# Patient Record
Sex: MALE | Race: WHITE | Employment: UNEMPLOYED | URBAN - NONMETROPOLITAN AREA
[De-identification: names, ages, dates, MRNs, and addresses within clinical notes are randomized per-mention and may not be internally consistent; named-entity substitution may affect disease eponyms.]

---

## 2016-12-31 LAB
CARBAMAZEPINE SERPL-MCNC: 6.4 UG/ML (ref 4–12)
FELBAMATE LEVEL: 48 UG/ML (ref 40–100)
Lab: 3.4 UG/ML (ref 0.4–4)
TOPIRAMATE LEVEL: 10.9 UG/ML (ref 2–25)

## 2017-01-01 ENCOUNTER — COMMUNICATION - GICH (OUTPATIENT)
Dept: INTERNAL MEDICINE | Facility: OTHER | Age: 47
End: 2017-01-01

## 2017-01-01 DIAGNOSIS — F41.1 GENERALIZED ANXIETY DISORDER: ICD-10-CM

## 2017-01-01 DIAGNOSIS — R56.1 POST TRAUMATIC SEIZURE (H): ICD-10-CM

## 2017-01-01 DIAGNOSIS — Z79.899 OTHER LONG TERM (CURRENT) DRUG THERAPY: ICD-10-CM

## 2017-01-01 DIAGNOSIS — G40.919 INTRACTABLE EPILEPSY WITHOUT STATUS EPILEPTICUS (H): ICD-10-CM

## 2017-01-03 ENCOUNTER — COMMUNICATION - GICH (OUTPATIENT)
Dept: INTERNAL MEDICINE | Facility: OTHER | Age: 47
End: 2017-01-03

## 2017-01-03 DIAGNOSIS — Z79.899 OTHER LONG TERM (CURRENT) DRUG THERAPY: ICD-10-CM

## 2017-01-03 DIAGNOSIS — F41.1 GENERALIZED ANXIETY DISORDER: ICD-10-CM

## 2017-01-03 DIAGNOSIS — R56.1 POST TRAUMATIC SEIZURE (H): ICD-10-CM

## 2017-01-03 DIAGNOSIS — S02.5XXB: ICD-10-CM

## 2017-01-03 DIAGNOSIS — G40.919 INTRACTABLE EPILEPSY WITHOUT STATUS EPILEPTICUS (H): ICD-10-CM

## 2017-01-06 ENCOUNTER — TELEPHONE (OUTPATIENT)
Dept: NEUROLOGY | Facility: CLINIC | Age: 47
End: 2017-01-06

## 2017-01-06 DIAGNOSIS — G40.219 PARTIAL EPILEPSY WITH IMPAIRMENT OF CONSCIOUSNESS, INTRACTABLE (H): Primary | ICD-10-CM

## 2017-01-06 DIAGNOSIS — G40.909 EPILEPTIC SEIZURE (H): Primary | ICD-10-CM

## 2017-01-06 DIAGNOSIS — G40.219 PARTIAL SYMPTOMATIC EPILEPSY WITH COMPLEX PARTIAL SEIZURES, INTRACTABLE, WITHOUT STATUS EPILEPTICUS (H): ICD-10-CM

## 2017-01-06 NOTE — TELEPHONE ENCOUNTER
PLAN:  Discussed with Dr. Plaza    1) Likely flu related.    2) Will check CBC, SGOT, and LFT    3) Fax lab order to [facility]    4) Follow up with PCP.

## 2017-01-06 NOTE — TELEPHONE ENCOUNTER
"Caller: Eleanor   Relationship to Patient: wife   Call Back Number: [phone]   Reason for Call: patient has been not feeling well, having hot flashes and getting sick. No fever.Would like to know if this is due to the VNS.  Spoke with PCP office already and think it maybe seizure related.     First time Joni became sick he had several layers of clothes on/was bundled up.   At some point Joni fell to the ground, Eleanor said this wasn't a seizure.   She feels his thinking is \"out of wack\"  His sleeping patterns are \"off\".  For two weeks he didn't get out of bed until 5 or 6 PM.  No he's lost his appetite. He's vomited a couple of times.   They called PCP office who directed them to call [facility].  Currently have an appointment February 9, 2017.    Current/confirmed anticonvulsant medication:    FBM [phone]  CBZ [phone]  TPM [phone]    "

## 2017-01-12 ENCOUNTER — TRANSFERRED RECORDS (OUTPATIENT)
Dept: HEALTH INFORMATION MANAGEMENT | Facility: CLINIC | Age: 47
End: 2017-01-12

## 2017-01-12 ENCOUNTER — HISTORY (OUTPATIENT)
Dept: INTERNAL MEDICINE | Facility: OTHER | Age: 47
End: 2017-01-12

## 2017-01-12 ENCOUNTER — OFFICE VISIT - GICH (OUTPATIENT)
Dept: INTERNAL MEDICINE | Facility: OTHER | Age: 47
End: 2017-01-12

## 2017-01-12 DIAGNOSIS — R53.81 OTHER MALAISE: ICD-10-CM

## 2017-01-12 DIAGNOSIS — R41.0 DISORIENTATION: ICD-10-CM

## 2017-01-12 DIAGNOSIS — G40.909 EPILEPSY WITHOUT STATUS EPILEPTICUS, NOT INTRACTABLE (H): ICD-10-CM

## 2017-01-12 DIAGNOSIS — R26.89 OTHER ABNORMALITIES OF GAIT AND MOBILITY: ICD-10-CM

## 2017-01-12 DIAGNOSIS — R53.83 OTHER FATIGUE: ICD-10-CM

## 2017-01-12 LAB
A/G RATIO - HISTORICAL: 1.3 (ref 1–2)
ABS BASOPHILS: 0.1 THOU/CU MM
ABSOLUTE BASOPHILS - HISTORICAL: 0.1 THOU/CU MM
ABSOLUTE EOSINOPHILS - HISTORICAL: 0.1 THOU/CU MM
ABSOLUTE LYMPHOCYTES - HISTORICAL: 2.2 THOU/CU MM (ref 0.9–2.9)
ABSOLUTE MONOCYTES - HISTORICAL: 0.4 THOU/CU MM
ABSOLUTE NEUTROPHILS - HISTORICAL: 2.8 THOU/CU MM (ref 1.7–7)
ALBUMIN SERPL-MCNC: 4 G/DL (ref 3.5–5.7)
ALBUMIN SERPL-MCNC: 4 G/DL (ref 3.5–5.7)
ALBUMIN/GLOB SERPL: 1.3 {RATIO} (ref 1–2)
ALP SERPL-CCNC: 104 IU/L (ref 34–104)
ALP SERPL-CCNC: 104 IU/L (ref 34–104)
ALT (SGPT) - HISTORICAL: 11 IU/L (ref 7–52)
ALT SERPL W/O P-5'-P-CCNC: 11 IU/L (ref 7–52)
AST SERPL-CCNC: 13 IU/L (ref 13–39)
AST SERPL-CCNC: 13 IU/L (ref 13–39)
BASOPHILS # BLD AUTO: 1.1 %
BASOPHILS NFR BLD AUTO: 1.1 %
BILIRUB SERPL-MCNC: 0.4 MG/DL (ref 0.3–1)
BILIRUB SERPL-MCNC: 0.4 MG/DL (ref 0.3–1)
BILIRUBIN DIRECT: 0.06 MG/DL (ref 0.03–0.18)
BILIRUBIN, DIRECT: 0.06 MG/DL (ref 0.03–0.18)
BILIRUBIN,INDIRECT: 0.3 MG/DL (ref 0.2–0.8)
BILIRUBIN,INDIRECT: 0.3 MG/DL (ref 0.2–0.8)
EOSINOPHIL # BLD AUTO: 0.1 THOU/CU MM
EOSINOPHIL NFR BLD AUTO: 2.4 %
EOSINOPHIL NFR BLD AUTO: 2.4 %
ERYTHROCYTE [DISTWIDTH] IN BLOOD BY AUTOMATED COUNT: 12.6 % (ref 11.5–15.5)
ERYTHROCYTE [DISTWIDTH] IN BLOOD BY AUTOMATED COUNT: 12.6 % (ref 11.5–15.5)
GLOBULIN - HISTORICAL: 3.2 G/DL (ref 2–3.7)
GLOBULIN: 3.2 G/DL (ref 2–3.7)
HCT VFR BLD AUTO: 39.3 % (ref 37–53)
HCT VFR BLD AUTO: 39.3 % (ref 37–53)
HEMOGLOBIN: 13.3 G/DL (ref 13.5–17.5)
HEMOGLOBIN: 13.3 G/DL (ref 13.5–17.5)
LYMPHOCYTES # BLD AUTO: 2.2 THOU/CU MM (ref 0.9–2.9)
LYMPHOCYTES NFR BLD AUTO: 39 % (ref 20–44)
LYMPHOCYTES NFR BLD AUTO: 39 % (ref 20–44)
MCH RBC QN AUTO: 31.7 PG (ref 26–34)
MCH RBC QN AUTO: 31.7 PG (ref 26–34)
MCHC RBC AUTO-ENTMCNC: 34 G/DL (ref 32–36)
MCHC RBC AUTO-ENTMCNC: 34 G/DL (ref 32–36)
MCV RBC AUTO: 93 FL (ref 80–100)
MCV RBC AUTO: 93 FL (ref 80–100)
MONOCYTES # BLD AUTO: 0.4 THOU/CU MM
MONOCYTES NFR BLD AUTO: 7.1 %
MONOCYTES NFR BLD AUTO: 7.1 %
NEUTROPHILS # BLD AUTO: 2.8 THOU/CU MM (ref 1.7–7)
NEUTROPHILS NFR BLD AUTO: 50.4 % (ref 42–72)
NEUTROPHILS NFR BLD AUTO: 50.4 % (ref 42–72)
PLATELET # BLD AUTO: 255 THOU/CU MM (ref 140–440)
PLATELET # BLD AUTO: 255 THOU/CU MM (ref 140–440)
PMV BLD: 8.4 FL (ref 6.5–11)
PMV BLD: 8.4 FL (ref 6.5–11)
PROT SERPL-MCNC: 7.2 G/DL (ref 6.4–8.9)
PROT UR QL STRIP.AUTO: 7.2 G/DL (ref 6.4–8.9)
RBC # BLD AUTO: 4.21 MIL/CU MM (ref 4.3–5.9)
RED BLOOD COUNT - HISTORICAL: 4.21 MIL/CU MM (ref 4.3–5.9)
SODIUM SERPL-SCNC: 139 MMOL/L (ref 133–143)
SODIUM SERPL-SCNC: 139 MMOL/L (ref 133–143)
WBC # BLD AUTO: 5.5 THOU/CU MM (ref 4.5–11)
WHITE BLOOD COUNT - HISTORICAL: 5.5 THOU/CU MM (ref 4.5–11)

## 2017-01-12 ASSESSMENT — PATIENT HEALTH QUESTIONNAIRE - PHQ9: SUM OF ALL RESPONSES TO PHQ QUESTIONS 1-9: 0

## 2017-01-13 DIAGNOSIS — G40.909 EPILEPTIC SEIZURE (H): ICD-10-CM

## 2017-01-13 DIAGNOSIS — G40.219 PARTIAL EPILEPSY WITH IMPAIRMENT OF CONSCIOUSNESS, INTRACTABLE (H): ICD-10-CM

## 2017-01-31 ENCOUNTER — TELEPHONE (OUTPATIENT)
Dept: NEUROLOGY | Facility: CLINIC | Age: 47
End: 2017-01-31

## 2017-01-31 NOTE — TELEPHONE ENCOUNTER
"Patient's wife called to clinic 1-30-17 with report that patient had decreased Carbatrol on his own and that PCP had said that it was OK.    Writer returned call to Eleanor -patient's wife.      Wife has many concerns about her , who at the time of call is living with his parents.  These concerns include that after noticing increased seizure activity, she questioned him about his meds and was told by him that he had skipped part of his medications due to his eye feeling funny.  She states that he often adjust his meds according to how he is feeling and that he often complains of feeling toxic.  She also states that he usually adjust his Carbatrol.    She also indicates that she feels there is something more and unusual going on now and indicates that she is concerned about a tumor re-growth.  She feels that he needs to have another MRI while here for his upcoming appointment 2/9/17.  She indicates that a MRI was ordered, but did not get done due to his VNS and \"the people up here don't know how to deal with it\".    Information will be forwarded to MD for appointment.    Tony Ramirez          "

## 2017-01-31 NOTE — TELEPHONE ENCOUNTER
Caller: brittany   Relationship to Patient: mother   Call Back Number: [phone]   Reason for Call: patient mother is requesting that Valentina gives her a phone call    Brittany (mom) calls reporting Joni will be moving to his parents home ( from spouse)  Mom expressed concern that Joni has lost weight, loss of appetite and experiencing headaches caused by light sensitivity.  Villa (brother) isn't observing as many seizures / seizures are better controlled.  Mom was called to see if she should help Joni establish care with PCP.  Appointment scheduled with Dr. Bush on 2/9/17.      PLAN:  Establish care with PCP.  Keep appointment 2/8/17  Call if seizures/concerns

## 2017-02-07 ENCOUNTER — OFFICE VISIT (OUTPATIENT)
Dept: FAMILY MEDICINE | Facility: CLINIC | Age: 47
End: 2017-02-07
Payer: MEDICARE

## 2017-02-07 VITALS
SYSTOLIC BLOOD PRESSURE: 110 MMHG | DIASTOLIC BLOOD PRESSURE: 66 MMHG | BODY MASS INDEX: 27.28 KG/M2 | HEIGHT: 68 IN | WEIGHT: 180 LBS | HEART RATE: 85 BPM | OXYGEN SATURATION: 98 % | TEMPERATURE: 98.2 F

## 2017-02-07 DIAGNOSIS — K21.9 GASTROESOPHAGEAL REFLUX DISEASE WITHOUT ESOPHAGITIS: ICD-10-CM

## 2017-02-07 DIAGNOSIS — Z96.89 STATUS POST VNS (VAGUS NERVE STIMULATOR) PLACEMENT: ICD-10-CM

## 2017-02-07 DIAGNOSIS — G40.209 PARTIAL EPILEPSY WITH IMPAIRMENT OF CONSCIOUSNESS (H): ICD-10-CM

## 2017-02-07 DIAGNOSIS — F41.8 MIXED ANXIETY DEPRESSIVE DISORDER: ICD-10-CM

## 2017-02-07 DIAGNOSIS — F32.4 MAJOR DEPRESSIVE DISORDER WITH SINGLE EPISODE, IN PARTIAL REMISSION (H): ICD-10-CM

## 2017-02-07 DIAGNOSIS — R26.81 UNSTABLE GAIT: ICD-10-CM

## 2017-02-07 DIAGNOSIS — C71.9 OLIGODENDROGLIOMA (H): ICD-10-CM

## 2017-02-07 DIAGNOSIS — F41.9 ANXIETY: ICD-10-CM

## 2017-02-07 DIAGNOSIS — Z72.0 TOBACCO USE: ICD-10-CM

## 2017-02-07 PROBLEM — L30.1 VESICULAR PALMOPLANTAR ECZEMA: Status: ACTIVE | Noted: 2017-02-07

## 2017-02-07 PROBLEM — M91.91: Status: ACTIVE | Noted: 2017-02-07

## 2017-02-07 PROBLEM — M91.91: Status: RESOLVED | Noted: 2017-02-07 | Resolved: 2017-02-07

## 2017-02-07 PROBLEM — M91.90: Status: ACTIVE | Noted: 2017-02-07

## 2017-02-07 PROBLEM — Z98.890 HISTORY OF BILIARY T-TUBE PLACEMENT: Status: RESOLVED | Noted: 2017-01-25 | Resolved: 2017-02-07

## 2017-02-07 PROBLEM — Z98.890 HISTORY OF BILIARY T-TUBE PLACEMENT: Status: ACTIVE | Noted: 2017-01-25

## 2017-02-07 PROCEDURE — 99204 OFFICE O/P NEW MOD 45 MIN: CPT | Performed by: FAMILY MEDICINE

## 2017-02-07 RX ORDER — ESCITALOPRAM OXALATE 20 MG/1
20 TABLET ORAL DAILY
Qty: 90 TABLET | Refills: 1 | Status: ON HOLD | OUTPATIENT
Start: 2017-02-07 | End: 2017-03-24

## 2017-02-07 RX ORDER — PANTOPRAZOLE SODIUM 40 MG/1
40 TABLET, DELAYED RELEASE ORAL DAILY
Qty: 90 TABLET | Refills: 1 | Status: ON HOLD | OUTPATIENT
Start: 2017-02-07 | End: 2017-03-24

## 2017-02-07 RX ORDER — CARBAMAZEPINE 300 MG/1
300 CAPSULE, EXTENDED RELEASE ORAL 2 TIMES DAILY
COMMUNITY
Start: 2017-01-12 | End: 2017-02-09

## 2017-02-07 ASSESSMENT — ANXIETY QUESTIONNAIRES
6. BECOMING EASILY ANNOYED OR IRRITABLE: SEVERAL DAYS
1. FEELING NERVOUS, ANXIOUS, OR ON EDGE: SEVERAL DAYS
2. NOT BEING ABLE TO STOP OR CONTROL WORRYING: SEVERAL DAYS
5. BEING SO RESTLESS THAT IT IS HARD TO SIT STILL: NOT AT ALL
GAD7 TOTAL SCORE: 6
7. FEELING AFRAID AS IF SOMETHING AWFUL MIGHT HAPPEN: SEVERAL DAYS
3. WORRYING TOO MUCH ABOUT DIFFERENT THINGS: SEVERAL DAYS
IF YOU CHECKED OFF ANY PROBLEMS ON THIS QUESTIONNAIRE, HOW DIFFICULT HAVE THESE PROBLEMS MADE IT FOR YOU TO DO YOUR WORK, TAKE CARE OF THINGS AT HOME, OR GET ALONG WITH OTHER PEOPLE: SOMEWHAT DIFFICULT

## 2017-02-07 ASSESSMENT — PATIENT HEALTH QUESTIONNAIRE - PHQ9: 5. POOR APPETITE OR OVEREATING: SEVERAL DAYS

## 2017-02-07 NOTE — PROGRESS NOTES
SUBJECTIVE:                                                    Joni Borja is a 46 year old male who presents to clinic today for the following health issues:    Mood Problems:  The patient recently moved down to [place] to live with his parents for better management of his medical problems. He recently  from his wife and was living in his work shop. The patient has been experiencing a lot of stress due to the relationship problems. He wants to go to therapy with his wife but she refused.    Weight Loss:  The patient has been experiencing unexpected weight loss for the past two months. He has a decreased appetite but has hanny drinking a lot of water. He denies vomiting and diarrhea. He recently decreased his Carbuterol his dosage to one 300 mg tablet twice daily. He drinks alcohol on the weekends.    Seizures:  The patient has a few partial seizures a week. He has not had Grand Mal seizures in the past few years. The patient does not remember the seizures occuring and is confused after the episodes. He has a history of a right sided frontal oligodendroglioma tumor. The tumor was removed in 2013. He is currently working with a neurologist. He has an implant in is vagus nerve that help prevent seizures. The implant sends impulses every five minutes. The implant is close to his vocal chords. He turns it off when he is talking because it makes it difficult to understand him.    Memory Issues:  The patient states that he occasionally has memory problems. He is oriented to place and time but occasionally has difficult remembering details.      Problem list and histories reviewed & adjusted, as indicated.  Additional history: as documented      ROS:  Constitutional, HEENT, cardiovascular, pulmonary, GI, , musculoskeletal, neuro, skin, endocrine and psych systems are negative, except as otherwise noted.    This document serves as a record of the services and decisions personally performed and made by  "Brian Coon MD. It was created on his behalf by Lisa Reynoso, a trained medical scribe. The creation of this document is based on the provider's statements to the medical scribe.  Lisa Reynoso 1:14 PM 2/7/2017  OBJECTIVE:                                                    /66 mmHg  Pulse 85  Temp(Src) 98.2  F (36.8  C) (Oral)  Ht 1.727 m (5' 8\")  Wt 81.647 kg (180 lb)  BMI 27.38 kg/m2  SpO2 98% Body mass index is 27.38 kg/(m^2).   GENERAL: healthy, alert, well nourished, well hydrated, no distress  HENT: ear canals- normal; TMs- normal; Nose- normal; Mouth- no ulcers, no lesions  NECK: no tenderness, no adenopathy, no asymmetry, no masses, no stiffness; thyroid- normal to palpation  RESP: lungs clear to auscultation - no rales, no rhonchi, no wheezes  CV: regular rates and rhythm, normal S1 S2, no S3 or S4 and no murmur, no click or rub -  ABDOMEN: soft, no tenderness, no  hepatosplenomegaly, no masses, normal bowel sounds  MS: extremities- no gross deformities noted, no edema  SKIN: no suspicious lesions, no rashes    Diagnostic test results:  none      ASSESSMENT/PLAN:       Joni was seen today for establish care.    Diagnoses and all orders for this visit:    Partial epilepsy with impairment of consciousness, intractable (H) - stable - continue meds and VNS.  Recent labs done at prior clinic - in [facility]    Anxiety - - controlled - continue medication./Major depressive disorder with single episode, in partial remission (H) - controlled - continue medication.  -     escitalopram (LEXAPRO) 20 MG tablet; Take 1 tablet (20 mg) by mouth daily 1 tab nightly  -     MENTAL HEALTH REFERRAL    Tobacco use    Unstable gait    Oligodendroglioma (H) - s/p excision        Gastroesophageal reflux disease without esophagitis  -     pantoprazole (PROTONIX) 40 MG EC tablet; Take 1 tablet (40 mg) by mouth daily            Risks, benefits and alternatives of treatments discussed. Plan agreed on.  " "    Followup: 6 months    Will call, return to clinic, or go to ED if worsening or symptoms not improving as discussed.    See patient instructions.     Tobacco Cessation:   reports that he has been smoking Cigarettes.  He has a 20 pack-year smoking history. He has never used smokeless tobacco.  Tobacco Cessation Action Plan: Information offered: Patient not interested at this time    BMI:   Estimated body mass index is 27.38 kg/(m^2) as calculated from the following:    Height as of this encounter: 1.727 m (5' 8\").    Weight as of this encounter: 81.647 kg (180 lb).   Weight management plan: Discussed healthy diet and exercise guidelines and patient will follow up in 12 months in clinic to re-evaluate.      Health Maintenance Topics with due status: Overdue       Topic Date Due    LIPID SCREEN Q5 YR MALE (SYSTEM ASSIGNED) 12/19/2005    INFLUENZA VACCINE (SYSTEM ASSIGNED) 09/01/2016       Health maintenance reviewed/updated? Yes    The information in this document, created by a scribe for me, accurately reflects the services I personally performed and the decisions made by me. I have reviewed and approved this document for accuracy.      Viet Coon MD     "

## 2017-02-07 NOTE — NURSING NOTE
"Chief Complaint   Patient presents with     Establish Care       Initial /66 mmHg  Pulse 85  Temp(Src) 98.2  F (36.8  C) (Oral)  Ht 5' 8\" (1.727 m)  Wt 180 lb (81.647 kg)  BMI 27.38 kg/m2  SpO2 98% Estimated body mass index is 27.38 kg/(m^2) as calculated from the following:    Height as of this encounter: 5' 8\" (1.727 m).    Weight as of this encounter: 180 lb (81.647 kg).  Medication Reconciliation: complete  "

## 2017-02-07 NOTE — MR AVS SNAPSHOT
After Visit Summary   2/7/2017    Joni Borja    MRN: [id number]           Patient Information     Date Of Birth          1970        Visit Information        Provider Department      2/7/2017 1:20 PM Brian Coon MD [facility]        Today's Diagnoses     Partial epilepsy with impairment of consciousness, intractable (H)    -  1     Anxiety         Unstable gait         Oligodendroglioma (H)         Major depressive disorder with single episode, in partial remission (H)            Follow-ups after your visit        Additional Services     MENTAL HEALTH REFERRAL       Your provider has referred you to: [facility]al [facility] Providers Intake Scheduling [phone]   [email or web address]    All scheduling is subject to the client's specific insurance plan & benefits, provider/location availability, and provider clinical specialities.  Please arrive 15 minutes early for your first appointment and bring your completed paperwork.    Please be aware that coverage of these services is subject to the terms and limitations of your health insurance plan.  Call member services at your health plan with any benefit or coverage questions.                  Your next 10 appointments already scheduled     Feb 09, 2017  2:30 PM   Return Visit with Moshe Bush MD   [facility] ([facility])    [address] [place] [address], [address]  [facility] [address] [phone]              Who to contact     If you have questions or need follow up information about today's clinic visit or your schedule please contact [facility] directly at [phone].  Normal or non-critical lab and imaging results will be communicated to you by MyChart, letter or phone within 4 business days after the clinic has received the results. If you do not hear from us within 7 days, please contact the clinic through MyChart or phone. If you have a critical or abnormal lab result,  "we will notify you by phone as soon as possible.  Submit refill requests through [employer] or call your pharmacy and they will forward the refill request to us. Please allow 3 business days for your refill to be completed.          Additional Information About Your Visit        [employer] Information     [employer] lets you send messages to your doctor, view your test results, renew your prescriptions, schedule appointments and more. To sign up, go to [email or web address] . Click on \"Log in\" on the left side of the screen, which will take you to the Welcome page. Then click on \"Sign up Now\" on the right side of the page.     You will be asked to enter the access code listed below, as well as some personal information. Please follow the directions to create your username and password.     Your access code is: [email or web address]  Expires: 2017  2:06 PM     Your access code will  in 90 days. If you need help or a new code, please call your [place] clinic or [phone].        Care EveryWhere ID     This is your Care EveryWhere ID. This could be used by other organizations to access your [facility] medical records  [id number]        Your Vitals Were     Pulse Temperature Height BMI (Body Mass Index) Pulse Oximetry       85 98.2  F (36.8  C) (Oral) 5' 8\" (1.727 m) 27.38 kg/m2 98%        Blood Pressure from Last 3 Encounters:   17 110/66   16 113/69   16 117/76    Weight from Last 3 Encounters:   17 180 lb (81.647 kg)   16 190 lb 3.2 oz (86.274 kg)   16 196 lb 6.4 oz (89.086 kg)              We Performed the Following     MENTAL HEALTH REFERRAL          Today's Medication Changes          These changes are accurate as of: 17  2:06 PM.  If you have any questions, ask your nurse or doctor.               These medicines have changed or have updated prescriptions.        Dose/Directions    carBAMazepine 300 MG 12 hr capsule   Commonly known as:  CARBATROL   This may have changed:  " Another medication with the same name was removed. Continue taking this medication, and follow the directions you see here.   Changed by:  Brian Coon MD        Dose:  300 mg   Take 300 mg by mouth 2 times daily   Refills:  0       escitalopram 20 MG tablet   Commonly known as:  LEXAPRO   This may have changed:    - medication strength  - when to take this   Used for:  Partial epilepsy with impairment of consciousness, intractable (H), Major depressive disorder with single episode, in partial remission (H), Anxiety, Unstable gait   Changed by:  Brian Coon MD        Dose:  20 mg   Take 1 tablet (20 mg) by mouth daily 1 tab nightly   Quantity:  90 tablet   Refills:  1            Where to get your medicines      These medications were sent to [id number] - [place] - [address]  [address] [facility] [place] [address]     Phone:  [phone]    - escitalopram 20 MG tablet  - pantoprazole 40 MG EC tablet             Primary Care Provider Office Phone # Fax #    Brian Coon [phone]       [address] [address]        Thank you!     Thank you for choosing [facility]  for your care. Our goal is always to provide you with excellent care. Hearing back from our patients is one way we can continue to improve our services. Please take a few minutes to complete the written survey that you may receive in the mail after your visit with us. Thank you!             Your Updated Medication List - Protect others around you: Learn how to safely use, store and throw away your medicines at www.disposemymeds.org.          This list is accurate as of: 2/7/17  2:06 PM.  Always use your most recent med list.                   Brand Name Dispense Instructions for use    acetaminophen-codeine 300-30 MG per tablet    TYLENOL #3     Take 1-2 tablets by mouth every 4 hours as needed       BENADRYL PO      Take by  mouth as needed       carBAMazepine 300 MG 12 hr capsule    CARBATROL     Take 300 mg by mouth 2 times daily       clonazePAM 0.5 MG tablet    klonoPIN    180 tablet    1 tab am, 2 tab pm       diazepam 5 MG tablet    VALIUM    30 tablet    As instructed for seizures. Please take 5 mg for more than 3 seizures in 8 hour period, may repeat 5 mg after 30 min if seizures continue.       escitalopram 20 MG tablet    LEXAPRO    90 tablet    Take 1 tablet (20 mg) by mouth daily 1 tab nightly       felbamate 600 MG tablet    FELBATOL    225 tablet    TAKE 1 TAB AM and 1 1/2 TAB in afternoon at 2 pm (600mg-900mg)       pantoprazole 40 MG EC tablet    PROTONIX    90 tablet    Take 1 tablet (40 mg) by mouth daily       topiramate 100 MG tablet    TOPAMAX    450 tablet    Take 2 tab po am - 3 tab po pm       ZYRTEC ALLERGY PO      Take 10 mg by mouth as needed

## 2017-02-08 ASSESSMENT — PATIENT HEALTH QUESTIONNAIRE - PHQ9: SUM OF ALL RESPONSES TO PHQ QUESTIONS 1-9: 10

## 2017-02-08 ASSESSMENT — ANXIETY QUESTIONNAIRES: GAD7 TOTAL SCORE: 6

## 2017-02-09 ENCOUNTER — AMBULATORY - GICH (OUTPATIENT)
Dept: SCHEDULING | Facility: OTHER | Age: 47
End: 2017-02-09

## 2017-02-09 ENCOUNTER — OFFICE VISIT (OUTPATIENT)
Dept: NEUROLOGY | Facility: CLINIC | Age: 47
End: 2017-02-09

## 2017-02-09 VITALS
HEIGHT: 68 IN | BODY MASS INDEX: 27.19 KG/M2 | SYSTOLIC BLOOD PRESSURE: 116 MMHG | HEART RATE: 84 BPM | WEIGHT: 179.4 LBS | DIASTOLIC BLOOD PRESSURE: 74 MMHG

## 2017-02-09 DIAGNOSIS — G40.219 PARTIAL EPILEPSY WITH IMPAIRMENT OF CONSCIOUSNESS, INTRACTABLE (H): ICD-10-CM

## 2017-02-09 DIAGNOSIS — G40.909 SEIZURE DISORDER (H): ICD-10-CM

## 2017-02-09 DIAGNOSIS — F41.9 ANXIETY: ICD-10-CM

## 2017-02-09 DIAGNOSIS — G40.219 LOCALIZATION-RELATED EPILEPSY WITH COMPLEX PARTIAL SEIZURES WITH INTRACTABLE EPILEPSY (H): ICD-10-CM

## 2017-02-09 DIAGNOSIS — R26.81 UNSTABLE GAIT: ICD-10-CM

## 2017-02-09 DIAGNOSIS — G40.209 PARTIAL EPILEPSY WITH IMPAIRMENT OF CONSCIOUSNESS (H): Primary | ICD-10-CM

## 2017-02-09 RX ORDER — CLONAZEPAM 0.5 MG/1
TABLET ORAL
Qty: 180 TABLET | Refills: 3 | Status: SHIPPED | OUTPATIENT
Start: 2017-02-09 | End: 2017-03-23

## 2017-02-09 RX ORDER — TOPIRAMATE 100 MG/1
TABLET, FILM COATED ORAL
Qty: 450 TABLET | Refills: 3 | Status: SHIPPED | OUTPATIENT
Start: 2017-02-09 | End: 2017-03-23

## 2017-02-09 RX ORDER — CARBAMAZEPINE 300 MG/1
300 CAPSULE, EXTENDED RELEASE ORAL 2 TIMES DAILY
Qty: 60 CAPSULE | Refills: 3 | Status: ON HOLD | OUTPATIENT
Start: 2017-02-09 | End: 2017-03-24

## 2017-02-09 RX ORDER — FELBAMATE 600 MG/1
TABLET ORAL
Qty: 225 TABLET | Refills: 3 | Status: SHIPPED | OUTPATIENT
Start: 2017-02-09 | End: 2017-03-23

## 2017-02-09 NOTE — LETTER
2017       RE: Joni Borja  : 1970   MRN: [id number]      Dear Colleague,    Thank you for referring your patient, Joni Borja, to the [facility] at [facility]. Please see a copy of my visit note below.    [facility]      Patient:  Joni Borja  :  1970   Age:  46 year old   Today's Office Visit:  2017    Epilepsy Data:    Patient History  Primary Epileptologist/Provider: Silvia Brown M.D.  Patient Status: Chronic Intractable  Epilepsy Syndrome: Localization-related epilepsy unspecified  Epilepsy Syndrome Status: Final  Age of Onset: 29  Etiology  : Tumor  Other Relevant Dx/ Issues: Grade 2 oligodendroglioma , s/p subtotal resection (), chemo (3407-6255), and whole brain radiation. Depression with paranoid thinking. 2013 right frontal resection, path notable for residual glioma with glial scarring.      Tests/Surgery History  Last EE2012  Last MRI: 2012  Last Neuropsych Testin2012  Last Case Management Conference: 2013  Epilepsy Surgery #1 Date: 13 (right frontal lobectomy. )  Epilepsy Surgery #2 Date: 16  Epilepsy Related Surgery #2 : Type : VNS    Seizure Record  Current Visit Date: 17  Previous Visit Date: 16  Months since last visit: 2.63  Seizure Type 1: Complex partial seizures unspecified  # of Type 1 Seizure since last visit: 12  Freq. Type 1 / Month: 4.56  Seizure Type 2: Tonic-clonic seizures  # of Type 2 Seizure since last visit: 0  Freq. Type 2 / Month: 0  Seizure Type 3: Unclassified Seizure  Description of Sz Type 3:  (Drop attacks)  # of Type 3 Seizure since last visit: 0  Freq. Type 3 / Month: 0      Current Outpatient Prescriptions   Medication Sig Dispense Refill     carBAMazepine (CARBATROL) 300 MG 12 hr capsule Take 1 capsule (300 mg) by mouth 2 times daily 60 capsule 3     felbamate (FELBATOL) 600 MG tablet TAKE 1 TAB AM and 1  1/2 TAB in afternoon at 2 pm (600mg-900mg) 225 tablet 3     topiramate (TOPAMAX) 100 MG tablet Take 2 tab po am - 3 tab po pm 450 tablet 3     clonazePAM (KLONOPIN) 0.5 MG tablet 1 tab am, 2 tab pm 180 tablet 3     escitalopram (LEXAPRO) 20 MG tablet Take 1 tablet (20 mg) by mouth daily 1 tab nightly 90 tablet 1     pantoprazole (PROTONIX) 40 MG EC tablet Take 1 tablet (40 mg) by mouth daily 90 tablet 1     acetaminophen-codeine (TYLENOL #3) 300-30 MG per tablet Take 1-2 tablets by mouth every 4 hours as needed       diazepam (VALIUM) 5 MG tablet As instructed for seizures. Please take 5 mg for more than 3 seizures in 8 hour period, may repeat 5 mg after 30 min if seizures continue. 30 tablet 1     Cetirizine HCl (ZYRTEC ALLERGY PO) Take 10 mg by mouth as needed        [DISCONTINUED] carBAMazepine (CARBATROL) 300 MG 12 hr capsule Take 300 mg by mouth 2 times daily       [DISCONTINUED] felbamate (FELBATOL) 600 MG tablet TAKE 1 TAB AM and 1 1/2 TAB in afternoon at 2 pm (600mg-900mg) 225 tablet 3     [DISCONTINUED] topiramate (TOPAMAX) 100 MG tablet Take 2 tab po am - 3 tab po pm 450 tablet 3     [DISCONTINUED] clonazePAM (KLONOPIN) 0.5 MG tablet 1 tab am, 2 tab pm 180 tablet 1     DiphenhydrAMINE HCl (BENADRYL PO) Take by mouth as needed              EPILEPSY HISTORY:  Copied forward from Dr. Brown's notes: The patient had his 1st seizure at the age of 29 on 04/22/2002; and, at that time, he was diagnosed with a right frontal oligodendroglioma grade II and underwent resection (2007), whole brain radiation and chemotherapy (procarbazine, CCNU, vincristine from 06/2007-07/2008).  His pathological evaluation of the tumor demonstrated abnormalities of 1p and 19q.  The patient has been tried on multiple seizure medications; carbamazepine is his mainstay medication, and he has responded the best to this medication.  The patient had a right frontal lobe resection on 09/19/2013 at [facility] by Dr. Linder.   Surgical pathology  "9/18/2013 showed \"majority of cells demonstrate an astrocytic morphology rather than that of oligodendrocytes. The immunohistochemical stain that recognizes a common mutation in IDH-1 is positive in many of the cells, indicating that these cells are a neoplastic population.\" On MRI of the brain, there is still residual tumor or T2 signal changes posterior to the tumor resection cavity. This portion of the brain was not removed primarily because on cortical mapping there was concern post operative hemiparesis. Prior to his surgery had 10 seizure per day (hypermotor) and after surgery he has 1- 2 per week. Major issue post epilepsy surgery have been psych issues.        History of Present Illness:   Patient returns for follow up.  He is accompanied by his parents.  He continues to have about 2 CPS per week, no falls, no double vision, continues to be ataxic/off balance, and more fatigue.  Seizure are lighter compared to previous ones.  He is more awake.  Parents felt that he is getting better.  No side effects to felbamate.  No mood changes, no nausea, no vomiting, no abdominal pain, no rashes. No recent ER visits and no hospitalizations since last visit. Vagus nerve stimulator was placed 7/19/2016, now much better tolerated.  Depression is the better, no suicidal ideations.  VNS interrogated, settings are not changed.  Patient had some eye pain and fatigue problems, improved after tegretol was decreased to 300mg bid.    Current AEDs:    Felbamate 600 mg morning, 600 mg noon, and 300 mg pm   Topiramate 200 mg AM and 300 mg PM   Tegretol  mg twice a day   Klonopin 0.5mg PM morning and 1 mg pm (prescribed by PCP for anxiety)   CBD oil started June 2016 from [employer] (smoking and oil - dosing and sporadic). Prior to this he was smoking marijuana.     Medication Notes:     AED Medication Compliance:  compliant most of the time. Using a pill box, yes.   1. Levetiracetam - mood issues. Weaned off Levetiracetam " "2/12/2015 after getting a DUI.   2. Vimpat 200 mg twice a day, which caused increased sedation, toxicity, and was not able to tolerate.   3. Carbamazepine: Higher dose of carbamazepine causes ataxia and falls (> 1600 mg per day).   4. Fycompa was started November 2015 and was not helpful and weaned off 5/2016, global toxicity and was not able to tolerate higher than 6 mg.  5. Felbamate was started in May 2016. No major side effects.      Review of Systems:  Positive for lethargy, depression.  Rest of the 8 point ROS is negative.    Other Issues:  Is patient safe to drive:  No. Not working.     Exam:    /74 mmHg  Pulse 84  Ht 5' 7.99\" (172.7 cm)  Wt 179 lb 6.4 oz (81.375 kg)  BMI 27.28 kg/m2     General exam: General Appearance: No acute distress. HEENT: Normocephalic, atraumatic. Neck: Supple.  Extremities: No edema, no clubbing, no cyanosis.     Neurologic Exam: Alert and oriented x3. Speech fluent, appropriate. Normal attention. Cranial Nerves: Pupils are equal, round, reactive to light and accomodation. Extraocular movement intact. No facial weakness or asymmetry. Hearing normal. Motor Exam: Normal. Coordination:no ataxia.  Gait and Station: normal.       IMPRESSION:   1.  Right frontal lobe epilepsy, intractable, etiology oligodendroglioma/astrocytoma. S/P right frontal lobe resection (9/19/2013). He continues to have about 2 CPS per week, no falls, no double vision, continues to be ataxic/off balance, and more fatigue.  Seizure are lighter compared to previous ones.  He is more awake.  Parents felt that he is getting better.  No side effects to felbamate.  No mood changes, no nausea, no vomiting, no abdominal pain, no rashes. No recent ER visits and no hospitalizations since last visit. Vagus nerve stimulator was placed 7/19/2016, now much better tolerated.  Depression is the better, no suicidal ideations.  VNS interrogated, settings are not changed.  Patient had some eye pain and fatigue problems, " improved after tegretol was decreased to 300mg bid.    2.  Depression.  Taking Celexa 40 mg daily.      3.  Oligodendroglioma.  Stable. Follows with Dr. Janes Garcia. MRI of brain is stable per Antonino.     PLAN:   1. CONTINUE   Felbamate 600 mg morning, 600 mg noon, and 300 mg pm   Topiramate 200 mg AM and 300 mg PM   Tegretol  mg twice a day   Klonopin 0.5mg PM morning and 1 mg pm  Taking CBD from [place]    2. Continue current VNS settings.  3. Follow up with Kevin in 3 months.    40 min was spent on the visit.  Over 50% of the time was spent on education, counseling about optimal seizure control and coordination of care.      Again, thank you for allowing me to participate in the care of your patient.      Sincerely,    Moshe Bush MD

## 2017-02-09 NOTE — MR AVS SNAPSHOT
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      Your next 10 appointments already scheduled     Mar 15, 2017 10:00 AM   New Visit with Flex Lombardo [facility]    [address] [place] [id number] [phone]            Apr 05, 2017  1:00 PM   Return Visit with Flex Lombardo [facility] ([facility]    [address] [place] [address] [phone]            May 17, 2017  2:30 PM   Return Visit with Silvia Brown MD   [facility] ([facility])    [address], [address]  [facility] [address] [phone]              Who to contact     Please call your clinic at [phone] to:    Ask questions about your health    Make or cancel appointments    Discuss your medicines    Learn about your test results    Speak to your doctor   If you have compliments or concerns about an experience at your clinic, or if you wish to file a complaint, please contact [facility] Patient Relations at [phone] or email us at [email or web address]         Additional Information About Your Visit        MyChart Information   "   [employer] gives you secure access to your electronic health record. If you see a primary care provider, you can also send messages to your care team and make appointments. If you have questions, please call your primary care clinic.  If you do not have a primary care provider, please call [phone] and they will assist you.      [employer] is an electronic gateway that provides easy, online access to your medical records. With [employer], you can request a clinic appointment, read your test results, renew a prescription or communicate with your care team.     To access your existing account, please contact your [facility] or call [phone] for assistance.        Care EveryWhere ID     This is your Care EveryWhere ID. This could be used by other organizations to access your [place] medical records  [id number]        Your Vitals Were     Pulse Height BMI (Body Mass Index)             84 5' 7.99\" (172.7 cm) 27.28 kg/m2          Blood Pressure from Last 3 Encounters:   02/09/17 116/74   02/07/17 110/66   11/28/16 113/69    Weight from Last 3 Encounters:   02/09/17 179 lb 6.4 oz (81.375 kg)   02/07/17 180 lb (81.647 kg)   11/28/16 190 lb 3.2 oz (86.274 kg)              Today, you had the following     No orders found for display         Where to get your medicines      These medications were sent to [facility] - [address]  [address] [facility] [address] [facility] [place] [address]     Phone:  [phone]    - carBAMazepine 300 MG 12 hr capsule  - felbamate 600 MG tablet  - topiramate 100 MG tablet      Some of these will need a paper prescription and others can be bought over the counter.  Ask your nurse if you have questions.     Bring a paper prescription for each of these medications    - clonazePAM 0.5 MG tablet       Primary Care Provider Office Phone # Fax #    Brian Coon [phone]       [address] " [facility] [address]        Thank you!     Thank you for choosing [facility]  for your care. Our goal is always to provide you with excellent care. Hearing back from our patients is one way we can continue to improve our services. Please take a few minutes to complete the written survey that you may receive in the mail after your visit with us. Thank you!             Your Updated Medication List - Protect others around you: Learn how to safely use, store and throw away your medicines at www.disposemymeds.org.          This list is accurate as of: 2/9/17  3:22 PM.  Always use your most recent med list.                   Brand Name Dispense Instructions for use    acetaminophen-codeine 300-30 MG per tablet    TYLENOL #3     Take 1-2 tablets by mouth every 4 hours as needed       BENADRYL PO      Take by mouth as needed       carBAMazepine 300 MG 12 hr capsule    CARBATROL    60 capsule    Take 1 capsule (300 mg) by mouth 2 times daily       clonazePAM 0.5 MG tablet    klonoPIN    180 tablet    1 tab am, 2 tab pm       diazepam 5 MG tablet    VALIUM    30 tablet    As instructed for seizures. Please take 5 mg for more than 3 seizures in 8 hour period, may repeat 5 mg after 30 min if seizures continue.       escitalopram 20 MG tablet    LEXAPRO    90 tablet    Take 1 tablet (20 mg) by mouth daily 1 tab nightly       felbamate 600 MG tablet    FELBATOL    225 tablet    TAKE 1 TAB AM and 1 1/2 TAB in afternoon at 2 pm (600mg-900mg)       pantoprazole 40 MG EC tablet    PROTONIX    90 tablet    Take 1 tablet (40 mg) by mouth daily       topiramate 100 MG tablet    TOPAMAX    450 tablet    Take 2 tab po am - 3 tab po pm       ZYRTEC ALLERGY PO      Take 10 mg by mouth as needed

## 2017-02-10 NOTE — PROGRESS NOTES
[facility] Progress Note      Patient:  Joni Borja  :  1970   Age:  46 year old   Today's Office Visit:  2017    Epilepsy Data:    Patient History  Primary Epileptologist/Provider: Silvia Brown M.D.  Patient Status: Chronic Intractable  Epilepsy Syndrome: Localization-related epilepsy unspecified  Epilepsy Syndrome Status: Final  Age of Onset: 29  Etiology  : Tumor  Other Relevant Dx/ Issues: Grade 2 oligodendroglioma , s/p subtotal resection (), chemo (7418-1890), and whole brain radiation. Depression with paranoid thinking. 2013 right frontal resection, path notable for residual glioma with glial scarring.      Tests/Surgery History  Last EE2012  Last MRI: 2012  Last Neuropsych Testin2012  Last Case Management Conference: 2013  Epilepsy Surgery #1 Date: 13 (right frontal lobectomy. )  Epilepsy Surgery #2 Date: 16  Epilepsy Related Surgery #2 : Type : VNS    Seizure Record  Current Visit Date: 17  Previous Visit Date: 16  Months since last visit: 2.63  Seizure Type 1: Complex partial seizures unspecified  # of Type 1 Seizure since last visit: 12  Freq. Type 1 / Month: 4.56  Seizure Type 2: Tonic-clonic seizures  # of Type 2 Seizure since last visit: 0  Freq. Type 2 / Month: 0  Seizure Type 3: Unclassified Seizure  Description of Sz Type 3:  (Drop attacks)  # of Type 3 Seizure since last visit: 0  Freq. Type 3 / Month: 0      Current Outpatient Prescriptions   Medication Sig Dispense Refill     carBAMazepine (CARBATROL) 300 MG 12 hr capsule Take 1 capsule (300 mg) by mouth 2 times daily 60 capsule 3     felbamate (FELBATOL) 600 MG tablet TAKE 1 TAB AM and 1 1/2 TAB in afternoon at 2 pm (600mg-900mg) 225 tablet 3     topiramate (TOPAMAX) 100 MG tablet Take 2 tab po am - 3 tab po pm 450 tablet 3     clonazePAM (KLONOPIN) 0.5 MG tablet 1 tab am, 2 tab pm 180 tablet 3     escitalopram (LEXAPRO) 20 MG tablet Take 1 tablet (20 mg) by  "mouth daily 1 tab nightly 90 tablet 1     pantoprazole (PROTONIX) 40 MG EC tablet Take 1 tablet (40 mg) by mouth daily 90 tablet 1     acetaminophen-codeine (TYLENOL #3) 300-30 MG per tablet Take 1-2 tablets by mouth every 4 hours as needed       diazepam (VALIUM) 5 MG tablet As instructed for seizures. Please take 5 mg for more than 3 seizures in 8 hour period, may repeat 5 mg after 30 min if seizures continue. 30 tablet 1     Cetirizine HCl (ZYRTEC ALLERGY PO) Take 10 mg by mouth as needed        [DISCONTINUED] carBAMazepine (CARBATROL) 300 MG 12 hr capsule Take 300 mg by mouth 2 times daily       [DISCONTINUED] felbamate (FELBATOL) 600 MG tablet TAKE 1 TAB AM and 1 1/2 TAB in afternoon at 2 pm (600mg-900mg) 225 tablet 3     [DISCONTINUED] topiramate (TOPAMAX) 100 MG tablet Take 2 tab po am - 3 tab po pm 450 tablet 3     [DISCONTINUED] clonazePAM (KLONOPIN) 0.5 MG tablet 1 tab am, 2 tab pm 180 tablet 1     DiphenhydrAMINE HCl (BENADRYL PO) Take by mouth as needed              EPILEPSY HISTORY:  Copied forward from Dr. Brown's notes: The patient had his 1st seizure at the age of 29 on 04/22/2002; and, at that time, he was diagnosed with a right frontal oligodendroglioma grade II and underwent resection (2007), whole brain radiation and chemotherapy (procarbazine, CCNU, vincristine from 06/2007-07/2008).  His pathological evaluation of the tumor demonstrated abnormalities of 1p and 19q.  The patient has been tried on multiple seizure medications; carbamazepine is his mainstay medication, and he has responded the best to this medication.  The patient had a right frontal lobe resection on 09/19/2013 at [facility] by Dr. Linder.   Surgical pathology 9/18/2013 showed \"majority of cells demonstrate an astrocytic morphology rather than that of oligodendrocytes. The immunohistochemical stain that recognizes a common mutation in IDH-1 is positive in many of the cells, indicating that these cells are a neoplastic population.\" On MRI " of the brain, there is still residual tumor or T2 signal changes posterior to the tumor resection cavity. This portion of the brain was not removed primarily because on cortical mapping there was concern post operative hemiparesis. Prior to his surgery had 10 seizure per day (hypermotor) and after surgery he has 1- 2 per week. Major issue post epilepsy surgery have been psych issues.        History of Present Illness:   Patient returns for follow up.  He is accompanied by his parents.  He continues to have about 2 CPS per week, no falls, no double vision, continues to be ataxic/off balance, and more fatigue.  Seizure are lighter compared to previous ones.  He is more awake.  Parents felt that he is getting better.  No side effects to felbamate.  No mood changes, no nausea, no vomiting, no abdominal pain, no rashes. No recent ER visits and no hospitalizations since last visit. Vagus nerve stimulator was placed 7/19/2016, now much better tolerated.  Depression is the better, no suicidal ideations.  VNS interrogated, settings are not changed.  Patient had some eye pain and fatigue problems, improved after tegretol was decreased to 300mg bid.    Current AEDs:    Felbamate 600 mg morning, 600 mg noon, and 300 mg pm   Topiramate 200 mg AM and 300 mg PM   Tegretol  mg twice a day   Klonopin 0.5mg PM morning and 1 mg pm (prescribed by PCP for anxiety)   CBD oil started June 2016 from [employer] (smoking and oil - dosing and sporadic). Prior to this he was smoking marijuana.     Medication Notes:     AED Medication Compliance:  compliant most of the time. Using a pill box, yes.   1. Levetiracetam - mood issues. Weaned off Levetiracetam 2/12/2015 after getting a DUI.   2. Vimpat 200 mg twice a day, which caused increased sedation, toxicity, and was not able to tolerate.   3. Carbamazepine: Higher dose of carbamazepine causes ataxia and falls (> 1600 mg per day).   4. Fycompa was started November 2015 and was not helpful  "and weaned off 5/2016, global toxicity and was not able to tolerate higher than 6 mg.  5. Felbamate was started in May 2016. No major side effects.      Review of Systems:  Positive for lethargy, depression.  Rest of the 8 point ROS is negative.    Other Issues:  Is patient safe to drive:  No. Not working.     Exam:    /74 mmHg  Pulse 84  Ht 5' 7.99\" (172.7 cm)  Wt 179 lb 6.4 oz (81.375 kg)  BMI 27.28 kg/m2     General exam: General Appearance: No acute distress. HEENT: Normocephalic, atraumatic. Neck: Supple.  Extremities: No edema, no clubbing, no cyanosis.     Neurologic Exam: Alert and oriented x3. Speech fluent, appropriate. Normal attention. Cranial Nerves: Pupils are equal, round, reactive to light and accomodation. Extraocular movement intact. No facial weakness or asymmetry. Hearing normal. Motor Exam: Normal. Coordination:no ataxia.  Gait and Station: normal.       IMPRESSION:   1.  Right frontal lobe epilepsy, intractable, etiology oligodendroglioma/astrocytoma. S/P right frontal lobe resection (9/19/2013). He continues to have about 2 CPS per week, no falls, no double vision, continues to be ataxic/off balance, and more fatigue.  Seizure are lighter compared to previous ones.  He is more awake.  Parents felt that he is getting better.  No side effects to felbamate.  No mood changes, no nausea, no vomiting, no abdominal pain, no rashes. No recent ER visits and no hospitalizations since last visit. Vagus nerve stimulator was placed 7/19/2016, now much better tolerated.  Depression is the better, no suicidal ideations.  VNS interrogated, settings are not changed.  Patient had some eye pain and fatigue problems, improved after tegretol was decreased to 300mg bid.    2.  Depression.  Taking Celexa 40 mg daily.      3.  Oligodendroglioma.  Stable. Follows with Dr. Janes Garcia. MRI of brain is stable per Antonino.     PLAN:   1. CONTINUE   Felbamate 600 mg morning, 600 mg noon, and 300 mg pm "   Topiramate 200 mg AM and 300 mg PM   Tegretol  mg twice a day   Klonopin 0.5mg PM morning and 1 mg pm  Taking CBD from [place]    2. Continue current VNS settings.  3. Follow up with Kevin in 3 months.    40 min was spent on the visit.  Over 50% of the time was spent on education, counseling about optimal seizure control and coordination of care.

## 2017-02-14 ENCOUNTER — OFFICE VISIT (OUTPATIENT)
Dept: PSYCHOLOGY | Facility: CLINIC | Age: 47
End: 2017-02-14
Payer: MEDICARE

## 2017-02-14 DIAGNOSIS — F32.1 MAJOR DEPRESSIVE DISORDER, SINGLE EPISODE, MODERATE WITH MELANCHOLIC FEATURES (H): Primary | ICD-10-CM

## 2017-02-14 DIAGNOSIS — Z63.0 PARTNER RELATIONSHIP PROBLEM: ICD-10-CM

## 2017-02-14 DIAGNOSIS — Z63.5 MARITAL ESTRANGEMENT: ICD-10-CM

## 2017-02-14 PROCEDURE — 90791 PSYCH DIAGNOSTIC EVALUATION: CPT | Performed by: MARRIAGE & FAMILY THERAPIST

## 2017-02-14 SDOH — SOCIAL STABILITY - SOCIAL INSECURITY: DISRUPTION OF FAMILY BY SEPARATION AND DIVORCE: Z63.5

## 2017-02-14 SDOH — SOCIAL STABILITY - SOCIAL INSECURITY: PROBLEMS IN RELATIONSHIP WITH SPOUSE OR PARTNER: Z63.0
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
## 2017-02-14 NOTE — Clinical Note
Hello,  Client completed his therapy diagnostic assessment at [facility]. Therapy will continue to address depression and process of un-coupling in a healthy manner.  Will collaborate throughout treatment as needed.  Regards,  Flex Lombardo MA, LMFT, LICSW

## 2017-02-17 ENCOUNTER — CARE COORDINATION (OUTPATIENT)
Dept: NEUROLOGY | Facility: CLINIC | Age: 47
End: 2017-02-17

## 2017-02-17 ENCOUNTER — HOSPITAL ENCOUNTER (OUTPATIENT)
Dept: MRI IMAGING | Facility: CLINIC | Age: 47
Discharge: HOME | End: 2017-02-17
Attending: INTERNAL MEDICINE | Admitting: INTERNAL MEDICINE
Payer: MEDICARE

## 2017-02-17 DIAGNOSIS — C71.9 OLIGODENDROGLIOMA (H): ICD-10-CM

## 2017-02-17 PROCEDURE — A9585 GADOBUTROL INJECTION: HCPCS | Performed by: INTERNAL MEDICINE

## 2017-02-17 PROCEDURE — 70553 MRI BRAIN STEM W/O & W/DYE: CPT

## 2017-02-17 PROCEDURE — 25500064 ZZH RX 255 OP 636: Performed by: INTERNAL MEDICINE

## 2017-02-17 RX ORDER — GADOBUTROL 604.72 MG/ML
10 INJECTION INTRAVENOUS ONCE
Status: COMPLETED | OUTPATIENT
Start: 2017-02-17 | End: 2017-02-17

## 2017-02-17 RX ADMIN — GADOBUTROL 10 ML: 604.72 INJECTION INTRAVENOUS at 14:37

## 2017-02-17 NOTE — PROGRESS NOTES
VNS turned off to complete MRI, turned back on to incoming parameters below. Patient tolerated well.     [employer] Vagus Nerve Stimulator interrogated. Settings as follows:-  Patient ID [id number], Model ID [id number], Serial # [id number], Implant date 7/19/16.    NORMAL SETTINGS:  Output Current 1.00 mA,  Pulse/signal Frequency 30 Hz,  Pulse Width 250 ìsec,  On Time 30 sec,  Off Time 5.0 min,  IFI no.    MAGNET SETTINGS:  Magnet Output Current 1.25mA,  Magnet Pulse Width 250 ìsec,  Magnet On Time 60 sec.    AUTOSTIM SETTINGS:   AutoStim Current .875  AutoStim Pulse Width 500  AutoStim On Time 60    CONFIGURATION SETTINGS:  Tachycardia Detection ON  Threshold for AutoStim 3  Heartbeat Detection (sensitivity) 50%

## 2017-02-20 ENCOUNTER — OFFICE VISIT (OUTPATIENT)
Dept: PSYCHOLOGY | Facility: CLINIC | Age: 47
End: 2017-02-20
Payer: MEDICARE

## 2017-02-20 DIAGNOSIS — F32.1 MAJOR DEPRESSIVE DISORDER, SINGLE EPISODE, MODERATE WITH MELANCHOLIC FEATURES (H): Primary | ICD-10-CM

## 2017-02-20 DIAGNOSIS — Z63.0 PARTNER RELATIONSHIP PROBLEM: ICD-10-CM

## 2017-02-20 DIAGNOSIS — Z63.5 MARITAL ESTRANGEMENT: ICD-10-CM

## 2017-02-20 PROCEDURE — 90847 FAMILY PSYTX W/PT 50 MIN: CPT | Performed by: MARRIAGE & FAMILY THERAPIST

## 2017-02-20 SDOH — SOCIAL STABILITY - SOCIAL INSECURITY: DISRUPTION OF FAMILY BY SEPARATION AND DIVORCE: Z63.5

## 2017-02-20 SDOH — SOCIAL STABILITY - SOCIAL INSECURITY: PROBLEMS IN RELATIONSHIP WITH SPOUSE OR PARTNER: Z63.0

## 2017-02-20 NOTE — MR AVS SNAPSHOT
MRN:[id number]                      After Visit Summary   2/20/2017    Joni Borja    MRN: [id number]           Visit Information        Provider Department      2/20/2017 3:00 PM [facility] Flex [facility] Generic      Your next 10 appointments already scheduled     Mar 15, 2017 10:00 AM CDT   Return Visit with Flex Lombardo [facility] ([facility])    [address] [facility] [address]   [phone]            Apr 05, 2017  1:00 PM CDT   Return Visit with Flex Lombardo [facility]    [address] [facility] [id number] [phone]            May 17, 2017  2:30 PM CDT   Return Visit with Silvia Brown MD   [facility] Epilepsy [facility] ([facility])    [address] [place] [address], [address] [address] [phone]              MyChart Information     [employer] gives you secure access to your electronic health record. If you see a primary care provider, you can also send messages to your care team and make appointments. If you have questions, please call your primary care clinic.  If you do not have a primary care provider, please call [phone] and they will assist you.        Care EveryWhere ID     This is your Care EveryWhere ID. This could be used by other organizations to access your [place] medical records  [id number]

## 2017-02-20 NOTE — PROGRESS NOTES
Progress Note    Client Name: Joni Borja  Date: 2/20/2017         Service Type: Family with client present      Session Start Time: 2:55pm  Session End Time: 3:40pm      Session Length: 45 minutes     Session #: 2     Attendees: Client, Father and Mother    Treatment Plan Last Reviewed: developing Tx plan  PHQ-9 / TONI-7 : 2/7/17     DATA      Progress Since Last Session (Related to Symptoms / Goals / Homework):   Symptoms: Stable    Homework: Achieved / completed to satisfaction      Episode of Care Goals: Satisfactory progress - PREPARATION (Decided to change - considering how); Intervened by negotiating a change plan and determining options / strategies for behavior change, identifying triggers, exploring social supports, and working towards setting a date to begin behavior change     Current / Ongoing Stressors and Concerns:   - Sx of depression as a result of years of marital distress that led to present separation, working towards divorce   - difficulties coping with medical problems which had led to limitation (driving) and work disability     Treatment Objective(s) Addressed in This Session:   Decrease frequency and intensity of feeling down, depressed, hopeless  compile a list of boundaries that they would like to set with others. wife  Continue to gather information about dynamics of separation and help family problem solve healthy solutions     Intervention:   CBT: operant conditioning, identifying triggers and patterns, identifying alternatives  Solution Focused: miracle question, healthy divorce/separation, coping with Dx  Structural: balanced         ASSESSMENT: Current Emotional / Mental Status (status of significant symptoms):   Risk status (Self / Other harm or suicidal ideation)   Client denies current fears or concerns for personal safety.   Client denies current or recent suicidal ideation or behaviors.   Client denies current or recent homicidal  ideation or behaviors.   Client denies current or recent self injurious behavior or ideation.   Client denies other safety concerns.   A safety and risk management plan has not been developed at this time, however client was given the after-hours number / 911 should there be a change in any of these risk factors.     Appearance:   Appropriate    Eye Contact:   Fair    Psychomotor Behavior: Normal    Attitude:   Cooperative    Orientation:   All   Speech    Rate / Production: Normal  Slow     Volume:  Normal    Mood:    Angry  Depressed    Affect:    Appropriate  Worrisome    Thought Content:  Clear  Rumination    Thought Form:  Coherent  Goal Directed  Circumstantial   Insight:    Poor      Medication Review:   No changes to current psychiatric medication(s)     Medication Compliance:   Yes     Changes in Health Issues:   None reported     Chemical Use Review:   Substance Use: Chemical use reviewed, no active concerns identified      Tobacco Use: No change in amount of tobacco use since last session.  Patient declined discussion at this time     Collateral Reports Completed:   Not Applicable    PLAN: (Client Tasks / Therapist Tasks / Other)  Client and his family will seek  for professional guidance in what next steps to take in collecting client's belongings and to begin divorce proceedings.       EMILY Donahue, [facility] Center[facility]                                                         ________________________________________________________________________    Treatment Plan    Client's Name: Joni Borja  YOB: 1970    Date: 2/20/17    DSM-V Diagnoses: 296.22 Major Depressive Disorder, Single Episode, Moderate With melancholic features   V61.10 (Z63.0) Relationship Distress With Spouse  V61.03 (Z63.5) Disruption of Family by Separation  Psychosocial & Contextual Factors: Client is experiencing depression from stressors related to marital distress which has led to his current separation, and  "from his history of medical problems.   WHODAS 2.0 (12 item) 16.67% on 2/14/2017    Referral / Collaboration:  Referral to another professional/service is not indicated at this time..    Anticipated number of session or this episode of care: 10      MeasurableTreatment Goal(s) related to diagnosis / functional impairment(s)  Goal 1: Client's depression will remit as evidenced by a decrease in PHQ9 score by at least 6 points or below a five, where symptoms occur fewer than half the days.   I will know I've met my goal when I \"clear my head and work on myself.      Objective #A (Client Action)   Client will decrease frequency and intensity of feeling down, depressed, hopeless  Client will increase self-awareness of symptom onset/escalation  Status: New - Date: TBD    Intervention(s)  Therapist will assign homework track symptoms, patterns and triggers  provide psycho-education on depression  Therapist will teach CBT strategies for attending to negative self-talk and cognitive distortions.  ACT: experiential exercises and mindfulness practices, how  to live with life barriers    Objective #B  Client will Increase interest, engagement, and pleasure in doing things  Identify negative self-talk and behaviors: challenge core beliefs, myths, and actions  Status: New - Date:  TBD    Intervention(s)  Therapist will assign homework to practice using coping skills outside the therapy encounter, worksheets to challenge negative thoughts  teach distraction skills. explore and tailor enjoyable activities, increase social activities, strengthen social supports  ACT: life values and being mindful of values for goals and daily living    Objective #C  Improve concentration, focus, and mindfulness in daily activities   Status: New - Date:  TBD    Intervention(s)  provide safe space to address hx of presenting problem and root cause  teach emotional regulation skills. mindfulness practices, grounding skills, affirmations, strengths " "based approach  Horacio: exercise to stage presenting problem, reflect, process and problem solve.      Goal 2: Client will improve his condition of interactions in his relationships (specifically with wife/) establishing healthy, balanced and appropriate boundaries to be kept 100% of the time for a minimum of 4 weeks.     I will know I've met my goal when I \"get my own place. Get the rest of my valuables from the house.      Objective #A (Client Action)      Client will compile a list of boundaries that they would like to set with others. Wife, adult step-children, family members  Learning to fight fair, learning to identify positive and negative communication patterns  Status: New - Date: TBD    Intervention(s)  Therapist will teach about healthy boundaries. structure, saying \"no\", advocating, identifying and establishing appropriate roles, rules, expectations.    Objective #B  Client will practice setting boundaries daily times in the next 12 weeks.                    Status: New - Date: TBD    Intervention(s)  Therapist will assign homework to track the use of healthy boundaries and outcome of establishing relational change  role-play effective communication skills and conflict management    Objective #C  Client will learn & utilize at least 3 assertive communication skills weekly.  Status: New - Date: TBD    Intervention(s)  teach assertiveness skills. aggressive/passive/assertive, impulsive control, reactive vs sensitive, exposure to uncomfortable conversation.  Therapist will role-play assertiveness skills      Client has not reviewed nor agreed to the above plan.      EMILY Donahue, [facility]SW  February 20, 2017  "

## 2017-02-28 ENCOUNTER — OFFICE VISIT (OUTPATIENT)
Dept: PSYCHOLOGY | Facility: CLINIC | Age: 47
End: 2017-02-28
Payer: MEDICARE

## 2017-02-28 ENCOUNTER — ONCOLOGY VISIT (OUTPATIENT)
Dept: ONCOLOGY | Facility: CLINIC | Age: 47
End: 2017-02-28
Attending: PSYCHIATRY & NEUROLOGY
Payer: MEDICARE

## 2017-02-28 VITALS
TEMPERATURE: 98 F | BODY MASS INDEX: 27.5 KG/M2 | HEART RATE: 80 BPM | WEIGHT: 180.8 LBS | DIASTOLIC BLOOD PRESSURE: 69 MMHG | SYSTOLIC BLOOD PRESSURE: 111 MMHG

## 2017-02-28 DIAGNOSIS — F32.1 MAJOR DEPRESSIVE DISORDER, SINGLE EPISODE, MODERATE WITH MELANCHOLIC FEATURES (H): Primary | ICD-10-CM

## 2017-02-28 DIAGNOSIS — G40.209 PARTIAL EPILEPSY WITH IMPAIRMENT OF CONSCIOUSNESS (H): ICD-10-CM

## 2017-02-28 DIAGNOSIS — Z63.0 PARTNER RELATIONSHIP PROBLEM: ICD-10-CM

## 2017-02-28 DIAGNOSIS — Z63.5 MARITAL ESTRANGEMENT: ICD-10-CM

## 2017-02-28 DIAGNOSIS — Z96.89 STATUS POST VNS (VAGUS NERVE STIMULATOR) PLACEMENT: ICD-10-CM

## 2017-02-28 DIAGNOSIS — C71.9 OLIGODENDROGLIOMA (H): Primary | ICD-10-CM

## 2017-02-28 PROCEDURE — 99205 OFFICE O/P NEW HI 60 MIN: CPT | Mod: GC | Performed by: PSYCHIATRY & NEUROLOGY

## 2017-02-28 PROCEDURE — 90834 PSYTX W PT 45 MINUTES: CPT | Performed by: MARRIAGE & FAMILY THERAPIST

## 2017-02-28 PROCEDURE — 99211 OFF/OP EST MAY X REQ PHY/QHP: CPT

## 2017-02-28 SDOH — SOCIAL STABILITY - SOCIAL INSECURITY: PROBLEMS IN RELATIONSHIP WITH SPOUSE OR PARTNER: Z63.0

## 2017-02-28 SDOH — SOCIAL STABILITY - SOCIAL INSECURITY: DISRUPTION OF FAMILY BY SEPARATION AND DIVORCE: Z63.5

## 2017-02-28 ASSESSMENT — ANXIETY QUESTIONNAIRES
1. FEELING NERVOUS, ANXIOUS, OR ON EDGE: SEVERAL DAYS
5. BEING SO RESTLESS THAT IT IS HARD TO SIT STILL: NOT AT ALL
2. NOT BEING ABLE TO STOP OR CONTROL WORRYING: SEVERAL DAYS
7. FEELING AFRAID AS IF SOMETHING AWFUL MIGHT HAPPEN: NOT AT ALL
IF YOU CHECKED OFF ANY PROBLEMS ON THIS QUESTIONNAIRE, HOW DIFFICULT HAVE THESE PROBLEMS MADE IT FOR YOU TO DO YOUR WORK, TAKE CARE OF THINGS AT HOME, OR GET ALONG WITH OTHER PEOPLE: NOT DIFFICULT AT ALL
GAD7 TOTAL SCORE: 4
6. BECOMING EASILY ANNOYED OR IRRITABLE: SEVERAL DAYS
3. WORRYING TOO MUCH ABOUT DIFFERENT THINGS: SEVERAL DAYS

## 2017-02-28 ASSESSMENT — PAIN SCALES - GENERAL: PAINLEVEL: NO PAIN (0)

## 2017-02-28 ASSESSMENT — PATIENT HEALTH QUESTIONNAIRE - PHQ9: 5. POOR APPETITE OR OVEREATING: NOT AT ALL
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
## 2017-02-28 NOTE — PROGRESS NOTES
"Join Borja is a 46 year old male who presents for:  Chief Complaint   Patient presents with     Oncology Clinic Visit     Oligodendroglioma        Initial Vitals:  /69  Pulse 80  Temp 98  F (36.7  C) (Oral)  Wt 82 kg (180 lb 12.8 oz)  BMI 27.5 kg/m2 Estimated body mass index is 27.5 kg/(m^2) as calculated from the following:    Height as of 2/9/17: 1.727 m (5' 7.99\").    Weight as of this encounter: 82 kg (180 lb 12.8 oz).. Body surface area is 1.98 meters squared. BP completed using cuff size: regular  No Pain (0) No LMP for male patient. Allergies and medications reviewed.     Medications: Medication refills not needed today.  Pharmacy name entered into Call[facility]:    [id number] - [facility] [address]  [email or web address] [address] [address]    Comments: None    6 minutes for nursing intake (face to face time)   Etelvina Wong CMA    DISCHARGE PLAN:  Next appointments: See patient instruction section  Departure Mode: Ambulatory  Accompanied by: family  10 minutes for nursing discharge (face to face time)   Gina Ramos RN    Per : obtain Pt's chemo-radiation records from [id number] from [facility] in [place]--obtained signed YADIEL.      "

## 2017-02-28 NOTE — MR AVS SNAPSHOT
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    Please be aware that coverage of these services is subject to the terms and limitations of your health insurance plan.  Call member services at your health plan with any benefit or coverage questions.      Please bring the following with you to your appointment:    (1) Any X-Rays, CTs or MRIs which have been performed.  Contact the facility where they were done to arrange for  prior to your scheduled appointment.   (2) List of current medications  (3) This referral request   (4) Any documents/labs given to you for this referral                  Your next 10 appointments already scheduled     Mar 15, 2017 10:00 AM CDT   Return Visit with Flex Lombardo [facility]    [address] [address] [phone]            Apr 05, 2017  1:00 PM CDT   Return Visit with Flex Lombardo [facility] ([facility])    [address] [id number] [phone]            May 17, 2017  2:30 PM CDT   Return Visit with Silvia Brown MD   [facility] ([facility])    [address], [address]  [facility] [address] [phone]              Who to contact     If you have  questions or need follow up information about today's clinic visit or your schedule please contact [facility] directly at [phone].  Normal or non-critical lab and imaging results will be communicated to you by MyChart, letter or phone within 4 business days after the clinic has received the results. If you do not hear from us within 7 days, please contact the clinic through [employer] or phone. If you have a critical or abnormal lab result, we will notify you by phone as soon as possible.  Submit refill requests through [employer] or call your pharmacy and they will forward the refill request to us. Please allow 3 business days for your refill to be completed.          Additional Information About Your Visit        [employer] Information     [employer] gives you secure access to your electronic health record. If you see a primary care provider, you can also send messages to your care team and make appointments. If you have questions, please call your primary care clinic.  If you do not have a primary care provider, please call [phone] and they will assist you.        Care EveryWhere ID     This is your Care EveryWhere ID. This could be used by other organizations to access your [place] medical records  [id number]        Your Vitals Were     Pulse Temperature BMI (Body Mass Index)             80 98  F (36.7  C) (Oral) 27.5 kg/m2          Blood Pressure from Last 3 Encounters:   02/28/17 111/69   02/09/17 116/74   02/07/17 110/66    Weight from Last 3 Encounters:   02/28/17 82 kg (180 lb 12.8 oz)   02/09/17 81.4 kg (179 lb 6.4 oz)   02/07/17 81.6 kg (180 lb)              We Performed the Following     NEUROSURGERY REFERRAL        Primary Care Provider Office Phone # Fax #    Brian Coon [phone]       [address] [address]        Thank you!     Thank you for choosing [facility]  for your care. Our goal is always to  provide you with excellent care. Hearing back from our patients is one way we can continue to improve our services. Please take a few minutes to complete the written survey that you may receive in the mail after your visit with us. Thank you!             Your Updated Medication List - Protect others around you: Learn how to safely use, store and throw away your medicines at www.disposemymeds.org.          This list is accurate as of: 2/28/17 11:59 PM.  Always use your most recent med list.                   Brand Name Dispense Instructions for use    acetaminophen-codeine 300-30 MG per tablet    TYLENOL #3     Take 1-2 tablets by mouth every 4 hours as needed       BENADRYL PO      Take by mouth as needed       carBAMazepine 300 MG 12 hr capsule    CARBATROL    60 capsule    Take 1 capsule (300 mg) by mouth 2 times daily       clonazePAM 0.5 MG tablet    klonoPIN    180 tablet    1 tab am, 2 tab pm       diazepam 5 MG tablet    VALIUM    30 tablet    As instructed for seizures. Please take 5 mg for more than 3 seizures in 8 hour period, may repeat 5 mg after 30 min if seizures continue.       escitalopram 20 MG tablet    LEXAPRO    90 tablet    Take 1 tablet (20 mg) by mouth daily 1 tab nightly       felbamate 600 MG tablet    FELBATOL    225 tablet    TAKE 1 TAB AM and 1 1/2 TAB in afternoon at 2 pm (600mg-900mg)       pantoprazole 40 MG EC tablet    PROTONIX    90 tablet    Take 1 tablet (40 mg) by mouth daily       topiramate 100 MG tablet    TOPAMAX    450 tablet    Take 2 tab po am - 3 tab po pm       ZYRTEC ALLERGY PO      Take 10 mg by mouth as needed

## 2017-03-01 ENCOUNTER — DOCUMENTATION ONLY (OUTPATIENT)
Dept: NEUROSURGERY | Facility: CLINIC | Age: 47
End: 2017-03-01

## 2017-03-01 ASSESSMENT — ANXIETY QUESTIONNAIRES: GAD7 TOTAL SCORE: 4

## 2017-03-01 ASSESSMENT — PATIENT HEALTH QUESTIONNAIRE - PHQ9: SUM OF ALL RESPONSES TO PHQ QUESTIONS 1-9: 10

## 2017-03-06 ENCOUNTER — OFFICE VISIT (OUTPATIENT)
Dept: NEUROSURGERY | Facility: CLINIC | Age: 47
End: 2017-03-06
Attending: NEUROLOGICAL SURGERY
Payer: MEDICARE

## 2017-03-06 VITALS
OXYGEN SATURATION: 99 % | WEIGHT: 177 LBS | TEMPERATURE: 98 F | HEIGHT: 69 IN | BODY MASS INDEX: 26.22 KG/M2 | DIASTOLIC BLOOD PRESSURE: 70 MMHG | SYSTOLIC BLOOD PRESSURE: 112 MMHG | HEART RATE: 72 BPM

## 2017-03-06 DIAGNOSIS — C71.9 OLIGODENDROGLIOMA (H): Primary | ICD-10-CM

## 2017-03-06 PROCEDURE — 99211 OFF/OP EST MAY X REQ PHY/QHP: CPT

## 2017-03-06 PROCEDURE — 99204 OFFICE O/P NEW MOD 45 MIN: CPT | Performed by: NEUROLOGICAL SURGERY

## 2017-03-06 NOTE — MR AVS SNAPSHOT
After Visit Summary   3/6/2017    Joni Borja    MRN: [id number]           Patient Information     Date Of Birth          1970        Visit Information        Provider Department      3/6/2017 2:20 PM Stuart Oscar MD [facility]        Care Instructions    Surgery scheduled for right frontal Craniotomy at [facility].      -Surgical risks: blood clots in the leg or lung, problems urinating, nerve damage, drainage from the incision, infection, stiffness    -Pre-operative physical with primary care physician within 30 days of surgical date.   -Stop all solid foods 8 hours before surgery.  -Keep drinking clear liquids until 4 hours before surgery. Clear liquids include water, clear juice, black coffee, or clear tea without milk, Gatorade, clear soda.     - Discontinue Aspirin, NSAIDs (Advil/Ibuprofen, Naproxen,Nuprin, Diclofenac,Meloxicam, Aleve, Celebrex) x 7 days prior to surgical date.    -Do not begin taking Non-Steroidal Anti-Inflammatory Drugs or NSAIDs (Advil/ibuprofen, Naproxen,Nuprin, Diclofenac,Meloxicam, Aleve, Celebrex, Aspirin, etc.) until 12 weeks after surgery. May cause bleeding and interfere with bone healing.     -Incision care: Watch for signs of infection: redness, swelling, warmth, drainage, and fever of 101 degrees or higher. If these occur, please notify clinic or visit nearest Emergency Room. No submerging incision in water, showers only, may use baby shampoo. Do not scrub incision. Gently pat or air dry.  -Restrictions for atleast 4 weeks: No bending forward, no lifting anything greater than 10 pounds.  -Expected hospitalization stay: 2-3 nights  -Go to the nearest Emergency Room if you develop: Acute changes in the level of consciousness (increased confusion, memory loss, speech abnormalities), any change in hearing or vision, increased headaches, or new onset of seizures.    -You will need a Stealth MRI with and without  contrast prior to surgery. We will schedule this appointment for you.    - Follow up appointments: 2 weeks after surgery for suture/staple removal and 4 weeks after surgery with Dr. Oscar. Will need a MRI of brain prior to this appointment. Please call to schedule follow up appointment at [phone].             Follow-ups after your visit        Your next 10 appointments already scheduled     Mar 15, 2017 10:00 AM CDT   Return Visit with Flex Lombardo [facility])    [address] [facility] [address] [phone]            Apr 05, 2017  1:00 PM CDT   [facility] Visit [facility] Flex [facility]    [address] [facility] [address] [phone]            May 17, 2017  2:30 PM CDT   Return Visit with Silvia Brown MD   [facility] ([facility])    [address] [place] [address] [address], [address]  [facility] [address] [phone]              Who to contact     If you have questions or need follow up information about today's clinic visit or your schedule please contact [facility] directly at [phone].  Normal or non-critical lab and imaging results will be communicated to you by [employer], letter or phone within 4 business days after the clinic has received the results. If you do not hear from us within 7 days, please contact the clinic through [employer] or phone. If you have a critical or abnormal lab result, we will notify you by phone as soon as possible.  Submit refill requests through [employer] or call your pharmacy and they will forward the refill request to us. Please allow 3 business days for your refill to be completed.          Additional Information About Your Visit        [employer] Information     [employer] gives you secure access to your electronic health record. If you see a primary care provider, you can also send messages to your care team and  "make appointments. If you have questions, please call your primary care clinic.  If you do not have a primary care provider, please call [phone] and they will assist you.        Care EveryWhere ID     This is your Care EveryWhere ID. This could be used by other organizations to access your [place] medical records  [id number]        Your Vitals Were     Pulse Temperature Height Pulse Oximetry BMI (Body Mass Index)       72 98  F (36.7  C) (Oral) 5' 8.5\" (1.74 m) 99% 26.52 kg/m2        Blood Pressure from Last 3 Encounters:   03/06/17 112/70   02/28/17 111/69   02/09/17 116/74    Weight from Last 3 Encounters:   03/06/17 177 lb (80.3 kg)   02/28/17 180 lb 12.8 oz (82 kg)   02/09/17 179 lb 6.4 oz (81.4 kg)              Today, you had the following     No orders found for display       Primary Care Provider Office Phone # Fax #    Brian Coon [phone]       [address] [address]        Thank you!     Thank you for choosing [facility]  for your care. Our goal is always to provide you with excellent care. Hearing back from our patients is one way we can continue to improve our services. Please take a few minutes to complete the written survey that you may receive in the mail after your visit with us. Thank you!             Your Updated Medication List - Protect others around you: Learn how to safely use, store and throw away your medicines at www.disposemymeds.org.          This list is accurate as of: 3/6/17  3:04 PM.  Always use your most recent med list.                   Brand Name Dispense Instructions for use    acetaminophen-codeine 300-30 MG per tablet    TYLENOL #3     Take 1-2 tablets by mouth every 4 hours as needed       BENADRYL PO      Take by mouth as needed       carBAMazepine 300 MG 12 hr capsule    CARBATROL    60 capsule    Take 1 capsule (300 mg) by mouth 2 times daily       clonazePAM 0.5 MG " tablet    klonoPIN    180 tablet    1 tab am, 2 tab pm       diazepam 5 MG tablet    VALIUM    30 tablet    As instructed for seizures. Please take 5 mg for more than 3 seizures in 8 hour period, may repeat 5 mg after 30 min if seizures continue.       escitalopram 20 MG tablet    LEXAPRO    90 tablet    Take 1 tablet (20 mg) by mouth daily 1 tab nightly       felbamate 600 MG tablet    FELBATOL    225 tablet    TAKE 1 TAB AM and 1 1/2 TAB in afternoon at 2 pm (600mg-900mg)       pantoprazole 40 MG EC tablet    PROTONIX    90 tablet    Take 1 tablet (40 mg) by mouth daily       topiramate 100 MG tablet    TOPAMAX    450 tablet    Take 2 tab po am - 3 tab po pm       ZYRTEC ALLERGY PO      Take 10 mg by mouth as needed

## 2017-03-06 NOTE — NURSING NOTE
"Joni Borja is a 46 year old male who presents for:  Chief Complaint   Patient presents with     Neurologic Problem     referral from Dr. Souza for right frontal mass resection slowly increasing in size, recurrent tumon        Initial Vitals:  There were no vitals taken for this visit. Estimated body mass index is 27.5 kg/(m^2) as calculated from the following:    Height as of 2/9/17: 5' 7.99\" (1.727 m).    Weight as of 2/28/17: 180 lb 12.8 oz (82 kg).. There is no height or weight on file to calculate BSA. BP completed using cuff size: regular  Data Unavailable    Do you feel safe in your environment?  Yes  Do you need any refills today? No    Nursing Comments: referral from Dr. Souza for right frontal mass resection slowly increasing in size, recurrent tumon.  Patient rates his pain today as 0 just a slight headach      5 min. nursing intake time  Kristina Carson CMA, AAS      Discharge plan:   See providers dictation  2 min. nursing discharge time  Kristina Carson CMA, AAS       "

## 2017-03-06 NOTE — PATIENT INSTRUCTIONS
Surgery scheduled for right frontal Craniotomy at [facility].      -Surgical risks: blood clots in the leg or lung, problems urinating, nerve damage, drainage from the incision, infection, stiffness    -Pre-operative physical with primary care physician within 30 days of surgical date.   -Stop all solid foods 8 hours before surgery.  -Keep drinking clear liquids until 4 hours before surgery. Clear liquids include water, clear juice, black coffee, or clear tea without milk, Gatorade, clear soda.     - Discontinue Aspirin, NSAIDs (Advil/Ibuprofen, Naproxen,Nuprin, Diclofenac,Meloxicam, Aleve, Celebrex) x 7 days prior to surgical date.    -Do not begin taking Non-Steroidal Anti-Inflammatory Drugs or NSAIDs (Advil/ibuprofen, Naproxen,Nuprin, Diclofenac,Meloxicam, Aleve, Celebrex, Aspirin, etc.) until 12 weeks after surgery. May cause bleeding and interfere with bone healing.     -Incision care: Watch for signs of infection: redness, swelling, warmth, drainage, and fever of 101 degrees or higher. If these occur, please notify clinic or visit nearest Emergency Room. No submerging incision in water, showers only, may use baby shampoo. Do not scrub incision. Gently pat or air dry.  -Restrictions for atleast 4 weeks: No bending forward, no lifting anything greater than 10 pounds.  -Expected hospitalization stay: 2-3 nights  -Go to the nearest Emergency Room if you develop: Acute changes in the level of consciousness (increased confusion, memory loss, speech abnormalities), any change in hearing or vision, increased headaches, or new onset of seizures.    -You will need a Stealth MRI with and without contrast prior to surgery. We will schedule this appointment for you.    - Follow up appointments: 2 weeks after surgery for suture/staple removal and 4 weeks after surgery with Dr. Oscar. Will need a MRI of brain prior to this appointment. Please call to schedule follow up appointment at [phone].

## 2017-03-06 NOTE — NURSING NOTE
Pre-operative Education    Education included but not limited to:    - Pre-operative physical with primary care physician within 30 days of surgical date.  Pre-operative clearance (cardiology, hematology, etc).  - Discontinue Aspirin, NSAIDs, Diclofenac x 7 days prior to surgical date.   -May try tylenol for pain 1000 mg three times per day for pain  -Patient is a smoker  -Surgical risks: blood clots in the leg or lung, problems urinating, nerve damage, drainage from the incision, infection, stiffness     -Preparation timeline   When to start NPO   Special bathing procedure.  Hospital stay:   Checking in   The surgery itself   Recovery room   Transition to the hospital room where you will begin your recovery  -2-3 night hospitalization.   -Managing pain  - Post operative incisional pain x 1-2 weeks which will require pain medications and muscle relaxants.   -Do NOT drive while taking narcotic pain medication.  -Post operative incision care:   Keep your incision clean and dry at all times.    Allow water to run over incision, pat dry after shower. No bathing, swimming or submerging in water.    Call immediately or come to ED if any drainage occurs, or you develop new pain.  Watch for signs of infection: redness, swelling, warmth, drainage, and fever of 101 degrees or higher. Notify clinic if these occur.  - Post operative activity limitations for atleast 4 weeks: No bending forward, no lifting anything greater than 10 pounds  - Follow up appointments in 2 weeks post op for suture/staple removal and 4 weeks post op with MRI of Brain with and without contrast. Please call to schedule follow up appointment at [phone].   -Go to the nearest Emergency Room for evaluation if you develop: Acute changes in the level of consciousness (increased confusion, memory loss, speech abnormalities), any change in hearing or vision, increased headaches, new onset of seizures.  -Craniotomy Precautions hand out given to  patient.     Patient verbalized understanding of above instructions. All questions were answered to the best of my ability and the patient's satisfaction. Patient advised to call with any additional questions or concerns.

## 2017-03-06 NOTE — PROGRESS NOTES
46-year-old male with history of right frontal glioma and seizures, status post multiple resections, chemotherapy, and placement of vagus nerve stimulator, presents with concern for recurrence.  He continues to have approximately two seizures per week, which is much improved since the placement of vagus nerve stimulator.  He has low-grade bitemporal headaches worse in the mornings, not substantially worse recently.  MRI shows a progressive periventricular enhancing lesion with some surrounding FLAIR signal, and perfusion uptake.         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, s/p biopsy and whole brain radiation, recurrence 5/07 s/p subtotal resection and chemo     Perthe's disease of hip      Right hip     Seizures (H)      Past Surgical History   Procedure Laterality Date     Appendectomy  1972     Head & neck surgery  2002     brain tumor removal     Orthopedic surgery  1982     Right Hip - Perthe's     Optical tracking system craniotomy, excise tumor with mapping, combined  9/10/2013     Procedure: COMBINED OPTICAL TRACKING SYSTEM CRANIOTOMY, EXCISE TUMOR WITH MAPPING;  Stealth Guided Right Redo Frontal Craniotomy for Grid Placement ;  Surgeon: Maverick Linder MD;  Location: [facility] OR     Craniotomy, excise tumor complex, combined  9/18/2013     Procedure: COMBINED CRANIOTOMY, EXCISE TUMOR COMPLEX;  Re-do Right Frontal Craniotomy, Grid Removal,  Resection of Right Frontal  Tumor and Epileptogenic Cortex Resection;  Surgeon: Maverick Linder MD;  Location: [facility] OR     Implant stimulator vagus nerve Left 7/19/2016     Procedure: IMPLANT STIMULATOR VAGUS NERVE;  Surgeon: Maverick Linder MD;  Location: [facility] OR     Social History     Social History     Marital status:  "     Spouse name:  from wife.     Number of children: 0     Years of education: N/A     Occupational History     Not on file.     Social History Main Topics     Smoking status: Current Every Day Smoker     Packs/day: 1.00     Years: 20.00     Types: Cigarettes     Smokeless tobacco: Never Used     Alcohol use 0.0 oz/week     0 Standard drinks or equivalent per week      Comment: on weekends     Drug use: Yes      Comment: marijuana(Rx) on weekends     Sexual activity: Not Currently     Other Topics Concern     Not on file     Social History Narrative     Family History   Problem Relation Age of Onset     Hyperlipidemia Father      Coronary Artery Disease Maternal Grandmother      Coronary Artery Disease Brother 42     DIABETES No family hx of      CEREBROVASCULAR DISEASE No family hx of      Breast Cancer No family hx of      Colon Cancer No family hx of      Prostate Cancer No family hx of         ROS: 10 point ROS neg other than the symptoms noted above in the HPI.    Physical Exam  /70 (BP Location: Right arm, Cuff Size: Adult Regular)  Pulse 72  Temp 98  F (36.7  C) (Oral)  Ht 1.74 m (5' 8.5\")  Wt 80.3 kg (177 lb)  SpO2 99%  BMI 26.52 kg/m2  HEENT:  Normocephalic, atraumatic.  PERRLA.  EOM s intact.  Visual fields full to gross exam  Neck:  Supple, non-tender, without lymphadenopathy.  Heart:  No peripheral edema  Lungs:  No SOB  Abdomen:  Non-distended.   Skin:  Warm and dry.  Extremities:  No edema, cyanosis or clubbing.    NEUROLOGICAL EXAMINATION:     Mental status:  Alert and Oriented x 3, speech is fluent.  Cranial nerves:  II-XII intact.   Motor:    Shoulder Abduction:  Right:  5/5   Left:  5/5  Biceps:                      Right:  5/5   Left:  5/5  Triceps:                     Right:  5/5   Left:  5/5  Wrist Extensors:       Right:  5/5   Left:  5/5  Wrist Flexors:           Right:  5/5   Left:  5/5  interosseus :            Right:  5/5   Left:  5/5   Hip Flexor:                " Right: 5/5  Left:  5/5  Hip Adductor:             Right:  5/5  Left:  5/5  Hip Abductor:             Right:  5/5  Left:  5/5  Gastroc Soleus:        Right:  5/5  Left:  5/5  Tib/Ant:                      Right:  5/5  Left:  5/5  EHL:                     Right:  5/5  Left:  5/5  Sensation:  Intact  Reflexes:  Negative Babinski.  Negative Clonus.  Negative Nolen's.  Coordination:  Smooth finger to nose testing.   Negative pronator drift.  Smooth tandem walking.  Incision well healed    A/P:  46-year-old male with history of right frontal glioma and seizures, status post multiple resections, chemotherapy, and placement of vagus nerve stimulator, presents with concern for recurrence    I had a lengthy discussion with the patient, reviewing the history, symptoms and imaging  We discussed risks and benefits of observation versus early biopsy and resection  He wishes to proceed with surgery  Risks and benefits discussed in detail

## 2017-03-09 ENCOUNTER — TELEPHONE (OUTPATIENT)
Dept: NEUROLOGY | Facility: CLINIC | Age: 47
End: 2017-03-09

## 2017-03-09 NOTE — TELEPHONE ENCOUNTER
Writer received message:    Subject: nful                                             Caller: self     Relationship to Patient: self     Call Back Number: [phone] Or [phone]     Writer called patient at [phone], his Mother answered.  Mother indicates that Joni is scheduled for a craniotomy to resect a growing tumor, and request that we look at MRI to assure him that it is going to be a safe procedure to perform as he was told the first time he had it rescected that they had gone as deep as they safely could go.    Writer explained to Mother that he was unqualified as an RN to give an opinion as it was not an area in which he is has expertise.   Writer further explained that it would not be something that the physicians here would be willing to comment on as it is not there area of expertise either.  Writer did indicate trust in the neuro surgeons as highly trained medical professionals.    Mother verbalized understanding of this, but indicated that Joni wanted to ask us because of his high regard for [facility] staff and the trust he places in them.    Mother also ask about dental x-rays with the VNS and if there would be any issue with it.  Writer assured her that dental x-rays would not be a issue.    Tony Ramirez

## 2017-03-10 ENCOUNTER — TELEPHONE (OUTPATIENT)
Dept: NEUROLOGY | Facility: CLINIC | Age: 47
End: 2017-03-10

## 2017-03-10 ENCOUNTER — OFFICE VISIT (OUTPATIENT)
Dept: FAMILY MEDICINE | Facility: CLINIC | Age: 47
End: 2017-03-10
Payer: MEDICARE

## 2017-03-10 ENCOUNTER — TRANSFERRED RECORDS (OUTPATIENT)
Dept: HEALTH INFORMATION MANAGEMENT | Facility: CLINIC | Age: 47
End: 2017-03-10

## 2017-03-10 VITALS
WEIGHT: 180 LBS | HEIGHT: 69 IN | DIASTOLIC BLOOD PRESSURE: 60 MMHG | SYSTOLIC BLOOD PRESSURE: 90 MMHG | OXYGEN SATURATION: 97 % | TEMPERATURE: 98.3 F | HEART RATE: 99 BPM | BODY MASS INDEX: 26.66 KG/M2

## 2017-03-10 DIAGNOSIS — C71.9 OLIGODENDROGLIOMA (H): ICD-10-CM

## 2017-03-10 DIAGNOSIS — Z01.818 PREOP GENERAL PHYSICAL EXAM: Primary | ICD-10-CM

## 2017-03-10 DIAGNOSIS — Z72.0 TOBACCO USE: ICD-10-CM

## 2017-03-10 DIAGNOSIS — G40.219 PARTIAL EPILEPSY WITH IMPAIRMENT OF CONSCIOUSNESS, INTRACTABLE (H): ICD-10-CM

## 2017-03-10 DIAGNOSIS — R26.81 UNSTABLE GAIT: ICD-10-CM

## 2017-03-10 PROCEDURE — 93000 ELECTROCARDIOGRAM COMPLETE: CPT | Performed by: FAMILY MEDICINE

## 2017-03-10 PROCEDURE — 99214 OFFICE O/P EST MOD 30 MIN: CPT | Performed by: FAMILY MEDICINE

## 2017-03-10 NOTE — MR AVS SNAPSHOT
After Visit Summary   3/10/2017    Joni Borja    MRN: [id number]           Patient Information     Date Of Birth          1970        Visit Information        Provider Department      3/10/2017 11:40 AM Brian Coon MD [facility]'s Diagnoses     Preop general physical exam    -  1    Oligodendroglioma (H)        Partial epilepsy with impairment of consciousness, intractable (H)        Unstable gait        Tobacco use          Care Instructions      Before Your Surgery      Call your surgeon if there is any change in your health. This includes signs of a cold or flu (such as a sore throat, runny nose, cough, rash or fever).    Do not smoke, drink alcohol or take over the counter medicine (unless your surgeon or primary care doctor tells you to) for the 24 hours before and after surgery.    If you take prescribed drugs: Follow your doctor s orders about which medicines to take and which to stop until after surgery.    Eating and drinking prior to surgery: follow the instructions from your surgeon    Take a shower or bath the night before surgery. Use the soap your surgeon gave you to gently clean your skin. If you do not have soap from your surgeon, use your regular soap. Do not shave or scrub the surgery site.  Wear clean pajamas and have clean sheets on your bed.         Follow-ups after your visit        Your next 10 appointments already scheduled     Mar 15, 2017 10:00 AM CDT   Return Visit with EMILY Donahue   [facility] ([facility])    [address] [address] [phone]            Mar 22, 2017 11:00 AM CDT   MR BRAIN W CONTRAST STEREOTACTIC with [id number]   [facility] ([facility])    [address] [address] [phone]           Take your medicines as usual, unless your doctor tells you not to. Bring a list of your current medicines to your exam  (including vitamins, minerals and over-the-counter drugs).  You will be given intravenous contrast for this exam. To prepare:   The day before your exam, drink extra fluids at least six 8-ounce glasses (unless your doctor tells you to restrict your fluids).   Have a blood test (creatinine test) within 30 days of your exam. Go to your clinic or Diagnostic Imaging Department for this test.  The MRI machine uses a strong magnet. Please wear clothes without metal (snaps, zippers). A sweatsuit works well, or we may give you a hospital gown.  Please remove any body piercings and hair extensions before you arrive. You will also remove watches, jewelry, hairpins, wallets, dentures, partial dental plates and hearing aids. You may wear contact lenses, and you may be able to wear your rings. We have a safe place to keep your personal items, but it is safer to leave them at home.   **IMPORTANT** THE INSTRUCTIONS BELOW ARE ONLY FOR THOSE PATIENTS WHO HAVE BEEN TOLD THEY WILL RECEIVE SEDATION OR GENERAL ANESTHESIA DURING THEIR MRI PROCEDURE:  IF YOU WILL RECEIVE SEDATION (take medicine to help you relax during your exam):   You must get the medicine from your doctor before you arrive. Bring the medicine to the exam. Do not take it at home.   Arrive one hour early. Bring someone who can take you home after the test. Your medicine will make you sleepy. After the exam, you may not drive, take a bus or take a taxi by yourself.   No eating 8 hours before your exam. You may have clear liquids up until 4 hours before your exam. (Clear liquids include water, clear tea, black coffee and fruit juice without pulp.)  IF YOU WILL RECEIVE ANESTHESIA (be asleep for your exam):   Arrive 1 1/2 hours early. Bring someone who can take you home after the test. You may not drive, take a bus or take a taxi by yourself.   No eating 8 hours before your exam. You may have clear liquids up until 4 hours before your exam. (Clear liquids include water,  clear tea, black coffee and fruit juice without pulp.)  Please call the Imaging Department at your exam site with any questions.            Mar 24, 2017   Procedure with Stuart Oscar MD   [facility] (--)    [address], [address]  [facility] [id number] [phone]            Apr 05, 2017  1:00 PM CDT   Return Visit with EMILY Donahue   [facility] ([facility])    [address] [address] [phone]            May 17, 2017  2:30 PM CDT   Return Visit with Silvia Brown MD   [facility] ([facility])    [address], [address]  [facility] [address] [phone]              Who to contact     If you have questions or need follow up information about today's clinic visit or your schedule please contact [facility] directly at [phone].  Normal or non-critical lab and imaging results will be communicated to you by MyChart, letter or phone within 4 business days after the clinic has received the results. If you do not hear from us within 7 days, please contact the clinic through MyChart or phone. If you have a critical or abnormal lab result, we will notify you by phone as soon as possible.  Submit refill requests through [employer] or call your pharmacy and they will forward the refill request to us. Please allow 3 business days for your refill to be completed.          Additional Information About Your Visit        [employer] Information     [employer] gives you secure access to your electronic health record. If you see a primary care provider, you can also send messages to your care team and make appointments. If you have questions, please call your primary care clinic.  If you do not have a primary care provider, please call [phone] and they will assist you.        Care EveryWhere ID     This is your Care EveryWhere ID. This could be used by other organizations to access your  "[facility] medical records  [id number]        Your Vitals Were     Pulse Temperature Height Pulse Oximetry BMI (Body Mass Index)       99 98.3  F (36.8  C) (Oral) 5' 8.5\" (1.74 m) 97% 26.97 kg/m2        Blood Pressure from Last 3 Encounters:   03/10/17 90/60   03/06/17 112/70   02/28/17 111/69    Weight from Last 3 Encounters:   03/10/17 180 lb (81.6 kg)   03/06/17 177 lb (80.3 kg)   02/28/17 180 lb 12.8 oz (82 kg)              We Performed the Following     EKG 12-lead complete w/read - Clinics        Primary Care Provider Office Phone # Fax #    Brian Coon [phone]       [facility] [address] [place] [id number]        Thank you!     Thank you for choosing [facility]  for your care. Our goal is always to provide you with excellent care. Hearing back from our patients is one way we can continue to improve our services. Please take a few minutes to complete the written survey that you may receive in the mail after your visit with us. Thank you!             Your Updated Medication List - Protect others around you: Learn how to safely use, store and throw away your medicines at www.disposemymeds.org.          This list is accurate as of: 3/10/17 12:17 PM.  Always use your most recent med list.                   Brand Name Dispense Instructions for use    acetaminophen-codeine 300-30 MG per tablet    TYLENOL #3     Take 1-2 tablets by mouth every 4 hours as needed       BENADRYL PO      Take by mouth as needed       carBAMazepine 300 MG 12 hr capsule    CARBATROL    60 capsule    Take 1 capsule (300 mg) by mouth 2 times daily       clonazePAM 0.5 MG tablet    klonoPIN    180 tablet    1 tab am, 2 tab pm       diazepam 5 MG tablet    VALIUM    30 tablet    As instructed for seizures. Please take 5 mg for more than 3 seizures in 8 hour period, may repeat 5 mg after 30 min if seizures continue.       escitalopram 20 MG tablet    LEXAPRO    90 tablet    " Take 1 tablet (20 mg) by mouth daily 1 tab nightly       felbamate 600 MG tablet    FELBATOL    225 tablet    TAKE 1 TAB AM and 1 1/2 TAB in afternoon at 2 pm (600mg-900mg)       pantoprazole 40 MG EC tablet    PROTONIX    90 tablet    Take 1 tablet (40 mg) by mouth daily       topiramate 100 MG tablet    TOPAMAX    450 tablet    Take 2 tab po am - 3 tab po pm       ZYRTEC ALLERGY PO      Take 10 mg by mouth as needed

## 2017-03-10 NOTE — NURSING NOTE
"Chief Complaint   Patient presents with     Pre-Op Exam       Initial BP 90/60  Pulse 99  Temp 98.3  F (36.8  C) (Oral)  Ht 5' 8.5\" (1.74 m)  Wt 180 lb (81.6 kg)  SpO2 97%  BMI 26.97 kg/m2 Estimated body mass index is 26.97 kg/(m^2) as calculated from the following:    Height as of this encounter: 5' 8.5\" (1.74 m).    Weight as of this encounter: 180 lb (81.6 kg).  Medication Reconciliation: complete  "

## 2017-03-10 NOTE — PROGRESS NOTES
[facility] PRIOR [address] [address]  [phone]  Dept: [phone]    PRE-OP EVALUATION:  Today's date: 3/10/2017    Joni Borja (: 1970) presents for pre-operative evaluation assessment as requested by Dr. Oscar.  He requires evaluation and anesthesia risk assessment prior to undergoing surgery/procedure for treatment of Brain Tumor .  Proposed procedure: COMBINED OPTICAL TRACKING SYSTEM CRANIOTOMY, EXCISE TUMOR; RIGHT FRONTAL CRANIOTOMY FOR TUMOR RESECTION (STEALTH; CALIX PINS)    Date of Surgery/ Procedure: 2017  Time of Surgery/ Procedure: 11:35 AM  Hospital/Surgical Facility: [facility]  Primary Physician: Brian Coon  Type of Anesthesia Anticipated: General    Patient has a Health Care Directive or Living Will:  NO    Preop Questions 3/7/2017   1.  Do you have a history of heart attack, stroke, stent, bypass or surgery on an artery in the head, neck, heart or legs? No   2.  Do you ever have any pain or discomfort in your chest? No   3.  Do you have a history of  Heart Failure? No   4.   Are you troubled by shortness of breath when:  walking on a level surface, or up a slight hill, or at night? No   5.  Do you currently have a cold, bronchitis or other respiratory infection? No   6.  Do you have a cough, shortness of breath, or wheezing? No   7.  Do you sometimes get pains in the calves of your legs when you walk? No   8. Do you or anyone in your family have previous history of blood clots? YES - brother had heart attack   9.  Do you or does anyone in your family have a serious bleeding problem such as prolonged bleeding following surgeries or cuts? No   10. Have you ever had problems with anemia or been told to take iron pills? No   11. Have you had any abnormal blood loss such as black, tarry or bloody stools? No   12. Have you ever had a blood transfusion? No   13. Have you or any of your relatives ever had problems with  anesthesia? No   14. Do you have sleep apnea, excessive snoring or daytime drowsiness? No   15. Do you have any prosthetic heart valves? No   16. Do you have prosthetic joints? No         HPI:                                                      Brief HPI related to upcoming procedure: h/o oligodendroglioma and increased seizure history       See problem list for active medical problems.  Problems all longstanding and stable, except as noted/documented.  See ROS for pertinent symptoms related to these conditions.                                                                                  .  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 Esdt-Sdsln-Bcjruyy disease 02/07/2017     Priority: Medium              Status post VNS (vagus nerve stimulator) placement - for epilepsy 02/07/2017     Priority: Medium     Oligodendroglioma (H)      Priority: Medium     Overview:   Right temporal brain tumor, oligodendroglioma  right frontal grade 2, s/p biopsy and whole brain radiation, recurrence 5/07 s/p subtotal resection and chemo       Other speech disturbance(784.59) 06/09/2015     Priority: Medium     Partial epilepsy with impairment of consciousness (H) 02/14/2015     Priority: Medium     Unstable gait 06/18/2014     Priority: Medium     Tobacco use 03/14/2014     Priority: Medium     Epilepsy (H) 09/10/2013     Priority:  Medium     Partial epilepsy with impairment of consciousness, intractable (H) 08/19/2013     Priority: Medium     Problem list name updated by automated process. 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 Bvud-Gmtlo-Gwzpura disease 2/7/2017           Localization-related epilepsy (H)      Oligodendroglioma (H) 4/02     right frontal grade 2, s/p biopsy and whole brain radiation, recurrence 5/07 s/p subtotal resection and chemo     Perthe's disease of hip      Right hip     Seizures (H)      Past Surgical History   Procedure Laterality Date     Appendectomy  1972     Head & neck surgery  2002     brain tumor removal     Orthopedic surgery  1982     Right Hip - Perthe's     Optical tracking system craniotomy, excise tumor with mapping, combined  9/10/2013     Procedure: COMBINED OPTICAL TRACKING SYSTEM CRANIOTOMY, EXCISE TUMOR WITH MAPPING;  Stealth Guided Right Redo Frontal Craniotomy for Grid Placement ;  Surgeon: Maverick Linder MD;  Location: [facility] OR     Craniotomy, excise tumor complex, combined  9/18/2013     Procedure: COMBINED CRANIOTOMY, EXCISE TUMOR COMPLEX;  Re-do Right Frontal Craniotomy, Grid Removal,  Resection of Right Frontal  Tumor and Epileptogenic Cortex Resection;  Surgeon: Maverick Linder MD;  Location: [facility] OR     Implant stimulator vagus nerve Left 7/19/2016     Procedure: IMPLANT STIMULATOR VAGUS NERVE;  Surgeon: Maverick Linder MD;  Location: [facility] OR     Biopsy  2002     Current Outpatient Prescriptions   Medication Sig Dispense Refill     carBAMazepine (CARBATROL) 300 MG 12 hr capsule Take 1 capsule (300 mg) by mouth 2 times daily 60 capsule 3     felbamate (FELBATOL) 600 MG tablet TAKE 1 TAB AM and 1 1/2 TAB in afternoon at 2 pm (600mg-900mg) 225 tablet 3     topiramate (TOPAMAX) 100 MG tablet Take 2 tab po am - 3 tab po pm 450  "tablet 3     clonazePAM (KLONOPIN) 0.5 MG tablet 1 tab am, 2 tab pm 180 tablet 3     escitalopram (LEXAPRO) 20 MG tablet Take 1 tablet (20 mg) by mouth daily 1 tab nightly 90 tablet 1     pantoprazole (PROTONIX) 40 MG EC tablet Take 1 tablet (40 mg) by mouth daily 90 tablet 1     diazepam (VALIUM) 5 MG tablet As instructed for seizures. Please take 5 mg for more than 3 seizures in 8 hour period, may repeat 5 mg after 30 min if seizures continue. 30 tablet 1     acetaminophen-codeine (TYLENOL #3) 300-30 MG per tablet Take 1-2 tablets by mouth every 4 hours as needed       Cetirizine HCl (ZYRTEC ALLERGY PO) Take 10 mg by mouth as needed        DiphenhydrAMINE HCl (BENADRYL PO) Take by mouth as needed       OTC products: None, except as noted above    Allergies   Allergen Reactions     Dilantin [Phenytoin] Hives     Mosquitoes (Informational Only)      blisters     Nuts      Other reaction(s): Angioedema  mosquito      Latex Allergy: NO    Social History   Substance Use Topics     Smoking status: Current Every Day Smoker     Packs/day: 1.00     Years: 20.00     Types: Cigarettes     Smokeless tobacco: Never Used     Alcohol use 0.0 oz/week      Comment: on weekends     History   Drug Use     Yes     Comment: marijuana(Rx) on weekends       REVIEW OF SYSTEMS:                                                    Constitutional, neuro, ENT, endocrine, pulmonary, cardiac, gastrointestinal, genitourinary, musculoskeletal, integument and psychiatric systems are negative, except as otherwise noted.    This document serves as a record of the services and decisions personally performed and made by Brian Coon MD. It was created on his behalf by Gloria Peres, a trained medical scribe. The creation of this document is based the provider's statements to the medical scribe.  Gloria Peres   EXAM:                                                    BP 90/60  Pulse 99  Temp 98.3  F (36.8  C) (Oral)  Ht 5' 8.5\" (1.74 m)  Wt 180 lb " (81.6 kg)  SpO2 97%  BMI 26.97 kg/m2    GENERAL APPEARANCE: healthy, alert and no distress     EYES: EOMI,  PERRL     HENT: ear canals and TM's normal and nose and mouth without ulcers or lesions     NECK: no adenopathy, no asymmetry, masses, or scars and thyroid normal to palpation     RESP: lungs clear to auscultation - no rales, rhonchi or wheezes     CV: regular rates and rhythm, normal S1 S2, no S3 or S4 and no murmur, click or rub     ABDOMEN:  soft, nontender, no HSM or masses and bowel sounds normal     MS: extremities normal- no gross deformities noted, no evidence of inflammation in joints, FROM in all extremities.     SKIN: no suspicious lesions or rashes     PSYCH: mentation appears normal. and affect normal/bright     LYMPHATICS: No axillary, cervical, or supraclavicular nodes    DIAGNOSTICS:                                                    EKG: appears normal, NSR, normal axis, normal intervals, no acute ST/T changes c/w ischemia, no LVH by voltage criteria, unchanged from previous tracings    Recent Labs   Lab Test 01/12/17 12/29/16   1548 10/24/16  07/19/16   0738  06/02/16 09/24/13   0533   09/23/13   0728   HGB  13.3*  13.4*  13.5   --    < >  13.5   < >  9.1*   < >   --    PLT  255  305  259   --    < >  267   < >  465*   < >   --    INR   --    --    --    --    --    --    --   1.09   --   1.23*   NA  139  143  142   --    < >  142   < >  143   --   142   POTASSIUM   --   3.5  3.8  3.9   < >  4.4   < >  3.8   --   3.5   CR   --    --    --   1.05   --   1.10   < >  0.90   --   0.87    < > = values in this interval not displayed.        IMPRESSION:                                                    Reason for surgery/procedure:     ICD-10-CM    1. Preop general physical exam Z01.818 EKG 12-lead complete w/read - Clinics   2. Oligodendroglioma (H) C71.9    3. Partial epilepsy with impairment of consciousness, intractable (H) G40.219    4. Unstable gait R26.81    5. Tobacco use Z72.0         The proposed surgical procedure is considered INTERMEDIATE risk.    REVISED CARDIAC RISK INDEX  The patient has the following serious cardiovascular risks for perioperative complications such as (MI, PE, VFib and 3  AV Block):  No serious cardiac risks  INTERPRETATION: 0 risks: Class I (very low risk - 0.4% complication rate)    The patient has the following additional risks for perioperative complications:  No identified additional risks      RECOMMENDATIONS:                                                      --Consult hospital rounder / IM to assist post-op medical management    --Patient is to take all scheduled medications on the day of surgery EXCEPT for modifications listed below.    Anticoagulant or Antiplatelet Medication Use  ASPIRIN: Discontinue ASA 7-10 days prior to procedure to reduce bleeding risk.  It should be resumed post-operatively.        APPROVAL GIVEN to proceed with proposed procedure, without further diagnostic evaluation     The information in this document, created by the medical scribe for me, accurately reflects the services I personally performed and the decisions made by me. I have reviewed and approved this document for accuracy prior to leaving the patient care area.  Brian Coon MD March 10, 2017 7:47 AM    Signed Electronically by: Brian Coon MD    Copy of this evaluation report is provided to requesting physician.    [facility] Preop Guidelines

## 2017-03-10 NOTE — TELEPHONE ENCOUNTER
Received a physician recommendations regarding dental treatment form from [facility] on 3/10/17. I placed the form in the folder to be completed by PANDA Schulz CMA

## 2017-03-15 ENCOUNTER — OFFICE VISIT (OUTPATIENT)
Dept: PSYCHOLOGY | Facility: CLINIC | Age: 47
End: 2017-03-15
Payer: MEDICARE

## 2017-03-15 DIAGNOSIS — Z63.5 MARITAL ESTRANGEMENT: ICD-10-CM

## 2017-03-15 DIAGNOSIS — F32.1 MAJOR DEPRESSIVE DISORDER, SINGLE EPISODE, MODERATE WITH MELANCHOLIC FEATURES (H): Primary | ICD-10-CM

## 2017-03-15 DIAGNOSIS — Z63.0 PARTNER RELATIONSHIP PROBLEM: ICD-10-CM

## 2017-03-15 PROCEDURE — 90834 PSYTX W PT 45 MINUTES: CPT | Performed by: MARRIAGE & FAMILY THERAPIST

## 2017-03-15 SDOH — SOCIAL STABILITY - SOCIAL INSECURITY: PROBLEMS IN RELATIONSHIP WITH SPOUSE OR PARTNER: Z63.0

## 2017-03-15 SDOH — SOCIAL STABILITY - SOCIAL INSECURITY: DISRUPTION OF FAMILY BY SEPARATION AND DIVORCE: Z63.5

## 2017-03-15 ASSESSMENT — ANXIETY QUESTIONNAIRES
3. WORRYING TOO MUCH ABOUT DIFFERENT THINGS: NOT AT ALL
2. NOT BEING ABLE TO STOP OR CONTROL WORRYING: SEVERAL DAYS
1. FEELING NERVOUS, ANXIOUS, OR ON EDGE: NOT AT ALL
IF YOU CHECKED OFF ANY PROBLEMS ON THIS QUESTIONNAIRE, HOW DIFFICULT HAVE THESE PROBLEMS MADE IT FOR YOU TO DO YOUR WORK, TAKE CARE OF THINGS AT HOME, OR GET ALONG WITH OTHER PEOPLE: NOT DIFFICULT AT ALL
5. BEING SO RESTLESS THAT IT IS HARD TO SIT STILL: SEVERAL DAYS
GAD7 TOTAL SCORE: 5
6. BECOMING EASILY ANNOYED OR IRRITABLE: SEVERAL DAYS
7. FEELING AFRAID AS IF SOMETHING AWFUL MIGHT HAPPEN: SEVERAL DAYS

## 2017-03-15 ASSESSMENT — PATIENT HEALTH QUESTIONNAIRE - PHQ9: 5. POOR APPETITE OR OVEREATING: SEVERAL DAYS

## 2017-03-15 NOTE — MR AVS SNAPSHOT
MRN:[id number]                      After Visit Summary   3/15/2017    Joni Borja    MRN: [id number]           Visit Information        Provider Department      3/15/2017 10:00 AM Flex Lombardo LMFT [facility]      Your next 10 appointments already scheduled     Mar 22, 2017 11:00 AM CDT   MR BRAIN W CONTRAST STEREOTACTIC with [id number]   [facility] ([facility])    [address] [place] [id number] [phone]           Take your medicines as usual, unless your doctor tells you not to. Bring a list of your current medicines to your exam (including vitamins, minerals and over-the-counter drugs).  You will be given intravenous contrast for this exam. To prepare:   The day before your exam, drink extra fluids at least six 8-ounce glasses (unless your doctor tells you to restrict your fluids).   Have a blood test (creatinine test) within 30 days of your exam. Go to your clinic or Diagnostic Imaging Department for this test.  The MRI machine uses a strong magnet. Please wear clothes without metal (snaps, zippers). A sweatsuit works well, or we may give you a hospital gown.  Please remove any body piercings and hair extensions before you arrive. You will also remove watches, jewelry, hairpins, wallets, dentures, partial dental plates and hearing aids. You may wear contact lenses, and you may be able to wear your rings. We have a safe place to keep your personal items, but it is safer to leave them at home.   **IMPORTANT** THE INSTRUCTIONS BELOW ARE ONLY FOR THOSE PATIENTS WHO HAVE BEEN TOLD THEY WILL RECEIVE SEDATION OR GENERAL ANESTHESIA DURING THEIR MRI PROCEDURE:  IF YOU WILL RECEIVE SEDATION (take medicine to help you relax during your exam):   You must get the medicine from your doctor before you arrive. Bring the medicine to the exam. Do not take it at home.   Arrive one hour early. Bring someone who can take you  home after the test. Your medicine will make you sleepy. After the exam, you may not drive, take a bus or take a taxi by yourself.   No eating 8 hours before your exam. You may have clear liquids up until 4 hours before your exam. (Clear liquids include water, clear tea, black coffee and fruit juice without pulp.)  IF YOU WILL RECEIVE ANESTHESIA (be asleep for your exam):   Arrive 1 1/2 hours early. Bring someone who can take you home after the test. You may not drive, take a bus or take a taxi by yourself.   No eating 8 hours before your exam. You may have clear liquids up until 4 hours before your exam. (Clear liquids include water, clear tea, black coffee and fruit juice without pulp.)  Please call the Imaging Department at your exam site with any questions.            Mar 24, 2017   Procedure with Stuart Oscar MD   [facility] (--)    [address]  [facility] [id number] [phone]            Apr 05, 2017  1:00 PM CDT   Return Visit with Flex Lombardo [facility] ([facility])    [address] [facility] [address] [phone]            Apr 19, 2017  1:00 PM CDT   Return Visit with Flex Lombardo [facility] ([facility])    [address] [facility] [address] [phone]            May 17, 2017  2:30 PM CDT   Return Visit with Silvia Brown MD   [facility] ([facility])    [address], [address]  [facility] [address] [phone]              MyChart Information     [facility] gives you secure access to your electronic health record. If you see a primary care provider, you can also send messages to your care team and make appointments. If you have questions, please call your primary care clinic.  If you do not have a primary care provider, please call [phone] and they will assist you.        Care EveryWhere ID     This is your Care  EveryWhere ID. This could be used by other organizations to access your [place] medical records  [id number]

## 2017-03-15 NOTE — PROGRESS NOTES
Progress Note    Client Name: Joni Borja  Date: 3/15/2017         Service Type: Individual      Session Start Time: 10am  Session End Time: 10:45am      Session Length: 45 minutes     Session #: 4     Attendees: Client attended alone    Treatment Plan Last Reviewed: 02/28/2017  PHQ-9 / TONI-7 : 3/15/2017     DATA      Progress Since Last Session (Related to Symptoms / Goals / Homework):   Symptoms: Stable    Homework: Achieved / completed to satisfaction      Episode of Care Goals: Minimal progress - ACTION (Actively working towards change); Intervened by reinforcing change plan / affirming steps taken     Current / Ongoing Stressors and Concerns:   - Sx of depression as a result of years of marital distress that led to present separation, working towards divorce   - difficulties coping with medical problems which had led to limitation (driving) and work disability  Client reports his family has made a decision on which  they will hire to begin his divorce process. They have not formally hired him. Since the last session the client reviewed his yearly MRI scan and will be undergoing another brain surgery. Client has confidence in his new medical team here in the [facility] and is optimistic for the results. After informing his wife of the news, he has had thoughts of life back home, and insists it is not healthy environment or relationship. At this time client will focus on his self-care surrounding the surgery and healing. He will revisit plans after the surgery.        Treatment Objective(s) Addressed in This Session:   Decrease frequency and intensity of feeling down, depressed, hopeless  compile a list of boundaries that they would like to set with others. wife  Discuss understanding and impact of TBI, managing TBI to make healthy choices      Intervention:   CBT: operant conditioning, identifying triggers and patterns, identifying alternatives  Solution  Focused: miracle question, healthy divorce/separation, coping with Dx  Structural: balance, roles, rules, past patterns vs desired patterns         ASSESSMENT: Current Emotional / Mental Status (status of significant symptoms):   Risk status (Self / Other harm or suicidal ideation)   Client denies current fears or concerns for personal safety.   Client denies current or recent suicidal ideation or behaviors.   Client denies current or recent homicidal ideation or behaviors.   Client denies current or recent self injurious behavior or ideation.   Client denies other safety concerns.   A safety and risk management plan has not been developed at this time, however client was given the after-hours number / 911 should there be a change in any of these risk factors.     Appearance:   Appropriate    Eye Contact:   Good    Psychomotor Behavior: Normal    Attitude:   Cooperative    Orientation:   All   Speech    Rate / Production: Normal  Slow     Volume:  Normal    Mood:    Depressed    Affect:    Appropriate  Bright  Worrisome    Thought Content:  Clear  Rumination    Thought Form:  Coherent  Goal Directed  Logical  Circumstantial   Insight:    Fair      Medication Review:   No changes to current psychiatric medication(s)     Medication Compliance:   Yes     Changes in Health Issues:   None reported     Chemical Use Review:   Substance Use: Chemical use reviewed, no active concerns identified      Tobacco Use: No change in amount of tobacco use since last session.  Patient declined discussion at this time     Collateral Reports Completed:   Not Applicable    PLAN: (Client Tasks / Therapist Tasks / Other)  Client will focus on his upcoming brain surgery and prioritize taking care of his pre-surgery needs and healing.   After healing client will re-assess plans for divorce.       EMILY Donhaue, LICSW                                                      "    ________________________________________________________________________    Treatment Plan    Client's Name: Joni Borja  YOB: 1970    Date: 2/20/17    DSM-V Diagnoses: 296.22 Major Depressive Disorder, Single Episode, Moderate With melancholic features   V61.10 (Z63.0) Relationship Distress With Spouse  V61.03 (Z63.5) Disruption of Family by Separation  Psychosocial & Contextual Factors: Client is experiencing depression from stressors related to marital distress which has led to his current separation, and from his history of medical problems.   WHODAS 2.0 (12 item) 16.67% on 2/14/2017    Referral / Collaboration:  Referral to another professional/service is not indicated at this time..    Anticipated number of session or this episode of care: 10      MeasurableTreatment Goal(s) related to diagnosis / functional impairment(s)  Goal 1: Client's depression will remit as evidenced by a decrease in PHQ9 score by at least 6 points or below a five, where symptoms occur fewer than half the days.   I will know I've met my goal when I \"clear my head and work on myself.      Objective #A (Client Action)   Client will decrease frequency and intensity of feeling down, depressed, hopeless  Client will increase self-awareness of symptom onset/escalation  Status: New - Date: 02/27/2017    Intervention(s)  Therapist will assign homework track symptoms, patterns and triggers  provide psycho-education on depression  Therapist will teach CBT strategies for attending to negative self-talk and cognitive distortions.  ACT: experiential exercises and mindfulness practices, how  to live with life barriers    Objective #B  Client will Increase interest, engagement, and pleasure in doing things  Identify negative self-talk and behaviors: challenge core beliefs, myths, and actions  Status:New - Date: 02/27/2017    Intervention(s)  Therapist will assign homework to practice using coping skills outside the therapy " "encounter, worksheets to challenge negative thoughts  teach distraction skills. explore and tailor enjoyable activities, increase social activities, strengthen social supports  ACT: life values and being mindful of values for goals and daily living    Objective #C  Improve concentration, focus, and mindfulness in daily activities   Status: New - Date: 02/27/2017    Intervention(s)  provide safe space to address hx of presenting problem and root cause  teach emotional regulation skills. mindfulness practices, grounding skills, affirmations, strengths based approach  Sandtray: exercise to stage presenting problem, reflect, process and problem solve.      Goal 2: Client will improve his condition of interactions in his relationships (specifically with wife/) establishing healthy, balanced and appropriate boundaries to be kept 100% of the time for a minimum of 4 weeks.     I will know I've met my goal when I \"get my own place. Get the rest of my valuables from the house.      Objective #A (Client Action)      Client will compile a list of boundaries that they would like to set with others. Wife, adult step-children, family members  Learning to fight fair, learning to identify positive and negative communication patterns  Status: New - Date: 02/27/2017    Intervention(s)  Therapist will teach about healthy boundaries. structure, saying \"no\", advocating, identifying and establishing appropriate roles, rules, expectations.    Objective #B  Client will practice setting boundaries daily times in the next 12 weeks.                    Status: New - Date: 02/27/2017    Intervention(s)  Therapist will assign homework to track the use of healthy boundaries and outcome of establishing relational change  role-play effective communication skills and conflict management    Objective #C  Client will learn & utilize at least 3 assertive communication skills weekly.  Status: New - Date: 02/27/2017    Intervention(s)  teach " assertiveness skills. aggressive/passive/assertive, impulsive control, reactive vs sensitive, exposure to uncomfortable conversation.  Therapist will role-play assertiveness skills      Client has reviewed and agreed to the above plan.      EMILY Donahue, LICSW  March 15, 2017

## 2017-03-16 ASSESSMENT — PATIENT HEALTH QUESTIONNAIRE - PHQ9: SUM OF ALL RESPONSES TO PHQ QUESTIONS 1-9: 9

## 2017-03-16 ASSESSMENT — ANXIETY QUESTIONNAIRES: GAD7 TOTAL SCORE: 5

## 2017-03-21 NOTE — H&P (VIEW-ONLY)
[facility] PRIOR [address] [address]  [phone]  Dept: [phone]    PRE-OP EVALUATION:  Today's date: 3/10/2017    Joni Borja (: 1970) presents for pre-operative evaluation assessment as requested by Dr. Oscar.  He requires evaluation and anesthesia risk assessment prior to undergoing surgery/procedure for treatment of Brain Tumor .  Proposed procedure: COMBINED OPTICAL TRACKING SYSTEM CRANIOTOMY, EXCISE TUMOR; RIGHT FRONTAL CRANIOTOMY FOR TUMOR RESECTION (STEALTH; CALIX PINS)    Date of Surgery/ Procedure: 2017  Time of Surgery/ Procedure: 11:35 AM  Hospital/Surgical Facility: [facility]  Primary Physician: Brian Coon  Type of Anesthesia Anticipated: General    Patient has a Health Care Directive or Living Will:  NO    Preop Questions 3/7/2017   1.  Do you have a history of heart attack, stroke, stent, bypass or surgery on an artery in the head, neck, heart or legs? No   2.  Do you ever have any pain or discomfort in your chest? No   3.  Do you have a history of  Heart Failure? No   4.   Are you troubled by shortness of breath when:  walking on a level surface, or up a slight hill, or at night? No   5.  Do you currently have a cold, bronchitis or other respiratory infection? No   6.  Do you have a cough, shortness of breath, or wheezing? No   7.  Do you sometimes get pains in the calves of your legs when you walk? No   8. Do you or anyone in your family have previous history of blood clots? YES - brother had heart attack   9.  Do you or does anyone in your family have a serious bleeding problem such as prolonged bleeding following surgeries or cuts? No   10. Have you ever had problems with anemia or been told to take iron pills? No   11. Have you had any abnormal blood loss such as black, tarry or bloody stools? No   12. Have you ever had a blood transfusion? No   13. Have you or any of your relatives ever had problems with  anesthesia? No   14. Do you have sleep apnea, excessive snoring or daytime drowsiness? No   15. Do you have any prosthetic heart valves? No   16. Do you have prosthetic joints? No         HPI:                                                      Brief HPI related to upcoming procedure: h/o oligodendroglioma and increased seizure history       See problem list for active medical problems.  Problems all longstanding and stable, except as noted/documented.  See ROS for pertinent symptoms related to these conditions.                                                                                  .  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 Iopx-Azgqb-Vcmmmvu disease 02/07/2017     Priority: Medium              Status post VNS (vagus nerve stimulator) placement - for epilepsy 02/07/2017     Priority: Medium     Oligodendroglioma (H)      Priority: Medium     Overview:   Right temporal brain tumor, oligodendroglioma  right frontal grade 2, s/p biopsy and whole brain radiation, recurrence 5/07 s/p subtotal resection and chemo       Other speech disturbance(784.59) 06/09/2015     Priority: Medium     Partial epilepsy with impairment of consciousness (H) 02/14/2015     Priority: Medium     Unstable gait 06/18/2014     Priority: Medium     Tobacco use 03/14/2014     Priority: Medium     Epilepsy (H) 09/10/2013     Priority:  Medium     Partial epilepsy with impairment of consciousness, intractable (H) 08/19/2013     Priority: Medium     Problem list name updated by automated process. 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 Igms-Qxwqk-Bbktdnc disease 2/7/2017           Localization-related epilepsy (H)      Oligodendroglioma (H) 4/02     right frontal grade 2, s/p biopsy and whole brain radiation, recurrence 5/07 s/p subtotal resection and chemo     Perthe's disease of hip      Right hip     Seizures (H)      Past Surgical History   Procedure Laterality Date     Appendectomy  1972     Head & neck surgery  2002     brain tumor removal     Orthopedic surgery  1982     Right Hip - Perthe's     Optical tracking system craniotomy, excise tumor with mapping, combined  9/10/2013     Procedure: COMBINED OPTICAL TRACKING SYSTEM CRANIOTOMY, EXCISE TUMOR WITH MAPPING;  Stealth Guided Right Redo Frontal Craniotomy for Grid Placement ;  Surgeon: Maverick Linder MD;  Location: [facility] OR     Craniotomy, excise tumor complex, combined  9/18/2013     Procedure: COMBINED CRANIOTOMY, EXCISE TUMOR COMPLEX;  Re-do Right Frontal Craniotomy, Grid Removal,  Resection of Right Frontal  Tumor and Epileptogenic Cortex Resection;  Surgeon: Maverick Linder MD;  Location: [facility] OR     Implant stimulator vagus nerve Left 7/19/2016     Procedure: IMPLANT STIMULATOR VAGUS NERVE;  Surgeon: Maverick Linder MD;  Location: [facility] OR     Biopsy  2002     Current Outpatient Prescriptions   Medication Sig Dispense Refill     carBAMazepine (CARBATROL) 300 MG 12 hr capsule Take 1 capsule (300 mg) by mouth 2 times daily 60 capsule 3     felbamate (FELBATOL) 600 MG tablet TAKE 1 TAB AM and 1 1/2 TAB in afternoon at 2 pm (600mg-900mg) 225 tablet 3     topiramate (TOPAMAX) 100 MG tablet Take 2 tab po am - 3 tab po pm 450  "tablet 3     clonazePAM (KLONOPIN) 0.5 MG tablet 1 tab am, 2 tab pm 180 tablet 3     escitalopram (LEXAPRO) 20 MG tablet Take 1 tablet (20 mg) by mouth daily 1 tab nightly 90 tablet 1     pantoprazole (PROTONIX) 40 MG EC tablet Take 1 tablet (40 mg) by mouth daily 90 tablet 1     diazepam (VALIUM) 5 MG tablet As instructed for seizures. Please take 5 mg for more than 3 seizures in 8 hour period, may repeat 5 mg after 30 min if seizures continue. 30 tablet 1     acetaminophen-codeine (TYLENOL #3) 300-30 MG per tablet Take 1-2 tablets by mouth every 4 hours as needed       Cetirizine HCl (ZYRTEC ALLERGY PO) Take 10 mg by mouth as needed        DiphenhydrAMINE HCl (BENADRYL PO) Take by mouth as needed       OTC products: None, except as noted above    Allergies   Allergen Reactions     Dilantin [Phenytoin] Hives     Mosquitoes (Informational Only)      blisters     Nuts      Other reaction(s): Angioedema  mosquito      Latex Allergy: NO    Social History   Substance Use Topics     Smoking status: Current Every Day Smoker     Packs/day: 1.00     Years: 20.00     Types: Cigarettes     Smokeless tobacco: Never Used     Alcohol use 0.0 oz/week      Comment: on weekends     History   Drug Use     Yes     Comment: marijuana(Rx) on weekends       REVIEW OF SYSTEMS:                                                    Constitutional, neuro, ENT, endocrine, pulmonary, cardiac, gastrointestinal, genitourinary, musculoskeletal, integument and psychiatric systems are negative, except as otherwise noted.    This document serves as a record of the services and decisions personally performed and made by Brian Coon MD. It was created on his behalf by Gloria Peres, a trained medical scribe. The creation of this document is based the provider's statements to the medical scribe.  Gloria Peres   EXAM:                                                    BP 90/60  Pulse 99  Temp 98.3  F (36.8  C) (Oral)  Ht 5' 8.5\" (1.74 m)  Wt 180 lb " (81.6 kg)  SpO2 97%  BMI 26.97 kg/m2    GENERAL APPEARANCE: healthy, alert and no distress     EYES: EOMI,  PERRL     HENT: ear canals and TM's normal and nose and mouth without ulcers or lesions     NECK: no adenopathy, no asymmetry, masses, or scars and thyroid normal to palpation     RESP: lungs clear to auscultation - no rales, rhonchi or wheezes     CV: regular rates and rhythm, normal S1 S2, no S3 or S4 and no murmur, click or rub     ABDOMEN:  soft, nontender, no HSM or masses and bowel sounds normal     MS: extremities normal- no gross deformities noted, no evidence of inflammation in joints, FROM in all extremities.     SKIN: no suspicious lesions or rashes     PSYCH: mentation appears normal. and affect normal/bright     LYMPHATICS: No axillary, cervical, or supraclavicular nodes    DIAGNOSTICS:                                                    EKG: appears normal, NSR, normal axis, normal intervals, no acute ST/T changes c/w ischemia, no LVH by voltage criteria, unchanged from previous tracings    Recent Labs   Lab Test 01/12/17 12/29/16   1548 10/24/16  07/19/16   0738  06/02/16 09/24/13   0533   09/23/13   0728   HGB  13.3*  13.4*  13.5   --    < >  13.5   < >  9.1*   < >   --    PLT  255  305  259   --    < >  267   < >  465*   < >   --    INR   --    --    --    --    --    --    --   1.09   --   1.23*   NA  139  143  142   --    < >  142   < >  143   --   142   POTASSIUM   --   3.5  3.8  3.9   < >  4.4   < >  3.8   --   3.5   CR   --    --    --   1.05   --   1.10   < >  0.90   --   0.87    < > = values in this interval not displayed.        IMPRESSION:                                                    Reason for surgery/procedure:     ICD-10-CM    1. Preop general physical exam Z01.818 EKG 12-lead complete w/read - Clinics   2. Oligodendroglioma (H) C71.9    3. Partial epilepsy with impairment of consciousness, intractable (H) G40.219    4. Unstable gait R26.81    5. Tobacco use Z72.0         The proposed surgical procedure is considered INTERMEDIATE risk.    REVISED CARDIAC RISK INDEX  The patient has the following serious cardiovascular risks for perioperative complications such as (MI, PE, VFib and 3  AV Block):  No serious cardiac risks  INTERPRETATION: 0 risks: Class I (very low risk - 0.4% complication rate)    The patient has the following additional risks for perioperative complications:  No identified additional risks      RECOMMENDATIONS:                                                      --Consult hospital rounder / IM to assist post-op medical management    --Patient is to take all scheduled medications on the day of surgery EXCEPT for modifications listed below.    Anticoagulant or Antiplatelet Medication Use  ASPIRIN: Discontinue ASA 7-10 days prior to procedure to reduce bleeding risk.  It should be resumed post-operatively.        APPROVAL GIVEN to proceed with proposed procedure, without further diagnostic evaluation     The information in this document, created by the medical scribe for me, accurately reflects the services I personally performed and the decisions made by me. I have reviewed and approved this document for accuracy prior to leaving the patient care area.  Brian Coon MD March 10, 2017 7:47 AM    Signed Electronically by: Brian Coon MD    Copy of this evaluation report is provided to requesting physician.    [facility] Preop Guidelines

## 2017-03-22 ENCOUNTER — HOSPITAL ENCOUNTER (OUTPATIENT)
Dept: MRI IMAGING | Facility: CLINIC | Age: 47
Discharge: HOME | DRG: 027 | End: 2017-03-22
Attending: NEUROLOGICAL SURGERY | Admitting: NEUROLOGICAL SURGERY
Payer: MEDICARE

## 2017-03-22 DIAGNOSIS — C71.9 OLIGODENDROGLIOMA (H): ICD-10-CM

## 2017-03-22 RX ORDER — GADOBUTROL 604.72 MG/ML
20 INJECTION INTRAVENOUS ONCE
Status: COMPLETED | OUTPATIENT
Start: 2017-03-22 | End: 2017-03-22

## 2017-03-22 RX ADMIN — GADOBUTROL 20 ML: 604.72 INJECTION INTRAVENOUS at 11:54

## 2017-03-24 ENCOUNTER — ANESTHESIA EVENT (OUTPATIENT)
Dept: SURGERY | Facility: CLINIC | Age: 47
DRG: 027 | End: 2017-03-24
Payer: MEDICARE

## 2017-03-24 ENCOUNTER — HOSPITAL ENCOUNTER (INPATIENT)
Facility: CLINIC | Age: 47
LOS: 3 days | Discharge: HOME | DRG: 027 | End: 2017-03-27
Attending: NEUROLOGICAL SURGERY | Admitting: NEUROLOGICAL SURGERY
Payer: MEDICARE

## 2017-03-24 ENCOUNTER — ANESTHESIA (OUTPATIENT)
Dept: SURGERY | Facility: CLINIC | Age: 47
DRG: 027 | End: 2017-03-24
Payer: MEDICARE

## 2017-03-24 DIAGNOSIS — C71.9 GLIOMA (H): ICD-10-CM

## 2017-03-24 DIAGNOSIS — Z98.890 STATUS POST CRANIOTOMY: Primary | ICD-10-CM

## 2017-03-24 DIAGNOSIS — Z30.9 CONTRACEPTIVE MANAGEMENT: ICD-10-CM

## 2017-03-24 LAB
ABO + RH BLD: NORMAL
ABO + RH BLD: NORMAL
BLD GP AB SCN SERPL QL: NORMAL
BLOOD BANK CMNT PATIENT-IMP: NORMAL
GLUCOSE BLDC GLUCOMTR-MCNC: 77 MG/DL (ref 70–99)
HGB BLD-MCNC: 12.6 G/DL (ref 13.3–17.7)
SPECIMEN EXP DATE BLD: NORMAL

## 2017-03-24 PROCEDURE — 25000125 ZZHC RX 250: Performed by: NEUROLOGICAL SURGERY

## 2017-03-24 PROCEDURE — 25000128 H RX IP 250 OP 636: Performed by: NURSE ANESTHETIST, CERTIFIED REGISTERED

## 2017-03-24 PROCEDURE — 86901 BLOOD TYPING SEROLOGIC RH(D): CPT | Performed by: NEUROLOGICAL SURGERY

## 2017-03-24 PROCEDURE — 25000125 ZZHC RX 250: Performed by: PHYSICIAN ASSISTANT

## 2017-03-24 PROCEDURE — 71000015 ZZH RECOVERY PHASE 1 LEVEL 2 EA ADDTL HR: Performed by: NEUROLOGICAL SURGERY

## 2017-03-24 PROCEDURE — A9270 NON-COVERED ITEM OR SERVICE: HCPCS | Mod: GY | Performed by: PHYSICIAN ASSISTANT

## 2017-03-24 PROCEDURE — 25000301 ZZH OR RX SURGIFLO W/THROMBIN KIT 2ML 1991 OPNP: Performed by: NEUROLOGICAL SURGERY

## 2017-03-24 PROCEDURE — 25000128 H RX IP 250 OP 636: Performed by: PHYSICIAN ASSISTANT

## 2017-03-24 PROCEDURE — 88331 PATH CONSLTJ SURG 1 BLK 1SPC: CPT | Mod: 26 | Performed by: NEUROLOGICAL SURGERY

## 2017-03-24 PROCEDURE — 88271 CYTOGENETICS DNA PROBE: CPT | Performed by: PATHOLOGY

## 2017-03-24 PROCEDURE — 25000128 H RX IP 250 OP 636: Performed by: NEUROLOGICAL SURGERY

## 2017-03-24 PROCEDURE — 88305 TISSUE EXAM BY PATHOLOGIST: CPT | Performed by: NEUROLOGICAL SURGERY

## 2017-03-24 PROCEDURE — 71000014 ZZH RECOVERY PHASE 1 LEVEL 2 FIRST HR: Performed by: NEUROLOGICAL SURGERY

## 2017-03-24 PROCEDURE — C1713 ANCHOR/SCREW BN/BN,TIS/BN: HCPCS | Performed by: NEUROLOGICAL SURGERY

## 2017-03-24 PROCEDURE — 88305 TISSUE EXAM BY PATHOLOGIST: CPT | Mod: 26 | Performed by: NEUROLOGICAL SURGERY

## 2017-03-24 PROCEDURE — 25000128 H RX IP 250 OP 636: Performed by: ANESTHESIOLOGY

## 2017-03-24 PROCEDURE — 86900 BLOOD TYPING SEROLOGIC ABO: CPT | Performed by: NEUROLOGICAL SURGERY

## 2017-03-24 PROCEDURE — 61510 CRNEC TREPH EXC BRN TUM STTL: CPT | Performed by: NEUROLOGICAL SURGERY

## 2017-03-24 PROCEDURE — 85018 HEMOGLOBIN: CPT | Performed by: ANESTHESIOLOGY

## 2017-03-24 PROCEDURE — 25000125 ZZHC RX 250: Performed by: NURSE ANESTHETIST, CERTIFIED REGISTERED

## 2017-03-24 PROCEDURE — 37000009 ZZH ANESTHESIA TECHNICAL FEE, EACH ADDTL 15 MIN: Performed by: NEUROLOGICAL SURGERY

## 2017-03-24 PROCEDURE — 88304 TISSUE EXAM BY PATHOLOGIST: CPT | Performed by: NEUROLOGICAL SURGERY

## 2017-03-24 PROCEDURE — 25800025 ZZH RX 258: Performed by: ANESTHESIOLOGY

## 2017-03-24 PROCEDURE — 81287 MGMT GENE PRMTR MTHYLTN ALYS: CPT | Performed by: PATHOLOGY

## 2017-03-24 PROCEDURE — 61510 CRNEC TREPH EXC BRN TUM STTL: CPT | Mod: AS | Performed by: PHYSICIAN ASSISTANT

## 2017-03-24 PROCEDURE — 88307 TISSUE EXAM BY PATHOLOGIST: CPT | Performed by: NEUROLOGICAL SURGERY

## 2017-03-24 PROCEDURE — 27210794 ZZH OR GENERAL SUPPLY STERILE: Performed by: NEUROLOGICAL SURGERY

## 2017-03-24 PROCEDURE — 27210995 ZZH RX 272: Performed by: NEUROLOGICAL SURGERY

## 2017-03-24 PROCEDURE — 36000073 ZZH SURGERY LEVEL 6 1ST 30 MIN: Performed by: NEUROLOGICAL SURGERY

## 2017-03-24 PROCEDURE — 86850 RBC ANTIBODY SCREEN: CPT | Performed by: NEUROLOGICAL SURGERY

## 2017-03-24 PROCEDURE — 25000125 ZZHC RX 250: Performed by: ANESTHESIOLOGY

## 2017-03-24 PROCEDURE — 36415 COLL VENOUS BLD VENIPUNCTURE: CPT | Performed by: ANESTHESIOLOGY

## 2017-03-24 PROCEDURE — 25000566 ZZH SEVOFLURANE, EA 15 MIN: Performed by: NEUROLOGICAL SURGERY

## 2017-03-24 PROCEDURE — 40000171 ZZH STATISTIC PRE-PROCEDURE ASSESSMENT III: Performed by: NEUROLOGICAL SURGERY

## 2017-03-24 PROCEDURE — 88307 TISSUE EXAM BY PATHOLOGIST: CPT | Mod: 26 | Performed by: NEUROLOGICAL SURGERY

## 2017-03-24 PROCEDURE — 88304 TISSUE EXAM BY PATHOLOGIST: CPT | Mod: 26 | Performed by: NEUROLOGICAL SURGERY

## 2017-03-24 PROCEDURE — 20000003 ZZH R&B ICU

## 2017-03-24 PROCEDURE — 00000159 ZZHCL STATISTIC H-SEND OUTS PREP: Performed by: NEUROLOGICAL SURGERY

## 2017-03-24 PROCEDURE — 88275 CYTOGENETICS 100-300: CPT | Performed by: PATHOLOGY

## 2017-03-24 PROCEDURE — 25000132 ZZH RX MED GY IP 250 OP 250 PS 637: Mod: GY | Performed by: PHYSICIAN ASSISTANT

## 2017-03-24 PROCEDURE — 37000008 ZZH ANESTHESIA TECHNICAL FEE, 1ST 30 MIN: Performed by: NEUROLOGICAL SURGERY

## 2017-03-24 PROCEDURE — 00000146 ZZHCL STATISTIC GLUCOSE BY METER IP

## 2017-03-24 PROCEDURE — 61781 SCAN PROC CRANIAL INTRA: CPT | Performed by: NEUROLOGICAL SURGERY

## 2017-03-24 PROCEDURE — 88331 PATH CONSLTJ SURG 1 BLK 1SPC: CPT | Performed by: NEUROLOGICAL SURGERY

## 2017-03-24 PROCEDURE — 36000075 ZZH SURGERY LEVEL 6 EA 15 ADDTL MIN: Performed by: NEUROLOGICAL SURGERY

## 2017-03-24 PROCEDURE — 25800025 ZZH RX 258: Performed by: NURSE ANESTHETIST, CERTIFIED REGISTERED

## 2017-03-24 PROCEDURE — 27810325 ZZHC OR IMPLANT OTHER OPNP: Performed by: NEUROLOGICAL SURGERY

## 2017-03-24 PROCEDURE — C1763 CONN TISS, NON-HUMAN: HCPCS | Performed by: NEUROLOGICAL SURGERY

## 2017-03-24 DEVICE — GRAFT DURAGEN 3X3" ID330: Type: IMPLANTABLE DEVICE | Site: BRAIN | Status: FUNCTIONAL

## 2017-03-24 RX ORDER — ONDANSETRON 4 MG/1
4 TABLET, ORALLY DISINTEGRATING ORAL EVERY 30 MIN PRN
Status: DISCONTINUED | OUTPATIENT
Start: 2017-03-24 | End: 2017-03-24

## 2017-03-24 RX ORDER — DEXAMETHASONE SODIUM PHOSPHATE 4 MG/ML
2 INJECTION, SOLUTION INTRA-ARTICULAR; INTRALESIONAL; INTRAMUSCULAR; INTRAVENOUS; SOFT TISSUE EVERY 6 HOURS
Status: DISCONTINUED | OUTPATIENT
Start: 2017-03-28 | End: 2017-03-26

## 2017-03-24 RX ORDER — GLYCOPYRROLATE 0.2 MG/ML
INJECTION, SOLUTION INTRAMUSCULAR; INTRAVENOUS PRN
Status: DISCONTINUED | OUTPATIENT
Start: 2017-03-24 | End: 2017-03-24

## 2017-03-24 RX ORDER — FELBAMATE 600 MG/1
600 TABLET ORAL EVERY MORNING
Status: DISCONTINUED | OUTPATIENT
Start: 2017-03-25 | End: 2017-03-27 | Stop reason: HOSPADM

## 2017-03-24 RX ORDER — LABETALOL HYDROCHLORIDE 5 MG/ML
INJECTION, SOLUTION INTRAVENOUS PRN
Status: DISCONTINUED | OUTPATIENT
Start: 2017-03-24 | End: 2017-03-24

## 2017-03-24 RX ORDER — EPHEDRINE SULFATE 50 MG/ML
INJECTION, SOLUTION INTRAMUSCULAR; INTRAVENOUS; SUBCUTANEOUS PRN
Status: DISCONTINUED | OUTPATIENT
Start: 2017-03-24 | End: 2017-03-24

## 2017-03-24 RX ORDER — ONDANSETRON 2 MG/ML
4 INJECTION INTRAMUSCULAR; INTRAVENOUS EVERY 30 MIN PRN
Status: DISCONTINUED | OUTPATIENT
Start: 2017-03-24 | End: 2017-03-24

## 2017-03-24 RX ORDER — ACETAMINOPHEN 325 MG/1
650 TABLET ORAL EVERY 4 HOURS PRN
Status: DISCONTINUED | OUTPATIENT
Start: 2017-03-27 | End: 2017-03-27 | Stop reason: HOSPADM

## 2017-03-24 RX ORDER — ONDANSETRON 2 MG/ML
INJECTION INTRAMUSCULAR; INTRAVENOUS PRN
Status: DISCONTINUED | OUTPATIENT
Start: 2017-03-24 | End: 2017-03-24

## 2017-03-24 RX ORDER — OXYCODONE HYDROCHLORIDE 5 MG/1
5-10 TABLET ORAL
Status: DISCONTINUED | OUTPATIENT
Start: 2017-03-24 | End: 2017-03-27 | Stop reason: HOSPADM

## 2017-03-24 RX ORDER — AMOXICILLIN 250 MG
1-2 CAPSULE ORAL 2 TIMES DAILY
Status: DISCONTINUED | OUTPATIENT
Start: 2017-03-24 | End: 2017-03-27 | Stop reason: HOSPADM

## 2017-03-24 RX ORDER — LIDOCAINE HYDROCHLORIDE AND EPINEPHRINE 10; 10 MG/ML; UG/ML
INJECTION, SOLUTION INFILTRATION; PERINEURAL PRN
Status: DISCONTINUED | OUTPATIENT
Start: 2017-03-24 | End: 2017-03-24 | Stop reason: HOSPADM

## 2017-03-24 RX ORDER — CARBAMAZEPINE 100 MG/1
300 CAPSULE, EXTENDED RELEASE ORAL 2 TIMES DAILY
Status: DISCONTINUED | OUTPATIENT
Start: 2017-03-24 | End: 2017-03-27 | Stop reason: HOSPADM

## 2017-03-24 RX ORDER — METOCLOPRAMIDE HYDROCHLORIDE 5 MG/ML
10 INJECTION INTRAMUSCULAR; INTRAVENOUS EVERY 6 HOURS PRN
Status: DISCONTINUED | OUTPATIENT
Start: 2017-03-24 | End: 2017-03-27 | Stop reason: HOSPADM

## 2017-03-24 RX ORDER — SODIUM CHLORIDE, SODIUM LACTATE, POTASSIUM CHLORIDE, CALCIUM CHLORIDE 600; 310; 30; 20 MG/100ML; MG/100ML; MG/100ML; MG/100ML
INJECTION, SOLUTION INTRAVENOUS CONTINUOUS
Status: DISCONTINUED | OUTPATIENT
Start: 2017-03-24 | End: 2017-03-24 | Stop reason: HOSPADM

## 2017-03-24 RX ORDER — SODIUM CHLORIDE 9 MG/ML
INJECTION, SOLUTION INTRAVENOUS CONTINUOUS
Status: DISCONTINUED | OUTPATIENT
Start: 2017-03-24 | End: 2017-03-27 | Stop reason: HOSPADM

## 2017-03-24 RX ORDER — CEFAZOLIN SODIUM 1 G/3ML
1 INJECTION, POWDER, FOR SOLUTION INTRAMUSCULAR; INTRAVENOUS SEE ADMIN INSTRUCTIONS
Status: DISCONTINUED | OUTPATIENT
Start: 2017-03-24 | End: 2017-03-24 | Stop reason: HOSPADM

## 2017-03-24 RX ORDER — PROPOFOL 10 MG/ML
INJECTION, EMULSION INTRAVENOUS CONTINUOUS PRN
Status: DISCONTINUED | OUTPATIENT
Start: 2017-03-24 | End: 2017-03-24

## 2017-03-24 RX ORDER — MAGNESIUM HYDROXIDE 1200 MG/15ML
LIQUID ORAL PRN
Status: DISCONTINUED | OUTPATIENT
Start: 2017-03-24 | End: 2017-03-24 | Stop reason: HOSPADM

## 2017-03-24 RX ORDER — VECURONIUM BROMIDE 1 MG/ML
INJECTION, POWDER, LYOPHILIZED, FOR SOLUTION INTRAVENOUS PRN
Status: DISCONTINUED | OUTPATIENT
Start: 2017-03-24 | End: 2017-03-24

## 2017-03-24 RX ORDER — PROPOFOL 10 MG/ML
INJECTION, EMULSION INTRAVENOUS PRN
Status: DISCONTINUED | OUTPATIENT
Start: 2017-03-24 | End: 2017-03-24

## 2017-03-24 RX ORDER — CALCIUM CARBONATE 500 MG/1
1-2 TABLET, CHEWABLE ORAL 4 TIMES DAILY PRN
Status: DISCONTINUED | OUTPATIENT
Start: 2017-03-24 | End: 2017-03-27 | Stop reason: HOSPADM

## 2017-03-24 RX ORDER — TOPIRAMATE 200 MG/1
200 TABLET, FILM COATED ORAL EVERY MORNING
Status: DISCONTINUED | OUTPATIENT
Start: 2017-03-24 | End: 2017-03-27 | Stop reason: HOSPADM

## 2017-03-24 RX ORDER — ONDANSETRON 4 MG/1
4 TABLET, ORALLY DISINTEGRATING ORAL EVERY 6 HOURS PRN
Status: DISCONTINUED | OUTPATIENT
Start: 2017-03-24 | End: 2017-03-27 | Stop reason: HOSPADM

## 2017-03-24 RX ORDER — FENTANYL CITRATE 50 UG/ML
INJECTION, SOLUTION INTRAMUSCULAR; INTRAVENOUS PRN
Status: DISCONTINUED | OUTPATIENT
Start: 2017-03-24 | End: 2017-03-24

## 2017-03-24 RX ORDER — ALUMINA, MAGNESIA, AND SIMETHICONE 2400; 2400; 240 MG/30ML; MG/30ML; MG/30ML
15-30 SUSPENSION ORAL EVERY 4 HOURS PRN
Status: DISCONTINUED | OUTPATIENT
Start: 2017-03-24 | End: 2017-03-27 | Stop reason: HOSPADM

## 2017-03-24 RX ORDER — FENTANYL CITRATE 0.05 MG/ML
25-50 INJECTION, SOLUTION INTRAMUSCULAR; INTRAVENOUS
Status: DISCONTINUED | OUTPATIENT
Start: 2017-03-24 | End: 2017-03-24

## 2017-03-24 RX ORDER — PROCHLORPERAZINE MALEATE 5 MG
5-10 TABLET ORAL EVERY 6 HOURS PRN
Status: DISCONTINUED | OUTPATIENT
Start: 2017-03-24 | End: 2017-03-27 | Stop reason: HOSPADM

## 2017-03-24 RX ORDER — NEOSTIGMINE METHYLSULFATE 1 MG/ML
VIAL (ML) INJECTION PRN
Status: DISCONTINUED | OUTPATIENT
Start: 2017-03-24 | End: 2017-03-24

## 2017-03-24 RX ORDER — DEXAMETHASONE SODIUM PHOSPHATE 4 MG/ML
INJECTION, SOLUTION INTRA-ARTICULAR; INTRALESIONAL; INTRAMUSCULAR; INTRAVENOUS; SOFT TISSUE PRN
Status: DISCONTINUED | OUTPATIENT
Start: 2017-03-24 | End: 2017-03-24

## 2017-03-24 RX ORDER — NALOXONE HYDROCHLORIDE 0.4 MG/ML
.1-.4 INJECTION, SOLUTION INTRAMUSCULAR; INTRAVENOUS; SUBCUTANEOUS
Status: DISCONTINUED | OUTPATIENT
Start: 2017-03-24 | End: 2017-03-27 | Stop reason: HOSPADM

## 2017-03-24 RX ORDER — CEFAZOLIN SODIUM 2 G/100ML
2 INJECTION, SOLUTION INTRAVENOUS
Status: COMPLETED | OUTPATIENT
Start: 2017-03-24 | End: 2017-03-24

## 2017-03-24 RX ORDER — BACITRACIN ZINC 500 [USP'U]/G
OINTMENT TOPICAL PRN
Status: DISCONTINUED | OUTPATIENT
Start: 2017-03-24 | End: 2017-03-24 | Stop reason: HOSPADM

## 2017-03-24 RX ORDER — LABETALOL HYDROCHLORIDE 5 MG/ML
10 INJECTION, SOLUTION INTRAVENOUS
Status: DISCONTINUED | OUTPATIENT
Start: 2017-03-24 | End: 2017-03-24

## 2017-03-24 RX ORDER — DEXAMETHASONE SODIUM PHOSPHATE 4 MG/ML
1 INJECTION, SOLUTION INTRA-ARTICULAR; INTRALESIONAL; INTRAMUSCULAR; INTRAVENOUS; SOFT TISSUE EVERY 12 HOURS
Status: DISCONTINUED | OUTPATIENT
Start: 2017-04-01 | End: 2017-03-26

## 2017-03-24 RX ORDER — MANNITOL 250 MG/ML
INJECTION, SOLUTION INTRAVENOUS PRN
Status: DISCONTINUED | OUTPATIENT
Start: 2017-03-24 | End: 2017-03-24

## 2017-03-24 RX ORDER — FENTANYL CITRATE 50 UG/ML
25-50 INJECTION, SOLUTION INTRAMUSCULAR; INTRAVENOUS
Status: COMPLETED | OUTPATIENT
Start: 2017-03-24 | End: 2017-03-24

## 2017-03-24 RX ORDER — HYDRALAZINE HYDROCHLORIDE 20 MG/ML
10-20 INJECTION INTRAMUSCULAR; INTRAVENOUS EVERY 30 MIN PRN
Status: DISCONTINUED | OUTPATIENT
Start: 2017-03-24 | End: 2017-03-27 | Stop reason: HOSPADM

## 2017-03-24 RX ORDER — ACETAMINOPHEN 325 MG/1
975 TABLET ORAL EVERY 8 HOURS
Status: DISCONTINUED | OUTPATIENT
Start: 2017-03-24 | End: 2017-03-27 | Stop reason: HOSPADM

## 2017-03-24 RX ORDER — LIDOCAINE 40 MG/G
CREAM TOPICAL
Status: DISCONTINUED | OUTPATIENT
Start: 2017-03-24 | End: 2017-03-27 | Stop reason: HOSPADM

## 2017-03-24 RX ORDER — SODIUM CHLORIDE, SODIUM LACTATE, POTASSIUM CHLORIDE, CALCIUM CHLORIDE 600; 310; 30; 20 MG/100ML; MG/100ML; MG/100ML; MG/100ML
INJECTION, SOLUTION INTRAVENOUS CONTINUOUS PRN
Status: DISCONTINUED | OUTPATIENT
Start: 2017-03-24 | End: 2017-03-24

## 2017-03-24 RX ORDER — CLONAZEPAM 0.5 MG/1
1 TABLET ORAL AT BEDTIME
Status: DISCONTINUED | OUTPATIENT
Start: 2017-03-24 | End: 2017-03-27 | Stop reason: HOSPADM

## 2017-03-24 RX ORDER — DEXAMETHASONE SODIUM PHOSPHATE 4 MG/ML
1 INJECTION, SOLUTION INTRA-ARTICULAR; INTRALESIONAL; INTRAMUSCULAR; INTRAVENOUS; SOFT TISSUE EVERY 6 HOURS
Status: DISCONTINUED | OUTPATIENT
Start: 2017-03-30 | End: 2017-03-26

## 2017-03-24 RX ORDER — METOCLOPRAMIDE 10 MG/1
10 TABLET ORAL EVERY 6 HOURS PRN
Status: DISCONTINUED | OUTPATIENT
Start: 2017-03-24 | End: 2017-03-27 | Stop reason: HOSPADM

## 2017-03-24 RX ORDER — HYDROMORPHONE HYDROCHLORIDE 1 MG/ML
0.2 INJECTION, SOLUTION INTRAMUSCULAR; INTRAVENOUS; SUBCUTANEOUS
Status: DISCONTINUED | OUTPATIENT
Start: 2017-03-24 | End: 2017-03-27 | Stop reason: HOSPADM

## 2017-03-24 RX ORDER — ONDANSETRON 2 MG/ML
4 INJECTION INTRAMUSCULAR; INTRAVENOUS EVERY 6 HOURS PRN
Status: DISCONTINUED | OUTPATIENT
Start: 2017-03-24 | End: 2017-03-27 | Stop reason: HOSPADM

## 2017-03-24 RX ORDER — DEXAMETHASONE SODIUM PHOSPHATE 4 MG/ML
6 INJECTION, SOLUTION INTRA-ARTICULAR; INTRALESIONAL; INTRAMUSCULAR; INTRAVENOUS; SOFT TISSUE EVERY 6 HOURS
Status: DISCONTINUED | OUTPATIENT
Start: 2017-03-24 | End: 2017-03-26

## 2017-03-24 RX ORDER — DEXAMETHASONE SODIUM PHOSPHATE 4 MG/ML
4 INJECTION, SOLUTION INTRA-ARTICULAR; INTRALESIONAL; INTRAMUSCULAR; INTRAVENOUS; SOFT TISSUE EVERY 6 HOURS
Status: DISCONTINUED | OUTPATIENT
Start: 2017-03-26 | End: 2017-03-26

## 2017-03-24 RX ORDER — LABETALOL HYDROCHLORIDE 5 MG/ML
10-40 INJECTION, SOLUTION INTRAVENOUS EVERY 10 MIN PRN
Status: DISCONTINUED | OUTPATIENT
Start: 2017-03-24 | End: 2017-03-27 | Stop reason: HOSPADM

## 2017-03-24 RX ORDER — LIDOCAINE HYDROCHLORIDE 20 MG/ML
INJECTION, SOLUTION INFILTRATION; PERINEURAL PRN
Status: DISCONTINUED | OUTPATIENT
Start: 2017-03-24 | End: 2017-03-24

## 2017-03-24 RX ORDER — CLONAZEPAM 0.5 MG/1
0.5 TABLET ORAL EVERY MORNING
Status: DISCONTINUED | OUTPATIENT
Start: 2017-03-25 | End: 2017-03-27 | Stop reason: HOSPADM

## 2017-03-24 RX ORDER — SODIUM CHLORIDE, SODIUM LACTATE, POTASSIUM CHLORIDE, CALCIUM CHLORIDE 600; 310; 30; 20 MG/100ML; MG/100ML; MG/100ML; MG/100ML
INJECTION, SOLUTION INTRAVENOUS CONTINUOUS
Status: DISCONTINUED | OUTPATIENT
Start: 2017-03-24 | End: 2017-03-24

## 2017-03-24 RX ADMIN — PHENYLEPHRINE HYDROCHLORIDE 50 MCG: 10 INJECTION, SOLUTION INTRAMUSCULAR; INTRAVENOUS; SUBCUTANEOUS at 12:39

## 2017-03-24 RX ADMIN — NEOSTIGMINE METHYLSULFATE 5 MG: 1 INJECTION INTRAMUSCULAR; INTRAVENOUS; SUBCUTANEOUS at 14:26

## 2017-03-24 RX ADMIN — Medication 5 MG: at 11:30

## 2017-03-24 RX ADMIN — CARBAMAZEPINE 300 MG: 100 CAPSULE, EXTENDED RELEASE ORAL at 21:30

## 2017-03-24 RX ADMIN — LABETALOL HYDROCHLORIDE 5 MG: 5 INJECTION, SOLUTION INTRAVENOUS at 14:33

## 2017-03-24 RX ADMIN — MIDAZOLAM HYDROCHLORIDE 1 MG: 1 INJECTION, SOLUTION INTRAMUSCULAR; INTRAVENOUS at 10:46

## 2017-03-24 RX ADMIN — PROPOFOL 150 MG: 10 INJECTION, EMULSION INTRAVENOUS at 11:14

## 2017-03-24 RX ADMIN — HYDROMORPHONE HYDROCHLORIDE 0.5 MG: 1 INJECTION, SOLUTION INTRAMUSCULAR; INTRAVENOUS; SUBCUTANEOUS at 15:00

## 2017-03-24 RX ADMIN — OXYCODONE HYDROCHLORIDE 5 MG: 5 TABLET ORAL at 20:12

## 2017-03-24 RX ADMIN — DEXAMETHASONE SODIUM PHOSPHATE 10 MG: 4 INJECTION, SOLUTION INTRAMUSCULAR; INTRAVENOUS at 11:47

## 2017-03-24 RX ADMIN — ROCURONIUM BROMIDE 50 MG: 10 INJECTION INTRAVENOUS at 11:14

## 2017-03-24 RX ADMIN — ACETAMINOPHEN 975 MG: 325 TABLET, FILM COATED ORAL at 20:13

## 2017-03-24 RX ADMIN — CLONAZEPAM 1 MG: 0.5 TABLET ORAL at 21:41

## 2017-03-24 RX ADMIN — CEFAZOLIN SODIUM 2 G: 2 INJECTION, SOLUTION INTRAVENOUS at 11:30

## 2017-03-24 RX ADMIN — PANTOPRAZOLE SODIUM 40 MG: 40 INJECTION, POWDER, FOR SOLUTION INTRAVENOUS at 18:56

## 2017-03-24 RX ADMIN — Medication 10 MG: at 12:17

## 2017-03-24 RX ADMIN — SODIUM CHLORIDE, POTASSIUM CHLORIDE, SODIUM LACTATE AND CALCIUM CHLORIDE: 600; 310; 30; 20 INJECTION, SOLUTION INTRAVENOUS at 10:46

## 2017-03-24 RX ADMIN — VECURONIUM BROMIDE 1 MG: 1 INJECTION, POWDER, LYOPHILIZED, FOR SOLUTION INTRAVENOUS at 13:40

## 2017-03-24 RX ADMIN — SODIUM CHLORIDE, POTASSIUM CHLORIDE, SODIUM LACTATE AND CALCIUM CHLORIDE: 600; 310; 30; 20 INJECTION, SOLUTION INTRAVENOUS at 13:06

## 2017-03-24 RX ADMIN — MANNITOL 40 G: 12.5 INJECTION, SOLUTION INTRAVENOUS at 12:19

## 2017-03-24 RX ADMIN — HYDROMORPHONE HYDROCHLORIDE 0.2 MG: 1 INJECTION, SOLUTION INTRAMUSCULAR; INTRAVENOUS; SUBCUTANEOUS at 20:11

## 2017-03-24 RX ADMIN — GLYCOPYRROLATE 0.8 MG: 0.2 INJECTION, SOLUTION INTRAMUSCULAR; INTRAVENOUS at 14:26

## 2017-03-24 RX ADMIN — VECURONIUM BROMIDE 2 MG: 1 INJECTION, POWDER, LYOPHILIZED, FOR SOLUTION INTRAVENOUS at 11:50

## 2017-03-24 RX ADMIN — HYDRALAZINE HYDROCHLORIDE 20 MG: 20 INJECTION INTRAMUSCULAR; INTRAVENOUS at 18:17

## 2017-03-24 RX ADMIN — PHENYLEPHRINE HYDROCHLORIDE 50 MCG: 10 INJECTION, SOLUTION INTRAMUSCULAR; INTRAVENOUS; SUBCUTANEOUS at 12:58

## 2017-03-24 RX ADMIN — SENNOSIDES AND DOCUSATE SODIUM 1 TABLET: 8.6; 5 TABLET ORAL at 20:12

## 2017-03-24 RX ADMIN — FENTANYL CITRATE 150 MCG: 50 INJECTION, SOLUTION INTRAMUSCULAR; INTRAVENOUS at 11:14

## 2017-03-24 RX ADMIN — PROPOFOL 30 MCG/KG/MIN: 10 INJECTION, EMULSION INTRAVENOUS at 11:55

## 2017-03-24 RX ADMIN — HYDROMORPHONE HYDROCHLORIDE 0.5 MG: 1 INJECTION, SOLUTION INTRAMUSCULAR; INTRAVENOUS; SUBCUTANEOUS at 15:18

## 2017-03-24 RX ADMIN — MIDAZOLAM HYDROCHLORIDE 1 MG: 1 INJECTION, SOLUTION INTRAMUSCULAR; INTRAVENOUS at 11:14

## 2017-03-24 RX ADMIN — SODIUM CHLORIDE 1000 MG: 9 INJECTION, SOLUTION INTRAVENOUS at 11:56

## 2017-03-24 RX ADMIN — HYDROMORPHONE HYDROCHLORIDE 0.5 MG: 1 INJECTION, SOLUTION INTRAMUSCULAR; INTRAVENOUS; SUBCUTANEOUS at 17:02

## 2017-03-24 RX ADMIN — PHENYLEPHRINE HYDROCHLORIDE 50 MCG: 10 INJECTION, SOLUTION INTRAMUSCULAR; INTRAVENOUS; SUBCUTANEOUS at 13:13

## 2017-03-24 RX ADMIN — PROPOFOL 50 MG: 10 INJECTION, EMULSION INTRAVENOUS at 11:36

## 2017-03-24 RX ADMIN — DEXAMETHASONE SODIUM PHOSPHATE 6 MG: 4 INJECTION, SOLUTION INTRA-ARTICULAR; INTRALESIONAL; INTRAMUSCULAR; INTRAVENOUS; SOFT TISSUE at 18:54

## 2017-03-24 RX ADMIN — SODIUM CHLORIDE, POTASSIUM CHLORIDE, SODIUM LACTATE AND CALCIUM CHLORIDE: 600; 310; 30; 20 INJECTION, SOLUTION INTRAVENOUS at 11:22

## 2017-03-24 RX ADMIN — Medication 5 MG: at 11:23

## 2017-03-24 RX ADMIN — VECURONIUM BROMIDE 2 MG: 1 INJECTION, POWDER, LYOPHILIZED, FOR SOLUTION INTRAVENOUS at 12:15

## 2017-03-24 RX ADMIN — Medication 900 MG: at 21:33

## 2017-03-24 RX ADMIN — LIDOCAINE HYDROCHLORIDE 60 MG: 20 INJECTION, SOLUTION INFILTRATION; PERINEURAL at 11:14

## 2017-03-24 RX ADMIN — CEFAZOLIN 1 G: 1 INJECTION, POWDER, FOR SOLUTION INTRAMUSCULAR; INTRAVENOUS at 13:30

## 2017-03-24 RX ADMIN — TOPIRAMATE 300 MG: 100 TABLET, FILM COATED ORAL at 21:34

## 2017-03-24 RX ADMIN — OXYCODONE HYDROCHLORIDE 5 MG: 5 TABLET ORAL at 21:35

## 2017-03-24 RX ADMIN — FENTANYL CITRATE 50 MCG: 50 INJECTION, SOLUTION INTRAMUSCULAR; INTRAVENOUS at 10:46

## 2017-03-24 RX ADMIN — ONDANSETRON 4 MG: 2 INJECTION INTRAMUSCULAR; INTRAVENOUS at 14:00

## 2017-03-24 RX ADMIN — Medication 5 MG: at 11:24

## 2017-03-24 RX ADMIN — GLYCOPYRROLATE 0.2 MG: 0.2 INJECTION, SOLUTION INTRAMUSCULAR; INTRAVENOUS at 11:58

## 2017-03-24 RX ADMIN — VECURONIUM BROMIDE 2 MG: 1 INJECTION, POWDER, LYOPHILIZED, FOR SOLUTION INTRAVENOUS at 13:00

## 2017-03-24 RX ADMIN — SODIUM CHLORIDE: 9 INJECTION, SOLUTION INTRAVENOUS at 16:47

## 2017-03-24 RX ADMIN — FENTANYL CITRATE 100 MCG: 50 INJECTION, SOLUTION INTRAMUSCULAR; INTRAVENOUS at 11:36

## 2017-03-24 ASSESSMENT — VISUAL ACUITY
OU: NORMAL ACUITY

## 2017-03-24 ASSESSMENT — PAIN DESCRIPTION - DESCRIPTORS
DESCRIPTORS: ACHING
DESCRIPTORS: ACHING

## 2017-03-24 ASSESSMENT — LIFESTYLE VARIABLES: TOBACCO_USE: 1

## 2017-03-24 ASSESSMENT — ENCOUNTER SYMPTOMS: SEIZURES: 1

## 2017-03-24 NOTE — ANESTHESIA PROCEDURE NOTES
ARTERIAL LINE PROCEDURE NOTE:   Pre-Procedure  Performed by BASIM AUGUST  Location: pre-op      Pre-Anesthestic Checklist: patient identified, IV checked, site marked, risks and benefits discussed, informed consent, monitors and equipment checked, pre-op evaluation, at physician/surgeon's request and post-op pain management    Timeout  Correct Patient: Yes   Correct Procedure: Yes   Correct Site: Yes   Correct Laterality: Yes   Correct Position: Yes   Site Marked: Yes   .   Procedure Documentation  Procedure: arterial line    Supine  Insertion Site:radial, left.Skin infiltrated with 3 mL of 1% lidocaine. Injection technique: ultrasound guided  .  Artery evaluated via ultrasound confirming patency.  Using realtime imaging the artery was punctured and needle was observed entering artery..  A permanent image is entered into the patient's record.Patient Prep;all elements of maximal sterile barrier technique followed, mask, hat, sterile gloves, draped, hand hygiene, chlorhexidine gluconate and isopropyl alcohol    Assessment/Narrative    Catheter: 20 gauge, 1.75 in/4.5 cm quick cath (integral wire)      Tegaderm dressing used.    Arterial waveform: Yes IBP within 10% of NIBP: Yes    Comments:  Pt tolerated procedure well with light sedation

## 2017-03-24 NOTE — OR NURSING
Dr Oscar in - aware of weakness to right lower extremity and neuro  checks   MRI  to be for tomorrow - order modified     Keppra to be started 12 hours after OR dosse

## 2017-03-24 NOTE — IP AVS SNAPSHOT
[facility] [address] [facility]    [address]    [place] [address]    Phone:  [phone]                                       After Visit Summary   3/24/2017    Joni Borja    MRN: [id number]           After Visit Summary Signature Page     I have received my discharge instructions, and my questions have been answered. I have discussed any challenges I see with this plan with the nurse or doctor.    ..........................................................................................................................................  Patient/Patient Representative Signature      ..........................................................................................................................................  Patient Representative Print Name and Relationship to Patient    ..................................................               ................................................  Date                                            Time    ..........................................................................................................................................  Reviewed by Signature/Title    ...................................................              ..............................................  Date                                                            Time

## 2017-03-24 NOTE — IP AVS SNAPSHOT
MRN:[id number]                      After Visit Summary   3/24/2017    Joni Borja    MRN: [id number]           Thank you!     Thank you for choosing [facility] for your care. Our goal is always to provide you with excellent care. Hearing back from our patients is one way we can continue to improve our services. Please take a few minutes to complete the written survey that you may receive in the mail after you visit with us. Thank you!        Patient Information     Date Of Birth          1970        About your hospital stay     You were admitted on:  March 24, 2017 You last received care in the:  [facility] [facility] [address] [facility]    You were discharged on:  March 27, 2017        Reason for your hospital stay       You were in the hospital for a craniotomy.                  Who to Call     For medical emergencies, please call 911.  For non-urgent questions about your medical care, please call your primary care provider or clinic, [phone]  For questions related to your surgery, please call your surgery clinic        Attending Provider     Provider Specialty    Stuart Oscar MD Neurosurgery       Primary Care Provider Office Phone # Fax #    Brian Coon [phone]       [address] [address]        After Care Instructions     Activity       Discharge instructions:  No lifting of more than 10 pounds, no bending, no twisting until follow up visit.    Ok to shampoo hair, shower or bathe, but, do not scrub or submerge incision under water until evaluated post-operatively in clinic.    Ok to walk as tolerated, avoid bed rest and prolonged sitting.    No contact sports until after follow up visit  No high impact activities such as; running/jogging, snowmobile or 4 bernard riding or any other recreational vehicles.    Do NOT drive while taking narcotic pain medication.    Call the [facility] at  [phone] for increasing redness, swelling or pus draining from the incision, increased pain or any other questions and concerns.            Diet       Follow this diet upon discharge: Orders Placed This Encounter      Advance Diet as Tolerated: Regular Diet Adult            Wound care and dressings       Keep your incision clean and dry at all times.  OK to remove dressing on postop day 2.  OK to shower on postop day 3 and allow water to run over incision, pat dry after shower.  No bathing swimming or submerging in water.  Call immediately or come to ED if any drainage occurs, or you develop new pain, redness, or swelling.                  Follow-up Appointments     Follow-up and recommended labs and tests        Please follow up at the [facility] in 10-12 days for staple removal with Carley Auguste PA-C or Stepan Smith PA-C.  Please call the clinic at [phone] to schedule your appointment.                  Your next 10 appointments already scheduled     Apr 05, 2017  1:00 PM CDT   Return Visit with Flex Lombardo [facility] ([facility])    [address] [facility] [address] [phone]            Apr 19, 2017  1:00 PM CDT   Return Visit with Flex Lombardo [facility]    [address] [facility] [address] [phone]            May 17, 2017  2:30 PM CDT   Return Visit with Silvia Brown MD   [facility]    [address] [place] [address], [address]  [facility] [id number] [phone]              Pending Results     No orders found from 3/22/2017 to 3/25/2017.            Statement of Approval     Ordered          03/27/17 1619  I have reviewed and agree with all the recommendations and orders detailed in this document.  EFFECTIVE NOW     Approved and electronically signed by:  Carley Auguste PA-C             Admission Information     Date & Time Provider  "Department Dept. Phone    3/24/2017 Stuart Oscar MD [place] [address] [facility] [phone]      Your Vitals Were     Blood Pressure Pulse Temperature Respirations Height Weight    124/79 60 98.5  F (36.9  C) (Oral) 18 1.753 m (5' 9\") 80.1 kg (176 lb 9.4 oz)    Pulse Oximetry BMI (Body Mass Index)                99% 26.08 kg/m2          [employer] Information     [employer] gives you secure access to your electronic health record. If you see a primary care provider, you can also send messages to your care team and make appointments. If you have questions, please call your primary care clinic.  If you do not have a primary care provider, please call [phone] and they will assist you.        Care EveryWhere ID     This is your Care EveryWhere ID. This could be used by other organizations to access your [facility] medical records  [id number]           Review of your medicines      START taking        Dose / Directions    dexamethasone 4 MG tablet   Commonly known as:  DECADRON   Notes to Patient:  May adjust times by one hour each dose to make medication times more convenient (change 4:00AM to 5:00AM etc.)        Dose:  4 mg   Take 1 tablet (4 mg) by mouth every 6 hours Starting 3/29/17 take one tablet (4mg) twice a day for 3 days, then decrease to 1/2 tablet (2mg) two times a day until follow up with appointment   Quantity:  60 tablet   Refills:  0       oxyCODONE 5 MG IR tablet   Commonly known as:  ROXICODONE        Dose:  5-10 mg   Take 1-2 tablets (5-10 mg) by mouth every 4 hours as needed for moderate to severe pain   Quantity:  45 tablet   Refills:  0         CONTINUE these medicines which have NOT CHANGED        Dose / Directions    BENADRYL PO   Used for:  Localization-related (focal) (partial) epilepsy and epileptic syndromes with complex partial seizures, with intractable epilepsy, Depression, Anxiety, Unstable gait        Dose:  50 mg   Take 50 mg by mouth daily as needed for allergies " (misquitos,)   Refills:  0       CARBATROL PO        Dose:  300 mg   Take 300 mg by mouth 2 times daily (at 10:00 & 22:00)   Refills:  0       diazepam 5 MG tablet   Commonly known as:  VALIUM   Used for:  Localization-related (focal) (partial) epilepsy and epileptic syndromes with complex partial seizures, with intractable epilepsy        Dose:  5 mg   Take 5 mg by mouth 2 times daily as needed for other (seizures.) As instructed for seizures. Please take 5 mg for more than 3 seizures in 8 hour period, may repeat 5 mg after 30 min if seizures continue.   Quantity:  30 tablet   Refills:  1       * FELBATOL PO        Dose:  600 mg   Take 600 mg by mouth every morning   Refills:  0       * FELBATOL PO        Dose:  900 mg   Take 900 mg by mouth every 24 hours At 1500 (1.5 x 600 mg tablet = 900 mg dose)   Refills:  0       * KLONOPIN PO        Dose:  0.5 mg   Take 0.5 mg by mouth every morning   Refills:  0       * KLONOPIN PO        Dose:  1 mg   Take 1 mg by mouth At Bedtime (2 x 0.5 mg tablet = 1 mg dose)   Refills:  0       LEXAPRO PO        Dose:  20 mg   Take 20 mg by mouth At Bedtime   Refills:  0       PROTONIX PO        Dose:  40 mg   Take 40 mg by mouth every morning   Refills:  0       * TOPAMAX PO        Dose:  200 mg   Take 200 mg by mouth every morning (2 x 100 mg = 200 mg dose)   Refills:  0       * TOPAMAX PO        Dose:  300 mg   Take 300 mg by mouth At Bedtime (3 x 100 mg = 300 mg dose)   Refills:  0       VITAMIN B 12 PO        Dose:  1 tablet   Take 1 tablet by mouth daily (with dinner)   Refills:  0       ZYRTEC ALLERGY PO   Used for:  Localization-related (focal) (partial) epilepsy and epileptic syndromes with complex partial seizures, with intractable epilepsy, Depression, Anxiety, Unstable gait        Dose:  10 mg   Take 10 mg by mouth daily as needed (for misquitos.)   Refills:  0       * Notice:  This list has 6 medication(s) that are the same as other medications prescribed for you. Read the  directions carefully, and ask your doctor or other care provider to review them with you.      STOP taking     acetaminophen-codeine 300-30 MG per tablet   Commonly known as:  TYLENOL #3                Where to get your medicines      Some of these will need a paper prescription and others can be bought over the counter. Ask your nurse if you have questions.     Bring a paper prescription for each of these medications     dexamethasone 4 MG tablet    oxyCODONE 5 MG IR tablet                Protect others around you: Learn how to safely use, store and throw away your medicines at www.disposemymeds.org.             Medication List: This is a list of all your medications and when to take them. Check marks below indicate your daily home schedule. Keep this list as a reference.      Medications           Morning Afternoon Evening Bedtime As Needed    BENADRYL PO   Take 50 mg by mouth daily as needed for allergies (misquitos,)   Next Dose Due:  Available anytime as needed                                   CARBATROL PO   Take 300 mg by mouth 2 times daily (at 10:00 & 22:00)   Last time this was given:  300 mg on 3/27/2017  9:59 AM   Next Dose Due:  3/27/17 10:00PM            10:00               10:00           dexamethasone 4 MG tablet   Commonly known as:  DECADRON   Take 1 tablet (4 mg) by mouth every 6 hours Starting 3/29/17 take one tablet (4mg) twice a day for 3 days, then decrease to 1/2 tablet (2mg) two times a day until follow up with appointment   Last time this was given:  4 mg on 3/27/2017  4:23 PM   Next Dose Due:  3/27/17 10:00PM   Notes to Patient:  May adjust times by one hour each dose to make medication times more convenient (change 4:00AM to 5:00AM etc.)            4:00AM  10:00AM       4:00PM           10:00PM           diazepam 5 MG tablet   Commonly known as:  VALIUM   Take 5 mg by mouth 2 times daily as needed for other (seizures.) As instructed for seizures. Please take 5 mg for more than 3 seizures  in 8 hour period, may repeat 5 mg after 30 min if seizures continue.   Next Dose Due:  May take anytime as needed for seizure                                   * FELBATOL PO   Take 600 mg by mouth every morning   Last time this was given:  900 mg on 3/27/2017  4:23 PM   Next Dose Due:  3/28/17 AM                                   * FELBATOL PO   Take 900 mg by mouth every 24 hours At 1500 (1.5 x 600 mg tablet = 900 mg dose)   Last time this was given:  900 mg on 3/27/2017  4:23 PM   Next Dose Due:  3/28/17 3:00PM                3:00PM                   * KLONOPIN PO   Take 0.5 mg by mouth every morning   Last time this was given:  0.5 mg on 3/27/2017  9:59 AM   Next Dose Due:  3/28/17 AM                                   * KLONOPIN PO   Take 1 mg by mouth At Bedtime (2 x 0.5 mg tablet = 1 mg dose)   Last time this was given:  0.5 mg on 3/27/2017  9:59 AM   Next Dose Due:  3/27/17 bedtime                                     LEXAPRO PO   Take 20 mg by mouth At Bedtime   Next Dose Due:  3/27/17 bedtime                                   oxyCODONE 5 MG IR tablet   Commonly known as:  ROXICODONE   Take 1-2 tablets (5-10 mg) by mouth every 4 hours as needed for moderate to severe pain   Last time this was given:  10 mg on 3/26/2017  4:18 PM   Next Dose Due:  May take anytime as needed                                   PROTONIX PO   Take 40 mg by mouth every morning   Last time this was given:  40 mg on 3/27/2017  9:59 AM   Next Dose Due:  3/28/17 AM                                   * TOPAMAX PO   Take 200 mg by mouth every morning (2 x 100 mg = 200 mg dose)   Last time this was given:  200 mg on 3/27/2017  9:59 AM   Next Dose Due:  3/28/17 AM                                   * TOPAMAX PO   Take 300 mg by mouth At Bedtime (3 x 100 mg = 300 mg dose)   Last time this was given:  200 mg on 3/27/2017  9:59 AM   Next Dose Due:  3/28/17 bedtime                                   VITAMIN B 12 PO   Take 1 tablet by mouth daily  (with dinner)   Next Dose Due:  3/27/17 evening                                   ZYRTEC ALLERGY PO   Take 10 mg by mouth daily as needed (for misquitos.)   Next Dose Due:  May take anytime as needed                                   * Notice:  This list has 6 medication(s) that are the same as other medications prescribed for you. Read the directions carefully, and ask your doctor or other care provider to review them with you.              More Information        Discharge Instructions for Craniotomy  You had a craniotomy, which is the surgical opening of the skull. Your healthcare provider needed to do this to perform brain surgery. Recovery after a craniotomy varies, depending on why the procedure was done. The guidelines provided here are for general care. Ask your healthcare provider to provide additional information based on your specific condition.  Home care  Do's and don'ts include:     Increase your activity slowly.    Don t drive until your healthcare provider says it s OK.    Don t lift anything until your healthcare provider says it s OK.    Take your medicine exactly as directed.    Shower as necessary, but keep your incision dry. You may wash your hair with mild soap after your stitches or staples have been removed.    Don t apply creams, lotions, or other ointments to your incision. Keeping the incision clean and dry will help it to heal quickly. Most stitches or staples in the scalp are removed in 7 to 10 days.    Do not drink alcohol.    Get plenty of rest and sleep.    Do not take aspirin or ibuprofen (or similar medicines) unless your healthcare provider says it's OK.   Follow-up  Make a follow-up appointment.     When to call your healthcare provider  Call your healthcare provider right away if you have any of the following:    Swelling on the face or scalp    Incision that becomes red and hot or drainage from incision    Fever of 100.4 F (38 C) or higher, or chills    Confusion, memory loss,  trouble speaking, or hallucinations    Fainting or  blacking out     Double or blurred vision; partial or total loss of vision    Numbness, tingling, or weakness in your face, arms, hands, legs, or feet    Stiffness in your neck    Severe sensitivity to light (photophobia) or severe headache    Seizure    Trouble controlling your bowels or bladder    Nausea or vomiting    Fluid draining from the nose or ear        4895-0530 [employer]. [address] [place] [address], [place] [address]. All rights reserved. This information is not intended as a substitute for professional medical care. Always follow your healthcare professional's instructions.

## 2017-03-24 NOTE — ANESTHESIA PREPROCEDURE EVALUATION
Anesthesia Evaluation     . Pt has had prior anesthetic.     No history of anesthetic complications          ROS/MED HX    ENT/Pulmonary:     (+)tobacco use, Current use , . .   (-) sleep apnea   Neurologic: Comment: oligodendrogioma s/p subtotal resection 2013    (+)seizures (s/p vagus nerve stimulator) last seizure: 2 weeks ago     Cardiovascular:  - neg cardiovascular ROS       METS/Exercise Tolerance:     Hematologic:         Musculoskeletal:         GI/Hepatic:     (+) GERD Asymptomatic on medication,      (-) liver disease   Renal/Genitourinary:      (-) renal disease   Endo:      (-) Type II DM, thyroid disease and chronic steroid usage   Psychiatric:     (+) psychiatric history anxiety and depression      Infectious Disease:        (-) Recent Fever   Malignancy:         Other:                     Physical Exam  Normal systems: cardiovascular, pulmonary and dental    Airway   Mallampati: II  TM distance: >3 FB  Neck ROM: full    Dental     Cardiovascular       Pulmonary                     Anesthesia Plan      History & Physical Review  History and physical reviewed and following examination; no interval change.    ASA Status:  3 .    NPO Status:  > 8 hours    Plan for General and ETT with Propofol induction. Maintenance will be Balanced.    PONV prophylaxis:  Ondansetron (or other 5HT-3) and Dexamethasone or Solumedrol  Additional equipment: Videolaryngoscope, 2nd IV and Arterial Line Propofol gtt      Postoperative Care  Postoperative pain management:  Multi-modal analgesia.      Consents  Anesthetic plan, risks, benefits and alternatives discussed with:  Patient.  Use of blood products discussed: Yes.   Use of blood products discussed with Patient.  Consented to blood products.  .                          .

## 2017-03-24 NOTE — ANESTHESIA POSTPROCEDURE EVALUATION
Patient: Joni Suttoncar    Procedure(s):  RIGHT FRONTAL CRANIOTOMY WITH OPTICAL TRACKING SYSTEM FOR RECURRENT TUMOR RESECTION - Wound Class: I-Clean    Diagnosis:RIGHT FRONTAL GLIOMA  Diagnosis Additional Information: No value filed.    Anesthesia Type:  General, ETT    Note:  Anesthesia Post Evaluation    Patient location during evaluation: PACU  Patient participation: Able to fully participate in evaluation  Level of consciousness: awake and alert  Pain management: satisfactory to patient  Airway patency: patent  Cardiovascular status: hemodynamically stable  Respiratory status: acceptable and unassisted  Hydration status: balanced  PONV: none     Anesthetic complications: None          Last vitals:  Vitals:    03/24/17 1448 03/24/17 1540 03/24/17 1550   BP: (!) 146/92     Resp: 18 12 15   Temp: 36.2  C (97.1  F)     SpO2: 100% 99% 99%         Electronically Signed By: Bernardo Moreno MD  March 24, 2017  3:55 PM

## 2017-03-24 NOTE — BRIEF OP NOTE
[facility]   Brief Operative Note    Pre-operative diagnosis: RIGHT FRONTAL GLIOMA   Post-operative diagnosis Same   Procedure: Procedure(s):  RIGHT FRONTAL CRANIOTOMY WITH OPTICAL TRACKING SYSTEM FOR RECURRENT TUMOR RESECTION - Wound Class: I-Clean   Surgeon(s): Surgeon(s) and Role:     * Stuart Oscar MD - Primary     * Carley Auguste PA-C - Assisting   Estimated blood loss: * No values recorded between 3/24/2017 12:07 PM and 3/24/2017  2:42 PM *    Specimens:   ID Type Source Tests Collected by Time Destination   A : Cyst Wall Tissue Brain SURGICAL PATHOLOGY EXAM Stuart Oscar MD 3/24/2017 12:42 PM    B : Right Frontal Mass Tissue Brain SURGICAL PATHOLOGY EXAM Stuart Oscar MD 3/24/2017  1:08 PM    C : Right Frontal Mass Tissue Brain SURGICAL PATHOLOGY EXAM Stuart Oscar MD 3/24/2017  1:11 PM       Findings: None

## 2017-03-24 NOTE — ANESTHESIA CARE TRANSFER NOTE
Patient: Joni Borja    Procedure(s):  RIGHT FRONTAL CRANIOTOMY WITH OPTICAL TRACKING SYSTEM FOR RECURRENT TUMOR RESECTION - Wound Class: I-Clean    Diagnosis: RIGHT FRONTAL GLIOMA  Diagnosis Additional Information: No value filed.    Anesthesia Type:   General, ETT     Note:  Airway :Face Mask  Patient transferred to:PACU  Comments: To PACU: Arouses easily, good airway, 02 face mask, VSS  Follows simple commands & moves all extremities  Report to RN      Vitals: (Last set prior to Anesthesia Care Transfer)    CRNA VITALS  3/24/2017 1412 - 3/24/2017 1455      3/24/2017             Pulse: 73    ART BP: 146/74    ART Mean: 103    SpO2: 97 %                Electronically Signed By: TONY Starks CRNA  March 24, 2017  2:55 PM

## 2017-03-24 NOTE — PROGRESS NOTES
Admission medication history interview status for the 3/24/2017  admission is complete. See EPIC admission navigator for prior to admission medications     Medication history source reliability:Good    Medication history interview source(s):Mother    Medication history resources (including written lists, pill bottles, clinic record):Mother brought in a med list on day of surgery.    Primary pharmacy.[facility], [place]    Additional medication history information not noted on PTA med list :None    Time spent in this activity: 30 minutes    Prior to Admission medications    Medication Sig Last Dose Taking? Auth Provider   CarBAMazepine (CARBATROL PO) Take 300 mg by mouth 2 times daily (at 10:00 & 22:00) 3/23/2017 at 2200 Yes Reported, Patient   Escitalopram Oxalate (LEXAPRO PO) Take 20 mg by mouth At Bedtime 3/23/2017 at 2200 Yes Reported, Patient   Pantoprazole Sodium (PROTONIX PO) Take 40 mg by mouth every morning 3/23/2017 at 1000 Yes Reported, Patient   Cyanocobalamin (VITAMIN B 12 PO) Take 1 tablet by mouth daily (with dinner) 3/22/2017 at pm Yes Reported, Patient   ClonazePAM (KLONOPIN PO) Take 0.5 mg by mouth every morning  3/23/2017 at 1000 Yes Reported, Patient   ClonazePAM (KLONOPIN PO) Take 1 mg by mouth At Bedtime (2 x 0.5 mg tablet = 1 mg dose) 3/23/2017 at 2200 Yes Reported, Patient   Felbamate (FELBATOL PO) Take 600 mg by mouth every morning  3/23/2017 at 1000 Yes Reported, Patient   Felbamate (FELBATOL PO) Take 900 mg by mouth every 24 hours At 1500 (1.5 x 600 mg tablet = 900 mg dose) 3/23/2017 at 1500 Yes Reported, Patient   Topiramate (TOPAMAX PO) Take 200 mg by mouth every morning (2 x 100 mg = 200 mg dose)  3/23/2017 at 1000 Yes Reported, Patient   Topiramate (TOPAMAX PO) Take 300 mg by mouth At Bedtime (3 x 100 mg = 300 mg dose)  3/23/2017 at 2200 Yes Reported, Patient   acetaminophen-codeine (TYLENOL #3) 300-30 MG per tablet Take 1-2 tablets by mouth every 4 hours as needed 3/22/2017 at prn Yes  Reported, Patient   diazepam (VALIUM) 5 MG tablet Take 5 mg by mouth 2 times daily as needed for other (seizures.) As instructed for seizures. Please take 5 mg for more than 3 seizures in 8 hour period, may repeat 5 mg after 30 min if seizures continue. Over 1 year ago at prn Yes Silvia Brown MD   Cetirizine HCl (ZYRTEC ALLERGY PO) Take 10 mg by mouth daily as needed (for misquitos.)  Over 5 months ago. at prn Yes Reported, Patient   DiphenhydrAMINE HCl (BENADRYL PO) Take 50 mg by mouth daily as needed for allergies (misquitos,)  Over 6 months ago at prn Yes Reported, Patient

## 2017-03-25 ENCOUNTER — APPOINTMENT (OUTPATIENT)
Dept: PHYSICAL THERAPY | Facility: CLINIC | Age: 47
DRG: 027 | End: 2017-03-25
Attending: PHYSICIAN ASSISTANT
Payer: MEDICARE

## 2017-03-25 PROCEDURE — A9270 NON-COVERED ITEM OR SERVICE: HCPCS | Mod: GY | Performed by: PHYSICIAN ASSISTANT

## 2017-03-25 PROCEDURE — 25000132 ZZH RX MED GY IP 250 OP 250 PS 637: Mod: GY | Performed by: PHYSICIAN ASSISTANT

## 2017-03-25 PROCEDURE — 25000128 H RX IP 250 OP 636: Performed by: PHYSICIAN ASSISTANT

## 2017-03-25 PROCEDURE — 0NR10JZ REPLACEMENT OF FRONTAL BONE WITH SYNTHETIC SUBSTITUTE, OPEN APPROACH: ICD-10-PCS | Performed by: NEUROLOGICAL SURGERY

## 2017-03-25 PROCEDURE — 97116 GAIT TRAINING THERAPY: CPT | Mod: GP

## 2017-03-25 PROCEDURE — 97161 PT EVAL LOW COMPLEX 20 MIN: CPT | Mod: GP

## 2017-03-25 PROCEDURE — 25000125 ZZHC RX 250: Performed by: PHYSICIAN ASSISTANT

## 2017-03-25 PROCEDURE — 25000128 H RX IP 250 OP 636: Performed by: NEUROLOGICAL SURGERY

## 2017-03-25 PROCEDURE — 40000193 ZZH STATISTIC PT WARD VISIT

## 2017-03-25 PROCEDURE — 0NP00JZ REMOVAL OF SYNTHETIC SUBSTITUTE FROM SKULL, OPEN APPROACH: ICD-10-PCS | Performed by: NEUROLOGICAL SURGERY

## 2017-03-25 PROCEDURE — 00B70ZZ EXCISION OF CEREBRAL HEMISPHERE, OPEN APPROACH: ICD-10-PCS | Performed by: NEUROLOGICAL SURGERY

## 2017-03-25 PROCEDURE — 12000000 ZZH R&B MED SURG/OB

## 2017-03-25 PROCEDURE — 8E09XBH COMPUTER ASSISTED PROCEDURE OF HEAD AND NECK REGION, WITH MAGNETIC RESONANCE IMAGING: ICD-10-PCS | Performed by: NEUROLOGICAL SURGERY

## 2017-03-25 PROCEDURE — 00U20KZ SUPPLEMENT DURA MATER WITH NONAUTOLOGOUS TISSUE SUBSTITUTE, OPEN APPROACH: ICD-10-PCS | Performed by: NEUROLOGICAL SURGERY

## 2017-03-25 RX ORDER — PANTOPRAZOLE SODIUM 40 MG/1
40 TABLET, DELAYED RELEASE ORAL EVERY MORNING
Status: DISCONTINUED | OUTPATIENT
Start: 2017-03-26 | End: 2017-03-27 | Stop reason: HOSPADM

## 2017-03-25 RX ADMIN — TOPIRAMATE 200 MG: 200 TABLET, FILM COATED ORAL at 09:56

## 2017-03-25 RX ADMIN — ACETAMINOPHEN 975 MG: 325 TABLET, FILM COATED ORAL at 04:20

## 2017-03-25 RX ADMIN — Medication 900 MG: at 17:49

## 2017-03-25 RX ADMIN — DEXAMETHASONE SODIUM PHOSPHATE 6 MG: 4 INJECTION, SOLUTION INTRA-ARTICULAR; INTRALESIONAL; INTRAMUSCULAR; INTRAVENOUS; SOFT TISSUE at 06:02

## 2017-03-25 RX ADMIN — LEVETIRACETAM 750 MG: 100 INJECTION, SOLUTION INTRAVENOUS at 13:12

## 2017-03-25 RX ADMIN — ACETAMINOPHEN 975 MG: 325 TABLET, FILM COATED ORAL at 20:08

## 2017-03-25 RX ADMIN — DEXAMETHASONE SODIUM PHOSPHATE 6 MG: 4 INJECTION, SOLUTION INTRA-ARTICULAR; INTRALESIONAL; INTRAMUSCULAR; INTRAVENOUS; SOFT TISSUE at 17:50

## 2017-03-25 RX ADMIN — DEXAMETHASONE SODIUM PHOSPHATE 6 MG: 4 INJECTION, SOLUTION INTRA-ARTICULAR; INTRALESIONAL; INTRAMUSCULAR; INTRAVENOUS; SOFT TISSUE at 11:39

## 2017-03-25 RX ADMIN — OXYCODONE HYDROCHLORIDE 10 MG: 5 TABLET ORAL at 00:46

## 2017-03-25 RX ADMIN — OXYCODONE HYDROCHLORIDE 10 MG: 5 TABLET ORAL at 18:10

## 2017-03-25 RX ADMIN — CLONAZEPAM 1 MG: 0.5 TABLET ORAL at 21:48

## 2017-03-25 RX ADMIN — DEXAMETHASONE SODIUM PHOSPHATE 6 MG: 4 INJECTION, SOLUTION INTRA-ARTICULAR; INTRALESIONAL; INTRAMUSCULAR; INTRAVENOUS; SOFT TISSUE at 00:10

## 2017-03-25 RX ADMIN — CLONAZEPAM 0.5 MG: 0.5 TABLET ORAL at 09:56

## 2017-03-25 RX ADMIN — CARBAMAZEPINE 300 MG: 100 CAPSULE, EXTENDED RELEASE ORAL at 20:07

## 2017-03-25 RX ADMIN — SENNOSIDES AND DOCUSATE SODIUM 2 TABLET: 8.6; 5 TABLET ORAL at 20:08

## 2017-03-25 RX ADMIN — TOPIRAMATE 300 MG: 100 TABLET, FILM COATED ORAL at 21:48

## 2017-03-25 RX ADMIN — FELBAMATE 600 MG: 600 TABLET ORAL at 09:56

## 2017-03-25 RX ADMIN — ACETAMINOPHEN 975 MG: 325 TABLET, FILM COATED ORAL at 11:38

## 2017-03-25 RX ADMIN — CARBAMAZEPINE 300 MG: 100 CAPSULE, EXTENDED RELEASE ORAL at 09:56

## 2017-03-25 RX ADMIN — PANTOPRAZOLE SODIUM 40 MG: 40 INJECTION, POWDER, FOR SOLUTION INTRAVENOUS at 10:00

## 2017-03-25 RX ADMIN — HYDROMORPHONE HYDROCHLORIDE 0.2 MG: 1 INJECTION, SOLUTION INTRAMUSCULAR; INTRAVENOUS; SUBCUTANEOUS at 10:15

## 2017-03-25 RX ADMIN — LEVETIRACETAM 750 MG: 100 INJECTION, SOLUTION INTRAVENOUS at 00:10

## 2017-03-25 RX ADMIN — OXYCODONE HYDROCHLORIDE 10 MG: 5 TABLET ORAL at 15:13

## 2017-03-25 RX ADMIN — SODIUM CHLORIDE: 9 INJECTION, SOLUTION INTRAVENOUS at 06:00

## 2017-03-25 RX ADMIN — OXYCODONE HYDROCHLORIDE 10 MG: 5 TABLET ORAL at 21:49

## 2017-03-25 ASSESSMENT — VISUAL ACUITY
OU: NORMAL ACUITY

## 2017-03-25 ASSESSMENT — PAIN DESCRIPTION - DESCRIPTORS
DESCRIPTORS: ACHING
DESCRIPTORS: ACHING

## 2017-03-25 NOTE — PROGRESS NOTES
" 03/25/17 1600   Quick Adds   Type of Visit Initial PT Evaluation   Living Environment   Lives With parent(s)  (currently  from wife,lives with parents)   Living Arrangements house   Home Accessibility stairs to enter home   Number of Stairs to Enter Home 2   Transportation Available family or friend will provide  (pt does not drive at baseline)   Living Environment Comment pt lives with his mom and dad, is on disability x many years, fairly active at home/in community.   Self-Care   Dominant Hand right   Usual Activity Tolerance good   Current Activity Tolerance moderate   Regular Exercise no   Equipment Currently Used at Home none   Activity/Exercise/Self-Care Comment pt reports he is \"fairly active\" during the day, does not expand   Functional Level Prior   Ambulation 0-->independent   Transferring 0-->independent   Toileting 0-->independent   Bathing 0-->independent   Dressing 0-->independent   Eating 0-->independent   Communication 0-->understands/communicates without difficulty   Swallowing 0-->swallows foods/liquids without difficulty   Cognition 0 - no cognition issues reported   Fall history within last six months yes   Number of times patient has fallen within last six months (pt reports he thinks he had 1 a few months ago but is unsure)   Which of the above functional risks had a recent onset or change? ambulation   General Information   Onset of Illness/Injury or Date of Surgery - Date 03/24/17   Referring Physician Stepan Smith PA-C  (Dr Oscar )   Patient/Family Goals Statement pt hopeful to return home, reports he has been to a rehab facility in the past   Pertinent History of Current Problem (include personal factors and/or comorbidities that impact the POC) POD1 R frontal tumor resection/craniotomy, recurrent glioma.   Precautions/Limitations fall precautions   General Observations SBP <160, Pt is pleasant and motivated, mother is present throughout whole session   General Info Comments " Activity: up with assist   Cognitive Status Examination   Orientation orientation to person, place and time   Level of Consciousness alert   Follows Commands and Answers Questions 100% of the time;able to follow multistep instructions   Personal Safety and Judgment intact   Memory intact   Pain Assessment   Patient Currently in Pain (mild HA, reports pain medication was helpful)   Integumentary/Edema   Integumentary/Edema Comments staples to R frontal intact   Posture    Posture Forward head position   Range of Motion (ROM)   ROM Quick Adds No deficits were identified   ROM Comment B LEs WFL   Strength   Manual Muscle Testing Quick Adds No deficits were identified   Strength Comments B LEs WFL against gravity   Bed Mobility   Bed Mobility Comments pt IND with supine<>sit   Transfer Skills   Transfer Comments pt IND with sit<>stand   Gait   Gait Comments pt SBA with B UE support on IV pole x10', hip drop with R stance noted, pt reports leg length discrepancy (R shorter than L) and limp is baseline, does not correct with shoes or orthotics, reports no pain or discomfort   Balance   Balance Comments good seated static and dynamic balance, passed Romberg standing, no LOB with mobility, however requires use of IV pole as AD with mobility and does not use AD at baseline   Sensory Examination   Sensory Perception no deficits were identified   Sensory Perception Comments pt intact to light touch B LEs   Coordination   Coordination no deficits were identified   General Therapy Interventions   Planned Therapy Interventions balance training;gait training;home program guidelines;progressive activity/exercise   Clinical Impression   Criteria for Skilled Therapeutic Intervention yes, treatment indicated   PT Diagnosis difficulty with gait   Influenced by the following impairments decreased balance and activity tolerance   Functional limitations due to impairments difficulty with gait, stairs   Clinical Presentation  "Stable/Uncomplicated   Clinical Presentation Rationale POD1 R frontal tumor (recurrent glioma) resection/craniotomy. SBP within parameters, mild c/o HA pain controlled with medication at this time   Clinical Decision Making (Complexity) Low complexity   Therapy Frequency` 2 times/day   Predicted Duration of Therapy Intervention (days/wks) 3 days   Anticipated Discharge Disposition Home with Outpatient Therapy   Risk & Benefits of therapy have been explained Yes   Patient, Family & other staff in agreement with plan of care Yes   [facility] TM \"6 Clicks\"   2016, Trustees of [facility], under license to "[employer].  All rights reserved.   6 Clicks Short Forms Basic Mobility Inpatient Short Form   [facility] AM-PAC  \"6 Clicks\" V.2 Basic Mobility Inpatient Short Form   1. Turning from your back to your side while in a flat bed without using bedrails? 4 - None   2. Moving from lying on your back to sitting on the side of a flat bed without using bedrails? 4 - None   3. Moving to and from a bed to a chair (including a wheelchair)? 3 - A Little   4. Standing up from a chair using your arms (e.g., wheelchair, or bedside chair)? 4 - None   5. To walk in hospital room? 3 - A Little   6. Climbing 3-5 steps with a railing? 3 - A Little   Basic Mobility Raw Score (Score out of 24.Lower scores equate to lower levels of function) 21   Total Evaluation Time   Total Evaluation Time (Minutes) 10     "

## 2017-03-25 NOTE — PROGRESS NOTES
Left message with Ceasar Fairchild, [facility]  for [employer] (cell [phone]) to schedule a time to disable the implanted vagal nerve stimulator for a MRI.  Still awaiting return phone call.    Also spoke with Dr Oscar earlier and ok to proceed with MRI today or tomorrow when device can be turned off temporarily.    Order placed by FERNY Smith for rep to disable device for duration of MRI.

## 2017-03-25 NOTE — PLAN OF CARE
Problem: Goal Outcome Summary  Goal: Goal Outcome Summary  PT: Orders received, chart reviewed, eval completed and treatment initiated. Pt is a 45 y/o male admitted 3/24/17 for planned surgery for R frontal tumor (recurrent glioma) resection/craniotomy. PMH significant for: oliogendroglioma and seizures. At baseline pt is on disability, not working, lives with his parents at this time, does not use an AD at baseline, reports leg length discrepancy at baseline (R LE shorter than L, causes limp with gait, pt does not use orthotics or shoe lift to correct at this time). Pt currently IND with bed mobility and transfers, SBA with gait x450', pt demonstrating B UE support on IV pole x200', R UE min support on IV pole x250'. No LOB noted throughout or unsteadiness, pt without c/o dizziness/lightheadedness and reports no change in HA with activity. 's before and after activity, goal of SBP <160. Pt is appropriate for IP PT to progress balance and activity tolerance for increased safety and IND with functional mobility. PT recommendation: anticipate home at discharge, may benefit from OP PT for progress balance interventions at discharge if continues to require AD with gait.

## 2017-03-25 NOTE — PLAN OF CARE
Alert and oriented x4. VSS. Pain rated up to 4/10. Helped with oxycodone. Up with 1 and IV pole. Incision CDI, open to air. Voiding adequately. CMS intact. Plan to continue to monitor.

## 2017-03-25 NOTE — OP NOTE
DATE OF PROCEDURE:  03/24/2017      SURGEON:  Stuart Oscar MD      ASSISTANT:  FERNY Hill   Note, Carley Auguste was present for and assisted with the entire surgery and her role as an assistant was crucial for aid in positioning, exposure, suctioning, retraction and closure.      PREOPERATIVE DIAGNOSIS:  Recurrent right frontal glioma, Acquired cranial defect.      POSTOPERATIVE DIAGNOSIS:  Recurrent right frontal glioma, Acquired cranial defect.      PROCEDURES:   1.  Right frontal craniotomy for resection of recurrent glioma.   2.  Right frontal titanium mesh cranioplasty for reconstruction of previous preoperative acquired cranial defect.   3.  Use of [employer]onic Stealth intraoperative navigation based on MRI guidance.   4.  Use of intraoperative microscope.      ESTIMATED BLOOD LOSS:  50 mL.      INDICATIONS:  Mr. 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  He had multiple previous craniotomies with intersecting calvarial tracts, and multiple defects, and numerous different locations of plating.  There were multiple foci of acquired cranial defect noted on the CT scan preoperatively.  Due to the concern for recurrence of tumor, we recommended resection.  Risks, benefits, indications and alternatives were discussed with the patient and his family in detail all their questions answered and they wished to proceed with surgery.      DESCRIPTION OF PROCEDURE:  The patient was positioned supine.  A sterile prepping and draping procedures were performed.  Antibiotics were administered and timeout was performed.  Medtronic Stealth navigation was registered.  The prior right pterional incision was opened with a 15 blade and the monopolar was used to mobilize the flap anteriorly and the multiple prior craniotomies and craniotomy tracts and defects were exposed.  We  utilized the craniotome to remove a modified version of one of the prior craniotomies.  After the universal plating system was used to remove the plate and screws holding that craniotomy in place significant adjacent cranial defect was noted.  Subsequently the dura was opened in a C-shaped fashion.  After the dura was opened and a thick cyst wall was noted.  This was resected with the Metzenbaum scissors and sent for pathology.  After the cyst wall was evacuated and the fluid was suctioned out the border of the cyst wall that was adjacent to the brain parenchyma was removed as well with micro dissection.  The Stealth navigation was then used to come and localize the enhancing region of the tumor and this was resected gross totally with entry into the right frontal horn of the lateral ventricle.  Subsequently, we switched to the FLAIR sequence on the Stealth navigation and debulked the FLAIR component of the lesion as well.  Hemostasis was achieved.  The cavity was lined with fibrillar.  Subsequently, the dura was closed with 4-0 Nurolon sutures.  This was then reinforced with Duragen and DuraSeal.  I then fashioned a piece of [employer] structural frontotemporal titanium mesh for cranioplasty.  This was used to reconstruct the cranial defect and the previous acquired defects.  This was fixed into place with numerous 4 mm screws.  Antibiotic irrigation was performed.  The galea was closed with 2-0 Vicryl sutures and the skin was closed with maria ines.         RYAN GALVEZ MD             D: 2017 14:56   T: 2017 02:03   MT: [id number]      Name:     MADHU HAYES   MRN:      [id number]        Account:        [id number]   :      1970           Procedure Date: 2017      Document: [id number]

## 2017-03-25 NOTE — PLAN OF CARE
Problem: Goal Outcome Summary  Goal: Goal Outcome Summary  OT: cx attempted eval and pt just beginning to eat breakfast.

## 2017-03-25 NOTE — PLAN OF CARE
Problem: Craniotomy/Craniectomy/Cranioplasty (Adult)  Goal: Signs and Symptoms of Listed Potential Problems Will be Absent or Manageable (Craniotomy/Craniectomy/Cranioplasty)  Signs and symptoms of listed potential problems will be absent or manageable by discharge/transition of care (reference Craniotomy/Craniectomy/Cranioplasty (Adult) CPG).   Outcome: Improving  Continued hourly neurologic checks overnight per orders so pt slept only intermittently, pt c/o headache at beginning of night, well controlled on oral pain meds as night went on, see MAR, pt denies any further complaints, all other neurologic assessment intact, no signs of seizure activity overnight, continuing IV keppra and home antiseizure meds, IV steroids continue, plan to have PT/OT work with pt today, MRI ordered this morning.

## 2017-03-25 NOTE — PLAN OF CARE
Problem: Craniotomy/Craniectomy/Cranioplasty (Adult)  Goal: Signs and Symptoms of Listed Potential Problems Will be Absent or Manageable (Craniotomy/Craniectomy/Cranioplasty)  Signs and symptoms of listed potential problems will be absent or manageable by discharge/transition of care (reference Craniotomy/Craniectomy/Cranioplasty (Adult) CPG).   Outcome: No Change  [facility]   Intensive Care Unit   Nursing Note     Vital signs stable during the day.  PERRL.  Moves all extremities.  Follows commands.  Neuros intact. No drift noted.  Dilaudid IV for pain.  Labs noted. Pt's Mom and Dad have been updated. Continue to monitor closely.        ROUTINE IP LABS (Last four results)    Recent Labs  Lab 03/24/17  1003   HGB 12.6*     Sandro Muñoz RN

## 2017-03-25 NOTE — PROGRESS NOTES
[facility]    Surgery  Daily Post-Op Note    Assessment & Plan   Procedure(s):  COMBINED OPTICAL TRACKING SYSTEM CRANIOTOMY, EXCISE TUMOR   -1 Day Post-Op  Doing well.  Pain well-controlled.  Mild headache, inc with cough. Right sided jaw tenderness, likely form temporalis manipulation intraoperatively.   Plan:  -Advance activity as tolerated  -Pain control measures  -Advance diet as tolerated  -Routine wound care  -transfer to spine/stroke floor      Stepan Smith    Interval History   Doing well.  Continues to improve.  Pain is well-controlled.  No fevers.      Physical Exam   Temp: 98.3  F (36.8  C) Temp src: Oral BP: 128/79   Heart Rate: 81 Resp: 17 SpO2: 98 % O2 Device: Nasal cannula Oxygen Delivery: 2 LPM  Vitals:    03/24/17 1006 03/25/17 0600   Weight: 179 lb (81.2 kg) 176 lb 9.4 oz (80.1 kg)     Vital Signs with Ranges  Temp:  [96.9  F (36.1  C)-99.5  F (37.5  C)] 98.3  F (36.8  C)  Heart Rate:  [] 81  Resp:  [10-25] 17  BP: (116-146)/(78-92) 128/79  MAP:  [89 mmHg-129 mmHg] 100 mmHg  Arterial Line BP: ()/(41-94) 129/81  SpO2:  [97 %-100 %] 98 %  I/O last 3 completed shifts:  In: 4256.25 [P.O.:1000; I.V.:3256.25]  Out: 3355 [Urine:3305; Blood:50]    Alert and oriented.  Moves all extremities equally.  Reflexes symmetrical.     Incision: CDI, staples intact.       Medications     NaCl 75 mL/hr at 03/25/17 0805        carBAMazepine  300 mg Oral BID     clonazePAM (klonoPIN) tablet 0.5 mg  0.5 mg Oral QAM     clonazePAM (klonoPIN) tablet 1 mg  1 mg Oral At Bedtime     felbamate (FELBATOL) tablet 600 mg  600 mg Oral QAM     felbamate (FELBATOL) half-tab 900 mg  900 mg Oral Q24H     topiramate (TOPAMAX) tablet 200 mg  200 mg Oral QAM     topiramate (TOPAMAX) tablet 300 mg  300 mg Oral At Bedtime     sodium chloride (PF)  3 mL Intracatheter Q8H     dexamethasone  6 mg Intravenous Q6H    Followed by     [START ON 3/26/2017] dexamethasone  4 mg Intravenous Q6H    Followed by     [START  ON 3/28/2017] dexamethasone  2 mg Intravenous Q6H    Followed by     [START ON 3/30/2017] dexamethasone  1 mg Intravenous Q6H    Followed by     [START ON 4/1/2017] dexamethasone  1 mg Intravenous Q12H     pantoprazole (PROTONIX) IV PEDS/NICU  40 mg Intravenous Daily     acetaminophen  975 mg Oral Q8H     senna-docusate  1-2 tablet Oral BID     levETIRAcetam  750 mg Intravenous Q12H       Data       Stepan Smith PA-C  [facility]

## 2017-03-26 ENCOUNTER — APPOINTMENT (OUTPATIENT)
Dept: PHYSICAL THERAPY | Facility: CLINIC | Age: 47
DRG: 027 | End: 2017-03-26
Attending: NEUROLOGICAL SURGERY
Payer: MEDICARE

## 2017-03-26 ENCOUNTER — APPOINTMENT (OUTPATIENT)
Dept: OCCUPATIONAL THERAPY | Facility: CLINIC | Age: 47
DRG: 027 | End: 2017-03-26
Attending: PHYSICIAN ASSISTANT
Payer: MEDICARE

## 2017-03-26 PROCEDURE — 97530 THERAPEUTIC ACTIVITIES: CPT | Mod: GO | Performed by: OCCUPATIONAL THERAPIST

## 2017-03-26 PROCEDURE — 25000132 ZZH RX MED GY IP 250 OP 250 PS 637: Mod: GY | Performed by: PHYSICIAN ASSISTANT

## 2017-03-26 PROCEDURE — 12000000 ZZH R&B MED SURG/OB

## 2017-03-26 PROCEDURE — 97535 SELF CARE MNGMENT TRAINING: CPT | Mod: GO | Performed by: OCCUPATIONAL THERAPIST

## 2017-03-26 PROCEDURE — A9270 NON-COVERED ITEM OR SERVICE: HCPCS | Mod: GY | Performed by: PHYSICIAN ASSISTANT

## 2017-03-26 PROCEDURE — 40000133 ZZH STATISTIC OT WARD VISIT: Performed by: OCCUPATIONAL THERAPIST

## 2017-03-26 PROCEDURE — 25000125 ZZHC RX 250: Performed by: PHYSICIAN ASSISTANT

## 2017-03-26 PROCEDURE — 25000128 H RX IP 250 OP 636: Performed by: PHYSICIAN ASSISTANT

## 2017-03-26 PROCEDURE — 25000132 ZZH RX MED GY IP 250 OP 250 PS 637: Mod: GY

## 2017-03-26 PROCEDURE — A9270 NON-COVERED ITEM OR SERVICE: HCPCS | Mod: GY

## 2017-03-26 PROCEDURE — 97116 GAIT TRAINING THERAPY: CPT | Mod: GP

## 2017-03-26 PROCEDURE — 97165 OT EVAL LOW COMPLEX 30 MIN: CPT | Mod: GO | Performed by: OCCUPATIONAL THERAPIST

## 2017-03-26 PROCEDURE — 97530 THERAPEUTIC ACTIVITIES: CPT | Mod: GP

## 2017-03-26 PROCEDURE — 25000131 ZZH RX MED GY IP 250 OP 636 PS 637: Mod: GY | Performed by: NURSE PRACTITIONER

## 2017-03-26 PROCEDURE — 40000193 ZZH STATISTIC PT WARD VISIT

## 2017-03-26 RX ORDER — DEXAMETHASONE 4 MG/1
4 TABLET ORAL EVERY 6 HOURS SCHEDULED
Status: DISCONTINUED | OUTPATIENT
Start: 2017-03-26 | End: 2017-03-27 | Stop reason: HOSPADM

## 2017-03-26 RX ADMIN — CARBAMAZEPINE 300 MG: 100 CAPSULE, EXTENDED RELEASE ORAL at 09:10

## 2017-03-26 RX ADMIN — TOPIRAMATE 200 MG: 200 TABLET, FILM COATED ORAL at 09:11

## 2017-03-26 RX ADMIN — CARBAMAZEPINE 300 MG: 100 CAPSULE, EXTENDED RELEASE ORAL at 20:29

## 2017-03-26 RX ADMIN — OXYCODONE HYDROCHLORIDE 10 MG: 5 TABLET ORAL at 01:02

## 2017-03-26 RX ADMIN — ACETAMINOPHEN 975 MG: 325 TABLET, FILM COATED ORAL at 12:50

## 2017-03-26 RX ADMIN — ACETAMINOPHEN 975 MG: 325 TABLET, FILM COATED ORAL at 04:10

## 2017-03-26 RX ADMIN — CLONAZEPAM 0.5 MG: 0.5 TABLET ORAL at 09:11

## 2017-03-26 RX ADMIN — DEXAMETHASONE 4 MG: 4 TABLET ORAL at 12:50

## 2017-03-26 RX ADMIN — Medication 900 MG: at 20:30

## 2017-03-26 RX ADMIN — LEVETIRACETAM 750 MG: 100 INJECTION, SOLUTION INTRAVENOUS at 12:50

## 2017-03-26 RX ADMIN — LEVETIRACETAM 750 MG: 100 INJECTION, SOLUTION INTRAVENOUS at 00:09

## 2017-03-26 RX ADMIN — PANTOPRAZOLE SODIUM 40 MG: 40 TABLET, DELAYED RELEASE ORAL at 09:11

## 2017-03-26 RX ADMIN — OXYCODONE HYDROCHLORIDE 10 MG: 5 TABLET ORAL at 07:50

## 2017-03-26 RX ADMIN — DEXAMETHASONE SODIUM PHOSPHATE 6 MG: 4 INJECTION, SOLUTION INTRA-ARTICULAR; INTRALESIONAL; INTRAMUSCULAR; INTRAVENOUS; SOFT TISSUE at 00:04

## 2017-03-26 RX ADMIN — DEXAMETHASONE 4 MG: 4 TABLET ORAL at 20:30

## 2017-03-26 RX ADMIN — OXYCODONE HYDROCHLORIDE 10 MG: 5 TABLET ORAL at 04:07

## 2017-03-26 RX ADMIN — CLONAZEPAM 1 MG: 0.5 TABLET ORAL at 22:47

## 2017-03-26 RX ADMIN — SENNOSIDES AND DOCUSATE SODIUM 2 TABLET: 8.6; 5 TABLET ORAL at 20:30

## 2017-03-26 RX ADMIN — OXYCODONE HYDROCHLORIDE 10 MG: 5 TABLET ORAL at 16:18

## 2017-03-26 RX ADMIN — ACETAMINOPHEN 975 MG: 325 TABLET, FILM COATED ORAL at 20:29

## 2017-03-26 RX ADMIN — DEXAMETHASONE SODIUM PHOSPHATE 6 MG: 4 INJECTION, SOLUTION INTRA-ARTICULAR; INTRALESIONAL; INTRAMUSCULAR; INTRAVENOUS; SOFT TISSUE at 06:24

## 2017-03-26 RX ADMIN — FELBAMATE 600 MG: 600 TABLET ORAL at 09:11

## 2017-03-26 RX ADMIN — TOPIRAMATE 300 MG: 100 TABLET, FILM COATED ORAL at 22:47

## 2017-03-26 ASSESSMENT — VISUAL ACUITY
OU: NORMAL ACUITY

## 2017-03-26 ASSESSMENT — ACTIVITIES OF DAILY LIVING (ADL): PREVIOUS_RESPONSIBILITIES: MEDICATION MANAGEMENT;FINANCES

## 2017-03-26 NOTE — PLAN OF CARE
Problem: Goal Outcome Summary  Goal: Goal Outcome Summary  Outcome: Therapy, progress toward functional goals as expected  OT: eval completed and treatment initiated.  At  Baseline pt lives with his parents in a 2 story home, pt remains on the main level. Reports being active at baseline and was I with ADL's, managing medications and finances. Pt does not drive or cook. Currently pt demo's SBA with mobility due to decreased higher level balance. rec home with assist as needed

## 2017-03-26 NOTE — PLAN OF CARE
Problem: Goal Outcome Summary  Goal: Goal Outcome Summary  Outcome: Improving  A&O x 4. Neuros intact. Right periorbital swollen and upper side of face swollen. Bowel sound active, passing flatus, no bm. Head elevated. VSS. Regular diet. Up with 1 with belt. Voiding adequately using urinal. On seizure precaution, no witnessed or seizure activity noted. Staples on head CDI, BRETT. Incision pain decreased with tylenol and scheduled tylenol. Plan continue to monitor.

## 2017-03-26 NOTE — PROGRESS NOTES
[facility]    Neurosurgery Progress Note    Date of Service (when I saw the patient): 03/26/2017     Assessment & Plan   Mr. 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 He had multiple previous craniotomies with intersecting calvarial tracts, and multiple defects, and numerous different locations of plating. There were multiple foci of acquired cranial defect noted on the CT scan preoperatively.  He underwent right frontal craniotomy for resection of recurrent glioma with right frontal titanium mesh cranioplasty for reconstruction of previous preoperative acquired cranial defect on 3/24/17 with Dr. Stuart Oscar.  Today he states he has minimal incision pain, rating it 2-3.  He has been up out of bed without difficulty, tolerating diet and denies nausea.  We have ordered MRI of brain, however, patient has VNS which will need to be turned off prior to MRI.  He states in the past he has needed the VNS rep to turn it off; we will work at coordinating for the rep to come.      Active Problems:    Status post craniotomy    Assessment:S/p right frontal craniotomy for resection of recurrent glioma  Plan:   MRI of brain; Contact rep to turn of VNS prior to MRI  Pain management; likely dc to home tomorrow        I have discussed the following assessment and plan with Dr. Oscar who is in agreement with initial plan and will follow up with further consultation recommendations.    Priscilla Ivey [facility]  [facility]  [address] [address]    Tel [phone]  Pager [phone]      Interval History   Stable    Physical Exam   Temp: 98.1  F (36.7  C) Temp src: Oral BP: 105/68   Heart Rate: 68 Resp: 16 SpO2: 96 % O2 Device: None (Room air)    Vitals:    03/24/17 1006 03/25/17 0600   Weight: 179 lb  "(81.2 kg) 176 lb 9.4 oz (80.1 kg)     Vital Signs with Ranges  Temp:  [97.9  F (36.6  C)-99.4  F (37.4  C)] 98.1  F (36.7  C)  Heart Rate:  [68-91] 68  Resp:  [12-24] 16  BP: ()/(58-68) 105/68  MAP:  [100 mmHg] 100 mmHg  Arterial Line BP: (135)/(86) 135/86  SpO2:  [95 %-98 %] 96 %  I/O last 3 completed shifts:  In: 690 [P.O.:240; I.V.:450]  Out: 2000 [Urine:2000]    Heart Rate: 68, Blood pressure 105/68, temperature 98.1  F (36.7  C), temperature source Oral, resp. rate 16, height 5' 9\" (1.753 m), weight 176 lb 9.4 oz (80.1 kg), SpO2 96 %.  176 lbs 9.42 oz  HEENT:  Slight swelling to right frontotemporal region.  PERRLA.  EOM s intact.    Neck:  Supple, non-tender, without lymphadenopathy.  Heart:  No peripheral edema  Lungs:  No SOB  Skin:  Warm and dry.  Incision clean, dry and intact; staples intact.  Extremities:  Good radial and dorsalis pedis pulses bilaterally, no edema, cyanosis or clubbing.    NEUROLOGICAL EXAMINATION:     Mental status:  Alert and Oriented x 3, speech is fluent.  Cranial nerves:  II-XII intact.   Motor:  Strength is 5/5 throughout the upper and lower extremities  Shoulder Abduction:  Right:  5/5   Left:  5/5  Biceps:                      Right:  5/5   Left:  5/5  Triceps:                     Right:  5/5   Left:  5/5  Wrist Extensors:       Right:  5/5   Left:  5/5  Wrist Flexors:           Right:  5/5   Left:  5/5  interosseus :            Right:  5/5   Left:  5/5   Hip Flexor:                Right: 5/5  Left:  5/5  Hip Adductor:             Right:  5/5  Left:  5/5  Hip Abductor:             Right:  5/5  Left:  5/5  Gastroc Soleus:        Right:  5/5  Left:  5/5  Tib/Ant:                      Right:  5/5  Left:  5/5  EHL:                     Right:  5/5  Left:  5/5  Sensation:  intact  Reflexes:  Negative Babinski.  Negative Clonus.    Coordination:  Smooth finger to nose testing.   Negative pronator drift.    Gait: Deferred.       Medications     NaCl 75 mL/hr at 03/25/17 0805       " pantoprazole  40 mg Oral QAM     carBAMazepine  300 mg Oral BID     clonazePAM (klonoPIN) tablet 0.5 mg  0.5 mg Oral QAM     clonazePAM (klonoPIN) tablet 1 mg  1 mg Oral At Bedtime     felbamate (FELBATOL) tablet 600 mg  600 mg Oral QAM     felbamate (FELBATOL) half-tab 900 mg  900 mg Oral Q24H     topiramate (TOPAMAX) tablet 200 mg  200 mg Oral QAM     topiramate (TOPAMAX) tablet 300 mg  300 mg Oral At Bedtime     sodium chloride (PF)  3 mL Intracatheter Q8H     dexamethasone  6 mg Intravenous Q6H    Followed by     dexamethasone  4 mg Intravenous Q6H    Followed by     [START ON 3/28/2017] dexamethasone  2 mg Intravenous Q6H    Followed by     [START ON 3/30/2017] dexamethasone  1 mg Intravenous Q6H    Followed by     [START ON 4/1/2017] dexamethasone  1 mg Intravenous Q12H     acetaminophen  975 mg Oral Q8H     senna-docusate  1-2 tablet Oral BID     levETIRAcetam  750 mg Intravenous Q12H       Data       CBC RESULTS:   Recent Labs   Lab Test  03/24/17   1003 01/12/17   WBC   --   5.5   RBC   --   4.21*   HGB  12.6*  13.3*   HCT   --   39.3   MCV   --   93   MCH   --   31.7   MCHC   --   34.0   RDW   --   12.6   PLT   --   255     Basic Metabolic Panel:  Lab Results   Component Value Date     01/12/2017      Lab Results   Component Value Date    POTASSIUM 3.5 12/29/2016     Lab Results   Component Value Date    CHLORIDE 111 12/29/2016     Lab Results   Component Value Date    SHABBIR 8.6 12/29/2016     Lab Results   Component Value Date    CO2 24 12/29/2016     Lab Results   Component Value Date    BUN 12 12/29/2016    BUN 14 12/29/2016     Lab Results   Component Value Date    CR 1.05 07/19/2016     Lab Results   Component Value Date     12/29/2016     INR:  Lab Results   Component Value Date    INR 1.09 09/24/2013    INR 1.23 09/23/2013    INR 1.17 09/22/2013    INR 1.57 09/21/2013    INR 1.19 09/19/2013    INR 1.12 09/18/2013    INR 1.09 09/17/2013    INR 1.10 09/17/2013    INR 1.09 09/16/2013    INR  1.07 09/15/2013    INR 1.12 09/14/2013    INR 1.15 09/13/2013

## 2017-03-26 NOTE — PLAN OF CARE
Problem: Goal Outcome Summary  Goal: Goal Outcome Summary  Outcome: Improving  Patient is A&O, VSS on RA, CMS intact, IV infusing at 75 ml/hr. Head staples intact, oxycodone for pain control, up with 1 and IV pole, voiding adequately using urinal. Will continue to monitor

## 2017-03-26 NOTE — PROGRESS NOTES
Message left for Ceasar Fairchild, [facility]  at [facility] cell [phone] regarding turning off VNS for MRI. No return call today.

## 2017-03-26 NOTE — PLAN OF CARE
Problem: Goal Outcome Summary  Goal: Goal Outcome Summary  Outcome: Improving  A&Ox4. VSS on RA. R periorbital swelling, improving. Oxycodone given x1 for 6/10 R sided head pain, with decrease in pain. Staples on R side of head and L front of head, BRETT and CDI. PIV infusing. Up with 1 and GB. Tolerating regular diet. Voiding adequately in urinal. Plan for MRI tomorrow. Vagus nerve stimulation (VNS) needs to be shut off by representative, phone number called per patient rep card that is copied and in back of chart, representative available Monday-Friday from [phone], needs to be called in AM during available hours in order for MRI to be completed. Continue to monitor.

## 2017-03-26 NOTE — PLAN OF CARE
Problem: Goal Outcome Summary  Goal: Goal Outcome Summary  PT: Limited session this AM secondary to patient's food arriving. Supine to/from sit with modified independence. Sit to/from stand with independence. Ambulates 300' with no AD. Demonstrates cross over steps and UE use on wall rails. No overt LOB. PT recommendation: anticipate home at discharge, may benefit from OP PT for progress balance interventions at discharge if continues to require AD with gait.

## 2017-03-26 NOTE — PLAN OF CARE
Problem: Goal Outcome Summary  Goal: Goal Outcome Summary  PT: Patient ambulates 300' x 2 reps with no AD, shoes donned and supervision. Significant improvement in steadiness with shoes donned this PM. Occasional, mild lateral path deviations with head turns while walking.  Family present who endorse patient is moving near his baseline mobility levels. Also performs 6 stairs with bilateral rail and SBA. Recommend home with assist from family as needed on discharge.

## 2017-03-26 NOTE — PROGRESS NOTES
03/26/17 1036   Quick Adds   Type of Visit Initial Occupational Therapy Evaluation   Living Environment   Lives With parent(s)   Living Arrangements house   Home Accessibility stairs within home;stairs to enter home   Transportation Available family or friend will provide   Living Environment Comment mom is available to help patient.    Self-Care   Dominant Hand right   Usual Activity Tolerance good   Current Activity Tolerance moderate   Regular Exercise yes   Activity/Exercise Type walking   Exercise Amount/Frequency daily   Activity/Exercise/Self-Care Comment pt reports walking a lot during the day, but didn't expand just that he's active   General Information   Onset of Illness/Injury or Date of Surgery - Date 03/24/17   Referring Physician Stepan Smith PA-C   Patient/Family Goals Statement to return home with assist   Additional Occupational Profile Info/Pertinent History of Current Problem pt s/p planned crani on 3/24 for R frontal tumor (recurrent glioma) resection/crani. PMhx, significan for oligendroglioma and seizures. has a vagal nerve stimulator.    Precautions/Limitations fall precautions;seizure precautions   General Observations pt resting upion arrival, agreeable to OT eval.   General Info Comments mom, dad, brother present   Cognitive Status Examination   Orientation orientation to person, place and time   Level of Consciousness alert   Able to Follow Commands WNL/WFL   Personal Safety (Cognitive) WNL/WFL   Cognitive Comment flat affect, family states this is baseline for him from previous surgery.    Visual Perception   Visual Perception No deficits were identified   Visual Perception Comments denies blurry or double vision   Sensory Examination   Sensory Quick Adds No deficits were identified   Pain Assessment   Patient Currently in Pain Yes, see Vital Sign flowsheet   Integumentary/Edema   Integumentary/Edema no deficits were identifed   Range of Motion (ROM)   ROM Quick Adds No deficits were  identified   Strength   Manual Muscle Testing Quick Adds No deficits were identified   Strength Comments functional with activities   Muscle Tone Assessment   Muscle Tone Quick Adds No deficits were identified   Coordination   Upper Extremity Coordination No deficits were identified   Mobility   Bed Mobility Comments SBA   Transfer Skill: Bed to Chair/Chair to Bed   Level of [place]: Bed to Chair stand-by assist   Transfer Skill: Sit to Stand   Level of [place]: Sit/Stand stand-by assist   Physical Assist/Nonphysical Assist: Sit/Stand supervision   Transfer Skill: Toilet Transfer   Level of [place]: Toilet stand-by assist   Balance   Balance Comments defer to PT assessment   Lower Body Dressing   Level of [place]: Dress Lower Body independent   Physical Assist/Nonphysical Assist: Dress Lower Body set-up required   Toileting   Level of [place]: Toilet independent   Grooming   Level of [place]: Grooming stand-by assist   Eating/Self Feeding   Level of [place]: Eating independent   Instrumental Activities of Daily Living (IADL)   Previous Responsibilities medication management;finances   IADL Comments works out in the shop   Activities of Daily Living Analysis   Impairments Contributing to Impaired Activities of Daily Living balance impaired   General Therapy Interventions   Planned Therapy Interventions ADL retraining;transfer training   Clinical Impression   Criteria for Skilled Therapeutic Interventions Met yes, treatment indicated   OT Diagnosis decreased I with ADL's    Influenced by the following impairments balance   Assessment of Occupational Performance 1-3 Performance Deficits   Identified Performance Deficits pt demo's decreased I with functional transfers and ADL's   Clinical Decision Making (Complexity) Low complexity   Therapy Frequency daily   Predicted Duration of Therapy Intervention (days/wks) 2 days   Anticipated Discharge Disposition Home with Assist   Risks and  "Benefits of Treatment have been explained. Yes   Patient, Family & other staff in agreement with plan of care Yes   [facility] TM \"6 Clicks\"   2016, Trustees of [facility], under license to [employer].  All rights reserved.   6 Clicks Short Forms Daily Activity Inpatient Short Form   [facility]  \"6 Clicks\" Daily Activity Inpatient Short Form   1. Putting on and taking off regular lower body clothing? 3 - A Little   2. Bathing (including washing, rinsing, drying)? 3 - A Little   3. Toileting, which includes using toilet, bedpan or urinal? 4 - None   4. Putting on and taking off regular upper body clothing? 4 - None   5. Taking care of personal grooming such as brushing teeth? 4 - None   6. Eating meals? 4 - None   Daily Activity Raw Score (Score out of 24.Lower scores equate to lower levels of function) 22   Total Evaluation Time   Total Evaluation Time (Minutes) 15     "

## 2017-03-27 ENCOUNTER — APPOINTMENT (OUTPATIENT)
Dept: PHYSICAL THERAPY | Facility: CLINIC | Age: 47
DRG: 027 | End: 2017-03-27
Attending: NEUROLOGICAL SURGERY
Payer: MEDICARE

## 2017-03-27 ENCOUNTER — APPOINTMENT (OUTPATIENT)
Dept: MRI IMAGING | Facility: CLINIC | Age: 47
DRG: 027 | End: 2017-03-27
Attending: PHYSICIAN ASSISTANT
Payer: MEDICARE

## 2017-03-27 VITALS
HEIGHT: 69 IN | OXYGEN SATURATION: 99 % | DIASTOLIC BLOOD PRESSURE: 79 MMHG | BODY MASS INDEX: 26.16 KG/M2 | WEIGHT: 176.59 LBS | TEMPERATURE: 98.5 F | RESPIRATION RATE: 18 BRPM | HEART RATE: 60 BPM | SYSTOLIC BLOOD PRESSURE: 124 MMHG

## 2017-03-27 LAB — COPATH REPORT: NORMAL

## 2017-03-27 PROCEDURE — 25000132 ZZH RX MED GY IP 250 OP 250 PS 637: Mod: GY | Performed by: PHYSICIAN ASSISTANT

## 2017-03-27 PROCEDURE — 25000131 ZZH RX MED GY IP 250 OP 636 PS 637: Mod: GY | Performed by: NURSE PRACTITIONER

## 2017-03-27 PROCEDURE — A9270 NON-COVERED ITEM OR SERVICE: HCPCS | Mod: GY | Performed by: PHYSICIAN ASSISTANT

## 2017-03-27 PROCEDURE — 25500064 ZZH RX 255 OP 636: Performed by: NEUROLOGICAL SURGERY

## 2017-03-27 PROCEDURE — 25000128 H RX IP 250 OP 636: Performed by: PHYSICIAN ASSISTANT

## 2017-03-27 PROCEDURE — 97530 THERAPEUTIC ACTIVITIES: CPT | Mod: GP

## 2017-03-27 PROCEDURE — 40000193 ZZH STATISTIC PT WARD VISIT

## 2017-03-27 PROCEDURE — 70553 MRI BRAIN STEM W/O & W/DYE: CPT

## 2017-03-27 PROCEDURE — A9270 NON-COVERED ITEM OR SERVICE: HCPCS | Mod: GY

## 2017-03-27 PROCEDURE — 25000132 ZZH RX MED GY IP 250 OP 250 PS 637: Mod: GY

## 2017-03-27 PROCEDURE — A9585 GADOBUTROL INJECTION: HCPCS | Performed by: NEUROLOGICAL SURGERY

## 2017-03-27 RX ORDER — OXYCODONE HYDROCHLORIDE 5 MG/1
5-10 TABLET ORAL EVERY 4 HOURS PRN
Qty: 45 TABLET | Refills: 0 | Status: SHIPPED | OUTPATIENT
Start: 2017-03-27 | End: 2017-06-06

## 2017-03-27 RX ORDER — DEXAMETHASONE 4 MG/1
4 TABLET ORAL EVERY 6 HOURS
Qty: 60 TABLET | Refills: 0 | Status: SHIPPED | OUTPATIENT
Start: 2017-03-27 | End: 2017-05-09

## 2017-03-27 RX ORDER — GADOBUTROL 604.72 MG/ML
8 INJECTION INTRAVENOUS ONCE
Status: COMPLETED | OUTPATIENT
Start: 2017-03-27 | End: 2017-03-27

## 2017-03-27 RX ORDER — DEXAMETHASONE 4 MG/1
4 TABLET ORAL EVERY 6 HOURS
Qty: 60 TABLET | Refills: 0 | Status: SHIPPED | OUTPATIENT
Start: 2017-03-27 | End: 2017-03-27

## 2017-03-27 RX ORDER — LEVETIRACETAM 750 MG/1
750 TABLET ORAL EVERY 12 HOURS
Qty: 60 TABLET | Refills: 0 | Status: SHIPPED | OUTPATIENT
Start: 2017-03-27 | End: 2017-03-27

## 2017-03-27 RX ORDER — LEVETIRACETAM 750 MG/1
750 TABLET ORAL EVERY 12 HOURS
Status: DISCONTINUED | OUTPATIENT
Start: 2017-03-27 | End: 2017-03-27 | Stop reason: HOSPADM

## 2017-03-27 RX ADMIN — Medication 900 MG: at 16:23

## 2017-03-27 RX ADMIN — GADOBUTROL 8 ML: 604.72 INJECTION INTRAVENOUS at 15:25

## 2017-03-27 RX ADMIN — FELBAMATE 600 MG: 600 TABLET ORAL at 09:59

## 2017-03-27 RX ADMIN — CLONAZEPAM 0.5 MG: 0.5 TABLET ORAL at 09:59

## 2017-03-27 RX ADMIN — CARBAMAZEPINE 300 MG: 100 CAPSULE, EXTENDED RELEASE ORAL at 09:59

## 2017-03-27 RX ADMIN — SENNOSIDES AND DOCUSATE SODIUM 2 TABLET: 8.6; 5 TABLET ORAL at 09:58

## 2017-03-27 RX ADMIN — PANTOPRAZOLE SODIUM 40 MG: 40 TABLET, DELAYED RELEASE ORAL at 09:59

## 2017-03-27 RX ADMIN — LEVETIRACETAM 750 MG: 100 INJECTION, SOLUTION INTRAVENOUS at 00:40

## 2017-03-27 RX ADMIN — DEXAMETHASONE 4 MG: 4 TABLET ORAL at 16:23

## 2017-03-27 RX ADMIN — ACETAMINOPHEN 975 MG: 325 TABLET, FILM COATED ORAL at 12:15

## 2017-03-27 RX ADMIN — DEXAMETHASONE 4 MG: 4 TABLET ORAL at 09:59

## 2017-03-27 RX ADMIN — DEXAMETHASONE 4 MG: 4 TABLET ORAL at 03:14

## 2017-03-27 RX ADMIN — TOPIRAMATE 200 MG: 200 TABLET, FILM COATED ORAL at 09:59

## 2017-03-27 RX ADMIN — LEVETIRACETAM 750 MG: 750 TABLET, FILM COATED ORAL at 10:00

## 2017-03-27 ASSESSMENT — VISUAL ACUITY
OU: NORMAL ACUITY

## 2017-03-27 NOTE — PLAN OF CARE
Problem: Goal Outcome Summary  Goal: Goal Outcome Summary  PT: Patient performs bed mobility and transfers with ambulation. Patient ambulates 250' with supervision and no AD. Patient continues to demonstrate baseline level impairments. Denies concerns with returning home when medically appropriate. Will complete PT order at this time as patient is moving at his baseline level.  Recommend return home with family assist on discharge.      Recommend continued ambulation in burris three times per day to maintain strength during stay.      Physical Therapy Discharge Summary    Reason for therapy discharge:    Satisfactory goal attainment    Progress towards therapy goal(s). See goals on Care Plan in [facility] electronic health record for goal details.  Goals not met.     Therapy recommendation(s):    No further therapy is recommended.

## 2017-03-27 NOTE — PLAN OF CARE
Problem: Goal Outcome Summary  Goal: Goal Outcome Summary  POD #3 crani for tumor removal. A&O x4. Neuros intact with slight right periorbital swelling that is resolving. VSS. Regular diet. Up with A1 + GB. Oxycodone for pain. Pt to have MRI before he can be discharged; vagal stimulator needs to be turned off by a representative (only available M-F) - model and ID # on the back of patients chart - please call asap Monday am. Continue to monitor and follow POC.

## 2017-03-27 NOTE — PLAN OF CARE
Problem: Goal Outcome Summary  Goal: Goal Outcome Summary  Outcome: Adequate for Discharge Date Met:  03/27/17  Neuro exam intact except for right temporal headache 2/10, decreased with Tylenol and rest.  Right sided periorbital swelling and slight right droop.  Up ambulating in burris with SBA, voiding adequately, No BM since pre-op.  Patient instructed to watch for constipation with narcotics.  Staples to right side of head and left forehead intact, scant dried drainage.  Stimulator turned off for approximately 45 during MRI, turned back on prior to return to floor.  MRI results reviewed by Carley Auguste NP.  Discharge instructions reviewed with patient and mother, opportunity for questions given.  Discharged to home this at 1730.

## 2017-03-27 NOTE — PROVIDER NOTIFICATION
"Call placed to Carley Auguste NP:  \"MRI is completed.  Also does he need Keppra at discharge? None is ordered.\"  Awaiting return call.  "

## 2017-03-27 NOTE — DISCHARGE SUMMARY
[facility]    Discharge Summary  Neurosurgery    Date of Admission:  3/24/2017  Date of Discharge:  3/27/2017  Discharging Provider: Carley Auguste  Date of Service (when I saw the patient): 03/27/17    Discharge Diagnoses   Active Problems:    Status post craniotomy      History of Present Illness   Mr. 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 He had multiple previous craniotomies with intersecting calvarial tracts, and multiple defects, and numerous different locations of plating. There were multiple foci of acquired cranial defect noted on the CT scan preoperatively. He underwent right frontal craniotomy for resection of recurrent glioma with right frontal titanium mesh cranioplasty for reconstruction of previous preoperative acquired cranial defect on 3/24/17 with Dr. Stuart Oscar. Patient tolerated procedure well and was relatively pain free post operatively. MRI of brain was completed on 3/27/17 after VNS was turned off by rep.     Hospital Course   Joni Borja was admitted on 3/24/2017.  The following problems were addressed during his hospitalization:    Active Problems:  Status post craniotomy  Assessment:S/p right frontal craniotomy for resection of recurrent glioma  Plan:   Discharge to home today  Follow up in 10-12 days for staple removal        I have discussed the following assessment and plan with Dr. Stuart Oscar who is in agreement with initial plan and will follow up with further consultation recommendations.    Carley Auguste PA-C  [facility]  [address] [address]    Tel [phone]  Pager [phone]        Significant Results and Procedures   S/p craniotomy for resection of recurrent glioma    Pending Results   These results will be followed up by   [place]  Unresulted Labs Ordered in the Past 30 Days of this Admission     No orders found from 1/23/2017 to 3/25/2017.          Code Status   Full Code    Primary Care Physician   Brian Coon    Physical Exam   Temp: 97.8  F (36.6  C) Temp src: Oral BP: 108/70 Pulse: 60 Heart Rate: 65 Resp: 18 SpO2: 96 % O2 Device: None (Room air)    Vitals:    03/24/17 1006 03/25/17 0600   Weight: 179 lb (81.2 kg) 176 lb 9.4 oz (80.1 kg)     Vital Signs with Ranges  Temp:  [97.7  F (36.5  C)-99.4  F (37.4  C)] 97.8  F (36.6  C)  Pulse:  [60] 60  Heart Rate:  [61-69] 65  Resp:  [15-18] 18  BP: (108-120)/(66-81) 108/70  SpO2:  [96 %-99 %] 96 %  I/O last 3 completed shifts:  In: 720 [P.O.:720]  Out: 1975 [Urine:1975]    Constitutional: Awake, alert, cooperative, no apparent distress, and appears stated age.  Eyes: Lids and lashes normal, pupils equal, round and reactive to light, extra ocular muscles intact  ENT: Mild swelling to right frontotemporal region.  Respiratory: No increased work of breathing  Skin: No bruising or bleeding, normal skin color, texture, turgor, no redness, warmth, or swelling.  Incision with staples intact, minimal drainage, open to air.  Musculoskeletal: There is no redness, warmth, or swelling of the joints.  Full range of motion noted.  Motor strength is 5 out of 5 all extremities bilaterally.  Tone is normal.  Neurologic: Awake, alert, oriented to name, place and time.  Cranial nerves II-XII are grossly intact.  Motor is 5 out of 5 bilaterally.     Neuropsychiatric: Calm, normal eye contact, alert, normal affect, oriented to self, place, time and situation, memory for past and recent events intact and thought process normal.       Time Spent on this Encounter   Carley BUTLER, personally saw the patient today and spent less than or equal to 30 minutes discharging this patient.    Discharge Disposition   Discharged to home  Condition at discharge: Stable    Consultations This Hospital Stay   PHYSICAL  THERAPY ADULT IP CONSULT  OCCUPATIONAL THERAPY ADULT IP CONSULT    Discharge Orders     Reason for your hospital stay   You were in the hospital for a craniotomy.     Follow-up and recommended labs and tests    Please follow up at the [facility] in 10-12 days for staple removal with Carley Auguste PA-C or Stepan Smith PA-C.  Please call the clinic at [phone] to schedule your appointment.     Activity   Discharge instructions:  No lifting of more than 10 pounds, no bending, no twisting until follow up visit.    Ok to shampoo hair, shower or bathe, but, do not scrub or submerge incision under water until evaluated post-operatively in clinic.    Ok to walk as tolerated, avoid bed rest and prolonged sitting.    No contact sports until after follow up visit  No high impact activities such as; running/jogging, snowmobile or 4 bernard riding or any other recreational vehicles.    Do NOT drive while taking narcotic pain medication.    Call the [facility] at [phone] for increasing redness, swelling or pus draining from the incision, increased pain or any other questions and concerns.     Wound care and dressings   Keep your incision clean and dry at all times.  OK to remove dressing on postop day 2.  OK to shower on postop day 3 and allow water to run over incision, pat dry after shower.  No bathing swimming or submerging in water.  Call immediately or come to ED if any drainage occurs, or you develop new pain, redness, or swelling.     Full Code     Diet   Follow this diet upon discharge: Orders Placed This Encounter     Advance Diet as Tolerated: Regular Diet Adult       Discharge Medications   Current Discharge Medication List      START taking these medications    Details   oxyCODONE (ROXICODONE) 5 MG IR tablet Take 1-2 tablets (5-10 mg) by mouth every 4 hours as needed for moderate to severe pain  Qty: 45 tablet, Refills: 0    Associated Diagnoses: Status post craniotomy      dexamethasone  (DECADRON) 4 MG tablet Take 1 tablet (4 mg) by mouth every 6 hours  Qty: 60 tablet, Refills: 0    Comments: On 4/29/17 decrease to 4mg BID x3d, then decrease to 2mg BID until follow-up  Associated Diagnoses: Status post craniotomy         CONTINUE these medications which have NOT CHANGED    Details   CarBAMazepine (CARBATROL PO) Take 300 mg by mouth 2 times daily (at 10:00 & 22:00)      Escitalopram Oxalate (LEXAPRO PO) Take 20 mg by mouth At Bedtime      Pantoprazole Sodium (PROTONIX PO) Take 40 mg by mouth every morning      Cyanocobalamin (VITAMIN B 12 PO) Take 1 tablet by mouth daily (with dinner)      !! ClonazePAM (KLONOPIN PO) Take 0.5 mg by mouth every morning       !! ClonazePAM (KLONOPIN PO) Take 1 mg by mouth At Bedtime (2 x 0.5 mg tablet = 1 mg dose)      !! Felbamate (FELBATOL PO) Take 600 mg by mouth every morning       !! Felbamate (FELBATOL PO) Take 900 mg by mouth every 24 hours At 1500 (1.5 x 600 mg tablet = 900 mg dose)      !! Topiramate (TOPAMAX PO) Take 200 mg by mouth every morning (2 x 100 mg = 200 mg dose)       !! Topiramate (TOPAMAX PO) Take 300 mg by mouth At Bedtime (3 x 100 mg = 300 mg dose)       diazepam (VALIUM) 5 MG tablet Take 5 mg by mouth 2 times daily as needed for other (seizures.) As instructed for seizures. Please take 5 mg for more than 3 seizures in 8 hour period, may repeat 5 mg after 30 min if seizures continue.  Qty: 30 tablet, Refills: 1    Associated Diagnoses: Localization-related (focal) (partial) epilepsy and epileptic syndromes with complex partial seizures, with intractable epilepsy      Cetirizine HCl (ZYRTEC ALLERGY PO) Take 10 mg by mouth daily as needed (for misquitos.)     Associated Diagnoses: Localization-related (focal) (partial) epilepsy and epileptic syndromes with complex partial seizures, with intractable epilepsy; Depression; Anxiety; Unstable gait      DiphenhydrAMINE HCl (BENADRYL PO) Take 50 mg by mouth daily as needed for allergies (misquitos,)      Associated Diagnoses: Localization-related (focal) (partial) epilepsy and epileptic syndromes with complex partial seizures, with intractable epilepsy; Depression; Anxiety; Unstable gait       !! - Potential duplicate medications found. Please discuss with provider.      STOP taking these medications       acetaminophen-codeine (TYLENOL #3) 300-30 MG per tablet Comments:   Reason for Stopping:             Allergies   Allergies   Allergen Reactions     Dilantin [Phenytoin] Hives     Mosquitoes (Informational Only)      blisters     Nuts      Other reaction(s): Angioedema  mosquito

## 2017-03-27 NOTE — PLAN OF CARE
Problem: Goal Outcome Summary  Goal: Goal Outcome Summary  OT: Attempted to see patient, however patient is at MRI. Will cancel OT today.

## 2017-03-27 NOTE — PLAN OF CARE
Problem: Goal Outcome Summary  Goal: Goal Outcome Summary  Outcome: Improving  POD #2 crani for tumor removal. A&O x4. Neuros intact with slight right periorbital swelling. VSS. Regular diet. Up with A1 + GB. Oxycodone for pain. Pt to have MRI before he can be discharged; vagal stimulator needs to be turned off by a representative (only available M-F) - model and ID # on the back of patients chart - please call asap Monday am. Continue to monitor and follow POC.

## 2017-03-27 NOTE — PROGRESS NOTES
[facility]    Neurosurgery Progress Note    Date of Service (when I saw the patient): 03/27/2017     Assessment & Plan   Mr. 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 He had multiple previous craniotomies with intersecting calvarial tracts, and multiple defects, and numerous different locations of plating. There were multiple foci of acquired cranial defect noted on the CT scan preoperatively. He underwent right frontal craniotomy for resection of recurrent glioma with right frontal titanium mesh cranioplasty for reconstruction of previous preoperative acquired cranial defect on 3/24/17 with Dr. Stuart Oscar. Today he is lying upright in bed and states he is not currently having any pain. He has been up and out of bed and tolerating it well. We have ordered MRI of the brain, but patient has VNS which will need to be turned off prior. VNS rep will be available today after 2pm per RN.    Active Problems:  Status post craniotomy  Assessment:S/p right frontal craniotomy for resection of recurrent glioma  Plan:   MRI of brain after 2pm today; likely to dc home pending results  Pain management      I have discussed the following assessment and plan with Dr. Oscar who is in agreement with initial plan and will follow up with further consultation recommendations.    Carley Auguste PA-C  [facility]  [place] [address]    Tel [phone]  Pager [phone]      Interval History   Stable.    Physical Exam   Temp: 97.7  F (36.5  C) Temp src: Oral BP: 116/81 Pulse: 60 Heart Rate: 61 Resp: 18 SpO2: 98 % O2 Device: None (Room air)    Vitals:    03/24/17 1006 03/25/17 0600   Weight: 179 lb (81.2 kg) 176 lb 9.4 oz (80.1 kg)     Vital Signs with Ranges  Temp:  [97.2  F (36.2  C)-99.4  F (37.4  " C)] 97.7  F (36.5  C)  Pulse:  [60] 60  Heart Rate:  [61-73] 61  Resp:  [15-18] 18  BP: (100-120)/(61-81) 116/81  SpO2:  [98 %-99 %] 98 %  I/O last 3 completed shifts:  In: 600 [P.O.:600]  Out: 1575 [Urine:1575]    Heart Rate: 61, Blood pressure 116/81, pulse 60, temperature 97.7  F (36.5  C), temperature source Oral, resp. rate 18, height 5' 9\" (1.753 m), weight 176 lb 9.4 oz (80.1 kg), SpO2 98 %.  176 lbs 9.42 oz  HEENT:  Minimal swelling to right frontotemporal region.  PERRLA.  EOM s intact.   Neck:  Supple, non-tender, without lymphadenopathy.  Heart:  No peripheral edema  Lungs:  No SOB  Abdomen:  Soft, non-tender, non-distended.   Skin:  Warm and dry. Incision clean, dry, and intact with staples intact.  Extremities:  Good radial and dorsalis pedis pulses bilaterally, no edema, cyanosis or clubbing.    NEUROLOGICAL EXAMINATION:   Mental status:  Alert and Oriented x 3, speech is fluent.  Cranial nerves:  II-XII intact.   Motor:  Moves all extremities equally.  Sensation:  intact  Reflexes:  Symmetric.   Coordination:  Smooth finger to nose testing.   Negative pronator drift.   Gait:  Deferred.    Medications     NaCl 75 mL/hr at 03/25/17 0805       levETIRAcetam  750 mg Oral Q12H     dexamethasone  4 mg Oral Q6H ELENA     pantoprazole  40 mg Oral QAM     carBAMazepine  300 mg Oral BID     clonazePAM (klonoPIN) tablet 0.5 mg  0.5 mg Oral QAM     clonazePAM (klonoPIN) tablet 1 mg  1 mg Oral At Bedtime     felbamate (FELBATOL) tablet 600 mg  600 mg Oral QAM     felbamate (FELBATOL) half-tab 900 mg  900 mg Oral Q24H     topiramate (TOPAMAX) tablet 200 mg  200 mg Oral QAM     topiramate (TOPAMAX) tablet 300 mg  300 mg Oral At Bedtime     sodium chloride (PF)  3 mL Intracatheter Q8H     acetaminophen  975 mg Oral Q8H     senna-docusate  1-2 tablet Oral BID       Data   CBC RESULTS:   Recent Labs   Lab Test  03/24/17   1003 01/12/17   WBC   --   5.5   RBC   --   4.21*   HGB  12.6*  13.3*   HCT   --   39.3   MCV   --  "  93   MCH   --   31.7   MCHC   --   34.0   RDW   --   12.6   PLT   --   255     Basic Metabolic Panel:  Lab Results   Component Value Date     01/12/2017      Lab Results   Component Value Date    POTASSIUM 3.5 12/29/2016     Lab Results   Component Value Date    CHLORIDE 111 12/29/2016     Lab Results   Component Value Date    SHABBIR 8.6 12/29/2016     Lab Results   Component Value Date    CO2 24 12/29/2016     Lab Results   Component Value Date    BUN 12 12/29/2016    BUN 14 12/29/2016     Lab Results   Component Value Date    CR 1.05 07/19/2016     Lab Results   Component Value Date     12/29/2016     INR:  Lab Results   Component Value Date    INR 1.09 09/24/2013    INR 1.23 09/23/2013    INR 1.17 09/22/2013    INR 1.57 09/21/2013    INR 1.19 09/19/2013    INR 1.12 09/18/2013    INR 1.09 09/17/2013    INR 1.10 09/17/2013    INR 1.09 09/16/2013    INR 1.07 09/15/2013    INR 1.12 09/14/2013    INR 1.15 09/13/2013

## 2017-03-28 ENCOUNTER — TELEPHONE (OUTPATIENT)
Dept: FAMILY MEDICINE | Facility: CLINIC | Age: 47
End: 2017-03-28

## 2017-03-28 NOTE — TELEPHONE ENCOUNTER
Patient discharged from [facility] for inpatient hospital stay on 3/27 for right frontal glioma, s/p craniotomy.    Please contact patient to follow up; no appointment scheduled at this time.    ER / IP:  0/1    Care Coordination:  jack Ruth

## 2017-03-28 NOTE — PLAN OF CARE
Problem: Goal Outcome Summary  Goal: Goal Outcome Summary  Occupational Therapy Discharge Summary     Reason for therapy discharge:    Discharged to home.     Progress towards therapy goal(s). See goals on Care Plan in [facility] electronic health record for goal details.  Goals not met.  Barriers to achieving goals:   discharge from facility.     Therapy recommendation(s):    No further therapy is recommended.

## 2017-03-28 NOTE — TELEPHONE ENCOUNTER
ED / Discharge Outreach Protocol    Patient Contact    Attempt # 1    Was call answered?  No.  Left message on voicemail with information to call me back.    Keren Silva RN    [facility]

## 2017-03-29 NOTE — TELEPHONE ENCOUNTER
"ED / Discharge Outreach Protocol    Patient Contact    Attempt # 2    Was call answered?  Yes.  \"May I please speak with Joni\"  Is patient available?   Yes    ED/Discharge Protocol    \"Hi, my name is Ligia Villeda, a registered nurse, and I am calling on behalf of Dr. Coon's office at [place].  I am calling to follow up and see how things are going for you after your recent visit.\"    \"I see that you were in the (ER/UC/IP) on 3/27/2017.    How are you doing now that you are home?\" pt is doing well    Is patient experiencing symptoms that may require a hospital visit?  no    Discharge Instructions    \"Let's review your discharge instructions.  What is/are the follow-up recommendations?  Pt. Response: pt is following with up with a specialist within two weeks    \"Were you instructed to make a follow-up appointment?\"  Pt. Response: Yes.  Has appointment been made?   Yes      \"When you see the provider, I would recommend that you bring your discharge instructions with you.    Medications    \"How many new medications are you on since your hospitalization/ED visit?\"    2 or more - Epic MTM referral needed. Pt refused, 2 meds went over with triage.  \"How many of your current medicines changed (dose, timing, name, etc.) while you were in the hospital/ED visit?\"   2 or more - Epic MTM referral needed  \"Do you have questions about your medications?\"   No  \"Were you newly diagnosed with heart failure, COPD, diabetes or did you have a heart attack?\"   No  For patients on insulin: \"Did you start on insulin in the hospital or did you have your insulin dose changed?\"   No    Medication reconciliation completed? Yes    Was MTM referral placed (*Make sure to put transitions as reason for referral)?   No    Call Summary    \"Do you have any questions or concerns about your condition or care plan at the moment?\"    No  Triage nurse advice given: continue to monitor symptoms, follow up with surgeon specific questions to the surgeon, " "otherwise contact the clinic with any further questions or concerns. The patient indicates understanding of these issues and agrees with the plan.      Patient was in ER 0x in the past year (assess appropriateness of ER visits.)      \"If you have questions or things don't continue to improve, we encourage you contact us through the main clinic number,  [phone].  Even if the clinic is not open, triage nurses are available 24/7 to help you.     We would like you to know that our clinic has extended hours (provide information).  We also have urgent care (provide details on closest location and hours/contact info)\"      \"Thank you for your time and take care!\"      Eneida Villeda RN      "

## 2017-04-01 LAB — COPATH REPORT: NORMAL

## 2017-04-05 ENCOUNTER — OFFICE VISIT (OUTPATIENT)
Dept: PSYCHOLOGY | Facility: CLINIC | Age: 47
End: 2017-04-05
Payer: MEDICARE

## 2017-04-05 DIAGNOSIS — Z63.0 PARTNER RELATIONSHIP PROBLEM: ICD-10-CM

## 2017-04-05 DIAGNOSIS — Z63.5 MARITAL ESTRANGEMENT: ICD-10-CM

## 2017-04-05 DIAGNOSIS — F32.1 MAJOR DEPRESSIVE DISORDER, SINGLE EPISODE, MODERATE WITH MELANCHOLIC FEATURES (H): Primary | ICD-10-CM

## 2017-04-05 LAB — COPATH REPORT: NORMAL

## 2017-04-05 PROCEDURE — 90834 PSYTX W PT 45 MINUTES: CPT | Performed by: MARRIAGE & FAMILY THERAPIST

## 2017-04-05 SDOH — SOCIAL STABILITY - SOCIAL INSECURITY: DISRUPTION OF FAMILY BY SEPARATION AND DIVORCE: Z63.5

## 2017-04-05 SDOH — SOCIAL STABILITY - SOCIAL INSECURITY: PROBLEMS IN RELATIONSHIP WITH SPOUSE OR PARTNER: Z63.0

## 2017-04-05 NOTE — PROGRESS NOTES
Progress Note    Client Name: Joni Borja  Date: 3/15/2017         Service Type: Individual       Session Start Time: 1pm  Session End Time: 1:45pm      Session Length: 45 minutes     Session #: 5     Attendees: Client attended alone     Treatment Plan Last Reviewed: 02/28/2017  PHQ-9 / TONI-7 : 3/15/2017     DATA      Progress Since Last Session (Related to Symptoms / Goals / Homework):   Symptoms: Stable    Homework: Achieved / completed to satisfaction      Episode of Care Goals: Minimal progress - ACTION (Actively working towards change); Intervened by reinforcing change plan / affirming steps taken     Current / Ongoing Stressors and Concerns:   - Sx of depression as a result of years of marital distress that led to present separation, working towards divorce   - difficulties coping with medical problems which had led to limitation (driving) and work disability  Client reports his brain surgery was successful and he is recovering well. His minor seizures have been reduced from daily to 2 times per week. Client is now focusing back on the divorce process. He understands that he will need to legally divorce to fully uncouple from the problematic relationship. He will practice communication skills to ask for help from his supports and prioritize action steps.       Treatment Objective(s) Addressed in This Session:   Decrease frequency and intensity of feeling down, depressed, hopeless  Improve concentration, focus, and mindfulness in daily activities   learn & utilize at least 2 assertive communication skills weekly  Discuss understanding and impact of TBI, managing TBI to make healthy choices      Intervention:   CBT: operant conditioning, identifying triggers and patterns, identifying alternatives  Solution Focused: miracle question, healthy divorce/separation, coping with Dx  Structural: balance, roles, rules, past patterns vs desired patterns         ASSESSMENT:  Current Emotional / Mental Status (status of significant symptoms):   Risk status (Self / Other harm or suicidal ideation)   Client denies current fears or concerns for personal safety.   Client denies current or recent suicidal ideation or behaviors.   Client denies current or recent homicidal ideation or behaviors.   Client denies current or recent self injurious behavior or ideation.   Client denies other safety concerns.   A safety and risk management plan has not been developed at this time, however client was given the after-hours number / 911 should there be a change in any of these risk factors.     Appearance:   Appropriate    Eye Contact:   Good    Psychomotor Behavior: Normal    Attitude:   Cooperative    Orientation:   All   Speech    Rate / Production: Normal  Slow     Volume:  Normal    Mood:    Depressed    Affect:    Appropriate  Bright  Worrisome    Thought Content:  Clear  Rumination    Thought Form:  Coherent  Goal Directed  Logical  Circumstantial   Insight:    Fair      Medication Review:   No changes to current psychiatric medication(s)     Medication Compliance:   Yes     Changes in Health Issues:   None reported     Chemical Use Review:   Substance Use: Chemical use reviewed, no active concerns identified      Tobacco Use: No change in amount of tobacco use since last session.  Patient declined discussion at this time     Collateral Reports Completed:   Not Applicable    PLAN: (Client Tasks / Therapist Tasks / Other)  Client will  his truck and bobcat with his friend and brother's help, with the supervision of the . He will also hire an  to start the divorce process.   He will continue to follow doctors orders during his brain surgery recovery.       EMILY Donahue, LICSW                                                       ________________________________________________________________________    Treatment Plan    Client's Name: Joni Borja  Date Of  "Birth: 1970    Date: 2/20/17    DSM-V Diagnoses: 296.22 Major Depressive Disorder, Single Episode, Moderate With melancholic features   V61.10 (Z63.0) Relationship Distress With Spouse  V61.03 (Z63.5) Disruption of Family by Separation  Psychosocial & Contextual Factors: Client is experiencing depression from stressors related to marital distress which has led to his current separation, and from his history of medical problems.   WHODAS 2.0 (12 item) 16.67% on 2/14/2017    Referral / Collaboration:  Referral to another professional/service is not indicated at this time..    Anticipated number of session or this episode of care: 10      MeasurableTreatment Goal(s) related to diagnosis / functional impairment(s)  Goal 1: Client's depression will remit as evidenced by a decrease in PHQ9 score by at least 6 points or below a five, where symptoms occur fewer than half the days.   I will know I've met my goal when I \"clear my head and work on myself.      Objective #A (Client Action)   Client will decrease frequency and intensity of feeling down, depressed, hopeless  Client will increase self-awareness of symptom onset/escalation  Status: New - Date: 02/27/2017    Intervention(s)  Therapist will assign homework track symptoms, patterns and triggers  provide psycho-education on depression  Therapist will teach CBT strategies for attending to negative self-talk and cognitive distortions.  ACT: experiential exercises and mindfulness practices, how  to live with life barriers    Objective #B  Client will Increase interest, engagement, and pleasure in doing things  Identify negative self-talk and behaviors: challenge core beliefs, myths, and actions  Status:New - Date: 02/27/2017    Intervention(s)  Therapist will assign homework to practice using coping skills outside the therapy encounter, worksheets to challenge negative thoughts  teach distraction skills. explore and tailor enjoyable activities, increase social " "activities, strengthen social supports  ACT: life values and being mindful of values for goals and daily living    Objective #C  Improve concentration, focus, and mindfulness in daily activities   Status: New - Date: 02/27/2017    Intervention(s)  provide safe space to address hx of presenting problem and root cause  teach emotional regulation skills. mindfulness practices, grounding skills, affirmations, strengths based approach  Horacio: exercise to stage presenting problem, reflect, process and problem solve.      Goal 2: Client will improve his condition of interactions in his relationships (specifically with wife/) establishing healthy, balanced and appropriate boundaries to be kept 100% of the time for a minimum of 4 weeks.     I will know I've met my goal when I \"get my own place. Get the rest of my valuables from the house.      Objective #A (Client Action)      Client will compile a list of boundaries that they would like to set with others. Wife, adult step-children, family members  Learning to fight fair, learning to identify positive and negative communication patterns  Status: New - Date: 02/27/2017    Intervention(s)  Therapist will teach about healthy boundaries. structure, saying \"no\", advocating, identifying and establishing appropriate roles, rules, expectations.    Objective #B  Client will practice setting boundaries daily times in the next 12 weeks.                    Status: New - Date: 02/27/2017    Intervention(s)  Therapist will assign homework to track the use of healthy boundaries and outcome of establishing relational change  role-play effective communication skills and conflict management    Objective #C  Client will learn & utilize at least 3 assertive communication skills weekly.  Status: New - Date: 02/27/2017    Intervention(s)  teach assertiveness skills. aggressive/passive/assertive, impulsive control, reactive vs sensitive, exposure to uncomfortable " conversation.  Therapist will role-play assertiveness skills      Client has reviewed and agreed to the above plan.      EMILY Donahue, LincolnHealthSW  April 5, 2017

## 2017-04-05 NOTE — MR AVS SNAPSHOT
MRN:[id number]                      After Visit Summary   4/5/2017    Joni Borja    MRN: [id number]           Visit Information        Provider Department      4/5/2017 1:00 PM Flex Lomb[facility] Generic      Your next 10 appointments already scheduled     Apr 10, 2017  1:05 PM CDT   Return Visit with Carley Auguste PA-C   [facility] ([facility])    [address] [place] [id number]   [phone]            Apr 19, 2017  1:00 PM CDT   Return Visit with Flex Lombardo [facility]    [id number] [place] [id number]   [phone]            May 01, 2017  3:00 PM CDT   Return Visit with Stuart Oscar MD   [facility] ([facility])    [address] [place] [id number]   [phone]            May 03, 2017  1:00 PM CDT   Return Visit with Flex Lombardo [facility]    [address] [place] [id number]   [phone]            May 17, 2017  2:30 PM CDT   Return Visit with Silvia Brown MD   [facility] ([facility])    [address] [place] [address], [address] [id number] [phone]              MyChart Information     [email or web address] gives you secure access to your electronic health record. If you see a primary care provider, you can also send messages to your care team and make appointments. If you have questions, please call your primary care clinic.  If you do not have a primary care provider, please call [phone] and they will assist you.        Care EveryWhere ID     This is your Care EveryWhere ID. This could be used by other organizations to access your [facility] medical records  [id number]

## 2017-04-10 ENCOUNTER — OFFICE VISIT (OUTPATIENT)
Dept: NEUROSURGERY | Facility: CLINIC | Age: 47
End: 2017-04-10
Attending: NEUROLOGICAL SURGERY
Payer: MEDICARE

## 2017-04-10 VITALS
BODY MASS INDEX: 26.73 KG/M2 | HEART RATE: 88 BPM | SYSTOLIC BLOOD PRESSURE: 112 MMHG | DIASTOLIC BLOOD PRESSURE: 69 MMHG | WEIGHT: 181 LBS | TEMPERATURE: 98.7 F | OXYGEN SATURATION: 98 %

## 2017-04-10 DIAGNOSIS — Z98.890 STATUS POST CRANIOTOMY: Primary | ICD-10-CM

## 2017-04-10 PROCEDURE — 99024 POSTOP FOLLOW-UP VISIT: CPT | Performed by: PHYSICIAN ASSISTANT

## 2017-04-10 PROCEDURE — 40000269 ZZH STATISTIC NO CHARGE FACILITY FEE: Performed by: PHYSICIAN ASSISTANT

## 2017-04-10 NOTE — NURSING NOTE
"Joni Borja is a 46 year old male who presents for:  Chief Complaint   Patient presents with     Neurologic Problem     s/p craniotomy 3-24-17 staple removal        Initial Vitals:  There were no vitals taken for this visit. Estimated body mass index is 26.08 kg/(m^2) as calculated from the following:    Height as of 3/24/17: 5' 9\" (1.753 m).    Weight as of 3/25/17: 176 lb 9.4 oz (80.1 kg).. There is no height or weight on file to calculate BSA. BP completed using cuff size: regular  Data Unavailable    Do you feel safe in your environment?  Yes  Do you need any refills today? No    Nursing Comments: s/p craniotomy 3-24-17 staple removal.  Patient rates his pain today as 2 only where staples are      5 min. nursing intake time  Kristina Carson CMA, AAS      Discharge plan:   See providers dictation   2 min. nursing discharge time  Kristina Carson CMA, AAS       "

## 2017-04-10 NOTE — MR AVS SNAPSHOT
After Visit Summary   4/10/2017    Joni Borja    MRN: [id number]           Patient Information     Date Of Birth          1970        Visit Information        Provider Department      4/10/2017 1:05 PM Carley Auguste PA-C [facility]        Today's Diagnoses     Status post craniotomy    -  1       Follow-ups after your visit        Your next 10 appointments already scheduled     Apr 19, 2017  1:00 PM CDT   Return Visit with EMILY Donahue   [facility])    [address] [address]   [phone]            May 01, 2017  3:00 PM CDT   Return Visit with Stuart Oscar MD   [facility] ([facility])    [address] [place] [address] [phone]            May 03, 2017  1:00 PM CDT   Return Visit with EMILY Donahue   [facility] ([facility])    [address] [address]   [phone]            May 17, 2017  2:30 PM CDT   Return Visit with Silvia Brown MD   [facility] ([facility])    [address] [place] [address], [address] [address] [phone]              Who to contact     If you have questions or need follow up information about today's clinic visit or your schedule please contact [facility] NEUROSURGERY [facility] directly at [phone].  Normal or non-critical lab and imaging results will be communicated to you by MyChart, letter or phone within 4 business days after the clinic has received the results. If you do not hear from us within 7 days, please contact the clinic through MyChart or phone. If you have a critical or abnormal lab result, we will notify you by phone as soon as possible.  Submit refill requests through [employer] or call your pharmacy and they will forward the refill request to us. Please allow 3 business days  for your refill to be completed.          Additional Information About Your Visit        MyChart Information     [employer] gives you secure access to your electronic health record. If you see a primary care provider, you can also send messages to your care team and make appointments. If you have questions, please call your primary care clinic.  If you do not have a primary care provider, please call [phone] and they will assist you.        Care EveryWhere ID     This is your Care EveryWhere ID. This could be used by other organizations to access your [place] medical records  [id number]        Your Vitals Were     Pulse Temperature Pulse Oximetry BMI (Body Mass Index)          88 98.7  F (37.1  C) (Oral) 98% 26.73 kg/m2         Blood Pressure from Last 3 Encounters:   04/10/17 112/69   03/27/17 124/79   03/10/17 90/60    Weight from Last 3 Encounters:   04/10/17 181 lb (82.1 kg)   03/25/17 176 lb 9.4 oz (80.1 kg)   03/10/17 180 lb (81.6 kg)              Today, you had the following     No orders found for display       Primary Care Provider Office Phone # Fax #    Brian Coon [phone]       [address] [address]        Thank you!     Thank you for choosing [facility]  for your care. Our goal is always to provide you with excellent care. Hearing back from our patients is one way we can continue to improve our services. Please take a few minutes to complete the written survey that you may receive in the mail after your visit with us. Thank you!             Your Updated Medication List - Protect others around you: Learn how to safely use, store and throw away your medicines at www.disposemymeds.org.          This list is accurate as of: 4/10/17  1:51 PM.  Always use your most recent med list.                   Brand Name Dispense Instructions for use    BENADRYL PO      Take 50 mg by mouth daily as needed for  allergies (misquitos,)       CARBATROL PO      Take 300 mg by mouth 2 times daily (at 10:00 & 22:00)       dexamethasone 4 MG tablet    DECADRON    60 tablet    Take 1 tablet (4 mg) by mouth every 6 hours Starting 3/29/17 take one tablet (4mg) twice a day for 3 days, then decrease to 1/2 tablet (2mg) two times a day until follow up with appointment       diazepam 5 MG tablet    VALIUM    30 tablet    Take 5 mg by mouth 2 times daily as needed for other (seizures.) As instructed for seizures. Please take 5 mg for more than 3 seizures in 8 hour period, may repeat 5 mg after 30 min if seizures continue.       * FELBATOL PO      Take 600 mg by mouth every morning       * FELBATOL PO      Take 900 mg by mouth every 24 hours At 1500 (1.5 x 600 mg tablet = 900 mg dose)       * KLONOPIN PO      Take 0.5 mg by mouth every morning       * KLONOPIN PO      Take 1 mg by mouth At Bedtime (2 x 0.5 mg tablet = 1 mg dose)       LEXAPRO PO      Take 20 mg by mouth At Bedtime       oxyCODONE 5 MG IR tablet    ROXICODONE    45 tablet    Take 1-2 tablets (5-10 mg) by mouth every 4 hours as needed for moderate to severe pain       PROTONIX PO      Take 40 mg by mouth every morning       * TOPAMAX PO      Take 200 mg by mouth every morning (2 x 100 mg = 200 mg dose)       * TOPAMAX PO      Take 300 mg by mouth At Bedtime (3 x 100 mg = 300 mg dose)       VITAMIN B 12 PO      Take 1 tablet by mouth daily (with dinner)       ZYRTEC ALLERGY PO      Take 10 mg by mouth daily as needed (for misquitos.)       * Notice:  This list has 6 medication(s) that are the same as other medications prescribed for you. Read the directions carefully, and ask your doctor or other care provider to review them with you.

## 2017-04-10 NOTE — PROGRESS NOTES
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 Oscar. He states he is feeling well. He is here today for staple removal.     Exam:  Constitutional:  Alert, well nourished, NAD.  HEENT: Normocephalic, atraumatic.   Pulm:  Without shortness of breath or audible adventitious respiratory sounds.  CV:  No pitting edema of BLE.  Brisk capillary refill, CMS intact.    Neurological:  Awake  Alert  Oriented x 3  Speech clear  Cranial nerves II - XII intact  PERRL  EOMI  Face symmetric  Tongue midline  Motor exam: 5/5 strength in all four extremities.   Sensation: intact  Finger to Nose: smooth bilaterally  Pronator drift: negative  Gait: Able to stand from a seated position. Normal non-antalgic, non-myelopathic gait.  Incisions: Healing nicely. Staples removed today without incident.  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 Oscar. He is doing well. He met with Dr. Oscar to review post-op MRI and pathology results. He will follow up in 2 weeks for routine post-op check.   - Call the clinic at [phone] for increasing redness, swelling or pus draining from the incision, increased pain or any other questions and concerns.  - Go to ER with any seizure activity, mental status change (increasing confusion), difficulty with speech or increasing or acute weakness     I saw and assessed the patient with Dr. Oscar who is in agreement with the above assessment and plan.    Carley Auguste PA-C  [facility]  [address]  [address] [place] [address]    Tel [phone]  Pager [phone]

## 2017-04-19 ENCOUNTER — OFFICE VISIT (OUTPATIENT)
Dept: PSYCHOLOGY | Facility: CLINIC | Age: 47
End: 2017-04-19
Payer: MEDICARE

## 2017-04-19 DIAGNOSIS — Z63.5 MARITAL ESTRANGEMENT: ICD-10-CM

## 2017-04-19 DIAGNOSIS — F32.1 MAJOR DEPRESSIVE DISORDER, SINGLE EPISODE, MODERATE WITH MELANCHOLIC FEATURES (H): Primary | ICD-10-CM

## 2017-04-19 PROCEDURE — 90834 PSYTX W PT 45 MINUTES: CPT | Performed by: MARRIAGE & FAMILY THERAPIST

## 2017-04-19 SDOH — SOCIAL STABILITY - SOCIAL INSECURITY: DISRUPTION OF FAMILY BY SEPARATION AND DIVORCE: Z63.5

## 2017-04-19 ASSESSMENT — ANXIETY QUESTIONNAIRES
2. NOT BEING ABLE TO STOP OR CONTROL WORRYING: NOT AT ALL
1. FEELING NERVOUS, ANXIOUS, OR ON EDGE: MORE THAN HALF THE DAYS
6. BECOMING EASILY ANNOYED OR IRRITABLE: NOT AT ALL
IF YOU CHECKED OFF ANY PROBLEMS ON THIS QUESTIONNAIRE, HOW DIFFICULT HAVE THESE PROBLEMS MADE IT FOR YOU TO DO YOUR WORK, TAKE CARE OF THINGS AT HOME, OR GET ALONG WITH OTHER PEOPLE: NOT DIFFICULT AT ALL
GAD7 TOTAL SCORE: 3
3. WORRYING TOO MUCH ABOUT DIFFERENT THINGS: SEVERAL DAYS
7. FEELING AFRAID AS IF SOMETHING AWFUL MIGHT HAPPEN: NOT AT ALL
5. BEING SO RESTLESS THAT IT IS HARD TO SIT STILL: NOT AT ALL

## 2017-04-19 ASSESSMENT — PATIENT HEALTH QUESTIONNAIRE - PHQ9: 5. POOR APPETITE OR OVEREATING: NOT AT ALL

## 2017-04-19 NOTE — MR AVS SNAPSHOT
MRN:[id number]                      After Visit Summary   4/19/2017    Joni Borja    MRN: [id number]           Visit Information        Provider Department      4/19/2017 1:00 PM Flex Lombardo TC [facility]      Your next 10 appointments already scheduled     May 01, 2017  2:50 PM CDT   (Arrive by 2:40 PM)   Return Visit with Carley Auguste PA-C   [facility] ([facility])    [address]  [facility] [address]   [phone]            May 03, 2017  1:00 PM CDT   Return Visit with Flex Lombardo TC   [facility] ([facility])    [address] [facility] [place] [address] [phone]            May 09, 2017  3:00 PM CDT   Return Visit with Berkley Daniels MD   [facility] ([facility])    [facility]  [address]  [facility] [address]   [phone]            May 17, 2017  2:30 PM CDT   Return Visit with Silvia Brown MD   [facility] ([facility])    [address]  [facility] [address] [phone]            May 18, 2017  2:00 PM CDT   Return Visit with EMILY Donahue   [facility] ([facility])    [address] [place] [address] [phone]              MyChart Information     [employer] gives you secure access to your electronic health record. If you see a primary care provider, you can also send messages to your care team and make appointments. If you have questions, please call your primary care clinic.  If you do not have a primary care provider, please call [phone] and they will assist you.        Care EveryWhere ID     This is your Care EveryWhere ID. This could be used by other organizations to access your [place] medical records  [id number]

## 2017-04-20 ASSESSMENT — ANXIETY QUESTIONNAIRES: GAD7 TOTAL SCORE: 3

## 2017-04-20 ASSESSMENT — PATIENT HEALTH QUESTIONNAIRE - PHQ9: SUM OF ALL RESPONSES TO PHQ QUESTIONS 1-9: 0

## 2017-04-28 NOTE — TELEPHONE ENCOUNTER
Dental recommendation paperwork completed, signed by MD and faxed to number provided on form on 4/28/17.    Alissa Schulz CMA

## 2017-05-01 ENCOUNTER — OFFICE VISIT (OUTPATIENT)
Dept: NEUROSURGERY | Facility: CLINIC | Age: 47
End: 2017-05-01
Attending: PHYSICIAN ASSISTANT
Payer: MEDICARE

## 2017-05-01 VITALS
BODY MASS INDEX: 26.73 KG/M2 | OXYGEN SATURATION: 98 % | TEMPERATURE: 98.8 F | WEIGHT: 181 LBS | HEART RATE: 81 BPM | DIASTOLIC BLOOD PRESSURE: 73 MMHG | SYSTOLIC BLOOD PRESSURE: 107 MMHG

## 2017-05-01 DIAGNOSIS — Z98.890 STATUS POST CRANIOTOMY: Primary | ICD-10-CM

## 2017-05-01 PROCEDURE — 40000269 ZZH STATISTIC NO CHARGE FACILITY FEE: Performed by: PHYSICIAN ASSISTANT

## 2017-05-01 PROCEDURE — 99024 POSTOP FOLLOW-UP VISIT: CPT | Performed by: PHYSICIAN ASSISTANT

## 2017-05-01 NOTE — NURSING NOTE
"Joni Borja is a 46 year old male who presents for:  Chief Complaint   Patient presents with     Neurologic Problem     s/p crainotomy 3-24-17        Initial Vitals:  There were no vitals taken for this visit. Estimated body mass index is 26.73 kg/(m^2) as calculated from the following:    Height as of 3/24/17: 5' 9\" (1.753 m).    Weight as of 4/10/17: 181 lb (82.1 kg).. There is no height or weight on file to calculate BSA. BP completed using cuff size: regular  Data Unavailable    Do you feel safe in your environment?  Yes  Do you need any refills today? No    Nursing Comments: s/p crainotomy 3-24-17.  Patient rates his pain today as 0      5 min. nursing intake time  Kristina Carson CMA, AAS      Discharge plan:   See providers dictation   2 min. nursing discharge time  Kristina Carson CMA, AAS       "

## 2017-05-01 NOTE — PROGRESS NOTES
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 Oscar. Patient doing well and only complains of mild intermittent headaches. He is happy with how he is progressing.    Exam:  Constitutional:  Alert, well nourished, NAD.  HEENT: Normocephalic, atraumatic.   Pulm:  Without shortness of breath or audible adventitious respiratory sounds.  CV:  No pitting edema of BLE.  Brisk capillary refill, CMS intact.    Neurological:  Awake  Alert  Oriented x 3  Speech clear  Cranial nerves II - XII intact  PERRL  EOMI  Face symmetric  Tongue midline  Motor exam: 5/5 strength in all four extremities.   Sensation: intact  Finger to Nose: negative  Pronator drift: negative  Gait: Able to stand from a seated position. Normal non-antalgic, non-myelopathic gait.  Able to heel/toe walk without loss of balance  Incisions: Healing nicely.  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 Oscar. Patient doing well. He is currently still taking decadron, so we will taper off over the next 5 days. Antiseizure medications to be managed by prescribing physician. Advised to continue followup with Berkley Daniels MD.      - Call the clinic at [phone] for increasing redness, swelling or pus draining from the incision, increased pain or any other questions and concerns.  - Go to ER with any seizure activity, mental status change (increasing confusion), difficulty with speech or increasing or acute weakness     I saw and assessed the patient with Dr. Oscar who is in agreement with the above assessment and plan.    Carley Auguste PA-C  [facility]  [address]  [address] [address]    Tel [phone]  Pager [phone]

## 2017-05-01 NOTE — MR AVS SNAPSHOT
After Visit Summary   5/1/2017    Joni Borja    MRN: [id number]           Patient Information     Date Of Birth          1970        Visit Information        Provider Department      5/1/2017 2:50 PM Carley Auguste PA-C [facility]        Today's Diagnoses     Status post craniotomy    -  1       Follow-ups after your visit        Your next 10 appointments already scheduled     May 03, 2017  1:00 PM CDT   [facility]    [address] [facility] [address] [phone]            May 09, 2017  3:00 PM CDT   Return Visit with Berkley Daniels MD   [facility] ([facility])    [facility]  [address]  [facility] [address] [phone]            May 17, 2017  2:30 PM CDT   Return Visit with Silvia Brown MD   [facility] ([facility])    [address]  [facility] [address] [phone]            May 18, 2017  2:00 PM CDT   [facility] Flex [facility]    [address] [facility] [address] [phone]              Who to contact     If you have questions or need follow up information about today's clinic visit or your schedule please contact [facility] directly at [phone].  Normal or non-critical lab and imaging results will be communicated to you by MyChart, letter or phone within 4 business days after the clinic has received the results. If you do not hear from us within 7 days, please contact the clinic through MyChart or phone. If you have a critical or abnormal lab result, we will notify you by phone as soon as possible.  Submit refill requests through [employer] or call your pharmacy and they will forward the refill request to us. Please allow 3  business days for your refill to be completed.          Additional Information About Your Visit        MyChart Information     [employer] gives you secure access to your electronic health record. If you see a primary care provider, you can also send messages to your care team and make appointments. If you have questions, please call your primary care clinic.  If you do not have a primary care provider, please call [phone] and they will assist you.        Care EveryWhere ID     This is your Care EveryWhere ID. This could be used by other organizations to access your [place] medical records  [id number]        Your Vitals Were     Pulse Temperature Pulse Oximetry BMI (Body Mass Index)          81 98.8  F (37.1  C) (Oral) 98% 26.73 kg/m2         Blood Pressure from Last 3 Encounters:   05/01/17 107/73   04/10/17 112/69   03/27/17 124/79    Weight from Last 3 Encounters:   05/01/17 181 lb (82.1 kg)   04/10/17 181 lb (82.1 kg)   03/25/17 176 lb 9.4 oz (80.1 kg)              Today, you had the following     No orders found for display       Primary Care Provider Office Phone # Fax #    Brian Coon [phone]       [address] [address]        Thank you!     Thank you for choosing [facility]IC  for your care. Our goal is always to provide you with excellent care. Hearing back from our patients is one way we can continue to improve our services. Please take a few minutes to complete the written survey that you may receive in the mail after your visit with us. Thank you!             Your Updated Medication List - Protect others around you: Learn how to safely use, store and throw away your medicines at www.disposemymeds.org.          This list is accurate as of: 5/1/17  3:07 PM.  Always use your most recent med list.                   Brand Name Dispense Instructions for use    acetaminophen-codeine 300-30 MG per tablet     TYLENOL #3     Take 1-2 tablets by mouth every 4 hours as needed for moderate pain As needed for headaches       BENADRYL PO      Take 50 mg by mouth daily as needed for allergies (misquitos,)       CARBATROL PO      Take 300 mg by mouth 2 times daily (at 10:00 & 22:00)       dexamethasone 4 MG tablet    DECADRON    60 tablet    Take 1 tablet (4 mg) by mouth every 6 hours Starting 3/29/17 take one tablet (4mg) twice a day for 3 days, then decrease to 1/2 tablet (2mg) two times a day until follow up with appointment       diazepam 5 MG tablet    VALIUM    30 tablet    Take 5 mg by mouth 2 times daily as needed for other (seizures.) As instructed for seizures. Please take 5 mg for more than 3 seizures in 8 hour period, may repeat 5 mg after 30 min if seizures continue.       * FELBATOL PO      Take 600 mg by mouth every morning       * FELBATOL PO      Take 900 mg by mouth every 24 hours At 1500 (1.5 x 600 mg tablet = 900 mg dose)       * KLONOPIN PO      Take 0.5 mg by mouth every morning       * KLONOPIN PO      Take 1 mg by mouth At Bedtime (2 x 0.5 mg tablet = 1 mg dose)       LEXAPRO PO      Take 20 mg by mouth At Bedtime       oxyCODONE 5 MG IR tablet    ROXICODONE    45 tablet    Take 1-2 tablets (5-10 mg) by mouth every 4 hours as needed for moderate to severe pain       PROTONIX PO      Take 40 mg by mouth every morning       * TOPAMAX PO      Take 200 mg by mouth every morning (2 x 100 mg = 200 mg dose)       * TOPAMAX PO      Take 300 mg by mouth At Bedtime (3 x 100 mg = 300 mg dose)       VITAMIN B 12 PO      Take 1 tablet by mouth daily (with dinner)       ZYRTEC ALLERGY PO      Take 10 mg by mouth daily as needed (for misquitos.)       * Notice:  This list has 6 medication(s) that are the same as other medications prescribed for you. Read the directions carefully, and ask your doctor or other care provider to review them with you.

## 2017-05-03 ENCOUNTER — OFFICE VISIT (OUTPATIENT)
Dept: PSYCHOLOGY | Facility: CLINIC | Age: 47
End: 2017-05-03
Payer: MEDICARE

## 2017-05-03 DIAGNOSIS — Z63.0 PARTNER RELATIONSHIP PROBLEM: ICD-10-CM

## 2017-05-03 DIAGNOSIS — F32.1 MAJOR DEPRESSIVE DISORDER, SINGLE EPISODE, MODERATE WITH MELANCHOLIC FEATURES (H): Primary | ICD-10-CM

## 2017-05-03 DIAGNOSIS — Z63.5 MARITAL ESTRANGEMENT: ICD-10-CM

## 2017-05-03 PROCEDURE — 90834 PSYTX W PT 45 MINUTES: CPT | Performed by: MARRIAGE & FAMILY THERAPIST

## 2017-05-03 SDOH — SOCIAL STABILITY - SOCIAL INSECURITY: DISRUPTION OF FAMILY BY SEPARATION AND DIVORCE: Z63.5

## 2017-05-03 SDOH — SOCIAL STABILITY - SOCIAL INSECURITY: PROBLEMS IN RELATIONSHIP WITH SPOUSE OR PARTNER: Z63.0

## 2017-05-03 NOTE — PROGRESS NOTES
Progress Note    Client Name: Joni Borja  Date: 5/3/2017         Service Type: Individual       Session Start Time: 1pm  Session End Time: 1:45pm      Session Length: 45 minutes     Session #: 7     Attendees: Client attended alone     Treatment Plan Last Reviewed: 02/28/2017  PHQ-9 / TONI-7 : 4/19/2017     DATA      Progress Since Last Session (Related to Symptoms / Goals / Homework):   Symptoms: Stable     Homework: Achieved / completed to satisfaction      Episode of Care Goals: Satisfactory progress - ACTION (Actively working towards change); Intervened by reinforcing change plan / affirming steps taken     Current / Ongoing Stressors and Concerns:   - Sx of depression as a result of years of marital distress that led to present separation, working towards divorce   - difficulties coping with medical problems which had led to limitation (driving) and work disability  Client brought in an order for protection he was served from his wife over the weekend. He can no longer contact her or return within 500 feet of their home. Client is certain this was provoked by the fact that he took his truck from the home, and planned to return for his other high priced items. Today client discussed the positive effect of recent court involvement which is that she also can no longer contact him. He is now more determined to complete the divorce. In the meantime the client has been meeting women online and talked about the risks of being conned online as well as acknowledging the difficulties of the TBI, and make well thought out decisions.      Treatment Objective(s) Addressed in This Session:   Decrease frequency and intensity of feeling down, depressed, hopeless  Improve concentration, focus, and mindfulness in daily activities   learn & utilize at least 2 assertive communication skills weekly      Intervention:   CBT: operant conditioning, identifying triggers and patterns,  identifying alternatives  Solution Focused: miracle question, healthy divorce/separation, coping with Dx  Structural: balance, roles, rules, past patterns vs desired patterns   Psycho-education: effects of TBI on decision making      ASSESSMENT: Current Emotional / Mental Status (status of significant symptoms):   Risk status (Self / Other harm or suicidal ideation)   Client denies current fears or concerns for personal safety.   Client denies current or recent suicidal ideation or behaviors.   Client denies current or recent homicidal ideation or behaviors.   Client denies current or recent self injurious behavior or ideation.   Client denies other safety concerns.   A safety and risk management plan has not been developed at this time, however client was given the after-hours number / 911 should there be a change in any of these risk factors.     Appearance:   Appropriate    Eye Contact:   Good    Psychomotor Behavior: Normal    Attitude:   Cooperative    Orientation:   All   Speech    Rate / Production: Normal  Slow     Volume:  Normal    Mood:    Depressed    Affect:    Appropriate    Thought Content:  Clear  Rumination    Thought Form:  Coherent  Goal Directed  Circumstantial   Insight:    Fair      Medication Review:   No changes to current psychiatric medication(s)     Medication Compliance:   Yes     Changes in Health Issues:   None reported     Chemical Use Review:   Substance Use: Chemical use reviewed, no active concerns identified      Tobacco Use: No change in amount of tobacco use since last session.  Patient declined discussion at this time     Collateral Reports Completed:   Not Applicable    PLAN: (Client Tasks / Therapist Tasks / Other)  Client will complete the process to take money out of an account so he can retain his  and begin his divorce. Client will take more time to consider and assess his plans, taking into account his impulsivity.   Next session will invite parents into session  "to discuss client's desires for his future.        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   WHODAS 2.0 (12 item) 16.67% on 2/14/2017    Referral / Collaboration:  Referral to another professional/service is not indicated at this time..    Anticipated number of session or this episode of care: 10      MeasurableTreatment Goal(s) related to diagnosis / functional impairment(s)  Goal 1: Client's depression will remit as evidenced by a decrease in PHQ9 score by at least 6 points or below a five, where symptoms occur fewer than half the days.   I will know I've met my goal when I \"clear my head and work on myself.      Objective #A (Client Action)   Client will decrease frequency and intensity of feeling down, depressed, hopeless  Client will increase self-awareness of symptom onset/escalation  Status: New - Date: 02/27/2017    Intervention(s)  Therapist will assign homework track symptoms, patterns and triggers  provide psycho-education on depression  Therapist will teach CBT strategies for attending to negative self-talk and cognitive distortions.  ACT: experiential exercises and mindfulness practices, how  to live with life barriers    Objective #B  Client will Increase interest, engagement, and pleasure in doing things  Identify negative self-talk and behaviors: challenge core beliefs, myths, and actions  Status:New - " "Date: 02/27/2017    Intervention(s)  Therapist will assign homework to practice using coping skills outside the therapy encounter, worksheets to challenge negative thoughts  teach distraction skills. explore and tailor enjoyable activities, increase social activities, strengthen social supports  ACT: life values and being mindful of values for goals and daily living    Objective #C  Improve concentration, focus, and mindfulness in daily activities   Status: New - Date: 02/27/2017    Intervention(s)  provide safe space to address hx of presenting problem and root cause  teach emotional regulation skills. mindfulness practices, grounding skills, affirmations, strengths based approach  [place]tray: exercise to stage presenting problem, reflect, process and problem solve.      Goal 2: Client will improve his condition of interactions in his relationships (specifically with wife/) establishing healthy, balanced and appropriate boundaries to be kept 100% of the time for a minimum of 4 weeks.     I will know I've met my goal when I \"get my own place. Get the rest of my valuables from the house.      Objective #A (Client Action)      Client will compile a list of boundaries that they would like to set with others. Wife, adult step-children, family members  Learning to fight fair, learning to identify positive and negative communication patterns  Status: New - Date: 02/27/2017    Intervention(s)  Therapist will teach about healthy boundaries. structure, saying \"no\", advocating, identifying and establishing appropriate roles, rules, expectations.    Objective #B  Client will practice setting boundaries daily times in the next 12 weeks.                    Status: New - Date: 02/27/2017    Intervention(s)  Therapist will assign homework to track the use of healthy boundaries and outcome of establishing relational change  role-play effective communication skills and conflict management    Objective #C  Client will learn & " utilize at least 3 assertive communication skills weekly.  Status: New - Date: 02/27/2017    Intervention(s)  teach assertiveness skills. aggressive/passive/assertive, impulsive control, reactive vs sensitive, exposure to uncomfortable conversation.  Therapist will role-play assertiveness skills      Client has reviewed and agreed to the above plan.      EMILY Donahue, LICSW  May 3, 2017

## 2017-05-08 LAB — COPATH REPORT: NORMAL

## 2017-05-09 ENCOUNTER — TRANSFERRED RECORDS (OUTPATIENT)
Dept: HEALTH INFORMATION MANAGEMENT | Facility: CLINIC | Age: 47
End: 2017-05-09

## 2017-05-09 ENCOUNTER — TELEPHONE (OUTPATIENT)
Dept: PHARMACY | Facility: CLINIC | Age: 47
End: 2017-05-09

## 2017-05-09 ENCOUNTER — HOSPITAL ENCOUNTER (OUTPATIENT)
Facility: CLINIC | Age: 47
Setting detail: SPECIMEN
Discharge: HOME | End: 2017-05-09
Attending: PSYCHIATRY & NEUROLOGY | Admitting: PSYCHIATRY & NEUROLOGY
Payer: MEDICARE

## 2017-05-09 ENCOUNTER — ONCOLOGY VISIT (OUTPATIENT)
Dept: ONCOLOGY | Facility: CLINIC | Age: 47
End: 2017-05-09
Attending: NEUROLOGICAL SURGERY
Payer: MEDICARE

## 2017-05-09 VITALS
BODY MASS INDEX: 26.58 KG/M2 | OXYGEN SATURATION: 98 % | HEART RATE: 88 BPM | WEIGHT: 180 LBS | SYSTOLIC BLOOD PRESSURE: 132 MMHG | TEMPERATURE: 98.6 F | DIASTOLIC BLOOD PRESSURE: 80 MMHG

## 2017-05-09 DIAGNOSIS — D70.1 CHEMOTHERAPY INDUCED NEUTROPENIA (H): ICD-10-CM

## 2017-05-09 DIAGNOSIS — Z91.89 HIGH RISK FOR CHEMOTHERAPY-INDUCED INFECTIOUS COMPLICATION: ICD-10-CM

## 2017-05-09 DIAGNOSIS — T45.1X5A CHEMOTHERAPY INDUCED NEUTROPENIA (H): ICD-10-CM

## 2017-05-09 DIAGNOSIS — C71.9 OLIGODENDROGLIOMA, ANAPLASTIC (H): Primary | ICD-10-CM

## 2017-05-09 DIAGNOSIS — Z72.0 TOBACCO ABUSE: ICD-10-CM

## 2017-05-09 DIAGNOSIS — Z96.89 STATUS POST VNS (VAGUS NERVE STIMULATOR) PLACEMENT: ICD-10-CM

## 2017-05-09 DIAGNOSIS — T45.1X5A CHEMOTHERAPY-INDUCED NAUSEA AND VOMITING: ICD-10-CM

## 2017-05-09 DIAGNOSIS — Z51.11 CHEMOTHERAPY MANAGEMENT, ENCOUNTER FOR: ICD-10-CM

## 2017-05-09 DIAGNOSIS — G40.209 PARTIAL EPILEPSY WITH IMPAIRMENT OF CONSCIOUSNESS (H): ICD-10-CM

## 2017-05-09 DIAGNOSIS — R11.2 CHEMOTHERAPY-INDUCED NAUSEA AND VOMITING: ICD-10-CM

## 2017-05-09 LAB
ALBUMIN SERPL-MCNC: 3.2 G/DL (ref 3.4–5)
ALP SERPL-CCNC: 117 U/L (ref 40–150)
ALT SERPL W P-5'-P-CCNC: 20 U/L (ref 0–70)
ANION GAP SERPL CALCULATED.3IONS-SCNC: 9 MMOL/L (ref 3–14)
AST SERPL W P-5'-P-CCNC: 18 U/L (ref 0–45)
BASOPHILS # BLD AUTO: 0 10E9/L (ref 0–0.2)
BASOPHILS NFR BLD AUTO: 0.5 %
BILIRUB SERPL-MCNC: 0.1 MG/DL (ref 0.2–1.3)
BUN SERPL-MCNC: 17 MG/DL (ref 7–30)
CALCIUM SERPL-MCNC: 8.1 MG/DL (ref 8.5–10.1)
CHLORIDE SERPL-SCNC: 112 MMOL/L (ref 94–109)
CO2 SERPL-SCNC: 24 MMOL/L (ref 20–32)
CREAT SERPL-MCNC: 1.37 MG/DL (ref 0.66–1.25)
DIFFERENTIAL METHOD BLD: ABNORMAL
EOSINOPHIL # BLD AUTO: 0.1 10E9/L (ref 0–0.7)
EOSINOPHIL NFR BLD AUTO: 1.1 %
ERYTHROCYTE [DISTWIDTH] IN BLOOD BY AUTOMATED COUNT: 13.6 % (ref 10–15)
GFR SERPL CREATININE-BSD FRML MDRD: 56 ML/MIN/1.7M2
GLUCOSE SERPL-MCNC: 118 MG/DL (ref 70–99)
HCT VFR BLD AUTO: 37.9 % (ref 40–53)
HGB BLD-MCNC: 12.5 G/DL (ref 13.3–17.7)
IMM GRANULOCYTES # BLD: 0 10E9/L (ref 0–0.4)
IMM GRANULOCYTES NFR BLD: 0.3 %
LYMPHOCYTES # BLD AUTO: 1.7 10E9/L (ref 0.8–5.3)
LYMPHOCYTES NFR BLD AUTO: 23.3 %
MCH RBC QN AUTO: 31.5 PG (ref 26.5–33)
MCHC RBC AUTO-ENTMCNC: 33 G/DL (ref 31.5–36.5)
MCV RBC AUTO: 96 FL (ref 78–100)
MONOCYTES # BLD AUTO: 0.7 10E9/L (ref 0–1.3)
MONOCYTES NFR BLD AUTO: 9.1 %
NEUTROPHILS # BLD AUTO: 4.8 10E9/L (ref 1.6–8.3)
NEUTROPHILS NFR BLD AUTO: 65.7 %
NRBC # BLD AUTO: 0 10*3/UL
NRBC BLD AUTO-RTO: 0 /100
PLATELET # BLD AUTO: 279 10E9/L (ref 150–450)
POTASSIUM SERPL-SCNC: 3.9 MMOL/L (ref 3.4–5.3)
PROT SERPL-MCNC: 6.9 G/DL (ref 6.8–8.8)
RBC # BLD AUTO: 3.97 10E12/L (ref 4.4–5.9)
SODIUM SERPL-SCNC: 145 MMOL/L (ref 133–144)
WBC # BLD AUTO: 7.3 10E9/L (ref 4–11)

## 2017-05-09 PROCEDURE — 99211 OFF/OP EST MAY X REQ PHY/QHP: CPT

## 2017-05-09 PROCEDURE — 87340 HEPATITIS B SURFACE AG IA: CPT | Performed by: PSYCHIATRY & NEUROLOGY

## 2017-05-09 PROCEDURE — 99215 OFFICE O/P EST HI 40 MIN: CPT | Performed by: PSYCHIATRY & NEUROLOGY

## 2017-05-09 PROCEDURE — 80053 COMPREHEN METABOLIC PANEL: CPT | Performed by: PSYCHIATRY & NEUROLOGY

## 2017-05-09 PROCEDURE — 85025 COMPLETE CBC W/AUTO DIFF WBC: CPT | Performed by: PSYCHIATRY & NEUROLOGY

## 2017-05-09 PROCEDURE — 86704 HEP B CORE ANTIBODY TOTAL: CPT | Performed by: PSYCHIATRY & NEUROLOGY

## 2017-05-09 RX ORDER — TEMOZOLOMIDE 100 MG/1
150 CAPSULE ORAL DAILY
Qty: 15 CAPSULE | Refills: 0 | Status: SHIPPED | OUTPATIENT
Start: 2017-05-09 | End: 2017-05-14

## 2017-05-09 RX ORDER — ONDANSETRON 4 MG/1
4 TABLET, FILM COATED ORAL AT BEDTIME
Qty: 30 TABLET | Refills: 3 | Status: SHIPPED | OUTPATIENT
Start: 2017-05-09 | End: 2017-08-29

## 2017-05-09 ASSESSMENT — PAIN SCALES - GENERAL: PAINLEVEL: NO PAIN (0)

## 2017-05-09 NOTE — PROGRESS NOTES
"Oncology Rooming Note    May 9, 2017 3:00 PM   Joni Borja is a 46 year old male who presents for:    Chief Complaint   Patient presents with     Oncology Clinic Visit     Initial Vitals: /80 (BP Location: Right arm, Patient Position: Chair, Cuff Size: Adult Regular)  Pulse 88  Temp 98.6  F (37  C) (Oral)  Wt 81.6 kg (180 lb)  SpO2 98%  BMI 26.58 kg/m2 Estimated body mass index is 26.58 kg/(m^2) as calculated from the following:    Height as of 3/24/17: 1.753 m (5' 9\").    Weight as of this encounter: 81.6 kg (180 lb). Body surface area is 1.99 meters squared.  No Pain (0) Comment: Data Unavailable   No LMP for male patient.  Allergies reviewed: Yes  Medications reviewed: Yes    Medications: Medication refills not needed today.  Pharmacy name entered into [employer]: [email or web address] [address] [place] [address] [address] [facility] [address]    Clinical concerns: None     5 minutes for nursing intake (face to face time)     Maria Luz Calhoun CMA    DISCHARGE PLAN:  Next appointments: See patient instruction section-- Jaimie  Departure Mode: Ambulatory  Accompanied by: Parents  10 minutes for nursing discharge (face to face time)   Gina Ramos RN      Reviewed with Pt & his parents & they verbalized understanding & copy given to them:   Starting temozolomide (chemotherapy):  -Take for 5 nights in a row.  -Take at night on an empty stomach (1.5-2 hours after last eating).  -Take nighttime seizure medication either 1 hour before or an hour after chemotherapy dosing.   -Take 30 minutes after Zofran dosing.  -Weekly labs: At [facility].     Blood today.--drawn     Referral to smoking cessation program. --5-16-17 faxed referral to Quit Plan--[place] [employer]    Repeat MR brain imaging before cycle 3.     I will coordinate care with Dr. Brown.     Return to clinic in 4 weeks.     Berkley Daniels MD  Neuro-oncology  5/9/2017 6/6/2017 Tue  3:00 PM  3:00 P [facility] " [[id number]] KATIA HAGAN [[id number]] RETURN [[id number]] Return - Glioma Post Craniotomy

## 2017-05-09 NOTE — ORAL ONC MGMT
Oral Chemotherapy Monitoring Program    Primary Oncologist: Dr. Daniels  Primary Oncology Clinic: [facility] Diagnosis: oligodendroglioma    Drug: Temodar  Start Date: 5/10/17  Dose is appropriate for patients:  BSA,  Renal Function and  Hepatic Function   Expected duration of therapy: Until disease progression or unacceptable toxicity    Drug Interaction Assessment: none    Lab Monitoring Plan  C1D1+   CMP, CBC C2D1+ Call, CMP, CBC C3D1+ Call, CMP, CBC C4D1+ Call, CMP, CBC C5D1+ Call, CMP, CBC C6D1+ Call, CMP, CBC   C1D8+  C2D8+  C3D8+  C4D8+  C5D8+  C6D8+    C1D15+ Call C2D15+  C3D15+  C4D15+  C5D15+  C6D15+    C1D22+  C2D22+  C3D22+  C4D22+  C5D22+  C6D22+          Labs drawn outside of [facility]: Weekly labs will be drawn in [place] at [facility].     Subjective/Objective:  Joni TABOR Aminatagabbie is a 46 year old male seen in clinic for an initial visit for oral chemotherapy education.  Met with Joni and his parents in clinic today. Patient is to start Temodar 300mg (3 x100mg caps) PO x 5 days of 28 day cycle. Reviewed medication and plan with them. Will have [facility] send medication to them for him to start on Wed 5/10. Will also fill ondansetron. Provided information on this medication and when to take it as well.    ORAL CHEMOTHERAPY 5/9/2017   Drug Name Temodar (Temozolomide)   Current Dosage 300mg   Current Schedule Daily   Cycle Details Other   Start Date of Last Cycle 5/10/2017   Planned next cycle start date 6/7/2017       Last PHQ-2 Score on record:   PHQ-2 ( 1999 [employer]) 10/28/2016 9/14/2016   Q1: Little interest or pleasure in doing things 2 0   Q2: Feeling down, depressed or hopeless 1 0   PHQ-2 Score 3 0       Patient does not report depression symptoms.      Vitals:  BP:   BP Readings from Last 1 Encounters:   05/09/17 132/80     Wt Readings from Last 1 Encounters:   05/09/17 81.6 kg (180 lb)     Estimated body surface area is 1.99 meters squared as calculated from the following:    Height as of  "3/24/17: 1.753 m (5' 9\").    Weight as of an earlier encounter on 5/9/17: 81.6 kg (180 lb).      Labs:  Lab Results   Component Value Date     05/09/2017      Lab Results   Component Value Date    POTASSIUM 3.9 05/09/2017     Lab Results   Component Value Date    SHABBIR 8.1 05/09/2017     Lab Results   Component Value Date    MAG 2.2 09/22/2013     Lab Results   Component Value Date    PHOS 3.7 09/23/2013     Lab Results   Component Value Date    ALBUMIN 3.2 05/09/2017     Lab Results   Component Value Date    BUN 17 05/09/2017     Lab Results   Component Value Date    CR 1.37 05/09/2017       Lab Results   Component Value Date    AST 18 05/09/2017     Lab Results   Component Value Date    ALT 20 05/09/2017     Lab Results   Component Value Date    BILITOTAL 0.1 05/09/2017       Lab Results   Component Value Date    WBC 7.3 05/09/2017     Lab Results   Component Value Date    HGB 12.5 05/09/2017     Lab Results   Component Value Date     05/09/2017     Lab Results   Component Value Date    ANEU 4.8 05/09/2017           Assessment:  Patient is appropriate to start therapy.    Plan:  Basic chemotherapy teaching was reviewed with the patient and the patient's family including indication, start date of therapy, dose, administration, adverse effects, missed doses, food and drug interactions, monitoring, side effect management, office contact information, and safe handling. Written materials were provided and all questions answered.    Follow-Up:  Call on Monday. Make sure first cycle went ok. Labs to be done weekly (T/W) at [facility]. Call if abnormal.     Malinda Morris PharmD  May 9, 2017              "

## 2017-05-09 NOTE — MR AVS SNAPSHOT
After Visit Summary   5/9/2017    Joni Borja    MRN: [id number]           Patient Information     Date Of Birth          1970        Visit Information        Provider Department      5/9/2017 3:00 PM Berkley Daniels MD [facility]        Today's Diagnoses     Oligodendroglioma, anaplastic (H)    -  1    Chemotherapy induced neutropenia (H)        High risk for chemotherapy-induced infectious complication        Chemotherapy management, encounter for        Chemotherapy-induced nausea and vomiting        Tobacco abuse          Care Instructions    Starting temozolomide (chemotherapy):  -Take for 5 nights in a row.  -Take at night on an empty stomach (1.5-2 hours after last eating).  -Take nighttime seizure medication either 1 hour before or an hour after chemotherapy dosing.   -Take 30 minutes after Zofran dosing.  -Weekly labs: At [facility].     Blood today.     Referral to smoking cessation program.     Repeat MR brain imaging before cycle 3.     I will coordinate care with Dr. Brown.     Return to clinic in 4 weeks.     Berkley Daniels MD  Neuro-oncology  5/9/2017                 Follow-ups after your visit        Additional Services     CALL IT QUITS (QUITPLAN) REFERRAL       [place] [employer]AC[employer] QUITLINES FAX FORM  Fax form to: [phone]    The clinic will facilitate the referral to the quitline.    Provider Information:  ===============================================================  Berkley Daniels MD  ID#: [id number] - [facility] [facility] [phone] Fax: [phone]   [email or web address]/  Payor: MEDICARE / Plan: MEDICARE / Product Type: Medicare /   ===============================================================    The Public Health Service Guideline does not recommend providing over-the-counter nicotine replacement therapy products without physician authorization to patients with the  following conditions: pregnancy, uncontrolled high blood pressure, or cardiovascular diseases.    I authorize the [place] Tobacco Quitlines to provide over-the-counter nicotine replacement products for the patient listed below if the patient's health plan benefits cover NRT or if the patient is eligible for QUITPLAN services.    Patient Consented to:  ===============================================================  - YES - I am ready to quit tobacco and request the above information be given to the quitline so they may contact me.  I understand that one of [place] s Tobacco Quitlines will inform my provider about my participation.  ===============================================================  Please check the BEST 3-hour call window for them to reach you: 11am - 2pm  May we leave a message?  YES  Language Preference:  English  Phone Number: Home Phone      [phone]  Mobile          [phone]    E-mail Address: [email or web address]    ========================================================================  FOR QUITLINE USE ONLY:  THIS INFORMATION WILL BE PROVIDED BACK TO THE PROVIDER  Contact date: __/ __/__ or ____ Did not reach after three attempts.    Outcome:  __ Enrolled in telephone counseling program  __ Declined  __ Not Reached    Stage of readiness: _______________________  Planned Quit Date: ___/ ___/ ___  Comments:      2011 [facility]   This message funded by [employer] [place], an independent licensee of the Blue [employer] and [employer]. Rev. 11/1/12                  Your next 10 appointments already scheduled     May 17, 2017  2:30 PM CDT   Return Visit with Silvia Brown MD   [facility] ([facility])    [address], [facility] [address]  [facility] [address]   [phone]            May 18, 2017  2:00 PM CDT   Return Visit with EMILY Donahue   [facility] ([facility])    [address]  [facility] [address] [phone]            May 30, 2017  2:00 PM CDT   Return Visit with EMILY Donahue   [facility]    [address]  [facility] [address] [phone]            Jun 06, 2017  3:00 PM CDT   Return Visit with Berkley Daniels MD   [facility] ([facility])    [facility]  [address]  [facility] [address] [phone]              Who to contact     If you have questions or need follow up information about today's clinic visit or your schedule please contact [facility] directly at [phone].  Normal or non-critical lab and imaging results will be communicated to you by Dexmohart, letter or phone within 4 business days after the clinic has received the results. If you do not hear from us within 7 days, please contact the clinic through Dexmohart or phone. If you have a critical or abnormal lab result, we will notify you by phone as soon as possible.  Submit refill requests through Kidaptive or call your pharmacy and they will forward the refill request to us. Please allow 3 business days for your refill to be completed.          Additional Information About Your Visit        Dexmohart Information     Kidaptive gives you secure access to your electronic health record. If you see a primary care provider, you can also send messages to your care team and make appointments. If you have questions, please call your primary care clinic.  If you do not have a primary care provider, please call [phone] and they will assist you.        Care EveryWhere ID     This is your Care EveryWhere ID. This could be used by other organizations to access your [facility] medical records  [id number]        Your Vitals Were     Pulse Temperature Pulse Oximetry BMI (Body Mass Index)          88 98.6  F (37  C) (Oral) 98% 26.58 kg/m2         Blood Pressure from Last 3  Encounters:   05/09/17 132/80   05/01/17 107/73   04/10/17 112/69    Weight from Last 3 Encounters:   05/09/17 81.6 kg (180 lb)   05/01/17 82.1 kg (181 lb)   04/10/17 82.1 kg (181 lb)              We Performed the Following     CALL IT QUITS (QUITPLAN) REFERRAL     CBC with platelets differential     Comprehensive metabolic panel     Hepatitis B core antibody     Hepatitis B surface antigen          Today's Medication Changes          These changes are accurate as of: 5/9/17  4:13 PM.  If you have any questions, ask your nurse or doctor.               Start taking these medicines.        Dose/Directions    ondansetron 4 MG tablet   Commonly known as:  ZOFRAN   Used for:  Chemotherapy management, encounter for, Chemotherapy-induced nausea and vomiting        Dose:  4 mg   Take 1 tablet (4 mg) by mouth At Bedtime Take 30 minutes prior to chemotherapy dosing and repeat every 8 hours as needed for nausea.   Quantity:  30 tablet   Refills:  3         These medicines have changed or have updated prescriptions.        Dose/Directions    FELBATOL PO   This may have changed:  Another medication with the same name was removed. Continue taking this medication, and follow the directions you see here.        Dose:  900 mg   Take 900 mg by mouth every 24 hours At 1500 (1.5 x 600 mg tablet = 900 mg dose)   Refills:  0       KLONOPIN PO   This may have changed:  Another medication with the same name was removed. Continue taking this medication, and follow the directions you see here.        Dose:  0.5 mg   Take 0.5 mg by mouth every morning   Refills:  0         Stop taking these medicines if you haven't already. Please contact your care team if you have questions.     dexamethasone 4 MG tablet   Commonly known as:  DECADRON                Where to get your medicines      These medications were sent to [facility] - [address]  [address] [address]     Phone:  [phone]      ondansetron 4 MG tablet                Primary Care Provider Office Phone # Fax #    Brian Coon [phone]       [address] [address]        Thank you!     Thank you for choosing [facility]  for your care. Our goal is always to provide you with excellent care. Hearing back from our patients is one way we can continue to improve our services. Please take a few minutes to complete the written survey that you may receive in the mail after your visit with us. Thank you!             Your Updated Medication List - Protect others around you: Learn how to safely use, store and throw away your medicines at www.disposemymeds.org.          This list is accurate as of: 5/9/17  4:13 PM.  Always use your most recent med list.                   Brand Name Dispense Instructions for use    acetaminophen-codeine 300-30 MG per tablet    TYLENOL #3     Take 1-2 tablets by mouth every 4 hours as needed for moderate pain As needed for headaches       BENADRYL PO      Take 50 mg by mouth daily as needed for allergies (misquitos,)       CARBATROL PO      Take 300 mg by mouth 2 times daily (at 10:00 & 22:00)       diazepam 5 MG tablet    VALIUM    30 tablet    Take 5 mg by mouth 2 times daily as needed for other (seizures.) As instructed for seizures. Please take 5 mg for more than 3 seizures in 8 hour period, may repeat 5 mg after 30 min if seizures continue.       FELBATOL PO      Take 900 mg by mouth every 24 hours At 1500 (1.5 x 600 mg tablet = 900 mg dose)       KLONOPIN PO      Take 0.5 mg by mouth every morning       LEXAPRO PO      Take 20 mg by mouth At Bedtime       ondansetron 4 MG tablet    ZOFRAN    30 tablet    Take 1 tablet (4 mg) by mouth At Bedtime Take 30 minutes prior to chemotherapy dosing and repeat every 8 hours as needed for nausea.       oxyCODONE 5 MG IR tablet    ROXICODONE    45 tablet    Take 1-2 tablets (5-10 mg) by mouth every  4 hours as needed for moderate to severe pain       PROTONIX PO      Take 40 mg by mouth every morning       * TOPAMAX PO      Take 200 mg by mouth every morning (2 x 100 mg = 200 mg dose)       * TOPAMAX PO      Take 300 mg by mouth At Bedtime (3 x 100 mg = 300 mg dose)       VITAMIN B 12 PO      Take 1 tablet by mouth daily (with dinner)       ZYRTEC ALLERGY PO      Take 10 mg by mouth daily as needed (for misquitos.)       * Notice:  This list has 2 medication(s) that are the same as other medications prescribed for you. Read the directions carefully, and ask your doctor or other care provider to review them with you.

## 2017-05-09 NOTE — PATIENT INSTRUCTIONS
Starting temozolomide (chemotherapy):  -Take for 5 nights in a row.  -Take at night on an empty stomach (1.5-2 hours after last eating).  -Take nighttime seizure medication either 1 hour before or an hour after chemotherapy dosing.   -Take 30 minutes after Zofran dosing.  -Weekly labs: At [facility].     Blood today.     Referral to smoking cessation program.     Repeat MR brain imaging before cycle 3.     I will coordinate care with Dr. Brown.     Return to clinic in 4 weeks.     Berkley Daniels MD  Neuro-oncology  5/9/2017
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
## 2017-05-10 LAB
HBV CORE AB SERPL QL IA: NONREACTIVE
HBV SURFACE AG SERPL QL IA: NONREACTIVE

## 2017-05-11 DIAGNOSIS — Z72.0 TOBACCO USE: Primary | ICD-10-CM

## 2017-05-12 NOTE — TELEPHONE ENCOUNTER
Attempt # 1    Left non-detailed VM for patient to call back.    Keren Cruz, ISAI, RN, PHN  [facility]

## 2017-05-12 NOTE — TELEPHONE ENCOUNTER
Name of caller: Brittany  Relationship of Patient: Mother    Reason for Call: Patient mother returned the call from Triage.     Best phone number to reach pt at is: [phone]  Ok to leave a message with medical info? YES    Pharmacy preferred (if calling for a refill): [facility]  [employer] [facility]-Patient Representative

## 2017-05-12 NOTE — TELEPHONE ENCOUNTER
Please call him and see if he would  Like to start chantix to help quit smoking..  If yes, then prescriptions are pended (starting and then continuing months for ~ 2 months)

## 2017-05-12 NOTE — TELEPHONE ENCOUNTER
----- Message from Berkley Daniels MD sent at 5/10/2017  2:24 PM CDT -----  Regarding: Starting chemotherapy  Cristal Torres,     I am the neuro-oncologist working with Joni and I wanted to let you know that because of the resulting pathology from his resection, the recommendation was for him to start chemotherapy with temozolomide, dosed 5 days of 28 day cycle for a goal of 12 cycles. Day 1 will be this evening.    Additionally, I spoke to Joni about smoking cessation and he is interested in quitting. I placed a referral to the smoking cessation program. In your experience with Joni and with other patients, I am not sure if you have had additional success with medications to curb cravings. I told Joni that I would reach out to you and ask for your assistance.     Thank you,   Berkley

## 2017-05-12 NOTE — TELEPHONE ENCOUNTER
Attempt # 2    Left non-detailed VM for patient to call back.    Keren Cruz, ISAI, RN, PHN  [facility]

## 2017-05-15 ENCOUNTER — TELEPHONE (OUTPATIENT)
Dept: PHARMACY | Facility: CLINIC | Age: 47
End: 2017-05-15

## 2017-05-15 RX ORDER — VARENICLINE TARTRATE 1 MG/1
1 TABLET, FILM COATED ORAL 2 TIMES DAILY
Qty: 56 TABLET | Refills: 2 | Status: CANCELLED | OUTPATIENT
Start: 2017-05-15

## 2017-05-15 NOTE — TELEPHONE ENCOUNTER
Mom is calling for Joni.  We have consent to communicate. He started chemo on Friday-oral tablets. He will be having labs here in [place] ordered by Dr Daniels.    She is wondering how often he will have labs.  I let her know she should call the oncologist. Once labs are ordered she can call us to make lab only appt.  I let her know that Chantix Rx is available if he would like to start.  She said oncologist spoke with him about it. She doesn't know if he will take it. She will call back if Rx desired  Iasmar Chahal RN- Triage [employer]

## 2017-05-16 DIAGNOSIS — C71.9 OLIGODENDROGLIOMA, ANAPLASTIC (H): ICD-10-CM

## 2017-05-16 DIAGNOSIS — Z51.11 CHEMOTHERAPY MANAGEMENT, ENCOUNTER FOR: ICD-10-CM

## 2017-05-16 DIAGNOSIS — T45.1X5A CHEMOTHERAPY INDUCED NEUTROPENIA (H): ICD-10-CM

## 2017-05-16 DIAGNOSIS — D70.1 CHEMOTHERAPY INDUCED NEUTROPENIA (H): ICD-10-CM

## 2017-05-16 LAB
ALBUMIN SERPL-MCNC: 3.2 G/DL (ref 3.4–5)
ALP SERPL-CCNC: 121 U/L (ref 40–150)
ALT SERPL W P-5'-P-CCNC: 20 U/L (ref 0–70)
ANION GAP SERPL CALCULATED.3IONS-SCNC: 6 MMOL/L (ref 3–14)
AST SERPL W P-5'-P-CCNC: 13 U/L (ref 0–45)
BASOPHILS # BLD AUTO: 0 10E9/L (ref 0–0.2)
BASOPHILS NFR BLD AUTO: 0.5 %
BILIRUB SERPL-MCNC: 0.3 MG/DL (ref 0.2–1.3)
BUN SERPL-MCNC: 13 MG/DL (ref 7–30)
CALCIUM SERPL-MCNC: 8.3 MG/DL (ref 8.5–10.1)
CHLORIDE SERPL-SCNC: 115 MMOL/L (ref 94–109)
CO2 SERPL-SCNC: 23 MMOL/L (ref 20–32)
CREAT SERPL-MCNC: 0.99 MG/DL (ref 0.66–1.25)
DIFFERENTIAL METHOD BLD: ABNORMAL
EOSINOPHIL # BLD AUTO: 0.1 10E9/L (ref 0–0.7)
EOSINOPHIL NFR BLD AUTO: 1.7 %
ERYTHROCYTE [DISTWIDTH] IN BLOOD BY AUTOMATED COUNT: 13.2 % (ref 10–15)
GFR SERPL CREATININE-BSD FRML MDRD: 81 ML/MIN/1.7M2
GLUCOSE SERPL-MCNC: 135 MG/DL (ref 70–99)
HCT VFR BLD AUTO: 37.3 % (ref 40–53)
HGB BLD-MCNC: 12.3 G/DL (ref 13.3–17.7)
LYMPHOCYTES # BLD AUTO: 1.1 10E9/L (ref 0.8–5.3)
LYMPHOCYTES NFR BLD AUTO: 19 %
MCH RBC QN AUTO: 31.7 PG (ref 26.5–33)
MCHC RBC AUTO-ENTMCNC: 33 G/DL (ref 31.5–36.5)
MCV RBC AUTO: 96 FL (ref 78–100)
MONOCYTES # BLD AUTO: 0.6 10E9/L (ref 0–1.3)
MONOCYTES NFR BLD AUTO: 9.7 %
NEUTROPHILS # BLD AUTO: 4.2 10E9/L (ref 1.6–8.3)
NEUTROPHILS NFR BLD AUTO: 69.1 %
PLATELET # BLD AUTO: 254 10E9/L (ref 150–450)
POTASSIUM SERPL-SCNC: 3.8 MMOL/L (ref 3.4–5.3)
PROT SERPL-MCNC: 6.8 G/DL (ref 6.8–8.8)
RBC # BLD AUTO: 3.88 10E12/L (ref 4.4–5.9)
SODIUM SERPL-SCNC: 144 MMOL/L (ref 133–144)
WBC # BLD AUTO: 6 10E9/L (ref 4–11)

## 2017-05-16 PROCEDURE — 36415 COLL VENOUS BLD VENIPUNCTURE: CPT | Performed by: FAMILY MEDICINE

## 2017-05-16 PROCEDURE — 85025 COMPLETE CBC W/AUTO DIFF WBC: CPT | Performed by: FAMILY MEDICINE

## 2017-05-16 PROCEDURE — 80053 COMPREHEN METABOLIC PANEL: CPT | Performed by: FAMILY MEDICINE

## 2017-05-17 ENCOUNTER — TELEPHONE (OUTPATIENT)
Dept: PHARMACY | Facility: CLINIC | Age: 47
End: 2017-05-17

## 2017-05-17 ENCOUNTER — OFFICE VISIT (OUTPATIENT)
Dept: NEUROLOGY | Facility: CLINIC | Age: 47
End: 2017-05-17

## 2017-05-17 ENCOUNTER — AMBULATORY - GICH (OUTPATIENT)
Dept: SCHEDULING | Facility: OTHER | Age: 47
End: 2017-05-17

## 2017-05-17 VITALS
BODY MASS INDEX: 26.19 KG/M2 | HEIGHT: 69 IN | WEIGHT: 176.8 LBS | DIASTOLIC BLOOD PRESSURE: 69 MMHG | HEART RATE: 91 BPM | SYSTOLIC BLOOD PRESSURE: 137 MMHG

## 2017-05-17 DIAGNOSIS — G40.219 PARTIAL EPILEPSY WITH IMPAIRMENT OF CONSCIOUSNESS, INTRACTABLE (H): Primary | ICD-10-CM

## 2017-05-17 RX ORDER — TOPIRAMATE 100 MG/1
200 TABLET, FILM COATED ORAL EVERY MORNING
Qty: 900 TABLET | Refills: 3 | Status: SHIPPED | OUTPATIENT
Start: 2017-05-17 | End: 2017-11-17

## 2017-05-17 RX ORDER — CARBAMAZEPINE 300 MG/1
300 CAPSULE, EXTENDED RELEASE ORAL 2 TIMES DAILY
Qty: 180 CAPSULE | Refills: 3 | Status: SHIPPED | OUTPATIENT
Start: 2017-05-17 | End: 2017-11-17

## 2017-05-17 RX ORDER — FELBAMATE 600 MG/1
600 TABLET ORAL
COMMUNITY
End: 2017-05-17

## 2017-05-17 RX ORDER — FELBAMATE 600 MG/1
TABLET ORAL
Qty: 225 TABLET | Refills: 3 | Status: SHIPPED | OUTPATIENT
Start: 2017-05-17 | End: 2017-11-17

## 2017-05-17 NOTE — LETTER
"2017       RE: Joni Borja  : 1970   MRN: [id number]      Dear Colleague,    Thank you for referring your patient, Joni Borja, to the [facility] EPILEPSY CARE at [facility]. Please see a copy of my visit note below.    [facility] Progress Note    Patient:  Joni Borja  :  1970   Age:  46 year old   Today's Office Visit:  2017    Epilepsy Data:  Patient History  Primary Epileptologist/Provider: Silvia rBown M.D.  Patient Status: Chronic Intractable  Epilepsy Syndrome: Localization-related epilepsy unspecified  Epilepsy Syndrome Status: Final  Age of Onset: 29  Etiology  : Tumor  Other Relevant Dx/ Issues: Grade 2 oligodendroglioma , s/p subtotal resection (), chemo ([id number]), and whole brain radiation. Depression with paranoid thinking. 2013 right frontal resection, path notable for residual glioma with glial scarring.    Tests/Surgery History  Last EE2012  Last MRI: 2012  Last Neuropsych Testin2012  Last Case Management Conference: 2013  Epilepsy Surgery #1 Date: 13 (right frontal lobectomy. )  Epilepsy Surgery #2 Date: 16  Epilepsy Related Surgery #2 : Type : VNS  Seizure Record  Current Visit Date: 17  Previous Visit Date: 17  Months since last visit: 3.19  Seizure Type 1: Complex partial seizures unspecified  Description of Sz Type 1: Turns head to right, has blank stare, will repeat a phrase.  Can last up to 3 hours.  Tends to push things around   # of Type 1 Seizure since last visit: 4  Freq. Type 1 / Month: 1.25  Seizure Type 2: Tonic-clonic seizures  # of Type 2 Seizure since last visit: 0  Freq. Type 2 / Month: 0  Seizure Type 3: Localization related seizures unspecified  Description of Sz Type 3: \"drop attacks\" has loss of awareness, lasts seconds, body is stiff when falling, and on the fall has non-rhythmic  (Drop attacks)  # of Type 3 Seizure since last visit: " "0  Freq. Type 3 / Month: 0      EPILEPSY HISTORY:  Copied forward from prior notes: The patient had his 1st seizure at the age of 29 on 04/22/2002; and, at that time, he was diagnosed with a right frontal oligodendroglioma grade II and underwent resection (2007), whole brain radiation and chemotherapy (procarbazine, CCNU, vincristine from 06/2007-07/2008).  His pathological evaluation of the tumor demonstrated abnormalities of 1p and 19q.  The patient has been tried on multiple seizure medications; carbamazepine is his mainstay medication, and he has responded the best to this medication.  The patient had a right frontal lobe resection on 09/19/2013 at [facility] by Dr. Linder.   Surgical pathology 9/18/2013 showed \"majority of cells demonstrate an astrocytic morphology rather than that of oligodendrocytes. The immunohistochemical stain that recognizes a common mutation in IDH-1 is positive in many of the cells, indicating that these cells are a neoplastic population.\" On MRI of the brain, there is still residual tumor or T2 signal changes posterior to the tumor resection cavity. This portion of the brain was not removed primarily because on cortical mapping there was concern post operative hemiparesis. Prior to his surgery had 10 seizure per day (hypermotor) and after surgery he has 1- 2 seizures per week. Vagus nerve stimulator was placed 7/19/2016, which may help his seizures.     History of Present Illness:   He came with parents (Brittany and Niko). He had tumor recurrence and s/p resection of tumor resection on 3/24/2017. Prior to surgery he had a couple seizure per week and parents state after surgery he has 1 seizure per month. These seizure consist of staring, upper extremities and lower extremities nonrhythmic asynchronous movements that are proximal. He has not gotten hurt from his seizure. No antiepileptic drug side effects. Currently, on antiepileptic drug there is no double vision, no mood changes, no nausea, no " vomiting, no abdominal pain, no rashes. We reviewed his antiepileptic drug and it seems he is on a lower dose of carbamazepine by 300 mg per day. He is very stressed because of his separation from his wife.       Prior to Admission medications    Medication Sig Start Date End Date Taking? Authorizing Provider   felbamate (FELBATOL) 600 MG tablet Take 600 mg by mouth Take one tablet in the am and one and 1/2 tablet at 2 pm   Yes Reported, Patient   ondansetron (ZOFRAN) 4 MG tablet Take 1 tablet (4 mg) by mouth At Bedtime Take 30 minutes prior to chemotherapy dosing and repeat every 8 hours as needed for nausea. 5/9/17  Yes Berkley Daniels MD   acetaminophen-codeine (TYLENOL #3) 300-30 MG per tablet Take 1-2 tablets by mouth every 4 hours as needed for moderate pain As needed for headaches   Yes Reported, Patient   oxyCODONE (ROXICODONE) 5 MG IR tablet Take 1-2 tablets (5-10 mg) by mouth every 4 hours as needed for moderate to severe pain 3/27/17  Yes Carley Auguste PA-C   CarBAMazepine (CARBATROL PO) Take 300 mg by mouth 2 times daily (at 10:00 & 22:00)   Yes Reported, Patient   Escitalopram Oxalate (LEXAPRO PO) Take 20 mg by mouth At Bedtime   Yes Reported, Patient   Pantoprazole Sodium (PROTONIX PO) Take 40 mg by mouth every morning   Yes Reported, Patient   Cyanocobalamin (VITAMIN B 12 PO) Take 1 tablet by mouth daily (with dinner)   Yes Reported, Patient   ClonazePAM (KLONOPIN PO) Take 0.5 mg by mouth every morning    Yes Reported, Patient   Topiramate (TOPAMAX PO) Take 200 mg by mouth every morning (2 x 100 mg = 200 mg dose)    Yes Reported, Patient   Topiramate (TOPAMAX PO) Take 300 mg by mouth At Bedtime (3 x 100 mg = 300 mg dose)    Yes Reported, Patient   diazepam (VALIUM) 5 MG tablet Take 5 mg by mouth 2 times daily as needed for other (seizures.) As instructed for seizures. Please take 5 mg for more than 3 seizures in 8 hour period, may repeat 5 mg after 30 min if seizures continue. 3/20/15   Yes Silvia Brown MD   Cetirizine HCl (ZYRTEC ALLERGY PO) Take 10 mg by mouth daily as needed (for misquitos.)    Yes Reported, Patient   DiphenhydrAMINE HCl (BENADRYL PO) Take 50 mg by mouth daily as needed for allergies (misquitos,)    Yes Reported, Patient   temozolomide (TEMODAR) 100 MG capsule CHEMO Take 3 capsules (300 mg) by mouth daily for 5 days Take ondansetron 30-60 min before temozolomide. Take at bedtime on an empty stomach. 5/9/17 5/14/17  Berkley Daniels MD         Patient has been filling his own medication box  Felbamate 600 mg morning, 900 mg 2pm   Topiramate 200 mg AM and 300 mg PM   Tegretol  mg am and 300 mg pm   Klonopin 0.5mg PM morning and 1 mg pm (prescribed by PCP for anxiety)     Medication Notes:     AED Medication Compliance:  compliant most of the time. Using a pill box, yes.   1. Levetiracetam - mood issues. Weaned off Levetiracetam 2/12/2015 after getting a DUI.   2. Vimpat 200 mg twice a day, which caused increased sedation, toxicity, and was not able to tolerate.   3. Carbamazepine: Higher dose of carbamazepine causes ataxia and falls (> 1600 mg per day).   4. Fycompa was started November 2015 and was not helpful and weaned off 5/2016, global toxicity and was not able to tolerate higher than 6 mg.  5. Felbamate was started in May 2016. No major side effects.      Review of Systems:  Lethargy / Tiredness:  yes  Nausea / Vomiting: no  Double Vision:  yes  Sleepiness:  yes  Depression:  yes  Slowed Cognitive Function:  yes  Memory Problems:  yes  Poor Balance:  no  Dizziness:  no  Appetite Changes:  stable  Blurred Vision:  No  Sleep Changes:  No  Behavioral Changes:  No  Skin: negative  Respiratory: No shortness of breath, dyspnea on exertion, cough, or hemoptysis  Cardiovascular: negative  Have you experienced a traumatic fall since your last visit: no  Are these falls related to your seizures: NO  No bleeding    Other Issues:  Is patient safe to drive:  No. Not  "working.     Exam:    /69  Pulse 91  Ht 5' 9.02\" (175.3 cm)  Wt 176 lb 12.8 oz (80.2 kg)  BMI 26.1 kg/m2     Wt Readings from Last 5 Encounters:   05/17/17 176 lb 12.8 oz (80.2 kg)   05/09/17 180 lb (81.6 kg)   05/01/17 181 lb (82.1 kg)   04/10/17 181 lb (82.1 kg)   03/25/17 176 lb 9.4 oz (80.1 kg)     NEUROLOGICAL EXAMINATION:   /69  Pulse 91  Ht 5' 9.02\" (175.3 cm)  Wt 176 lb 12.8 oz (80.2 kg)  BMI 26.1 kg/m2  The patient is awake, alert.  Speech is fluent.  Extraocular movements intact. No nystagmus.  No focal weakness noted.  No tremor noted.   Stable gait, not ataxic.      IMPRESSION:   1.  Right frontal lobe epilepsy, intractable, etiology oligodendroglioma/astrocytoma. S/P right frontal lobe resection (9/19/2013 for epilepsy surgery) and vagus nerve stimulator placement (7/2016).  After his recent tumor resection surgery (3/24/2017) he had a reduction in his seizures. He  continues to have complex partial seizure seizures 1 per week (prior to frontal resection epilepsy surgery he had 50 per week). Continue antiepileptic drug with no changes. He is on a lower dose of carbamazepine than previously prescribed. At this time we will focus on his chemo treatment.     2.  Depression. He is following with a psychologist. Taking Celexa.      3.  Oligodendroglioma.  He had tumor recurrence. He is following Dr. Daniels and is on temozolomide.     4. Social: He and his wife are . He is living with his parents.      PLAN:   1. Continue antiepileptic drug noted above  2. Check antiepileptic drug for efficacy, toxicity, and side effects.   3. Follow up  4 months    I spent 25 minutes with the patient and family. During this time counseling and coordination of care exceeded 50% of the visit time. I addressed all questions and concerns the family raised in regards to the patients care.          PETER MONTIEL MD               "

## 2017-05-17 NOTE — MR AVS SNAPSHOT
After Visit Summary   5/17/2017    Joni Borja    MRN: [id number]           Patient Information     Date Of Birth          1970        Visit Information        Provider Department      5/17/2017 2:30 PM Silvia Brown MD [facility]        Today's Diagnoses     Partial epilepsy with impairment of consciousness, intractable (H)    -  1       Follow-ups after your visit        Follow-up notes from your care team     Return in about 6 months (around 11/17/2017).      Your next 10 appointments already scheduled     May 18, 2017  2:00 PM CDT   Return Visit with Flex [facility])    [address] [facility] [address] [phone]            May 23, 2017  2:00 PM CDT   LAB with RV LAB   [facility] ([facility])    [address] [place] [id number] [phone]           Patient must bring picture ID.  Patient should be prepared to give a urine specimen  Please do not eat 10-12 hours before your appointment if you are coming in fasting for labs on lipids, cholesterol, or glucose (sugar).  Pregnant women should follow their Care Team instructions. Water with medications is okay. Do not drink coffee or other fluids.   If you have concerns about taking  your medications, please ask at office or if scheduling via [facility], send a message by clicking on Secure Messaging, Message Your Care Team.            May 30, 2017  2:00 PM CDT   Return Visit with Flex [facility] ([facility])    [address] [facility] [address] [phone]            May 31, 2017  2:00 PM CDT   LAB with RV LAB   [facility] ([facility])    [address] [place] [id number] [phone]           Patient must bring picture ID.  Patient should be prepared to give a urine specimen  Please do not  eat 10-12 hours before your appointment if you are coming in fasting for labs on lipids, cholesterol, or glucose (sugar).  Pregnant women should follow their Care Team instructions. Water with medications is okay. Do not drink coffee or other fluids.   If you have concerns about taking  your medications, please ask at office or if scheduling via [employer], send a message by clicking on Secure Messaging, Message Your Care Team.            Jun 06, 2017  2:00 PM CDT   LAB with RV LAB   [facility] ([facility])    [address] [place] [id number] [phone]           Patient must bring picture ID.  Patient should be prepared to give a urine specimen  Please do not eat 10-12 hours before your appointment if you are coming in fasting for labs on lipids, cholesterol, or glucose (sugar).  Pregnant women should follow their Care Team instructions. Water with medications is okay. Do not drink coffee or other fluids.   If you have concerns about taking  your medications, please ask at office or if scheduling via [employer], send a message by clicking on Secure Messaging, Message Your Care Team.            Jun 06, 2017  3:00 PM CDT   Return Visit with Berkley Daniels MD   [facility] ([facility])    [facility]  [address] [address]   [phone]            Jun 13, 2017  2:00 PM CDT   LAB with RV LAB   [facility] ([facility])    [address] [place] [phone]           Patient must bring picture ID.  Patient should be prepared to give a urine specimen  Please do not eat 10-12 hours before your appointment if you are coming in fasting for labs on lipids, cholesterol, or glucose (sugar).  Pregnant women should follow their Care Team instructions. Water with medications is okay. Do not drink coffee or other fluids.   If  you have concerns about taking  your medications, please ask at office or if scheduling via [employer], send a message by clicking on Secure Messaging, Message Your Care Team.            Jun 20, 2017  2:00 PM CDT   LAB with RV LAB   [facility] ([facility]    [address] [place] [address]   [phone]           Patient must bring picture ID.  Patient should be prepared to give a urine specimen  Please do not eat 10-12 hours before your appointment if you are coming in fasting for labs on lipids, cholesterol, or glucose (sugar).  Pregnant women should follow their Care Team instructions. Water with medications is okay. Do not drink coffee or other fluids.   If you have concerns about taking  your medications, please ask at office or if scheduling via [employer], send a message by clicking on Secure Messaging, Message Your Care Team.            Jun 27, 2017  2:00 PM CDT   LAB with RV LAB   [facility] ([facility])    [address] [place] [id number] [phone]           Patient must bring picture ID.  Patient should be prepared to give a urine specimen  Please do not eat 10-12 hours before your appointment if you are coming in fasting for labs on lipids, cholesterol, or glucose (sugar).  Pregnant women should follow their Care Team instructions. Water with medications is okay. Do not drink coffee or other fluids.   If you have concerns about taking  your medications, please ask at office or if scheduling via [employer], send a message by clicking on Secure Messaging, Message Your Care Team.            Jul 11, 2017  2:00 PM CDT   LAB with RV LAB   [facility] ([facility])    [address] [place] [id number] [phone]           Patient must bring picture ID.  Patient should be prepared to give a urine specimen  Please do not eat 10-12 hours before your  "appointment if you are coming in fasting for labs on lipids, cholesterol, or glucose (sugar).  Pregnant women should follow their Care Team instructions. Water with medications is okay. Do not drink coffee or other fluids.   If you have concerns about taking  your medications, please ask at office or if scheduling via [employer], send a message by clicking on Secure Messaging, Message Your Care Team.              Who to contact     Please call your clinic at [phone] to:    Ask questions about your health    Make or cancel appointments    Discuss your medicines    Learn about your test results    Speak to your doctor   If you have compliments or concerns about an experience at your clinic, or if you wish to file a complaint, please contact [facility] Patient Relations at [phone] or email us at [email or web address]         Additional Information About Your Visit        [employer] Information     [employer] gives you secure access to your electronic health record. If you see a primary care provider, you can also send messages to your care team and make appointments. If you have questions, please call your primary care clinic.  If you do not have a primary care provider, please call [phone] and they will assist you.      [employer] is an electronic gateway that provides easy, online access to your medical records. With [employer], you can request a clinic appointment, read your test results, renew a prescription or communicate with your care team.     To access your existing account, please contact your [facility] Clinic or call [phone] for assistance.        Care EveryWhere ID     This is your Care EveryWhere ID. This could be used by other organizations to access your [facility] medical records  [id number]        Your Vitals Were     Pulse Height BMI (Body Mass Index)             91 5' 9.02\" (175.3 cm) 26.1 kg/m2          Blood Pressure from Last 3 " Encounters:   05/17/17 137/69   05/09/17 132/80   05/01/17 107/73    Weight from Last 3 Encounters:   05/17/17 176 lb 12.8 oz (80.2 kg)   05/09/17 180 lb (81.6 kg)   05/01/17 181 lb (82.1 kg)              We Performed the Following     Carbamazepine and epoxide free and total     Felbamate level     Topiramate Level          Today's Medication Changes          These changes are accurate as of: 5/17/17  3:23 PM.  If you have any questions, ask your nurse or doctor.               These medicines have changed or have updated prescriptions.        Dose/Directions    carBAMazepine 300 MG 12 hr capsule   Commonly known as:  CARBATROL   This may have changed:  medication strength   Used for:  Partial epilepsy with impairment of consciousness, intractable (H)   Changed by:  Silvia Brown MD        Dose:  300 mg   Take 1 capsule (300 mg) by mouth 2 times daily (at 10:00 & 22:00)   Quantity:  180 capsule   Refills:  3       felbamate 600 MG tablet   Commonly known as:  FELBATOL   This may have changed:    - how much to take  - how to take this  - additional instructions   Used for:  Partial epilepsy with impairment of consciousness, intractable (H)   Changed by:  Silvia Brown MD        Take 600 mg (one tablet) am and 900 mg (1.5 tablet) 2 pm   Quantity:  225 tablet   Refills:  3       topiramate 100 MG tablet   Commonly known as:  TOPAMAX   This may have changed:    - medication strength  - additional instructions  - Another medication with the same name was removed. Continue taking this medication, and follow the directions you see here.   Used for:  Partial epilepsy with impairment of consciousness, intractable (H)   Changed by:  Silvia Brown MD        Dose:  200 mg   Take 2 tablets (200 mg) by mouth every morning 200 mg am and 300 mg pm   Quantity:  900 tablet   Refills:  3            Where to get your medicines      These medications were sent to [address] [place] - [address]  [address]  [place] [address]     Phone:  [phone]     carBAMazepine 300 MG 12 hr capsule    felbamate 600 MG tablet    topiramate 100 MG tablet                Primary Care Provider Office Phone # Fax #    Brian Coon [phone]       [facility] [address] [facility] [address]        Thank you!     Thank you for choosing [facility]  for your care. Our goal is always to provide you with excellent care. Hearing back from our patients is one way we can continue to improve our services. Please take a few minutes to complete the written survey that you may receive in the mail after your visit with us. Thank you!             Your Updated Medication List - Protect others around you: Learn how to safely use, store and throw away your medicines at www.disposemymeds.org.          This list is accurate as of: 5/17/17  3:23 PM.  Always use your most recent med list.                   Brand Name Dispense Instructions for use    acetaminophen-codeine 300-30 MG per tablet    TYLENOL #3     Take 1-2 tablets by mouth every 4 hours as needed for moderate pain As needed for headaches       BENADRYL PO      Take 50 mg by mouth daily as needed for allergies (misquitos,)       carBAMazepine 300 MG 12 hr capsule    CARBATROL    180 capsule    Take 1 capsule (300 mg) by mouth 2 times daily (at 10:00 & 22:00)       diazepam 5 MG tablet    VALIUM    30 tablet    Take 5 mg by mouth 2 times daily as needed for other (seizures.) As instructed for seizures. Please take 5 mg for more than 3 seizures in 8 hour period, may repeat 5 mg after 30 min if seizures continue.       felbamate 600 MG tablet    FELBATOL    225 tablet    Take 600 mg (one tablet) am and 900 mg (1.5 tablet) 2 pm       KLONOPIN PO      Take 0.5 mg by mouth every morning       LEXAPRO PO      Take 20 mg by mouth At Bedtime       ondansetron 4 MG tablet    ZOFRAN    30 tablet    Take 1 tablet (4 mg) by mouth At  Bedtime Take 30 minutes prior to chemotherapy dosing and repeat every 8 hours as needed for nausea.       oxyCODONE 5 MG IR tablet    ROXICODONE    45 tablet    Take 1-2 tablets (5-10 mg) by mouth every 4 hours as needed for moderate to severe pain       PROTONIX PO      Take 40 mg by mouth every morning       temozolomide 100 MG capsule CHEMO    TEMODAR    15 capsule    Take 3 capsules (300 mg) by mouth daily for 5 days Take ondansetron 30-60 min before temozolomide. Take at bedtime on an empty stomach.       topiramate 100 MG tablet    TOPAMAX    900 tablet    Take 2 tablets (200 mg) by mouth every morning 200 mg am and 300 mg pm       VITAMIN B 12 PO      Take 1 tablet by mouth daily (with dinner)       ZYRTEC ALLERGY PO      Take 10 mg by mouth daily as needed (for misquitos.)

## 2017-05-17 NOTE — PROGRESS NOTES
"[facility] Progress Note    Patient:  Joni Borja  :  1970   Age:  46 year old   Today's Office Visit:  2017    Epilepsy Data:  Patient History  Primary Epileptologist/Provider: Silvia Brown M.D.  Patient Status: Chronic Intractable  Epilepsy Syndrome: Localization-related epilepsy unspecified  Epilepsy Syndrome Status: Final  Age of Onset: 29  Etiology  : Tumor  Other Relevant Dx/ Issues: Grade 2 oligodendroglioma , s/p subtotal resection (), chemo (0127-1750), and whole brain radiation. Depression with paranoid thinking. 2013 right frontal resection, path notable for residual glioma with glial scarring.    Tests/Surgery History  Last EE2012  Last MRI: 2012  Last Neuropsych Testin2012  Last Case Management Conference: 2013  Epilepsy Surgery #1 Date: 13 (right frontal lobectomy. )  Epilepsy Surgery #2 Date: 16  Epilepsy Related Surgery #2 : Type : VNS  Seizure Record  Current Visit Date: 17  Previous Visit Date: 17  Months since last visit: 3.19  Seizure Type 1: Complex partial seizures unspecified  Description of Sz Type 1: Turns head to right, has blank stare, will repeat a phrase.  Can last up to 3 hours.  Tends to push things around   # of Type 1 Seizure since last visit: 4  Freq. Type 1 / Month: 1.25  Seizure Type 2: Tonic-clonic seizures  # of Type 2 Seizure since last visit: 0  Freq. Type 2 / Month: 0  Seizure Type 3: Localization related seizures unspecified  Description of Sz Type 3: \"drop attacks\" has loss of awareness, lasts seconds, body is stiff when falling, and on the fall has non-rhythmic  (Drop attacks)  # of Type 3 Seizure since last visit: 0  Freq. Type 3 / Month: 0      EPILEPSY HISTORY:  Copied forward from prior notes: The patient had his 1st seizure at the age of 29 on 2002; and, at that time, he was diagnosed with a right frontal oligodendroglioma grade II and underwent resection (), whole " "brain radiation and chemotherapy (procarbazine, CCNU, vincristine from 06/2007-07/2008).  His pathological evaluation of the tumor demonstrated abnormalities of 1p and 19q.  The patient has been tried on multiple seizure medications; carbamazepine is his mainstay medication, and he has responded the best to this medication.  The patient had a right frontal lobe resection on 09/19/2013 at [facility] by Dr. Linder.   Surgical pathology 9/18/2013 showed \"majority of cells demonstrate an astrocytic morphology rather than that of oligodendrocytes. The immunohistochemical stain that recognizes a common mutation in IDH-1 is positive in many of the cells, indicating that these cells are a neoplastic population.\" On MRI of the brain, there is still residual tumor or T2 signal changes posterior to the tumor resection cavity. This portion of the brain was not removed primarily because on cortical mapping there was concern post operative hemiparesis. Prior to his surgery had 10 seizure per day (hypermotor) and after surgery he has 1- 2 seizures per week. Vagus nerve stimulator was placed 7/19/2016, which may help his seizures.     History of Present Illness:   He came with parents (Brittany and Niko). He had tumor recurrence and s/p resection of tumor resection on 3/24/2017. Prior to surgery he had a couple seizure per week and parents state after surgery he has 1 seizure per month. These seizure consist of staring, upper extremities and lower extremities nonrhythmic asynchronous movements that are proximal. He has not gotten hurt from his seizure. No antiepileptic drug side effects. Currently, on antiepileptic drug there is no double vision, no mood changes, no nausea, no vomiting, no abdominal pain, no rashes. We reviewed his antiepileptic drug and it seems he is on a lower dose of carbamazepine by 300 mg per day. He is very stressed because of his separation from his wife.       Prior to Admission medications    Medication Sig Start " Date End Date Taking? Authorizing Provider   felbamate (FELBATOL) 600 MG tablet Take 600 mg by mouth Take one tablet in the am and one and 1/2 tablet at 2 pm   Yes Reported, Patient   ondansetron (ZOFRAN) 4 MG tablet Take 1 tablet (4 mg) by mouth At Bedtime Take 30 minutes prior to chemotherapy dosing and repeat every 8 hours as needed for nausea. 5/9/17  Yes Berkley Daniels MD   acetaminophen-codeine (TYLENOL #3) 300-30 MG per tablet Take 1-2 tablets by mouth every 4 hours as needed for moderate pain As needed for headaches   Yes Reported, Patient   oxyCODONE (ROXICODONE) 5 MG IR tablet Take 1-2 tablets (5-10 mg) by mouth every 4 hours as needed for moderate to severe pain 3/27/17  Yes Carley Auguste PA-C   CarBAMazepine (CARBATROL PO) Take 300 mg by mouth 2 times daily (at 10:00 & 22:00)   Yes Reported, Patient   Escitalopram Oxalate (LEXAPRO PO) Take 20 mg by mouth At Bedtime   Yes Reported, Patient   Pantoprazole Sodium (PROTONIX PO) Take 40 mg by mouth every morning   Yes Reported, Patient   Cyanocobalamin (VITAMIN B 12 PO) Take 1 tablet by mouth daily (with dinner)   Yes Reported, Patient   ClonazePAM (KLONOPIN PO) Take 0.5 mg by mouth every morning    Yes Reported, Patient   Topiramate (TOPAMAX PO) Take 200 mg by mouth every morning (2 x 100 mg = 200 mg dose)    Yes Reported, Patient   Topiramate (TOPAMAX PO) Take 300 mg by mouth At Bedtime (3 x 100 mg = 300 mg dose)    Yes Reported, Patient   diazepam (VALIUM) 5 MG tablet Take 5 mg by mouth 2 times daily as needed for other (seizures.) As instructed for seizures. Please take 5 mg for more than 3 seizures in 8 hour period, may repeat 5 mg after 30 min if seizures continue. 3/20/15  Yes Silvia Brown MD   Cetirizine HCl (ZYRTEC ALLERGY PO) Take 10 mg by mouth daily as needed (for misquitos.)    Yes Reported, Patient   DiphenhydrAMINE HCl (BENADRYL PO) Take 50 mg by mouth daily as needed for allergies (misquitos,)    Yes Reported, Patient  "  temozolomide (TEMODAR) 100 MG capsule CHEMO Take 3 capsules (300 mg) by mouth daily for 5 days Take ondansetron 30-60 min before temozolomide. Take at bedtime on an empty stomach. 5/9/17 5/14/17  Berkley Daniels MD         Patient has been filling his own medication box  Felbamate 600 mg morning, 900 mg 2pm   Topiramate 200 mg AM and 300 mg PM   Tegretol  mg am and 300 mg pm   Klonopin 0.5mg PM morning and 1 mg pm (prescribed by PCP for anxiety)     Medication Notes:     AED Medication Compliance:  compliant most of the time. Using a pill box, yes.   1. Levetiracetam - mood issues. Weaned off Levetiracetam 2/12/2015 after getting a DUI.   2. Vimpat 200 mg twice a day, which caused increased sedation, toxicity, and was not able to tolerate.   3. Carbamazepine: Higher dose of carbamazepine causes ataxia and falls (> 1600 mg per day).   4. Fycompa was started November 2015 and was not helpful and weaned off 5/2016, global toxicity and was not able to tolerate higher than 6 mg.  5. Felbamate was started in May 2016. No major side effects.      Review of Systems:  Lethargy / Tiredness:  yes  Nausea / Vomiting: no  Double Vision:  yes  Sleepiness:  yes  Depression:  yes  Slowed Cognitive Function:  yes  Memory Problems:  yes  Poor Balance:  no  Dizziness:  no  Appetite Changes:  stable  Blurred Vision:  No  Sleep Changes:  No  Behavioral Changes:  No  Skin: negative  Respiratory: No shortness of breath, dyspnea on exertion, cough, or hemoptysis  Cardiovascular: negative  Have you experienced a traumatic fall since your last visit: no  Are these falls related to your seizures: NO  No bleeding    Other Issues:  Is patient safe to drive:  No. Not working.     Exam:    /69  Pulse 91  Ht 5' 9.02\" (175.3 cm)  Wt 176 lb 12.8 oz (80.2 kg)  BMI 26.1 kg/m2     Wt Readings from Last 5 Encounters:   05/17/17 176 lb 12.8 oz (80.2 kg)   05/09/17 180 lb (81.6 kg)   05/01/17 181 lb (82.1 kg)   04/10/17 181 lb " "(82.1 kg)   03/25/17 176 lb 9.4 oz (80.1 kg)     NEUROLOGICAL EXAMINATION:   /69  Pulse 91  Ht 5' 9.02\" (175.3 cm)  Wt 176 lb 12.8 oz (80.2 kg)  BMI 26.1 kg/m2  The patient is awake, alert.  Speech is fluent.  Extraocular movements intact. No nystagmus.  No focal weakness noted.  No tremor noted.   Stable gait, not ataxic.      IMPRESSION:   1.  Right frontal lobe epilepsy, intractable, etiology oligodendroglioma/astrocytoma. S/P right frontal lobe resection (9/19/2013 for epilepsy surgery) and vagus nerve stimulator placement (7/2016).  After his recent tumor resection surgery (3/24/2017) he had a reduction in his seizures. He  continues to have complex partial seizure seizures 1 per week (prior to frontal resection epilepsy surgery he had 50 per week). Continue antiepileptic drug with no changes. He is on a lower dose of carbamazepine than previously prescribed. At this time we will focus on his chemo treatment.     2.  Depression. He is following with a psychologist. Taking Celexa.      3.  Oligodendroglioma.  He had tumor recurrence. He is following Dr. Daniels and is on temozolomide.     4. Social: He and his wife are . He is living with his parents.      PLAN:   1. Continue antiepileptic drug noted above  2. Check antiepileptic drug for efficacy, toxicity, and side effects.   3. Follow up  4 months    I spent 25 minutes with the patient and family. During this time counseling and coordination of care exceeded 50% of the visit time. I addressed all questions and concerns the family raised in regards to the patients care.          PETER MONTIEL MD               "

## 2017-05-17 NOTE — ORAL ONC MGMT
Oral Chemotherapy Monitoring Program.    Patient currently on temodar therapy.    Reviewed labs from 5/16/17. Labs are WNL.    Spoke with Joni's mother on 5/15 to check in on how medication start went. Of note, they were unable to start on Wed due to delivery delay. They started on 5/12/17. He has been doing well. He states that he had nausea the first night, but was fine the next couple of nights. He has no other concerns at this time.    Questions answered to patient's satisfaction.    Will follow up in 1 week with repeat labs.    Malinda Morris PharmD  May 17, 2017

## 2017-05-18 ENCOUNTER — OFFICE VISIT (OUTPATIENT)
Dept: PSYCHOLOGY | Facility: CLINIC | Age: 47
End: 2017-05-18
Payer: MEDICARE

## 2017-05-18 ENCOUNTER — TELEPHONE (OUTPATIENT)
Dept: ONCOLOGY | Facility: CLINIC | Age: 47
End: 2017-05-18

## 2017-05-18 DIAGNOSIS — F32.1 MAJOR DEPRESSIVE DISORDER, SINGLE EPISODE, MODERATE WITH MELANCHOLIC FEATURES (H): Primary | ICD-10-CM

## 2017-05-18 DIAGNOSIS — Z63.5 MARITAL ESTRANGEMENT: ICD-10-CM

## 2017-05-18 DIAGNOSIS — C71.9 OLIGODENDROGLIOMA, ANAPLASTIC (H): Primary | ICD-10-CM

## 2017-05-18 PROCEDURE — 90834 PSYTX W PT 45 MINUTES: CPT | Performed by: MARRIAGE & FAMILY THERAPIST

## 2017-05-18 SDOH — SOCIAL STABILITY - SOCIAL INSECURITY: DISRUPTION OF FAMILY BY SEPARATION AND DIVORCE: Z63.5

## 2017-05-18 NOTE — MR AVS SNAPSHOT
MRN:[id number]                      After Visit Summary   5/18/2017    Joni Borja    MRN: [id number]           Visit Information        Provider Department      5/18/2017 2:00 PM Flex Lombardo LMFT [facility] Generic      Your next 10 appointments already scheduled     May 23, 2017  2:00 PM CDT   LAB with RV LAB   [facility] ([facility]    [address] [place] [id number] [phone]           Patient must bring picture ID.  Patient should be prepared to give a urine specimen  Please do not eat 10-12 hours before your appointment if you are coming in fasting for labs on lipids, cholesterol, or glucose (sugar).  Pregnant women should follow their Care Team instructions. Water with medications is okay. Do not drink coffee or other fluids.   If you have concerns about taking  your medications, please ask at office or if scheduling via [employer], send a message by clicking on Secure Messaging, Message Your Care Team.            May 30, 2017  2:00 PM CDT   Return Visit with EMILY Donahue   [facility] ([facility])    [address] [place] [id number] [phone]            May 31, 2017  2:00 PM CDT   LAB with RV LAB   [facility] ([facility])    [address] [place] [id number] [phone]           Patient must bring picture ID.  Patient should be prepared to give a urine specimen  Please do not eat 10-12 hours before your appointment if you are coming in fasting for labs on lipids, cholesterol, or glucose (sugar).  Pregnant women should follow their Care Team instructions. Water with medications is okay. Do not drink coffee or other fluids.   If you have concerns about taking  your medications, please ask at office or if scheduling via [employer], send a message by clicking on Secure Messaging, Message Your  Care Team.            Jun 06, 2017  2:00 PM CDT   LAB with RV LAB   [facility] ([facility])    [address] [place] [id number] [phone]           Patient must bring picture ID.  Patient should be prepared to give a urine specimen  Please do not eat 10-12 hours before your appointment if you are coming in fasting for labs on lipids, cholesterol, or glucose (sugar).  Pregnant women should follow their Care Team instructions. Water with medications is okay. Do not drink coffee or other fluids.   If you have concerns about taking  your medications, please ask at office or if scheduling via [employer], send a message by clicking on Secure Messaging, Message Your Care Team.            Jun 06, 2017  3:00 PM CDT   Return Visit with Berkley Daniels MD   [facility] ([facility])    [facility]  [address]  [facility] [address]   [phone]            Jun 13, 2017  2:00 PM CDT   LAB with RV LAB   [facility] ([facility])    [address] [place] [id number] [phone]           Patient must bring picture ID.  Patient should be prepared to give a urine specimen  Please do not eat 10-12 hours before your appointment if you are coming in fasting for labs on lipids, cholesterol, or glucose (sugar).  Pregnant women should follow their Care Team instructions. Water with medications is okay. Do not drink coffee or other fluids.   If you have concerns about taking  your medications, please ask at office or if scheduling via Sp[employer], send a message by clicking on Secure Messaging, Message Your Care Team.            Pasha 15, 2017  2:00 PM CDT   Return Visit with EMILY Donahue   [facility] ([facility])    [address] [place] [id number] [phone]            Jun 20, 2017  2:00 PM CDT   LAB with RV  LAB   [facility] ([facility])    [address] [place] [phone]           Patient must bring picture ID.  Patient should be prepared to give a urine specimen  Please do not eat 10-12 hours before your appointment if you are coming in fasting for labs on lipids, cholesterol, or glucose (sugar).  Pregnant women should follow their Care Team instructions. Water with medications is okay. Do not drink coffee or other fluids.   If you have concerns about taking  your medications, please ask at office or if scheduling via just[employer]me, send a message by clicking on Secure Messaging, Message Your Care Team.            Jun 27, 2017  2:00 PM CDT   LAB with RV LAB   [facility] ([facility])    [address] [place] [id number] [phone]           Patient must bring picture ID.  Patient should be prepared to give a urine specimen  Please do not eat 10-12 hours before your appointment if you are coming in fasting for labs on lipids, cholesterol, or glucose (sugar).  Pregnant women should follow their Care Team instructions. Water with medications is okay. Do not drink coffee or other fluids.   If you have concerns about taking  your medications, please ask at office or if scheduling via just.me, send a message by clicking on Secure Messaging, Message Your Care Team.            Jul 11, 2017  2:00 PM CDT   LAB with RV LAB   [facility] ([facility])    [address] [place] [id number] [phone]           Patient must bring picture ID.  Patient should be prepared to give a urine specimen  Please do not eat 10-12 hours before your appointment if you are coming in fasting for labs on lipids, cholesterol, or glucose (sugar).  Pregnant women should follow their Care Team instructions. Water with medications is okay. Do not drink coffee or other fluids.    If you have concerns about taking  your medications, please ask at office or if scheduling via [employer], send a message by clicking on Secure Messaging, Message Your Care Team.              [employer] Information     [employer] gives you secure access to your electronic health record. If you see a primary care provider, you can also send messages to your care team and make appointments. If you have questions, please call your primary care clinic.  If you do not have a primary care provider, please call [phone] and they will assist you.        Care EveryWhere ID     This is your Care EveryWhere ID. This could be used by other organizations to access your [place] medical records  [id number]

## 2017-05-18 NOTE — PROGRESS NOTES
Progress Note    Client Name: Joni Borja  Date: 5/18/2017         Service Type: Family with client present      Session Start Time: 2pm  Session End Time: 2:45pm      Session Length: 45 minutes     Session #: 8     Attendees: Client and Mother     Treatment Plan Last Reviewed: 5/18/2017  PHQ-9 / TONI-7 : 4/19/2017     DATA      Progress Since Last Session (Related to Symptoms / Goals / Homework):   Symptoms: Stable     Homework: Partially completed      Episode of Care Goals: Minimal progress - ACTION (Actively working towards change); Intervened by reinforcing change plan / affirming steps taken     Current / Ongoing Stressors and Concerns:   - Sx of depression as a result of years of marital distress that led to present separation, working towards divorce   - difficulties coping with medical problems which had led to limitation (driving) and work disability  Client has his mother join in session today. During the session client shared that he has been contacted by his wife and that she has requested that they try couples counseling. Client still has hope that they can save the marriage and would like to try couple's counseling. He was also [place] with his [place] Although mother has her concerns she was challenged in what actions the family would like to take as client's supports. She reports wanting to support client's choices and reacting appropriately when future problems arise.      Treatment Objective(s) Addressed in This Session:   identify 2 strategies to more effectively address stressors  Identify negative self-talk and behaviors: challenge core beliefs, myths, and actions  learn & utilize at least 2 assertive communication skills weekly      Intervention:   CBT: operant conditioning, identifying triggers and patterns, identifying alternatives  Solution Focused: miracle question, healthy marriage, couples  counseling  Structural: balance, roles, rules, past patterns vs desired patterns    Assertive communication        ASSESSMENT: Current Emotional / Mental Status (status of significant symptoms):   Risk status (Self / Other harm or suicidal ideation)   Client denies current fears or concerns for personal safety.   Client denies current or recent suicidal ideation or behaviors.   Client denies current or recent homicidal ideation or behaviors.   Client denies current or recent self injurious behavior or ideation.   Client denies other safety concerns.   A safety and risk management plan has not been developed at this time, however client was given the after-hours number / 911 should there be a change in any of these risk factors.     Appearance:   Appropriate    Eye Contact:   Good    Psychomotor Behavior: Normal    Attitude:   Cooperative    Orientation:   All   Speech    Rate / Production: Normal  Slow     Volume:  Normal    Mood:    Depressed    Affect:    Appropriate    Thought Content:  Clear  Rumination    Thought Form:  Coherent  Goal Directed  Logical  Circumstantial   Insight:    Fair      Medication Review:   No changes to current psychiatric medication(s)     Medication Compliance:   Yes     Changes in Health Issues:   None reported     Chemical Use Review:   Substance Use: Chemical use reviewed, no active concerns identified      Tobacco Use: No change in amount of tobacco use since last session.  Patient declined discussion at this time     Collateral Reports Completed:   Not Applicable    PLAN: (Client Tasks / Therapist Tasks / Other)  Client will use assertiveness and communication skills working with his wife to begin couples counseling in effort to save their marriage. Client will be honest with his parents about his intension and parents will be honest with client about their intentions as his support system.       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   WHODAS 2.0 (12 item) 16.67% on 2/14/2017    Referral / Collaboration:  Referral to another professional/service is not indicated at this time..    Anticipated number of session or this episode of care: 10      MeasurableTreatment Goal(s) related to diagnosis / functional impairment(s)  Goal 1: Client's depression will remit as evidenced by a decrease in PHQ9 score by at least 6 points or below a five, where symptoms occur fewer than half the days.   I will know I've met my goal when I \"clear my head and work on myself.      Objective #A (Client Action)   Client will decrease frequency and intensity of feeling down, depressed, hopeless  Client will increase self-awareness of symptom onset/escalation  Status: Continued - Date: 5/18/2017    Intervention(s)  Therapist will assign homework track symptoms, patterns and triggers  provide psycho-education on depression  Therapist will teach CBT strategies for attending to negative self-talk and cognitive distortions.  ACT: experiential exercises and mindfulness practices, how  to live with life barriers    Objective #B  Client will Increase interest, engagement, and pleasure in doing things  Identify negative self-talk and behaviors: challenge core beliefs, myths, and actions  Status: Continued - Date: 5/18/2017    Intervention(s)  Therapist will assign homework to practice using coping skills outside the " "therapy encounter, worksheets to challenge negative thoughts  teach distraction skills. explore and tailor enjoyable activities, increase social activities, strengthen social supports  ACT: life values and being mindful of values for goals and daily living    Objective #C  Improve concentration, focus, and mindfulness in daily activities   Status: Continued - Date: 5/18/2017    Intervention(s)  provide safe space to address hx of presenting problem and root cause  teach emotional regulation skills. mindfulness practices, grounding skills, affirmations, strengths based approach  Horacio: exercise to stage presenting problem, reflect, process and problem solve.      Goal 2: Client will improve his condition of interactions in his relationships (specifically with wife/) establishing healthy, balanced and appropriate boundaries to be kept 100% of the time for a minimum of 4 weeks.     I will know I've met my goal when I \"get my own place. Get the rest of my valuables from the house.      Objective #A (Client Action)      Client will compile a list of boundaries that they would like to set with others. Wife, adult step-children, family members  Learning to fight fair, learning to identify positive and negative communication patterns  Status: Continued - Date: 5/18/2017    Intervention(s)  Therapist will teach about healthy boundaries. structure, saying \"no\", advocating, identifying and establishing appropriate roles, rules, expectations.    Objective #B  Client will practice setting boundaries daily times in the next 12 weeks.                    Status: Continued - Date: 5/18/2017    Intervention(s)  Therapist will assign homework to track the use of healthy boundaries and outcome of establishing relational change  role-play effective communication skills and conflict management    Objective #C  Client will learn & utilize at least 3 assertive communication skills weekly.  Status: Continued - Date: " 5/18/2017    Intervention(s)  teach assertiveness skills. aggressive/passive/assertive, impulsive control, reactive vs sensitive, exposure to uncomfortable conversation.  Therapist will role-play assertiveness skills      Client has reviewed and agreed to the above plan.      EMILY Donahue, LICSW  May 18, 2017

## 2017-05-18 NOTE — TELEPHONE ENCOUNTER
Pt's mother called stating that Pt needs to have 2 dental fillings & he has an appt scheduled for on Tues 5-23-17.    Should Pt keep this appt or cancel it?      Will forward this message to  to advise as Pt started his Temodar on 5-12-17.

## 2017-05-19 LAB
CARBAMAZEPINE EP FREE SERPL-MCNC: 0.8 UG/ML
CARBAMAZEPINE EP SERPL-MCNC: 1.6 UG/ML
CARBAMAZEPINE FREE SERPL-MCNC: 1.1 UG/ML
CARBAMAZEPINE SERPL-MCNC: 3.9 UG/ML
FELBAMATE SERPL-MCNC: 31 UG/ML
TOPIRAMATE SERPL-MCNC: 9.5 UG/ML

## 2017-05-19 NOTE — TELEPHONE ENCOUNTER
Dental work should be rescheduled for week of 6/5.   Labs 1-2 days prior to procedure to insure immune system has recovered.   Keep appt with me as scheduled. With only fillings being done, unlikely to delay next cycle to chemo.   Thanks.         The above message was relayed to patient's mother Lakia. Patient will not be covered by insurance passed this month and so she verbalized that she would cancel dental appointment. Encourage Lakia to call with any questions. Lynn Montgomery

## 2017-05-25 ENCOUNTER — TELEPHONE (OUTPATIENT)
Dept: PHARMACY | Facility: CLINIC | Age: 47
End: 2017-05-25

## 2017-05-25 DIAGNOSIS — D70.1 CHEMOTHERAPY INDUCED NEUTROPENIA (H): ICD-10-CM

## 2017-05-25 DIAGNOSIS — C71.9 OLIGODENDROGLIOMA, ANAPLASTIC (H): ICD-10-CM

## 2017-05-25 DIAGNOSIS — T45.1X5A CHEMOTHERAPY INDUCED NEUTROPENIA (H): ICD-10-CM

## 2017-05-25 DIAGNOSIS — Z51.11 CHEMOTHERAPY MANAGEMENT, ENCOUNTER FOR: ICD-10-CM

## 2017-05-25 LAB
BASOPHILS # BLD AUTO: 0 10E9/L (ref 0–0.2)
BASOPHILS NFR BLD AUTO: 0.2 %
DIFFERENTIAL METHOD BLD: ABNORMAL
EOSINOPHIL # BLD AUTO: 0.1 10E9/L (ref 0–0.7)
EOSINOPHIL NFR BLD AUTO: 1.3 %
ERYTHROCYTE [DISTWIDTH] IN BLOOD BY AUTOMATED COUNT: 13.8 % (ref 10–15)
HCT VFR BLD AUTO: 39.2 % (ref 40–53)
HGB BLD-MCNC: 12.9 G/DL (ref 13.3–17.7)
LYMPHOCYTES # BLD AUTO: 1.5 10E9/L (ref 0.8–5.3)
LYMPHOCYTES NFR BLD AUTO: 13.5 %
MCH RBC QN AUTO: 32.1 PG (ref 26.5–33)
MCHC RBC AUTO-ENTMCNC: 32.9 G/DL (ref 31.5–36.5)
MCV RBC AUTO: 98 FL (ref 78–100)
MONOCYTES # BLD AUTO: 1 10E9/L (ref 0–1.3)
MONOCYTES NFR BLD AUTO: 9.1 %
NEUTROPHILS # BLD AUTO: 8.3 10E9/L (ref 1.6–8.3)
NEUTROPHILS NFR BLD AUTO: 75.9 %
PLATELET # BLD AUTO: 331 10E9/L (ref 150–450)
RBC # BLD AUTO: 4.02 10E12/L (ref 4.4–5.9)
WBC # BLD AUTO: 11 10E9/L (ref 4–11)

## 2017-05-25 PROCEDURE — 80053 COMPREHEN METABOLIC PANEL: CPT | Performed by: FAMILY MEDICINE

## 2017-05-25 PROCEDURE — 85025 COMPLETE CBC W/AUTO DIFF WBC: CPT | Performed by: FAMILY MEDICINE

## 2017-05-25 PROCEDURE — 36415 COLL VENOUS BLD VENIPUNCTURE: CPT | Performed by: FAMILY MEDICINE

## 2017-05-25 NOTE — ORAL ONC MGMT
Oral Chemotherapy Monitoring Program.    Patient currently on temodar therapy.    Reviewed labs from 5/25/17.    No concerning abnormalities.    Questions answered to patient's satisfaction.    Will review labs again in 1 week.    Malinda Morris PharmD  May 25, 2017

## 2017-05-26 ENCOUNTER — TELEPHONE (OUTPATIENT)
Dept: ONCOLOGY | Facility: CLINIC | Age: 47
End: 2017-05-26

## 2017-05-26 LAB
ALBUMIN SERPL-MCNC: 3.3 G/DL (ref 3.4–5)
ALP SERPL-CCNC: 125 U/L (ref 40–150)
ALT SERPL W P-5'-P-CCNC: 22 U/L (ref 0–70)
ANION GAP SERPL CALCULATED.3IONS-SCNC: 7 MMOL/L (ref 3–14)
AST SERPL W P-5'-P-CCNC: 15 U/L (ref 0–45)
BILIRUB SERPL-MCNC: 0.3 MG/DL (ref 0.2–1.3)
BUN SERPL-MCNC: 13 MG/DL (ref 7–30)
CALCIUM SERPL-MCNC: 8.5 MG/DL (ref 8.5–10.1)
CHLORIDE SERPL-SCNC: 111 MMOL/L (ref 94–109)
CO2 SERPL-SCNC: 25 MMOL/L (ref 20–32)
CREAT SERPL-MCNC: 0.98 MG/DL (ref 0.66–1.25)
GFR SERPL CREATININE-BSD FRML MDRD: 82 ML/MIN/1.7M2
GLUCOSE SERPL-MCNC: 81 MG/DL (ref 70–99)
POTASSIUM SERPL-SCNC: 3.6 MMOL/L (ref 3.4–5.3)
PROT SERPL-MCNC: 7 G/DL (ref 6.8–8.8)
SODIUM SERPL-SCNC: 143 MMOL/L (ref 133–144)

## 2017-05-26 NOTE — TELEPHONE ENCOUNTER
Patient left a message with the RN's that he had a few questions about insurance and coverage.    I called patient back but had to leave a voicemail on home phone. I will await return call     Gaye Jenkins [facility] Liaison  [phone]

## 2017-05-30 ENCOUNTER — OFFICE VISIT (OUTPATIENT)
Dept: PSYCHOLOGY | Facility: CLINIC | Age: 47
End: 2017-05-30
Payer: MEDICARE

## 2017-05-30 DIAGNOSIS — F32.1 MAJOR DEPRESSIVE DISORDER, SINGLE EPISODE, MODERATE WITH MELANCHOLIC FEATURES (H): Primary | ICD-10-CM

## 2017-05-30 DIAGNOSIS — Z63.0 PARTNER RELATIONSHIP PROBLEM: ICD-10-CM

## 2017-05-30 PROCEDURE — 90834 PSYTX W PT 45 MINUTES: CPT | Performed by: MARRIAGE & FAMILY THERAPIST

## 2017-05-30 SDOH — SOCIAL STABILITY - SOCIAL INSECURITY: PROBLEMS IN RELATIONSHIP WITH SPOUSE OR PARTNER: Z63.0

## 2017-05-30 ASSESSMENT — ANXIETY QUESTIONNAIRES
5. BEING SO RESTLESS THAT IT IS HARD TO SIT STILL: NOT AT ALL
7. FEELING AFRAID AS IF SOMETHING AWFUL MIGHT HAPPEN: SEVERAL DAYS
6. BECOMING EASILY ANNOYED OR IRRITABLE: NOT AT ALL
GAD7 TOTAL SCORE: 4
2. NOT BEING ABLE TO STOP OR CONTROL WORRYING: SEVERAL DAYS
IF YOU CHECKED OFF ANY PROBLEMS ON THIS QUESTIONNAIRE, HOW DIFFICULT HAVE THESE PROBLEMS MADE IT FOR YOU TO DO YOUR WORK, TAKE CARE OF THINGS AT HOME, OR GET ALONG WITH OTHER PEOPLE: SOMEWHAT DIFFICULT
1. FEELING NERVOUS, ANXIOUS, OR ON EDGE: SEVERAL DAYS
3. WORRYING TOO MUCH ABOUT DIFFERENT THINGS: SEVERAL DAYS

## 2017-05-30 ASSESSMENT — PATIENT HEALTH QUESTIONNAIRE - PHQ9: 5. POOR APPETITE OR OVEREATING: NOT AT ALL

## 2017-05-30 NOTE — PROGRESS NOTES
Progress Note    Client Name: Joni Borja  Date: 5/30/2017         Service Type: Individual       Session Start Time: 2pm  Session End Time: 2:50pm      Session Length: 50 minutes     Session #: 10     Attendees: Client attended alone     Treatment Plan Last Reviewed: 5/18/2017  PHQ-9 / TONI-7 : 5/30/2017     DATA      Progress Since Last Session (Related to Symptoms / Goals / Homework):   Symptoms: Stable     Homework: Partially completed      Episode of Care Goals: Satisfactory progress - ACTION (Actively working towards change); Intervened by reinforcing change plan / affirming steps taken     Current / Ongoing Stressors and Concerns:   - Sx of depression as a result of years of marital distress that led to present separation, working towards divorce   - difficulties coping with medical problems which had led to limitation (driving) and work disability  Client reports that since our last session he has made multiple efforts at repairing his marriage with his wife, and move back home. Since the client started to be honest about his hopes for their future, his wife has not been consistent in her commitment to the marriage. It appears that she is not being 100% honest and her lack of clarity is stalling the client's progress moving forward with his life. He understands that if she is not willing to make any effort he will have to make a decision about moving forward with the divorce, focus on his health and [place]        Treatment Objective(s) Addressed in This Session:   identify 2 strategies to more effectively address stressors  Identify negative self-talk and behaviors: challenge core beliefs, myths, and actions  Improve concentration, focus, and mindfulness in daily activities   learn & utilize at least 2 assertive communication skills weekly      Intervention:   CBT: operant conditioning, identifying triggers and  patterns, identifying alternatives  Solution Focused: miracle question, healthy marriage, couples counseling  Structural: balance, roles, rules, past patterns vs desired patterns    Assertive communication        ASSESSMENT: Current Emotional / Mental Status (status of significant symptoms):   Risk status (Self / Other harm or suicidal ideation)   Client denies current fears or concerns for personal safety.   Client denies current or recent suicidal ideation or behaviors.   Client denies current or recent homicidal ideation or behaviors.   Client denies current or recent self injurious behavior or ideation.   Client denies other safety concerns.   A safety and risk management plan has not been developed at this time, however client was given the after-hours number / 911 should there be a change in any of these risk factors.     Appearance:   Appropriate    Eye Contact:   Good    Psychomotor Behavior: Normal    Attitude:   Cooperative    Orientation:   All   Speech    Rate / Production: Normal     Volume:  Normal    Mood:    Depressed    Affect:    Appropriate  Worrisome    Thought Content:  Clear  Rumination    Thought Form:  Coherent  Goal Directed  Logical  Circumstantial   Insight:    Fair      Medication Review:   No changes to current psychiatric medication(s)     Medication Compliance:   Yes     Changes in Health Issues:   None reported     Chemical Use Review:   Substance Use: Chemical use reviewed, no active concerns identified      Tobacco Use: No change in amount of tobacco use since last session.  Patient declined discussion at this time     Collateral Reports Completed:   Not Applicable    PLAN: (Client Tasks / Therapist Tasks / Other)  Client will make one more attempt to verify if his wife wants to try to salvage the marriage or if the relationship is over. Depending on the outcome he will either plan re-unification or mail his papers and payment to formally hire his .       EMILY Donahue,  "LICSW                                                       ________________________________________________________________________    Treatment Plan    Client's Name: Joni Borja  YOB: 1970    Date: 2/20/17    DSM-V Diagnoses: 296.22 Major Depressive Disorder, Single Episode, Moderate With melancholic features   V61.10 (Z63.0) Relationship Distress With Spouse  V61.03 (Z63.5) Disruption of Family by Separation  Psychosocial & Contextual Factors: Client is experiencing depression from stressors related to marital distress which has led to his current separation, and from his history of medical problems.   WHODAS 2.0 (12 item) 16.67% on 2/14/2017    Referral / Collaboration:  Referral to another professional/service is not indicated at this time..    Anticipated number of session or this episode of care: 10      MeasurableTreatment Goal(s) related to diagnosis / functional impairment(s)  Goal 1: Client's depression will remit as evidenced by a decrease in PHQ9 score by at least 6 points or below a five, where symptoms occur fewer than half the days.   I will know I've met my goal when I \"clear my head and work on myself.      Objective #A (Client Action)   Client will decrease frequency and intensity of feeling down, depressed, hopeless  Client will increase self-awareness of symptom onset/escalation  Status: Continued - Date: 5/18/2017    Intervention(s)  Therapist will assign homework track symptoms, patterns and triggers  provide psycho-education on depression  Therapist will teach CBT strategies for attending to negative self-talk and cognitive distortions.  ACT: experiential exercises and mindfulness practices, how  to live with life barriers    Objective #B  Client will Increase interest, engagement, and pleasure in doing things  Identify negative self-talk and behaviors: challenge core beliefs, myths, and actions  Status: Continued - Date: 5/18/2017    Intervention(s)  Therapist will " "assign homework to practice using coping skills outside the therapy encounter, worksheets to challenge negative thoughts  teach distraction skills. explore and tailor enjoyable activities, increase social activities, strengthen social supports  ACT: life values and being mindful of values for goals and daily living    Objective #C  Improve concentration, focus, and mindfulness in daily activities   Status: Continued - Date: 5/18/2017    Intervention(s)  provide safe space to address hx of presenting problem and root cause  teach emotional regulation skills. mindfulness practices, grounding skills, affirmations, strengths based approach  Horacio: exercise to stage presenting problem, reflect, process and problem solve.      Goal 2: Client will improve his condition of interactions in his relationships (specifically with wife/) establishing healthy, balanced and appropriate boundaries to be kept 100% of the time for a minimum of 4 weeks.     I will know I've met my goal when I \"get my own place. Get the rest of my valuables from the house.      Objective #A (Client Action)      Client will compile a list of boundaries that they would like to set with others. Wife, adult step-children, family members  Learning to fight fair, learning to identify positive and negative communication patterns  Status: Continued - Date: 5/18/2017    Intervention(s)  Therapist will teach about healthy boundaries. structure, saying \"no\", advocating, identifying and establishing appropriate roles, rules, expectations.    Objective #B  Client will practice setting boundaries daily times in the next 12 weeks.                    Status: Continued - Date: 5/18/2017    Intervention(s)  Therapist will assign homework to track the use of healthy boundaries and outcome of establishing relational change  role-play effective communication skills and conflict management    Objective #C  Client will learn & utilize at least 3 assertive " communication skills weekly.  Status: Continued - Date: 5/18/2017    Intervention(s)  teach assertiveness skills. aggressive/passive/assertive, impulsive control, reactive vs sensitive, exposure to uncomfortable conversation.  Therapist will role-play assertiveness skills      Client has reviewed and agreed to the above plan.      EMIYL Donahue, LICSW  May 30, 2017

## 2017-05-30 NOTE — MR AVS SNAPSHOT
MRN:[id number]                      After Visit Summary   5/30/2017    Joni Borja    MRN: [id number]           Visit Information        Provider Department      5/30/2017 2:00 PM Flex Lombardo LMFT [facility] Generic      Your next 10 appointments already scheduled     May 31, 2017  2:00 PM CDT   [facility] with [facility]   [facility]    [address] [place] [id number] [phone]           Patient must bring picture ID.  Patient should be prepared to give a urine specimen  Please do not eat 10-12 hours before your appointment if you are coming in fasting for labs on lipids, cholesterol, or glucose (sugar).  Pregnant women should follow their Care Team instructions. Water with medications is okay. Do not drink coffee or other fluids.   If you have concerns about taking  your medications, please ask at office or if scheduling via [employer], send a message by clicking on Secure Messaging, Message Your Care Team.            Jun 06, 2017  3:00 PM CDT   Return Visit with Berkley Daniels MD   [facility] ([facility])    [facility]  [address]  [facility] [address]   [phone]            Jun 07, 2017  2:15 PM CDT   LAB with RV LAB   [facility]    [address] [place] [id number] [phone]           Patient must bring picture ID.  Patient should be prepared to give a urine specimen  Please do not eat 10-12 hours before your appointment if you are coming in fasting for labs on lipids, cholesterol, or glucose (sugar).  Pregnant women should follow their Care Team instructions. Water with medications is okay. Do not drink coffee or other fluids.   If you have concerns about taking  your medications, please ask at office or if scheduling via [employer], send a message by  clicking on Secure Messaging, Message Your Care Team.            Jun 13, 2017  2:00 PM CDT   [facility] LAB   [facility]    [address] [place] [id number] [phone]           Patient must bring picture ID.  Patient should be prepared to give a urine specimen  Please do not eat 10-12 hours before your appointment if you are coming in fasting for labs on lipids, cholesterol, or glucose (sugar).  Pregnant women should follow their Care Team instructions. Water with medications is okay. Do not drink coffee or other fluids.   If you have concerns about taking  your medications, please ask at office or if scheduling via [employer], send a message by clicking on Secure Messaging, Message Your Care Team.            Pasha 15, 2017  2:00 PM CDT   Return Visit with EMILY Donahue   [facility] ([address] [id number] [phone]            Jun 20, 2017  2:00 PM CDT   LAB with RV LAB   [facility]    [address] [place] [address] [phone]           Patient must bring picture ID.  Patient should be prepared to give a urine specimen  Please do not eat 10-12 hours before your appointment if you are coming in fasting for labs on lipids, cholesterol, or glucose (sugar).  Pregnant women should follow their Care Team instructions. Water with medications is okay. Do not drink coffee or other fluids.   If you have concerns about taking  your medications, please ask at office or if scheduling via [employer], send a message by clicking on Secure Messaging, Message Your Care Team.            Jun 27, 2017  2:00 PM CDT   LAB with RV LAB   [facility]    [address] [place] [id number] [phone]           Patient must bring picture ID.  Patient should be  prepared to give a urine specimen  Please do not eat 10-12 hours before your appointment if you are coming in fasting for labs on lipids, cholesterol, or glucose (sugar).  Pregnant women should follow their Care Team instructions. Water with medications is okay. Do not drink coffee or other fluids.   If you have concerns about taking  your medications, please ask at office or if scheduling via [employer], send a message by clicking on Secure Messaging, Message Your Care Team.            Jul 11, 2017  2:00 PM CDT   LAB with RV LAB   [facility]    [address] [place] [id number] [phone]           Patient must bring picture ID.  Patient should be prepared to give a urine specimen  Please do not eat 10-12 hours before your appointment if you are coming in fasting for labs on lipids, cholesterol, or glucose (sugar).  Pregnant women should follow their Care Team instructions. Water with medications is okay. Do not drink coffee or other fluids.   If you have concerns about taking  your medications, please ask at office or if scheduling via [employer], send a message by clicking on Secure Messaging, Message Your Care Team.            Jul 18, 2017  2:00 PM CDT   LAB with RV LAB   [facility]    [address] [place] [id number] [phone]           Patient must bring picture ID.  Patient should be prepared to give a urine specimen  Please do not eat 10-12 hours before your appointment if you are coming in fasting for labs on lipids, cholesterol, or glucose (sugar).  Pregnant women should follow their Care Team instructions. Water with medications is okay. Do not drink coffee or other fluids.   If you have concerns about taking  your medications, please ask at office or if scheduling via [employer], send a message by clicking on Secure Messaging, Message Your Care Team.            Jul 25,  2017  2:00 PM CDT   LAB with RV LAB   [facility]    [address] [place] [address] [phone]           Patient must bring picture ID.  Patient should be prepared to give a urine specimen  Please do not eat 10-12 hours before your appointment if you are coming in fasting for labs on lipids, cholesterol, or glucose (sugar).  Pregnant women should follow their Care Team instructions. Water with medications is okay. Do not drink coffee or other fluids.   If you have concerns about taking  your medications, please ask at office or if scheduling via [employer], send a message by clicking on Secure Messaging, Message Your Care Team.              [employer] Information     [employer] gives you secure access to your electronic health record. If you see a primary care provider, you can also send messages to your care team and make appointments. If you have questions, please call your primary care clinic.  If you do not have a primary care provider, please call [phone] and they will assist you.        Care EveryWhere ID     This is your Care EveryWhere ID. This could be used by other organizations to access your [facility] medical records  [id number]

## 2017-05-31 DIAGNOSIS — C71.9 OLIGODENDROGLIOMA, ANAPLASTIC (H): ICD-10-CM

## 2017-05-31 DIAGNOSIS — D70.1 CHEMOTHERAPY INDUCED NEUTROPENIA (H): ICD-10-CM

## 2017-05-31 DIAGNOSIS — T45.1X5A CHEMOTHERAPY INDUCED NEUTROPENIA (H): ICD-10-CM

## 2017-05-31 LAB
BASOPHILS # BLD AUTO: 0 10E9/L (ref 0–0.2)
BASOPHILS NFR BLD AUTO: 0.5 %
DIFFERENTIAL METHOD BLD: ABNORMAL
EOSINOPHIL # BLD AUTO: 0.1 10E9/L (ref 0–0.7)
EOSINOPHIL NFR BLD AUTO: 1.1 %
ERYTHROCYTE [DISTWIDTH] IN BLOOD BY AUTOMATED COUNT: 13.9 % (ref 10–15)
HCT VFR BLD AUTO: 38.4 % (ref 40–53)
HGB BLD-MCNC: 12.4 G/DL (ref 13.3–17.7)
LYMPHOCYTES # BLD AUTO: 1.8 10E9/L (ref 0.8–5.3)
LYMPHOCYTES NFR BLD AUTO: 21.5 %
MCH RBC QN AUTO: 31.6 PG (ref 26.5–33)
MCHC RBC AUTO-ENTMCNC: 32.3 G/DL (ref 31.5–36.5)
MCV RBC AUTO: 98 FL (ref 78–100)
MONOCYTES # BLD AUTO: 0.8 10E9/L (ref 0–1.3)
MONOCYTES NFR BLD AUTO: 9.3 %
NEUTROPHILS # BLD AUTO: 5.8 10E9/L (ref 1.6–8.3)
NEUTROPHILS NFR BLD AUTO: 67.6 %
PLATELET # BLD AUTO: 336 10E9/L (ref 150–450)
RBC # BLD AUTO: 3.92 10E12/L (ref 4.4–5.9)
WBC # BLD AUTO: 8.5 10E9/L (ref 4–11)

## 2017-05-31 PROCEDURE — 85025 COMPLETE CBC W/AUTO DIFF WBC: CPT | Performed by: PSYCHIATRY & NEUROLOGY

## 2017-05-31 PROCEDURE — 36415 COLL VENOUS BLD VENIPUNCTURE: CPT | Performed by: PSYCHIATRY & NEUROLOGY

## 2017-05-31 ASSESSMENT — ANXIETY QUESTIONNAIRES: GAD7 TOTAL SCORE: 4

## 2017-05-31 ASSESSMENT — PATIENT HEALTH QUESTIONNAIRE - PHQ9: SUM OF ALL RESPONSES TO PHQ QUESTIONS 1-9: 1

## 2017-06-01 ENCOUNTER — TELEPHONE (OUTPATIENT)
Dept: PHARMACY | Facility: CLINIC | Age: 47
End: 2017-06-01

## 2017-06-01 NOTE — ORAL ONC MGMT
Oral Chemotherapy Monitoring Program.    Patient currently on temodar therapy.    Reviewed labs from 5/31/17.    No concerning abnormalities.    Will follow up in 1 week with repeat labs    Malinda Morris PharmD  June 1, 2017

## 2017-06-06 ENCOUNTER — DOCUMENTATION ONLY (OUTPATIENT)
Dept: PHARMACY | Facility: CLINIC | Age: 47
End: 2017-06-06

## 2017-06-06 ENCOUNTER — ONCOLOGY VISIT (OUTPATIENT)
Dept: ONCOLOGY | Facility: CLINIC | Age: 47
End: 2017-06-06
Attending: PSYCHIATRY & NEUROLOGY
Payer: COMMERCIAL

## 2017-06-06 VITALS
RESPIRATION RATE: 16 BRPM | WEIGHT: 178.6 LBS | BODY MASS INDEX: 26.36 KG/M2 | SYSTOLIC BLOOD PRESSURE: 123 MMHG | TEMPERATURE: 98.8 F | DIASTOLIC BLOOD PRESSURE: 76 MMHG | HEART RATE: 80 BPM | OXYGEN SATURATION: 98 %

## 2017-06-06 DIAGNOSIS — D70.1 CHEMOTHERAPY INDUCED NEUTROPENIA (H): ICD-10-CM

## 2017-06-06 DIAGNOSIS — C71.9 OLIGODENDROGLIOMA, ANAPLASTIC (H): Primary | ICD-10-CM

## 2017-06-06 DIAGNOSIS — R11.2 CHEMOTHERAPY-INDUCED NAUSEA AND VOMITING: ICD-10-CM

## 2017-06-06 DIAGNOSIS — G40.209 PARTIAL EPILEPSY WITH IMPAIRMENT OF CONSCIOUSNESS (H): ICD-10-CM

## 2017-06-06 DIAGNOSIS — T45.1X5A CHEMOTHERAPY-INDUCED NAUSEA AND VOMITING: ICD-10-CM

## 2017-06-06 DIAGNOSIS — Z96.89 STATUS POST VNS (VAGUS NERVE STIMULATOR) PLACEMENT: ICD-10-CM

## 2017-06-06 DIAGNOSIS — T45.1X5A CHEMOTHERAPY INDUCED NEUTROPENIA (H): ICD-10-CM

## 2017-06-06 DIAGNOSIS — Z72.0 TOBACCO ABUSE: ICD-10-CM

## 2017-06-06 DIAGNOSIS — Z51.11 CHEMOTHERAPY MANAGEMENT, ENCOUNTER FOR: ICD-10-CM

## 2017-06-06 PROCEDURE — 99212 OFFICE O/P EST SF 10 MIN: CPT

## 2017-06-06 PROCEDURE — 99358 PROLONG SERVICE W/O CONTACT: CPT | Performed by: PSYCHIATRY & NEUROLOGY

## 2017-06-06 PROCEDURE — 99215 OFFICE O/P EST HI 40 MIN: CPT | Performed by: PSYCHIATRY & NEUROLOGY

## 2017-06-06 RX ORDER — TEMOZOLOMIDE 100 MG/1
200 CAPSULE ORAL DAILY
Qty: 20 CAPSULE | Refills: 0 | Status: SHIPPED | OUTPATIENT
Start: 2017-06-06 | End: 2017-06-11

## 2017-06-06 ASSESSMENT — PAIN SCALES - GENERAL: PAINLEVEL: NO PAIN (0)
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
## 2017-06-06 NOTE — MR AVS SNAPSHOT
After Visit Summary   6/6/2017    Joni Borja    MRN: [id number]           Patient Information     Date Of Birth          1970        Visit Information        Provider Department      6/6/2017 3:00 PM Berkley Daniels MD [facility]        Today's Diagnoses     Oligodendroglioma (H)    -  1      Care Instructions    Increase temozolomide to 400mg, start date on 6/9.    Return to clinic on 6/27.  Repeat MRI scan 8/1 to be done 3 hours prior to PM appt with me.   Contact radiology department about turning off patient's VNS; may need to come from the [facility].     Berkley Daniels MD  Neuro-oncology  6/6/2017            Follow-ups after your visit        Your next 10 appointments already scheduled     Jun 07, 2017  2:15 PM CDT   LAB with RV LAB   [facility]    [address] [place] [id number] [phone]           Patient must bring picture ID.  Patient should be prepared to give a urine specimen  Please do not eat 10-12 hours before your appointment if you are coming in fasting for labs on lipids, cholesterol, or glucose (sugar).  Pregnant women should follow their Care Team instructions. Water with medications is okay. Do not drink coffee or other fluids.   If you have concerns about taking  your medications, please ask at office or if scheduling via [employer], send a message by clicking on Secure Messaging, Message Your Care Team.            Jun 13, 2017  2:00 PM CDT   LAB with RV LAB   [facility]    [address] [place] [id number] [phone]           Patient must bring picture ID.  Patient should be prepared to give a urine specimen  Please do not eat 10-12 hours before your appointment if you are coming in fasting for labs on lipids, cholesterol, or glucose (sugar).  Pregnant women should follow their Care Team instructions.  Water with medications is okay. Do not drink coffee or other fluids.   If you have concerns about taking  your medications, please ask at office or if scheduling via [employer], send a message by clicking on Secure Messaging, Message Your Care Team.            Pasha 15, 2017  2:00 PM CDT   Return Visit with EMILY Donahue   [facility] ([facility]    [address] [facility] [id number] [phone]            Jun 20, 2017  2:00 PM CDT   LAB with RV LAB   [facility]    [address] [place] [address] [phone]           Patient must bring picture ID.  Patient should be prepared to give a urine specimen  Please do not eat 10-12 hours before your appointment if you are coming in fasting for labs on lipids, cholesterol, or glucose (sugar).  Pregnant women should follow their Care Team instructions. Water with medications is okay. Do not drink coffee or other fluids.   If you have concerns about taking  your medications, please ask at office or if scheduling via [employer], send a message by clicking on Secure Messaging, Message Your Care Team.            Jun 27, 2017  2:00 PM CDT   LAB with RV LAB   [facility]    [address] [place] [phone] [phone]           Patient must bring picture ID.  Patient should be prepared to give a urine specimen  Please do not eat 10-12 hours before your appointment if you are coming in fasting for labs on lipids, cholesterol, or glucose (sugar).  Pregnant women should follow their Care Team instructions. Water with medications is okay. Do not drink coffee or other fluids.   If you have concerns about taking  your medications, please ask at office or if scheduling via [employer], send a message by clicking on Secure Messaging, Message Your Care Team.            Jun 27, 2017  3:30 PM CDT   Return Visit with  Berkley Daniels MD   [facility] ([facility])    [facility]  [address]  [place] [address]   [phone]            Jul 11, 2017  2:00 PM CDT   LAB with RV LAB   [facility]    [address] [place] [id number] [phone]           Patient must bring picture ID.  Patient should be prepared to give a urine specimen  Please do not eat 10-12 hours before your appointment if you are coming in fasting for labs on lipids, cholesterol, or glucose (sugar).  Pregnant women should follow their Care Team instructions. Water with medications is okay. Do not drink coffee or other fluids.   If you have concerns about taking  your medications, please ask at office or if scheduling via [employer], send a message by clicking on Secure Messaging, Message Your Care Team.            Jul 18, 2017  2:00 PM CDT   LAB with RV LAB   [facility]    [address] [place] [id number] [phone]           Patient must bring picture ID.  Patient should be prepared to give a urine specimen  Please do not eat 10-12 hours before your appointment if you are coming in fasting for labs on lipids, cholesterol, or glucose (sugar).  Pregnant women should follow their Care Team instructions. Water with medications is okay. Do not drink coffee or other fluids.   If you have concerns about taking  your medications, please ask at office or if scheduling via [employer], send a message by clicking on Secure Messaging, Message Your Care Team.            Jul 25, 2017  2:00 PM CDT   LAB with RV LAB   [facility] ([facility]    [address] [place] [phone] [phone]           Patient must bring picture ID.  Patient should be prepared to give a urine specimen  Please do not eat 10-12 hours  before your appointment if you are coming in fasting for labs on lipids, cholesterol, or glucose (sugar).  Pregnant women should follow their Care Team instructions. Water with medications is okay. Do not drink coffee or other fluids.   If you have concerns about taking  your medications, please ask at office or if scheduling via [employer], send a message by clicking on Secure Messaging, Message Your Care Team.            Aug 01, 2017  2:00 PM CDT   LAB with RV LAB   [facility]    [address] [place] [id number] [phone]           Patient must bring picture ID.  Patient should be prepared to give a urine specimen  Please do not eat 10-12 hours before your appointment if you are coming in fasting for labs on lipids, cholesterol, or glucose (sugar).  Pregnant women should follow their Care Team instructions. Water with medications is okay. Do not drink coffee or other fluids.   If you have concerns about taking  your medications, please ask at office or if scheduling via [employer], send a message by clicking on Secure Messaging, Message Your Care Team.              Future tests that were ordered for you today     Open Future Orders        Priority Expected Expires Ordered    MR Brain w/o & w Contrast Routine 8/1/2017 6/6/2018 6/6/2017            Who to contact     If you have questions or need follow up information about today's clinic visit or your schedule please contact [facility] directly at [phone].  Normal or non-critical lab and imaging results will be communicated to you by MyChart, letter or phone within 4 business days after the clinic has received the results. If you do not hear from us within 7 days, please contact the clinic through [employer] or phone. If you have a critical or abnormal lab result, we will notify you by phone as soon as possible.  Submit refill requests through [employer] or call your pharmacy and they  will forward the refill request to us. Please allow 3 business days for your refill to be completed.          Additional Information About Your Visit        [employer] Information     [employer] gives you secure access to your electronic health record. If you see a primary care provider, you can also send messages to your care team and make appointments. If you have questions, please call your primary care clinic.  If you do not have a primary care provider, please call [phone] and they will assist you.        Care EveryWhere ID     This is your Care EveryWhere ID. This could be used by other organizations to access your [facility] medical records  [id number]        Your Vitals Were     Pulse Temperature Respirations Pulse Oximetry BMI (Body Mass Index)       80 98.8  F (37.1  C) (Oral) 16 98% 26.36 kg/m2        Blood Pressure from Last 3 Encounters:   06/06/17 123/76   05/17/17 137/69   05/09/17 132/80    Weight from Last 3 Encounters:   06/06/17 81 kg (178 lb 9.6 oz)   05/17/17 80.2 kg (176 lb 12.8 oz)   05/09/17 81.6 kg (180 lb)                 Today's Medication Changes          These changes are accurate as of: 6/6/17  4:59 PM.  If you have any questions, ask your nurse or doctor.               Stop taking these medicines if you haven't already. Please contact your care team if you have questions.     oxyCODONE 5 MG IR tablet   Commonly known as:  ROXICODONE   Stopped by:  Berkley Daniels MD                    Primary Care Provider Office Phone # Fax #    Brian Coon [phone]       [address] [address]        Thank you!     Thank you for choosing [facility]  for your care. Our goal is always to provide you with excellent care. Hearing back from our patients is one way we can continue to improve our services. Please take a few minutes to complete the written survey that you may receive in the mail after your visit  with us. Thank you!             Your Updated Medication List - Protect others around you: Learn how to safely use, store and throw away your medicines at www.disposemymeds.org.          This list is accurate as of: 6/6/17  4:59 PM.  Always use your most recent med list.                   Brand Name Dispense Instructions for use    acetaminophen-codeine 300-30 MG per tablet    TYLENOL #3     Take 1-2 tablets by mouth every 4 hours as needed for moderate pain As needed for headaches       BENADRYL PO      Take 50 mg by mouth daily as needed for allergies (misquitos,)       carBAMazepine 300 MG 12 hr capsule    CARBATROL    180 capsule    Take 1 capsule (300 mg) by mouth 2 times daily (at 10:00 & 22:00)       diazepam 5 MG tablet    VALIUM    30 tablet    Take 5 mg by mouth 2 times daily as needed for other (seizures.) As instructed for seizures. Please take 5 mg for more than 3 seizures in 8 hour period, may repeat 5 mg after 30 min if seizures continue.       felbamate 600 MG tablet    FELBATOL    225 tablet    Take 600 mg (one tablet) am and 900 mg (1.5 tablet) 2 pm       KLONOPIN PO      Take 0.5 mg by mouth every morning       LEXAPRO PO      Take 20 mg by mouth At Bedtime       ondansetron 4 MG tablet    ZOFRAN    30 tablet    Take 1 tablet (4 mg) by mouth At Bedtime Take 30 minutes prior to chemotherapy dosing and repeat every 8 hours as needed for nausea.       PROTONIX PO      Take 40 mg by mouth every morning       * temozolomide 100 MG capsule CHEMO    TEMODAR    15 capsule    Take 3 capsules (300 mg) by mouth daily for 5 days Take ondansetron 30-60 min before temozolomide. Take at bedtime on an empty stomach.       * temozolomide 100 MG capsule CHEMO    TEMODAR    20 capsule    Take 4 capsules (400 mg) by mouth daily for 5 days Take ondansetron 30-60 min before temozolomide. Take at bedtime on an empty stomach.       topiramate 100 MG tablet    TOPAMAX    900 tablet    Take 2 tablets (200 mg) by mouth every  morning 200 mg am and 300 mg pm       VITAMIN B 12 PO      Take 1 tablet by mouth daily (with dinner)       ZYRTEC ALLERGY PO      Take 10 mg by mouth daily as needed (for misquitos.)       * Notice:  This list has 2 medication(s) that are the same as other medications prescribed for you. Read the directions carefully, and ask your doctor or other care provider to review them with you.

## 2017-06-06 NOTE — PATIENT INSTRUCTIONS
Increase temozolomide to 400mg, start date on 6/9.    Return to clinic on 6/27.  Repeat MRI scan 8/1 to be done 3 hours prior to PM appt with me.   Contact radiology department about turning off patient's VNS; may need to come from the [facility].     Berkley Daniels MD  Neuro-oncology  6/6/2017

## 2017-06-06 NOTE — PROGRESS NOTES
Oral Chemotherapy Monitoring Program    Primary Oncologist: Dr. Daniels  Primary Oncology Clinic: [facility]  Cancer Diagnosis: oligodendroglioma    Therapy History:  Started temodar 150mg/m2 (300mg x 5 days) on 5/12/17; increase for C2 to 200mg/m2 (400mg x 5 days)    Drug Interaction Assessment: none    Lab Monitoring Plan  C1D1+   CMP, CBC C2D1+ Call, CMP, CBC C3D1+ Call, CMP, CBC C4D1+ Call, CMP, CBC C5D1+ Call, CMP, CBC C6D1+ Call, CMP, CBC   C1D8+   C2D8+   C3D8+   C4D8+   C5D8+   C6D8+     C1D15+ Call C2D15+   C3D15+   C4D15+   C5D15+   C6D15+     C1D22+   C2D22+   C3D22+   C4D22+   C5D22+   C6D22+         Subjective/Objective:  Joni Boraj is a 46 year old male seen in clinic for a follow-up visit for oral chemotherapy.  Spoke with Joni and his parents while in clinic today. Joni did very well on the first cycle of temodar. He had nausea the first night and then did well after that with no side effects or complaints. He is having some insurance changes this month. Santa working on trying to figure out his coverage and what needs to be done to have this filled and sent prior to start date of 6/9.    ORAL CHEMOTHERAPY 5/9/2017 6/6/2017   Drug Name Temodar (Temozolomide) Temodar (Temozolomide)   Current Dosage 300mg 400mg   Current Schedule Daily Daily   Cycle Details Other Other   Start Date of Last Cycle 5/10/2017 6/9/2017   Planned next cycle start date 6/7/2017 7/7/2017   Doses missed in last 2 weeks - 0   Adherence Assessment - Adherent   Adverse Effects - No AE identified during assessment   Home BPs - not needed   Any new drug interactions? - No   Is the dose as ordered appropriate for the patient? - Yes   Is the patient currently in pain? - No   Has the patient been assessed within the past 6 months for depression? - Yes       Last PHQ-2 Score on record:   PHQ-2 ( 1999 [employer]) 5/17/2017 10/28/2016   Q1: Little interest or pleasure in doing things 0 2   Q2: Feeling down, depressed or hopeless 0 1   PHQ-2  "Score 0 3       Patient does not report depression symptoms.      Vitals:  BP:   BP Readings from Last 1 Encounters:   06/06/17 123/76     Wt Readings from Last 1 Encounters:   06/06/17 81 kg (178 lb 9.6 oz)     Estimated body surface area is 1.99 meters squared as calculated from the following:    Height as of 5/17/17: 1.753 m (5' 9.02\").    Weight as of an earlier encounter on 6/6/17: 81 kg (178 lb 9.6 oz).    Labs:  Lab Results   Component Value Date     05/25/2017      Lab Results   Component Value Date    POTASSIUM 3.6 05/25/2017     Lab Results   Component Value Date    SHABBIR 8.5 05/25/2017     Lab Results   Component Value Date    ALBUMIN 3.3 05/25/2017     Lab Results   Component Value Date    MAG 2.2 09/22/2013     Lab Results   Component Value Date    PHOS 3.7 09/23/2013     Lab Results   Component Value Date    BUN 13 05/25/2017     Lab Results   Component Value Date    CR 0.98 05/25/2017       Lab Results   Component Value Date    AST 15 05/25/2017     Lab Results   Component Value Date    ALT 22 05/25/2017     Lab Results   Component Value Date    BILITOTAL 0.3 05/25/2017       Lab Results   Component Value Date    WBC 8.5 05/31/2017     Lab Results   Component Value Date    HGB 12.4 05/31/2017     Lab Results   Component Value Date     05/31/2017     Lab Results   Component Value Date    ANEU 5.8 05/31/2017       Labs WNL.    Assessment:  Patient tolerating medication very well. Ok to proceed with next cycle at increased dose of 200mg daily x 5 days.    Plan:  Continue temodar at 400mg x5days (start on 6/9 pending insurance issues)    Follow-Up:  Call on 6/16 and make sure the new dosing went ok with no side effects. Remind them of weekly labs.    Refill Due:  6/9/17 then 7/7/17    Malinda Morris PharmD  June 6, 2017      "

## 2017-06-07 DIAGNOSIS — C71.9 OLIGODENDROGLIOMA, ANAPLASTIC (H): ICD-10-CM

## 2017-06-07 LAB
ALBUMIN SERPL-MCNC: 3.1 G/DL (ref 3.4–5)
ALP SERPL-CCNC: 123 U/L (ref 40–150)
ALT SERPL W P-5'-P-CCNC: 18 U/L (ref 0–70)
ANION GAP SERPL CALCULATED.3IONS-SCNC: 7 MMOL/L (ref 3–14)
AST SERPL W P-5'-P-CCNC: 13 U/L (ref 0–45)
BASOPHILS # BLD AUTO: 0.1 10E9/L (ref 0–0.2)
BASOPHILS NFR BLD AUTO: 0.5 %
BILIRUB SERPL-MCNC: 0.2 MG/DL (ref 0.2–1.3)
BUN SERPL-MCNC: 18 MG/DL (ref 7–30)
CALCIUM SERPL-MCNC: 7.9 MG/DL (ref 8.5–10.1)
CHLORIDE SERPL-SCNC: 116 MMOL/L (ref 94–109)
CO2 SERPL-SCNC: 23 MMOL/L (ref 20–32)
CREAT SERPL-MCNC: 0.89 MG/DL (ref 0.66–1.25)
DIFFERENTIAL METHOD BLD: ABNORMAL
EOSINOPHIL # BLD AUTO: 0.3 10E9/L (ref 0–0.7)
EOSINOPHIL NFR BLD AUTO: 2.8 %
ERYTHROCYTE [DISTWIDTH] IN BLOOD BY AUTOMATED COUNT: 13.8 % (ref 10–15)
GFR SERPL CREATININE-BSD FRML MDRD: ABNORMAL ML/MIN/1.7M2
GLUCOSE SERPL-MCNC: 86 MG/DL (ref 70–99)
HCT VFR BLD AUTO: 37.6 % (ref 40–53)
HGB BLD-MCNC: 12.3 G/DL (ref 13.3–17.7)
LYMPHOCYTES # BLD AUTO: 1.7 10E9/L (ref 0.8–5.3)
LYMPHOCYTES NFR BLD AUTO: 18.1 %
MCH RBC QN AUTO: 31.5 PG (ref 26.5–33)
MCHC RBC AUTO-ENTMCNC: 32.7 G/DL (ref 31.5–36.5)
MCV RBC AUTO: 96 FL (ref 78–100)
MONOCYTES # BLD AUTO: 0.9 10E9/L (ref 0–1.3)
MONOCYTES NFR BLD AUTO: 10 %
NEUTROPHILS # BLD AUTO: 6.3 10E9/L (ref 1.6–8.3)
NEUTROPHILS NFR BLD AUTO: 68.6 %
PLATELET # BLD AUTO: 321 10E9/L (ref 150–450)
POTASSIUM SERPL-SCNC: 4.3 MMOL/L (ref 3.4–5.3)
PROT SERPL-MCNC: 6.5 G/DL (ref 6.8–8.8)
RBC # BLD AUTO: 3.9 10E12/L (ref 4.4–5.9)
SODIUM SERPL-SCNC: 146 MMOL/L (ref 133–144)
WBC # BLD AUTO: 9.2 10E9/L (ref 4–11)

## 2017-06-07 PROCEDURE — 85025 COMPLETE CBC W/AUTO DIFF WBC: CPT | Performed by: FAMILY MEDICINE

## 2017-06-07 PROCEDURE — 36415 COLL VENOUS BLD VENIPUNCTURE: CPT | Performed by: FAMILY MEDICINE

## 2017-06-07 PROCEDURE — 80053 COMPREHEN METABOLIC PANEL: CPT | Performed by: FAMILY MEDICINE

## 2017-06-07 NOTE — TELEPHONE ENCOUNTER
Patient had an insurance change from medicaid to medicare part B (BCBS) and D with [employer]. Patient is now responsible for $279 copay each month for the temozolomide.     I spoke with the patients father today who stated they were able to use the patients HSA card this month to pay for the copay but do have interest in finding help to pay for the copay. I suggested applying to the [employer] free drug program for Temodar.    I have sent the application in the mail to the patient at his [place] [address], [place] address. The patients mother will call me if she has any further questions and will mail back the signed application once they receive it.    According to [facility], the pt will receive the temozolomide on 6/8 for his start on 6/9.

## 2017-06-13 DIAGNOSIS — C71.9 OLIGODENDROGLIOMA, ANAPLASTIC (H): ICD-10-CM

## 2017-06-13 LAB
BASOPHILS # BLD AUTO: 0.1 10E9/L (ref 0–0.2)
BASOPHILS NFR BLD AUTO: 1 %
DIFFERENTIAL METHOD BLD: ABNORMAL
EOSINOPHIL # BLD AUTO: 0.5 10E9/L (ref 0–0.7)
EOSINOPHIL NFR BLD AUTO: 5.9 %
ERYTHROCYTE [DISTWIDTH] IN BLOOD BY AUTOMATED COUNT: 13.9 % (ref 10–15)
HCT VFR BLD AUTO: 40.6 % (ref 40–53)
HGB BLD-MCNC: 13.1 G/DL (ref 13.3–17.7)
LYMPHOCYTES # BLD AUTO: 1.4 10E9/L (ref 0.8–5.3)
LYMPHOCYTES NFR BLD AUTO: 18.4 %
MCH RBC QN AUTO: 31.4 PG (ref 26.5–33)
MCHC RBC AUTO-ENTMCNC: 32.3 G/DL (ref 31.5–36.5)
MCV RBC AUTO: 97 FL (ref 78–100)
MONOCYTES # BLD AUTO: 0.5 10E9/L (ref 0–1.3)
MONOCYTES NFR BLD AUTO: 6.9 %
NEUTROPHILS # BLD AUTO: 5.3 10E9/L (ref 1.6–8.3)
NEUTROPHILS NFR BLD AUTO: 67.8 %
PLATELET # BLD AUTO: 312 10E9/L (ref 150–450)
RBC # BLD AUTO: 4.17 10E12/L (ref 4.4–5.9)
WBC # BLD AUTO: 7.8 10E9/L (ref 4–11)

## 2017-06-13 PROCEDURE — 80053 COMPREHEN METABOLIC PANEL: CPT | Performed by: FAMILY MEDICINE

## 2017-06-13 PROCEDURE — 85025 COMPLETE CBC W/AUTO DIFF WBC: CPT | Performed by: FAMILY MEDICINE

## 2017-06-13 PROCEDURE — 36415 COLL VENOUS BLD VENIPUNCTURE: CPT | Performed by: FAMILY MEDICINE

## 2017-06-14 LAB
ALBUMIN SERPL-MCNC: 3.2 G/DL (ref 3.4–5)
ALP SERPL-CCNC: 122 U/L (ref 40–150)
ALT SERPL W P-5'-P-CCNC: 18 U/L (ref 0–70)
ANION GAP SERPL CALCULATED.3IONS-SCNC: 7 MMOL/L (ref 3–14)
AST SERPL W P-5'-P-CCNC: 14 U/L (ref 0–45)
BILIRUB SERPL-MCNC: 0.2 MG/DL (ref 0.2–1.3)
BUN SERPL-MCNC: 18 MG/DL (ref 7–30)
CALCIUM SERPL-MCNC: 8.1 MG/DL (ref 8.5–10.1)
CHLORIDE SERPL-SCNC: 116 MMOL/L (ref 94–109)
CO2 SERPL-SCNC: 23 MMOL/L (ref 20–32)
CREAT SERPL-MCNC: 1 MG/DL (ref 0.66–1.25)
GFR SERPL CREATININE-BSD FRML MDRD: 80 ML/MIN/1.7M2
GLUCOSE SERPL-MCNC: 133 MG/DL (ref 70–99)
POTASSIUM SERPL-SCNC: 4.3 MMOL/L (ref 3.4–5.3)
PROT SERPL-MCNC: 6.6 G/DL (ref 6.8–8.8)
SODIUM SERPL-SCNC: 146 MMOL/L (ref 133–144)

## 2017-06-14 NOTE — TELEPHONE ENCOUNTER
Patient mailed back signed copy of Temodar drug replacement application.    I have faxed this to [employer] along with medication and problem list.

## 2017-06-15 ENCOUNTER — OFFICE VISIT (OUTPATIENT)
Dept: PSYCHOLOGY | Facility: CLINIC | Age: 47
End: 2017-06-15
Payer: COMMERCIAL

## 2017-06-15 DIAGNOSIS — Z63.5 MARITAL ESTRANGEMENT: ICD-10-CM

## 2017-06-15 DIAGNOSIS — F32.1 MAJOR DEPRESSIVE DISORDER, SINGLE EPISODE, MODERATE WITH MELANCHOLIC FEATURES (H): Primary | ICD-10-CM

## 2017-06-15 PROCEDURE — 90834 PSYTX W PT 45 MINUTES: CPT | Performed by: MARRIAGE & FAMILY THERAPIST

## 2017-06-15 SDOH — SOCIAL STABILITY - SOCIAL INSECURITY: DISRUPTION OF FAMILY BY SEPARATION AND DIVORCE: Z63.5

## 2017-06-15 NOTE — PROGRESS NOTES
Progress Note    Client Name: Joni Borja  Date: 6/15/2017         Service Type: Individual       Session Start Time: 2:05pm  Session End Time: 2:50pm      Session Length: 45 minutes     Session #: 11     Attendees: Client attended alone     Treatment Plan Last Reviewed: 5/18/2017  PHQ-9 / TONI-7 : 5/30/2017     DATA      Progress Since Last Session (Related to Symptoms / Goals / Homework):   Symptoms: Stable     Homework: Partially completed      Episode of Care Goals: Satisfactory progress - ACTION (Actively working towards change); Intervened by reinforcing change plan / affirming steps taken     Current / Ongoing Stressors and Concerns:   - Sx of depression as a result of years of marital distress that led to present separation, working towards divorce   - difficulties coping with medical problems which had led to limitation (driving) and work disability  Client reports he was waiting for his wife to drop the OFP in order to start couples counseling, but that never happened. She has not made any progress towards repairing the relationship. Despite the pain and disappointment the client realizes he needs to get a divorce and will be mailing the retainer to formally hire his  and proceed with the divorce. For the time being he will focus on improving his quality of life living at his parents home. He will get back into the community of mechanics and boating in his area. He will use a bicycle for transportation to regain some of his needed independence.        Treatment Objective(s) Addressed in This Session:   identify 2 strategies to more effectively address stressors  Identify negative self-talk and behaviors: challenge core beliefs, myths, and actions  Improve concentration, focus, and mindfulness in daily activities   learn & utilize at least 2 assertive communication skills weekly      Intervention:   CBT: operant conditioning, identifying triggers and  patterns, identifying alternatives  Solution Focused: miracle question, healthy marriage, couples counseling  Structural: balance, roles, rules, past patterns vs desired patterns    Assertive communication        ASSESSMENT: Current Emotional / Mental Status (status of significant symptoms):   Risk status (Self / Other harm or suicidal ideation)   Client denies current fears or concerns for personal safety.   Client denies current or recent suicidal ideation or behaviors.   Client denies current or recent homicidal ideation or behaviors.   Client denies current or recent self injurious behavior or ideation.   Client denies other safety concerns.   A safety and risk management plan has not been developed at this time, however client was given the after-hours number / 911 should there be a change in any of these risk factors.     Appearance:   Appropriate    Eye Contact:   Good    Psychomotor Behavior: Normal    Attitude:   Cooperative    Orientation:   All   Speech    Rate / Production: Normal     Volume:  Normal    Mood:    Depressed    Affect:    Appropriate  Worrisome    Thought Content:  Clear    Thought Form:  Coherent  Goal Directed  Logical    Insight:    Fair      Medication Review:   No changes to current psychiatric medication(s)     Medication Compliance:   Yes     Changes in Health Issues:   None reported     Chemical Use Review:   Substance Use: Chemical use reviewed, no active concerns identified      Tobacco Use: No change in amount of tobacco use since last session.  Patient declined discussion at this time     Collateral Reports Completed:   Not Applicable    PLAN: (Client Tasks / Therapist Tasks / Other)  Client will start to use a bicycle to get out of the house and into the community. He will seek out connections with people in the auto/ industry to get back to his personal interests and peer social supports. Client will stop contacting his wife by text and let his  lead in  "how to handle communication about the divorce .      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   WHODAS 2.0 (12 item) 16.67% on 2/14/2017    Referral / Collaboration:  Referral to another professional/service is not indicated at this time..    Anticipated number of session or this episode of care: 10      MeasurableTreatment Goal(s) related to diagnosis / functional impairment(s)  Goal 1: Client's depression will remit as evidenced by a decrease in PHQ9 score by at least 6 points or below a five, where symptoms occur fewer than half the days.   I will know I've met my goal when I \"clear my head and work on myself.      Objective #A (Client Action)   Client will decrease frequency and intensity of feeling down, depressed, hopeless  Client will increase self-awareness of symptom onset/escalation  Status: Continued - Date: 5/18/2017    Intervention(s)  Therapist will assign homework track symptoms, patterns and triggers  provide psycho-education on depression  Therapist will teach CBT strategies for attending to negative self-talk and cognitive distortions.  ACT: experiential exercises and mindfulness practices, how  to live with life barriers    Objective #B  Client will Increase interest, engagement, and pleasure in doing things  Identify negative self-talk and behaviors: challenge core beliefs, myths, and " "actions  Status: Continued - Date: 5/18/2017    Intervention(s)  Therapist will assign homework to practice using coping skills outside the therapy encounter, worksheets to challenge negative thoughts  teach distraction skills. explore and tailor enjoyable activities, increase social activities, strengthen social supports  ACT: life values and being mindful of values for goals and daily living    Objective #C  Improve concentration, focus, and mindfulness in daily activities   Status: Continued - Date: 5/18/2017    Intervention(s)  provide safe space to address hx of presenting problem and root cause  teach emotional regulation skills. mindfulness practices, grounding skills, affirmations, strengths based approach  Sandtray: exercise to stage presenting problem, reflect, process and problem solve.      Goal 2: Client will improve his condition of interactions in his relationships (specifically with wife/) establishing healthy, balanced and appropriate boundaries to be kept 100% of the time for a minimum of 4 weeks.     I will know I've met my goal when I \"get my own place. Get the rest of my valuables from the house.      Objective #A (Client Action)      Client will compile a list of boundaries that they would like to set with others. Wife, adult step-children, family members  Learning to fight fair, learning to identify positive and negative communication patterns  Status: Continued - Date: 5/18/2017    Intervention(s)  Therapist will teach about healthy boundaries. structure, saying \"no\", advocating, identifying and establishing appropriate roles, rules, expectations.    Objective #B  Client will practice setting boundaries daily times in the next 12 weeks.                    Status: Continued - Date: 5/18/2017    Intervention(s)  Therapist will assign homework to track the use of healthy boundaries and outcome of establishing relational change  role-play effective communication skills and conflict " management    Objective #C  Client will learn & utilize at least 3 assertive communication skills weekly.  Status: Continued - Date: 5/18/2017    Intervention(s)  teach assertiveness skills. aggressive/passive/assertive, impulsive control, reactive vs sensitive, exposure to uncomfortable conversation.  Therapist will role-play assertiveness skills      Client has reviewed and agreed to the above plan.      EMILY Donahue, [facility] Medical CenterSW  [place] 15, 2017

## 2017-06-15 NOTE — MR AVS SNAPSHOT
MRN:[id number]                      After Visit Summary   6/15/2017    Joni Borja    MRN: [id number]           Visit Information        Provider Department      6/15/2017 2:00 PM Flex Lombardo LMFT [facility]      Your next 10 appointments already scheduled     Jun 27, 2017  3:30 PM CDT   Return Visit with Berkley Daniels MD   [facility] ([facility]    [facility]  [address]  [facility] [address]   [phone]            Jun 28, 2017  2:00 PM CDT   LAB with RV LAB   [facility]    [address] [place] [address] [phone]           Patient must bring picture ID.  Patient should be prepared to give a urine specimen  Please do not eat 10-12 hours before your appointment if you are coming in fasting for labs on lipids, cholesterol, or glucose (sugar).  Pregnant women should follow their Care Team instructions. Water with medications is okay. Do not drink coffee or other fluids.   If you have concerns about taking  your medications, please ask at office or if scheduling via [employer]apps, send a message by clicking on Secure Messaging, Message Your Care Team.            Jul 05, 2017  2:00 PM CDT   LAB with RV LAB   [facility]    [address] [place] [address] [phone]           Patient must bring picture ID.  Patient should be prepared to give a urine specimen  Please do not eat 10-12 hours before your appointment if you are coming in fasting for labs on lipids, cholesterol, or glucose (sugar).  Pregnant women should follow their Care Team instructions. Water with medications is okay. Do not drink coffee or other fluids.   If you have concerns about taking  your medications, please ask at office or if scheduling via [employer]apps, send a message by  clicking on Secure Messaging, Message Your Care Team.            Jul 06, 2017  4:00 PM CDT   Return Visit with EMILY Donahue   [facility] ([facility])    [address] [address] [phone]            Jul 11, 2017  2:00 PM CDT   LAB with RV LAB   [facility]    [address] [place] [address] [phone]           Patient must bring picture ID.  Patient should be prepared to give a urine specimen  Please do not eat 10-12 hours before your appointment if you are coming in fasting for labs on lipids, cholesterol, or glucose (sugar).  Pregnant women should follow their Care Team instructions. Water with medications is okay. Do not drink coffee or other fluids.   If you have concerns about taking  your medications, please ask at office or if scheduling via [employer], send a message by clicking on Secure Messaging, Message Your Care Team.            Jul 18, 2017  2:00 PM CDT   LAB with RV LAB   [facility]    [address] [place] [address] [phone]           Patient must bring picture ID.  Patient should be prepared to give a urine specimen  Please do not eat 10-12 hours before your appointment if you are coming in fasting for labs on lipids, cholesterol, or glucose (sugar).  Pregnant women should follow their Care Team instructions. Water with medications is okay. Do not drink coffee or other fluids.   If you have concerns about taking  your medications, please ask at office or if scheduling via [employer], send a message by clicking on Secure Messaging, Message Your Care Team.            Jul 20, 2017  2:00 PM CDT   Return Visit with EMILY Donahue   [facility] ([facility])    [address] [address] [phone]            Jul 25, 2017  2:00 PM CDT   LAB with RV LAB    [facility]    [address] [place] [address] [phone]           Patient must bring picture ID.  Patient should be prepared to give a urine specimen  Please do not eat 10-12 hours before your appointment if you are coming in fasting for labs on lipids, cholesterol, or glucose (sugar).  Pregnant women should follow their Care Team instructions. Water with medications is okay. Do not drink coffee or other fluids.   If you have concerns about taking  your medications, please ask at office or if scheduling via [employer], send a message by clicking on Secure Messaging, Message Your Care Team.            Jul 31, 2017  2:00 PM CDT   LAB with RV LAB   [facility]    [address] [place] [address] [phone]           Patient must bring picture ID.  Patient should be prepared to give a urine specimen  Please do not eat 10-12 hours before your appointment if you are coming in fasting for labs on lipids, cholesterol, or glucose (sugar).  Pregnant women should follow their Care Team instructions. Water with medications is okay. Do not drink coffee or other fluids.   If you have concerns about taking  your medications, please ask at office or if scheduling via [employer], send a message by clicking on Secure Messaging, Message Your Care Team.            Aug 01, 2017  3:30 PM CDT   Return Visit with Berkley Daniels MD   [facility] ([facility])    [facility]  [address]  [facility] [address]   [phone]              [employer] Information     [employer] gives you secure access to your electronic health record. If you see a primary care provider, you can also send messages to your care team and make appointments. If you have questions, please call your primary care clinic.  If you do not have a primary care provider,  please call [phone] and they will assist you.        Care EveryWhere ID     This is your Care EveryWhere ID. This could be used by other organizations to access your [facility] medical records  [id number]        Equal Access to Services     KAYLIE DE LA GARZA: Abril Orellana, yuan day, awais kaalkateryna jack, es de la garza. So [facility] [phone].    ATENCIÓN: Si habla español, tiene a kirby disposición servicios gratuitos de asistencia lingüística. Llame al [phone].    We comply with applicable federal civil rights laws and [place] laws. We do not discriminate on the basis of race, color, national origin, age, disability sex, sexual orientation or gender identity.

## 2017-06-16 ENCOUNTER — TELEPHONE (OUTPATIENT)
Dept: PHARMACY | Facility: CLINIC | Age: 47
End: 2017-06-16

## 2017-06-16 NOTE — TELEPHONE ENCOUNTER
Patient has been approved for free Temodar from the manufacture [employer] till 12/31/2017. A new application needs to be submitted every calendar year.     [employer] Patient assistance program Telephone: [phone], Fax: [phone].     Pharmacy that [employer] uses to dispense the Temodar is [facility]. The pharmacy does not accept electronic prescriptions but they will take verbal or faxed orders.  [facility] - [facility] PATIENT ASSISTANCE PROGRAM [phone] (Phone)  [phone] (Fax)     Received #20 capsules of the 100mg on 6/16/17

## 2017-06-16 NOTE — ORAL ONC MGMT
Oral Chemotherapy Monitoring Program    Primary Oncologist: Dr. Daniels  Primary Oncology Clinic: [facility] Diagnosis: Oligodendroglioma    Therapy History:  Started temodar 150mg/m2 (300mg x 5 days) on 5/12/17; increase for C2 to 200mg/m2 (400mg x 5 days)    Drug Interaction Assessment: none    Lab Monitoring Plan  C1D1+   CMP, CBC C2D1+ Call, CMP, CBC C3D1+ Call, CMP, CBC C4D1+ Call, CMP, CBC C5D1+ Call, CMP, CBC C6D1+ Call, CMP, CBC   C1D8+    C2D8+    C3D8+    C4D8+    C5D8+    C6D8+      C1D15+ Call C2D15+    C3D15+    C4D15+    C5D15+    C6D15+      C1D22+    C2D22+    C3D22+    C4D22+    C5D22+    C6D22+            Subjective/Objective:  Joni Borja is a 46 year old male contacted by phone for a follow-up visit for oral chemotherapy.  Spoke with Joni today to F/U about his dose increase this past cycle. Patient started temodar on the evening on 6/9 and took for 5 days. Joni states that it went very well at the increased dose of 400mg this cycle. He felt a little sick again on D2, but it was manageable. He continues to use his zofran and has enough of that for the time being. He states he normally doesn't sleep as well during the temodar days, but does not feel worn down or overly fatigued due to that. He also received a package from [employer] while we were on the phone. It contained his free drug for the next cycle. Confirmed that it was the 100mg capsules and the directions stated to take 4 capsules daily. He is aware that he is to set that aside for his next cycle which is still not for a few weeks.    ORAL CHEMOTHERAPY 5/9/2017 6/6/2017 6/16/2017   Drug Name Temodar (Temozolomide) Temodar (Temozolomide) Temodar (Temozolomide)   Current Dosage 300mg 400mg 400mg   Current Schedule Daily Daily Daily   Cycle Details Other Other Other   Start Date of Last Cycle 5/10/2017 6/9/2017 6/9/2017   Planned next cycle start date 6/7/2017 7/7/2017 7/7/2017   Doses missed in last 2 weeks - 0 0   Adherence Assessment  "- Adherent Adherent   Adverse Effects - No AE identified during assessment No AE identified during assessment   Home BPs - not needed not needed   Any new drug interactions? - No No   Is the dose as ordered appropriate for the patient? - Yes Yes   Is the patient currently in pain? - No -   Has the patient been assessed within the past 6 months for depression? - Yes -       Last PHQ-2 Score on record:   PHQ-2 ( 1999 [employer]) 5/17/2017 10/28/2016   Q1: Little interest or pleasure in doing things 0 2   Q2: Feeling down, depressed or hopeless 0 1   PHQ-2 Score 0 3       Patient does not report depression symptoms.      Vitals:  BP:   BP Readings from Last 1 Encounters:   06/06/17 123/76     Wt Readings from Last 1 Encounters:   06/06/17 81 kg (178 lb 9.6 oz)     Estimated body surface area is 1.99 meters squared as calculated from the following:    Height as of 5/17/17: 1.753 m (5' 9.02\").    Weight as of 6/6/17: 81 kg (178 lb 9.6 oz).    Labs:  Lab Results   Component Value Date     06/13/2017      Lab Results   Component Value Date    POTASSIUM 4.3 06/13/2017     Lab Results   Component Value Date    SHABBIR 8.1 06/13/2017     Lab Results   Component Value Date    ALBUMIN 3.2 06/13/2017     Lab Results   Component Value Date    MAG 2.2 09/22/2013     Lab Results   Component Value Date    PHOS 3.7 09/23/2013     Lab Results   Component Value Date    BUN 18 06/13/2017     Lab Results   Component Value Date    CR 1.00 06/13/2017       Lab Results   Component Value Date    AST 14 06/13/2017     Lab Results   Component Value Date    ALT 18 06/13/2017     Lab Results   Component Value Date    BILITOTAL 0.2 06/13/2017       Lab Results   Component Value Date    WBC 7.8 06/13/2017     Lab Results   Component Value Date    HGB 13.1 06/13/2017     Lab Results   Component Value Date     06/13/2017     Lab Results   Component Value Date    ANEU 5.3 06/13/2017       Labs WNL    Assessment:  Patient has tolerated the " increased dose very well and has no complaints at this time.    Plan:  Continue to follow weekly labs. Joni understands this plan. He has an appt with Dr. Daniels on 6/27 and his next start is scheduled for 7/7. He already has his free drug for that cycle.    Follow-Up:  1 week with repeat labs    Refill Due:  7/7/17 but patient already has his free drug from [employer] for this next cycle.    Malinda Morris PharmD  June 16, 2017

## 2017-06-20 ENCOUNTER — DOCUMENTATION ONLY (OUTPATIENT)
Dept: PHARMACY | Facility: CLINIC | Age: 47
End: 2017-06-20

## 2017-06-20 DIAGNOSIS — C71.9 OLIGODENDROGLIOMA, ANAPLASTIC (H): ICD-10-CM

## 2017-06-20 DIAGNOSIS — T45.1X5A CHEMOTHERAPY INDUCED NEUTROPENIA (H): ICD-10-CM

## 2017-06-20 DIAGNOSIS — D70.1 CHEMOTHERAPY INDUCED NEUTROPENIA (H): ICD-10-CM

## 2017-06-20 LAB
BASOPHILS # BLD AUTO: 0.1 10E9/L (ref 0–0.2)
BASOPHILS NFR BLD AUTO: 0.5 %
DIFFERENTIAL METHOD BLD: ABNORMAL
EOSINOPHIL # BLD AUTO: 0.5 10E9/L (ref 0–0.7)
EOSINOPHIL NFR BLD AUTO: 5.9 %
ERYTHROCYTE [DISTWIDTH] IN BLOOD BY AUTOMATED COUNT: 13.7 % (ref 10–15)
HCT VFR BLD AUTO: 40.5 % (ref 40–53)
HGB BLD-MCNC: 13.2 G/DL (ref 13.3–17.7)
LYMPHOCYTES # BLD AUTO: 2.1 10E9/L (ref 0.8–5.3)
LYMPHOCYTES NFR BLD AUTO: 23.2 %
MCH RBC QN AUTO: 31.7 PG (ref 26.5–33)
MCHC RBC AUTO-ENTMCNC: 32.6 G/DL (ref 31.5–36.5)
MCV RBC AUTO: 97 FL (ref 78–100)
MONOCYTES # BLD AUTO: 1 10E9/L (ref 0–1.3)
MONOCYTES NFR BLD AUTO: 10.6 %
NEUTROPHILS # BLD AUTO: 5.5 10E9/L (ref 1.6–8.3)
NEUTROPHILS NFR BLD AUTO: 59.8 %
PLATELET # BLD AUTO: 311 10E9/L (ref 150–450)
RBC # BLD AUTO: 4.17 10E12/L (ref 4.4–5.9)
WBC # BLD AUTO: 9.1 10E9/L (ref 4–11)

## 2017-06-20 PROCEDURE — 36415 COLL VENOUS BLD VENIPUNCTURE: CPT | Performed by: FAMILY MEDICINE

## 2017-06-20 PROCEDURE — 85025 COMPLETE CBC W/AUTO DIFF WBC: CPT | Performed by: FAMILY MEDICINE

## 2017-06-20 NOTE — PROGRESS NOTES
Oral Chemotherapy Monitoring Program.    Patient currently on temodar therapy.    Reviewed labs from 6/20/17    No concerning abnormalities.    Will follow up in 1 week with repeat labs    Malinda Morris PharmVESNA  June 20, 2017

## 2017-06-27 ENCOUNTER — ONCOLOGY VISIT (OUTPATIENT)
Dept: ONCOLOGY | Facility: CLINIC | Age: 47
End: 2017-06-27
Attending: PSYCHIATRY & NEUROLOGY
Payer: COMMERCIAL

## 2017-06-27 VITALS
HEART RATE: 64 BPM | RESPIRATION RATE: 12 BRPM | WEIGHT: 178.4 LBS | TEMPERATURE: 98.9 F | BODY MASS INDEX: 26.33 KG/M2 | SYSTOLIC BLOOD PRESSURE: 109 MMHG | OXYGEN SATURATION: 100 % | DIASTOLIC BLOOD PRESSURE: 71 MMHG

## 2017-06-27 DIAGNOSIS — T45.1X5A CHEMOTHERAPY-INDUCED NAUSEA AND VOMITING: ICD-10-CM

## 2017-06-27 DIAGNOSIS — K02.9 DENTAL CARIES: ICD-10-CM

## 2017-06-27 DIAGNOSIS — K08.89 PAIN, DENTAL: ICD-10-CM

## 2017-06-27 DIAGNOSIS — C71.9 OLIGODENDROGLIOMA, ANAPLASTIC (H): Primary | ICD-10-CM

## 2017-06-27 DIAGNOSIS — Z72.0 TOBACCO ABUSE: ICD-10-CM

## 2017-06-27 DIAGNOSIS — R11.2 CHEMOTHERAPY-INDUCED NAUSEA AND VOMITING: ICD-10-CM

## 2017-06-27 DIAGNOSIS — Z96.89 STATUS POST VNS (VAGUS NERVE STIMULATOR) PLACEMENT: ICD-10-CM

## 2017-06-27 DIAGNOSIS — T45.1X5A CHEMOTHERAPY INDUCED NEUTROPENIA (H): ICD-10-CM

## 2017-06-27 DIAGNOSIS — G40.209 PARTIAL EPILEPSY WITH IMPAIRMENT OF CONSCIOUSNESS (H): ICD-10-CM

## 2017-06-27 DIAGNOSIS — Z51.11 CHEMOTHERAPY MANAGEMENT, ENCOUNTER FOR: ICD-10-CM

## 2017-06-27 DIAGNOSIS — D70.1 CHEMOTHERAPY INDUCED NEUTROPENIA (H): ICD-10-CM

## 2017-06-27 PROCEDURE — 99215 OFFICE O/P EST HI 40 MIN: CPT | Performed by: PSYCHIATRY & NEUROLOGY

## 2017-06-27 PROCEDURE — 99211 OFF/OP EST MAY X REQ PHY/QHP: CPT

## 2017-06-27 PROCEDURE — 99212 OFFICE O/P EST SF 10 MIN: CPT

## 2017-06-27 ASSESSMENT — PAIN SCALES - GENERAL: PAINLEVEL: EXTREME PAIN (9)

## 2017-06-27 NOTE — PATIENT INSTRUCTIONS
Go to the dentist; [facility] [phone].   OK to have whatever dental procedure is needed.  Call our clinic if/ when a surgery/ procedure date is arranged;  -We will let you know about chemo start date.  -Temozolomide dose of 400mg nightly for 5 nights.     Continue with weekly lab.     Smoking cessation program has not contacted him yet, we will follow-up.     Clinic follow-up appt and imaging is already scheduled.     Berkley Daniels MD  Neuro-oncology  6/27/2017

## 2017-06-27 NOTE — PROGRESS NOTES
"Oncology Rooming Note    June 27, 2017 3:47 PM   Joni Borja is a 46 year old male who presents for:    Chief Complaint   Patient presents with     Oncology Clinic Visit     Initial Vitals: /71 (BP Location: Left arm, Patient Position: Chair, Cuff Size: Adult Regular)  Pulse 64  Temp 98.9  F (37.2  C) (Oral)  Resp 12  Wt 80.9 kg (178 lb 6.4 oz)  SpO2 100%  BMI 26.33 kg/m2 Estimated body mass index is 26.33 kg/(m^2) as calculated from the following:    Height as of 5/17/17: 1.753 m (5' 9.02\").    Weight as of this encounter: 80.9 kg (178 lb 6.4 oz). Body surface area is 1.98 meters squared.  Extreme Pain (9) Comment: tooth   No LMP for male patient.  Allergies reviewed: Yes  Medications reviewed: Yes    Medications: Medication refills not needed today.  Pharmacy name entered into [facility]:    [id number] - [place], [place] - [address] [facility] - [place], [place] - [address] [place] [address]  [facility] - [employer] PATIENT ASSISTANCE PROGRAM    Clinical concerns: None     5 minutes for nursing intake (face to face time)     Maria Luz Calhoun CMA    DISCHARGE PLAN:  Next appointments: See patient instruction section--previously scheduled.  Departure Mode: Ambulatory  Accompanied by: mother/ father  15 minutes for nursing discharge (face to face time)   Gina Ramos RN    Go to the dentist; [facility] [phone].   OK to have whatever dental procedure is needed.  Call our clinic if/ when a surgery/ procedure date is arranged;  -We will let you know about chemo start date.  -Temozolomide dose of 400mg nightly for 5 nights.     Continue with weekly lab.     Smoking cessation program has not contacted him yet, we will follow-up.--6-30-17: Pt was given phone # to call Quit for Life Program #: [phone] (a part of [employer]) & Pt verbalized understanding.    Clinic follow-up appt and imaging is already " scheduled.     Berkley Hagan MD  Neuro-oncology  6/27/2017       R 6/28/2017 Wed  2:00 PM  2:00 P 15 RV[facility] [[id number]] RV LAB [[id number]] LAB [930] labs per Dr. Jeremiah THAYER 7/5/2017 Wed  2:00 PM  2:00 P 15 RVLAB [[id number]] RV LAB [[id number]] LAB [930] labs per Dr. Hagan       7/11/2017 Tue  2:00 PM  2:00 P 15 RVLAB [[id number]] RV LAB [[id number]] LAB [930] labs per Dr. Hagan      R 7/18/2017 Tue  2:00 PM  2:00 P 15 RV[facility] [[id number]] RV LAB [[id number]] LAB [930] labs per Dr. Jeremiah THAYER 7/25/2017 Tue  2:00 PM  2:00 P 15 RV[facility] [[id number]] RV LAB [[id number]] LAB [930] labs per Dr. Jeremiah THAYER 7/31/2017 Mon  2:00 PM  2:00 P 15 RVLAB [[id number]] RV LAB [[id number]] LAB [930] labs per Dr. Hagan       8/1/2017 Tue 12:00 PM 12:00 P 60 SHMR2 [[id number]] SHMRP1 [[id number]] MR BRAIN WWO [[id number]] epic order, sb Jaimie, nurse will contact regarding the nerve stimulator  MR BRAIN W/O & W CONTRAST [[id number]]     8/1/2017 Tue  3:30 PM  3:30 P 30 [facility] [[id number]] BERKLEY HAGAN [[id number]] RETURN [657] Return Glioma
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
## 2017-06-27 NOTE — MR AVS SNAPSHOT
After Visit Summary   6/27/2017    Joni Borja    MRN: [id number]           Patient Information     Date Of Birth          1970        Visit Information        Provider Department      6/27/2017 3:30 PM Berkley Daniels MD [facility]        Today's Diagnoses     Oligodendroglioma, anaplastic (H)    -  1    Chemotherapy management, encounter for        Chemotherapy-induced nausea and vomiting        Chemotherapy induced neutropenia (H)        Tobacco abuse        Status post VNS (vagus nerve stimulator) placement - for epilepsy        Partial epilepsy with impairment of consciousness (H)        Dental caries        Pain, dental          Care Instructions    Go to the dentist; [facility] [phone].   OK to have whatever dental procedure is needed.  Call our clinic if/ when a surgery/ procedure date is arranged;  -We will let you know about chemo start date.  -Temozolomide dose of 400mg nightly for 5 nights.     Continue with weekly lab.     Smoking cessation program has not contacted him yet, we will follow-up.     Clinic follow-up appt and imaging is already scheduled.     Berkley Daniels MD  Neuro-oncology  6/27/2017                   Follow-ups after your visit        Your next 10 appointments already scheduled     Jun 28, 2017  2:00 PM CDT   LAB with RV LAB   [facility]    [address] [place] [id number] [phone]           Patient must bring picture ID.  Patient should be prepared to give a urine specimen  Please do not eat 10-12 hours before your appointment if you are coming in fasting for labs on lipids, cholesterol, or glucose (sugar).  Pregnant women should follow their Care Team instructions. Water with medications is okay. Do not drink coffee or other fluids.   If you have concerns about taking  your medications, please ask at office or if scheduling  via ENJORE, send a message by clicking on Secure Messaging, Message Your Care Team.            Jul 05, 2017  2:00 PM CDT   LAB with RV LAB   [facility]    [address] [place] [id number] [phone]           Patient must bring picture ID.  Patient should be prepared to give a urine specimen  Please do not eat 10-12 hours before your appointment if you are coming in fasting for labs on lipids, cholesterol, or glucose (sugar).  Pregnant women should follow their Care Team instructions. Water with medications is okay. Do not drink coffee or other fluids.   If you have concerns about taking  your medications, please ask at office or if scheduling via ENJORE, send a message by clicking on Secure Messaging, Message Your Care Team.            Jul 06, 2017  4:00 PM CDT   Return Visit with EMILY Donahue   [facility] ([facility])    [address] [place] [id number] [phone]            Jul 11, 2017  2:00 PM CDT   LAB with RV LAB   [facility]    [address] [place] [id number] [phone]           Patient must bring picture ID.  Patient should be prepared to give a urine specimen  Please do not eat 10-12 hours before your appointment if you are coming in fasting for labs on lipids, cholesterol, or glucose (sugar).  Pregnant women should follow their Care Team instructions. Water with medications is okay. Do not drink coffee or other fluids.   If you have concerns about taking  your medications, please ask at office or if scheduling via ENJORE, send a message by clicking on Secure Messaging, Message Your Care Team.            Jul 18, 2017  2:00 PM CDT   LAB with RV LAB   [facility]    [address] [place] [id number] [phone]           Patient must bring  picture ID.  Patient should be prepared to give a urine specimen  Please do not eat 10-12 hours before your appointment if you are coming in fasting for labs on lipids, cholesterol, or glucose (sugar).  Pregnant women should follow their Care Team instructions. Water with medications is okay. Do not drink coffee or other fluids.   If you have concerns about taking  your medications, please ask at office or if scheduling via Social Studios, send a message by clicking on Secure Messaging, Message Your Care Team.            Jul 20, 2017  2:00 PM CDT   Return Visit with EMILY Donahue   [facility] ([facility])    [address] [facility] [address] [phone]            Jul 25, 2017  2:00 PM CDT   LAB with RV LAB   [facility]    [address] [place] [phone] [phone]           Patient must bring picture ID.  Patient should be prepared to give a urine specimen  Please do not eat 10-12 hours before your appointment if you are coming in fasting for labs on lipids, cholesterol, or glucose (sugar).  Pregnant women should follow their Care Team instructions. Water with medications is okay. Do not drink coffee or other fluids.   If you have concerns about taking  your medications, please ask at office or if scheduling via Social Studios, send a message by clicking on Secure Messaging, Message Your Care Team.            Jul 31, 2017  2:00 PM CDT   LAB with RV LAB   [facility]    [address] [place] [id number] [phone]           Patient must bring picture ID.  Patient should be prepared to give a urine specimen  Please do not eat 10-12 hours before your appointment if you are coming in fasting for labs on lipids, cholesterol, or glucose (sugar).  Pregnant women should follow their Care Team instructions. Water with medications is okay. Do not  drink coffee or other fluids.   If you have concerns about taking  your medications, please ask at office or if scheduling via Spark Mobile, send a message by clicking on Secure Messaging, Message Your Care Team.            Aug 01, 2017 12:00 PM CDT   MR BRAIN W/O & W CONTRAST with SHMRP1   [facility] ([facility])    [address] [place] [id number]   [phone]           Take your medicines as usual, unless your doctor tells you not to. Bring a list of your current medicines to your exam (including vitamins, minerals and over-the-counter drugs).  You will be given intravenous contrast for this exam. To prepare:   The day before your exam, drink extra fluids at least six 8-ounce glasses (unless your doctor tells you to restrict your fluids).   Have a blood test (creatinine test) within 30 days of your exam. Go to your clinic or Diagnostic Imaging Department for this test.  The MRI machine uses a strong magnet. Please wear clothes without metal (snaps, zippers). A sweatsuit works well, or we may give you a hospital gown.  Please remove any body piercings and hair extensions before you arrive. You will also remove watches, jewelry, hairpins, wallets, dentures, partial dental plates and hearing aids. You may wear contact lenses, and you may be able to wear your rings. We have a safe place to keep your personal items, but it is safer to leave them at home.   **IMPORTANT** THE INSTRUCTIONS BELOW ARE ONLY FOR THOSE PATIENTS WHO HAVE BEEN TOLD THEY WILL RECEIVE SEDATION OR GENERAL ANESTHESIA DURING THEIR MRI PROCEDURE:  IF YOU WILL RECEIVE SEDATION (take medicine to help you relax during your exam):   You must get the medicine from your doctor before you arrive. Bring the medicine to the exam. Do not take it at home.   Arrive one hour early. Bring someone who can take you home after the test. Your medicine will make you sleepy. After the exam, you may not drive, take a bus or take a  taxi by yourself.   No eating 8 hours before your exam. You may have clear liquids up until 4 hours before your exam. (Clear liquids include water, clear tea, black coffee and fruit juice without pulp.)  IF YOU WILL RECEIVE ANESTHESIA (be asleep for your exam):   Arrive 1 1/2 hours early. Bring someone who can take you home after the test. You may not drive, take a bus or take a taxi by yourself.   No eating 8 hours before your exam. You may have clear liquids up until 4 hours before your exam. (Clear liquids include water, clear tea, black coffee and fruit juice without pulp.)  Please call the Imaging Department at your exam site with any questions.            Aug 01, 2017  3:30 PM CDT   Return Visit with Berkley Daniels MD   [facility] ([facility])    [facility]  [address]  [facility] [id number] [phone]              Who to contact     If you have questions or need follow up information about today's clinic visit or your schedule please contact [facility] directly at [phone].  Normal or non-critical lab and imaging results will be communicated to you by MyChart, letter or phone within 4 business days after the clinic has received the results. If you do not hear from us within 7 days, please contact the clinic through Svpplyhart or phone. If you have a critical or abnormal lab result, we will notify you by phone as soon as possible.  Submit refill requests through [employer] or call your pharmacy and they will forward the refill request to us. Please allow 3 business days for your refill to be completed.          Additional Information About Your Visit        [employer] Information     [employer] gives you secure access to your electronic health record. If you see a primary care provider, you can also send messages to your care team and make appointments. If you have questions, please call your primary care clinic.  If you do not  have a primary care provider, please call [phone] and they will assist you.        Care EveryWhere ID     This is your Care EveryWhere ID. This could be used by other organizations to access your [place] medical records  [id number]        Your Vitals Were     Pulse Temperature Respirations Pulse Oximetry BMI (Body Mass Index)       64 98.9  F (37.2  C) (Oral) 12 100% 26.33 kg/m2        Blood Pressure from Last 3 Encounters:   06/27/17 109/71   06/06/17 123/76   05/17/17 137/69    Weight from Last 3 Encounters:   06/27/17 80.9 kg (178 lb 6.4 oz)   06/06/17 81 kg (178 lb 9.6 oz)   05/17/17 80.2 kg (176 lb 12.8 oz)              Today, you had the following     No orders found for display       Primary Care Provider Office Phone # Fax #    Brian Coon [phone]       [place] [address]        Equal Access to Services     [facility]: Hadii aad ku hadasho Soomaali, waaxda luqadaha, qaybta kaalmada adeegyada, waxay idiin haystar arriaga . So [facility] [phone].    ATENCIÓN: Si habla español, tiene a kirby disposición servicios gratuitos de asistencia lingüística. Llame al [phone].    We comply with applicable federal civil rights laws and [place] laws. We do not discriminate on the basis of race, color, national origin, age, disability sex, sexual orientation or gender identity.            Thank you!     Thank you for choosing [facility]  for your care. Our goal is always to provide you with excellent care. Hearing back from our patients is one way we can continue to improve our services. Please take a few minutes to complete the written survey that you may receive in the mail after your visit with us. Thank you!             Your Updated Medication List - Protect others around you: Learn how to safely use, store and throw away your medicines at www.disposemymeds.org.          This list is accurate as of:  6/27/17 11:59 PM.  Always use your most recent med list.                   Brand Name Dispense Instructions for use Diagnosis    acetaminophen-codeine 300-30 MG per tablet    TYLENOL #3     Take 1-2 tablets by mouth every 4 hours as needed for moderate pain As needed for headaches        BENADRYL PO      Take 50 mg by mouth daily as needed for allergies (misquitos,)    Localization-related (focal) (partial) epilepsy and epileptic syndromes with complex partial seizures, with intractable epilepsy, Depression, Anxiety, Unstable gait       carBAMazepine 300 MG 12 hr capsule    CARBATROL    180 capsule    Take 1 capsule (300 mg) by mouth 2 times daily (at 10:00 & 22:00)    Partial epilepsy with impairment of consciousness, intractable (H)       diazepam 5 MG tablet    VALIUM    30 tablet    Take 5 mg by mouth 2 times daily as needed for other (seizures.) As instructed for seizures. Please take 5 mg for more than 3 seizures in 8 hour period, may repeat 5 mg after 30 min if seizures continue.    Localization-related (focal) (partial) epilepsy and epileptic syndromes with complex partial seizures, with intractable epilepsy       felbamate 600 MG tablet    FELBATOL    225 tablet    Take 600 mg (one tablet) am and 900 mg (1.5 tablet) 2 pm    Partial epilepsy with impairment of consciousness, intractable (H)       KLONOPIN PO      Take 0.5 mg by mouth every morning        LEXAPRO PO      Take 20 mg by mouth At Bedtime        ondansetron 4 MG tablet    ZOFRAN    30 tablet    Take 1 tablet (4 mg) by mouth At Bedtime Take 30 minutes prior to chemotherapy dosing and repeat every 8 hours as needed for nausea.    Chemotherapy management, encounter for, Chemotherapy-induced nausea and vomiting       PROTONIX PO      Take 40 mg by mouth every morning        temozolomide 100 MG capsule CHEMO    TEMODAR    20 capsule    Take 4 capsules (400 mg) by mouth daily for 5 days Take ondansetron 30-60 min before temozolomide. Take at bedtime on  an empty stomach.    Oligodendroglioma, anaplastic (H)       topiramate 100 MG tablet    TOPAMAX    900 tablet    Take 2 tablets (200 mg) by mouth every morning 200 mg am and 300 mg pm    Partial epilepsy with impairment of consciousness, intractable (H)       VITAMIN B 12 PO      Take 1 tablet by mouth daily (with dinner)        ZYRTEC ALLERGY PO      Take 10 mg by mouth daily as needed (for misquitos.)    Localization-related (focal) (partial) epilepsy and epileptic syndromes with complex partial seizures, with intractable epilepsy, Depression, Anxiety, Unstable gait

## 2017-06-28 ENCOUNTER — DOCUMENTATION ONLY (OUTPATIENT)
Dept: PHARMACY | Facility: CLINIC | Age: 47
End: 2017-06-28

## 2017-06-28 DIAGNOSIS — D70.1 CHEMOTHERAPY INDUCED NEUTROPENIA (H): ICD-10-CM

## 2017-06-28 DIAGNOSIS — Z51.11 CHEMOTHERAPY MANAGEMENT, ENCOUNTER FOR: ICD-10-CM

## 2017-06-28 DIAGNOSIS — T45.1X5A CHEMOTHERAPY INDUCED NEUTROPENIA (H): ICD-10-CM

## 2017-06-28 DIAGNOSIS — C71.9 OLIGODENDROGLIOMA, ANAPLASTIC (H): ICD-10-CM

## 2017-06-28 LAB
ALBUMIN SERPL-MCNC: 3.3 G/DL (ref 3.4–5)
ALP SERPL-CCNC: 136 U/L (ref 40–150)
ALT SERPL W P-5'-P-CCNC: 19 U/L (ref 0–70)
ANION GAP SERPL CALCULATED.3IONS-SCNC: 7 MMOL/L (ref 3–14)
AST SERPL W P-5'-P-CCNC: 13 U/L (ref 0–45)
BASOPHILS # BLD AUTO: 0 10E9/L (ref 0–0.2)
BASOPHILS NFR BLD AUTO: 0.2 %
BILIRUB SERPL-MCNC: 0.3 MG/DL (ref 0.2–1.3)
BUN SERPL-MCNC: 15 MG/DL (ref 7–30)
CALCIUM SERPL-MCNC: 8.4 MG/DL (ref 8.5–10.1)
CHLORIDE SERPL-SCNC: 112 MMOL/L (ref 94–109)
CO2 SERPL-SCNC: 24 MMOL/L (ref 20–32)
CREAT SERPL-MCNC: 1.1 MG/DL (ref 0.66–1.25)
DIFFERENTIAL METHOD BLD: ABNORMAL
EOSINOPHIL # BLD AUTO: 0.4 10E9/L (ref 0–0.7)
EOSINOPHIL NFR BLD AUTO: 4.7 %
ERYTHROCYTE [DISTWIDTH] IN BLOOD BY AUTOMATED COUNT: 13.4 % (ref 10–15)
GFR SERPL CREATININE-BSD FRML MDRD: 72 ML/MIN/1.7M2
GLUCOSE SERPL-MCNC: 137 MG/DL (ref 70–99)
HCT VFR BLD AUTO: 39.6 % (ref 40–53)
HGB BLD-MCNC: 12.8 G/DL (ref 13.3–17.7)
LYMPHOCYTES # BLD AUTO: 2 10E9/L (ref 0.8–5.3)
LYMPHOCYTES NFR BLD AUTO: 23.7 %
MCH RBC QN AUTO: 31.5 PG (ref 26.5–33)
MCHC RBC AUTO-ENTMCNC: 32.3 G/DL (ref 31.5–36.5)
MCV RBC AUTO: 98 FL (ref 78–100)
MONOCYTES # BLD AUTO: 0.7 10E9/L (ref 0–1.3)
MONOCYTES NFR BLD AUTO: 7.7 %
NEUTROPHILS # BLD AUTO: 5.4 10E9/L (ref 1.6–8.3)
NEUTROPHILS NFR BLD AUTO: 63.7 %
PLATELET # BLD AUTO: 305 10E9/L (ref 150–450)
POTASSIUM SERPL-SCNC: 4.1 MMOL/L (ref 3.4–5.3)
PROT SERPL-MCNC: 7 G/DL (ref 6.8–8.8)
RBC # BLD AUTO: 4.06 10E12/L (ref 4.4–5.9)
SODIUM SERPL-SCNC: 143 MMOL/L (ref 133–144)
WBC # BLD AUTO: 8.5 10E9/L (ref 4–11)

## 2017-06-28 PROCEDURE — 85025 COMPLETE CBC W/AUTO DIFF WBC: CPT | Performed by: FAMILY MEDICINE

## 2017-06-28 PROCEDURE — 36415 COLL VENOUS BLD VENIPUNCTURE: CPT | Performed by: FAMILY MEDICINE

## 2017-06-28 PROCEDURE — 80053 COMPREHEN METABOLIC PANEL: CPT | Performed by: FAMILY MEDICINE

## 2017-06-28 NOTE — PROGRESS NOTES
Oral Chemotherapy Monitoring Program.    Patient currently on temodar therapy.    Reviewed labs from 6/28/17.    No concerning abnormalities.    Patient is due to start his next cycle of Temodar on 7/7/17. He already has the free drug and knows when to start.    Will follow up in 1 week with repeat labs.    Malinda Morris PharmD  June 28, 2017

## 2017-06-30 ENCOUNTER — TELEPHONE (OUTPATIENT)
Dept: ONCOLOGY | Facility: CLINIC | Age: 47
End: 2017-06-30

## 2017-06-30 NOTE — TELEPHONE ENCOUNTER
I called patient and provided the number for smoking cessation called [id number] [phone]. Patient seem agreeable to the help. Lynn Montgomery

## 2017-07-05 ENCOUNTER — DOCUMENTATION ONLY (OUTPATIENT)
Dept: PHARMACY | Facility: CLINIC | Age: 47
End: 2017-07-05

## 2017-07-05 DIAGNOSIS — D70.1 CHEMOTHERAPY INDUCED NEUTROPENIA (H): ICD-10-CM

## 2017-07-05 DIAGNOSIS — C71.9 OLIGODENDROGLIOMA, ANAPLASTIC (H): ICD-10-CM

## 2017-07-05 DIAGNOSIS — Z51.11 CHEMOTHERAPY MANAGEMENT, ENCOUNTER FOR: ICD-10-CM

## 2017-07-05 DIAGNOSIS — T45.1X5A CHEMOTHERAPY INDUCED NEUTROPENIA (H): ICD-10-CM

## 2017-07-05 LAB
ALBUMIN SERPL-MCNC: 3.1 G/DL (ref 3.4–5)
ALP SERPL-CCNC: 124 U/L (ref 40–150)
ALT SERPL W P-5'-P-CCNC: 18 U/L (ref 0–70)
ANION GAP SERPL CALCULATED.3IONS-SCNC: 5 MMOL/L (ref 3–14)
AST SERPL W P-5'-P-CCNC: 11 U/L (ref 0–45)
BASOPHILS # BLD AUTO: 0.1 10E9/L (ref 0–0.2)
BASOPHILS NFR BLD AUTO: 0.5 %
BILIRUB SERPL-MCNC: 0.2 MG/DL (ref 0.2–1.3)
BUN SERPL-MCNC: 13 MG/DL (ref 7–30)
CALCIUM SERPL-MCNC: 8.1 MG/DL (ref 8.5–10.1)
CHLORIDE SERPL-SCNC: 114 MMOL/L (ref 94–109)
CO2 SERPL-SCNC: 25 MMOL/L (ref 20–32)
CREAT SERPL-MCNC: 1.02 MG/DL (ref 0.66–1.25)
DIFFERENTIAL METHOD BLD: ABNORMAL
EOSINOPHIL # BLD AUTO: 0.6 10E9/L (ref 0–0.7)
EOSINOPHIL NFR BLD AUTO: 5.2 %
ERYTHROCYTE [DISTWIDTH] IN BLOOD BY AUTOMATED COUNT: 13.6 % (ref 10–15)
GFR SERPL CREATININE-BSD FRML MDRD: 78 ML/MIN/1.7M2
GLUCOSE SERPL-MCNC: 92 MG/DL (ref 70–99)
HCT VFR BLD AUTO: 38.6 % (ref 40–53)
HGB BLD-MCNC: 12.5 G/DL (ref 13.3–17.7)
LYMPHOCYTES # BLD AUTO: 2.1 10E9/L (ref 0.8–5.3)
LYMPHOCYTES NFR BLD AUTO: 19.4 %
MCH RBC QN AUTO: 31.3 PG (ref 26.5–33)
MCHC RBC AUTO-ENTMCNC: 32.4 G/DL (ref 31.5–36.5)
MCV RBC AUTO: 97 FL (ref 78–100)
MONOCYTES # BLD AUTO: 0.8 10E9/L (ref 0–1.3)
MONOCYTES NFR BLD AUTO: 7.7 %
NEUTROPHILS # BLD AUTO: 7.1 10E9/L (ref 1.6–8.3)
NEUTROPHILS NFR BLD AUTO: 67.2 %
PLATELET # BLD AUTO: 263 10E9/L (ref 150–450)
POTASSIUM SERPL-SCNC: 4 MMOL/L (ref 3.4–5.3)
PROT SERPL-MCNC: 6.7 G/DL (ref 6.8–8.8)
RBC # BLD AUTO: 3.99 10E12/L (ref 4.4–5.9)
SODIUM SERPL-SCNC: 144 MMOL/L (ref 133–144)
WBC # BLD AUTO: 10.6 10E9/L (ref 4–11)

## 2017-07-05 PROCEDURE — 85025 COMPLETE CBC W/AUTO DIFF WBC: CPT | Performed by: FAMILY MEDICINE

## 2017-07-05 PROCEDURE — 80053 COMPREHEN METABOLIC PANEL: CPT | Performed by: FAMILY MEDICINE

## 2017-07-05 PROCEDURE — 36415 COLL VENOUS BLD VENIPUNCTURE: CPT | Performed by: FAMILY MEDICINE

## 2017-07-05 NOTE — PROGRESS NOTES
Oral Chemotherapy Monitoring Program  Lab Follow Up    Patient currently on TMZ therapy for Oligodendroglioma.    Reviewed lab results from 7/5/17.    No concerning abnormalities.    Follow-Up:  Will call patient on 7/11 days for follow up after his lab appt    Tae Ron, PharmD, BCPS, BCOP  Hematology/Oncology Clinical Pharmacist  [facility]  [email or web address]

## 2017-07-06 ENCOUNTER — OFFICE VISIT (OUTPATIENT)
Dept: PSYCHOLOGY | Facility: CLINIC | Age: 47
End: 2017-07-06
Payer: COMMERCIAL

## 2017-07-06 DIAGNOSIS — F32.1 MAJOR DEPRESSIVE DISORDER, SINGLE EPISODE, MODERATE WITH MELANCHOLIC FEATURES (H): Primary | ICD-10-CM

## 2017-07-06 DIAGNOSIS — Z63.0 PARTNER RELATIONSHIP PROBLEM: ICD-10-CM

## 2017-07-06 DIAGNOSIS — Z63.5 MARITAL ESTRANGEMENT: ICD-10-CM

## 2017-07-06 PROCEDURE — 90834 PSYTX W PT 45 MINUTES: CPT | Performed by: MARRIAGE & FAMILY THERAPIST

## 2017-07-06 SDOH — SOCIAL STABILITY - SOCIAL INSECURITY: PROBLEMS IN RELATIONSHIP WITH SPOUSE OR PARTNER: Z63.0

## 2017-07-06 SDOH — SOCIAL STABILITY - SOCIAL INSECURITY: DISRUPTION OF FAMILY BY SEPARATION AND DIVORCE: Z63.5

## 2017-07-06 ASSESSMENT — ANXIETY QUESTIONNAIRES
GAD7 TOTAL SCORE: 15
7. FEELING AFRAID AS IF SOMETHING AWFUL MIGHT HAPPEN: NEARLY EVERY DAY
2. NOT BEING ABLE TO STOP OR CONTROL WORRYING: MORE THAN HALF THE DAYS
5. BEING SO RESTLESS THAT IT IS HARD TO SIT STILL: MORE THAN HALF THE DAYS
3. WORRYING TOO MUCH ABOUT DIFFERENT THINGS: MORE THAN HALF THE DAYS
6. BECOMING EASILY ANNOYED OR IRRITABLE: SEVERAL DAYS
IF YOU CHECKED OFF ANY PROBLEMS ON THIS QUESTIONNAIRE, HOW DIFFICULT HAVE THESE PROBLEMS MADE IT FOR YOU TO DO YOUR WORK, TAKE CARE OF THINGS AT HOME, OR GET ALONG WITH OTHER PEOPLE: NOT DIFFICULT AT ALL
1. FEELING NERVOUS, ANXIOUS, OR ON EDGE: NEARLY EVERY DAY

## 2017-07-06 ASSESSMENT — PATIENT HEALTH QUESTIONNAIRE - PHQ9: 5. POOR APPETITE OR OVEREATING: MORE THAN HALF THE DAYS

## 2017-07-06 NOTE — PROGRESS NOTES
Progress Note    Client Name: Joni Borja  Date: 7/6/2017         Service Type: Individual       Session Start Time: 4:10pm  Session End Time: 4:55pm      Session Length: 45 minutes     Session #: 12     Attendees: Client attended alone     Treatment Plan Last Reviewed: 5/18/2017  PHQ-9 / TONI-7 : 7/6/2017     DATA      Progress Since Last Session (Related to Symptoms / Goals / Homework):   Symptoms: Worsening Client finally went forward with the divorce after realizing his wife had not intentions of repairing their marriage.     Homework: Partially completed      Episode of Care Goals: Satisfactory progress - ACTION (Actively working towards change); Intervened by reinforcing change plan / affirming steps taken     Current / Ongoing Stressors and Concerns:   - Sx of depression as a result of years of marital distress that led to present separation, working towards divorce   - difficulties coping with medical problems which had led to limitation (driving) and work disability  Client reports that since our last session his wife continued to lead him on about goals to repair their marriage. After continued lies and games client finally accepted that he needed to end the relationship for both their benefit. He is still struggling with the hurt and loss, because he was hopeful. He wants to focus on having the divorce process go quickly and then plan to return to his life up [place], as he does not like living in the [facility] area. He will keep his wellbeing/needs a priority and stop all contact with his wife and work with his  going forward.      Treatment Objective(s) Addressed in This Session:   identify 2 strategies to more effectively address stressors  Identify negative self-talk and behaviors: challenge core beliefs, myths, and actions  Improve concentration, focus, and mindfulness in daily activities   learn & utilize at least 2 assertive communication skills  weekly      Intervention:   CBT: operant conditioning, identifying triggers and patterns, identifying alternatives  Solution Focused: miracle question, healthy marriage, couples counseling  Structural: balance, roles, rules, past patterns vs desired patterns         ASSESSMENT: Current Emotional / Mental Status (status of significant symptoms):   Risk status (Self / Other harm or suicidal ideation)   Client denies current fears or concerns for personal safety.   Client denies current or recent suicidal ideation or behaviors.   Client denies current or recent homicidal ideation or behaviors.   Client denies current or recent self injurious behavior or ideation.   Client denies other safety concerns.   A safety and risk management plan has not been developed at this time, however client was given the after-hours number / 911 should there be a change in any of these risk factors.     Appearance:   Appropriate    Eye Contact:   Good    Psychomotor Behavior: Normal    Attitude:   Cooperative    Orientation:   All   Speech    Rate / Production: Normal     Volume:  Normal    Mood:    Anxious  Depressed  Sad    Affect:    Appropriate  Worrisome    Thought Content:  Clear    Thought Form:  Coherent  Goal Directed  Logical    Insight:    Fair      Medication Review:   No changes to current psychiatric medication(s)     Medication Compliance:   Yes     Changes in Health Issues:   None reported     Chemical Use Review:   Substance Use: Chemical use reviewed, no active concerns identified      Tobacco Use: No change in amount of tobacco use since last session.  Patient declined discussion at this time     Collateral Reports Completed:   Not Applicable    PLAN: (Client Tasks / Therapist Tasks / Other)  Client will stop all communication with his wife and stay in close contact with his  during the start of his divorce. He will lean on his family support and try to go fishing which has always helped his mood.   Next session  "will meet with client and parents to discuss his plans following the divorce.       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   WHODAS 2.0 (12 item) 16.67% on 2/14/2017    Referral / Collaboration:  Referral to another professional/service is not indicated at this time..    Anticipated number of session or this episode of care: 10      MeasurableTreatment Goal(s) related to diagnosis / functional impairment(s)  Goal 1: Client's depression will remit as evidenced by a decrease in PHQ9 score by at least 6 points or below a five, where symptoms occur fewer than half the days.   I will know I've met my goal when I \"clear my head and work on myself.      Objective #A (Client Action)   Client will decrease frequency and intensity of feeling down, depressed, hopeless  Client will increase self-awareness of symptom onset/escalation  Status: Continued - Date: 5/18/2017    Intervention(s)  Therapist will assign homework track symptoms, patterns and triggers  provide psycho-education on depression  Therapist will teach CBT strategies for attending to negative self-talk and cognitive distortions.  ACT: experiential exercises and mindfulness practices, how  to live with life barriers    Objective #B  Client will Increase interest, engagement, and pleasure in doing things  Identify negative self-talk and behaviors: challenge core " "beliefs, myths, and actions  Status: Continued - Date: 5/18/2017    Intervention(s)  Therapist will assign homework to practice using coping skills outside the therapy encounter, worksheets to challenge negative thoughts  teach distraction skills. explore and tailor enjoyable activities, increase social activities, strengthen social supports  ACT: life values and being mindful of values for goals and daily living    Objective #C  Improve concentration, focus, and mindfulness in daily activities   Status: Continued - Date: 5/18/2017    Intervention(s)  provide safe space to address hx of presenting problem and root cause  teach emotional regulation skills. mindfulness practices, grounding skills, affirmations, strengths based approach  [place]tray: exercise to stage presenting problem, reflect, process and problem solve.      Goal 2: Client will improve his condition of interactions in his relationships (specifically with wife/) establishing healthy, balanced and appropriate boundaries to be kept 100% of the time for a minimum of 4 weeks.     I will know I've met my goal when I \"get my own place. Get the rest of my valuables from the house.      Objective #A (Client Action)      Client will compile a list of boundaries that they would like to set with others. Wife, adult step-children, family members  Learning to fight fair, learning to identify positive and negative communication patterns  Status: Continued - Date: 5/18/2017    Intervention(s)  Therapist will teach about healthy boundaries. structure, saying \"no\", advocating, identifying and establishing appropriate roles, rules, expectations.    Objective #B  Client will practice setting boundaries daily times in the next 12 weeks.                    Status: Continued - Date: 5/18/2017    Intervention(s)  Therapist will assign homework to track the use of healthy boundaries and outcome of establishing relational change  role-play effective communication " skills and conflict management    Objective #C  Client will learn & utilize at least 3 assertive communication skills weekly.  Status: Continued - Date: 5/18/2017    Intervention(s)  teach assertiveness skills. aggressive/passive/assertive, impulsive control, reactive vs sensitive, exposure to uncomfortable conversation.  Therapist will role-play assertiveness skills      Client has reviewed and agreed to the above plan.      EMILY Donahue, [facility]  July 6, 2017

## 2017-07-07 ENCOUNTER — TELEPHONE (OUTPATIENT)
Dept: ONCOLOGY | Facility: CLINIC | Age: 47
End: 2017-07-07

## 2017-07-07 ASSESSMENT — PATIENT HEALTH QUESTIONNAIRE - PHQ9: SUM OF ALL RESPONSES TO PHQ QUESTIONS 1-9: 7

## 2017-07-07 ASSESSMENT — ANXIETY QUESTIONNAIRES: GAD7 TOTAL SCORE: 15

## 2017-07-07 NOTE — TELEPHONE ENCOUNTER
Called & LVM for [facility]  for [employer] cell: [phone]--requesting information on how to get Pt's implanted VNS  (vagal nerve stimulator) shut off with a special wand for his 8-1-17 Brain MRI.    Called Debora in [facility] & faxed her (fax: [phone]) Pt's VNS information & [facility]  [facility] contact information.

## 2017-07-11 ENCOUNTER — DOCUMENTATION ONLY (OUTPATIENT)
Dept: PHARMACY | Facility: CLINIC | Age: 47
End: 2017-07-11

## 2017-07-11 DIAGNOSIS — T45.1X5A CHEMOTHERAPY INDUCED NEUTROPENIA (H): ICD-10-CM

## 2017-07-11 DIAGNOSIS — C71.9 OLIGODENDROGLIOMA, ANAPLASTIC (H): ICD-10-CM

## 2017-07-11 DIAGNOSIS — Z51.11 CHEMOTHERAPY MANAGEMENT, ENCOUNTER FOR: ICD-10-CM

## 2017-07-11 DIAGNOSIS — D70.1 CHEMOTHERAPY INDUCED NEUTROPENIA (H): ICD-10-CM

## 2017-07-11 LAB
BASOPHILS # BLD AUTO: 0 10E9/L (ref 0–0.2)
BASOPHILS NFR BLD AUTO: 0.4 %
DIFFERENTIAL METHOD BLD: ABNORMAL
EOSINOPHIL # BLD AUTO: 0.2 10E9/L (ref 0–0.7)
EOSINOPHIL NFR BLD AUTO: 2.5 %
ERYTHROCYTE [DISTWIDTH] IN BLOOD BY AUTOMATED COUNT: 13.4 % (ref 10–15)
HCT VFR BLD AUTO: 38.5 % (ref 40–53)
HGB BLD-MCNC: 12.5 G/DL (ref 13.3–17.7)
LYMPHOCYTES # BLD AUTO: 1.2 10E9/L (ref 0.8–5.3)
LYMPHOCYTES NFR BLD AUTO: 12.1 %
MCH RBC QN AUTO: 31.2 PG (ref 26.5–33)
MCHC RBC AUTO-ENTMCNC: 32.5 G/DL (ref 31.5–36.5)
MCV RBC AUTO: 96 FL (ref 78–100)
MONOCYTES # BLD AUTO: 0.8 10E9/L (ref 0–1.3)
MONOCYTES NFR BLD AUTO: 8.6 %
NEUTROPHILS # BLD AUTO: 7.4 10E9/L (ref 1.6–8.3)
NEUTROPHILS NFR BLD AUTO: 76.4 %
PLATELET # BLD AUTO: 274 10E9/L (ref 150–450)
RBC # BLD AUTO: 4.01 10E12/L (ref 4.4–5.9)
WBC # BLD AUTO: 9.7 10E9/L (ref 4–11)

## 2017-07-11 PROCEDURE — 80053 COMPREHEN METABOLIC PANEL: CPT | Performed by: FAMILY MEDICINE

## 2017-07-11 PROCEDURE — 85025 COMPLETE CBC W/AUTO DIFF WBC: CPT | Performed by: FAMILY MEDICINE

## 2017-07-11 PROCEDURE — 36415 COLL VENOUS BLD VENIPUNCTURE: CPT | Performed by: FAMILY MEDICINE

## 2017-07-11 NOTE — PROGRESS NOTES
Oral Chemotherapy Monitoring Program.    Patient currently on temodar therapy.    Reviewed labs from 7/11/17    No concerning abnormalities.    Patient should have started on 7/7. He is now on [employer] free drug.    Will follow up in 1 week with repeat labs and check in with patient to see how this last round went. Has been tolerating very well.    Malinda Morris PharmD  July 11, 2017

## 2017-07-12 LAB
ALBUMIN SERPL-MCNC: 3.3 G/DL (ref 3.4–5)
ALP SERPL-CCNC: 128 U/L (ref 40–150)
ALT SERPL W P-5'-P-CCNC: 20 U/L (ref 0–70)
ANION GAP SERPL CALCULATED.3IONS-SCNC: 7 MMOL/L (ref 3–14)
AST SERPL W P-5'-P-CCNC: 15 U/L (ref 0–45)
BILIRUB SERPL-MCNC: 0.2 MG/DL (ref 0.2–1.3)
BUN SERPL-MCNC: 14 MG/DL (ref 7–30)
CALCIUM SERPL-MCNC: 8.4 MG/DL (ref 8.5–10.1)
CHLORIDE SERPL-SCNC: 112 MMOL/L (ref 94–109)
CO2 SERPL-SCNC: 25 MMOL/L (ref 20–32)
CREAT SERPL-MCNC: 1.01 MG/DL (ref 0.66–1.25)
GFR SERPL CREATININE-BSD FRML MDRD: 79 ML/MIN/1.7M2
GLUCOSE SERPL-MCNC: 108 MG/DL (ref 70–99)
POTASSIUM SERPL-SCNC: 4.2 MMOL/L (ref 3.4–5.3)
PROT SERPL-MCNC: 6.9 G/DL (ref 6.8–8.8)
SODIUM SERPL-SCNC: 144 MMOL/L (ref 133–144)

## 2017-07-18 DIAGNOSIS — D70.1 CHEMOTHERAPY INDUCED NEUTROPENIA (H): ICD-10-CM

## 2017-07-18 DIAGNOSIS — T45.1X5A CHEMOTHERAPY INDUCED NEUTROPENIA (H): ICD-10-CM

## 2017-07-18 DIAGNOSIS — C71.9 OLIGODENDROGLIOMA, ANAPLASTIC (H): ICD-10-CM

## 2017-07-18 LAB
BASOPHILS # BLD AUTO: 0 10E9/L (ref 0–0.2)
BASOPHILS NFR BLD AUTO: 0.5 %
DIFFERENTIAL METHOD BLD: ABNORMAL
EOSINOPHIL # BLD AUTO: 0.3 10E9/L (ref 0–0.7)
EOSINOPHIL NFR BLD AUTO: 3.9 %
ERYTHROCYTE [DISTWIDTH] IN BLOOD BY AUTOMATED COUNT: 13.9 % (ref 10–15)
HCT VFR BLD AUTO: 37.5 % (ref 40–53)
HGB BLD-MCNC: 12.5 G/DL (ref 13.3–17.7)
LYMPHOCYTES # BLD AUTO: 1.5 10E9/L (ref 0.8–5.3)
LYMPHOCYTES NFR BLD AUTO: 18.9 %
MCH RBC QN AUTO: 32.1 PG (ref 26.5–33)
MCHC RBC AUTO-ENTMCNC: 33.3 G/DL (ref 31.5–36.5)
MCV RBC AUTO: 96 FL (ref 78–100)
MONOCYTES # BLD AUTO: 0.8 10E9/L (ref 0–1.3)
MONOCYTES NFR BLD AUTO: 10.1 %
NEUTROPHILS # BLD AUTO: 5.4 10E9/L (ref 1.6–8.3)
NEUTROPHILS NFR BLD AUTO: 66.6 %
PLATELET # BLD AUTO: 323 10E9/L (ref 150–450)
RBC # BLD AUTO: 3.9 10E12/L (ref 4.4–5.9)
WBC # BLD AUTO: 8.2 10E9/L (ref 4–11)

## 2017-07-18 PROCEDURE — 36415 COLL VENOUS BLD VENIPUNCTURE: CPT | Performed by: FAMILY MEDICINE

## 2017-07-18 PROCEDURE — 85025 COMPLETE CBC W/AUTO DIFF WBC: CPT | Performed by: FAMILY MEDICINE

## 2017-07-19 ENCOUNTER — DOCUMENTATION ONLY (OUTPATIENT)
Dept: PHARMACY | Facility: CLINIC | Age: 47
End: 2017-07-19

## 2017-07-19 NOTE — PROGRESS NOTES
Oral Chemotherapy Monitoring Program    Primary Oncologist: Dr. Daniels  Primary Oncology Clinic: [facility]  Cancer Diagnosis: Oligodendroglioma     Therapy History:  Started temodar 150mg/m2 (300mg x 5 days) on 5/12/17; increase for C2 to 200mg/m2 (400mg x 5 days)     Drug Interaction Assessment: none    Lab Monitoring Plan  Patient is currently getting weekly CBC per Dr Daniels.  Subjective/Objective:  Joni Borja is a 46 year old male. I contacted the patients mother (Brittany) by phone for a follow-up visit for oral chemotherapy. Brittany reports that Joni tolerated this past cycle of Temodar well and denies any side effects. He started on 7/7/17 for 5 days.    ORAL CHEMOTHERAPY 5/9/2017 6/6/2017 6/16/2017 7/19/2017   Drug Name Temodar (Temozolomide) Temodar (Temozolomide) Temodar (Temozolomide) Temodar (Temozolomide)   Current Dosage 300mg 400mg 400mg 400mg   Current Schedule Daily Daily Daily Daily   Cycle Details Other Other Other Other   Start Date of Last Cycle 5/10/2017 6/9/2017 6/9/2017 7/7/2017   Planned next cycle start date 6/7/2017 7/7/2017 7/7/2017 8/4/2017   Doses missed in last 2 weeks - 0 0 0   Adherence Assessment - Adherent Adherent Adherent   Adverse Effects - No AE identified during assessment No AE identified during assessment No AE identified during assessment   Home BPs - not needed not needed not needed   Any new drug interactions? - No No No   Is the dose as ordered appropriate for the patient? - Yes Yes Yes   Is the patient currently in pain? - No - -   Has the patient been assessed within the past 6 months for depression? - Yes - Yes   Has the patient missed any days of school, work, or other routine activity? - - - No       Last PHQ-2 Score on record:   PHQ-2 ( 1999 [employer]) 5/17/2017 10/28/2016   Q1: Little interest or pleasure in doing things 0 2   Q2: Feeling down, depressed or hopeless 0 1   PHQ-2 Score 0 3           Vitals:  BP:   BP Readings from Last 1 Encounters:   06/27/17 109/71  "    Wt Readings from Last 1 Encounters:   06/27/17 80.9 kg (178 lb 6.4 oz)     Estimated body surface area is 1.98 meters squared as calculated from the following:    Height as of 5/17/17: 1.753 m (5' 9.02\").    Weight as of 6/27/17: 80.9 kg (178 lb 6.4 oz).    Labs:  Lab Results   Component Value Date     07/11/2017      Lab Results   Component Value Date    POTASSIUM 4.2 07/11/2017     Lab Results   Component Value Date    SHABBIR 8.4 07/11/2017     Lab Results   Component Value Date    ALBUMIN 3.3 07/11/2017     Lab Results   Component Value Date    MAG 2.2 09/22/2013     Lab Results   Component Value Date    PHOS 3.7 09/23/2013     Lab Results   Component Value Date    BUN 14 07/11/2017     Lab Results   Component Value Date    CR 1.01 07/11/2017       Lab Results   Component Value Date    AST 15 07/11/2017     Lab Results   Component Value Date    ALT 20 07/11/2017     Lab Results   Component Value Date    BILITOTAL 0.2 07/11/2017       Lab Results   Component Value Date    WBC 8.2 07/18/2017     Lab Results   Component Value Date    HGB 12.5 07/18/2017     Lab Results   Component Value Date     07/18/2017     Lab Results   Component Value Date    ANEU 5.4 07/18/2017           Assessment:  Labs were done on 7/18/17 and meet parameters to continue treatment.    Plan:  The patient will start the next cycle on 8/4/17 and will continue getting weekly labs. The next labs are scheduled for 7/25/17    Follow-Up:  1 week for labs     Refill Due:  Before 8/4/17    Rodríguez Smith PharmD.  "

## 2017-07-25 ENCOUNTER — TELEPHONE (OUTPATIENT)
Dept: PHARMACY | Facility: CLINIC | Age: 47
End: 2017-07-25

## 2017-07-25 DIAGNOSIS — C71.9 OLIGODENDROGLIOMA, ANAPLASTIC (H): ICD-10-CM

## 2017-07-25 DIAGNOSIS — D70.1 CHEMOTHERAPY INDUCED NEUTROPENIA (H): ICD-10-CM

## 2017-07-25 DIAGNOSIS — T45.1X5A CHEMOTHERAPY INDUCED NEUTROPENIA (H): ICD-10-CM

## 2017-07-25 DIAGNOSIS — Z51.11 CHEMOTHERAPY MANAGEMENT, ENCOUNTER FOR: ICD-10-CM

## 2017-07-25 LAB
ALBUMIN SERPL-MCNC: 3 G/DL (ref 3.4–5)
ALP SERPL-CCNC: 118 U/L (ref 40–150)
ALT SERPL W P-5'-P-CCNC: 21 U/L (ref 0–70)
ANION GAP SERPL CALCULATED.3IONS-SCNC: 3 MMOL/L (ref 3–14)
AST SERPL W P-5'-P-CCNC: 14 U/L (ref 0–45)
BASOPHILS # BLD AUTO: 0 10E9/L (ref 0–0.2)
BASOPHILS NFR BLD AUTO: 0.3 %
BILIRUB SERPL-MCNC: 0.2 MG/DL (ref 0.2–1.3)
BUN SERPL-MCNC: 16 MG/DL (ref 7–30)
CALCIUM SERPL-MCNC: 8.2 MG/DL (ref 8.5–10.1)
CHLORIDE SERPL-SCNC: 116 MMOL/L (ref 94–109)
CO2 SERPL-SCNC: 25 MMOL/L (ref 20–32)
CREAT SERPL-MCNC: 1.02 MG/DL (ref 0.66–1.25)
DIFFERENTIAL METHOD BLD: ABNORMAL
EOSINOPHIL # BLD AUTO: 0.5 10E9/L (ref 0–0.7)
EOSINOPHIL NFR BLD AUTO: 5.2 %
ERYTHROCYTE [DISTWIDTH] IN BLOOD BY AUTOMATED COUNT: 14.4 % (ref 10–15)
GFR SERPL CREATININE-BSD FRML MDRD: 78 ML/MIN/1.7M2
GLUCOSE SERPL-MCNC: 102 MG/DL (ref 70–99)
HCT VFR BLD AUTO: 39.2 % (ref 40–53)
HGB BLD-MCNC: 12.8 G/DL (ref 13.3–17.7)
LYMPHOCYTES # BLD AUTO: 1.7 10E9/L (ref 0.8–5.3)
LYMPHOCYTES NFR BLD AUTO: 19.4 %
MCH RBC QN AUTO: 32 PG (ref 26.5–33)
MCHC RBC AUTO-ENTMCNC: 32.7 G/DL (ref 31.5–36.5)
MCV RBC AUTO: 98 FL (ref 78–100)
MONOCYTES # BLD AUTO: 0.9 10E9/L (ref 0–1.3)
MONOCYTES NFR BLD AUTO: 9.7 %
NEUTROPHILS # BLD AUTO: 5.8 10E9/L (ref 1.6–8.3)
NEUTROPHILS NFR BLD AUTO: 65.4 %
PLATELET # BLD AUTO: 323 10E9/L (ref 150–450)
POTASSIUM SERPL-SCNC: 4.1 MMOL/L (ref 3.4–5.3)
PROT SERPL-MCNC: 6.8 G/DL (ref 6.8–8.8)
RBC # BLD AUTO: 4 10E12/L (ref 4.4–5.9)
SODIUM SERPL-SCNC: 144 MMOL/L (ref 133–144)
WBC # BLD AUTO: 8.8 10E9/L (ref 4–11)

## 2017-07-25 PROCEDURE — 36415 COLL VENOUS BLD VENIPUNCTURE: CPT | Performed by: FAMILY MEDICINE

## 2017-07-25 PROCEDURE — 80053 COMPREHEN METABOLIC PANEL: CPT | Performed by: FAMILY MEDICINE

## 2017-07-25 PROCEDURE — 85025 COMPLETE CBC W/AUTO DIFF WBC: CPT | Performed by: FAMILY MEDICINE

## 2017-07-25 NOTE — TELEPHONE ENCOUNTER
Oral Chemotherapy Monitoring Program.    Patient currently on Temodar therapy.    Reviewed lab results from 7/25/17.    Labs meet parameters to continue treatment.    Will follow up in 1 week for his weekly labs. The next cycle is scheduled to start on 8/4/17    Rodríguez Smith PharmD  Oral Chemotherapy Program

## 2017-07-31 ENCOUNTER — DOCUMENTATION ONLY (OUTPATIENT)
Dept: PHARMACY | Facility: CLINIC | Age: 47
End: 2017-07-31

## 2017-07-31 DIAGNOSIS — Z51.11 CHEMOTHERAPY MANAGEMENT, ENCOUNTER FOR: ICD-10-CM

## 2017-07-31 DIAGNOSIS — C71.9 OLIGODENDROGLIOMA, ANAPLASTIC (H): ICD-10-CM

## 2017-07-31 DIAGNOSIS — D70.1 CHEMOTHERAPY INDUCED NEUTROPENIA (H): ICD-10-CM

## 2017-07-31 DIAGNOSIS — T45.1X5A CHEMOTHERAPY INDUCED NEUTROPENIA (H): ICD-10-CM

## 2017-07-31 LAB
BASOPHILS # BLD AUTO: 0 10E9/L (ref 0–0.2)
BASOPHILS NFR BLD AUTO: 0.6 %
DIFFERENTIAL METHOD BLD: ABNORMAL
EOSINOPHIL # BLD AUTO: 0.2 10E9/L (ref 0–0.7)
EOSINOPHIL NFR BLD AUTO: 3.6 %
ERYTHROCYTE [DISTWIDTH] IN BLOOD BY AUTOMATED COUNT: 14.2 % (ref 10–15)
HCT VFR BLD AUTO: 36 % (ref 40–53)
HGB BLD-MCNC: 11.8 G/DL (ref 13.3–17.7)
LYMPHOCYTES # BLD AUTO: 1.2 10E9/L (ref 0.8–5.3)
LYMPHOCYTES NFR BLD AUTO: 18.6 %
MCH RBC QN AUTO: 32.2 PG (ref 26.5–33)
MCHC RBC AUTO-ENTMCNC: 32.8 G/DL (ref 31.5–36.5)
MCV RBC AUTO: 98 FL (ref 78–100)
MONOCYTES # BLD AUTO: 0.8 10E9/L (ref 0–1.3)
MONOCYTES NFR BLD AUTO: 11.7 %
NEUTROPHILS # BLD AUTO: 4.4 10E9/L (ref 1.6–8.3)
NEUTROPHILS NFR BLD AUTO: 65.5 %
PLATELET # BLD AUTO: 255 10E9/L (ref 150–450)
RBC # BLD AUTO: 3.67 10E12/L (ref 4.4–5.9)
WBC # BLD AUTO: 6.7 10E9/L (ref 4–11)

## 2017-07-31 PROCEDURE — 85025 COMPLETE CBC W/AUTO DIFF WBC: CPT | Performed by: FAMILY MEDICINE

## 2017-07-31 PROCEDURE — 80053 COMPREHEN METABOLIC PANEL: CPT | Performed by: FAMILY MEDICINE

## 2017-07-31 PROCEDURE — 36415 COLL VENOUS BLD VENIPUNCTURE: CPT | Performed by: FAMILY MEDICINE

## 2017-07-31 NOTE — PROGRESS NOTES
Oral Chemotherapy Monitoring Program.     Patient currently on Temodar therapy.     Reviewed lab results from 7/31/17.     Labs meet parameters to continue treatment.     Will follow up in 1 week for his weekly labs. The next cycle is scheduled to start on 8/4/17.   Visit with Dr. Daniels 8/2/17 to determine plan going forward.      Marcella Malhotra, PharmD, MPH, BCOP  Hematology/Oncology Clinical Pharmacist   [facility]   [email or web address]  Oral Chemotherapy Monitoring Program

## 2017-08-01 ENCOUNTER — ONCOLOGY VISIT (OUTPATIENT)
Dept: ONCOLOGY | Facility: CLINIC | Age: 47
End: 2017-08-01
Attending: PSYCHIATRY & NEUROLOGY
Payer: COMMERCIAL

## 2017-08-01 ENCOUNTER — HOSPITAL ENCOUNTER (OUTPATIENT)
Dept: MRI IMAGING | Facility: CLINIC | Age: 47
Discharge: HOME | End: 2017-08-01
Attending: PSYCHIATRY & NEUROLOGY | Admitting: PSYCHIATRY & NEUROLOGY
Payer: MEDICARE

## 2017-08-01 VITALS
HEART RATE: 69 BPM | TEMPERATURE: 98.6 F | DIASTOLIC BLOOD PRESSURE: 65 MMHG | WEIGHT: 175.27 LBS | OXYGEN SATURATION: 100 % | RESPIRATION RATE: 16 BRPM | HEIGHT: 69 IN | SYSTOLIC BLOOD PRESSURE: 99 MMHG | BODY MASS INDEX: 25.96 KG/M2

## 2017-08-01 DIAGNOSIS — D70.1 CHEMOTHERAPY INDUCED NEUTROPENIA (H): ICD-10-CM

## 2017-08-01 DIAGNOSIS — G40.209 PARTIAL EPILEPSY WITH IMPAIRMENT OF CONSCIOUSNESS (H): ICD-10-CM

## 2017-08-01 DIAGNOSIS — C71.9 OLIGODENDROGLIOMA, ANAPLASTIC (H): Primary | ICD-10-CM

## 2017-08-01 DIAGNOSIS — T45.1X5A CHEMOTHERAPY INDUCED NEUTROPENIA (H): ICD-10-CM

## 2017-08-01 DIAGNOSIS — Z96.89 STATUS POST VNS (VAGUS NERVE STIMULATOR) PLACEMENT: ICD-10-CM

## 2017-08-01 DIAGNOSIS — Z51.11 CHEMOTHERAPY MANAGEMENT, ENCOUNTER FOR: ICD-10-CM

## 2017-08-01 DIAGNOSIS — K02.9 DENTAL CARIES: ICD-10-CM

## 2017-08-01 DIAGNOSIS — T45.1X5A CHEMOTHERAPY-INDUCED NAUSEA AND VOMITING: ICD-10-CM

## 2017-08-01 DIAGNOSIS — K08.89 PAIN, DENTAL: ICD-10-CM

## 2017-08-01 DIAGNOSIS — Z72.0 TOBACCO ABUSE: ICD-10-CM

## 2017-08-01 DIAGNOSIS — R11.2 CHEMOTHERAPY-INDUCED NAUSEA AND VOMITING: ICD-10-CM

## 2017-08-01 LAB
ALBUMIN SERPL-MCNC: 3.2 G/DL (ref 3.4–5)
ALP SERPL-CCNC: 109 U/L (ref 40–150)
ALT SERPL W P-5'-P-CCNC: 23 U/L (ref 0–70)
ANION GAP SERPL CALCULATED.3IONS-SCNC: 6 MMOL/L (ref 3–14)
AST SERPL W P-5'-P-CCNC: 16 U/L (ref 0–45)
BILIRUB SERPL-MCNC: 0.2 MG/DL (ref 0.2–1.3)
BUN SERPL-MCNC: 16 MG/DL (ref 7–30)
CALCIUM SERPL-MCNC: 8.1 MG/DL (ref 8.5–10.1)
CHLORIDE SERPL-SCNC: 115 MMOL/L (ref 94–109)
CO2 SERPL-SCNC: 25 MMOL/L (ref 20–32)
CREAT SERPL-MCNC: 1.01 MG/DL (ref 0.66–1.25)
GFR SERPL CREATININE-BSD FRML MDRD: 79 ML/MIN/1.7M2
GLUCOSE SERPL-MCNC: 104 MG/DL (ref 70–99)
POTASSIUM SERPL-SCNC: 4.3 MMOL/L (ref 3.4–5.3)
PROT SERPL-MCNC: 6.4 G/DL (ref 6.8–8.8)
SODIUM SERPL-SCNC: 146 MMOL/L (ref 133–144)

## 2017-08-01 PROCEDURE — A9585 GADOBUTROL INJECTION: HCPCS | Performed by: PSYCHIATRY & NEUROLOGY

## 2017-08-01 PROCEDURE — 99215 OFFICE O/P EST HI 40 MIN: CPT | Performed by: PSYCHIATRY & NEUROLOGY

## 2017-08-01 PROCEDURE — 70553 MRI BRAIN STEM W/O & W/DYE: CPT

## 2017-08-01 PROCEDURE — 99211 OFF/OP EST MAY X REQ PHY/QHP: CPT

## 2017-08-01 PROCEDURE — 25000128 H RX IP 250 OP 636: Performed by: PSYCHIATRY & NEUROLOGY

## 2017-08-01 RX ORDER — GADOBUTROL 604.72 MG/ML
8 INJECTION INTRAVENOUS ONCE
Status: COMPLETED | OUTPATIENT
Start: 2017-08-01 | End: 2017-08-01

## 2017-08-01 RX ADMIN — GADOBUTROL 8 ML: 604.72 INJECTION INTRAVENOUS at 12:51

## 2017-08-01 ASSESSMENT — PAIN SCALES - GENERAL: PAINLEVEL: NO PAIN (0)

## 2017-08-01 NOTE — PATIENT INSTRUCTIONS
Call the dental clinic about pending dental procedure.     Call the clinic about any changes that need to be made to your medication list.     I will reach out to Dr. Brown about your concerns about Lexapro. Return to taking Lexapro as prescribed.   Please call the clinic prior to changing any of your medications.     Work on getting additional temozolomide cycles.     Return to clinic in 4 weeks.   Scheduled/Jaimie Daniels MD  Neuro-oncology  8/1/2017         AVS printed & given to patient/Jaimie

## 2017-08-01 NOTE — MR AVS SNAPSHOT
After Visit Summary   8/1/2017    Joni Borja    MRN: [id number]           Patient Information     Date Of Birth          1970        Visit Information        Provider Department      8/1/2017 3:30 PM Berkley Daniels MD [facility]        Care Instructions    Call the dental clinic about pending dental procedure.     Call the clinic about any changes that need to be made to your medication list.     I will reach out to Dr. Brown about your concerns about Lexapro. Return to taking Lexapro as prescribed.   Please call the clinic prior to changing any of your medications.     Work on getting additional temozolomide cycles.     Return to clinic in 4 weeks.     Berkley Dnaiels MD  Neuro-oncology  8/1/2017                       Follow-ups after your visit        Your next 10 appointments already scheduled     Aug 07, 2017  3:00 PM CDT   Return Visit with EMILY Donahue   [facility] ([facility])    [address] [place] [id number] [phone]            Aug 29, 2017  3:00 PM CDT   Return Visit with Berkley Daniels MD   [facility] ([facility])    [facility]  [address] [facility] [address]  [facility] [address] [phone]            Nov 17, 2017  2:30 PM CST   Return Visit with Silvia Brown MD   [facility] ([facility])    [address], [address]  [facility] [address] [phone]              Who to contact     If you have questions or need follow up information about today's clinic visit or your schedule please contact [facility] directly at [phone].  Normal or non-critical lab and imaging results will be communicated to you by MyChart, letter or phone within 4 business days after the clinic has received the results. If you do not hear from us within 7 days, please contact the clinic through MyChart or phone. If you  "have a critical or abnormal lab result, we will notify you by phone as soon as possible.  Submit refill requests through [employer] or call your pharmacy and they will forward the refill request to us. Please allow 3 business days for your refill to be completed.          Additional Information About Your Visit        MyChart Information     [employer] gives you secure access to your electronic health record. If you see a primary care provider, you can also send messages to your care team and make appointments. If you have questions, please call your primary care clinic.  If you do not have a primary care provider, please call [phone] and they will assist you.        Care EveryWhere ID     This is your Care EveryWhere ID. This could be used by other organizations to access your [facility] medical records  [id number]        Your Vitals Were     Pulse Temperature Respirations Height Pulse Oximetry BMI (Body Mass Index)    69 98.6  F (37  C) (Oral) 16 1.753 m (5' 9\") 100% 25.88 kg/m2       Blood Pressure from Last 3 Encounters:   08/01/17 99/65   06/27/17 109/71   06/06/17 123/76    Weight from Last 3 Encounters:   08/01/17 79.5 kg (175 lb 4.3 oz)   06/27/17 80.9 kg (178 lb 6.4 oz)   06/06/17 81 kg (178 lb 9.6 oz)              Today, you had the following     No orders found for display       Primary Care Provider Office Phone # Fax #    Brian Coon [phone]       [address] [address]        Equal Access to Services     [facility]: Hadii lexis mirza hadasho Soomaali, waaxda luqadaha, qaybta kaalmada adevelvet, es arriaga . So [facility] [phone].    ATENCIÓN: Si habla español, tiene a kirby disposición servicios gratuitos de asistencia lingüística. Llame al [phone].    We comply with applicable federal civil rights laws and [place] laws. We do not discriminate on the basis of race, color, national origin, age, " disability sex, sexual orientation or gender identity.            Thank you!     Thank you for choosing [facility]  for your care. Our goal is always to provide you with excellent care. Hearing back from our patients is one way we can continue to improve our services. Please take a few minutes to complete the written survey that you may receive in the mail after your visit with us. Thank you!             Your Updated Medication List - Protect others around you: Learn how to safely use, store and throw away your medicines at www.disposemymeds.org.          This list is accurate as of: 8/1/17  4:58 PM.  Always use your most recent med list.                   Brand Name Dispense Instructions for use Diagnosis    acetaminophen-codeine 300-30 MG per tablet    TYLENOL #3     Take 1-2 tablets by mouth every 4 hours as needed for moderate pain As needed for headaches        BENADRYL PO      Take 50 mg by mouth daily as needed for allergies (misquitos,)    Localization-related (focal) (partial) epilepsy and epileptic syndromes with complex partial seizures, with intractable epilepsy, Depression, Anxiety, Unstable gait       carBAMazepine 300 MG 12 hr capsule    CARBATROL    180 capsule    Take 1 capsule (300 mg) by mouth 2 times daily (at 10:00 & 22:00)    Partial epilepsy with impairment of consciousness, intractable (H)       diazepam 5 MG tablet    VALIUM    30 tablet    Take 5 mg by mouth 2 times daily as needed for other (seizures.) As instructed for seizures. Please take 5 mg for more than 3 seizures in 8 hour period, may repeat 5 mg after 30 min if seizures continue.    Localization-related (focal) (partial) epilepsy and epileptic syndromes with complex partial seizures, with intractable epilepsy       felbamate 600 MG tablet    FELBATOL    225 tablet    Take 600 mg (one tablet) am and 900 mg (1.5 tablet) 2 pm    Partial epilepsy with impairment of consciousness, intractable (H)       KLONOPIN PO       Take 0.5 mg by mouth 2 times daily 1 tab in the AM and 2 tabs in PM.        LEXAPRO PO      Take 20 mg by mouth At Bedtime        ondansetron 4 MG tablet    ZOFRAN    30 tablet    Take 1 tablet (4 mg) by mouth At Bedtime Take 30 minutes prior to chemotherapy dosing and repeat every 8 hours as needed for nausea.    Chemotherapy management, encounter for, Chemotherapy-induced nausea and vomiting       PROTONIX PO      Take 40 mg by mouth every morning        temozolomide 100 MG capsule CHEMO    TEMODAR    20 capsule    Take 4 capsules (400 mg) by mouth daily for 5 days Take ondansetron 30-60 min before temozolomide. Take at bedtime on an empty stomach.    Oligodendroglioma, anaplastic (H)       topiramate 100 MG tablet    TOPAMAX    900 tablet    Take 2 tablets (200 mg) by mouth every morning 200 mg am and 300 mg pm    Partial epilepsy with impairment of consciousness, intractable (H)       VITAMIN B 12 PO      Take 1 tablet by mouth daily (with dinner)        ZYRTEC ALLERGY PO      Take 10 mg by mouth daily as needed (for misquitos.)    Localization-related (focal) (partial) epilepsy and epileptic syndromes with complex partial seizures, with intractable epilepsy, Depression, Anxiety, Unstable gait

## 2017-08-01 NOTE — PROGRESS NOTES
"Oncology Rooming Note    August 1, 2017 3:38 PM   Joni Borja is a 46 year old male who presents for:    Chief Complaint   Patient presents with     Oncology Clinic Visit     follow up appt     Initial Vitals: BP 99/65 (BP Location: Left arm, Patient Position: Sitting, Cuff Size: Adult Regular)  Pulse 69  Temp 98.6  F (37  C) (Oral)  Resp 16  Ht 1.753 m (5' 9\")  Wt 79.5 kg (175 lb 4.3 oz)  SpO2 100%  BMI 25.88 kg/m2 Estimated body mass index is 25.88 kg/(m^2) as calculated from the following:    Height as of this encounter: 1.753 m (5' 9\").    Weight as of this encounter: 79.5 kg (175 lb 4.3 oz). Body surface area is 1.97 meters squared.  No Pain (0) Comment: Data Unavailable   No LMP for male patient.  Allergies reviewed: Yes  Medications reviewed: Yes    Medications: MEDICATION REFILLS NEEDED TODAY. Provider was notified.  Pharmacy name entered into [email or web address]:    [facility] - [place], [place] - [address] [facility] [place] [place] [facility] [address]  [facility] - [employer] PATIENT ASSISTANCE PROGRAM    Clinical concerns: Refills needed today. Jeremiah was notified.    8 minutes for nursing intake (face to face time)     Brittany Mcguire MA    DISCHARGE PLAN:  Next appointments: See patient instruction section-- Jaimie  Departure Mode: Ambulatory  Accompanied by: parents  7 minutes for nursing discharge (face to face time)   Gina Ramos RN    Call the dental clinic about pending dental procedure. --Pt is seeing dentist on 8-3-17 at 7am.  Pt's Mom Brittany will call back on 8-3-17.   8-3-17 Spoke with Pt's Mom Brittany & Pt only had an extraction of his upper left side molar removed today.  Dentist said that Pt was just starting to have an infection start at the tip of the root & that the infection was removed along with the tooth so the dentist didn't feel Pt needed an antibiotic & dentist was made aware that Pt is on an oral chemo regimen by " Pt's mother.    [facility] ph: [phone]  (Dentist  is Pt's usual dentist but didn't perform this dental extraction.)    Plan currently is to hold Temodar until after labs rechecked on 8-14-17 & go ahead is given.   Pt's mother verbalized understanding.  Gina Ramos, RN      Call the clinic about any changes that need to be made to your medication list. --Pt's mother Brittany called & clarified that Pt only takes the Benadryl & Zyrtec PRN.     I will reach out to Dr. Brown about your concerns about Lexapro. Return to taking Lexapro as prescribed.   Please call the clinic prior to changing any of your medications.     Work on getting additional temozolomide cycles.     Return to clinic in 4 weeks.   Scheduled/Jaimie Daniels MD  Neuro-oncology  8/1/2017
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
## 2017-08-02 ENCOUNTER — DOCUMENTATION ONLY (OUTPATIENT)
Dept: PHARMACY | Facility: CLINIC | Age: 47
End: 2017-08-02

## 2017-08-02 NOTE — PROGRESS NOTES
Oral Chemotherapy Monitoring Program    Spoke with Dr. Daniels on 8/1/17. Patient has dental issues. He was able to schedule a dentist appointment for 8/3. He is due to start his next cycle of TMZ on 8/4, but since he will have a tooth extraction we will likely delay therapy for a few weeks per Dr. Daniels. Inbasket sent to Dr. Daniels to determine when he should start the next cycle. We will call the patient with that information once determined. They have drug in possession for the next cycle.    Tae Ron, PharmD, BCPS, BCOP  Hematology/Oncology Clinical Pharmacist  [facility]  [email or web address]

## 2017-08-02 NOTE — PROGRESS NOTES
Oral Chemotherapy Monitoring Program    Per Dr. Daniels, patient will recheck CBC on 8/14. TMZ will remain on hold until then. If labs at goal and mouth is healed (no swelling, continued bleeding, pain), then can restart on Monday, 8/14 PM.     Tae Ron, PharmD, BCPS, BCOP  Hematology/Oncology Clinical Pharmacist  [facility]  [email or web address]

## 2017-08-07 ENCOUNTER — TELEPHONE (OUTPATIENT)
Dept: NEUROLOGY | Facility: CLINIC | Age: 47
End: 2017-08-07

## 2017-08-07 ENCOUNTER — OFFICE VISIT (OUTPATIENT)
Dept: PSYCHOLOGY | Facility: CLINIC | Age: 47
End: 2017-08-07
Payer: COMMERCIAL

## 2017-08-07 DIAGNOSIS — Z63.5 MARITAL ESTRANGEMENT: ICD-10-CM

## 2017-08-07 DIAGNOSIS — C71.9 OLIGODENDROGLIOMA, ANAPLASTIC (H): ICD-10-CM

## 2017-08-07 DIAGNOSIS — T45.1X5A CHEMOTHERAPY INDUCED NEUTROPENIA (H): ICD-10-CM

## 2017-08-07 DIAGNOSIS — F32.1 MAJOR DEPRESSIVE DISORDER, SINGLE EPISODE, MODERATE WITH MELANCHOLIC FEATURES (H): Primary | ICD-10-CM

## 2017-08-07 DIAGNOSIS — Z51.11 CHEMOTHERAPY MANAGEMENT, ENCOUNTER FOR: ICD-10-CM

## 2017-08-07 DIAGNOSIS — D70.1 CHEMOTHERAPY INDUCED NEUTROPENIA (H): ICD-10-CM

## 2017-08-07 PROBLEM — Z63.0 PARTNER RELATIONSHIP PROBLEM: Status: RESOLVED | Noted: 2017-02-28 | Resolved: 2017-08-07

## 2017-08-07 LAB
ALBUMIN SERPL-MCNC: 3.2 G/DL (ref 3.4–5)
ALP SERPL-CCNC: 120 U/L (ref 40–150)
ALT SERPL W P-5'-P-CCNC: 42 U/L (ref 0–70)
ANION GAP SERPL CALCULATED.3IONS-SCNC: 4 MMOL/L (ref 3–14)
AST SERPL W P-5'-P-CCNC: 26 U/L (ref 0–45)
BASOPHILS # BLD AUTO: 0.1 10E9/L (ref 0–0.2)
BASOPHILS NFR BLD AUTO: 0.7 %
BILIRUB SERPL-MCNC: <0.1 MG/DL (ref 0.2–1.3)
BUN SERPL-MCNC: 17 MG/DL (ref 7–30)
CALCIUM SERPL-MCNC: 8.3 MG/DL (ref 8.5–10.1)
CHLORIDE SERPL-SCNC: 115 MMOL/L (ref 94–109)
CO2 SERPL-SCNC: 26 MMOL/L (ref 20–32)
CREAT SERPL-MCNC: 0.94 MG/DL (ref 0.66–1.25)
DIFFERENTIAL METHOD BLD: ABNORMAL
EOSINOPHIL # BLD AUTO: 0.2 10E9/L (ref 0–0.7)
EOSINOPHIL NFR BLD AUTO: 2.3 %
ERYTHROCYTE [DISTWIDTH] IN BLOOD BY AUTOMATED COUNT: 13.9 % (ref 10–15)
GFR SERPL CREATININE-BSD FRML MDRD: 86 ML/MIN/1.7M2
GLUCOSE SERPL-MCNC: 93 MG/DL (ref 70–99)
HCT VFR BLD AUTO: 38 % (ref 40–53)
HGB BLD-MCNC: 12.6 G/DL (ref 13.3–17.7)
LYMPHOCYTES # BLD AUTO: 1.3 10E9/L (ref 0.8–5.3)
LYMPHOCYTES NFR BLD AUTO: 17.5 %
MCH RBC QN AUTO: 32.3 PG (ref 26.5–33)
MCHC RBC AUTO-ENTMCNC: 33.2 G/DL (ref 31.5–36.5)
MCV RBC AUTO: 97 FL (ref 78–100)
MONOCYTES # BLD AUTO: 0.7 10E9/L (ref 0–1.3)
MONOCYTES NFR BLD AUTO: 8.9 %
NEUTROPHILS # BLD AUTO: 5.2 10E9/L (ref 1.6–8.3)
NEUTROPHILS NFR BLD AUTO: 70.6 %
PLATELET # BLD AUTO: 251 10E9/L (ref 150–450)
POTASSIUM SERPL-SCNC: 4.2 MMOL/L (ref 3.4–5.3)
PROT SERPL-MCNC: 6.7 G/DL (ref 6.8–8.8)
RBC # BLD AUTO: 3.9 10E12/L (ref 4.4–5.9)
SODIUM SERPL-SCNC: 145 MMOL/L (ref 133–144)
WBC # BLD AUTO: 7.4 10E9/L (ref 4–11)

## 2017-08-07 PROCEDURE — 90834 PSYTX W PT 45 MINUTES: CPT | Performed by: MARRIAGE & FAMILY THERAPIST

## 2017-08-07 PROCEDURE — 36415 COLL VENOUS BLD VENIPUNCTURE: CPT | Performed by: FAMILY MEDICINE

## 2017-08-07 PROCEDURE — 85025 COMPLETE CBC W/AUTO DIFF WBC: CPT | Performed by: FAMILY MEDICINE

## 2017-08-07 SDOH — SOCIAL STABILITY - SOCIAL INSECURITY: DISRUPTION OF FAMILY BY SEPARATION AND DIVORCE: Z63.5

## 2017-08-07 NOTE — PROGRESS NOTES
Progress Note    Client Name: Joni Borja  Date: 8/7/2017         Service Type: Family with client present       Session Start Time: 3pm  Session End Time: 3:45pm      Session Length: 45 minutes     Session #: 13     Attendees: Client and Mother     Treatment Plan Last Reviewed: 5/18/2017  PHQ-9 / TONI-7 : 7/6/2017      DATA      Progress Since Last Session (Related to Symptoms / Goals / Homework):   Symptoms: Stable     Homework: Achieved / completed to satisfaction      Episode of Care Goals: Satisfactory progress - ACTION (Actively working towards change); Intervened by reinforcing change plan / affirming steps taken     Current / Ongoing Stressors and Concerns:   - Sx of depression as a result of years of marital distress that led to present separation, working towards divorce   - difficulties coping with medical problems which had led to limitation (driving) and work disability  Client reports that his wife has been served divorce papers and he is no longer in contact with her. The client is experiencing mixed feelings. He feels confident about his decision to start the divorce, and hurt by the actions of his wife because he still cares for her. He has been doing better since our last session focusing on his health and no conflict with his wife. He expects to return to living in [place] by the end of the year. Today the client and his mother discussed plans to establish support services for when he is living on his own again.      Treatment Objective(s) Addressed in This Session:   identify 2 strategies to more effectively address stressors  Identify negative self-talk and behaviors: challenge core beliefs, myths, and actions  Improve concentration, focus, and mindfulness in daily activities   learn & utilize at least 2 assertive communication skills weekly      Intervention:   CBT: operant conditioning, identifying triggers and patterns, identifying  alternatives  Solution Focused: miracle question, healthy marriage, couples counseling  Structural: balance, roles, rules, past patterns vs desired patterns         ASSESSMENT: Current Emotional / Mental Status (status of significant symptoms):   Risk status (Self / Other harm or suicidal ideation)   Client denies current fears or concerns for personal safety.   Client denies current or recent suicidal ideation or behaviors.   Client denies current or recent homicidal ideation or behaviors.   Client denies current or recent self injurious behavior or ideation.   Client denies other safety concerns.   A safety and risk management plan has not been developed at this time, however client was given the after-hours number / 911 should there be a change in any of these risk factors.     Appearance:   Appropriate    Eye Contact:   Good    Psychomotor Behavior: Normal    Attitude:   Cooperative    Orientation:   All   Speech    Rate / Production: Normal     Volume:  Normal    Mood:    Depressed    Affect:    Appropriate  Bright    Thought Content:  Clear    Thought Form:  Coherent  Goal Directed  Logical    Insight:    Fair      Medication Review:   No changes to current psychiatric medication(s)     Medication Compliance:   Yes     Changes in Health Issues:   None reported     Chemical Use Review:   Substance Use: Chemical use reviewed, no active concerns identified      Tobacco Use: No change in amount of tobacco use since last session.  Patient declined discussion at this time     Collateral Reports Completed:   Not Applicable    PLAN: (Client Tasks / Therapist Tasks / Other)  Client will inquire about [facility] or \[facility] [place] [facility]\"" services in [place] in preparation for the client having his needs met when he returns up [place]. Client will be mindful to stop discussing Eleanor and let his  deal with the divorce problems.  Next session will discuss worries about increasing alcohol use if living alone.         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   WHODAS 2.0 (12 item) 16.67% on 2/14/2017    Referral / Collaboration:  Referral to another professional/service is not indicated at this time..    Anticipated number of session or this episode of care: 10      MeasurableTreatment Goal(s) related to diagnosis / functional impairment(s)  Goal 1: Client's depression will remit as evidenced by a decrease in PHQ9 score by at least 6 points or below a five, where symptoms occur fewer than half the days.   I will know I've met my goal when I \"clear my head and work on myself.      Objective #A (Client Action)   Client will decrease frequency and intensity of feeling down, depressed, hopeless  Client will increase self-awareness of symptom onset/escalation  Status: Continued - Date: 5/18/2017    Intervention(s)  Therapist will assign homework track symptoms, patterns and triggers  provide psycho-education on depression  Therapist will teach CBT strategies for attending to negative self-talk and cognitive distortions.  ACT: experiential exercises and mindfulness practices, how  to live with life barriers    Objective #B  Client will Increase interest, engagement, and pleasure in doing things  Identify negative self-talk and behaviors: challenge core beliefs, myths, and actions  Status: Continued - Date: 5/18/2017    Intervention(s)  Therapist will " "assign homework to practice using coping skills outside the therapy encounter, worksheets to challenge negative thoughts  teach distraction skills. explore and tailor enjoyable activities, increase social activities, strengthen social supports  ACT: life values and being mindful of values for goals and daily living    Objective #C  Improve concentration, focus, and mindfulness in daily activities   Status: Continued - Date: 5/18/2017    Intervention(s)  provide safe space to address hx of presenting problem and root cause  teach emotional regulation skills. mindfulness practices, grounding skills, affirmations, strengths based approach  Horacio: exercise to stage presenting problem, reflect, process and problem solve.      Goal 2: Client will improve his condition of interactions in his relationships (specifically with wife/) establishing healthy, balanced and appropriate boundaries to be kept 100% of the time for a minimum of 4 weeks.     I will know I've met my goal when I \"get my own place. Get the rest of my valuables from the house.      Objective #A (Client Action)      Client will compile a list of boundaries that they would like to set with others. Wife, adult step-children, family members  Learning to fight fair, learning to identify positive and negative communication patterns  Status: Continued - Date: 5/18/2017    Intervention(s)  Therapist will teach about healthy boundaries. structure, saying \"no\", advocating, identifying and establishing appropriate roles, rules, expectations.    Objective #B  Client will practice setting boundaries daily times in the next 12 weeks.                    Status: Continued - Date: 5/18/2017    Intervention(s)  Therapist will assign homework to track the use of healthy boundaries and outcome of establishing relational change  role-play effective communication skills and conflict management    Objective #C  Client will learn & utilize at least 3 assertive " communication skills weekly.  Status: Continued - Date: 5/18/2017    Intervention(s)  teach assertiveness skills. aggressive/passive/assertive, impulsive control, reactive vs sensitive, exposure to uncomfortable conversation.  Therapist will role-play assertiveness skills      Client has reviewed and agreed to the above plan.      EMILY Donahue, LICSW  August 7, 2017

## 2017-08-07 NOTE — TELEPHONE ENCOUNTER
"  [facility] nurse:   Please call Joni and encourage him to continue lexapro. Lexapro  will not cause significant antiepileptic drug interactions. He is recently  from his wife and needs to follow up  with psychiatric recommendations. This is very important, because he has several psychiatrist issues. I will also encourage him on our next clinic visit.   Thanks. Silvia       Writer contacted Joni who agrees to continue Lexapro 20 mg HS.  Joni is also actively being treated by his Psychiatrist Dr. Flex Lombardo located at [facility]  Joni continues to receive chemotherapy once each month for 5 days.  Joni reports he is \"doing well\".    PLAN:  Advised to call PR[facility] concerns/seizures.  Next office visit scheduled 11/17/2017.   Routed to Dr. Brown as PAM      "

## 2017-08-14 ENCOUNTER — TELEPHONE (OUTPATIENT)
Dept: PHARMACY | Facility: CLINIC | Age: 47
End: 2017-08-14

## 2017-08-14 DIAGNOSIS — C71.9 OLIGODENDROGLIOMA, ANAPLASTIC (H): ICD-10-CM

## 2017-08-14 DIAGNOSIS — T45.1X5A CHEMOTHERAPY INDUCED NEUTROPENIA (H): ICD-10-CM

## 2017-08-14 DIAGNOSIS — D70.1 CHEMOTHERAPY INDUCED NEUTROPENIA (H): ICD-10-CM

## 2017-08-14 DIAGNOSIS — Z51.11 CHEMOTHERAPY MANAGEMENT, ENCOUNTER FOR: ICD-10-CM

## 2017-08-14 LAB
ALBUMIN SERPL-MCNC: 3.2 G/DL (ref 3.4–5)
ALP SERPL-CCNC: 115 U/L (ref 40–150)
ALT SERPL W P-5'-P-CCNC: 55 U/L (ref 0–70)
ANION GAP SERPL CALCULATED.3IONS-SCNC: 6 MMOL/L (ref 3–14)
AST SERPL W P-5'-P-CCNC: 27 U/L (ref 0–45)
BASOPHILS # BLD AUTO: 0 10E9/L (ref 0–0.2)
BASOPHILS NFR BLD AUTO: 0.5 %
BILIRUB SERPL-MCNC: 0.3 MG/DL (ref 0.2–1.3)
BUN SERPL-MCNC: 21 MG/DL (ref 7–30)
CALCIUM SERPL-MCNC: 8.4 MG/DL (ref 8.5–10.1)
CHLORIDE SERPL-SCNC: 112 MMOL/L (ref 94–109)
CO2 SERPL-SCNC: 26 MMOL/L (ref 20–32)
CREAT SERPL-MCNC: 1.1 MG/DL (ref 0.66–1.25)
DIFFERENTIAL METHOD BLD: ABNORMAL
EOSINOPHIL # BLD AUTO: 0.3 10E9/L (ref 0–0.7)
EOSINOPHIL NFR BLD AUTO: 3.1 %
ERYTHROCYTE [DISTWIDTH] IN BLOOD BY AUTOMATED COUNT: 14.1 % (ref 10–15)
GFR SERPL CREATININE-BSD FRML MDRD: 72 ML/MIN/1.7M2
GLUCOSE SERPL-MCNC: 134 MG/DL (ref 70–99)
HCT VFR BLD AUTO: 39.3 % (ref 40–53)
HGB BLD-MCNC: 13 G/DL (ref 13.3–17.7)
LYMPHOCYTES # BLD AUTO: 2.7 10E9/L (ref 0.8–5.3)
LYMPHOCYTES NFR BLD AUTO: 32 %
MCH RBC QN AUTO: 31.9 PG (ref 26.5–33)
MCHC RBC AUTO-ENTMCNC: 33.1 G/DL (ref 31.5–36.5)
MCV RBC AUTO: 97 FL (ref 78–100)
MONOCYTES # BLD AUTO: 0.9 10E9/L (ref 0–1.3)
MONOCYTES NFR BLD AUTO: 10.3 %
NEUTROPHILS # BLD AUTO: 4.5 10E9/L (ref 1.6–8.3)
NEUTROPHILS NFR BLD AUTO: 54.1 %
PLATELET # BLD AUTO: 303 10E9/L (ref 150–450)
POTASSIUM SERPL-SCNC: 3.8 MMOL/L (ref 3.4–5.3)
PROT SERPL-MCNC: 6.8 G/DL (ref 6.8–8.8)
RBC # BLD AUTO: 4.07 10E12/L (ref 4.4–5.9)
SODIUM SERPL-SCNC: 144 MMOL/L (ref 133–144)
WBC # BLD AUTO: 8.4 10E9/L (ref 4–11)

## 2017-08-14 PROCEDURE — 85025 COMPLETE CBC W/AUTO DIFF WBC: CPT | Performed by: FAMILY MEDICINE

## 2017-08-14 PROCEDURE — 36415 COLL VENOUS BLD VENIPUNCTURE: CPT | Performed by: FAMILY MEDICINE

## 2017-08-14 PROCEDURE — 80053 COMPREHEN METABOLIC PANEL: CPT | Performed by: FAMILY MEDICINE

## 2017-08-14 NOTE — ORAL ONC MGMT
Oral Chemotherapy Monitoring Program    Spoke with patient regarding follow up from dental visit on 8/3.   Patient denies any bleeding, swelling or mouth pain.   Patient reports he is eating and drinking as normal.     Reviewed labs from today and are WNL.     As per Dr. Daniels guidance, instructed patient to start Temodar cycle today.   8/14Patient reported understanding.     Will follow up with labs in 1 week.     Marcella Malhotra, PharmD, MPH, BCOP  Hematology/Oncology Clinical Pharmacist   [facility]   [email or web address]

## 2017-08-21 ENCOUNTER — DOCUMENTATION ONLY (OUTPATIENT)
Dept: PHARMACY | Facility: CLINIC | Age: 47
End: 2017-08-21

## 2017-08-21 DIAGNOSIS — T45.1X5A CHEMOTHERAPY INDUCED NEUTROPENIA (H): ICD-10-CM

## 2017-08-21 DIAGNOSIS — C71.9 OLIGODENDROGLIOMA, ANAPLASTIC (H): ICD-10-CM

## 2017-08-21 DIAGNOSIS — Z51.11 CHEMOTHERAPY MANAGEMENT, ENCOUNTER FOR: ICD-10-CM

## 2017-08-21 DIAGNOSIS — D70.1 CHEMOTHERAPY INDUCED NEUTROPENIA (H): ICD-10-CM

## 2017-08-21 LAB
BASOPHILS # BLD AUTO: 0.1 10E9/L (ref 0–0.2)
BASOPHILS NFR BLD AUTO: 0.7 %
DIFFERENTIAL METHOD BLD: ABNORMAL
EOSINOPHIL # BLD AUTO: 0.2 10E9/L (ref 0–0.7)
EOSINOPHIL NFR BLD AUTO: 3.1 %
ERYTHROCYTE [DISTWIDTH] IN BLOOD BY AUTOMATED COUNT: 14.1 % (ref 10–15)
HCT VFR BLD AUTO: 41.1 % (ref 40–53)
HGB BLD-MCNC: 13.4 G/DL (ref 13.3–17.7)
LYMPHOCYTES # BLD AUTO: 1.4 10E9/L (ref 0.8–5.3)
LYMPHOCYTES NFR BLD AUTO: 20.1 %
MCH RBC QN AUTO: 32 PG (ref 26.5–33)
MCHC RBC AUTO-ENTMCNC: 32.6 G/DL (ref 31.5–36.5)
MCV RBC AUTO: 98 FL (ref 78–100)
MONOCYTES # BLD AUTO: 0.8 10E9/L (ref 0–1.3)
MONOCYTES NFR BLD AUTO: 10.7 %
NEUTROPHILS # BLD AUTO: 4.7 10E9/L (ref 1.6–8.3)
NEUTROPHILS NFR BLD AUTO: 65.4 %
PLATELET # BLD AUTO: 294 10E9/L (ref 150–450)
RBC # BLD AUTO: 4.19 10E12/L (ref 4.4–5.9)
WBC # BLD AUTO: 7.1 10E9/L (ref 4–11)

## 2017-08-21 PROCEDURE — 36415 COLL VENOUS BLD VENIPUNCTURE: CPT | Performed by: FAMILY MEDICINE

## 2017-08-21 PROCEDURE — 85025 COMPLETE CBC W/AUTO DIFF WBC: CPT | Performed by: FAMILY MEDICINE

## 2017-08-22 DIAGNOSIS — F32.4 MAJOR DEPRESSIVE DISORDER WITH SINGLE EPISODE, IN PARTIAL REMISSION (H): ICD-10-CM

## 2017-08-22 DIAGNOSIS — K21.9 GASTROESOPHAGEAL REFLUX DISEASE WITHOUT ESOPHAGITIS: ICD-10-CM

## 2017-08-22 DIAGNOSIS — F41.9 ANXIETY: ICD-10-CM

## 2017-08-23 NOTE — TELEPHONE ENCOUNTER
Routing to PCP for further review/recommendations/orders. Routing refill request to provider for review/approval because:  Medication is reported/historical    Eneida Villeda RN

## 2017-08-23 NOTE — TELEPHONE ENCOUNTER
Pantoprazole Sodium (PROTONIX PO)      Last Written Prescription Date: NA  Last Fill Quantity: NA,  # refills: NA   Last Office Visit with [facility], [facility] or [facility] prescribing provider: 3/10/2017                                         Next 5 appointments (look out 90 days)     Aug 24, 2017  2:00 PM CDT   Return Visit with Flex [facility] ([facility])    [address] [facility] [address] [phone]            Aug 29, 2017  3:00 PM CDT   Return Visit with Berkley Daniels MD   [facility] ([facility])    [facility]  [address]  [place] [id number] [phone]                    Escitalopram Oxalate (LEXAPRO PO)     Last Written Prescription Date: NA  Last Fill Quantity: NA, # refills: NA  Last Office Visit with [facility] primary care provider:  3/10/2017   Next 5 appointments (look out 90 days)     Aug 24, 2017  2:00 PM CDT   Return Visit with Flex Lombardo [facility] ([facility])    [address] [facility] [id number] [phone]            Aug 29, 2017  3:00 PM CDT   Return Visit with Berkley Daniels MD   [facility] ([facility])    [facility]  [address]  [place] [id number] [phone]                   Last PHQ-9 score on record=   PHQ-9 SCORE 7/6/2017   Total Score -   Total Score MyChart -   Total Score 7

## 2017-08-24 ENCOUNTER — OFFICE VISIT (OUTPATIENT)
Dept: PSYCHOLOGY | Facility: CLINIC | Age: 47
End: 2017-08-24
Payer: COMMERCIAL

## 2017-08-24 DIAGNOSIS — F32.1 MAJOR DEPRESSIVE DISORDER, SINGLE EPISODE, MODERATE WITH MELANCHOLIC FEATURES (H): Primary | ICD-10-CM

## 2017-08-24 DIAGNOSIS — Z63.5 FAMILY DISRUPTION DUE TO DIVORCE: ICD-10-CM

## 2017-08-24 PROCEDURE — 90834 PSYTX W PT 45 MINUTES: CPT | Performed by: MARRIAGE & FAMILY THERAPIST

## 2017-08-24 RX ORDER — ESCITALOPRAM OXALATE 20 MG/1
TABLET ORAL
Qty: 90 TABLET | Refills: 1 | Status: SHIPPED | OUTPATIENT
Start: 2017-08-24 | End: 2018-02-24

## 2017-08-24 RX ORDER — PANTOPRAZOLE SODIUM 40 MG/1
TABLET, DELAYED RELEASE ORAL
Qty: 90 TABLET | Refills: 1 | Status: SHIPPED | OUTPATIENT
Start: 2017-08-24 | End: 2018-03-14

## 2017-08-24 SDOH — SOCIAL STABILITY - SOCIAL INSECURITY: DISRUPTION OF FAMILY BY SEPARATION AND DIVORCE: Z63.5

## 2017-08-24 ASSESSMENT — ANXIETY QUESTIONNAIRES
GAD7 TOTAL SCORE: 10
3. WORRYING TOO MUCH ABOUT DIFFERENT THINGS: MORE THAN HALF THE DAYS
5. BEING SO RESTLESS THAT IT IS HARD TO SIT STILL: NOT AT ALL
1. FEELING NERVOUS, ANXIOUS, OR ON EDGE: MORE THAN HALF THE DAYS
2. NOT BEING ABLE TO STOP OR CONTROL WORRYING: MORE THAN HALF THE DAYS
IF YOU CHECKED OFF ANY PROBLEMS ON THIS QUESTIONNAIRE, HOW DIFFICULT HAVE THESE PROBLEMS MADE IT FOR YOU TO DO YOUR WORK, TAKE CARE OF THINGS AT HOME, OR GET ALONG WITH OTHER PEOPLE: SOMEWHAT DIFFICULT
6. BECOMING EASILY ANNOYED OR IRRITABLE: SEVERAL DAYS
7. FEELING AFRAID AS IF SOMETHING AWFUL MIGHT HAPPEN: SEVERAL DAYS

## 2017-08-24 ASSESSMENT — PATIENT HEALTH QUESTIONNAIRE - PHQ9
5. POOR APPETITE OR OVEREATING: MORE THAN HALF THE DAYS
SUM OF ALL RESPONSES TO PHQ QUESTIONS 1-9: 9

## 2017-08-24 NOTE — TELEPHONE ENCOUNTER
The prescription(s) has been sent to the requested pharmacy.  Please notify the patient if needed.

## 2017-08-24 NOTE — PROGRESS NOTES
Progress Note    Client Name: Joni Borja  Date: 8/24/2017         Service Type: Individual       Session Start Time: 2:10pm  Session End Time: 2:55pm      Session Length: 45 minutes     Session #: 14     Attendees: Client attended alone     Treatment Plan Last Reviewed: 8/24/2017  PHQ-9 / TONI-7 : 8/24/2017      DATA      Progress Since Last Session (Related to Symptoms / Goals / Homework):   Symptoms: Stable     Homework: Achieved / completed to satisfaction      Episode of Care Goals: Satisfactory progress - ACTION (Actively working towards change); Intervened by reinforcing change plan / affirming steps taken     Current / Ongoing Stressors and Concerns:   - Sx of depression as a result of years of marital distress that led to present separation, working towards divorce   - difficulties coping with medical problems which had led to limitation (driving) and work disability  Client reports that he has his first divorce meeting on 9/5/17 and his last chemotherapy treatment is also in September. He is having a difficult time knowing that his marriage is coming to and end, but understands that it was no longer healthy and the right thing for him to do. Either way [place]. He feels that is where he belongs and needs to get back to his way of life.      Treatment Objective(s) Addressed in This Session:   Identify negative self-talk and behaviors: challenge core beliefs, myths, and actions  Improve concentration, focus, and mindfulness in daily activities   practice setting boundaries daily times in the next 4 weeks      Intervention:   CBT: operant conditioning, identifying triggers and patterns, identifying alternatives  Solution Focused: miracle question, healthy marriage, couples counseling  Structural: balance, roles, rules, past patterns vs desired patterns         ASSESSMENT: Current Emotional / Mental  Status (status of significant symptoms):   Risk status (Self / Other harm or suicidal ideation)   Client denies current fears or concerns for personal safety.   Client denies current or recent suicidal ideation or behaviors.   Client denies current or recent homicidal ideation or behaviors.   Client denies current or recent self injurious behavior or ideation.   Client denies other safety concerns.   A safety and risk management plan has not been developed at this time, however client was given the after-hours number / 911 should there be a change in any of these risk factors.     Appearance:   Appropriate    Eye Contact:   Good    Psychomotor Behavior: Normal    Attitude:   Cooperative    Orientation:   All   Speech    Rate / Production: Normal     Volume:  Normal    Mood:    Depressed  Sad    Affect:    Appropriate  Bright    Thought Content:  Clear    Thought Form:  Coherent  Goal Directed  Logical  Circumstantial   Insight:    Fair      Medication Review:   No changes to current psychiatric medication(s)     Medication Compliance:   Yes     Changes in Health Issues:   None reported     Chemical Use Review:   Substance Use: Chemical use reviewed, no active concerns identified      Tobacco Use: No change in amount of tobacco use since last session.  Patient declined discussion at this time     Collateral Reports Completed:   Not Applicable    PLAN: (Client Tasks / Therapist Tasks / Other)  Client [place] to [place] after his divorce meeting with his wife. He plans to live with friends in the region he feels a sense of belonging, and move on from his toxic relationship with his wife.       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   WHODAS 2.0 (12 item) 16.67% on 2/14/2017    Referral / Collaboration:  Referral to another professional/service is not indicated at this time..    Anticipated number of session or this episode of care: 10      MeasurableTreatment Goal(s) related to diagnosis / functional impairment(s)  Goal 1: Client's depression will remit as evidenced by a decrease in PHQ9 score by at least 6 points or below a five, where symptoms occur fewer than half the days.   I will know I've met my goal when I \"clear my head and work on myself.      Objective #A (Client Action)   Client will decrease frequency and intensity of feeling down, depressed, hopeless  Client will increase self-awareness of symptom onset/escalation  Status: Continued - Date: 8/24/2017     Intervention(s)  Therapist will assign homework track symptoms, patterns and triggers  provide psycho-education on depression  Therapist will teach CBT strategies for attending to negative self-talk and cognitive distortions.  ACT: experiential exercises and mindfulness practices, how  to live with life barriers    Objective #B  Client will Increase interest, engagement, and pleasure in doing things  Identify negative self-talk and behaviors: challenge core beliefs, myths, and actions  Status: Continued - Date: 8/24/2017     Intervention(s)  Therapist will assign homework to practice using coping skills outside the therapy encounter, worksheets to challenge negative thoughts  teach distraction skills. explore and tailor enjoyable activities, increase social activities, strengthen social supports  ACT: life values and being mindful " "of values for goals and daily living    Objective #C  Improve concentration, focus, and mindfulness in daily activities   Status: Continued - Date: 8/24/2017     Intervention(s)  provide safe space to address hx of presenting problem and root cause  teach emotional regulation skills. mindfulness practices, grounding skills, affirmations, strengths based approach  Horacio: exercise to stage presenting problem, reflect, process and problem solve.      Goal 2: Client will improve his condition of interactions in his relationships (specifically with wife/) establishing healthy, balanced and appropriate boundaries to be kept 100% of the time for a minimum of 4 weeks.     I will know I've met my goal when I \"get my own place. Get the rest of my valuables from the house.      Objective #A (Client Action)      Client will compile a list of boundaries that they would like to set with others. Wife, adult step-children, family members  Learning to fight fair, learning to identify positive and negative communication patterns  Status: Continued - Date: 8/24/2017     Intervention(s)  Therapist will teach about healthy boundaries. structure, saying \"no\", advocating, identifying and establishing appropriate roles, rules, expectations.    Objective #B  Client will practice setting boundaries daily times in the next 12 weeks.                    Status: Continued - Date: 8/24/2017     Intervention(s)  Therapist will assign homework to track the use of healthy boundaries and outcome of establishing relational change  role-play effective communication skills and conflict management    Objective #C  Client will learn & utilize at least 3 assertive communication skills weekly.  Status: Continued - Date: 8/24/2017     Intervention(s)  teach assertiveness skills. aggressive/passive/assertive, impulsive control, reactive vs sensitive, exposure to uncomfortable conversation.  Therapist will role-play assertiveness skills      Client " has reviewed and agreed to the above plan.      EMILY Donahue, LICSW  August 24, 2017

## 2017-08-25 PROBLEM — Z63.5: Status: RESOLVED | Noted: 2017-02-28 | Resolved: 2017-08-25

## 2017-08-25 ASSESSMENT — ANXIETY QUESTIONNAIRES: GAD7 TOTAL SCORE: 10

## 2017-08-28 DIAGNOSIS — T45.1X5A CHEMOTHERAPY INDUCED NEUTROPENIA (H): ICD-10-CM

## 2017-08-28 DIAGNOSIS — Z51.11 CHEMOTHERAPY MANAGEMENT, ENCOUNTER FOR: ICD-10-CM

## 2017-08-28 DIAGNOSIS — C71.9 OLIGODENDROGLIOMA, ANAPLASTIC (H): ICD-10-CM

## 2017-08-28 DIAGNOSIS — D70.1 CHEMOTHERAPY INDUCED NEUTROPENIA (H): ICD-10-CM

## 2017-08-28 LAB
ALBUMIN SERPL-MCNC: 3.2 G/DL (ref 3.4–5)
ALP SERPL-CCNC: 107 U/L (ref 40–150)
ALT SERPL W P-5'-P-CCNC: 30 U/L (ref 0–70)
ANION GAP SERPL CALCULATED.3IONS-SCNC: 6 MMOL/L (ref 3–14)
AST SERPL W P-5'-P-CCNC: 15 U/L (ref 0–45)
BASOPHILS # BLD AUTO: 0 10E9/L (ref 0–0.2)
BASOPHILS NFR BLD AUTO: 0.2 %
BILIRUB SERPL-MCNC: 0.2 MG/DL (ref 0.2–1.3)
BUN SERPL-MCNC: 17 MG/DL (ref 7–30)
CALCIUM SERPL-MCNC: 8.4 MG/DL (ref 8.5–10.1)
CHLORIDE SERPL-SCNC: 115 MMOL/L (ref 94–109)
CO2 SERPL-SCNC: 24 MMOL/L (ref 20–32)
CREAT SERPL-MCNC: 1.01 MG/DL (ref 0.66–1.25)
DIFFERENTIAL METHOD BLD: ABNORMAL
EOSINOPHIL # BLD AUTO: 0.2 10E9/L (ref 0–0.7)
EOSINOPHIL NFR BLD AUTO: 2.3 %
ERYTHROCYTE [DISTWIDTH] IN BLOOD BY AUTOMATED COUNT: 13.9 % (ref 10–15)
GFR SERPL CREATININE-BSD FRML MDRD: 79 ML/MIN/1.7M2
GLUCOSE SERPL-MCNC: 90 MG/DL (ref 70–99)
HCT VFR BLD AUTO: 37.9 % (ref 40–53)
HGB BLD-MCNC: 12.4 G/DL (ref 13.3–17.7)
LYMPHOCYTES # BLD AUTO: 2.2 10E9/L (ref 0.8–5.3)
LYMPHOCYTES NFR BLD AUTO: 25.8 %
MCH RBC QN AUTO: 32 PG (ref 26.5–33)
MCHC RBC AUTO-ENTMCNC: 32.7 G/DL (ref 31.5–36.5)
MCV RBC AUTO: 98 FL (ref 78–100)
MONOCYTES # BLD AUTO: 1 10E9/L (ref 0–1.3)
MONOCYTES NFR BLD AUTO: 11.8 %
NEUTROPHILS # BLD AUTO: 5.1 10E9/L (ref 1.6–8.3)
NEUTROPHILS NFR BLD AUTO: 59.9 %
PLATELET # BLD AUTO: 265 10E9/L (ref 150–450)
POTASSIUM SERPL-SCNC: 3.9 MMOL/L (ref 3.4–5.3)
PROT SERPL-MCNC: 6.5 G/DL (ref 6.8–8.8)
RBC # BLD AUTO: 3.88 10E12/L (ref 4.4–5.9)
SODIUM SERPL-SCNC: 145 MMOL/L (ref 133–144)
WBC # BLD AUTO: 8.5 10E9/L (ref 4–11)

## 2017-08-28 PROCEDURE — 80053 COMPREHEN METABOLIC PANEL: CPT | Performed by: FAMILY MEDICINE

## 2017-08-28 PROCEDURE — 85025 COMPLETE CBC W/AUTO DIFF WBC: CPT | Performed by: FAMILY MEDICINE

## 2017-08-28 PROCEDURE — 36415 COLL VENOUS BLD VENIPUNCTURE: CPT | Performed by: FAMILY MEDICINE

## 2017-08-29 ENCOUNTER — ONCOLOGY VISIT (OUTPATIENT)
Dept: ONCOLOGY | Facility: CLINIC | Age: 47
End: 2017-08-29
Attending: PSYCHIATRY & NEUROLOGY
Payer: COMMERCIAL

## 2017-08-29 VITALS
TEMPERATURE: 98.5 F | SYSTOLIC BLOOD PRESSURE: 110 MMHG | WEIGHT: 173 LBS | RESPIRATION RATE: 16 BRPM | HEART RATE: 75 BPM | DIASTOLIC BLOOD PRESSURE: 74 MMHG | BODY MASS INDEX: 25.55 KG/M2 | OXYGEN SATURATION: 98 %

## 2017-08-29 DIAGNOSIS — C71.9 OLIGODENDROGLIOMA, ANAPLASTIC (H): Primary | ICD-10-CM

## 2017-08-29 DIAGNOSIS — G40.209 PARTIAL EPILEPSY WITH IMPAIRMENT OF CONSCIOUSNESS (H): ICD-10-CM

## 2017-08-29 DIAGNOSIS — Z72.0 TOBACCO ABUSE: ICD-10-CM

## 2017-08-29 DIAGNOSIS — F41.9 ANXIETY: ICD-10-CM

## 2017-08-29 DIAGNOSIS — T45.1X5A CHEMOTHERAPY-INDUCED NAUSEA AND VOMITING: ICD-10-CM

## 2017-08-29 DIAGNOSIS — R11.2 CHEMOTHERAPY-INDUCED NAUSEA AND VOMITING: ICD-10-CM

## 2017-08-29 DIAGNOSIS — R45.89 DEPRESSED MOOD: ICD-10-CM

## 2017-08-29 DIAGNOSIS — K02.9 DENTAL CARIES: ICD-10-CM

## 2017-08-29 DIAGNOSIS — Z51.11 CHEMOTHERAPY MANAGEMENT, ENCOUNTER FOR: ICD-10-CM

## 2017-08-29 DIAGNOSIS — Z65.9 OTHER SOCIAL STRESSOR: ICD-10-CM

## 2017-08-29 DIAGNOSIS — Z96.89 S/P PLACEMENT OF VNS (VAGUS NERVE STIMULATION) DEVICE: ICD-10-CM

## 2017-08-29 PROCEDURE — 99215 OFFICE O/P EST HI 40 MIN: CPT | Performed by: PSYCHIATRY & NEUROLOGY

## 2017-08-29 PROCEDURE — 99211 OFF/OP EST MAY X REQ PHY/QHP: CPT

## 2017-08-29 PROCEDURE — 99212 OFFICE O/P EST SF 10 MIN: CPT

## 2017-08-29 RX ORDER — ONDANSETRON 4 MG/1
8 TABLET, FILM COATED ORAL AT BEDTIME
Qty: 60 TABLET | Refills: 3 | Status: SHIPPED | OUTPATIENT
Start: 2017-08-29 | End: 2018-05-15

## 2017-08-29 RX ORDER — TEMOZOLOMIDE 100 MG/1
200 CAPSULE ORAL DAILY
Qty: 20 CAPSULE | Refills: 0 | Status: SHIPPED | OUTPATIENT
Start: 2017-08-29 | End: 2017-09-03

## 2017-08-29 ASSESSMENT — PAIN SCALES - GENERAL: PAINLEVEL: NO PAIN (0)

## 2017-08-29 NOTE — PROGRESS NOTES
"Oncology Rooming Note    August 29, 2017 3:09 PM   Joni Borja is a 46 year old male who presents for:    No chief complaint on file.    Initial Vitals: /74 (BP Location: Left arm, Patient Position: Sitting, Cuff Size: Adult Regular)  Pulse 75  Temp 98.5  F (36.9  C) (Oral)  Resp 16  Wt 78.5 kg (173 lb)  SpO2 98%  BMI 25.55 kg/m2 Estimated body mass index is 25.55 kg/(m^2) as calculated from the following:    Height as of 8/1/17: 1.753 m (5' 9\").    Weight as of this encounter: 78.5 kg (173 lb). Body surface area is 1.95 meters squared.  No Pain (0) Comment: Data Unavailable   No LMP for male patient.  Allergies reviewed: Yes  Medications reviewed: Yes    Medications: Medication refills not needed today.  Pharmacy name entered into [employer]:    [facility] - [place], [place] - [address] [facility] - [place], [place] - [address]   [facility] [employer] PATIENT ASSISTANCE PROGRAM    Clinical concerns: Would like to find something to occupy his time, always had a job... Also dealing with divorce, and seeing psychologist and  and more. Restraining order in place as well. Jeremiah was notified.    7 minutes for nursing intake (face to face time)     Brittany Higginbotham MA    DISCHARGE PLAN:  Next appointments: See patient instruction section-- Laura  Departure Mode: Ambulatory  Accompanied by: Parents  10 minutes for nursing discharge (face to face time)   Gina Ramos RN      Reviewed with Pt & his Parents & they all verbalized understanding:   Referral to palliative care at [place].    Next temozolomide cycle due on 9/11.   Order placed, will mail prescription request.   Try increasing Zofran from 4mg to 8mg prior to chemotherapy dosing (refill placed).   Continue with weekly labs.     Return to clinic on 9/19 with MR brain imaging prior to same day appointment.   VNS to be addressed for MR brain scan.--8-31-17 5pm called & LVM " for Ceasar Fairchild, Pt's VNS Therapeutic Consultant (ph: [phone]), of Pt's 9-19-17     10:15am Brain MRI at [facility] & need to shut off/turn on VNS before/after MRI.  (This worked well last time without issue.)      Berkley Hagan MD  Neuro-oncology  8/29/2017 9/6/2017 Wed  2:00 PM  2:00 P 15 [facility] [[id number]] RV LAB [[id number]] LAB [930] blood levels for chemo      S 9/11/2017 Mon  2:00 PM  2:00 P 15 [facility] [[id number]] RV LAB [[id number]] LAB [930] blood levels for chemo      S 9/18/2017 Mon  2:00 PM  2:00 P 15 [facility] [[id number]] RV LAB [[id number]] LAB [930] blood levels for chemo       9/19/2017 Tue 10:15 AM 10:15 A 45 SHMR2 [[id number]] SHMRP1 [[id number]] MR BRAIN WWO [[id number]] EPIC order, sb Laura   MR BRAIN W/O & W CONTRAST [[id number]]     9/19/2017 Tue  3:30 PM  3:30 P 30 [facility] [[id number]] BERKLEY HAGAN [[id number]] RETURN [065] 3 week follow up - Oligodendroglioma      S 9/25/2017 Mon  2:00 PM  2:00 P 15 [facility] [[id number]] RV LAB [[id number]] LAB [930] blood levels for chemo

## 2017-08-29 NOTE — MR AVS SNAPSHOT
After Visit Summary   8/29/2017    Joni Borja    MRN: [id number]           Patient Information     Date Of Birth          1970        Visit Information        Provider Department      8/29/2017 3:00 PM Berkley Daniels MD [facility]        Today's Diagnoses     Oligodendroglioma, anaplastic (H)    -  1    Chemotherapy management, encounter for        Chemotherapy-induced nausea and vomiting        S/P placement of VNS (vagus nerve stimulation) device        Tobacco abuse        Partial epilepsy with impairment of consciousness (H)        Dental caries        Depressed mood        Anxiety        Other social stressor          Care Instructions    Referral to palliative care at [facility].    Next temozolomide cycle due on 9/11.   Order placed, will mail prescription request.   Try increasing Zofran from 4mg to 8mg prior to chemotherapy dosing (refill placed).   Continue with weekly labs.     Return to clinic on 9/19 with MR brain imaging prior to same day appointment.   VNS to be addressed for MR brain scan.     Berkley Daniels MD  Neuro-oncology  8/29/2017            Follow-ups after your visit        Additional Services     PALLIATIVE CARE REFERRAL       Your provider has referred you to Palliative Care Services.    To schedule an Outpatient Palliative Care Referral appointment, please call: [facility]: [facility] [phone]   [email or web address]/.    Please be aware that coverage of these services is subject to the terms and limitations of your health insurance plan.  Call member services at your health plan with any benefit or coverage questions.      Please bring the following with you to your appointment:    (1) Any X-Rays, CTs or MRIs which have been performed.  Contact the facility where they were done to arrange for  prior to your scheduled appointment.   (2) If  you have recently seen a provider outside of the [facility], please bring your most recent clinic note and/or imaging results  (3) List of current medications - please bring ALL of the medications that you are currently taking (in their original bottles) to your appointment  (4) This referral request  (5) Any documents/labs given to you for this referral    Services Requested: Consult and make recommendations, Evaluate and treat symptoms (including writing prescriptions), Explore goals of care. Many social stressors (divorce, [place] here in the [facility], needing assistance with managing stress, anxiety, and mood. 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 What is patient's life-limiting diagnosis? Malignant brain tumor  2. What is the reason for the referral? See above.  3. What is the patient's prognosis? Years.     Palliative Care Definition:    Palliative Care is specialized medical care for people with serious illness.  This type of care is focused on providing patients with relief from symptoms, pain and stresses of serious illness - whatever the diagnosis may be.  The goal of Palliative Care is to improve quality of life for both the patient and the family.  Palliative Care is provided by a team of doctors, nurses and other specialists who work with the patient's other doctors to provide an extra layer of support.  Palliative Care is appropriate at any age and at any stage in a serious illness, and can be provided together with curative treatment.                  Your next 10 appointments already scheduled     Sep 06, 2017  2:00 PM CDT   LAB with RV LAB   [facility]    [address] [place] [id number] [phone]           Patient must  bring picture ID. Patient should be prepared to give a urine specimen  Please do not eat 10-12 hours before your appointment if you are coming in fasting for labs on lipids, cholesterol, or glucose (sugar). Pregnant women should follow their Care Team instructions. Water with medications is okay. Do not drink coffee or other fluids. If you have concerns about taking  your medications, please ask at office or if scheduling via [employer], send a message by clicking on Secure Messaging, Message Your Care Team.            Sep 11, 2017  2:00 PM CDT   LAB with RV LAB   [facility]    [address] [place] [id number] [phone]           Patient must bring picture ID. Patient should be prepared to give a urine specimen  Please do not eat 10-12 hours before your appointment if you are coming in fasting for labs on lipids, cholesterol, or glucose (sugar). Pregnant women should follow their Care Team instructions. Water with medications is okay. Do not drink coffee or other fluids. If you have concerns about taking  your medications, please ask at office or if scheduling via [employer], send a message by clicking on Secure Messaging, Message Your Care Team.            Sep 18, 2017  2:00 PM CDT   LAB with RV LAB   [facility]    [address] [place] [id number] [phone]           Patient must bring picture ID. Patient should be prepared to give a urine specimen  Please do not eat 10-12 hours before your appointment if you are coming in fasting for labs on lipids, cholesterol, or glucose (sugar). Pregnant women should follow their Care Team instructions. Water with medications is okay. Do not drink coffee or other fluids. If you have concerns about taking  your medications, please ask at office or if scheduling via [employer], send a message by clicking on Secure Messaging, Message Your Care  Team.            Sep 19, 2017 10:15 AM CDT   MR BRAIN W/O & W CONTRAST with SHMRP1   [facility] ([facility])    [address] [place] [address] [phone]           Take your medicines as usual, unless your doctor tells you not to. Bring a list of your current medicines to your exam (including vitamins, minerals and over-the-counter drugs).  You will be given intravenous contrast for this exam. To prepare:   The day before your exam, drink extra fluids at least six 8-ounce glasses (unless your doctor tells you to restrict your fluids).   Have a blood test (creatinine test) within 30 days of your exam. Go to your clinic or Diagnostic Imaging Department for this test.  The MRI machine uses a strong magnet. Please wear clothes without metal (snaps, zippers). A sweatsuit works well, or we may give you a hospital gown.  Please remove any body piercings and hair extensions before you arrive. You will also remove watches, jewelry, hairpins, wallets, dentures, partial dental plates and hearing aids. You may wear contact lenses, and you may be able to wear your rings. We have a safe place to keep your personal items, but it is safer to leave them at home.   **IMPORTANT** THE INSTRUCTIONS BELOW ARE ONLY FOR THOSE PATIENTS WHO HAVE BEEN TOLD THEY WILL RECEIVE SEDATION OR GENERAL ANESTHESIA DURING THEIR MRI PROCEDURE:  IF YOU WILL RECEIVE SEDATION (take medicine to help you relax during your exam):   You must get the medicine from your doctor before you arrive. Bring the medicine to the exam. Do not take it at home.   Arrive one hour early. Bring someone who can take you home after the test. Your medicine will make you sleepy. After the exam, you may not drive, take a bus or take a taxi by yourself.   No eating 8 hours before your exam. You may have clear liquids up until 4 hours before your exam. (Clear liquids include water, clear tea, black coffee and fruit juice without pulp.)   IF YOU WILL RECEIVE ANESTHESIA (be asleep for your exam):   Arrive 1 1/2 hours early. Bring someone who can take you home after the test. You may not drive, take a bus or take a taxi by yourself.   No eating 8 hours before your exam. You may have clear liquids up until 4 hours before your exam. (Clear liquids include water, clear tea, black coffee and fruit juice without pulp.)  Please call the Imaging Department at your exam site with any questions.            Sep 19, 2017  3:30 PM CDT   Return Visit with Berkley Daniels MD   [facility] ([facility])    [facility]  [address]  [facility] [address] [phone]            Sep 25, 2017  2:00 PM CDT   LAB with RV LAB   [facility]    [address] [place] [address] [phone]           Patient must bring picture ID. Patient should be prepared to give a urine specimen  Please do not eat 10-12 hours before your appointment if you are coming in fasting for labs on lipids, cholesterol, or glucose (sugar). Pregnant women should follow their Care Team instructions. Water with medications is okay. Do not drink coffee or other fluids. If you have concerns about taking  your medications, please ask at office or if scheduling via [employer], send a message by clicking on Secure Messaging, Message Your Care Team.            Nov 17, 2017  2:30 PM CST   Return Visit with Silvia Brown MD   [facility] ([facility])    [address], [address]  [facility] [id number] [phone]              Future tests that were ordered for you today     Open Future Orders        Priority Expected Expires Ordered    MR Brain w/o & w Contrast Routine 9/19/2017 8/29/2018 8/29/2017            Who to contact     If you have questions or need follow up information about today's clinic visit or your schedule please contact  [facility] directly at [phone].  Normal or non-critical lab and imaging results will be communicated to you by MyChart, letter or phone within 4 business days after the clinic has received the results. If you do not hear from us within 7 days, please contact the clinic through [employer] or phone. If you have a critical or abnormal lab result, we will notify you by phone as soon as possible.  Submit refill requests through [employer] or call your pharmacy and they will forward the refill request to us. Please allow 3 business days for your refill to be completed.          Additional Information About Your Visit        [employer] Information     [employer] gives you secure access to your electronic health record. If you see a primary care provider, you can also send messages to your care team and make appointments. If you have questions, please call your primary care clinic.  If you do not have a primary care provider, please call [phone] and they will assist you.        Care EveryWhere ID     This is your Care EveryWhere ID. This could be used by other organizations to access your [place] medical records  [id number]        Your Vitals Were     Pulse Temperature Respirations Pulse Oximetry BMI (Body Mass Index)       75 98.5  F (36.9  C) (Oral) 16 98% 25.55 kg/m2        Blood Pressure from Last 3 Encounters:   08/29/17 110/74   08/01/17 99/65   06/27/17 109/71    Weight from Last 3 Encounters:   08/29/17 78.5 kg (173 lb)   08/01/17 79.5 kg (175 lb 4.3 oz)   06/27/17 80.9 kg (178 lb 6.4 oz)              We Performed the Following     PALLIATIVE CARE REFERRAL          Today's Medication Changes          These changes are accurate as of: 8/29/17  4:22 PM.  If you have any questions, ask your nurse or doctor.               These medicines have changed or have updated prescriptions.        Dose/Directions    ondansetron 4 MG tablet   Commonly known as:  ZOFRAN   This may have changed:    - how much to  take  - additional instructions   Used for:  Chemotherapy management, encounter for, Chemotherapy-induced nausea and vomiting   Changed by:  Berkley Daniels MD        Dose:  8 mg   Take 2 tablets (8 mg) by mouth At Bedtime or 30 minutes prior to chemotherapy dosing. Repeat every 8 hours as needed for nausea.   Quantity:  60 tablet   Refills:  3       * temozolomide 100 MG capsule CHEMO   Commonly known as:  TEMODAR   This may have changed:  Another medication with the same name was added. Make sure you understand how and when to take each.   Used for:  Oligodendroglioma, anaplastic (H)   Changed by:  Berkley Daniels MD        Dose:  200 mg/m2/day   Take 4 capsules (400 mg) by mouth daily for 5 days Take ondansetron 30-60 min before temozolomide. Take at bedtime on an empty stomach.   Quantity:  20 capsule   Refills:  0       * temozolomide 100 MG capsule CHEMO   Commonly known as:  TEMODAR   This may have changed:  You were already taking a medication with the same name, and this prescription was added. Make sure you understand how and when to take each.   Used for:  Oligodendroglioma, anaplastic (H)   Changed by:  Berkley Daniels MD        Dose:  200 mg/m2/day   Take 4 capsules (400 mg) by mouth daily for 5 days Take ondansetron 30-60 min before temozolomide. Take at bedtime on an empty stomach.   Quantity:  20 capsule   Refills:  0       * Notice:  This list has 2 medication(s) that are the same as other medications prescribed for you. Read the directions carefully, and ask your doctor or other care provider to review them with you.         Where to get your medicines      These medications were sent to [facility]  [place] - [address]  [address] [facility] [place] [address]     Phone:  [phone]     ondansetron 4 MG tablet         Some of these will need a paper prescription and others can be bought over the counter.  Ask your nurse if you  have questions.     Bring a paper prescription for each of these medications     temozolomide 100 MG capsule CHEMO                Primary Care Provider Office Phone # Fax #    Brian Coon [phone] [address] [facility] [address]        Equal Access to Services     JONATHANDONNA CYNTHIA : Hadii lexis ku haddandreo Soomaali, waaxda luqadaha, qaybta kaalmada adeegyada, waxtiffanie lesleyin hayaan concepcion mamadou bart de la garza. So [facility] [phone].    ATENCIÓN: Si habla español, tiene a kirby disposición servicios gratuitos de asistencia lingüística. Llame al [phone].    We comply with applicable federal civil rights laws and [place] laws. We do not discriminate on the basis of race, color, national origin, age, disability sex, sexual orientation or gender identity.            Thank you!     Thank you for choosing [facility]  for your care. Our goal is always to provide you with excellent care. Hearing back from our patients is one way we can continue to improve our services. Please take a few minutes to complete the written survey that you may receive in the mail after your visit with us. Thank you!             Your Updated Medication List - Protect others around you: Learn how to safely use, store and throw away your medicines at www.disposemymeds.org.          This list is accurate as of: 8/29/17  4:22 PM.  Always use your most recent med list.                   Brand Name Dispense Instructions for use Diagnosis    acetaminophen-codeine 300-30 MG per tablet    TYLENOL #3     Take 1-2 tablets by mouth every 4 hours as needed for moderate pain As needed for headaches        BENADRYL PO      Take 50 mg by mouth daily as needed for allergies (misquitos,)    Localization-related (focal) (partial) epilepsy and epileptic syndromes with complex partial seizures, with intractable epilepsy, Depression, Anxiety, Unstable gait       carBAMazepine 300 MG 12 hr capsule    CARBATROL    180 capsule     Take 1 capsule (300 mg) by mouth 2 times daily (at 10:00 & 22:00)    Partial epilepsy with impairment of consciousness, intractable (H)       diazepam 5 MG tablet    VALIUM    30 tablet    Take 5 mg by mouth 2 times daily as needed for other (seizures.) As instructed for seizures. Please take 5 mg for more than 3 seizures in 8 hour period, may repeat 5 mg after 30 min if seizures continue.    Localization-related (focal) (partial) epilepsy and epileptic syndromes with complex partial seizures, with intractable epilepsy       felbamate 600 MG tablet    FELBATOL    225 tablet    Take 600 mg (one tablet) am and 900 mg (1.5 tablet) 2 pm    Partial epilepsy with impairment of consciousness, intractable (H)       KLONOPIN PO      Take 0.5 mg by mouth 2 times daily 1 tab in the AM and 2 tabs in PM.        * LEXAPRO PO      Take 20 mg by mouth At Bedtime        * escitalopram 20 MG tablet    LEXAPRO    90 tablet    TAKE ONE TABLET BY MOUTH ONCE DAILY AT  NIGHT    Major depressive disorder with single episode, in partial remission (H), Anxiety       ondansetron 4 MG tablet    ZOFRAN    60 tablet    Take 2 tablets (8 mg) by mouth At Bedtime or 30 minutes prior to chemotherapy dosing. Repeat every 8 hours as needed for nausea.    Chemotherapy management, encounter for, Chemotherapy-induced nausea and vomiting       * PROTONIX PO      Take 40 mg by mouth every morning        * pantoprazole 40 MG EC tablet    PROTONIX    90 tablet    TAKE ONE TABLET BY MOUTH ONCE DAILY    Gastroesophageal reflux disease without esophagitis       * temozolomide 100 MG capsule CHEMO    TEMODAR    20 capsule    Take 4 capsules (400 mg) by mouth daily for 5 days Take ondansetron 30-60 min before temozolomide. Take at bedtime on an empty stomach.    Oligodendroglioma, anaplastic (H)       * temozolomide 100 MG capsule CHEMO    TEMODAR    20 capsule    Take 4 capsules (400 mg) by mouth daily for 5 days Take ondansetron 30-60 min before temozolomide.  Take at bedtime on an empty stomach.    Oligodendroglioma, anaplastic (H)       topiramate 100 MG tablet    TOPAMAX    900 tablet    Take 2 tablets (200 mg) by mouth every morning 200 mg am and 300 mg pm    Partial epilepsy with impairment of consciousness, intractable (H)       VITAMIN B 12 PO      Take 1 tablet by mouth daily (with dinner)        ZYRTEC ALLERGY PO      Take 10 mg by mouth daily as needed (for misquitos.)    Localization-related (focal) (partial) epilepsy and epileptic syndromes with complex partial seizures, with intractable epilepsy, Depression, Anxiety, Unstable gait       * Notice:  This list has 6 medication(s) that are the same as other medications prescribed for you. Read the directions carefully, and ask your doctor or other care provider to review them with you.
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
## 2017-08-29 NOTE — PATIENT INSTRUCTIONS
Referral to palliative care at [place].    Next temozolomide cycle due on 9/11.   Order placed, will mail prescription request.   Try increasing Zofran from 4mg to 8mg prior to chemotherapy dosing (refill placed).   Continue with weekly labs.     Return to clinic on 9/19 with MR brain imaging prior to same day appointment.   VNS to be addressed for MR brain scan.     Berkley Daniels MD  Neuro-oncology  8/29/2017

## 2017-08-30 ENCOUNTER — DOCUMENTATION ONLY (OUTPATIENT)
Dept: PHARMACY | Facility: CLINIC | Age: 47
End: 2017-08-30

## 2017-08-30 NOTE — PROGRESS NOTES
Oral Chemotherapy Monitoring Program.    Patient currently on Temodar therapy.    Reviewed lab results from 8/29/17.    Labs meet parameters to continue treatment.     Will follow up in about 1 week for labs.    Rodríguez Smith PharmD  Oral Chemotherapy Program

## 2017-08-31 ENCOUNTER — TELEPHONE (OUTPATIENT)
Dept: ONCOLOGY | Facility: CLINIC | Age: 47
End: 2017-08-31

## 2017-08-31 NOTE — TELEPHONE ENCOUNTER
Received positive distress screen regarding patient's concern for ability to eat.  Called and left RD contact information on patient's voicemail.  Await return call from patient.    Dao Simpson, RD, LD  Clinical Dietitian  [facility]  [phone] (direct)

## 2017-08-31 NOTE — TELEPHONE ENCOUNTER
Received phone call from patient's mother asking if RD had just called home number as patient not available currently.  Explained RD called to discuss diet with patient.  Patient's mother states will give patient message.  Provided RD name and number for patient to contact.    Dao Simpson, RD, LD  Clinical Dietitian  [facility]  [phone] (direct)

## 2017-09-01 ENCOUNTER — TELEPHONE (OUTPATIENT)
Dept: ONCOLOGY | Facility: CLINIC | Age: 47
End: 2017-09-01

## 2017-09-01 NOTE — TELEPHONE ENCOUNTER
Patient returned RD phone call.  Patient feels he is eating okay at this time and does not have any nutrition concerns to discuss at this time.  Encouraged patient to content RD in the future if questions or issues arise.  Patient verbalized understanding of plan.    Dao Simpson, RD, LD  Clinical Dietitian  [facility]  [phone] (direct)

## 2017-09-06 ENCOUNTER — TELEPHONE (OUTPATIENT)
Dept: PALLIATIVE CARE | Facility: CLINIC | Age: 47
End: 2017-09-06

## 2017-09-06 ENCOUNTER — DOCUMENTATION ONLY (OUTPATIENT)
Dept: PHARMACY | Facility: CLINIC | Age: 47
End: 2017-09-06

## 2017-09-06 DIAGNOSIS — Z51.11 CHEMOTHERAPY MANAGEMENT, ENCOUNTER FOR: ICD-10-CM

## 2017-09-06 DIAGNOSIS — T45.1X5A CHEMOTHERAPY INDUCED NEUTROPENIA (H): ICD-10-CM

## 2017-09-06 DIAGNOSIS — C71.9 OLIGODENDROGLIOMA, ANAPLASTIC (H): ICD-10-CM

## 2017-09-06 DIAGNOSIS — D70.1 CHEMOTHERAPY INDUCED NEUTROPENIA (H): ICD-10-CM

## 2017-09-06 LAB
BASOPHILS # BLD AUTO: 0 10E9/L (ref 0–0.2)
BASOPHILS NFR BLD AUTO: 0.4 %
DIFFERENTIAL METHOD BLD: ABNORMAL
EOSINOPHIL # BLD AUTO: 0.2 10E9/L (ref 0–0.7)
EOSINOPHIL NFR BLD AUTO: 2.5 %
ERYTHROCYTE [DISTWIDTH] IN BLOOD BY AUTOMATED COUNT: 13.9 % (ref 10–15)
HCT VFR BLD AUTO: 37.5 % (ref 40–53)
HGB BLD-MCNC: 12.4 G/DL (ref 13.3–17.7)
LYMPHOCYTES # BLD AUTO: 1.7 10E9/L (ref 0.8–5.3)
LYMPHOCYTES NFR BLD AUTO: 23.7 %
MCH RBC QN AUTO: 32 PG (ref 26.5–33)
MCHC RBC AUTO-ENTMCNC: 33.1 G/DL (ref 31.5–36.5)
MCV RBC AUTO: 97 FL (ref 78–100)
MONOCYTES # BLD AUTO: 0.6 10E9/L (ref 0–1.3)
MONOCYTES NFR BLD AUTO: 9.1 %
NEUTROPHILS # BLD AUTO: 4.5 10E9/L (ref 1.6–8.3)
NEUTROPHILS NFR BLD AUTO: 64.3 %
PLATELET # BLD AUTO: 262 10E9/L (ref 150–450)
RBC # BLD AUTO: 3.88 10E12/L (ref 4.4–5.9)
WBC # BLD AUTO: 7.1 10E9/L (ref 4–11)

## 2017-09-06 PROCEDURE — 80053 COMPREHEN METABOLIC PANEL: CPT | Performed by: FAMILY MEDICINE

## 2017-09-06 PROCEDURE — 36415 COLL VENOUS BLD VENIPUNCTURE: CPT | Performed by: FAMILY MEDICINE

## 2017-09-06 PROCEDURE — 85025 COMPLETE CBC W/AUTO DIFF WBC: CPT | Performed by: FAMILY MEDICINE

## 2017-09-06 NOTE — PROGRESS NOTES
Oral Chemotherapy Monitoring Program.    Patient currently on temodar therapy.    Reviewed labs from 9/6/17    No concerning abnormalities.    Questions answered to patient's satisfaction.    Will follow up in 1 week with repeat labs    Malinda Morris PharmD  September 6, 2017

## 2017-09-07 LAB
ALBUMIN SERPL-MCNC: 3.3 G/DL (ref 3.4–5)
ALP SERPL-CCNC: 110 U/L (ref 40–150)
ALT SERPL W P-5'-P-CCNC: 28 U/L (ref 0–70)
ANION GAP SERPL CALCULATED.3IONS-SCNC: 6 MMOL/L (ref 3–14)
AST SERPL W P-5'-P-CCNC: 22 U/L (ref 0–45)
BILIRUB SERPL-MCNC: 0.3 MG/DL (ref 0.2–1.3)
BUN SERPL-MCNC: 13 MG/DL (ref 7–30)
CALCIUM SERPL-MCNC: 8.2 MG/DL (ref 8.5–10.1)
CHLORIDE SERPL-SCNC: 113 MMOL/L (ref 94–109)
CO2 SERPL-SCNC: 24 MMOL/L (ref 20–32)
CREAT SERPL-MCNC: 0.91 MG/DL (ref 0.66–1.25)
GFR SERPL CREATININE-BSD FRML MDRD: 90 ML/MIN/1.7M2
GLUCOSE SERPL-MCNC: 94 MG/DL (ref 70–99)
POTASSIUM SERPL-SCNC: 3.9 MMOL/L (ref 3.4–5.3)
PROT SERPL-MCNC: 6.7 G/DL (ref 6.8–8.8)
SODIUM SERPL-SCNC: 143 MMOL/L (ref 133–144)

## 2017-09-11 ENCOUNTER — DOCUMENTATION ONLY (OUTPATIENT)
Dept: PHARMACY | Facility: CLINIC | Age: 47
End: 2017-09-11

## 2017-09-11 DIAGNOSIS — D70.1 CHEMOTHERAPY INDUCED NEUTROPENIA (H): ICD-10-CM

## 2017-09-11 DIAGNOSIS — C71.9 OLIGODENDROGLIOMA, ANAPLASTIC (H): ICD-10-CM

## 2017-09-11 DIAGNOSIS — T45.1X5A CHEMOTHERAPY INDUCED NEUTROPENIA (H): ICD-10-CM

## 2017-09-11 LAB
BASOPHILS # BLD AUTO: 0.1 10E9/L (ref 0–0.2)
BASOPHILS NFR BLD AUTO: 0.6 %
DIFFERENTIAL METHOD BLD: ABNORMAL
EOSINOPHIL # BLD AUTO: 0.2 10E9/L (ref 0–0.7)
EOSINOPHIL NFR BLD AUTO: 2.9 %
ERYTHROCYTE [DISTWIDTH] IN BLOOD BY AUTOMATED COUNT: 14 % (ref 10–15)
HCT VFR BLD AUTO: 38.9 % (ref 40–53)
HGB BLD-MCNC: 12.8 G/DL (ref 13.3–17.7)
LYMPHOCYTES # BLD AUTO: 2.1 10E9/L (ref 0.8–5.3)
LYMPHOCYTES NFR BLD AUTO: 25.6 %
MCH RBC QN AUTO: 32.2 PG (ref 26.5–33)
MCHC RBC AUTO-ENTMCNC: 32.9 G/DL (ref 31.5–36.5)
MCV RBC AUTO: 98 FL (ref 78–100)
MONOCYTES # BLD AUTO: 0.7 10E9/L (ref 0–1.3)
MONOCYTES NFR BLD AUTO: 8.7 %
NEUTROPHILS # BLD AUTO: 5 10E9/L (ref 1.6–8.3)
NEUTROPHILS NFR BLD AUTO: 62.2 %
PLATELET # BLD AUTO: 235 10E9/L (ref 150–450)
RBC # BLD AUTO: 3.98 10E12/L (ref 4.4–5.9)
WBC # BLD AUTO: 8 10E9/L (ref 4–11)

## 2017-09-11 PROCEDURE — 85025 COMPLETE CBC W/AUTO DIFF WBC: CPT | Performed by: FAMILY MEDICINE

## 2017-09-11 PROCEDURE — 36415 COLL VENOUS BLD VENIPUNCTURE: CPT | Performed by: FAMILY MEDICINE

## 2017-09-11 NOTE — PROGRESS NOTES
Oral Chemotherapy Monitoring Program.  Lab follow up     Patient currently on temodar therapy.    Reviewed labs from 9/11/17.  No concerning abnormalities.    Assessment & Plan:  Sent [facility] message to patient to continue course.  Notified patient to call with any questions.     Will follow up in 1 week (9/18) with repeat labs and appt with Dr. Daniels on 9/19.     Marcella Malhotra, PharmD, MPH, BCOP  Sept 11, 2017

## 2017-09-11 NOTE — PROGRESS NOTES
Oral Chemotherapy Monitoring Program.    Patient currently on Temodar therapy.    Reviewed lab results from 9/11/17.    Labs meet parameters to continue treatment.    Will follow up in 1 week    Rodríguez Smith PharmD  Oral Chemotherapy Program

## 2017-09-18 ENCOUNTER — DOCUMENTATION ONLY (OUTPATIENT)
Dept: PHARMACY | Facility: CLINIC | Age: 47
End: 2017-09-18

## 2017-09-18 DIAGNOSIS — D70.1 CHEMOTHERAPY INDUCED NEUTROPENIA (H): ICD-10-CM

## 2017-09-18 DIAGNOSIS — T45.1X5A CHEMOTHERAPY INDUCED NEUTROPENIA (H): ICD-10-CM

## 2017-09-18 DIAGNOSIS — C71.9 OLIGODENDROGLIOMA, ANAPLASTIC (H): ICD-10-CM

## 2017-09-18 LAB
BASOPHILS # BLD AUTO: 0 10E9/L (ref 0–0.2)
BASOPHILS NFR BLD AUTO: 0.5 %
DIFFERENTIAL METHOD BLD: ABNORMAL
EOSINOPHIL # BLD AUTO: 0.3 10E9/L (ref 0–0.7)
EOSINOPHIL NFR BLD AUTO: 3.9 %
ERYTHROCYTE [DISTWIDTH] IN BLOOD BY AUTOMATED COUNT: 14.4 % (ref 10–15)
HCT VFR BLD AUTO: 40.2 % (ref 40–53)
HGB BLD-MCNC: 13.1 G/DL (ref 13.3–17.7)
LYMPHOCYTES # BLD AUTO: 1.5 10E9/L (ref 0.8–5.3)
LYMPHOCYTES NFR BLD AUTO: 23.5 %
MCH RBC QN AUTO: 32.1 PG (ref 26.5–33)
MCHC RBC AUTO-ENTMCNC: 32.6 G/DL (ref 31.5–36.5)
MCV RBC AUTO: 99 FL (ref 78–100)
MONOCYTES # BLD AUTO: 0.6 10E9/L (ref 0–1.3)
MONOCYTES NFR BLD AUTO: 8.6 %
NEUTROPHILS # BLD AUTO: 4.1 10E9/L (ref 1.6–8.3)
NEUTROPHILS NFR BLD AUTO: 63.5 %
PLATELET # BLD AUTO: 267 10E9/L (ref 150–450)
RBC # BLD AUTO: 4.08 10E12/L (ref 4.4–5.9)
WBC # BLD AUTO: 6.4 10E9/L (ref 4–11)

## 2017-09-18 PROCEDURE — 85025 COMPLETE CBC W/AUTO DIFF WBC: CPT | Performed by: FAMILY MEDICINE

## 2017-09-18 PROCEDURE — 36415 COLL VENOUS BLD VENIPUNCTURE: CPT | Performed by: FAMILY MEDICINE

## 2017-09-18 NOTE — PROGRESS NOTES
Oral Chemotherapy Monitoring Program.    Patient currently on Temodar therapy.    Reviewed lab results from 9/18/17.    Labs meet parameters to continue treatment.    Will follow up in 1 week for labs.    Rodríguez Smith PharmD  Oral Chemotherapy Program

## 2017-09-19 ENCOUNTER — ONCOLOGY VISIT (OUTPATIENT)
Dept: ONCOLOGY | Facility: CLINIC | Age: 47
End: 2017-09-19
Attending: PSYCHIATRY & NEUROLOGY
Payer: COMMERCIAL

## 2017-09-19 ENCOUNTER — HOSPITAL ENCOUNTER (OUTPATIENT)
Dept: MRI IMAGING | Facility: CLINIC | Age: 47
Discharge: HOME | End: 2017-09-19
Attending: PSYCHIATRY & NEUROLOGY | Admitting: PSYCHIATRY & NEUROLOGY
Payer: MEDICARE

## 2017-09-19 VITALS
DIASTOLIC BLOOD PRESSURE: 73 MMHG | WEIGHT: 178.6 LBS | BODY MASS INDEX: 26.37 KG/M2 | SYSTOLIC BLOOD PRESSURE: 112 MMHG | HEART RATE: 68 BPM | RESPIRATION RATE: 16 BRPM | TEMPERATURE: 98.7 F | OXYGEN SATURATION: 98 %

## 2017-09-19 DIAGNOSIS — Z96.89 S/P PLACEMENT OF VNS (VAGUS NERVE STIMULATION) DEVICE: ICD-10-CM

## 2017-09-19 DIAGNOSIS — Z51.11 CHEMOTHERAPY MANAGEMENT, ENCOUNTER FOR: ICD-10-CM

## 2017-09-19 DIAGNOSIS — G40.209 PARTIAL EPILEPSY WITH IMPAIRMENT OF CONSCIOUSNESS (H): ICD-10-CM

## 2017-09-19 DIAGNOSIS — C71.9 OLIGODENDROGLIOMA, ANAPLASTIC (H): ICD-10-CM

## 2017-09-19 DIAGNOSIS — D70.1 CHEMOTHERAPY INDUCED NEUTROPENIA (H): ICD-10-CM

## 2017-09-19 DIAGNOSIS — C71.9 OLIGODENDROGLIOMA, ANAPLASTIC (H): Primary | ICD-10-CM

## 2017-09-19 DIAGNOSIS — T45.1X5A CHEMOTHERAPY INDUCED NEUTROPENIA (H): ICD-10-CM

## 2017-09-19 DIAGNOSIS — R11.2 CHEMOTHERAPY-INDUCED NAUSEA AND VOMITING: ICD-10-CM

## 2017-09-19 DIAGNOSIS — T45.1X5A CHEMOTHERAPY-INDUCED NAUSEA AND VOMITING: ICD-10-CM

## 2017-09-19 DIAGNOSIS — Z65.9 OTHER SOCIAL STRESSOR: ICD-10-CM

## 2017-09-19 DIAGNOSIS — F41.9 ANXIETY: ICD-10-CM

## 2017-09-19 DIAGNOSIS — K02.9 DENTAL CARIES: ICD-10-CM

## 2017-09-19 DIAGNOSIS — Z72.0 TOBACCO ABUSE: ICD-10-CM

## 2017-09-19 DIAGNOSIS — R45.89 DEPRESSED MOOD: ICD-10-CM

## 2017-09-19 DIAGNOSIS — R51.9 CHRONIC DAILY HEADACHE: ICD-10-CM

## 2017-09-19 PROCEDURE — 25000128 H RX IP 250 OP 636: Performed by: PSYCHIATRY & NEUROLOGY

## 2017-09-19 PROCEDURE — 99215 OFFICE O/P EST HI 40 MIN: CPT | Performed by: PSYCHIATRY & NEUROLOGY

## 2017-09-19 PROCEDURE — 70553 MRI BRAIN STEM W/O & W/DYE: CPT

## 2017-09-19 PROCEDURE — A9585 GADOBUTROL INJECTION: HCPCS | Performed by: PSYCHIATRY & NEUROLOGY

## 2017-09-19 PROCEDURE — 99211 OFF/OP EST MAY X REQ PHY/QHP: CPT

## 2017-09-19 RX ORDER — RIBOFLAVIN (VITAMIN B2) 400 MG
400 TABLET ORAL DAILY
Qty: 90 TABLET | Refills: 3 | Status: SHIPPED | OUTPATIENT
Start: 2017-09-19 | End: 2017-11-17

## 2017-09-19 RX ORDER — GADOBUTROL 604.72 MG/ML
8 INJECTION INTRAVENOUS ONCE
Status: COMPLETED | OUTPATIENT
Start: 2017-09-19 | End: 2017-09-19

## 2017-09-19 RX ADMIN — GADOBUTROL 8 ML: 604.72 INJECTION INTRAVENOUS at 11:41

## 2017-09-19 ASSESSMENT — PAIN SCALES - GENERAL: PAINLEVEL: NO PAIN (0)

## 2017-09-19 NOTE — PATIENT INSTRUCTIONS
I will message Dr. Brown about repeat neuro-psych testing.     Riboflavin (vitamin B2) 400mg once a day for headaches.     Continue on your road to quitting smoking.     Recommending palliative care clinic evaluation to discuss goals of care documentation.     For temozolomide (chemotherapy);  -Next cycle is due on 10/9.  -Following cycle is due on 11/6.    Return to clinic on 11/6 with Dr. Herrmann.  Scheduled/jiamie  Return to clinic with me on 12/5 for repeat MR brain review and to start cycle 8.  Scheduled/Jaimie  Imaging looks good today, stable.     Berkley Daniels MD  Neuro-oncology  9/19/2017      AVS printed & given to patient/Jaimie
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
## 2017-09-19 NOTE — MR AVS SNAPSHOT
After Visit Summary   9/19/2017    Joni Borja    MRN: [id number]           Patient Information     Date Of Birth          1970        Visit Information        Provider Department      9/19/2017 3:30 PM Berkley Daniels MD [facility]        Today's Diagnoses     Oligodendroglioma, anaplastic (H)    -  1    Chemotherapy management, encounter for        Chemotherapy-induced nausea and vomiting        S/P placement of VNS (vagus nerve stimulation) device        Tobacco abuse        Partial epilepsy with impairment of consciousness (H)        Dental caries        Depressed mood        Anxiety        Other social stressor        Chemotherapy induced neutropenia (H)        Chronic daily headache          Care Instructions    I will message Dr. Brown about repeat neuro-psych testing.     Riboflavin (vitamin B2) 400mg once a day for headaches.     Continue on your road to quitting smoking.     Recommending palliative care clinic evaluation to discuss goals of care documentation.     For temozolomide (chemotherapy);  -Next cycle is due on 10/9.  -Following cycle is due on 11/6.    Return to clinic on 11/6 with Dr. Herrmann.  Return to clinic with me on 12/5 for repeat MR brain review and to start cycle 8.  Imaging looks good today, stable.     Berkley Daniels MD  Neuro-oncology  9/19/2017             Follow-ups after your visit        Your next 10 appointments already scheduled     Sep 20, 2017  3:00 PM CDT   Return Visit with EMILY Donahue   [facility] ([facility])    [address] [place] [address] [phone]            Sep 25, 2017  2:00 PM CDT   LAB with  LAB   [facility] ([facility])    [address] [place] [address] [phone]           Patient must bring picture ID. Patient should be prepared to give a urine specimen  Please do not eat 10-12 hours before your  appointment if you are coming in fasting for labs on lipids, cholesterol, or glucose (sugar). Pregnant women should follow their Care Team instructions. Water with medications is okay. Do not drink coffee or other fluids. If you have concerns about taking  your medications, please ask at office or if scheduling via [email or web address], send a message by clicking on Secure Messaging, Message Your Care Team.            Sep 29, 2017 12:30 PM CDT   New Visit with Maru Kumar MD   [facility] ([facility])    [facility]  [address]  [facility] [address]   [phone]            Nov 06, 2017  2:30 PM CST   Return Visit with Haja Herrmann MD   [facility] ([facility])    [facility]  [address]  [facility] [address]   [phone]            Nov 17, 2017  2:30 PM CST   Return Visit with Silvia Brown MD   [facility] ([facility])    [address]  [facility] [address]   [phone]            Dec 05, 2017 12:45 PM CST   MR BRAIN W/O & W CONTRAST with SHMRP1   [facility] ([facility])    [address] [facility] [id number] [phone]           Take your medicines as usual, unless your doctor tells you not to. Bring a list of your current medicines to your exam (including vitamins, minerals and over-the-counter drugs).  You will be given intravenous contrast for this exam. To prepare:   The day before your exam, drink extra fluids at least six 8-ounce glasses (unless your doctor tells you to restrict your fluids).   Have a blood test (creatinine test) within 30 days of your exam. Go to your clinic or Diagnostic Imaging Department for this test.  The MRI machine uses a strong magnet. Please wear clothes without metal (snaps, zippers). A sweatsuit works well, or we may give you a hospital gown.   Please remove any body piercings and hair extensions before you arrive. You will also remove watches, jewelry, hairpins, wallets, dentures, partial dental plates and hearing aids. You may wear contact lenses, and you may be able to wear your rings. We have a safe place to keep your personal items, but it is safer to leave them at home.   **IMPORTANT** THE INSTRUCTIONS BELOW ARE ONLY FOR THOSE PATIENTS WHO HAVE BEEN TOLD THEY WILL RECEIVE SEDATION OR GENERAL ANESTHESIA DURING THEIR MRI PROCEDURE:  IF YOU WILL RECEIVE SEDATION (take medicine to help you relax during your exam):   You must get the medicine from your doctor before you arrive. Bring the medicine to the exam. Do not take it at home.   Arrive one hour early. Bring someone who can take you home after the test. Your medicine will make you sleepy. After the exam, you may not drive, take a bus or take a taxi by yourself.   No eating 8 hours before your exam. You may have clear liquids up until 4 hours before your exam. (Clear liquids include water, clear tea, black coffee and fruit juice without pulp.)  IF YOU WILL RECEIVE ANESTHESIA (be asleep for your exam):   Arrive 1 1/2 hours early. Bring someone who can take you home after the test. You may not drive, take a bus or take a taxi by yourself.   No eating 8 hours before your exam. You may have clear liquids up until 4 hours before your exam. (Clear liquids include water, clear tea, black coffee and fruit juice without pulp.)  Please call the Imaging Department at your exam site with any questions.            Dec 05, 2017  2:30 PM CST   Return Visit with Berkley Daniels MD   [facility] ([facility])    [facility]  [address]  [facility] [address]   [phone]              Future tests that were ordered for you today     Open Future Orders        Priority Expected Expires Ordered    MR Brain w/o & w Contrast Routine 12/5/2017 9/19/2018  9/19/2017            Who to contact     If you have questions or need follow up information about today's clinic visit or your schedule please contact [facility] directly at [phone].  Normal or non-critical lab and imaging results will be communicated to you by MyChart, letter or phone within 4 business days after the clinic has received the results. If you do not hear from us within 7 days, please contact the clinic through WillCallhart or phone. If you have a critical or abnormal lab result, we will notify you by phone as soon as possible.  Submit refill requests through [employer] or call your pharmacy and they will forward the refill request to us. Please allow 3 business days for your refill to be completed.          Additional Information About Your Visit        WillCallhart Information     [employer] gives you secure access to your electronic health record. If you see a primary care provider, you can also send messages to your care team and make appointments. If you have questions, please call your primary care clinic.  If you do not have a primary care provider, please call [phone] and they will assist you.        Care EveryWhere ID     This is your Care EveryWhere ID. This could be used by other organizations to access your [place] medical records  [id number]        Your Vitals Were     Pulse Temperature Respirations Pulse Oximetry BMI (Body Mass Index)       68 98.7  F (37.1  C) (Oral) 16 98% 26.37 kg/m2        Blood Pressure from Last 3 Encounters:   09/19/17 112/73   08/29/17 110/74   08/01/17 99/65    Weight from Last 3 Encounters:   09/19/17 81 kg (178 lb 9.6 oz)   08/29/17 78.5 kg (173 lb)   08/01/17 79.5 kg (175 lb 4.3 oz)                 Today's Medication Changes          These changes are accurate as of: 9/19/17  5:13 PM.  If you have any questions, ask your nurse or doctor.               Start taking these medicines.        Dose/Directions    Riboflavin 400 MG Tabs   Used for:  Chronic  daily headache        Dose:  400 mg   Take 400 mg by mouth daily   Quantity:  90 tablet   Refills:  3            Where to get your medicines      These medications were sent to [id number] - [place], [place] - [address]  [address] [address]     Phone:  [phone]     Riboflavin 400 MG Tabs                Primary Care Provider Office Phone # Fax #    Brian Coon [phone]       [address] [place] [address]        Equal Access to Services     [facility]: Hadii aad ku hadasho Soomaali, waaxda luqadaha, qaybta kaalmada adeegyada, es arriaga . So [facility] [phone].    ATENCIÓN: Si habla español, tiene a kirby disposición servicios gratuitos de asistencia lingüística. [facility] [phone].    We comply with applicable federal civil rights laws and [place] laws. We do not discriminate on the basis of race, color, national origin, age, disability sex, sexual orientation or gender identity.            Thank you!     Thank you for choosing [facility] [place]  for your care. Our goal is always to provide you with excellent care. Hearing back from our patients is one way we can continue to improve our services. Please take a few minutes to complete the written survey that you may receive in the mail after your visit with us. Thank you!             Your Updated Medication List - Protect others around you: Learn how to safely use, store and throw away your medicines at www.disposemymeds.org.          This list is accurate as of: 9/19/17  5:13 PM.  Always use your most recent med list.                   Brand Name Dispense Instructions for use Diagnosis    acetaminophen-codeine 300-30 MG per tablet    TYLENOL #3     Take 1-2 tablets by mouth every 4 hours as needed for moderate pain As needed for headaches        BENADRYL PO      Take 50 mg by mouth daily as needed for allergies (misquitos,)     Localization-related (focal) (partial) epilepsy and epileptic syndromes with complex partial seizures, with intractable epilepsy, Depression, Anxiety, Unstable gait       carBAMazepine 300 MG 12 hr capsule    CARBATROL    180 capsule    Take 1 capsule (300 mg) by mouth 2 times daily (at 10:00 & 22:00)    Partial epilepsy with impairment of consciousness, intractable (H)       diazepam 5 MG tablet    VALIUM    30 tablet    Take 5 mg by mouth 2 times daily as needed for other (seizures.) As instructed for seizures. Please take 5 mg for more than 3 seizures in 8 hour period, may repeat 5 mg after 30 min if seizures continue.    Localization-related (focal) (partial) epilepsy and epileptic syndromes with complex partial seizures, with intractable epilepsy       felbamate 600 MG tablet    FELBATOL    225 tablet    Take 600 mg (one tablet) am and 900 mg (1.5 tablet) 2 pm    Partial epilepsy with impairment of consciousness, intractable (H)       KLONOPIN PO      Take 0.5 mg by mouth 2 times daily 1 tab in the AM and 2 tabs in PM.        * LEXAPRO PO      Take 20 mg by mouth At Bedtime        * escitalopram 20 MG tablet    LEXAPRO    90 tablet    TAKE ONE TABLET BY MOUTH ONCE DAILY AT  NIGHT    Major depressive disorder with single episode, in partial remission (H), Anxiety       ondansetron 4 MG tablet    ZOFRAN    60 tablet    Take 2 tablets (8 mg) by mouth At Bedtime or 30 minutes prior to chemotherapy dosing. Repeat every 8 hours as needed for nausea.    Chemotherapy management, encounter for, Chemotherapy-induced nausea and vomiting       * PROTONIX PO      Take 40 mg by mouth every morning        * pantoprazole 40 MG EC tablet    PROTONIX    90 tablet    TAKE ONE TABLET BY MOUTH ONCE DAILY    Gastroesophageal reflux disease without esophagitis       Riboflavin 400 MG Tabs     90 tablet    Take 400 mg by mouth daily    Chronic daily headache       temozolomide 100 MG capsule CHEMO    TEMODAR    20 capsule    Take 4  capsules (400 mg) by mouth daily for 5 days Take ondansetron 30-60 min before temozolomide. Take at bedtime on an empty stomach.    Oligodendroglioma, anaplastic (H)       topiramate 100 MG tablet    TOPAMAX    900 tablet    Take 2 tablets (200 mg) by mouth every morning 200 mg am and 300 mg pm    Partial epilepsy with impairment of consciousness, intractable (H)       VITAMIN B 12 PO      Take 1 tablet by mouth daily (with dinner)        ZYRTEC ALLERGY PO      Take 10 mg by mouth daily as needed (for misquitos.)    Localization-related (focal) (partial) epilepsy and epileptic syndromes with complex partial seizures, with intractable epilepsy, Depression, Anxiety, Unstable gait       * Notice:  This list has 4 medication(s) that are the same as other medications prescribed for you. Read the directions carefully, and ask your doctor or other care provider to review them with you.

## 2017-09-19 NOTE — PROGRESS NOTES
"Oncology Rooming Note    September 19, 2017 3:28 PM   Joni Borja is a 46 year old male who presents for:    No chief complaint on file.    Initial Vitals: There were no vitals taken for this visit. Estimated body mass index is 25.55 kg/(m^2) as calculated from the following:    Height as of 8/1/17: 1.753 m (5' 9\").    Weight as of 8/29/17: 78.5 kg (173 lb). There is no height or weight on file to calculate BSA.  Data Unavailable Comment: Data Unavailable   No LMP for male patient.  Allergies reviewed: Yes  Medications reviewed: Yes    Medications: Medication refills not needed today.  Pharmacy name entered into [employer]:    [facility] - [place], [place] - [address] [facility] - [place], [place] - [address]   RX CROSSROADS - [employer] PATIENT ASSISTANCE PROGRAM    Clinical concerns: none     4 minutes for nursing intake (face to face time)     Ora Lott CMA    DISCHARGE PLAN:  Next appointments: See patient instruction section-- Jaimie  Departure Mode: Ambulatory  Accompanied by: Parents  10 minutes for nursing discharge (face to face time)   Gina Ramos RN    Reviewed the following Pt Instructions with Pt's parents & they verbalized understanding (Pt left exam room upset at end of exam appt due to topic of palliative care):    I will message Dr. Brown about repeat neuro-psych testing.     Riboflavin (vitamin B2) 400mg once a day for headaches.     Continue on your road to quitting smoking.     Recommending palliative care clinic evaluation to discuss goals of care documentation.     For temozolomide (chemotherapy);  -Next cycle is due on 10/9. (Cycle 6)  -Following cycle is due on 11/6. (Cycle 7-- is recommending 12 cycles of TMZ & spoke about this with Pt's parents after Pt left exam room upset)    Return to clinic on 11/6 with Dr. Herrmann. Scheduled/Jaimie  Return to clinic with me on 12/5 for repeat MR brain review and to " start cycle 8.  Scheduled/Janice  Imaging looks good today, stable.     Berkley Hagan MD  Neuro-oncology  9/19/2017          AVS printed & given to patient/Janice 11/6/2017 Mon  2:30 PM  2:30 P 30 [facility] [[id number]] BACILIO HERNANDEZ [[id number]] RETURN [657] Return oligodenroglioma (Dr Hagan)       12/5/2017 Tue 12:45 PM 12:45 P 45 SHMR2 [410406] SHMRP1 [[id number]] MR BRAIN WWO [[id number]] Jan [id number]  MR BRAIN W/O & W CONTRAST [[id number]]     12/5/2017 Tue  2:30 PM  2:30 P 30 [facility] [[id number]] BERKLEY HAGAN [[id number]] RETURN [077] 3 month follow up - Oligodendroglioma  MRI 12/5/17 am

## 2017-09-20 ENCOUNTER — DOCUMENTATION ONLY (OUTPATIENT)
Dept: PHARMACY | Facility: CLINIC | Age: 47
End: 2017-09-20

## 2017-09-20 ENCOUNTER — OFFICE VISIT (OUTPATIENT)
Dept: PSYCHOLOGY | Facility: CLINIC | Age: 47
End: 2017-09-20
Payer: COMMERCIAL

## 2017-09-20 DIAGNOSIS — F32.1 MAJOR DEPRESSIVE DISORDER, SINGLE EPISODE, MODERATE WITH MELANCHOLIC FEATURES (H): Primary | ICD-10-CM

## 2017-09-20 DIAGNOSIS — Z63.5 MARITAL CONFLICT INVOLVING DIVORCE: ICD-10-CM

## 2017-09-20 PROCEDURE — 90834 PSYTX W PT 45 MINUTES: CPT | Performed by: MARRIAGE & FAMILY THERAPIST

## 2017-09-20 SDOH — SOCIAL STABILITY - SOCIAL INSECURITY: DISRUPTION OF FAMILY BY SEPARATION AND DIVORCE: Z63.5

## 2017-09-20 ASSESSMENT — ANXIETY QUESTIONNAIRES
6. BECOMING EASILY ANNOYED OR IRRITABLE: SEVERAL DAYS
2. NOT BEING ABLE TO STOP OR CONTROL WORRYING: SEVERAL DAYS
3. WORRYING TOO MUCH ABOUT DIFFERENT THINGS: SEVERAL DAYS
1. FEELING NERVOUS, ANXIOUS, OR ON EDGE: SEVERAL DAYS
GAD7 TOTAL SCORE: 6
7. FEELING AFRAID AS IF SOMETHING AWFUL MIGHT HAPPEN: NOT AT ALL
IF YOU CHECKED OFF ANY PROBLEMS ON THIS QUESTIONNAIRE, HOW DIFFICULT HAVE THESE PROBLEMS MADE IT FOR YOU TO DO YOUR WORK, TAKE CARE OF THINGS AT HOME, OR GET ALONG WITH OTHER PEOPLE: VERY DIFFICULT
5. BEING SO RESTLESS THAT IT IS HARD TO SIT STILL: SEVERAL DAYS

## 2017-09-20 ASSESSMENT — PATIENT HEALTH QUESTIONNAIRE - PHQ9
SUM OF ALL RESPONSES TO PHQ QUESTIONS 1-9: 6
5. POOR APPETITE OR OVEREATING: SEVERAL DAYS

## 2017-09-20 NOTE — MR AVS SNAPSHOT
MRN:[id number]                      After Visit Summary   9/20/2017    Joni Borja    MRN: [id number]           Visit Information        Provider Department      9/20/2017 3:00 PM Flex Lombardo LMFT [facility] Generic      Your next 10 appointments already scheduled     Sep 25, 2017  2:00 PM CDT   LAB with RV LAB   [facility] ([facility])    [address] [place] [address]   [phone]           Patient must bring picture ID. Patient should be prepared to give a urine specimen  Please do not eat 10-12 hours before your appointment if you are coming in fasting for labs on lipids, cholesterol, or glucose (sugar). Pregnant women should follow their Care Team instructions. Water with medications is okay. Do not drink coffee or other fluids. If you have concerns about taking  your medications, please ask at office or if scheduling via [employer], send a message by clicking on Secure Messaging, Message Your Care Team.            Sep 29, 2017 12:30 PM CDT   New Visit with Maru Kumar MD   [facility] ([facility])    [facility]  [address]  [facility] [address]   [phone]            Oct 19, 2017  3:00 PM CDT   Return Visit with EMILY Donahue   [facility] ([facility])    [address] [place] [address] [phone]            Nov 06, 2017  2:30 PM CST   Return Visit with Haja Herrmann MD   [facility] ([facility])    [facility]  [address]  [facility] [address]   [phone]            Nov 17, 2017  2:30 PM CST   Return Visit with Silvia Brown MD   [facility] ([facility])    [address]  [facility] [id number] [phone]            Dec 05, 2017 12:45 PM CST   MR  BRAIN W/O & W CONTRAST with SHMRP1   [facility] ([facility])    [address] [place] [address] [phone]           Take your medicines as usual, unless your doctor tells you not to. Bring a list of your current medicines to your exam (including vitamins, minerals and over-the-counter drugs).  You will be given intravenous contrast for this exam. To prepare:   The day before your exam, drink extra fluids at least six 8-ounce glasses (unless your doctor tells you to restrict your fluids).   Have a blood test (creatinine test) within 30 days of your exam. Go to your clinic or Diagnostic Imaging Department for this test.  The MRI machine uses a strong magnet. Please wear clothes without metal (snaps, zippers). A sweatsuit works well, or we may give you a hospital gown.  Please remove any body piercings and hair extensions before you arrive. You will also remove watches, jewelry, hairpins, wallets, dentures, partial dental plates and hearing aids. You may wear contact lenses, and you may be able to wear your rings. We have a safe place to keep your personal items, but it is safer to leave them at home.   **IMPORTANT** THE INSTRUCTIONS BELOW ARE ONLY FOR THOSE PATIENTS WHO HAVE BEEN TOLD THEY WILL RECEIVE SEDATION OR GENERAL ANESTHESIA DURING THEIR MRI PROCEDURE:  IF YOU WILL RECEIVE SEDATION (take medicine to help you relax during your exam):   You must get the medicine from your doctor before you arrive. Bring the medicine to the exam. Do not take it at home.   Arrive one hour early. Bring someone who can take you home after the test. Your medicine will make you sleepy. After the exam, you may not drive, take a bus or take a taxi by yourself.   No eating 8 hours before your exam. You may have clear liquids up until 4 hours before your exam. (Clear liquids include water, clear tea, black coffee and fruit juice without pulp.)  IF YOU WILL RECEIVE ANESTHESIA (be asleep for  your exam):   Arrive 1 1/2 hours early. Bring someone who can take you home after the test. You may not drive, take a bus or take a taxi by yourself.   No eating 8 hours before your exam. You may have clear liquids up until 4 hours before your exam. (Clear liquids include water, clear tea, black coffee and fruit juice without pulp.)  Please call the Imaging Department at your exam site with any questions.            Dec 05, 2017  2:30 PM CST   Return Visit with Berkley Daniels MD   [facility] ([facility])    [facility]  [address]  [place] [address] [phone]              MyChart Information     [employer] gives you secure access to your electronic health record. If you see a primary care provider, you can also send messages to your care team and make appointments. If you have questions, please call your primary care clinic.  If you do not have a primary care provider, please call [phone] and they will assist you.        Care EveryWhere ID     This is your Care EveryWhere ID. This could be used by other organizations to access your [place] medical records  [id number]        Equal Access to Services     KAYLIE MARIE : Abril Orellana, waleonidas day, qamigdalia jack, es de la garza. So [facility] [phone].    ATENCIÓN: Si habla español, tiene a kirby disposición servicios gratuitos de asistencia lingüística. Sxito al [phone].    We comply with applicable federal civil rights laws and [place] laws. We do not discriminate on the basis of race, color, national origin, age, disability sex, sexual orientation or gender identity.

## 2017-09-20 NOTE — PROGRESS NOTES
Oral Chemotherapy Monitoring Program    Primary Oncologist: Dr. Daniels  Primary Oncology Clinic: [facility]  Cancer Diagnosis: Oligodendroglioma      Therapy History:  Started temodar 150mg/m2 (300mg x 5 days) on 5/12/17; increase for C2 to 200mg/m2 (400mg x 5 days)      Drug Interaction Assessment: none     Lab Monitoring Plan  Patient is currently getting weekly CBC per Dr Daniels.  Subjective/Objective:  Joni Borja is a 46 year old male who was in clinic on 9/19/17. When I went to talk to him, Joni had already left the room and only his parents were not sure if he was coming back to the room or not.  I talked to the parents and they said that he has been having headaches but no new adverse effects from Temodar.    ORAL CHEMOTHERAPY 5/9/2017 6/6/2017 6/16/2017 7/19/2017 9/20/2017   Drug Name Temodar (Temozolomide) Temodar (Temozolomide) Temodar (Temozolomide) Temodar (Temozolomide) Temodar (Temozolomide)   Current Dosage 300mg 400mg 400mg 400mg 400mg   Current Schedule Daily Daily Daily Daily Daily   Cycle Details Other Other Other Other (No Data)   Start Date of Last Cycle 5/10/2017 6/9/2017 6/9/2017 7/7/2017 9/11/2017   Planned next cycle start date 6/7/2017 7/7/2017 7/7/2017 8/4/2017 10/9/2017   Doses missed in last 2 weeks - 0 0 0 0   Adherence Assessment - Adherent Adherent Adherent Adherent   Adverse Effects - No AE identified during assessment No AE identified during assessment No AE identified during assessment -   Home BPs - not needed not needed not needed -   Any new drug interactions? - No No No No   Is the dose as ordered appropriate for the patient? - Yes Yes Yes Yes   Is the patient currently in pain? - No - - Assessed in last 30 days.   Has the patient been assessed within the past 6 months for depression? - Yes - Yes Yes   Has the patient missed any days of school, work, or other routine activity? - - - No No       Last PHQ-2 Score on record:   PHQ-2 ( 1999 [facility] [place]) 5/17/2017 10/28/2016   Q1: Little  "interest or pleasure in doing things 0 2   Q2: Feeling down, depressed or hopeless 0 1   PHQ-2 Score 0 3           Vitals:  BP:   BP Readings from Last 1 Encounters:   09/19/17 112/73     Wt Readings from Last 1 Encounters:   09/19/17 81 kg (178 lb 9.6 oz)     Estimated body surface area is 1.99 meters squared as calculated from the following:    Height as of 8/1/17: 1.753 m (5' 9\").    Weight as of 9/19/17: 81 kg (178 lb 9.6 oz).    Labs:  Lab Results   Component Value Date     09/06/2017      Lab Results   Component Value Date    POTASSIUM 3.9 09/06/2017     Lab Results   Component Value Date    SHABBIR 8.2 09/06/2017     Lab Results   Component Value Date    ALBUMIN 3.3 09/06/2017     Lab Results   Component Value Date    MAG 2.2 09/22/2013     Lab Results   Component Value Date    PHOS 3.7 09/23/2013     Lab Results   Component Value Date    BUN 13 09/06/2017     Lab Results   Component Value Date    CR 0.91 09/06/2017       Lab Results   Component Value Date    AST 22 09/06/2017     Lab Results   Component Value Date    ALT 28 09/06/2017     Lab Results   Component Value Date    BILITOTAL 0.3 09/06/2017       Lab Results   Component Value Date    WBC 6.4 09/18/2017     Lab Results   Component Value Date    HGB 13.1 09/18/2017     Lab Results   Component Value Date     09/18/2017     Lab Results   Component Value Date    ANEU 4.1 09/18/2017           Assessment:  Labs done on 9/18/17 were within normal limits. Patient is tolerating Temodar. Dr Daniels prescribed Riboflavin for headaches.    Plan:  No change in plan. The patient will start the next cycle on 10/9/17. He will continue to have labs as scheduled.    Follow-Up:  1 week for labs.    Refill Due:  Before 10/9/17    [place] PharmD.  "

## 2017-09-20 NOTE — PROGRESS NOTES
Progress Note    Client Name: Joni Borja  Date: 9/20/2017         Service Type: Individual       Session Start Time: 3pm  Session End Time: 3:45pm      Session Length: 45 minutes     Session #: 15     Attendees: Client attended alone     Treatment Plan Last Reviewed: 8/24/2017  PHQ-9 / TONI-7 : 9/20/2017      DATA      Progress Since Last Session (Related to Symptoms / Goals / Homework):   Symptoms: Stable     Homework: Partially completed      Episode of Care Goals: Minimal progress - ACTION (Actively working towards change); Intervened by reinforcing change plan / affirming steps taken     Current / Ongoing Stressors and Concerns:   - Sx of depression as a result of years of marital distress that led to present separation, working towards divorce   - difficulties coping with medical problems which had led to limitation (driving) and work disability  Client reports that the first meeting regarding property in his divorce did not go as expected. His wife is claiming that he never helped pay for the property and that the house belongs to her children. Client reports that at this point he just wants to be  and other personal property still at their home. He is struggling with his living arrangements at his parents because there are many limitations. Client feels bored, feels alone and is struggling with his loss of independence. Client would benefit from additional services being offered by his medical providers and plans to give those recommendations a try. Today client talked about being patient to complete his cancer treatment before making other plans.      Treatment Objective(s) Addressed in This Session:   Identify negative self-talk and behaviors: challenge core beliefs, myths, and actions  Improve concentration, focus, and mindfulness in daily activities   practice setting boundaries daily times in the next 4 weeks      Intervention:   CBT: operant  conditioning, identifying triggers and patterns, identifying alternatives  Solution Focused: miracle question, healthy marriage, couples counseling  Structural: balance, roles, rules, past patterns vs desired patterns         ASSESSMENT: Current Emotional / Mental Status (status of significant symptoms):   Risk status (Self / Other harm or suicidal ideation)   Client denies current fears or concerns for personal safety.   Client denies current or recent suicidal ideation or behaviors.   Client denies current or recent homicidal ideation or behaviors.   Client denies current or recent self injurious behavior or ideation.   Client denies other safety concerns.   A safety and risk management plan has not been developed at this time, however client was given the after-hours number / 911 should there be a change in any of these risk factors.     Appearance:   Appropriate    Eye Contact:   Good    Psychomotor Behavior: Normal    Attitude:   Cooperative    Orientation:   All   Speech    Rate / Production: Normal     Volume:  Normal    Mood:    Depressed  Irritable  Sad    Affect:    Appropriate    Thought Content:  Clear    Thought Form:  Coherent  Goal Directed  Logical  Circumstantial   Insight:    Poor      Medication Review:   No changes to current psychiatric medication(s)     Medication Compliance:   Yes     Changes in Health Issues:   None reported     Chemical Use Review:   Substance Use: Chemical use reviewed, no active concerns identified      Tobacco Use: No change in amount of tobacco use since last session.  Patient declined discussion at this time     Collateral Reports Completed:   Not Applicable    PLAN: (Client Tasks / Therapist Tasks / Other)  Client will attend his appointment for palliative care and complete his chemo treatment. He will focus his efforts on finalizing his divorce and increase time with friends who live in the area.   Next session discuss participating in support groups. Meet with family  "to discuss options to increase client's social connections.      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   WHODAS 2.0 (12 item) 16.67% on 2/14/2017    Referral / Collaboration:  Referral to another professional/service is not indicated at this time..    Anticipated number of session or this episode of care: 10      MeasurableTreatment Goal(s) related to diagnosis / functional impairment(s)  Goal 1: Client's depression will remit as evidenced by a decrease in PHQ9 score by at least 6 points or below a five, where symptoms occur fewer than half the days.   I will know I've met my goal when I \"clear my head and work on myself.      Objective #A (Client Action)   Client will decrease frequency and intensity of feeling down, depressed, hopeless  Client will increase self-awareness of symptom onset/escalation  Status: Continued - Date: 8/24/2017     Intervention(s)  Therapist will assign homework track symptoms, patterns and triggers  provide psycho-education on depression  Therapist will teach CBT strategies for attending to negative self-talk and cognitive distortions.  ACT: experiential exercises and mindfulness practices, how  to live with life barriers    Objective #B  Client will Increase interest, engagement, and pleasure in doing things  Identify negative self-talk and behaviors: challenge core beliefs, myths, and " "actions  Status: Continued - Date: 8/24/2017     Intervention(s)  Therapist will assign homework to practice using coping skills outside the therapy encounter, worksheets to challenge negative thoughts  teach distraction skills. explore and tailor enjoyable activities, increase social activities, strengthen social supports  ACT: life values and being mindful of values for goals and daily living    Objective #C  Improve concentration, focus, and mindfulness in daily activities   Status: Continued - Date: 8/24/2017     Intervention(s)  provide safe space to address hx of presenting problem and root cause  teach emotional regulation skills. mindfulness practices, grounding skills, affirmations, strengths based approach  [place]: exercise to stage presenting problem, reflect, process and problem solve.      Goal 2: Client will improve his condition of interactions in his relationships (specifically with wife/) establishing healthy, balanced and appropriate boundaries to be kept 100% of the time for a minimum of 4 weeks.     I will know I've met my goal when I \"get my own place. Get the rest of my valuables from the house.      Objective #A (Client Action)      Client will compile a list of boundaries that they would like to set with others. Wife, adult step-children, family members  Learning to fight fair, learning to identify positive and negative communication patterns  Status: Continued - Date: 8/24/2017     Intervention(s)  Therapist will teach about healthy boundaries. structure, saying \"no\", advocating, identifying and establishing appropriate roles, rules, expectations.    Objective #B  Client will practice setting boundaries daily times in the next 12 weeks.                    Status: Continued - Date: 8/24/2017     Intervention(s)  Therapist will assign homework to track the use of healthy boundaries and outcome of establishing relational change  role-play effective communication skills and conflict " management    Objective #C  Client will learn & utilize at least 3 assertive communication skills weekly.  Status: Continued - Date: 8/24/2017     Intervention(s)  teach assertiveness skills. aggressive/passive/assertive, impulsive control, reactive vs sensitive, exposure to uncomfortable conversation.  Therapist will role-play assertiveness skills      Client has reviewed and agreed to the above plan.      EMILY Donahue, Southern Maine Health CareSW  September 20, 2017

## 2017-09-21 ENCOUNTER — TELEPHONE (OUTPATIENT)
Dept: NEUROLOGY | Facility: CLINIC | Age: 47
End: 2017-09-21

## 2017-09-21 ASSESSMENT — ANXIETY QUESTIONNAIRES: GAD7 TOTAL SCORE: 6

## 2017-09-21 NOTE — TELEPHONE ENCOUNTER
Call patient and ask if they would like to complete neuropsych testing. Last visit with Dr. Daniels was concerning for worse memory. I suspect this may be depression, but, it may also be epilepsy/brain cancer causing this. Neuropsych testing would be helpful.   Thanks. Silvia

## 2017-09-22 NOTE — TELEPHONE ENCOUNTER
"Writer contacted Joni to discuss NPT.   Joni stated he \"does not want to participate in Neuropsych testing\".  Routed to Dr. Brown as FYI    "

## 2017-09-25 ENCOUNTER — ALLIED HEALTH/NURSE VISIT (OUTPATIENT)
Dept: FAMILY MEDICINE | Facility: CLINIC | Age: 47
End: 2017-09-25
Payer: COMMERCIAL

## 2017-09-25 VITALS
HEART RATE: 67 BPM | RESPIRATION RATE: 16 BRPM | BODY MASS INDEX: 25.92 KG/M2 | TEMPERATURE: 98.8 F | SYSTOLIC BLOOD PRESSURE: 109 MMHG | HEIGHT: 69 IN | WEIGHT: 175 LBS | OXYGEN SATURATION: 100 % | DIASTOLIC BLOOD PRESSURE: 67 MMHG

## 2017-09-25 DIAGNOSIS — C71.9 OLIGODENDROGLIOMA (H): Primary | ICD-10-CM

## 2017-09-25 DIAGNOSIS — D70.1 CHEMOTHERAPY INDUCED NEUTROPENIA (H): ICD-10-CM

## 2017-09-25 DIAGNOSIS — T45.1X5A CHEMOTHERAPY INDUCED NEUTROPENIA (H): ICD-10-CM

## 2017-09-25 DIAGNOSIS — C71.9 OLIGODENDROGLIOMA, ANAPLASTIC (H): ICD-10-CM

## 2017-09-25 DIAGNOSIS — H11.32 SUBCONJUNCTIVAL HEMORRHAGE OF LEFT EYE: ICD-10-CM

## 2017-09-25 DIAGNOSIS — S05.02XA CORNEAL ABRASION, LEFT, INITIAL ENCOUNTER: Primary | ICD-10-CM

## 2017-09-25 LAB
BASOPHILS # BLD AUTO: 0 10E9/L (ref 0–0.2)
BASOPHILS NFR BLD AUTO: 0.3 %
DIFFERENTIAL METHOD BLD: ABNORMAL
EOSINOPHIL # BLD AUTO: 0.4 10E9/L (ref 0–0.7)
EOSINOPHIL NFR BLD AUTO: 5.1 %
ERYTHROCYTE [DISTWIDTH] IN BLOOD BY AUTOMATED COUNT: 14.1 % (ref 10–15)
HCT VFR BLD AUTO: 39.1 % (ref 40–53)
HGB BLD-MCNC: 12.7 G/DL (ref 13.3–17.7)
LYMPHOCYTES # BLD AUTO: 1.6 10E9/L (ref 0.8–5.3)
LYMPHOCYTES NFR BLD AUTO: 21.9 %
MCH RBC QN AUTO: 32.1 PG (ref 26.5–33)
MCHC RBC AUTO-ENTMCNC: 32.5 G/DL (ref 31.5–36.5)
MCV RBC AUTO: 99 FL (ref 78–100)
MONOCYTES # BLD AUTO: 0.8 10E9/L (ref 0–1.3)
MONOCYTES NFR BLD AUTO: 10.8 %
NEUTROPHILS # BLD AUTO: 4.4 10E9/L (ref 1.6–8.3)
NEUTROPHILS NFR BLD AUTO: 61.9 %
PLATELET # BLD AUTO: 278 10E9/L (ref 150–450)
RBC # BLD AUTO: 3.96 10E12/L (ref 4.4–5.9)
WBC # BLD AUTO: 7.1 10E9/L (ref 4–11)

## 2017-09-25 PROCEDURE — 85025 COMPLETE CBC W/AUTO DIFF WBC: CPT | Performed by: FAMILY MEDICINE

## 2017-09-25 PROCEDURE — 36415 COLL VENOUS BLD VENIPUNCTURE: CPT | Performed by: FAMILY MEDICINE

## 2017-09-25 PROCEDURE — 99213 OFFICE O/P EST LOW 20 MIN: CPT | Performed by: FAMILY MEDICINE

## 2017-09-25 ASSESSMENT — PAIN SCALES - GENERAL: PAINLEVEL: NO PAIN (0)

## 2017-09-25 NOTE — PROGRESS NOTES
Patient currently on Temodar therapy.     Reviewed lab results from 9/25/17.     Labs meet parameters to continue treatment.     Will follow up with labs with next cycle    Sandro Egan, PharmD  September 25, 2017

## 2017-09-25 NOTE — PATIENT INSTRUCTIONS
Subconjunctival Hemorrhage    A subconjunctival hemorrhage is when a blood vessel breaks open in the white of the eye. It causes a bright red patch in the white of the eye. It is similar to a bruise on the skin. This type of hemorrhage is common. It can look quite alarming, but it is usually harmless.  Understanding the conjunctiva  The conjunctiva is the thin layer that covers the inside of the eyelids and the surface of the eye. It has many tiny blood vessels that bring oxygen and nutrients to the eye. The sclera is the white part of the eye that lies beneath the conjunctiva. Sometimes a blood vessel in the conjunctiva breaks open and bleeds. The blood then collects under the conjunctiva and turns part of the eye red. Over several weeks, your body then absorbs the blood.  What causes subconjunctival hemorrhage?  In many cases the cause isn t known. But some health conditions may make it more likely. These include:    Eye injury    Eye surgery    High blood pressure    Inflammation of the conjunctiva    Contact lens use    Diabetes    Arteriosclerosis    Tumor of the conjunctiva    Diseases that affect blood clotting    Violent sneezing, coughing, or vomiting    Certain medicines that can increase bleeding, such as aspirin    Pushing hard during childbirth    Straining during constipation  Symptoms of subconjunctival hemorrhage  The main symptom is a red patch on the eye. You may notice it after waking up in the morning. In most cases just one eye will have a hemorrhage. It usually happens once and then goes away. But some health conditions may cause repeat hemorrhages. You may feel like you have something in your eye, but this is not common. The hemorrhage shouldn t affect your eyesight or cause any pain. If you do have pain, you may have another type of problem with your eye.  Diagnosing subconjunctival hemorrhage  Your healthcare provider will ask about your health history. You may have a physical exam. This  includes a basic eye exam. Your provider will make sure you don t have other causes of red eye that may need other treatment.  You will need to see an eye doctor (ophthalmologist) if you have had an eye injury. This doctor might use a special lighted microscope to look closely at your eye. This helps show the doctor if the injury hurt the eye itself and not just its outer layer.  If this is not your first subconjunctival hemorrhage, your doctor may need to find the cause. For example, you may need blood tests to check for a blood clotting disorder.  Treatment for subconjunctival hemorrhage  In most cases you will not need treatment. The red patch will usually go away on its own in a few weeks. It will turn from red to brown and then yellow. There are no treatments to speed up this process. Your doctor may suggest you use a warm compress and artificial tears eye drops to help relieve some of the redness.  If your subconjunctival hemorrhage was caused by a health condition, that condition will be treated. For example, you may need a blood pressure medicine to treat high blood pressure.  When to call your healthcare provider  Call your healthcare provider right away if you have any of these:    Hemorrhage that doesn t go away in 2 to 3 weeks    Eye pain    Loss of eyesight    Another subconjunctival hemorrhage    Date Last Reviewed: 2/1/2017 2000-2017 [employer]. [address] [place] [address], [place] [address]. All rights reserved. This information is not intended as a substitute for professional medical care. Always follow your healthcare professional's instructions.        Corneal Abrasion    You have received a scratch or scrape (abrasion) to your cornea. The cornea is the clear part in the front of the eye. This sensitive area is very painful when injured. You may make tears frequently, and your vision may be blurry until the injury heals. You may be sensitive to light.  This part of the body heals  quickly. You can expect the pain to go away within 24 to 48 hours. If the abrasion is large or deep, your doctor may apply an eye patch, although this is not always done. An antibiotic ointment or eye drops may also be used to prevent infection.  Numbing drops may be used to relieve the pain temporarily so that your eyes can be examined. However, these drops cannot be prescribed for home use because that would prevent healing and lead to more serious problems. Also, if you can t feel your eye, there is a chance of accidentally injuring it further without knowing it.  Home care    A cold pack (ice in a plastic bag, wrapped in a towel) may be applied over the eye (or eye patch) for 20 minutes at a time, to reduce pain.    You may use acetaminophen or ibuprofen to control pain, unless another pain medicine was prescribed. Note: If you have chronic liver or kidney disease or ever had a stomach ulcer or GI bleeding, talk with your doctor before using these medicines.    Rest your eyes and do not read until symptoms are gone.    If you use contact lenses, do not wear them until all symptoms are gone.    If your vision is affected by the corneal abrasion or if an eye patch was applied, do not drive a motor vehicle or operate machinery until all symptoms are gone. You may have trouble judging distances using only one eye.    If your eyes are sensitive to light, try wearing sunglasses, or stay indoors until symptoms go away.  Follow-up care  Follow up with your health care provider, or as advised.    If no patch was put on your eye, and used but the pain continues for more than 48 hours, you should have another exam. Return to this facility or contact your health care provider to arrange this.    If your eye was patched and you were asked to remove the patch yourself, see your health care provider. You may also return to this facility if you still have pain after the patch is removed.    If you were given a return  appointment for patch removal and re-examination, be sure to keep the appointment. Leaving the patch in place longer than advised could be harmful.  When to seek medical advice  Call your health care provider right away if any of these occur.    Increasing eye pain or pain that does not improve after 24 hours    Discharge from the eye    Increasing redness of the eye or swelling of the eyelids    Worsening vision    Symptoms that worsen after the abrasion has healed  Date Last Reviewed: 6/14/2015 2000-2017 [employer]. [address] [place] [address], [place] [address]. All rights reserved. This information is not intended as a substitute for professional medical care. Always follow your healthcare professional's instructions.

## 2017-09-25 NOTE — PROGRESS NOTES
"  SUBJECTIVE:   Joni Borja is a 46 year old male who presents to clinic today for the following health issues:    Eye(s) Problem  Onset: 2 days    Description:   Location: left  Pain: no, does not feel full either  Redness: YES- bloodshot    Accompanying Signs & Symptoms:  Discharge/mattering: no   Swelling: no   Visual changes: no   Fever: no   Nasal Congestion: no   Bothered by bright lights: no     History:   Trauma: no, but was lifting a boat yesterday and was sharpening chainsaw blades on Saturday  Foreign body exposure: YES- maybe was sharpening chainsaw blades but did not feel anything get in eye and was wearing safety goggles    Precipitating factors:   Wearing contacts: no     Alleviating factors:  Improved by: Nothing    Therapies Tried and outcome: Visine with no change in redness    Patient denies injury to eye, pain, blurred vision, dryness, itching, headache or dizziness.    Keren Cruz, BS, RN, PHN  [facility]) [phone]        Problem list and histories reviewed & adjusted, as indicated.  Additional history: as documented      Reviewed and updated as needed this visit by clinical staffTobacco  Allergies  Meds       Reviewed and updated as needed this visit by Provider         ROS:  Constitutional, HEENT, cardiovascular, pulmonary, GI, , musculoskeletal, neuro, skin, endocrine and psych systems are negative, except as otherwise noted.    This document serves as a record of the services and decisions personally performed and made by Brian Coon MD. It was created on his behalf by Gloria Peres, a trained medical scribe. The creation of this document is based the provider's statements to the medical scribe.  Gloria Peres   OBJECTIVE:   /67 (BP Location: Right arm, Patient Position: Sitting, Cuff Size: Adult Regular)  Pulse 67  Temp 98.8  F (37.1  C) (Oral)  Resp 16  Ht 1.753 m (5' 9\")  Wt 79.4 kg (175 lb)  SpO2 100%  BMI 25.84 kg/m2  Body mass " "index is 25.84 kg/(m^2).  GENERAL: healthy, alert and no distress  EYES:  Left eye - lateral sub conj hemorrhage noted. -Slit lamp exam after proparacaine gtts and fluorescin showed a small corneal abrasion at the 5 o'clock position of left eye, otherwise, Eyes grossly normal to inspection  NEURO: Normal strength and tone, mentation intact and speech normal  PSYCH: mentation appears normal, affect normal/bright  Diagnostic Test Results: none   ASSESSMENT/PLAN:   Joni was seen today for eye problem.    Diagnoses and all orders for this visit:    Corneal abrasion, left, initial encounter- small and should heal  Subconjunctival hemorrhage of left eye: New onset, given a handout on corneal abrasions and patient may follow up prn      Tobacco Cessation:   reports that he has been smoking Cigarettes.  He has a 20.00 pack-year smoking history. He has never used smokeless tobacco.  Tobacco Cessation Action Plan: Not addressed    BMI:   Estimated body mass index is 25.84 kg/(m^2) as calculated from the following:    Height as of this encounter: 1.753 m (5' 9\").    Weight as of this encounter: 79.4 kg (175 lb).   Weight management plan: Discussed healthy diet and exercise guidelines and patient will follow up in 6 months in clinic to re-evaluate.    Follow up: 1-2 days prn  See Patient Instructions    The information in this document, created by the medical scribe for me, accurately reflects the services I personally performed and the decisions made by me. I have reviewed and approved this document for accuracy prior to leaving the patient care area.  Brian Coon MD September 25, 2017 3:04 PM    Brian Coon MD  [facility]    "

## 2017-09-25 NOTE — MR AVS SNAPSHOT
After Visit Summary   9/25/2017    Joni Bojra    MRN: [id number]           Patient Information     Date Of Birth          1970        Visit Information        Provider Department      9/25/2017 2:20 PM Brian Coon MD [facility]        Today's Diagnoses     Corneal abrasion, left, initial encounter    -  1    Subconjunctival hemorrhage of left eye          Care Instructions      Subconjunctival Hemorrhage    A subconjunctival hemorrhage is when a blood vessel breaks open in the white of the eye. It causes a bright red patch in the white of the eye. It is similar to a bruise on the skin. This type of hemorrhage is common. It can look quite alarming, but it is usually harmless.  Understanding the conjunctiva  The conjunctiva is the thin layer that covers the inside of the eyelids and the surface of the eye. It has many tiny blood vessels that bring oxygen and nutrients to the eye. The sclera is the white part of the eye that lies beneath the conjunctiva. Sometimes a blood vessel in the conjunctiva breaks open and bleeds. The blood then collects under the conjunctiva and turns part of the eye red. Over several weeks, your body then absorbs the blood.  What causes subconjunctival hemorrhage?  In many cases the cause isn t known. But some health conditions may make it more likely. These include:    Eye injury    Eye surgery    High blood pressure    Inflammation of the conjunctiva    Contact lens use    Diabetes    Arteriosclerosis    Tumor of the conjunctiva    Diseases that affect blood clotting    Violent sneezing, coughing, or vomiting    Certain medicines that can increase bleeding, such as aspirin    Pushing hard during childbirth    Straining during constipation  Symptoms of subconjunctival hemorrhage  The main symptom is a red patch on the eye. You may notice it after waking up in the morning. In most cases just one eye will have a hemorrhage. It usually happens once  and then goes away. But some health conditions may cause repeat hemorrhages. You may feel like you have something in your eye, but this is not common. The hemorrhage shouldn t affect your eyesight or cause any pain. If you do have pain, you may have another type of problem with your eye.  Diagnosing subconjunctival hemorrhage  Your healthcare provider will ask about your health history. You may have a physical exam. This includes a basic eye exam. Your provider will make sure you don t have other causes of red eye that may need other treatment.  You will need to see an eye doctor (ophthalmologist) if you have had an eye injury. This doctor might use a special lighted microscope to look closely at your eye. This helps show the doctor if the injury hurt the eye itself and not just its outer layer.  If this is not your first subconjunctival hemorrhage, your doctor may need to find the cause. For example, you may need blood tests to check for a blood clotting disorder.  Treatment for subconjunctival hemorrhage  In most cases you will not need treatment. The red patch will usually go away on its own in a few weeks. It will turn from red to brown and then yellow. There are no treatments to speed up this process. Your doctor may suggest you use a warm compress and artificial tears eye drops to help relieve some of the redness.  If your subconjunctival hemorrhage was caused by a health condition, that condition will be treated. For example, you may need a blood pressure medicine to treat high blood pressure.  When to call your healthcare provider  Call your healthcare provider right away if you have any of these:    Hemorrhage that doesn t go away in 2 to 3 weeks    Eye pain    Loss of eyesight    Another subconjunctival hemorrhage    Date Last Reviewed: 2/1/2017 2000-2017 [employer]. [address] [place] [address], [place] [address]. All rights reserved. This information is not intended as a substitute for  professional medical care. Always follow your healthcare professional's instructions.        Corneal Abrasion    You have received a scratch or scrape (abrasion) to your cornea. The cornea is the clear part in the front of the eye. This sensitive area is very painful when injured. You may make tears frequently, and your vision may be blurry until the injury heals. You may be sensitive to light.  This part of the body heals quickly. You can expect the pain to go away within 24 to 48 hours. If the abrasion is large or deep, your doctor may apply an eye patch, although this is not always done. An antibiotic ointment or eye drops may also be used to prevent infection.  Numbing drops may be used to relieve the pain temporarily so that your eyes can be examined. However, these drops cannot be prescribed for home use because that would prevent healing and lead to more serious problems. Also, if you can t feel your eye, there is a chance of accidentally injuring it further without knowing it.  Home care    A cold pack (ice in a plastic bag, wrapped in a towel) may be applied over the eye (or eye patch) for 20 minutes at a time, to reduce pain.    You may use acetaminophen or ibuprofen to control pain, unless another pain medicine was prescribed. Note: If you have chronic liver or kidney disease or ever had a stomach ulcer or GI bleeding, talk with your doctor before using these medicines.    Rest your eyes and do not read until symptoms are gone.    If you use contact lenses, do not wear them until all symptoms are gone.    If your vision is affected by the corneal abrasion or if an eye patch was applied, do not drive a motor vehicle or operate machinery until all symptoms are gone. You may have trouble judging distances using only one eye.    If your eyes are sensitive to light, try wearing sunglasses, or stay indoors until symptoms go away.  Follow-up care  Follow up with your health care provider, or as advised.    If no  patch was put on your eye, and used but the pain continues for more than 48 hours, you should have another exam. Return to this facility or contact your health care provider to arrange this.    If your eye was patched and you were asked to remove the patch yourself, see your health care provider. You may also return to this facility if you still have pain after the patch is removed.    If you were given a return appointment for patch removal and re-examination, be sure to keep the appointment. Leaving the patch in place longer than advised could be harmful.  When to seek medical advice  Call your health care provider right away if any of these occur.    Increasing eye pain or pain that does not improve after 24 hours    Discharge from the eye    Increasing redness of the eye or swelling of the eyelids    Worsening vision    Symptoms that worsen after the abrasion has healed  Date Last Reviewed: 6/14/2015 2000-2017 [employer]. [address] [place] [address]. All rights reserved. This information is not intended as a substitute for professional medical care. Always follow your healthcare professional's instructions.                Follow-ups after your visit        Your next 10 appointments already scheduled     Sep 29, 2017 12:30 PM CDT   New Visit with Maru Kumar MD   [facility] ([facility])    [facility]  [address] [place] [address] [address]  [facility] [address]   [phone]            Oct 19, 2017  3:00 PM CDT   Return Visit with EMILY Donahue   [facility] ([facility])    [address] [place] [id number] [phone]            Nov 06, 2017  2:30 PM CST   Return Visit with Haja Herrmann MD   [facility] ([facility])    [facility] [place]  [address]  [place] [address]   [phone]            Nov 17, 2017   2:30 PM CST   Return Visit with Silvia Brown MD   [facility]    [address]  [facility] [id number] [phone]            Dec 05, 2017 12:45 PM CST   MR BRAIN W/O & W CONTRAST with SHMRP1   [facility] ([facility])    [address] [place] [address] [phone]           Take your medicines as usual, unless your doctor tells you not to. Bring a list of your current medicines to your exam (including vitamins, minerals and over-the-counter drugs).  You will be given intravenous contrast for this exam. To prepare:   The day before your exam, drink extra fluids at least six 8-ounce glasses (unless your doctor tells you to restrict your fluids).   Have a blood test (creatinine test) within 30 days of your exam. Go to your clinic or Diagnostic Imaging Department for this test.  The MRI machine uses a strong magnet. Please wear clothes without metal (snaps, zippers). A sweatsuit works well, or we may give you a hospital gown.  Please remove any body piercings and hair extensions before you arrive. You will also remove watches, jewelry, hairpins, wallets, dentures, partial dental plates and hearing aids. You may wear contact lenses, and you may be able to wear your rings. We have a safe place to keep your personal items, but it is safer to leave them at home.   **IMPORTANT** THE INSTRUCTIONS BELOW ARE ONLY FOR THOSE PATIENTS WHO HAVE BEEN TOLD THEY WILL RECEIVE SEDATION OR GENERAL ANESTHESIA DURING THEIR MRI PROCEDURE:  IF YOU WILL RECEIVE SEDATION (take medicine to help you relax during your exam):   You must get the medicine from your doctor before you arrive. Bring the medicine to the exam. Do not take it at home.   Arrive one hour early. Bring someone who can take you home after the test. Your medicine will make you sleepy. After the exam, you may not drive, take a bus or take a taxi by yourself.   No eating 8  hours before your exam. You may have clear liquids up until 4 hours before your exam. (Clear liquids include water, clear tea, black coffee and fruit juice without pulp.)  IF YOU WILL RECEIVE ANESTHESIA (be asleep for your exam):   Arrive 1 1/2 hours early. Bring someone who can take you home after the test. You may not drive, take a bus or take a taxi by yourself.   No eating 8 hours before your exam. You may have clear liquids up until 4 hours before your exam. (Clear liquids include water, clear tea, black coffee and fruit juice without pulp.)  Please call the Imaging Department at your exam site with any questions.            Dec 05, 2017  2:30 PM CST   Return Visit with Berkley Daniels MD   [facility] ([facility])    [facility]  [address]  [facility] [id number] [phone]              Who to contact     If you have questions or need follow up information about today's clinic visit or your schedule please contact [facility] directly at [phone].  Normal or non-critical lab and imaging results will be communicated to you by SpokenLayerhart, letter or phone within 4 business days after the clinic has received the results. If you do not hear from us within 7 days, please contact the clinic through Symonicst or phone. If you have a critical or abnormal lab result, we will notify you by phone as soon as possible.  Submit refill requests through NuMedii or call your pharmacy and they will forward the refill request to us. Please allow 3 business days for your refill to be completed.          Additional Information About Your Visit        SpokenLayerhart Information     [employer] gives you secure access to your electronic health record. If you see a primary care provider, you can also send messages to your care team and make appointments. If you have questions, please call your primary care clinic.  If you do not have a primary care  "provider, please call [phone] and they will assist you.        Care EveryWhere ID     This is your Care EveryWhere ID. This could be used by other organizations to access your [facility] medical records  [id number]        Your Vitals Were     Pulse Temperature Respirations Height Pulse Oximetry BMI (Body Mass Index)    67 98.8  F (37.1  C) (Oral) 16 5' 9\" (1.753 m) 100% 25.84 kg/m2       Blood Pressure from Last 3 Encounters:   09/25/17 109/67   09/19/17 112/73   08/29/17 110/74    Weight from Last 3 Encounters:   09/25/17 175 lb (79.4 kg)   09/19/17 178 lb 9.6 oz (81 kg)   08/29/17 173 lb (78.5 kg)              Today, you had the following     No orders found for display       Primary Care Provider Office Phone # Fax #    Brian Coon [phone]       [address] [place] [address]        Equal Access to Services     [facility]: Hadii lexis ku hadasho Soomaali, waaxda luqadaha, qaybta kaalmada adeegyada, es arriaga . So [facility] [phone].    ATENCIÓN: Si habla español, tiene a kirby disposición servicios gratuitos de asistencia lingüística. Llame al [phone].    We comply with applicable federal civil rights laws and [place] laws. We do not discriminate on the basis of race, color, national origin, age, disability sex, sexual orientation or gender identity.            Thank you!     Thank you for choosing [facility]  for your care. Our goal is always to provide you with excellent care. Hearing back from our patients is one way we can continue to improve our services. Please take a few minutes to complete the written survey that you may receive in the mail after your visit with us. Thank you!             Your Updated Medication List - Protect others around you: Learn how to safely use, store and throw away your medicines at www.disposemymeds.org.          This list is accurate as of: 9/25/17  3:08 PM.  Always use your most " recent med list.                   Brand Name Dispense Instructions for use Diagnosis    acetaminophen-codeine 300-30 MG per tablet    TYLENOL #3     Take 1-2 tablets by mouth every 4 hours as needed for moderate pain As needed for headaches        carBAMazepine 300 MG 12 hr capsule    CARBATROL    180 capsule    Take 1 capsule (300 mg) by mouth 2 times daily (at 10:00 & 22:00)    Partial epilepsy with impairment of consciousness, intractable (H)       escitalopram 20 MG tablet    LEXAPRO    90 tablet    TAKE ONE TABLET BY MOUTH ONCE DAILY AT  NIGHT    Major depressive disorder with single episode, in partial remission (H), Anxiety       felbamate 600 MG tablet    FELBATOL    225 tablet    Take 600 mg (one tablet) am and 900 mg (1.5 tablet) 2 pm    Partial epilepsy with impairment of consciousness, intractable (H)       KLONOPIN PO      Take 0.5 mg by mouth 2 times daily 1 tab in the AM and 2 tabs in PM.        ondansetron 4 MG tablet    ZOFRAN    60 tablet    Take 2 tablets (8 mg) by mouth At Bedtime or 30 minutes prior to chemotherapy dosing. Repeat every 8 hours as needed for nausea.    Chemotherapy management, encounter for, Chemotherapy-induced nausea and vomiting       pantoprazole 40 MG EC tablet    PROTONIX    90 tablet    TAKE ONE TABLET BY MOUTH ONCE DAILY    Gastroesophageal reflux disease without esophagitis       Riboflavin 400 MG Tabs     90 tablet    Take 400 mg by mouth daily    Chronic daily headache       temozolomide 100 MG capsule CHEMO    TEMODAR    20 capsule    Take 4 capsules (400 mg) by mouth daily for 5 days Take ondansetron 30-60 min before temozolomide. Take at bedtime on an empty stomach.    Oligodendroglioma, anaplastic (H)       topiramate 100 MG tablet    TOPAMAX    900 tablet    Take 2 tablets (200 mg) by mouth every morning 200 mg am and 300 mg pm    Partial epilepsy with impairment of consciousness, intractable (H)       VITAMIN B 12 PO      Take 1 tablet by mouth daily (with  dinner)        ZYRTEC ALLERGY PO      Take 10 mg by mouth daily as needed (for misquitos.)    Localization-related (focal) (partial) epilepsy and epileptic syndromes with complex partial seizures, with intractable epilepsy, Depression, Anxiety, Unstable gait

## 2017-09-29 ENCOUNTER — ONCOLOGY VISIT (OUTPATIENT)
Dept: ONCOLOGY | Facility: CLINIC | Age: 47
End: 2017-09-29
Attending: INTERNAL MEDICINE
Payer: COMMERCIAL

## 2017-09-29 VITALS
DIASTOLIC BLOOD PRESSURE: 69 MMHG | RESPIRATION RATE: 16 BRPM | WEIGHT: 183 LBS | BODY MASS INDEX: 27.11 KG/M2 | HEIGHT: 69 IN | SYSTOLIC BLOOD PRESSURE: 119 MMHG | HEART RATE: 82 BPM | OXYGEN SATURATION: 95 % | TEMPERATURE: 98.2 F

## 2017-09-29 DIAGNOSIS — C71.9 OLIGODENDROGLIOMA, ANAPLASTIC (H): Primary | ICD-10-CM

## 2017-09-29 DIAGNOSIS — Z65.9 OTHER SOCIAL STRESSOR: ICD-10-CM

## 2017-09-29 DIAGNOSIS — Z51.5 ENCOUNTER FOR PALLIATIVE CARE: ICD-10-CM

## 2017-09-29 PROCEDURE — 99211 OFF/OP EST MAY X REQ PHY/QHP: CPT

## 2017-09-29 PROCEDURE — 99204 OFFICE O/P NEW MOD 45 MIN: CPT | Performed by: INTERNAL MEDICINE

## 2017-09-29 ASSESSMENT — PAIN SCALES - GENERAL: PAINLEVEL: NO PAIN (0)

## 2017-09-29 NOTE — NURSING NOTE
"Oncology Rooming Note    September 29, 2017 12:31 PM   Joni Borja is a 46 year old male who presents for:    Chief Complaint   Patient presents with     Palliative     New patient     Initial Vitals: /69  Pulse 82  Temp 98.2  F (36.8  C) (Tympanic)  Resp 16  Ht 1.753 m (5' 9\")  Wt 83 kg (183 lb)  SpO2 95%  BMI 27.02 kg/m2 Estimated body mass index is 27.02 kg/(m^2) as calculated from the following:    Height as of this encounter: 1.753 m (5' 9\").    Weight as of this encounter: 83 kg (183 lb). Body surface area is 2.01 meters squared.  No Pain (0) Comment: Data Unavailable   No LMP for male patient.  Allergies reviewed: Yes  Medications reviewed: Yes    Medications: Medication refills not needed today.  Pharmacy name entered into PPLCONNECT:    [facility] - [place], [place] - [address]  [facility] - [place], [place] - [facility] [address]   [facility] - [employer] PATIENT ASSISTANCE PROGRAM    Clinical concerns: New Patient     8 minutes for nursing intake (face to face time)     Brenda Greer CMA     DISCHARGE PLAN:  Next appointments: See patient instruction section  Departure Mode: Ambulatory  Accompanied by: mother  0 minutes for nursing discharge (face to face time)   Brenda Greer CMA                  "

## 2017-09-29 NOTE — MR AVS SNAPSHOT
After Visit Summary   9/29/2017    Joni Borja    MRN: [id number]           Patient Information     Date Of Birth          1970        Visit Information        Provider Department      9/29/2017 12:30 PM Maru Kumar MD [facility]        Care Instructions    Thank you for coming into the Palliative Care Clinic today.     Please complete the healthcare directive and return it to clinic once completed.     Return to clinic as needed for a follow up.     You can reach the Palliative Care Team during business hours at the following numbers:   -For the [facility], call [phone]  -For the [facility], call [phone]  -For the [facility], call [phone]    To reach the Palliative Care Provider on-call After-hours or on holidays and weekends, call: [phone].  Please note that we are not able to provide pain medication refills on evenings or weekends.     ===================================================================            Follow-ups after your visit        Your next 10 appointments already scheduled     Oct 19, 2017  3:00 PM CDT   Return Visit with EMILY Donahue   [facility] ([facility])    [address] [facility] [address] [phone]            Nov 06, 2017  2:30 PM CST   Return Visit with Haja Herrmann MD   [facility] ([facility])    [facility]  [address]  [facility] [address] [phone]            Nov 17, 2017  2:30 PM CST   Return Visit with Silvia Brown MD   [facility] ([facility])    [address] [facility] [address]  [facility] [id number] [phone]            Dec 05, 2017 12:45 PM CST   MR BRAIN W/O & W CONTRAST with SHMRP1   [facility] [place] [facility] ([facility])    [address] [facility] [address]    [phone]           Take your medicines as usual, unless your doctor tells you not to. Bring a list of your current medicines to your exam (including vitamins, minerals and over-the-counter drugs).  You will be given intravenous contrast for this exam. To prepare:   The day before your exam, drink extra fluids at least six 8-ounce glasses (unless your doctor tells you to restrict your fluids).   Have a blood test (creatinine test) within 30 days of your exam. Go to your clinic or Diagnostic Imaging Department for this test.  The MRI machine uses a strong magnet. Please wear clothes without metal (snaps, zippers). A sweatsuit works well, or we may give you a hospital gown.  Please remove any body piercings and hair extensions before you arrive. You will also remove watches, jewelry, hairpins, wallets, dentures, partial dental plates and hearing aids. You may wear contact lenses, and you may be able to wear your rings. We have a safe place to keep your personal items, but it is safer to leave them at home.   **IMPORTANT** THE INSTRUCTIONS BELOW ARE ONLY FOR THOSE PATIENTS WHO HAVE BEEN TOLD THEY WILL RECEIVE SEDATION OR GENERAL ANESTHESIA DURING THEIR MRI PROCEDURE:  IF YOU WILL RECEIVE SEDATION (take medicine to help you relax during your exam):   You must get the medicine from your doctor before you arrive. Bring the medicine to the exam. Do not take it at home.   Arrive one hour early. Bring someone who can take you home after the test. Your medicine will make you sleepy. After the exam, you may not drive, take a bus or take a taxi by yourself.   No eating 8 hours before your exam. You may have clear liquids up until 4 hours before your exam. (Clear liquids include water, clear tea, black coffee and fruit juice without pulp.)  IF YOU WILL RECEIVE ANESTHESIA (be asleep for your exam):   Arrive 1 1/2 hours early. Bring someone who can take you home after the test. You may not drive, take a bus or take a taxi by  yourself.   No eating 8 hours before your exam. You may have clear liquids up until 4 hours before your exam. (Clear liquids include water, clear tea, black coffee and fruit juice without pulp.)  Please call the Imaging Department at your exam site with any questions.            Dec 05, 2017  2:30 PM CST   Return Visit with Berkley Daniels MD   [facility] ([facility])    [facility] [place]  [address]  [facility] [address] [phone]              Who to contact     If you have questions or need follow up information about today's clinic visit or your schedule please contact [facility] directly at [phone].  Normal or non-critical lab and imaging results will be communicated to you by MyChart, letter or phone within 4 business days after the clinic has received the results. If you do not hear from us within 7 days, please contact the clinic through [employer] or phone. If you have a critical or abnormal lab result, we will notify you by phone as soon as possible.  Submit refill requests through [employer] or call your pharmacy and they will forward the refill request to us. Please allow 3 business days for your refill to be completed.          Additional Information About Your Visit        Versa Information     [employer] gives you secure access to your electronic health record. If you see a primary care provider, you can also send messages to your care team and make appointments. If you have questions, please call your primary care clinic.  If you do not have a primary care provider, please call [phone] and they will assist you.        Care EveryWhere ID     This is your Care EveryWhere ID. This could be used by other organizations to access your [place] medical records  [id number]        Your Vitals Were     Pulse Temperature Respirations Height Pulse Oximetry BMI (Body Mass Index)    82 98.2  F (36.8  C) (Tympanic)  "16 1.753 m (5' 9\") 95% 27.02 kg/m2       Blood Pressure from Last 3 Encounters:   09/29/17 119/69   09/25/17 109/67   09/19/17 112/73    Weight from Last 3 Encounters:   09/29/17 83 kg (183 lb)   09/25/17 79.4 kg (175 lb)   09/19/17 81 kg (178 lb 9.6 oz)              Today, you had the following     No orders found for display       Primary Care Provider Office Phone # Fax #    Brian Coon [phone] [address] [facility] [address]        Equal Access to Services     [facility] Center[facility] : Hadii lexis Orellana, waaxda barb, qaybta kaalmada marcie, es arriaga . So [facility] [phone].    ATENCIÓN: Si habla español, tiene a kirby disposición servicios gratuitos de asistencia lingüística. Llame al [phone].    We comply with applicable federal civil rights laws and [place] laws. We do not discriminate on the basis of race, color, national origin, age, disability, sex, sexual orientation, or gender identity.            Thank you!     Thank you for choosing [facility]  for your care. Our goal is always to provide you with excellent care. Hearing back from our patients is one way we can continue to improve our services. Please take a few minutes to complete the written survey that you may receive in the mail after your visit with us. Thank you!             Your Updated Medication List - Protect others around you: Learn how to safely use, store and throw away your medicines at www.disposemymeds.org.          This list is accurate as of: 9/29/17  1:38 PM.  Always use your most recent med list.                   Brand Name Dispense Instructions for use Diagnosis    acetaminophen-codeine 300-30 MG per tablet    TYLENOL #3     Take 1-2 tablets by mouth every 4 hours as needed for moderate pain As needed for headaches        carBAMazepine 300 MG 12 hr capsule    CARBATROL    180 capsule    Take 1 capsule (300 mg) by mouth 2 " times daily (at 10:00 & 22:00)    Partial epilepsy with impairment of consciousness, intractable (H)       escitalopram 20 MG tablet    LEXAPRO    90 tablet    TAKE ONE TABLET BY MOUTH ONCE DAILY AT  NIGHT    Major depressive disorder with single episode, in partial remission (H), Anxiety       felbamate 600 MG tablet    FELBATOL    225 tablet    Take 600 mg (one tablet) am and 900 mg (1.5 tablet) 2 pm    Partial epilepsy with impairment of consciousness, intractable (H)       KLONOPIN PO      Take 0.5 mg by mouth 2 times daily 1 tab in the AM and 2 tabs in PM.        ondansetron 4 MG tablet    ZOFRAN    60 tablet    Take 2 tablets (8 mg) by mouth At Bedtime or 30 minutes prior to chemotherapy dosing. Repeat every 8 hours as needed for nausea.    Chemotherapy management, encounter for, Chemotherapy-induced nausea and vomiting       pantoprazole 40 MG EC tablet    PROTONIX    90 tablet    TAKE ONE TABLET BY MOUTH ONCE DAILY    Gastroesophageal reflux disease without esophagitis       Riboflavin 400 MG Tabs     90 tablet    Take 400 mg by mouth daily    Chronic daily headache       temozolomide 100 MG capsule CHEMO    TEMODAR    20 capsule    Take 4 capsules (400 mg) by mouth daily for 5 days Take ondansetron 30-60 min before temozolomide. Take at bedtime on an empty stomach.    Oligodendroglioma, anaplastic (H)       topiramate 100 MG tablet    TOPAMAX    900 tablet    Take 2 tablets (200 mg) by mouth every morning 200 mg am and 300 mg pm    Partial epilepsy with impairment of consciousness, intractable (H)       VITAMIN B 12 PO      Take 1 tablet by mouth daily (with dinner)        ZYRTEC ALLERGY PO      Take 10 mg by mouth daily as needed (for misquitos.)    Localization-related (focal) (partial) epilepsy and epileptic syndromes with complex partial seizures, with intractable epilepsy, Depression, Anxiety, Unstable gait

## 2017-09-29 NOTE — PATIENT INSTRUCTIONS
Thank you for coming into the Palliative Care Clinic today.     Please complete the healthcare directive and return it to clinic once completed.     Return to clinic as needed for a follow up.     You can reach the Palliative Care Team during business hours at the following numbers:   -For the [facility], call [phone]  -For the [facility], call [phone]  -For the [facility], call [phone]    To reach the Palliative Care Provider on-call After-hours or on holidays and weekends, call: [phone].  Please note that we are not able to provide pain medication refills on evenings or weekends.     ==================================================N    No scheduling needed at this time.  Laura     AVS printed for pt.  Laura================

## 2017-09-29 NOTE — PROGRESS NOTES
Palliative Care Outpatient Clinic Consultation Note    Patient:  Joni Borja    Chief Complaint:   Joni Borja 46 year old male who is presenting to the palliative medicine clinic today with his mother Brittany at the request of Dr. 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   The patient's primary care provider is:  Brian Coon     History of Present Illness:  Joni's cancer history was reviewed.  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  He had a biopsy and radiation at that point, and then was noted to have recurrence in 2007.  He had a resection followed by additional therapy.  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  He has been on temozolomide since that time.  Joni has struggled with fairly refractory epilepsy, and had a VNS placed in 2016.  He is working with Dr. Brown for ongoing management of seizures.      Joni has struggled with a lot of social issues since his diagnosis.  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  He is going through a difficult divorce as well, and has now had to move in with his parents.  This frustrates him greatly, as he is 46 and would like to be independent.  He helped to raise his stepchildren, who are 23 and 28, and was not able to attend one step-child's wedding recently due to an active restraining order against his wife.  Joni overall notes that he has had some increased frustration with his family, as they try to help him navigate his medical issues, while at the same time he is frustrated by the level of dependence that he has.  Joni did note that he has had a history of depression since he was .  He has been on Lexapro 20 mg  "daily for this, as well as Klonopin 0.5 mg in the morning and 1 mg at bedtime.  He feels that his mood and anxiety are overall pretty well controlled, but does note that he has been a little bit more irritable with his parents recently.  He is working with a therapist, and he feels that this is a good relationship.  Joni denies any feelings of guilt, and has no suicidal ideation today.  He does find enjoyment in \"wrenching\" (working on cars) which makes him feel like he is being productive again.      PATIENT'S DISEASE UNDERSTANDING:  Joni was asked what the goal of his chemotherapy is.  He initially reported to cure his cancer, and then upon further reflection stated that he has had recurrence of tumors over the past 15 years, and he expects that his cancer will be with him again really for the rest of his life.  He shares that he has not asked Dr. Daniels specifically what his overall prognosis might be, and is not sure that this information would be helpful to review.  If he were to review it, his mother and father would like to be present for that conversation as well.      SOCIAL HISTORY:  As noted above, Joni has 3 brothers who he enjoys visiting with.  Along with the other stressors noted above, he also had a lowering of his credit score due to issues related to the divorce.  He previously enjoyed 4-wheeling and being very adventurous.  He does describe a [employer] and[employer], and is very close to a Bahai community.       Social History   Substance Use Topics     Smoking status: Current Every Day Smoker     Packs/day: 1.00     Years: 20.00     Types: Cigarettes     Smokeless tobacco: Never Used      Comment: would like to quit soon.     Alcohol use 0.0 oz/week      Comment: monthly a few drinks       Advance Care Planning:  Advance Directive:    On file  Health Care Agent Contact Information: His ex-wife is listed as his healthcare agent.       REVIEW OF SYSTEMS:   ROS: 10 point ROS neg other than the symptoms noted " "above in the HPI.  I note that Joni has had c/o headache and nausea to Dr. Daniels, but he denied pain and nausea issues today. Has a history of mild right hip pain, previously had surgery to this area.            Physical Exam:   Vitals were reviewed  /69  Pulse 82  Temp 98.2  F (36.8  C) (Tympanic)  Resp 16  Ht 1.753 m (5' 9\")  Wt 83 kg (183 lb)  SpO2 95%  BMI 27.02 kg/m2  General: Alert, comfortable appearing male in no acute distress.   Head: Multiple well healed surgical incisions present.   Eyes: Pupils equal, sclera clear.   ENT: MMM. Poor dentition.   Cardiac: Normal rate, regular rhythm, no murmurs.    Resp: CTAB, unlabored on room air.   Abd: Soft, non-distended, non-tender to palpation, active bowel sounds.   Ext: Well healed surgical incision to right hip.    Neuro: No facial asymmetry.  Spontaneous movements grossly non-focal.  Gait is mildly antalgic.   Neuropsych: Alert, appropriately interactive, full affect.         Data Reviewed:  LABS:   Lab Results   Component Value Date    WBC 7.1 09/25/2017    HGB 12.7 (L) 09/25/2017    HCT 39.1 (L) 09/25/2017     09/25/2017     09/06/2017    POTASSIUM 3.9 09/06/2017    CHLORIDE 113 (H) 09/06/2017    CO2 24 09/06/2017    BUN 13 09/06/2017    CR 0.91 09/06/2017    GLC 94 09/06/2017    AST 22 09/06/2017    ALT 28 09/06/2017    ALKPHOS 110 09/06/2017    BILITOTAL 0.3 09/06/2017    INR 1.09 09/24/2013     IMAGING:   MRI 9/19/17  \"IMPRESSION:   1. Stable brain MRI demonstrating a right frontal craniotomy for  resection of the anterior portion of the right frontal lobe. Minimal  gliosis in the brain parenchyma surrounding the surgical resection  cavity again noted without change. No evidence for residual or  recurrent tumor.  2. Diffuse cerebral volume loss and cerebral white matter changes  consistent with chronic small vessel ischemic disease.\"    [place]  - Use of controlled substances consistent with history.     Impressions:    Joni is a 46 " year old man with anaplastic oligodendroglioma, currently on temozolomide.  He has multiple social stressors which are currently being supported by a therapist, and he denies issues with uncontrolled depression or anxiety today.     Recommendations & Counselin. Support: Joni has been offered the support of our Palliative SW's, but feels that he has a good relationship with his current therapist.  I will see what kind of resources I can find for his family, as his mother notes that she and the other family members would also benefit from additional support.  Joni described some boredom at home and plans to work with his Jewish community to get more involved in finding new activities and meeting new people.     2. Goals of Care/Disease Understanding: Joni would like to live many years, and is hoping to be able to be independent again in the future.  He correctly names that he is likely to struggle with his brain tumor intermittently in the time ahead.  His healthcare directive was reviewed, which shows his ex-wife as his agent.   Joni has been given a new healthcare directive to complete.  I have offered to review this with him at a follow up visit as needed.     RTC PRN for a follow up. As noted above, I would be happy to see Joni again to facilitate a healthcare directive.  He could also schedule an appointment with our Palliative SW for guidance on this and additional support surrounding coping with his serious illness in the setting of multiple social stressors.     Thank you for involving me in the care of this patient.     Maru Kumar MD / Palliative Medicine / Pager [phone] / After-Hours Answering Service [phone] / Main Palliative Clinic - [facility] [phone] / [facility] Inpatient Team Consult Pager [phone] (answered 8am-430pm M-F)    Additional History Reviewed:   Allergies   Allergen Reactions     Dilantin [Phenytoin] Hives     Mosquitoes (Informational Only)       blisters     Current Outpatient Prescriptions   Medication Sig Dispense Refill     Riboflavin 400 MG TABS Take 400 mg by mouth daily 90 tablet 3     ondansetron (ZOFRAN) 4 MG tablet Take 2 tablets (8 mg) by mouth At Bedtime or 30 minutes prior to chemotherapy dosing. Repeat every 8 hours as needed for nausea. 60 tablet 3     pantoprazole (PROTONIX) 40 MG EC tablet TAKE ONE TABLET BY MOUTH ONCE DAILY 90 tablet 1     escitalopram (LEXAPRO) 20 MG tablet TAKE ONE TABLET BY MOUTH ONCE DAILY AT  NIGHT 90 tablet 1     felbamate (FELBATOL) 600 MG tablet Take 600 mg (one tablet) am and 900 mg (1.5 tablet) 2 pm 225 tablet 3     topiramate (TOPAMAX) 100 MG tablet Take 2 tablets (200 mg) by mouth every morning 200 mg am and 300 mg pm 900 tablet 3     carBAMazepine (CARBATROL) 300 MG 12 hr capsule Take 1 capsule (300 mg) by mouth 2 times daily (at 10:00 & 22:00) 180 capsule 3     acetaminophen-codeine (TYLENOL #3) 300-30 MG per tablet Take 1-2 tablets by mouth every 4 hours as needed for moderate pain As needed for headaches       Cyanocobalamin (VITAMIN B 12 PO) Take 1 tablet by mouth daily (with dinner)       ClonazePAM (KLONOPIN PO) Take 0.5 mg by mouth 2 times daily 1 tab in the AM and 2 tabs in PM.        Cetirizine HCl (ZYRTEC ALLERGY PO) Take 10 mg by mouth daily as needed (for misquitos.)        temozolomide (TEMODAR) 100 MG capsule CHEMO Take 4 capsules (400 mg) by mouth daily for 5 days Take ondansetron 30-60 min before temozolomide. Take at bedtime on an empty stomach. 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 Vvls-Gfveg-Acjurin disease 2/7/2017          Localization-related epilepsy (H)      Meningitis, viral      Oligodendroglioma (H) 4/02    right frontal grade 2, s/p biopsy and whole brain radiation, recurrence 5/07 s/p subtotal resection and chemo     Perthe's disease of hip      Right hip     Seizures (H)      Past Surgical History:   Procedure Laterality Date     APPENDECTOMY  1972     BIOPSY       CRANIOTOMY, EXCISE TUMOR COMPLEX, COMBINED  2013    Procedure: COMBINED CRANIOTOMY, EXCISE TUMOR COMPLEX;  Re-do Right Frontal Craniotomy, Grid Removal,  Resection of Right Frontal  Tumor and Epileptogenic Cortex Resection;  Surgeon: Maverick Linder MD;  Location:  OR     HEAD & NECK SURGERY      brain tumor removal     IMPLANT STIMULATOR VAGUS NERVE Left 2016    Procedure: IMPLANT STIMULATOR VAGUS NERVE;  Surgeon: Maverick Linder MD;  Location: [facility] OR     OPTICAL TRACKING SYSTEM CRANIOTOMY, EXCISE TUMOR WITH MAPPING, COMBINED  9/10/2013    Procedure: COMBINED OPTICAL TRACKING SYSTEM CRANIOTOMY, EXCISE TUMOR WITH MAPPING;  Stealth Guided Right Redo Frontal Craniotomy for Grid Placement ;  Surgeon: Maverick Linder MD;  Location: [facility] OR     OPTICAL TRACKING SYSTEM CRANIOTOMY, EXCISE TUMOR, COMBINED Right 3/24/2017    Procedure: COMBINED OPTICAL TRACKING SYSTEM CRANIOTOMY, EXCISE TUMOR;  Surgeon: Stuart Oscar MD;  Location:  OR     ORTHOPEDIC SURGERY      Right Hip - Perthe's     REPAIR CONGENITAL HIP Right     Age 8     SHOULDER SURGERY       Family History   Problem Relation Age of Onset     Hyperlipidemia Father      medicine therapy     Coronary Artery Disease Maternal Grandmother      Medicine Therapy/Decesed     DIABETES Paternal Grandfather      Slight/diet control     Coronary Artery Disease Brother 42     mechanical valve     Coronary Artery Disease Maternal Grandfather      undiagnosed/     CEREBROVASCULAR DISEASE Other      Breast Cancer Other      Colon Cancer No family hx of      Prostate Cancer No family hx of        Face to face time: 55 minutes, with >50% of time devoted to patient counseling.

## 2017-10-02 RX ORDER — TEMOZOLOMIDE 100 MG/1
200 CAPSULE ORAL DAILY
Qty: 20 CAPSULE | Refills: 0 | Status: SHIPPED | OUTPATIENT
Start: 2017-10-02 | End: 2017-10-02

## 2017-10-02 RX ORDER — TEMOZOLOMIDE 100 MG/1
200 CAPSULE ORAL DAILY
Qty: 20 CAPSULE | Refills: 0 | Status: SHIPPED | OUTPATIENT
Start: 2017-10-02 | End: 2017-10-07

## 2017-10-04 DIAGNOSIS — C71.9 OLIGODENDROGLIOMA, ANAPLASTIC (H): ICD-10-CM

## 2017-10-04 DIAGNOSIS — T45.1X5A CHEMOTHERAPY INDUCED NEUTROPENIA (H): ICD-10-CM

## 2017-10-04 DIAGNOSIS — D70.1 CHEMOTHERAPY INDUCED NEUTROPENIA (H): ICD-10-CM

## 2017-10-04 LAB
BASOPHILS # BLD AUTO: 0 10E9/L (ref 0–0.2)
BASOPHILS NFR BLD AUTO: 0.6 %
DIFFERENTIAL METHOD BLD: ABNORMAL
EOSINOPHIL # BLD AUTO: 0.3 10E9/L (ref 0–0.7)
EOSINOPHIL NFR BLD AUTO: 4.4 %
ERYTHROCYTE [DISTWIDTH] IN BLOOD BY AUTOMATED COUNT: 13.6 % (ref 10–15)
HCT VFR BLD AUTO: 38.1 % (ref 40–53)
HGB BLD-MCNC: 12.6 G/DL (ref 13.3–17.7)
LYMPHOCYTES # BLD AUTO: 2 10E9/L (ref 0.8–5.3)
LYMPHOCYTES NFR BLD AUTO: 28.7 %
MCH RBC QN AUTO: 32.5 PG (ref 26.5–33)
MCHC RBC AUTO-ENTMCNC: 33.1 G/DL (ref 31.5–36.5)
MCV RBC AUTO: 98 FL (ref 78–100)
MONOCYTES # BLD AUTO: 0.9 10E9/L (ref 0–1.3)
MONOCYTES NFR BLD AUTO: 12.3 %
NEUTROPHILS # BLD AUTO: 3.8 10E9/L (ref 1.6–8.3)
NEUTROPHILS NFR BLD AUTO: 54 %
PLATELET # BLD AUTO: 261 10E9/L (ref 150–450)
RBC # BLD AUTO: 3.88 10E12/L (ref 4.4–5.9)
WBC # BLD AUTO: 7 10E9/L (ref 4–11)

## 2017-10-04 PROCEDURE — 85025 COMPLETE CBC W/AUTO DIFF WBC: CPT | Performed by: FAMILY MEDICINE

## 2017-10-04 PROCEDURE — 36415 COLL VENOUS BLD VENIPUNCTURE: CPT | Performed by: FAMILY MEDICINE

## 2017-10-10 DIAGNOSIS — C71.9 OLIGODENDROGLIOMA, ANAPLASTIC (H): ICD-10-CM

## 2017-10-10 DIAGNOSIS — T45.1X5A CHEMOTHERAPY INDUCED NEUTROPENIA (H): ICD-10-CM

## 2017-10-10 DIAGNOSIS — D70.1 CHEMOTHERAPY INDUCED NEUTROPENIA (H): ICD-10-CM

## 2017-10-10 LAB
BASOPHILS # BLD AUTO: 0 10E9/L (ref 0–0.2)
BASOPHILS NFR BLD AUTO: 0.4 %
DIFFERENTIAL METHOD BLD: ABNORMAL
EOSINOPHIL # BLD AUTO: 0.2 10E9/L (ref 0–0.7)
EOSINOPHIL NFR BLD AUTO: 2.7 %
ERYTHROCYTE [DISTWIDTH] IN BLOOD BY AUTOMATED COUNT: 13.3 % (ref 10–15)
HCT VFR BLD AUTO: 37.2 % (ref 40–53)
HGB BLD-MCNC: 12.3 G/DL (ref 13.3–17.7)
LYMPHOCYTES # BLD AUTO: 2.7 10E9/L (ref 0.8–5.3)
LYMPHOCYTES NFR BLD AUTO: 29.6 %
MCH RBC QN AUTO: 32.3 PG (ref 26.5–33)
MCHC RBC AUTO-ENTMCNC: 33.1 G/DL (ref 31.5–36.5)
MCV RBC AUTO: 98 FL (ref 78–100)
MONOCYTES # BLD AUTO: 1.1 10E9/L (ref 0–1.3)
MONOCYTES NFR BLD AUTO: 12.2 %
NEUTROPHILS # BLD AUTO: 4.9 10E9/L (ref 1.6–8.3)
NEUTROPHILS NFR BLD AUTO: 55.1 %
PLATELET # BLD AUTO: 220 10E9/L (ref 150–450)
RBC # BLD AUTO: 3.81 10E12/L (ref 4.4–5.9)
WBC # BLD AUTO: 9 10E9/L (ref 4–11)

## 2017-10-10 PROCEDURE — 85025 COMPLETE CBC W/AUTO DIFF WBC: CPT | Performed by: FAMILY MEDICINE

## 2017-10-10 PROCEDURE — 36415 COLL VENOUS BLD VENIPUNCTURE: CPT | Performed by: FAMILY MEDICINE

## 2017-10-10 NOTE — PROGRESS NOTES
Patients temodar was not delivered in time. We sent the prescription manually on 10/3/17 and I called to confirm receipt. [facility] stated they did receive the prescription and that it was in process. Unfortunately the patients mother Brittany called on 10/9/17 stating that the temodar did not come in the mail yet.    After speaking with someone at [facility] and [employer] I finally deciphered that the company had a computer system change so they did not process anything faxed to them within the last week. Betsy Petty gave the pharmacy a verbal prescription and the representative stated that the order would be shipped out ASAP in hopes for an overnight delivery. The pharmacist stated that in the future we can call the pharmacist direct line @ [phone] ext [phone] to confirm they have received a fax and if not we can then give them a verbal to help expedite the prescription.    I informed Brittany that the prescription should come tomorrow or Thursday by the latest and that he can start right when he received this or the following Monday as he likes to start on mondays for compliance reasons. Brittany will call us if she does not receive the temodar by Thursday.

## 2017-10-12 ENCOUNTER — TELEPHONE (OUTPATIENT)
Dept: PALLIATIVE CARE | Facility: CLINIC | Age: 47
End: 2017-10-12

## 2017-10-12 NOTE — TELEPHONE ENCOUNTER
Palliative Care Clinical Social Work Phone Contact    Data: Request from mother for information about Brain Tumor Support Groups including support for family members.    Intervention: Called and discussed with Joni's mother Brittany. She is interested although unfortunately none of the groups are near their home, but up in [place]    Response/Assessment: receptive, appreciative    Plan: we agreed I would send details by Akkio.    DELANEY Sepulveda, [facility]  Clinical , Palliative Care Program  [facility]  Office Phone: [phone]  [facility]: [phone]  [facility]: [phone]

## 2017-10-17 DIAGNOSIS — T45.1X5A CHEMOTHERAPY INDUCED NEUTROPENIA (H): ICD-10-CM

## 2017-10-17 DIAGNOSIS — D70.1 CHEMOTHERAPY INDUCED NEUTROPENIA (H): ICD-10-CM

## 2017-10-17 DIAGNOSIS — C71.9 OLIGODENDROGLIOMA, ANAPLASTIC (H): ICD-10-CM

## 2017-10-17 LAB
BASOPHILS # BLD AUTO: 0 10E9/L (ref 0–0.2)
BASOPHILS NFR BLD AUTO: 0.6 %
DIFFERENTIAL METHOD BLD: ABNORMAL
EOSINOPHIL # BLD AUTO: 0.2 10E9/L (ref 0–0.7)
EOSINOPHIL NFR BLD AUTO: 2.9 %
ERYTHROCYTE [DISTWIDTH] IN BLOOD BY AUTOMATED COUNT: 13.3 % (ref 10–15)
HCT VFR BLD AUTO: 38.1 % (ref 40–53)
HGB BLD-MCNC: 12.5 G/DL (ref 13.3–17.7)
LYMPHOCYTES # BLD AUTO: 1.9 10E9/L (ref 0.8–5.3)
LYMPHOCYTES NFR BLD AUTO: 28.2 %
MCH RBC QN AUTO: 32.2 PG (ref 26.5–33)
MCHC RBC AUTO-ENTMCNC: 32.8 G/DL (ref 31.5–36.5)
MCV RBC AUTO: 98 FL (ref 78–100)
MONOCYTES # BLD AUTO: 0.7 10E9/L (ref 0–1.3)
MONOCYTES NFR BLD AUTO: 10.3 %
NEUTROPHILS # BLD AUTO: 4 10E9/L (ref 1.6–8.3)
NEUTROPHILS NFR BLD AUTO: 58 %
PLATELET # BLD AUTO: 277 10E9/L (ref 150–450)
RBC # BLD AUTO: 3.88 10E12/L (ref 4.4–5.9)
WBC # BLD AUTO: 6.9 10E9/L (ref 4–11)

## 2017-10-17 PROCEDURE — 36415 COLL VENOUS BLD VENIPUNCTURE: CPT | Performed by: FAMILY MEDICINE

## 2017-10-17 PROCEDURE — 85025 COMPLETE CBC W/AUTO DIFF WBC: CPT | Performed by: FAMILY MEDICINE

## 2017-10-18 ENCOUNTER — DOCUMENTATION ONLY (OUTPATIENT)
Dept: PHARMACY | Facility: CLINIC | Age: 47
End: 2017-10-18

## 2017-10-18 NOTE — PROGRESS NOTES
Oral Chemotherapy Monitoring Program.    Patient currently on temodar therapy.    Reviewed labs from 10/17/17    No concerning abnormalities.    Of note, patient likely started on 10/16 but possibly a couple of days early. Last free drug shipment from [employer] was delayed so he was about a week off from when he should have started. Verify at appt with Dr. Herrmann on 11/6 what his start date was.    Will follow up in 1 week with repeat labs.     Malinda Morris PharmD  October 18, 2017

## 2017-10-19 ENCOUNTER — OFFICE VISIT (OUTPATIENT)
Dept: PSYCHOLOGY | Facility: CLINIC | Age: 47
End: 2017-10-19
Payer: COMMERCIAL

## 2017-10-19 DIAGNOSIS — F32.1 MAJOR DEPRESSIVE DISORDER, SINGLE EPISODE, MODERATE WITH MELANCHOLIC FEATURES (H): Primary | ICD-10-CM

## 2017-10-19 PROCEDURE — 90834 PSYTX W PT 45 MINUTES: CPT | Performed by: MARRIAGE & FAMILY THERAPIST

## 2017-10-19 NOTE — PROGRESS NOTES
Progress Note    Client Name: Joni Borja  Date: 10/19/2017         Service Type: Individual       Session Start Time: 3:15pm  Session End Time: 4pm      Session Length: 45 minutes     Session #: 16     Attendees: Client attended alone     Treatment Plan Last Reviewed: 8/24/2017  PHQ-9 / TONI-7 : 9/20/2017      DATA      Progress Since Last Session (Related to Symptoms / Goals / Homework):   Symptoms: Improved per client reports     Homework: Partially completed      Episode of Care Goals: Minimal progress - ACTION (Actively working towards change); Intervened by reinforcing change plan / affirming steps taken     Current / Ongoing Stressors and Concerns:   - Sx of depression as a result of years of marital distress that led to present separation, working towards divorce   - difficulties coping with medical problems which had led to limitation (driving) and work disability  Client reports he is completing his chemo treatment this Friday and will then be able to do his dental work which is important to him. He has been spending time outdoors deer hunting which has been positive for him. There has been no further news regarding his divorce, and he is patiently waiting to hear what happens next. He will continue to seek out activities that bring him chela outdoors and will reach out to his friends about ice fishing this winter.      Treatment Objective(s) Addressed in This Session:   Identify negative self-talk and behaviors: challenge core beliefs, myths, and actions  Improve concentration, focus, and mindfulness in daily activities   practice setting boundaries daily times in the next 4 weeks      Intervention:   CBT: operant conditioning, identifying triggers and patterns, identifying alternatives  Solution Focused: miracle question, healthy marriage, couples counseling  Structural: balance, roles, rules, past patterns vs desired patterns         ASSESSMENT: Current  Emotional / Mental Status (status of significant symptoms):   Risk status (Self / Other harm or suicidal ideation)   Client denies current fears or concerns for personal safety.   Client denies current or recent suicidal ideation or behaviors.   Client denies current or recent homicidal ideation or behaviors.   Client denies current or recent self injurious behavior or ideation.   Client denies other safety concerns.   A safety and risk management plan has not been developed at this time, however client was given the after-hours number / 911 should there be a change in any of these risk factors.     Appearance:   Appropriate    Eye Contact:   Good    Psychomotor Behavior: Normal    Attitude:   Cooperative    Orientation:   All   Speech    Rate / Production: Normal     Volume:  Normal    Mood:    Depressed    Affect:    Appropriate    Thought Content:  Clear    Thought Form:  Coherent  Goal Directed  Logical  Circumstantial   Insight:    Fair      Medication Review:   No changes to current psychiatric medication(s)     Medication Compliance:   Yes     Changes in Health Issues:   None reported     Chemical Use Review:   Substance Use: Chemical use reviewed, no active concerns identified      Tobacco Use: No change in amount of tobacco use since last session.  Patient declined discussion at this time     Collateral Reports Completed:   Not Applicable    PLAN: (Client Tasks / Therapist Tasks / Other)  After his chemo is completed this week the client plans to schedule his dental work that he has been waiting to get done. He will contact his friend in [facility] about visiting him and try to go ice fishing in the winter.       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   WHODAS 2.0 (12 item) 16.67% on 2/14/2017    Referral / Collaboration:  Referral to another professional/service is not indicated at this time..    Anticipated number of session or this episode of care: 10      MeasurableTreatment Goal(s) related to diagnosis / functional impairment(s)  Goal 1: Client's depression will remit as evidenced by a decrease in PHQ9 score by at least 6 points or below a five, where symptoms occur fewer than half the days.   I will know I've met my goal when I \"clear my head and work on myself.      Objective #A (Client Action)   Client will decrease frequency and intensity of feeling down, depressed, hopeless  Client will increase self-awareness of symptom onset/escalation  Status: Continued - Date: 8/24/2017     Intervention(s)  Therapist will assign homework track symptoms, patterns and triggers  provide psycho-education on depression  Therapist will teach CBT strategies for attending to negative self-talk and cognitive distortions.  ACT: experiential exercises and mindfulness practices, how  to live with life barriers    Objective #B  Client will Increase interest, engagement, and pleasure in doing things  Identify negative self-talk and behaviors: challenge core beliefs, myths, and actions  Status: Continued - Date: 8/24/2017     Intervention(s)  Therapist will assign homework to practice using coping skills outside the therapy encounter, worksheets to challenge negative thoughts  teach distraction skills. explore and tailor enjoyable activities, increase social activities, strengthen social supports  ACT: life values and being mindful " "of values for goals and daily living    Objective #C  Improve concentration, focus, and mindfulness in daily activities   Status: Continued - Date: 8/24/2017     Intervention(s)  provide safe space to address hx of presenting problem and root cause  teach emotional regulation skills. mindfulness practices, grounding skills, affirmations, strengths based approach  Horacio: exercise to stage presenting problem, reflect, process and problem solve.      Goal 2: Client will improve his condition of interactions in his relationships (specifically with wife/) establishing healthy, balanced and appropriate boundaries to be kept 100% of the time for a minimum of 4 weeks.     I will know I've met my goal when I \"get my own place. Get the rest of my valuables from the house.      Objective #A (Client Action)      Client will compile a list of boundaries that they would like to set with others. Wife, adult step-children, family members  Learning to fight fair, learning to identify positive and negative communication patterns  Status: Continued - Date: 8/24/2017     Intervention(s)  Therapist will teach about healthy boundaries. structure, saying \"no\", advocating, identifying and establishing appropriate roles, rules, expectations.    Objective #B  Client will practice setting boundaries daily times in the next 12 weeks.                    Status: Continued - Date: 8/24/2017     Intervention(s)  Therapist will assign homework to track the use of healthy boundaries and outcome of establishing relational change  role-play effective communication skills and conflict management    Objective #C  Client will learn & utilize at least 3 assertive communication skills weekly.  Status: Continued - Date: 8/24/2017     Intervention(s)  teach assertiveness skills. aggressive/passive/assertive, impulsive control, reactive vs sensitive, exposure to uncomfortable conversation.  Therapist will role-play assertiveness skills      Client " has reviewed and agreed to the above plan.      EMILY Donahue, LICSW  October 19, 2017

## 2017-10-23 DIAGNOSIS — T45.1X5A CHEMOTHERAPY INDUCED NEUTROPENIA (H): ICD-10-CM

## 2017-10-23 DIAGNOSIS — D70.1 CHEMOTHERAPY INDUCED NEUTROPENIA (H): ICD-10-CM

## 2017-10-23 DIAGNOSIS — C71.9 OLIGODENDROGLIOMA, ANAPLASTIC (H): ICD-10-CM

## 2017-10-23 LAB
BASOPHILS # BLD AUTO: 0.1 10E9/L (ref 0–0.2)
BASOPHILS NFR BLD AUTO: 0.8 %
DIFFERENTIAL METHOD BLD: ABNORMAL
EOSINOPHIL # BLD AUTO: 0.3 10E9/L (ref 0–0.7)
EOSINOPHIL NFR BLD AUTO: 3.3 %
ERYTHROCYTE [DISTWIDTH] IN BLOOD BY AUTOMATED COUNT: 13.4 % (ref 10–15)
HCT VFR BLD AUTO: 39.1 % (ref 40–53)
HGB BLD-MCNC: 12.9 G/DL (ref 13.3–17.7)
LYMPHOCYTES # BLD AUTO: 2.3 10E9/L (ref 0.8–5.3)
LYMPHOCYTES NFR BLD AUTO: 29.6 %
MCH RBC QN AUTO: 32.3 PG (ref 26.5–33)
MCHC RBC AUTO-ENTMCNC: 33 G/DL (ref 31.5–36.5)
MCV RBC AUTO: 98 FL (ref 78–100)
MONOCYTES # BLD AUTO: 0.8 10E9/L (ref 0–1.3)
MONOCYTES NFR BLD AUTO: 10.7 %
NEUTROPHILS # BLD AUTO: 4.3 10E9/L (ref 1.6–8.3)
NEUTROPHILS NFR BLD AUTO: 55.6 %
PLATELET # BLD AUTO: 301 10E9/L (ref 150–450)
RBC # BLD AUTO: 3.99 10E12/L (ref 4.4–5.9)
WBC # BLD AUTO: 7.6 10E9/L (ref 4–11)

## 2017-10-23 PROCEDURE — 85025 COMPLETE CBC W/AUTO DIFF WBC: CPT | Performed by: FAMILY MEDICINE

## 2017-10-23 PROCEDURE — 36415 COLL VENOUS BLD VENIPUNCTURE: CPT | Performed by: FAMILY MEDICINE

## 2017-10-24 ENCOUNTER — DOCUMENTATION ONLY (OUTPATIENT)
Dept: PHARMACY | Facility: CLINIC | Age: 47
End: 2017-10-24

## 2017-10-24 NOTE — PROGRESS NOTES
Oral Chemotherapy Monitoring Program.    Patient currently on Temodar therapy.    Reviewed lab results from 10/23/17.    Labs meet parameters to continue treatment.    Will follow up in 1 week.    Rodríguez Smith PharmD  Oral Chemotherapy Program

## 2017-10-31 ENCOUNTER — DOCUMENTATION ONLY (OUTPATIENT)
Dept: PHARMACY | Facility: CLINIC | Age: 47
End: 2017-10-31

## 2017-10-31 DIAGNOSIS — D70.1 CHEMOTHERAPY INDUCED NEUTROPENIA (H): ICD-10-CM

## 2017-10-31 DIAGNOSIS — C71.9 OLIGODENDROGLIOMA, ANAPLASTIC (H): ICD-10-CM

## 2017-10-31 DIAGNOSIS — T45.1X5A CHEMOTHERAPY INDUCED NEUTROPENIA (H): ICD-10-CM

## 2017-10-31 LAB
BASOPHILS # BLD AUTO: 0 10E9/L (ref 0–0.2)
BASOPHILS NFR BLD AUTO: 0.1 %
DIFFERENTIAL METHOD BLD: ABNORMAL
EOSINOPHIL # BLD AUTO: 0.1 10E9/L (ref 0–0.7)
EOSINOPHIL NFR BLD AUTO: 2.1 %
ERYTHROCYTE [DISTWIDTH] IN BLOOD BY AUTOMATED COUNT: 13.5 % (ref 10–15)
HCT VFR BLD AUTO: 39 % (ref 40–53)
HGB BLD-MCNC: 12.8 G/DL (ref 13.3–17.7)
LYMPHOCYTES # BLD AUTO: 1.9 10E9/L (ref 0.8–5.3)
LYMPHOCYTES NFR BLD AUTO: 27.7 %
MCH RBC QN AUTO: 32.4 PG (ref 26.5–33)
MCHC RBC AUTO-ENTMCNC: 32.8 G/DL (ref 31.5–36.5)
MCV RBC AUTO: 99 FL (ref 78–100)
MONOCYTES # BLD AUTO: 0.8 10E9/L (ref 0–1.3)
MONOCYTES NFR BLD AUTO: 11.6 %
NEUTROPHILS # BLD AUTO: 4 10E9/L (ref 1.6–8.3)
NEUTROPHILS NFR BLD AUTO: 58.5 %
PLATELET # BLD AUTO: 311 10E9/L (ref 150–450)
RBC # BLD AUTO: 3.95 10E12/L (ref 4.4–5.9)
WBC # BLD AUTO: 6.8 10E9/L (ref 4–11)

## 2017-10-31 PROCEDURE — 85025 COMPLETE CBC W/AUTO DIFF WBC: CPT | Performed by: FAMILY MEDICINE

## 2017-10-31 PROCEDURE — 36415 COLL VENOUS BLD VENIPUNCTURE: CPT | Performed by: FAMILY MEDICINE

## 2017-10-31 NOTE — PROGRESS NOTES
Oral Chemotherapy Monitoring Program.    Patient currently on temodar therapy.    Reviewed labs from 10/31/17    No concerning abnormalities.    Check in at appt with BK to see what date he started this past cycle.    One week F/U with labs and check in.    Malinda Morris PharmD  October 31, 2017

## 2017-11-02 RX ORDER — TEMOZOLOMIDE 100 MG/1
200 CAPSULE ORAL DAILY
Qty: 20 CAPSULE | Refills: 0 | Status: SHIPPED | OUTPATIENT
Start: 2017-11-02 | End: 2017-11-07

## 2017-11-03 ENCOUNTER — TELEPHONE (OUTPATIENT)
Dept: NEUROLOGY | Facility: CLINIC | Age: 47
End: 2017-11-03

## 2017-11-03 ENCOUNTER — TELEPHONE (OUTPATIENT)
Dept: FAMILY MEDICINE | Facility: CLINIC | Age: 47
End: 2017-11-03

## 2017-11-03 DIAGNOSIS — F41.9 ANXIETY: Primary | ICD-10-CM

## 2017-11-03 RX ORDER — CLONAZEPAM 0.5 MG/1
TABLET ORAL
Qty: 90 TABLET | Refills: 2 | Status: SHIPPED | OUTPATIENT
Start: 2017-11-03 | End: 2018-01-27

## 2017-11-03 RX ORDER — CLONAZEPAM 0.5 MG/1
0.5 TABLET ORAL 2 TIMES DAILY
Qty: 180 TABLET | Status: CANCELLED | OUTPATIENT
Start: 2017-11-03

## 2017-11-03 NOTE — TELEPHONE ENCOUNTER
Reason for Call:  Medication or medication refill:    Do you use a [facility]?  Name of the pharmacy and phone number for the current request:  [id number] - [place], [place] - [address]    Name of the medication requested: Clonazepam    Other request: The patient needs this today as he only has this for today and tomorrow.    Can we leave a detailed message on this number? YES    Phone number patient can be reached at: Home number on file [phone] (home)    Best Time: Anytime    Call taken on 11/3/2017 at 12:49 PM by Gloria Dow

## 2017-11-03 NOTE — PROGRESS NOTES
Spoke with Jon, NeilD from [facility], the package of Temodar has shipped via [facility] tracking # [id number]. This should have arrived at patients home today around 8:15am. I left a message at the patients home to confirm this delivery with me. Will await the return call.    Gaye Jenkins [facility] Liaison  [phone]

## 2017-11-03 NOTE — TELEPHONE ENCOUNTER
Spoke with Brittany by telephone. She asked PCP to refill clonazepam and they deferred to the last prescribing MD (Silvia Brown). Writer will call PCP and ask them to meet patients needs for anxiety treatment as this clinic's focus is epilepsy.

## 2017-11-03 NOTE — PROGRESS NOTES
Confirmed with Brittany, package was delivered today from [employer] around 1:30pm. Patient is not due to start until 11/13, which was delayed from last cycle as [employer] was unable to deliver in time.

## 2017-11-03 NOTE — TELEPHONE ENCOUNTER
Controlled Substance Refill Request for Clonazepam  Problem List Complete:  No     PROVIDER TO CONSIDER COMPLETION OF PROBLEM LIST AND OVERVIEW/CONTROLLED SUBSTANCE AGREEMENT    Last Written Prescription Date:  2/9/2017  Last Fill Quantity: 180,   # refills: 3 by Dr. Bush.    Last Office Visit with [facility] primary care provider: 3/10/2017    Future Office visit:   Next 5 appointments (look out 90 days)     Nov 06, 2017  2:30 PM CST   Return Visit with Haja Herrmann MD   [facility] ([facility])    [facility]  [address] [facility] [address]  [facility] [address]   [phone]            Nov 16, 2017  1:00 PM CST   Return Visit with Flex Lombardo [facility] ([address] [address] [phone]            Dec 05, 2017  2:30 PM CST   Return Visit with Berkley Daniels MD   [facility] ([facility])    [facility]  [address] [facility] [address]  [facility] [address]   [phone]                  Controlled substance agreement on file: No.     Processing:  Fax Rx to [facility] pharmacy     checked in past 6 months?  No CSA on file.      Routing refill request to provider for review/approval because:  Drug not on the [facility] refill protocol       Keren Cruz, BS, RN, PHN  [facility] [phone]

## 2017-11-03 NOTE — TELEPHONE ENCOUNTER
Pt will need to have clonazepam for anxiety prescription to be provided by his primary care provider. We cannot fill this prescription. VM left at home phone to discuss with patient.

## 2017-11-03 NOTE — TELEPHONE ENCOUNTER
Cortney calling from [place] [facility].  [phone]    Trying to clarify Clonazepam request for anxiety.  This was only filled in the interim and they request that PCP fills this from now on.  See recent note regarding script.     Controlled Substance Refill Request for Clonazepam   Problem List Complete:  Yes    Last Written Prescription Date:  n/a  Last Fill Quantity: n/a,   # refills: n/a    Last Office Visit with [facility] primary care provider: 3/10/2017    Clinic visit frequency required: none noted     Future Office visit:   Next 5 appointments (look out 90 days)     Nov 06, 2017  2:30 PM CST   Return Visit with Haja Herrmann MD   [facility] ([facility])    [facility]  [address]  [facility] [address]   [phone]            Nov 16, 2017  1:00 PM CST   Return Visit with Flex Lombardo [facility] ([facility])    [address] [place] [address]   [phone]            Dec 05, 2017  2:30 PM CST   Return Visit with Berkley Daniels MD   [facility] ([facility])    [facility]  [address]  [facility] [address]   [phone]                  Controlled substance agreement on file: No.     Processing:  Fax Rx to [facility] pharmacy pharmacy     checked in past 6 months?  No, route to RN     Routing refill request to provider for review/approval because:  Drug not on the [facility] refill protocol   Medication is reported/historical  Eneida Villeda RN  [facility]

## 2017-11-06 ENCOUNTER — DOCUMENTATION ONLY (OUTPATIENT)
Dept: PHARMACY | Facility: CLINIC | Age: 47
End: 2017-11-06

## 2017-11-06 ENCOUNTER — ONCOLOGY VISIT (OUTPATIENT)
Dept: ONCOLOGY | Facility: CLINIC | Age: 47
End: 2017-11-06
Attending: PSYCHIATRY & NEUROLOGY
Payer: COMMERCIAL

## 2017-11-06 ENCOUNTER — HOSPITAL ENCOUNTER (OUTPATIENT)
Facility: CLINIC | Age: 47
Setting detail: SPECIMEN
Discharge: HOME | End: 2017-11-06
Attending: INTERNAL MEDICINE | Admitting: INTERNAL MEDICINE
Payer: MEDICARE

## 2017-11-06 VITALS
DIASTOLIC BLOOD PRESSURE: 66 MMHG | HEART RATE: 69 BPM | RESPIRATION RATE: 16 BRPM | TEMPERATURE: 98.2 F | OXYGEN SATURATION: 99 % | SYSTOLIC BLOOD PRESSURE: 108 MMHG | WEIGHT: 185.8 LBS | BODY MASS INDEX: 27.44 KG/M2

## 2017-11-06 DIAGNOSIS — T45.1X5A CHEMOTHERAPY INDUCED NEUTROPENIA (H): ICD-10-CM

## 2017-11-06 DIAGNOSIS — C71.9 OLIGODENDROGLIOMA (H): ICD-10-CM

## 2017-11-06 DIAGNOSIS — D70.1 CHEMOTHERAPY INDUCED NEUTROPENIA (H): ICD-10-CM

## 2017-11-06 DIAGNOSIS — C71.9 OLIGODENDROGLIOMA, ANAPLASTIC (H): Primary | ICD-10-CM

## 2017-11-06 LAB
ALBUMIN SERPL-MCNC: 3.2 G/DL (ref 3.4–5)
ALP SERPL-CCNC: 119 U/L (ref 40–150)
ALT SERPL W P-5'-P-CCNC: 26 U/L (ref 0–70)
ANION GAP SERPL CALCULATED.3IONS-SCNC: 6 MMOL/L (ref 3–14)
AST SERPL W P-5'-P-CCNC: 13 U/L (ref 0–45)
BASOPHILS # BLD AUTO: 0 10E9/L (ref 0–0.2)
BASOPHILS NFR BLD AUTO: 0.4 %
BILIRUB SERPL-MCNC: 0.2 MG/DL (ref 0.2–1.3)
BUN SERPL-MCNC: 19 MG/DL (ref 7–30)
CALCIUM SERPL-MCNC: 8.1 MG/DL (ref 8.5–10.1)
CHLORIDE SERPL-SCNC: 114 MMOL/L (ref 94–109)
CO2 SERPL-SCNC: 24 MMOL/L (ref 20–32)
CREAT SERPL-MCNC: 1 MG/DL (ref 0.66–1.25)
DIFFERENTIAL METHOD BLD: ABNORMAL
EOSINOPHIL # BLD AUTO: 0.1 10E9/L (ref 0–0.7)
EOSINOPHIL NFR BLD AUTO: 2.9 %
ERYTHROCYTE [DISTWIDTH] IN BLOOD BY AUTOMATED COUNT: 13.9 % (ref 10–15)
GFR SERPL CREATININE-BSD FRML MDRD: 80 ML/MIN/1.7M2
GLUCOSE SERPL-MCNC: 90 MG/DL (ref 70–99)
HCT VFR BLD AUTO: 38.1 % (ref 40–53)
HGB BLD-MCNC: 12.5 G/DL (ref 13.3–17.7)
IMM GRANULOCYTES # BLD: 0 10E9/L (ref 0–0.4)
IMM GRANULOCYTES NFR BLD: 0.4 %
LYMPHOCYTES # BLD AUTO: 1.3 10E9/L (ref 0.8–5.3)
LYMPHOCYTES NFR BLD AUTO: 26.9 %
MCH RBC QN AUTO: 32 PG (ref 26.5–33)
MCHC RBC AUTO-ENTMCNC: 32.8 G/DL (ref 31.5–36.5)
MCV RBC AUTO: 97 FL (ref 78–100)
MONOCYTES # BLD AUTO: 0.6 10E9/L (ref 0–1.3)
MONOCYTES NFR BLD AUTO: 12 %
NEUTROPHILS # BLD AUTO: 2.7 10E9/L (ref 1.6–8.3)
NEUTROPHILS NFR BLD AUTO: 57.4 %
NRBC # BLD AUTO: 0 10*3/UL
NRBC BLD AUTO-RTO: 0 /100
PLATELET # BLD AUTO: 330 10E9/L (ref 150–450)
POTASSIUM SERPL-SCNC: 3.9 MMOL/L (ref 3.4–5.3)
PROT SERPL-MCNC: 6.7 G/DL (ref 6.8–8.8)
RBC # BLD AUTO: 3.91 10E12/L (ref 4.4–5.9)
SODIUM SERPL-SCNC: 144 MMOL/L (ref 133–144)
WBC # BLD AUTO: 4.8 10E9/L (ref 4–11)

## 2017-11-06 PROCEDURE — 36415 COLL VENOUS BLD VENIPUNCTURE: CPT

## 2017-11-06 PROCEDURE — 99214 OFFICE O/P EST MOD 30 MIN: CPT | Performed by: INTERNAL MEDICINE

## 2017-11-06 PROCEDURE — 80053 COMPREHEN METABOLIC PANEL: CPT | Performed by: INTERNAL MEDICINE

## 2017-11-06 PROCEDURE — 99211 OFF/OP EST MAY X REQ PHY/QHP: CPT

## 2017-11-06 PROCEDURE — 85025 COMPLETE CBC W/AUTO DIFF WBC: CPT | Performed by: INTERNAL MEDICINE

## 2017-11-06 ASSESSMENT — PAIN SCALES - GENERAL: PAINLEVEL: NO PAIN (0)

## 2017-11-06 NOTE — PROGRESS NOTES
"Oncology Rooming Note    November 6, 2017 2:20 PM   Joni Borja is a 46 year old male who presents for:    Chief Complaint   Patient presents with     Oncology Clinic Visit     Oligodendroglioma, anaplastic (H)     Initial Vitals: /66 (BP Location: Left arm, Patient Position: Sitting, Cuff Size: Adult Large)  Pulse 69  Temp 98.2  F (36.8  C) (Oral)  Resp 16  Wt 84.3 kg (185 lb 12.8 oz)  SpO2 99%  BMI 27.44 kg/m2 Estimated body mass index is 27.44 kg/(m^2) as calculated from the following:    Height as of 9/29/17: 1.753 m (5' 9\").    Weight as of this encounter: 84.3 kg (185 lb 12.8 oz). Body surface area is 2.03 meters squared.  No Pain (0) Comment: Data Unavailable   No LMP for male patient.  Allergies reviewed: Yes  Medications reviewed: Yes    Medications: Medication refills not needed today.  Pharmacy name entered into [facility]:    [facility] - [place], [place] - [address] [facility] - [place], [place] - [address]   [facility] [employer] PATIENT ASSISTANCE PROGRAM    Clinical concerns: None                   4 minutes for nursing intake (face to face time)     Joyce Pelayo MA    Medical Assistant Note:  Joni Borja presents today for lab visit.    Patient seen by provider today: Yes: Dr. Herrmann.   present during visit today: Not Applicable.    Concerns: No Concerns.    Procedure:  Lab draw site: LAC, Needle type: BF, Gauge: 21 g gauze and coban applied.    Post Assessment:  Labs drawn without difficulty: Yes.    Discharge Plan:  Departure Mode: Ambulatory.    Face to Face Time: 4.    Joyce Pelayo MA    DISCHARGE PLAN:  Next appointments: See patient instruction section.  Future appointments already scheduled.  Malinda, Pharmacist will talk to the patient today.  Departure Mode: Ambulatory  Accompanied by: self  4 minutes for nursing discharge (face to face time)   Joyce Pelayo MA    "

## 2017-11-06 NOTE — LETTER
"    11/6/2017         RE: Joni Borja  [id number] [id number]        Dear Colleague,    Thank you for referring your patient, Joni Borja, to the [facility]. Please see a copy of my visit note below.    Oncology Rooming Note    November 6, 2017 2:20 PM   Joni Borja is a 46 year old male who presents for:    Chief Complaint   Patient presents with     Oncology Clinic Visit     Oligodendroglioma, anaplastic (H)     Initial Vitals: /66 (BP Location: Left arm, Patient Position: Sitting, Cuff Size: Adult Large)  Pulse 69  Temp 98.2  F (36.8  C) (Oral)  Resp 16  Wt 84.3 kg (185 lb 12.8 oz)  SpO2 99%  BMI 27.44 kg/m2 Estimated body mass index is 27.44 kg/(m^2) as calculated from the following:    Height as of 9/29/17: 1.753 m (5' 9\").    Weight as of this encounter: 84.3 kg (185 lb 12.8 oz). Body surface area is 2.03 meters squared.  No Pain (0) Comment: Data Unavailable   No LMP for male patient.  Allergies reviewed: Yes  Medications reviewed: Yes    Medications: Medication refills not needed today.  Pharmacy name entered into [employer]:    [facility] - [place], [place] - [address] [facility] [place] [place] [facility] [address] [facility]  [facility] - [employer] PATIENT ASSISTANCE PROGRAM    Clinical concerns: None                   4 minutes for nursing intake (face to face time)     Joyce Pelayo MA    Medical Assistant Note:  Joni Borja presents today for lab visit.    Patient seen by provider today: Yes: Dr. Herrmann.   present during visit today: Not Applicable.    Concerns: No Concerns.    Procedure:  Lab draw site: LAC, Needle type: BF, Gauge: 21 g gauze and coban applied.    Post Assessment:  Labs drawn without difficulty: Yes.    Discharge Plan:  Departure Mode: Ambulatory.    Face to Face Time: 4.    Joyce Pelayo MA    DISCHARGE PLAN:  Next appointments: See patient instruction section.  Future " appointments already scheduled.  Malinda Pharmacist will talk to the patient today.  Departure Mode: Ambulatory  Accompanied by: self  4 minutes for nursing discharge (face to face time)   Joyce Pelayo MA      ONCOLOGIC HISTORY (as per Dr. Daniels's note):  Patient seen in [facility] by Ghislaine Garcia, Ambrosio Agarwal, Magdiel De La Torre. Records requested. Based on records from care everywhere and patient report:   -2002 PRESENTATION: Seizure, generalized.  -MRB with a non-contrast enhancing right frontal mass lesion.  -4/29/2002 SURGERY: Stereotactic biopsy by Dr. Polo Lawler.  PATHOLOGY: Grade II oligodendroglioma  -Treatment per research protocol RTOG 9802-  -6/6-7/18/2002 RADS: Radiation alone; 54 Gy in 30 fractions by Dr. Cole Webb.  -7/2002-6/2003 CHEMO: Procarbazine, CCNU, vincristine.  -5/2007 MRB concerning for tumor growth.  -5/2007 SURGERY: Subtotal resection in [place].   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      -Observed since that time without evidence of progression.  -Worsening seizure frequency with poor seizure control.     -9/10 and 9/18/2013 SURGERY: Craniotomy with resection of epileptogenic cortex in the right frontal lobe at the [facility] with Dr. Linder.      -2/17/2017 MRB with a slightly increasing periventricular contrast enhancing lesion in medial aspect of the right frontal resection cavity with slightly increasing T2 FLAIR changes in the posterior aspect of the resection cavity.  2/28/2017 NEURO-ONC: Referral to Dr. Stuart Oscar, neurosurgery at [place] for evaluation and consideration of surgical resection of the contrast enhancing lesion.  -3/24/2017 SURGERY: Craniotomy with tumor resection by Dr. Stuart Oscar, neurosurgery at [place].  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  -3/27/2017 MRB with gross total  resection of the contrast enhancing lesion and debulking of T2 FLAIR changes.   -5/9/2017 NEURO-ONC: Based on Brain Tumor Board discussion and discussion with the patient will initiate chemotherapy alone with temozolomide and reserve radiation for recurrence.   -5/12/2017 CHEMO: Adjuvant temozolomide 150mg/m2 (300mg), cycle 1.  -6/6/2017 NEURO-ONC: Doing well clinically, normal seizure baseline. Tolerated cycle 1 well, will increase to 200mg/m2.  -6/9/2017 CHEMO: Adjuvant temozolomide 200mg/m2 (400mg), cycle 2.  -6/27/2017 NEURO-ONC: Significant dental caries/ oral pain. Holding chemotherapy until after dental procedures.   -7/7/2017 CHEMO: Adjuvant temozolomide 200mg/m2 (400mg), cycle 3.  -8/1/2017 NEURO-ONC/ MRB: Clinically and radiographically stable. Dental procedure planning, holding adjuvant temozolomide cycle until after procedure, likely to restart cycle 4 on 8/14.  -8/14/2017 CHEMO: Adjuvant temozolomide 200mg/m2 (400mg), cycle 4.  -8/29/2017 NEURO-ONC: More nauseated with last cycle, increasing Zofran to 8mg. Fatigue ongoing. Referral to palliative care.   -9/11/2017 CHEMO: Adjuvant temozolomide 200mg/m2 (400mg), cycle 5.  -9/19/2017 NEURO-ONC/ MRB: Imaging stable. Clinically stable, except for increased headache, starting Riboflavin and memory complaints, will discuss with Dr. Brown the need for referral to neuro-psych. Encouraged palliative care appointment to discuss EOL/ GOC planning.   -10/16/2017 CHEMO: Adjuvant temozolomide 200mg/m2 (400mg), cycle 6.    SUBJECTIVE:    Mr. 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 Initial treatment was with biopsy followed by radiation therapy . In 2007, a recurrence was noted and he underwent a resection followed by PCV. 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 Now managed on adjuvant temozolomide since 05/2017.  He is here for followup.  He took last cycle of temozolomide between 10/16/2017 and 10/20/2017.  He tolerated it well.  For about a week, he had some fatigue.  He is feeling good now.      Patient gets frequent seizures.  He follows up with neurologist.  He gets seizures 2-3 times a week.      He gets headache on and off. Some dizziness.  No visual problems.  No sore throat.  No neck pain.  No chest pain.  No shortness of breath.  Some nausea when he takes the temozolomide.  No vomiting.  No urinary or bowel complaints.  No incontinence.  No fever, chills or night sweats.  No recent infection.  He was accompanied by his mother and father.  As per them, he has been doing well.      PHYSICAL EXAMINATION:   GENERAL:  He was alert and oriented x 3.   VITAL SIGNS:  Reviewed.   EYES:  No icterus.   THROAT:  No ulcer or thrush. Dental caries.  NECK:  Supple. No lymphadenopathy or thyromegaly.   LUNGS:  Good air entry bilaterally.  No crackles or wheezing.   HEART:  Regular.  No murmur.   ABDOMEN:  Soft, nontender.  No mass.   EXTREMITIES:  No edema.  No calf swelling or tenderness.   SKIN:  No rash.      LABORATORY DATA:  Reviewed.      ASSESSMENT:   1.  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   2.  Seizures.   3.  Mild anemia.   4.  Low protein and albumin.   5.  Dental caries.      PLAN:   1.  Patient clinically is stable from anaplastic oligodendroglioma. He does not have any new symptoms.   He is on temozolomide.  He has been tolerating it well.  He is not having any significant side effects.  He is not having any significant cytopenia.  He will continue on temozolomide at 200 mg/m2.  Side effects reviewed   3.  He is scheduled for brain MRI next month.  He will see Dr. Daniels after that.   4.  He will continue to follow up with neurologist regarding his seizures.   5.  The patient has dental carries.  He wants to have dental workup  done.  Patient advised to have a dentist contact us before he has any dental procedure.   6.  Labs were all reviewed.  There are a few minor abnormalities. Labs will be monitored.  He gets weekly CBC done in the local clinic.   7.  Patient and his parents had few questions, which were all answered.         BACILIO HERNANDEZ MD             D: 2017 16:00   T: 2017 01:19   MT: TD      Name:     MADHU HAYES   MRN:      [id number]        Account:      [id number]   :      1970           Visit Date:   2017      Document: [id number]        Again, thank you for allowing me to participate in the care of your patient.        Sincerely,        Bacilio Hernandez MD

## 2017-11-06 NOTE — MR AVS SNAPSHOT
After Visit Summary   11/6/2017    Joni Borja    MRN: [id number]           Patient Information     Date Of Birth          1970        Visit Information        Provider Department      11/6/2017 2:30 PM Haja Herrmann MD [facility]        Today's Diagnoses     Oligodendroglioma, anaplastic (H)    -  1    Oligodendroglioma (H)        Chemotherapy induced neutropenia (H)          Care Instructions    Start temodar next week.- Malinda to talk with the patient- LW  Follow up with Dr. Daniels as scheduled.- Scheduled  Weekly CBC in local clinic.- Scheduled          Follow-ups after your visit        Your next 10 appointments already scheduled     Nov 14, 2017  2:00 PM CST   LAB with RV LAB   [facility] ([facility]    [address] [place] [phone]           Please do not eat 10-12 hours before your appointment if you are coming in fasting for labs on lipids, cholesterol, or glucose (sugar). This does not apply to pregnant women. Water, hot tea and black coffee (with nothing added) are okay. Do not drink other fluids, diet soda or chew gum.            Nov 16, 2017  1:00 PM CST   Return Visit with EMILY Donahue   [facility] ([facility])    [address] [place] [id number] [phone]            Nov 17, 2017  2:30 PM CST   Return Visit with Silvia Brown MD   [facility] Epilepsy [facility] ([facility])    [address], [address]  [facility] [phone]            Nov 21, 2017  2:00 PM CST   LAB with RV LAB   [facility] ([facility])    [address] [place] [phone]           Please do not eat 10-12 hours before your appointment if you are coming in fasting for labs on lipids, cholesterol, or glucose (sugar). This does not apply to pregnant women. Water, hot tea and  black coffee (with nothing added) are okay. Do not drink other fluids, diet soda or chew gum.            Nov 27, 2017  2:00 PM CST   LAB with RV LAB   [facility]    [address] [place] [phone]   [phone]           Please do not eat 10-12 hours before your appointment if you are coming in fasting for labs on lipids, cholesterol, or glucose (sugar). This does not apply to pregnant women. Water, hot tea and black coffee (with nothing added) are okay. Do not drink other fluids, diet soda or chew gum.            Dec 04, 2017  2:00 PM CST   LAB with RV LAB   [facility] ([facility])    [address] [place] [phone]   [phone]           Please do not eat 10-12 hours before your appointment if you are coming in fasting for labs on lipids, cholesterol, or glucose (sugar). This does not apply to pregnant women. Water, hot tea and black coffee (with nothing added) are okay. Do not drink other fluids, diet soda or chew gum.            Dec 05, 2017 12:45 PM CST   MR BRAIN W/O & W CONTRAST with SHMRP1   [facility] MRI ([facility])    [address] [place] [phone]           Take your medicines as usual, unless your doctor tells you not to. Bring a list of your current medicines to your exam (including vitamins, minerals and over-the-counter drugs).  You will be given intravenous contrast for this exam. To prepare:   The day before your exam, drink extra fluids at least six 8-ounce glasses (unless your doctor tells you to restrict your fluids).   Have a blood test (creatinine test) within 30 days of your exam. Go to your clinic or Diagnostic Imaging Department for this test.  The MRI machine uses a strong magnet. Please wear clothes without metal (snaps, zippers). A sweatsuit works well, or we may give you a hospital gown.  Please remove  any body piercings and hair extensions before you arrive. You will also remove watches, jewelry, hairpins, wallets, dentures, partial dental plates and hearing aids. You may wear contact lenses, and you may be able to wear your rings. We have a safe place to keep your personal items, but it is safer to leave them at home.   **IMPORTANT** THE INSTRUCTIONS BELOW ARE ONLY FOR THOSE PATIENTS WHO HAVE BEEN TOLD THEY WILL RECEIVE SEDATION OR GENERAL ANESTHESIA DURING THEIR MRI PROCEDURE:  IF YOU WILL RECEIVE SEDATION (take medicine to help you relax during your exam):   You must get the medicine from your doctor before you arrive. Bring the medicine to the exam. Do not take it at home.   Arrive one hour early. Bring someone who can take you home after the test. Your medicine will make you sleepy. After the exam, you may not drive, take a bus or take a taxi by yourself.   No eating 8 hours before your exam. You may have clear liquids up until 4 hours before your exam. (Clear liquids include water, clear tea, black coffee and fruit juice without pulp.)  IF YOU WILL RECEIVE ANESTHESIA (be asleep for your exam):   Arrive 1 1/2 hours early. Bring someone who can take you home after the test. You may not drive, take a bus or take a taxi by yourself.   No eating 8 hours before your exam. You may have clear liquids up until 4 hours before your exam. (Clear liquids include water, clear tea, black coffee and fruit juice without pulp.)  Please call the Imaging Department at your exam site with any questions.            Dec 05, 2017  2:30 PM CST   Return Visit with Berkley Daniels MD   [facility] ([facility])    [facility]  [address]  [facility] [address]   [phone]              Who to contact     If you have questions or need follow up information about today's clinic visit or your schedule please contact [facility] directly at  [phone].  Normal or non-critical lab and imaging results will be communicated to you by MyChart, letter or phone within 4 business days after the clinic has received the results. If you do not hear from us within 7 days, please contact the clinic through [employer] or phone. If you have a critical or abnormal lab result, we will notify you by phone as soon as possible.  Submit refill requests through [employer] or call your pharmacy and they will forward the refill request to us. Please allow 3 business days for your refill to be completed.          Additional Information About Your Visit        [employer] Information     [employer] gives you secure access to your electronic health record. If you see a primary care provider, you can also send messages to your care team and make appointments. If you have questions, please call your primary care clinic.  If you do not have a primary care provider, please call [phone] and they will assist you.        Care EveryWhere ID     This is your Care EveryWhere ID. This could be used by other organizations to access your [place] medical records  [id number]        Your Vitals Were     Pulse Temperature Respirations Pulse Oximetry BMI (Body Mass Index)       69 98.2  F (36.8  C) (Oral) 16 99% 27.44 kg/m2        Blood Pressure from Last 3 Encounters:   11/06/17 108/66   09/29/17 119/69   09/25/17 109/67    Weight from Last 3 Encounters:   11/06/17 84.3 kg (185 lb 12.8 oz)   09/29/17 83 kg (183 lb)   09/25/17 79.4 kg (175 lb)              We Performed the Following     CBC with platelets differential     Comprehensive metabolic panel        Primary Care Provider Office Phone # Fax #    Brian Coon [phone] [address] [facility] [address]        Equal Access to Services     KAYLIE MARIE : Abril Orellana, yuan day, es rodas. So [facility] [phone].    ATENCIÓN: Si  lo varela, tiene a kirby disposición servicios gratuitos de asistencia lingüística. Sixto sanchez [phone].    We comply with applicable federal civil rights laws and [place] laws. We do not discriminate on the basis of race, color, national origin, age, disability, sex, sexual orientation, or gender identity.            Thank you!     Thank you for choosing [facility]  for your care. Our goal is always to provide you with excellent care. Hearing back from our patients is one way we can continue to improve our services. Please take a few minutes to complete the written survey that you may receive in the mail after your visit with us. Thank you!             Your Updated Medication List - Protect others around you: Learn how to safely use, store and throw away your medicines at www.disposemymeds.org.          This list is accurate as of: 11/6/17 11:59 PM.  Always use your most recent med list.                   Brand Name Dispense Instructions for use Diagnosis    acetaminophen-codeine 300-30 MG per tablet    TYLENOL #3     Take 1-2 tablets by mouth every 4 hours as needed for moderate pain As needed for headaches        carBAMazepine 300 MG 12 hr capsule    CARBATROL    180 capsule    Take 1 capsule (300 mg) by mouth 2 times daily (at 10:00 & 22:00)    Partial epilepsy with impairment of consciousness, intractable (H)       clonazePAM 0.5 MG tablet    klonoPIN    90 tablet    1 tab in the AM and 2 tabs in PM.    Anxiety       escitalopram 20 MG tablet    LEXAPRO    90 tablet    TAKE ONE TABLET BY MOUTH ONCE DAILY AT  NIGHT    Major depressive disorder with single episode, in partial remission (H), Anxiety       felbamate 600 MG tablet    FELBATOL    225 tablet    Take 600 mg (one tablet) am and 900 mg (1.5 tablet) 2 pm    Partial epilepsy with impairment of consciousness, intractable (H)       ondansetron 4 MG tablet    ZOFRAN    60 tablet    Take 2 tablets (8 mg) by mouth At Bedtime or 30 minutes  prior to chemotherapy dosing. Repeat every 8 hours as needed for nausea.    Chemotherapy management, encounter for, Chemotherapy-induced nausea and vomiting       pantoprazole 40 MG EC tablet    PROTONIX    90 tablet    TAKE ONE TABLET BY MOUTH ONCE DAILY    Gastroesophageal reflux disease without esophagitis       Riboflavin 400 MG Tabs     90 tablet    Take 400 mg by mouth daily    Chronic daily headache       temozolomide 100 MG capsule CHEMO    TEMODAR    20 capsule    Take 4 capsules (400 mg) by mouth daily for 5 days Take ondansetron 30-60 min before temozolomide. Take at bedtime on an empty stomach.    Oligodendroglioma, anaplastic (H)       topiramate 100 MG tablet    TOPAMAX    900 tablet    Take 2 tablets (200 mg) by mouth every morning 200 mg am and 300 mg pm    Partial epilepsy with impairment of consciousness, intractable (H)       VITAMIN B 12 PO      Take 1 tablet by mouth daily (with dinner)        ZYRTEC ALLERGY PO      Take 10 mg by mouth daily as needed (for misquitos.)    Localization-related (focal) (partial) epilepsy and epileptic syndromes with complex partial seizures, with intractable epilepsy, Depression, Anxiety, Unstable gait

## 2017-11-06 NOTE — PATIENT INSTRUCTIONS
Start temodar next week.- Malinda to talk with the patient- LW  Follow up with Dr. Daniels as scheduled.- Scheduled  Weekly CBC in local clinic.- Scheduled

## 2017-11-06 NOTE — PROGRESS NOTES
Oral Chemotherapy Monitoring Program.    Patient currently on temodar therapy.    Reviewed labs from 11/6/17    No concerning abnormalities. Patient gets weekly labs at [facility]. Started last cycle on 10/16. Next cycle tentative for 11/13 start. Patient and parents are aware of plan.    Questions answered to patient's satisfaction.    Will follow up in 1 week with lab check. Continue weekly labs until patient sees Dr. Daniels again (12/5). At that time recommend every 2 week labs since he has been stable for about 6 months.    Malinda Morris PharmD  November 6, 2017

## 2017-11-07 NOTE — PROGRESS NOTES
ONCOLOGIC HISTORY (as per Dr. Daniels's note):  Patient seen in [facility] by Ghislaine Garcia, Ambrosio Agarwal, Magdiel De La Torre. Records requested. Based on records from care everywhere and patient report:   -2002 PRESENTATION: Seizure, generalized.  -MRB with a non-contrast enhancing right frontal mass lesion.  -4/29/2002 SURGERY: Stereotactic biopsy by Dr. Polo Lawler.  PATHOLOGY: Grade II oligodendroglioma  -Treatment per research protocol RTOG 9802-  -6/6-7/18/2002 RADS: Radiation alone; 54 Gy in 30 fractions by Dr. Cole Webb.  -7/2002-6/2003 CHEMO: Procarbazine, CCNU, vincristine.  -5/2007 MRB concerning for tumor growth.  -5/2007 SURGERY: Subtotal resection in [place].   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      -Observed since that time without evidence of progression.  -Worsening seizure frequency with poor seizure control.     -9/10 and 9/18/2013 SURGERY: Craniotomy with resection of epileptogenic cortex in the right frontal lobe at the [facility] with Dr. Linder.      -2/17/2017 MRB with a slightly increasing periventricular contrast enhancing lesion in medial aspect of the right frontal resection cavity with slightly increasing T2 FLAIR changes in the posterior aspect of the resection cavity.  2/28/2017 NEURO-ONC: Referral to Dr. Stuart Oscar, neurosurgery at [facility] for evaluation and consideration of surgical resection of the contrast enhancing lesion.  -3/24/2017 SURGERY: Craniotomy with tumor resection by Dr. Stuart Oscar, neurosurgery at [facility].  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  -3/27/2017 MRB with gross total resection of the contrast enhancing lesion and debulking of T2 FLAIR changes.   -5/9/2017 NEURO-ONC: Based on Brain Tumor Board discussion and discussion with the patient will initiate chemotherapy alone with  temozolomide and reserve radiation for recurrence.   -5/12/2017 CHEMO: Adjuvant temozolomide 150mg/m2 (300mg), cycle 1.  -6/6/2017 NEURO-ONC: Doing well clinically, normal seizure baseline. Tolerated cycle 1 well, will increase to 200mg/m2.  -6/9/2017 CHEMO: Adjuvant temozolomide 200mg/m2 (400mg), cycle 2.  -6/27/2017 NEURO-ONC: Significant dental caries/ oral pain. Holding chemotherapy until after dental procedures.   -7/7/2017 CHEMO: Adjuvant temozolomide 200mg/m2 (400mg), cycle 3.  -8/1/2017 NEURO-ONC/ MRB: Clinically and radiographically stable. Dental procedure planning, holding adjuvant temozolomide cycle until after procedure, likely to restart cycle 4 on 8/14.  -8/14/2017 CHEMO: Adjuvant temozolomide 200mg/m2 (400mg), cycle 4.  -8/29/2017 NEURO-ONC: More nauseated with last cycle, increasing Zofran to 8mg. Fatigue ongoing. Referral to palliative care.   -9/11/2017 CHEMO: Adjuvant temozolomide 200mg/m2 (400mg), cycle 5.  -9/19/2017 NEURO-ONC/ MRB: Imaging stable. Clinically stable, except for increased headache, starting Riboflavin and memory complaints, will discuss with Dr. Brown the need for referral to neuro-psych. Encouraged palliative care appointment to discuss EOL/ GOC planning.   -10/16/2017 CHEMO: Adjuvant temozolomide 200mg/m2 (400mg), cycle 6.    SUBJECTIVE:    Mr. 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 Initial treatment was with biopsy followed by radiation therapy . In 2007, a recurrence was noted and he underwent a resection followed by PCV. 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 Now managed on adjuvant temozolomide since 05/2017.  He is here for followup.  He took last cycle of temozolomide between 10/16/2017 and 10/20/2017.  He tolerated it well.  For about a week,  he had some fatigue.  He is feeling good now.      Patient gets frequent seizures.  He follows up with neurologist.  He gets seizures 2-3 times a week.      He gets headache on and off. Some dizziness.  No visual problems.  No sore throat.  No neck pain.  No chest pain.  No shortness of breath.  Some nausea when he takes the temozolomide.  No vomiting.  No urinary or bowel complaints.  No incontinence.  No fever, chills or night sweats.  No recent infection.  He was accompanied by his mother and father.  As per them, he has been doing well.      PHYSICAL EXAMINATION:   GENERAL:  He was alert and oriented x 3.   VITAL SIGNS:  Reviewed.   EYES:  No icterus.   THROAT:  No ulcer or thrush. Dental caries.  NECK:  Supple. No lymphadenopathy or thyromegaly.   LUNGS:  Good air entry bilaterally.  No crackles or wheezing.   HEART:  Regular.  No murmur.   ABDOMEN:  Soft, nontender.  No mass.   EXTREMITIES:  No edema.  No calf swelling or tenderness.   SKIN:  No rash.      LABORATORY DATA:  Reviewed.      ASSESSMENT:   1.  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   2.  Seizures.   3.  Mild anemia.   4.  Low protein and albumin.   5.  Dental caries.      PLAN:   1.  Patient clinically is stable from anaplastic oligodendroglioma. He does not have any new symptoms.   He is on temozolomide.  He has been tolerating it well.  He is not having any significant side effects.  He is not having any significant cytopenia.  He will continue on temozolomide at 200 mg/m2.  Side effects reviewed   3.  He is scheduled for brain MRI next month.  He will see Dr. Daniels after that.   4.  He will continue to follow up with neurologist regarding his seizures.   5.  The patient has dental carries.  He wants to have dental workup done.  Patient advised to have a dentist contact us before he has any dental procedure.   6.  Labs were all reviewed.  There are a few minor abnormalities. Labs will be monitored.  He gets weekly CBC done in the  local clinic.   7.  Patient and his parents had few questions, which were all answered.         BACILIO HERNANDEZ MD             D: 2017 16:00   T: 2017 01:19   MT: CHRISTOFER      Name:     MADHU HAYES   MRN:      [id number]        Account:      [id number]   :      1970           Visit Date:   2017      Document: [id number]

## 2017-11-14 ENCOUNTER — DOCUMENTATION ONLY (OUTPATIENT)
Dept: PHARMACY | Facility: CLINIC | Age: 47
End: 2017-11-14

## 2017-11-14 DIAGNOSIS — D70.1 CHEMOTHERAPY INDUCED NEUTROPENIA (H): ICD-10-CM

## 2017-11-14 DIAGNOSIS — C71.9 OLIGODENDROGLIOMA, ANAPLASTIC (H): ICD-10-CM

## 2017-11-14 DIAGNOSIS — T45.1X5A CHEMOTHERAPY INDUCED NEUTROPENIA (H): ICD-10-CM

## 2017-11-14 LAB
BASOPHILS # BLD AUTO: 0 10E9/L (ref 0–0.2)
BASOPHILS NFR BLD AUTO: 0.6 %
DIFFERENTIAL METHOD BLD: ABNORMAL
EOSINOPHIL # BLD AUTO: 0.2 10E9/L (ref 0–0.7)
EOSINOPHIL NFR BLD AUTO: 3.2 %
ERYTHROCYTE [DISTWIDTH] IN BLOOD BY AUTOMATED COUNT: 13.5 % (ref 10–15)
HCT VFR BLD AUTO: 39.3 % (ref 40–53)
HGB BLD-MCNC: 12.9 G/DL (ref 13.3–17.7)
LYMPHOCYTES # BLD AUTO: 2 10E9/L (ref 0.8–5.3)
LYMPHOCYTES NFR BLD AUTO: 27.6 %
MCH RBC QN AUTO: 32.2 PG (ref 26.5–33)
MCHC RBC AUTO-ENTMCNC: 32.8 G/DL (ref 31.5–36.5)
MCV RBC AUTO: 98 FL (ref 78–100)
MONOCYTES # BLD AUTO: 0.9 10E9/L (ref 0–1.3)
MONOCYTES NFR BLD AUTO: 12.8 %
NEUTROPHILS # BLD AUTO: 4 10E9/L (ref 1.6–8.3)
NEUTROPHILS NFR BLD AUTO: 55.8 %
PLATELET # BLD AUTO: 223 10E9/L (ref 150–450)
RBC # BLD AUTO: 4.01 10E12/L (ref 4.4–5.9)
WBC # BLD AUTO: 7.2 10E9/L (ref 4–11)

## 2017-11-14 PROCEDURE — 36415 COLL VENOUS BLD VENIPUNCTURE: CPT | Performed by: FAMILY MEDICINE

## 2017-11-14 PROCEDURE — 85025 COMPLETE CBC W/AUTO DIFF WBC: CPT | Performed by: FAMILY MEDICINE

## 2017-11-14 NOTE — PROGRESS NOTES
Oral Chemotherapy Monitoring Program.    Patient currently on temodar therapy.    Reviewed labs from 11/14/17.    No concerning abnormalities.    Patient started this cycle on 11/13/17. Due to start next cycle on 12/11/17. Consult with Dr. Daniels on 12/5/17 to see if patient can move to labs every 2 weeks since he has been stable for quite some time.  Will follow up in 1 week with repeat labs.    Malinda Morris PharmD  November 14, 2017

## 2017-11-16 ENCOUNTER — OFFICE VISIT (OUTPATIENT)
Dept: PSYCHOLOGY | Facility: CLINIC | Age: 47
End: 2017-11-16
Payer: COMMERCIAL

## 2017-11-16 DIAGNOSIS — F32.1 MAJOR DEPRESSIVE DISORDER, SINGLE EPISODE, MODERATE WITH MELANCHOLIC FEATURES (H): Primary | ICD-10-CM

## 2017-11-16 PROCEDURE — 90834 PSYTX W PT 45 MINUTES: CPT | Performed by: MARRIAGE & FAMILY THERAPIST

## 2017-11-16 NOTE — PROGRESS NOTES
Progress Note    Client Name: Joni Borja  Date: 11/16/2017         Service Type: Individual       Session Start Time: 1pm  Session End Time: 1:45pm      Session Length: 45 minutes     Session #: 17     Attendees: Client attended alone     Treatment Plan Last Reviewed: 8/24/2017  PHQ-9 / TONI-7 : 9/20/2017      DATA      Progress Since Last Session (Related to Symptoms / Goals / Homework):   Symptoms: Improved per client reports      Homework: Partially completed      Episode of Care Goals: Minimal progress - ACTION (Actively working towards change); Intervened by reinforcing change plan / affirming steps taken     Current / Ongoing Stressors and Concerns:   - Sx of depression as a result of years of marital distress that led to present separation, working towards divorce   - difficulties coping with medical problems which had led to limitation (driving) and work disability  Client reports his chemo was going well and it was recommended he continue with 6 additional months. His divorce court date was moved to March as well. In the meantime he has accepted that all he can do is wait while continuing to live with his parents. He states that the environment at home has improved as he feels his parents have been understanding since he threatened that he would drop everything and leave to live with friends in [facility] if they didn't ease off with restrictions. The client is keeping busy fixing things around their property and is looking forward to outdoor activities in the winter.       Treatment Objective(s) Addressed in This Session:   Identify negative self-talk and behaviors: challenge core beliefs, myths, and actions  Improve concentration, focus, and mindfulness in daily activities   practice setting boundaries daily times in the next 4 weeks      Intervention:   CBT: operant conditioning, identifying triggers and patterns, identifying alternatives  Solution  "Focused: miracle question, healthy marriage, couples counseling  Structural: balance, roles, rules, past patterns vs desired patterns         ASSESSMENT: Current Emotional / Mental Status (status of significant symptoms):   Risk status (Self / Other harm or suicidal ideation)   Client denies current fears or concerns for personal safety.   Client denies current or recent suicidal ideation or behaviors.   Client denies current or recent homicidal ideation or behaviors.   Client denies current or recent self injurious behavior or ideation.   Client denies other safety concerns.   A safety and risk management plan has not been developed at this time, however client was given the after-hours number / 911 should there be a change in any of these risk factors.     Appearance:   Appropriate    Eye Contact:   Good    Psychomotor Behavior: Normal    Attitude:   Cooperative    Orientation:   All   Speech    Rate / Production: Normal     Volume:  Normal    Mood:    Depressed    Affect:    Appropriate  Bright    Thought Content:  Clear    Thought Form:  Coherent  Goal Directed  Logical  Circumstantial   Insight:    Fair      Medication Review:   No changes to current psychiatric medication(s)     Medication Compliance:   Yes     Changes in Health Issues:   None reported     Chemical Use Review:   Substance Use: Chemical use reviewed, no active concerns identified      Tobacco Use: No change in amount of tobacco use since last session.  Patient declined discussion at this time     Collateral Reports Completed:   Not Applicable    PLAN: (Client Tasks / Therapist Tasks / Other)  Client will continue to focus on his health and self-care as he patiently waits to complete the next 6 months of chemo and the divorce court date. He plans to participate in outdoor winter activities which he enjoys, such as Ice fishing. He accepts that right now he can just \"hurry up and wait.\"  Client will call to schedule his next appointment after his " "next MRI and chemo treatment within the next few weeks.       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   WHODAS 2.0 (12 item) 16.67% on 2/14/2017    Referral / Collaboration:  Referral to another professional/service is not indicated at this time..    Anticipated number of session or this episode of care: 10      MeasurableTreatment Goal(s) related to diagnosis / functional impairment(s)  Goal 1: Client's depression will remit as evidenced by a decrease in PHQ9 score by at least 6 points or below a five, where symptoms occur fewer than half the days.   I will know I've met my goal when I \"clear my head and work on myself.      Objective #A (Client Action)   Client will decrease frequency and intensity of feeling down, depressed, hopeless  Client will increase self-awareness of symptom onset/escalation  Status: Continued - Date: 8/24/2017     Intervention(s)  Therapist will assign homework track symptoms, patterns and triggers  provide psycho-education on depression  Therapist will teach CBT strategies for attending to negative self-talk and cognitive distortions.  ACT: experiential exercises and mindfulness practices, how  to live with life barriers    Objective #B  Client will Increase interest, engagement, and pleasure in doing things  Identify negative self-talk and behaviors: challenge core beliefs, myths, and " "actions  Status: Continued - Date: 8/24/2017     Intervention(s)  Therapist will assign homework to practice using coping skills outside the therapy encounter, worksheets to challenge negative thoughts  teach distraction skills. explore and tailor enjoyable activities, increase social activities, strengthen social supports  ACT: life values and being mindful of values for goals and daily living    Objective #C  Improve concentration, focus, and mindfulness in daily activities   Status: Continued - Date: 8/24/2017     Intervention(s)  provide safe space to address hx of presenting problem and root cause  teach emotional regulation skills. mindfulness practices, grounding skills, affirmations, strengths based approach  Sandtray: exercise to stage presenting problem, reflect, process and problem solve.      Goal 2: Client will improve his condition of interactions in his relationships (specifically with wife/) establishing healthy, balanced and appropriate boundaries to be kept 100% of the time for a minimum of 4 weeks.     I will know I've met my goal when I \"get my own place. Get the rest of my valuables from the house.      Objective #A (Client Action)      Client will compile a list of boundaries that they would like to set with others. Wife, adult step-children, family members  Learning to fight fair, learning to identify positive and negative communication patterns  Status: Continued - Date: 8/24/2017     Intervention(s)  Therapist will teach about healthy boundaries. structure, saying \"no\", advocating, identifying and establishing appropriate roles, rules, expectations.    Objective #B  Client will practice setting boundaries daily times in the next 12 weeks.                    Status: Continued - Date: 8/24/2017     Intervention(s)  Therapist will assign homework to track the use of healthy boundaries and outcome of establishing relational change  role-play effective communication skills and conflict " management    Objective #C  Client will learn & utilize at least 3 assertive communication skills weekly.  Status: Continued - Date: 8/24/2017     Intervention(s)  teach assertiveness skills. aggressive/passive/assertive, impulsive control, reactive vs sensitive, exposure to uncomfortable conversation.  Therapist will role-play assertiveness skills      Client has reviewed and agreed to the above plan.      EMILY Donahue, LICSW  November 16, 2017

## 2017-11-16 NOTE — MR AVS SNAPSHOT
MRN:[id number]                      After Visit Summary   11/16/2017    Joni Borja    MRN: [id number]           Visit Information        Provider Department      11/16/2017 1:00 PM Flex Lombardo LMFT [facility] Generic      Your next 10 appointments already scheduled     Nov 17, 2017  2:30 PM CST   Return Visit with Silvia Brown MD   [facility] Epilepsy [facility] ([facility])    [address], [address]  [place] [id number] [phone]            Nov 21, 2017  2:00 PM CST   [facility]    [address] [place] [address] [phone]           Please do not eat 10-12 hours before your appointment if you are coming in fasting for labs on lipids, cholesterol, or glucose (sugar). This does not apply to pregnant women. Water, hot tea and black coffee (with nothing added) are okay. Do not drink other fluids, diet soda or chew gum.            Nov 27, 2017  2:00 PM CST   [facility]   [facility]    [address] [place] [address] [phone]           Please do not eat 10-12 hours before your appointment if you are coming in fasting for labs on lipids, cholesterol, or glucose (sugar). This does not apply to pregnant women. Water, hot tea and black coffee (with nothing added) are okay. Do not drink other fluids, diet soda or chew gum.            Dec 04, 2017  2:00 PM CST   LAB with RV LAB   [facility]    [address] [place] [id number] [phone]           Please do not eat 10-12 hours before your appointment if you are coming in fasting for labs on lipids, cholesterol, or glucose (sugar). This does not apply to pregnant women. Water, hot tea and black coffee (with nothing added) are okay. Do not drink other fluids,  diet soda or chew gum.            Dec 05, 2017 12:45 PM CST   MR BRAIN W/O & W CONTRAST with SHMRP1   [facility] MRI ([facility])    [address] [place] [address] [phone]           Take your medicines as usual, unless your doctor tells you not to. Bring a list of your current medicines to your exam (including vitamins, minerals and over-the-counter drugs).  You will be given intravenous contrast for this exam. To prepare:   The day before your exam, drink extra fluids at least six 8-ounce glasses (unless your doctor tells you to restrict your fluids).   Have a blood test (creatinine test) within 30 days of your exam. Go to your clinic or Diagnostic Imaging Department for this test.  The MRI machine uses a strong magnet. Please wear clothes without metal (snaps, zippers). A sweatsuit works well, or we may give you a hospital gown.  Please remove any body piercings and hair extensions before you arrive. You will also remove watches, jewelry, hairpins, wallets, dentures, partial dental plates and hearing aids. You may wear contact lenses, and you may be able to wear your rings. We have a safe place to keep your personal items, but it is safer to leave them at home.   **IMPORTANT** THE INSTRUCTIONS BELOW ARE ONLY FOR THOSE PATIENTS WHO HAVE BEEN TOLD THEY WILL RECEIVE SEDATION OR GENERAL ANESTHESIA DURING THEIR MRI PROCEDURE:  IF YOU WILL RECEIVE SEDATION (take medicine to help you relax during your exam):   You must get the medicine from your doctor before you arrive. Bring the medicine to the exam. Do not take it at home.   Arrive one hour early. Bring someone who can take you home after the test. Your medicine will make you sleepy. After the exam, you may not drive, take a bus or take a taxi by yourself.   No eating 8 hours before your exam. You may have clear liquids up until 4 hours before your exam. (Clear liquids include water, clear tea, black coffee and fruit  juice without pulp.)  IF YOU WILL RECEIVE ANESTHESIA (be asleep for your exam):   Arrive 1 1/2 hours early. Bring someone who can take you home after the test. You may not drive, take a bus or take a taxi by yourself.   No eating 8 hours before your exam. You may have clear liquids up until 4 hours before your exam. (Clear liquids include water, clear tea, black coffee and fruit juice without pulp.)  Please call the Imaging Department at your exam site with any questions.            Dec 05, 2017  2:30 PM CST   Return Visit with Berkley Daniels MD   [facility] ([facility])    [facility]  [address]  [facility] [address] [phone]              MyChart Information     [employer] gives you secure access to your electronic health record. If you see a primary care provider, you can also send messages to your care team and make appointments. If you have questions, please call your primary care clinic.  If you do not have a primary care provider, please call [phone] and they will assist you.        Care EveryWhere ID     This is your Care EveryWhere ID. This could be used by other organizations to access your Given medical records  [id number]        Equal Access to Services     KAYLIE MARIE : Abril rioso Sosparkle, waaxda luqadaha, qaybta kaalmada ademaureenyafilemon, es de la garza. So [facility] [phone].    ATENCIÓN: Si habla español, tiene a kirby disposición servicios gratuitos de asistencia lingüística. Llame al [phone].    We comply with applicable federal civil rights laws and [place] laws. We do not discriminate on the basis of race, color, national origin, age, disability, sex, sexual orientation, or gender identity.

## 2017-11-17 ENCOUNTER — OFFICE VISIT (OUTPATIENT)
Dept: NEUROLOGY | Facility: CLINIC | Age: 47
End: 2017-11-17

## 2017-11-17 VITALS
SYSTOLIC BLOOD PRESSURE: 109 MMHG | BODY MASS INDEX: 27.28 KG/M2 | HEART RATE: 68 BPM | WEIGHT: 184.2 LBS | DIASTOLIC BLOOD PRESSURE: 76 MMHG | HEIGHT: 69 IN

## 2017-11-17 DIAGNOSIS — G40.219 PARTIAL EPILEPSY WITH IMPAIRMENT OF CONSCIOUSNESS, INTRACTABLE (H): ICD-10-CM

## 2017-11-17 DIAGNOSIS — F32.1 MAJOR DEPRESSIVE DISORDER, SINGLE EPISODE, MODERATE WITH MELANCHOLIC FEATURES (H): Primary | ICD-10-CM

## 2017-11-17 DIAGNOSIS — R51.9 CHRONIC DAILY HEADACHE: ICD-10-CM

## 2017-11-17 RX ORDER — RIBOFLAVIN (VITAMIN B2) 400 MG
400 TABLET ORAL DAILY
Qty: 90 TABLET | Refills: 3 | Status: SHIPPED | OUTPATIENT
Start: 2017-11-17 | End: 2018-06-20

## 2017-11-17 RX ORDER — FELBAMATE 600 MG/1
TABLET ORAL
Qty: 270 TABLET | Refills: 3 | Status: SHIPPED | OUTPATIENT
Start: 2017-11-17 | End: 2018-06-20

## 2017-11-17 RX ORDER — TOPIRAMATE 100 MG/1
200 TABLET, FILM COATED ORAL EVERY MORNING
Qty: 900 TABLET | Refills: 3 | Status: SHIPPED | OUTPATIENT
Start: 2017-11-17 | End: 2018-06-20

## 2017-11-17 RX ORDER — CARBAMAZEPINE 300 MG/1
300 CAPSULE, EXTENDED RELEASE ORAL 2 TIMES DAILY
Qty: 180 CAPSULE | Refills: 3 | Status: SHIPPED | OUTPATIENT
Start: 2017-11-17 | End: 2018-06-20

## 2017-11-17 NOTE — PROGRESS NOTES
"[facility] Progress Note    Patient:  Joni Borja  :  1970   Age:  46 year old   Today's Office Visit:  2017    Epilepsy Data:  Patient History  Primary Epileptologist/Provider: Silvia Brown M.D.  Patient Status: Chronic Intractable  Epilepsy Syndrome: Localization-related epilepsy unspecified  Epilepsy Syndrome Status: Final  Age of Onset: 29  Etiology  : Tumor  Other Relevant Dx/ Issues: Grade 2 oligodendroglioma , s/p subtotal resection (), chemo (3745-7824), and whole brain radiation. Depression with paranoid thinking. 2013 right frontal resection, path notable for residual glioma with glial scarring.    Tests/Surgery History  Last EE2012  Last MRI: 2012  Last Neuropsych Testin2012  Last Case Management Conference: 2013  Epilepsy Surgery #1 Date: 13 (right frontal lobectomy. )  Epilepsy Surgery #2 Date: 16  Epilepsy Related Surgery #2 : Type : VNS  Seizure Record  Current Visit Date: 17  Previous Visit Date: 17  Months since last visit: 6.05  Seizure Type 1: Complex partial seizures unspecified  Description of Sz Type 1: Turns head to right, has blank stare, will repeat a phrase.  usually last minutes. Tends to push things around   # of Type 1 Seizure since last visit: 48  Freq. Type 1 / Month: 7.93  Seizure Type 2: Tonic-clonic seizures  # of Type 2 Seizure since last visit: 0  Freq. Type 2 / Month: 0  Seizure Type 3: Localization related seizures unspecified  Description of Sz Type 3: \"drop attacks\" has loss of awareness, lasts seconds, body is stiff when falling, and on the fall has non-rhythmic  (Drop attacks)  # of Type 3 Seizure since last visit: 0  Freq. Type 3 / Month: 0         EPILEPSY HISTORY:  Copied forward from prior notes: The patient had his 1st seizure at the age of 29 on 2002; and, at that time, he was diagnosed with a right frontal oligodendroglioma grade II and underwent resection (), whole " "brain radiation and chemotherapy (procarbazine, CCNU, vincristine from 06/2007-07/2008).  His pathological evaluation of the tumor demonstrated abnormalities of 1p and 19q.  The patient has been tried on multiple seizure medications; carbamazepine is his mainstay medication, and he has responded the best to this medication.  The patient had a right frontal lobe resection on 09/19/2013 at [facility] by Dr. Linder.   Surgical pathology 9/18/2013 showed \"majority of cells demonstrate an astrocytic morphology rather than that of oligodendrocytes. The immunohistochemical stain that recognizes a common mutation in IDH-1 is positive in many of the cells, indicating that these cells are a neoplastic population.\" On MRI of the brain, there is still residual tumor or T2 signal changes posterior to the tumor resection cavity. This portion of the brain was not removed primarily because on cortical mapping there was concern post operative hemiparesis. Prior to his surgery had 10 seizure per day (hypermotor) and after surgery he has 1- 2 seizures per week. Vagus nerve stimulator was placed 7/19/2016, which may help his seizures.     History of Present Illness:   He came with parents (Brittany and Niko). He is averaging 1-2 seizure (staring, unresponsive, moving around) per day. 10/23/2017 he had 8 seizures.      He had tumor recurrence and s/p resection of tumor resection on 3/24/2017. Prior to surgery he had a couple seizure per week and parents state after surgery he has 1 seizure per month. These seizure consist of staring, upper extremities and lower extremities nonrhythmic asynchronous movements that are proximal. He has not gotten hurt from his seizure. No antiepileptic drug side effects. Currently, on antiepileptic drug there is no double vision, no mood changes, no nausea, no vomiting, no abdominal pain, no rashes. We reviewed his antiepileptic drug and it seems he is on a lower dose of carbamazepine by 300 mg per day. He is very " stressed because of his separation from his wife.       Prior to Admission medications    Medication Sig Start Date End Date Taking? Authorizing Provider   felbamate (FELBATOL) 600 MG tablet Take 600 mg by mouth Take one tablet in the am and one and 1/2 tablet at 2 pm   Yes Reported, Patient   ondansetron (ZOFRAN) 4 MG tablet Take 1 tablet (4 mg) by mouth At Bedtime Take 30 minutes prior to chemotherapy dosing and repeat every 8 hours as needed for nausea. 5/9/17  Yes Berkley Daniels MD   acetaminophen-codeine (TYLENOL #3) 300-30 MG per tablet Take 1-2 tablets by mouth every 4 hours as needed for moderate pain As needed for headaches   Yes Reported, Patient   oxyCODONE (ROXICODONE) 5 MG IR tablet Take 1-2 tablets (5-10 mg) by mouth every 4 hours as needed for moderate to severe pain 3/27/17  Yes Carley Auguste PA-C   CarBAMazepine (CARBATROL PO) Take 300 mg by mouth 2 times daily (at 10:00 & 22:00)   Yes Reported, Patient   Escitalopram Oxalate (LEXAPRO PO) Take 20 mg by mouth At Bedtime   Yes Reported, Patient   Pantoprazole Sodium (PROTONIX PO) Take 40 mg by mouth every morning   Yes Reported, Patient   Cyanocobalamin (VITAMIN B 12 PO) Take 1 tablet by mouth daily (with dinner)   Yes Reported, Patient   ClonazePAM (KLONOPIN PO) Take 0.5 mg by mouth every morning    Yes Reported, Patient   Topiramate (TOPAMAX PO) Take 200 mg by mouth every morning (2 x 100 mg = 200 mg dose)    Yes Reported, Patient   Topiramate (TOPAMAX PO) Take 300 mg by mouth At Bedtime (3 x 100 mg = 300 mg dose)    Yes Reported, Patient   diazepam (VALIUM) 5 MG tablet Take 5 mg by mouth 2 times daily as needed for other (seizures.) As instructed for seizures. Please take 5 mg for more than 3 seizures in 8 hour period, may repeat 5 mg after 30 min if seizures continue. 3/20/15  Yes Silvia Brown MD   Cetirizine HCl (ZYRTEC ALLERGY PO) Take 10 mg by mouth daily as needed (for misquitos.)    Yes Reported, Patient   DiphenhydrAMINE  "HCl (BENADRYL PO) Take 50 mg by mouth daily as needed for allergies (misquitos,)    Yes Reported, Patient   temozolomide (TEMODAR) 100 MG capsule CHEMO Take 3 capsules (300 mg) by mouth daily for 5 days Take ondansetron 30-60 min before temozolomide. Take at bedtime on an empty stomach. 5/9/17 5/14/17  Berkley Daniels MD         Patient has been filling his own medication box  Felbamate 600 mg morning, 900 mg 2pm   Topiramate 200 mg AM and 300 mg PM   Tegretol  mg am and 300 mg pm   Klonopin 0.5mg PM morning and 1 mg pm (prescribed by PCP for anxiety and RLS)     Medication Notes:     AED Medication Compliance:  compliant most of the time. Using a pill box, yes.   1. Levetiracetam - mood issues. Weaned off Levetiracetam 2/12/2015 after getting a DUI.   2. Vimpat 200 mg twice a day, which caused increased sedation, toxicity, and was not able to tolerate.   3. Carbamazepine: Higher dose of carbamazepine causes ataxia and falls (> 1600 mg per day).   4. Fycompa was started November 2015 and was not helpful and weaned off 5/2016, global toxicity and was not able to tolerate higher than 6 mg.  5. Felbamate was started in May 2016. No major side effects.      Review of Systems:  Lethargy / Tiredness:  yes  Nausea / Vomiting: no  Double Vision:  yes  Sleepiness:  yes  Depression:  yes  Slowed Cognitive Function:  yes  Memory Problems:  yes  Poor Balance:  no  Dizziness:  no  Appetite Changes:  stable  Blurred Vision:  No  Sleep Changes:  No  Behavioral Changes:  No  Skin: negative  Respiratory: No shortness of breath, dyspnea on exertion, cough, or hemoptysis  Cardiovascular: negative  Have you experienced a traumatic fall since your last visit: no  Are these falls related to your seizures: NO  No bleeding    Other Issues:  Is patient safe to drive:  No. Not working.     Exam:    /76 (BP Location: Right arm, Patient Position: Chair, Cuff Size: Adult Regular)  Pulse 68  Ht 5' 9\" (175.3 cm)  Wt 184 " "lb 3.2 oz (83.6 kg)  BMI 27.2 kg/m2     Wt Readings from Last 5 Encounters:   11/17/17 184 lb 3.2 oz (83.6 kg)   11/06/17 185 lb 12.8 oz (84.3 kg)   09/29/17 183 lb (83 kg)   09/25/17 175 lb (79.4 kg)   09/19/17 178 lb 9.6 oz (81 kg)     NEUROLOGICAL EXAMINATION:   /76 (BP Location: Right arm, Patient Position: Chair, Cuff Size: Adult Regular)  Pulse 68  Ht 5' 9\" (175.3 cm)  Wt 184 lb 3.2 oz (83.6 kg)  BMI 27.2 kg/m2  The patient is awake, alert.  Speech is fluent.  Extraocular movements intact. No nystagmus.  No focal weakness noted.  No tremor noted.   Stable gait, not ataxic.      IMPRESSION:   1.  Right frontal lobe epilepsy, intractable, etiology oligodendroglioma/astrocytoma. S/P right frontal lobe resection (9/19/2013 for epilepsy surgery) and vagus nerve stimulator placement (7/2016).  After his recent tumor resection surgery (3/24/2017) he had a reduction in his seizures. He  continues to have complex partial seizure seizures 1 -2 per week (prior to frontal resection epilepsy surgery he had 50 per week). We will increase felbamate in the morning to improve seizure and this will also help his energy.  Increased vagus nerve stimulator settings today.     2.  Depression. He is following with a psychologist. Taking Celexa. Encouraged him to see psychiatrist, placed consult.      3.  Oligodendroglioma.  He had tumor recurrence and s/p resection of tumor resection on 3/24/2017. He is following Dr. Daniels and is on temozolomide.     4. Headaches: takes riboflavin which helps    5. Social: He and his wife are . He is living with his parents. He has health care directive that he has to fill out.      PLAN:   1. Continue antiepileptic drug. Increase felbamate.   Felbamate 900 mg morning, 900 mg 2pm   Topiramate 200 mg AM and 300 mg PM   Tegretol  mg am and 300 mg pm     2. Psychiatrist consult     3. Follow up  8 months    4. Interrograted vagus nerve stimulator  And increased two parameter. " When [employer] vagus nerve stimulator software comes out in 2018, we can decrease night time vagus nerve stimulator current setting to decrease stimulation irritability which will help him sleep.     I spent 45 minutes with the patient and family. During this time counseling and coordination of care exceeded 50% of the visit time. I addressed all questions and concerns the family raised in regards to the patients care.          PETER MONTIEL MD

## 2017-11-17 NOTE — PATIENT INSTRUCTIONS
Increase felbamate   Felbamate 900 mg morning, 900 mg 2pm   Topiramate 200 mg AM and 300 mg PM   Tegretol  mg am and 300 mg pm     Psychiatrist consult     Silvia Brown MD

## 2017-11-17 NOTE — MR AVS SNAPSHOT
After Visit Summary   11/17/2017    Joni Borja    MRN: [id number]           Patient Information     Date Of Birth          1970        Visit Information        Provider Department      11/17/2017 2:30 PM Silvia Brown MD [facility]        Today's Diagnoses     Major depressive disorder, single episode, moderate with melancholic features (H)    -  1    Chronic daily headache        Partial epilepsy with impairment of consciousness, intractable (H)          Care Instructions    Increase felbamate   Felbamate 900 mg morning, 900 mg 2pm   Topiramate 200 mg AM and 300 mg PM   Tegretol  mg am and 300 mg pm     Psychiatrist consult     Silvia Brown MD           Follow-ups after your visit        Additional Services     MENTAL HEALTH REFERRAL  - Adult; Psychiatry and Medication Management; Psychiatry; [facility]: Psychiatry Clinic [phone]; The scheduling team will contact you to schedule your appointment.  If you have any questions or want to call ahead to schedu...       All scheduling is subject to the client's specific insurance plan & benefits, provider/location availability, and provider clinical specialities.  Please arrive 15 minutes early for your first appointment and bring your completed paperwork.    Please be aware that coverage of these services is subject to the terms and limitations of your health insurance plan.  Call member services at your health plan with any benefit or coverage questions.                            Follow-up notes from your care team     Return in about 8 months (around 7/17/2018).      Your next 10 appointments already scheduled     Nov 21, 2017  2:00 PM CST   LAB with  LAB   [facility] ([facility]    [address] [place] [id number] [phone]           Please do not eat 10-12 hours before your appointment if you are coming in fasting for labs on lipids, cholesterol, or glucose  (sugar). This does not apply to pregnant women. Water, hot tea and black coffee (with nothing added) are okay. Do not drink other fluids, diet soda or chew gum.            Nov 27, 2017  2:00 PM CST   LAB with RV LAB   [facility] ([facility]    [address] [place] [id number]   [phone]           Please do not eat 10-12 hours before your appointment if you are coming in fasting for labs on lipids, cholesterol, or glucose (sugar). This does not apply to pregnant women. Water, hot tea and black coffee (with nothing added) are okay. Do not drink other fluids, diet soda or chew gum.            Dec 04, 2017  2:00 PM CST   LAB with RV LAB   [facility] ([facility])    [address] [place] [address]   [phone]           Please do not eat 10-12 hours before your appointment if you are coming in fasting for labs on lipids, cholesterol, or glucose (sugar). This does not apply to pregnant women. Water, hot tea and black coffee (with nothing added) are okay. Do not drink other fluids, diet soda or chew gum.            Dec 05, 2017 12:45 PM CST   MR BRAIN W/O & W CONTRAST with SHMRP1   [facility] ([facility])    [address] [place] [id number]   [phone]           Take your medicines as usual, unless your doctor tells you not to. Bring a list of your current medicines to your exam (including vitamins, minerals and over-the-counter drugs).  You will be given intravenous contrast for this exam. To prepare:   The day before your exam, drink extra fluids at least six 8-ounce glasses (unless your doctor tells you to restrict your fluids).   Have a blood test (creatinine test) within 30 days of your exam. Go to your clinic or Diagnostic Imaging Department for this test.  The MRI machine uses a strong magnet. Please wear clothes without metal (snaps, zippers). A  sweatsuit works well, or we may give you a hospital gown.  Please remove any body piercings and hair extensions before you arrive. You will also remove watches, jewelry, hairpins, wallets, dentures, partial dental plates and hearing aids. You may wear contact lenses, and you may be able to wear your rings. We have a safe place to keep your personal items, but it is safer to leave them at home.   **IMPORTANT** THE INSTRUCTIONS BELOW ARE ONLY FOR THOSE PATIENTS WHO HAVE BEEN TOLD THEY WILL RECEIVE SEDATION OR GENERAL ANESTHESIA DURING THEIR MRI PROCEDURE:  IF YOU WILL RECEIVE SEDATION (take medicine to help you relax during your exam):   You must get the medicine from your doctor before you arrive. Bring the medicine to the exam. Do not take it at home.   Arrive one hour early. Bring someone who can take you home after the test. Your medicine will make you sleepy. After the exam, you may not drive, take a bus or take a taxi by yourself.   No eating 8 hours before your exam. You may have clear liquids up until 4 hours before your exam. (Clear liquids include water, clear tea, black coffee and fruit juice without pulp.)  IF YOU WILL RECEIVE ANESTHESIA (be asleep for your exam):   Arrive 1 1/2 hours early. Bring someone who can take you home after the test. You may not drive, take a bus or take a taxi by yourself.   No eating 8 hours before your exam. You may have clear liquids up until 4 hours before your exam. (Clear liquids include water, clear tea, black coffee and fruit juice without pulp.)  Please call the Imaging Department at your exam site with any questions.            Dec 05, 2017  2:30 PM CST   Return Visit with Berkley Daniels MD   [facility] ([facility])    [facility]  [address]  [facility] [address] [phone]              Who to contact     Please call your clinic at [phone] to:    Ask questions about your health    Make  "or cancel appointments    Discuss your medicines    Learn about your test results    Speak to your doctor   If you have compliments or concerns about an experience at your clinic, or if you wish to file a complaint, please contact [facility] Patient Relations at [phone] or email us at [email or web address]         Additional Information About Your Visit        [employer]hart Information     [employer] gives you secure access to your electronic health record. If you see a primary care provider, you can also send messages to your care team and make appointments. If you have questions, please call your primary care clinic.  If you do not have a primary care provider, please call [phone] and they will assist you.      [employer] is an electronic gateway that provides easy, online access to your medical records. With [employer], you can request a clinic appointment, read your test results, renew a prescription or communicate with your care team.     To access your existing account, please contact your [facility] or call [phone] for assistance.        Care EveryWhere ID     This is your Care EveryWhere ID. This could be used by other organizations to access your [facility] medical records  [id number]        Your Vitals Were     Pulse Height BMI (Body Mass Index)             68 5' 9\" (175.3 cm) 27.2 kg/m2          Blood Pressure from Last 3 Encounters:   11/17/17 109/76   11/06/17 108/66   09/29/17 119/69    Weight from Last 3 Encounters:   11/17/17 184 lb 3.2 oz (83.6 kg)   11/06/17 185 lb 12.8 oz (84.3 kg)   09/29/17 183 lb (83 kg)              We Performed the Following     MENTAL HEALTH REFERRAL  - Adult; Psychiatry and Medication Management; Psychiatry; [facility]: Psychiatry Clinic [phone]; The scheduling team will contact you to schedule your appointment.  If you have any questions or want to call ahead to schedu...          Today's Medication " Changes          These changes are accurate as of: 11/17/17  3:17 PM.  If you have any questions, ask your nurse or doctor.               These medicines have changed or have updated prescriptions.        Dose/Directions    felbamate 600 MG tablet   Commonly known as:  FELBATOL   This may have changed:  additional instructions   Used for:  Partial epilepsy with impairment of consciousness, intractable (H)   Changed by:  Silvia Brown MD        Take 900 mg (1.5 tablet) am and 900 mg (1.5 tablet) 2 pm   Quantity:  270 tablet   Refills:  3            Where to get your medicines      These medications were sent to [facility] [address] [place], [place] - [address]  [address], [facility] [address]     Phone:  [phone]     carBAMazepine 300 MG 12 hr capsule    felbamate 600 MG tablet    Riboflavin 400 MG Tabs    topiramate 100 MG tablet                Primary Care Provider Office Phone # Fax #    Brian Coon [phone]       [address] [place] [id number]        Equal Access to Services     [facility]: Hadii aad ku hadasho Soomaali, waaxda luqadaha, qaybta kaalmada adeegyada, waxay idiin haymikeyn concepcion arriaga . So [facility] [place] [phone].    ATENCIÓN: Si habla español, tiene a kirby disposición servicios gratuitos de asistencia lingüística. Llame al [phone].    We comply with applicable federal civil rights laws and [place] laws. We do not discriminate on the basis of race, color, national origin, age, disability, sex, sexual orientation, or gender identity.            Thank you!     Thank you for choosing [facility]  for your care. Our goal is always to provide you with excellent care. Hearing back from our patients is one way we can continue to improve our services. Please take a few minutes to complete the written survey that you may receive in the mail after your visit with us. Thank you!             Your Updated Medication List -  Protect others around you: Learn how to safely use, store and throw away your medicines at www.disposemymeds.org.          This list is accurate as of: 11/17/17  3:17 PM.  Always use your most recent med list.                   Brand Name Dispense Instructions for use Diagnosis    acetaminophen-codeine 300-30 MG per tablet    TYLENOL #3     Take 1-2 tablets by mouth every 4 hours as needed for moderate pain As needed for headaches        carBAMazepine 300 MG 12 hr capsule    CARBATROL    180 capsule    Take 1 capsule (300 mg) by mouth 2 times daily (at 10:00 & 22:00)    Partial epilepsy with impairment of consciousness, intractable (H)       clonazePAM 0.5 MG tablet    klonoPIN    90 tablet    1 tab in the AM and 2 tabs in PM.    Anxiety       escitalopram 20 MG tablet    LEXAPRO    90 tablet    TAKE ONE TABLET BY MOUTH ONCE DAILY AT  NIGHT    Major depressive disorder with single episode, in partial remission (H), Anxiety       felbamate 600 MG tablet    FELBATOL    270 tablet    Take 900 mg (1.5 tablet) am and 900 mg (1.5 tablet) 2 pm    Partial epilepsy with impairment of consciousness, intractable (H)       ondansetron 4 MG tablet    ZOFRAN    60 tablet    Take 2 tablets (8 mg) by mouth At Bedtime or 30 minutes prior to chemotherapy dosing. Repeat every 8 hours as needed for nausea.    Chemotherapy management, encounter for, Chemotherapy-induced nausea and vomiting       pantoprazole 40 MG EC tablet    PROTONIX    90 tablet    TAKE ONE TABLET BY MOUTH ONCE DAILY    Gastroesophageal reflux disease without esophagitis       Riboflavin 400 MG Tabs     90 tablet    Take 400 mg by mouth daily    Chronic daily headache       temozolomide 100 MG capsule CHEMO    TEMODAR    20 capsule    Take 4 capsules (400 mg) by mouth daily for 5 days Take ondansetron 30-60 min before temozolomide. Take at bedtime on an empty stomach.    Oligodendroglioma, anaplastic (H)       topiramate 100 MG tablet    TOPAMAX    900 tablet    Take  2 tablets (200 mg) by mouth every morning 200 mg am and 300 mg pm    Partial epilepsy with impairment of consciousness, intractable (H)       VITAMIN B 12 PO      Take 1 tablet by mouth daily (with dinner)        ZYRTEC ALLERGY PO      Take 10 mg by mouth daily as needed (for misquitos.)    Localization-related (focal) (partial) epilepsy and epileptic syndromes with complex partial seizures, with intractable epilepsy, Depression, Anxiety, Unstable gait

## 2017-11-17 NOTE — LETTER
"2017     RE: Joni Borja  : 1970   MRN: [id number]      Dear Colleague,    Thank you for referring your patient, Joni Borja, to the [facility] EPILEPSY [facility]ARE at [facility]. Please see a copy of my visit note below.    [facility]    Patient:  Joni Borja  :  1970   Age:  46 year old   Today's Office Visit:  2017    Epilepsy Data:  Patient History  Primary Epileptologist/Provider: Silvia Brown M.D.  Patient Status: Chronic Intractable  Epilepsy Syndrome: Localization-related epilepsy unspecified  Epilepsy Syndrome Status: Final  Age of Onset: 29  Etiology  : Tumor  Other Relevant Dx/ Issues: Grade 2 oligodendroglioma , s/p subtotal resection (), chemo (5323-1925), and whole brain radiation. Depression with paranoid thinking. 2013 right frontal resection, path notable for residual glioma with glial scarring.    Tests/Surgery History  Last EE2012  Last MRI: 2012  Last Neuropsych Testin2012  Last Case Management Conference: 2013  Epilepsy Surgery #1 Date: 13 (right frontal lobectomy. )  Epilepsy Surgery #2 Date: 16  Epilepsy Related Surgery #2 : Type : VNS  Seizure Record  Current Visit Date: 17  Previous Visit Date: 17  Months since last visit: 6.05  Seizure Type 1: Complex partial seizures unspecified  Description of Sz Type 1: Turns head to right, has blank stare, will repeat a phrase.  usually last minutes. Tends to push things around   # of Type 1 Seizure since last visit: 48  Freq. Type 1 / Month: 7.93  Seizure Type 2: Tonic-clonic seizures  # of Type 2 Seizure since last visit: 0  Freq. Type 2 / Month: 0  Seizure Type 3: Localization related seizures unspecified  Description of Sz Type 3: \"drop attacks\" has loss of awareness, lasts seconds, body is stiff when falling, and on the fall has non-rhythmic  (Drop attacks)  # of Type 3 Seizure since last visit: " "0  Freq. Type 3 / Month: 0       EPILEPSY HISTORY:  Copied forward from prior notes: The patient had his 1st seizure at the age of 29 on 04/22/2002; and, at that time, he was diagnosed with a right frontal oligodendroglioma grade II and underwent resection (2007), whole brain radiation and chemotherapy (procarbazine, CCNU, vincristine from 06/2007-07/2008).  His pathological evaluation of the tumor demonstrated abnormalities of 1p and 19q.  The patient has been tried on multiple seizure medications; carbamazepine is his mainstay medication, and he has responded the best to this medication.  The patient had a right frontal lobe resection on 09/19/2013 at [facility] by Dr. Linder.   Surgical pathology 9/18/2013 showed \"majority of cells demonstrate an astrocytic morphology rather than that of oligodendrocytes. The immunohistochemical stain that recognizes a common mutation in IDH-1 is positive in many of the cells, indicating that these cells are a neoplastic population.\" On MRI of the brain, there is still residual tumor or T2 signal changes posterior to the tumor resection cavity. This portion of the brain was not removed primarily because on cortical mapping there was concern post operative hemiparesis. Prior to his surgery had 10 seizure per day (hypermotor) and after surgery he has 1- 2 seizures per week. Vagus nerve stimulator was placed 7/19/2016, which may help his seizures.     History of Present Illness:   He came with parents (Brittany and Niko). He is averaging 1-2 seizure (staring, unresponsive, moving around) per day. 10/23/2017 he had 8 seizures.      He had tumor recurrence and s/p resection of tumor resection on 3/24/2017. Prior to surgery he had a couple seizure per week and parents state after surgery he has 1 seizure per month. These seizure consist of staring, upper extremities and lower extremities nonrhythmic asynchronous movements that are proximal. He has not gotten hurt from his seizure. No " antiepileptic drug side effects. Currently, on antiepileptic drug there is no double vision, no mood changes, no nausea, no vomiting, no abdominal pain, no rashes. We reviewed his antiepileptic drug and it seems he is on a lower dose of carbamazepine by 300 mg per day. He is very stressed because of his separation from his wife.       Prior to Admission medications    Medication Sig Start Date End Date Taking? Authorizing Provider   felbamate (FELBATOL) 600 MG tablet Take 600 mg by mouth Take one tablet in the am and one and 1/2 tablet at 2 pm   Yes Reported, Patient   ondansetron (ZOFRAN) 4 MG tablet Take 1 tablet (4 mg) by mouth At Bedtime Take 30 minutes prior to chemotherapy dosing and repeat every 8 hours as needed for nausea. 5/9/17  Yes Berkley Daniels MD   acetaminophen-codeine (TYLENOL #3) 300-30 MG per tablet Take 1-2 tablets by mouth every 4 hours as needed for moderate pain As needed for headaches   Yes Reported, Patient   oxyCODONE (ROXICODONE) 5 MG IR tablet Take 1-2 tablets (5-10 mg) by mouth every 4 hours as needed for moderate to severe pain 3/27/17  Yes Carley Auguste PA-C   CarBAMazepine (CARBATROL PO) Take 300 mg by mouth 2 times daily (at 10:00 & 22:00)   Yes Reported, Patient   Escitalopram Oxalate (LEXAPRO PO) Take 20 mg by mouth At Bedtime   Yes Reported, Patient   Pantoprazole Sodium (PROTONIX PO) Take 40 mg by mouth every morning   Yes Reported, Patient   Cyanocobalamin (VITAMIN B 12 PO) Take 1 tablet by mouth daily (with dinner)   Yes Reported, Patient   ClonazePAM (KLONOPIN PO) Take 0.5 mg by mouth every morning    Yes Reported, Patient   Topiramate (TOPAMAX PO) Take 200 mg by mouth every morning (2 x 100 mg = 200 mg dose)    Yes Reported, Patient   Topiramate (TOPAMAX PO) Take 300 mg by mouth At Bedtime (3 x 100 mg = 300 mg dose)    Yes Reported, Patient   diazepam (VALIUM) 5 MG tablet Take 5 mg by mouth 2 times daily as needed for other (seizures.) As instructed for  seizures. Please take 5 mg for more than 3 seizures in 8 hour period, may repeat 5 mg after 30 min if seizures continue. 3/20/15  Yes Silvia Brown MD   Cetirizine HCl (ZYRTEC ALLERGY PO) Take 10 mg by mouth daily as needed (for misquitos.)    Yes Reported, Patient   DiphenhydrAMINE HCl (BENADRYL PO) Take 50 mg by mouth daily as needed for allergies (misquitos,)    Yes Reported, Patient   temozolomide (TEMODAR) 100 MG capsule CHEMO Take 3 capsules (300 mg) by mouth daily for 5 days Take ondansetron 30-60 min before temozolomide. Take at bedtime on an empty stomach. 5/9/17 5/14/17  Berkley Daniels MD     Patient has been filling his own medication box  Felbamate 600 mg morning, 900 mg 2pm   Topiramate 200 mg AM and 300 mg PM   Tegretol  mg am and 300 mg pm   Klonopin 0.5mg PM morning and 1 mg pm (prescribed by PCP for anxiety and RLS)     Medication Notes:     AED Medication Compliance:  compliant most of the time. Using a pill box, yes.   1. Levetiracetam - mood issues. Weaned off Levetiracetam 2/12/2015 after getting a DUI.   2. Vimpat 200 mg twice a day, which caused increased sedation, toxicity, and was not able to tolerate.   3. Carbamazepine: Higher dose of carbamazepine causes ataxia and falls (> 1600 mg per day).   4. Fycompa was started November 2015 and was not helpful and weaned off 5/2016, global toxicity and was not able to tolerate higher than 6 mg.  5. Felbamate was started in May 2016. No major side effects.      Review of Systems:  Lethargy / Tiredness:  yes  Nausea / Vomiting: no  Double Vision:  yes  Sleepiness:  yes  Depression:  yes  Slowed Cognitive Function:  yes  Memory Problems:  yes  Poor Balance:  no  Dizziness:  no  Appetite Changes:  stable  Blurred Vision:  No  Sleep Changes:  No  Behavioral Changes:  No  Skin: negative  Respiratory: No shortness of breath, dyspnea on exertion, cough, or hemoptysis  Cardiovascular: negative  Have you experienced a traumatic fall since  "your last visit: no  Are these falls related to your seizures: NO  No bleeding    Other Issues:  Is patient safe to drive:  No. Not working.     Exam:    /76 (BP Location: Right arm, Patient Position: Chair, Cuff Size: Adult Regular)  Pulse 68  Ht 5' 9\" (175.3 cm)  Wt 184 lb 3.2 oz (83.6 kg)  BMI 27.2 kg/m2     Wt Readings from Last 5 Encounters:   11/17/17 184 lb 3.2 oz (83.6 kg)   11/06/17 185 lb 12.8 oz (84.3 kg)   09/29/17 183 lb (83 kg)   09/25/17 175 lb (79.4 kg)   09/19/17 178 lb 9.6 oz (81 kg)     NEUROLOGICAL EXAMINATION:   /76 (BP Location: Right arm, Patient Position: Chair, Cuff Size: Adult Regular)  Pulse 68  Ht 5' 9\" (175.3 cm)  Wt 184 lb 3.2 oz (83.6 kg)  BMI 27.2 kg/m2  The patient is awake, alert.  Speech is fluent.  Extraocular movements intact. No nystagmus.  No focal weakness noted.  No tremor noted.   Stable gait, not ataxic.      IMPRESSION:   1.  Right frontal lobe epilepsy, intractable, etiology oligodendroglioma/astrocytoma. S/P right frontal lobe resection (9/19/2013 for epilepsy surgery) and vagus nerve stimulator placement (7/2016).  After his recent tumor resection surgery (3/24/2017) he had a reduction in his seizures. He  continues to have complex partial seizure seizures 1 -2 per week (prior to frontal resection epilepsy surgery he had 50 per week). We will increase felbamate in the morning to improve seizure and this will also help his energy.  Increased vagus nerve stimulator settings today.     2.  Depression. He is following with a psychologist. Taking Celexa. Encouraged him to see psychiatrist, placed consult.      3.  Oligodendroglioma.  He had tumor recurrence and s/p resection of tumor resection on 3/24/2017. He is following Dr. Daniels and is on temozolomide.     4. Headaches: takes riboflavin which helps    5. Social: He and his wife are . He is living with his parents. He has health care directive that he has to fill out.      PLAN:   1. Continue " antiepileptic drug. Increase felbamate.   Felbamate 900 mg morning, 900 mg 2pm   Topiramate 200 mg AM and 300 mg PM   Tegretol  mg am and 300 mg pm     2. Psychiatrist consult     3. Follow up  8 months    4. Interrograted vagus nerve stimulator  And increased two parameter. When June [employer] vagus nerve stimulator software comes out in 2018, we can decrease night time vagus nerve stimulator current setting to decrease stimulation irritability which will help him sleep.     I spent 45 minutes with the patient and family. During this time counseling and coordination of care exceeded 50% of the visit time. I addressed all questions and concerns the family raised in regards to the patients care.        PETER MONTIEL MD

## 2017-11-21 ENCOUNTER — DOCUMENTATION ONLY (OUTPATIENT)
Dept: PHARMACY | Facility: CLINIC | Age: 47
End: 2017-11-21

## 2017-11-21 DIAGNOSIS — C71.9 OLIGODENDROGLIOMA, ANAPLASTIC (H): ICD-10-CM

## 2017-11-21 DIAGNOSIS — D70.1 CHEMOTHERAPY INDUCED NEUTROPENIA (H): ICD-10-CM

## 2017-11-21 DIAGNOSIS — T45.1X5A CHEMOTHERAPY INDUCED NEUTROPENIA (H): ICD-10-CM

## 2017-11-21 LAB
BASOPHILS # BLD AUTO: 0 10E9/L (ref 0–0.2)
BASOPHILS NFR BLD AUTO: 0.3 %
DIFFERENTIAL METHOD BLD: ABNORMAL
EOSINOPHIL # BLD AUTO: 0.2 10E9/L (ref 0–0.7)
EOSINOPHIL NFR BLD AUTO: 3.8 %
ERYTHROCYTE [DISTWIDTH] IN BLOOD BY AUTOMATED COUNT: 13.1 % (ref 10–15)
HCT VFR BLD AUTO: 39.6 % (ref 40–53)
HGB BLD-MCNC: 13 G/DL (ref 13.3–17.7)
LYMPHOCYTES # BLD AUTO: 1.7 10E9/L (ref 0.8–5.3)
LYMPHOCYTES NFR BLD AUTO: 28 %
MCH RBC QN AUTO: 32.3 PG (ref 26.5–33)
MCHC RBC AUTO-ENTMCNC: 32.8 G/DL (ref 31.5–36.5)
MCV RBC AUTO: 98 FL (ref 78–100)
MONOCYTES # BLD AUTO: 0.8 10E9/L (ref 0–1.3)
MONOCYTES NFR BLD AUTO: 12.9 %
NEUTROPHILS # BLD AUTO: 3.4 10E9/L (ref 1.6–8.3)
NEUTROPHILS NFR BLD AUTO: 55 %
PLATELET # BLD AUTO: 259 10E9/L (ref 150–450)
RBC # BLD AUTO: 4.03 10E12/L (ref 4.4–5.9)
WBC # BLD AUTO: 6.1 10E9/L (ref 4–11)

## 2017-11-21 PROCEDURE — 85025 COMPLETE CBC W/AUTO DIFF WBC: CPT | Performed by: FAMILY MEDICINE

## 2017-11-21 PROCEDURE — 36415 COLL VENOUS BLD VENIPUNCTURE: CPT | Performed by: FAMILY MEDICINE

## 2017-11-21 NOTE — PROGRESS NOTES
Oral Chemotherapy Monitoring Program.    Patient currently on temodar therapy.    Reviewed labs from 11/21/17.    No concerning abnormalities.    Will follow up in 1 week with repeat labs. Check in with patient after appt with Jeremiah on 12/5. Recommend spacing out labs. Has been tolerating and counts have been good. Due to restart temodar on 12/11.    Malinda Morris PharmD  November 21, 2017

## 2017-11-22 DIAGNOSIS — C71.9 OLIGODENDROGLIOMA, ANAPLASTIC (H): Primary | ICD-10-CM

## 2017-11-22 RX ORDER — TEMOZOLOMIDE 100 MG/1
200 CAPSULE ORAL DAILY
Qty: 20 CAPSULE | Refills: 0 | Status: SHIPPED | OUTPATIENT
Start: 2017-11-22 | End: 2017-11-27

## 2017-11-26 DIAGNOSIS — F32.4 MAJOR DEPRESSIVE DISORDER WITH SINGLE EPISODE, IN PARTIAL REMISSION (H): ICD-10-CM

## 2017-11-26 DIAGNOSIS — F41.9 ANXIETY: ICD-10-CM

## 2017-11-27 DIAGNOSIS — T45.1X5A CHEMOTHERAPY INDUCED NEUTROPENIA (H): ICD-10-CM

## 2017-11-27 DIAGNOSIS — D70.1 CHEMOTHERAPY INDUCED NEUTROPENIA (H): ICD-10-CM

## 2017-11-27 DIAGNOSIS — C71.9 OLIGODENDROGLIOMA, ANAPLASTIC (H): ICD-10-CM

## 2017-11-27 LAB
BASOPHILS # BLD AUTO: 0 10E9/L (ref 0–0.2)
BASOPHILS NFR BLD AUTO: 0.1 %
DIFFERENTIAL METHOD BLD: ABNORMAL
EOSINOPHIL # BLD AUTO: 0.2 10E9/L (ref 0–0.7)
EOSINOPHIL NFR BLD AUTO: 3.1 %
ERYTHROCYTE [DISTWIDTH] IN BLOOD BY AUTOMATED COUNT: 13 % (ref 10–15)
HCT VFR BLD AUTO: 38.3 % (ref 40–53)
HGB BLD-MCNC: 12.6 G/DL (ref 13.3–17.7)
LYMPHOCYTES # BLD AUTO: 1.8 10E9/L (ref 0.8–5.3)
LYMPHOCYTES NFR BLD AUTO: 25.6 %
MCH RBC QN AUTO: 32.3 PG (ref 26.5–33)
MCHC RBC AUTO-ENTMCNC: 32.9 G/DL (ref 31.5–36.5)
MCV RBC AUTO: 98 FL (ref 78–100)
MONOCYTES # BLD AUTO: 1 10E9/L (ref 0–1.3)
MONOCYTES NFR BLD AUTO: 14 %
NEUTROPHILS # BLD AUTO: 4.1 10E9/L (ref 1.6–8.3)
NEUTROPHILS NFR BLD AUTO: 57.2 %
PLATELET # BLD AUTO: 326 10E9/L (ref 150–450)
RBC # BLD AUTO: 3.9 10E12/L (ref 4.4–5.9)
WBC # BLD AUTO: 7.2 10E9/L (ref 4–11)

## 2017-11-27 PROCEDURE — 85025 COMPLETE CBC W/AUTO DIFF WBC: CPT | Performed by: FAMILY MEDICINE

## 2017-11-27 PROCEDURE — 36415 COLL VENOUS BLD VENIPUNCTURE: CPT | Performed by: FAMILY MEDICINE

## 2017-11-28 ENCOUNTER — DOCUMENTATION ONLY (OUTPATIENT)
Dept: PHARMACY | Facility: CLINIC | Age: 47
End: 2017-11-28

## 2017-11-28 NOTE — PROGRESS NOTES
Oral Chemotherapy Monitoring Program.    Patient currently on temodar therapy.    Reviewed labs from 11/27/17    No concerning abnormalities.    Due to restart on 12/11. Check in after appt with Jeremiah on 12/5. Recommend spacing out labs since he has tolerated very well.    Will follow up in 1 week.    Malinda Morris PharmD  November 28, 2017

## 2017-11-28 NOTE — TELEPHONE ENCOUNTER
Requested Prescriptions   Pending Prescriptions Disp Refills     escitalopram (LEXAPRO) 20 MG tablet [Pharmacy Med Name: ESCITALOPRAM 20MG TAB]  Last Written Prescription Date:  8/24/2017  Last Fill Quantity: 90 tablet,  # refills: 1   Last Office Visit with [facility], P or [facility] prescribing provider:  3/10/2017   Future Office Visit:    Next 5 appointments (look out 90 days)     Dec 05, 2017  2:30 PM CST   Return Visit with Berkley Daniels MD   [facility] ([facility])    [facility]  [address] [facility] [address]  [facility] [address]   [phone]                  90 tablet 1     Sig: TAKE ONE TABLET BY MOUTH ONCE DAILY IN THE EVENING    SSRIs Protocol Passed    11/26/2017  5:42 PM       Passed - PHQ-9 score less than 5 in past 6 months    Please review PHQ-9 score.          Passed - Medication is NOT Bupropion    If the medication is Bupropion (Wellbutrin), and the patient is taking for smoking cessation; OK to refill.         Passed - Patient is age 18 or older       Passed - Recent (6 mo) or future visit with authorizing provider's specialty    Patient had office visit in the last 6 months or has a visit in the next 30 days with authorizing provider.  See chart review.

## 2017-11-29 RX ORDER — ESCITALOPRAM OXALATE 20 MG/1
TABLET ORAL
Qty: 90 TABLET | Refills: 1 | OUTPATIENT
Start: 2017-11-29

## 2017-11-29 NOTE — TELEPHONE ENCOUNTER
#90 x 1 given on 08/24/2017, no pharmacy change.   Patient should have refill at pharmacy    Gina Roberts RN  [place] [facility]

## 2017-12-04 DIAGNOSIS — C71.9 OLIGODENDROGLIOMA, ANAPLASTIC (H): ICD-10-CM

## 2017-12-04 DIAGNOSIS — D70.1 CHEMOTHERAPY INDUCED NEUTROPENIA (H): ICD-10-CM

## 2017-12-04 DIAGNOSIS — T45.1X5A CHEMOTHERAPY INDUCED NEUTROPENIA (H): ICD-10-CM

## 2017-12-04 LAB
BASOPHILS # BLD AUTO: 0 10E9/L (ref 0–0.2)
BASOPHILS NFR BLD AUTO: 0.4 %
DIFFERENTIAL METHOD BLD: ABNORMAL
EOSINOPHIL # BLD AUTO: 0.2 10E9/L (ref 0–0.7)
EOSINOPHIL NFR BLD AUTO: 3.3 %
ERYTHROCYTE [DISTWIDTH] IN BLOOD BY AUTOMATED COUNT: 13.2 % (ref 10–15)
HCT VFR BLD AUTO: 37.7 % (ref 40–53)
HGB BLD-MCNC: 12.5 G/DL (ref 13.3–17.7)
LYMPHOCYTES # BLD AUTO: 2.1 10E9/L (ref 0.8–5.3)
LYMPHOCYTES NFR BLD AUTO: 31.5 %
MCH RBC QN AUTO: 32.5 PG (ref 26.5–33)
MCHC RBC AUTO-ENTMCNC: 33.2 G/DL (ref 31.5–36.5)
MCV RBC AUTO: 98 FL (ref 78–100)
MONOCYTES # BLD AUTO: 1 10E9/L (ref 0–1.3)
MONOCYTES NFR BLD AUTO: 14.2 %
NEUTROPHILS # BLD AUTO: 3.4 10E9/L (ref 1.6–8.3)
NEUTROPHILS NFR BLD AUTO: 50.6 %
PLATELET # BLD AUTO: 322 10E9/L (ref 150–450)
RBC # BLD AUTO: 3.85 10E12/L (ref 4.4–5.9)
WBC # BLD AUTO: 6.7 10E9/L (ref 4–11)

## 2017-12-04 PROCEDURE — 85025 COMPLETE CBC W/AUTO DIFF WBC: CPT | Performed by: FAMILY MEDICINE

## 2017-12-04 PROCEDURE — 36415 COLL VENOUS BLD VENIPUNCTURE: CPT | Performed by: FAMILY MEDICINE

## 2017-12-05 ENCOUNTER — TELEPHONE (OUTPATIENT)
Dept: ONCOLOGY | Facility: CLINIC | Age: 47
End: 2017-12-05

## 2017-12-05 NOTE — TELEPHONE ENCOUNTER
Called to check on Joni.  His mom states he was sleeping.  She stated that they re-scheduled the MRI and Dr. Daniels's visit to next week.  She mentioned that he has his days and nights mixed up, this happens from time to time, otherwise offers no new concerns.  Will route to Dr. Daniels.

## 2017-12-07 ENCOUNTER — HOSPITAL ENCOUNTER (OUTPATIENT)
Dept: MRI IMAGING | Facility: CLINIC | Age: 47
Discharge: HOME | End: 2017-12-07
Attending: PSYCHIATRY & NEUROLOGY | Admitting: PSYCHIATRY & NEUROLOGY
Payer: MEDICARE

## 2017-12-07 DIAGNOSIS — C71.9 OLIGODENDROGLIOMA, ANAPLASTIC (H): ICD-10-CM

## 2017-12-07 PROCEDURE — 25000128 H RX IP 250 OP 636: Performed by: PSYCHIATRY & NEUROLOGY

## 2017-12-07 PROCEDURE — A9585 GADOBUTROL INJECTION: HCPCS | Performed by: PSYCHIATRY & NEUROLOGY

## 2017-12-07 PROCEDURE — 70553 MRI BRAIN STEM W/O & W/DYE: CPT

## 2017-12-07 RX ORDER — GADOBUTROL 604.72 MG/ML
8 INJECTION INTRAVENOUS ONCE
Status: COMPLETED | OUTPATIENT
Start: 2017-12-07 | End: 2017-12-07

## 2017-12-07 RX ADMIN — GADOBUTROL 8 ML: 604.72 INJECTION INTRAVENOUS at 15:47

## 2017-12-11 ENCOUNTER — TELEPHONE (OUTPATIENT)
Dept: ONCOLOGY | Facility: CLINIC | Age: 47
End: 2017-12-11

## 2017-12-11 DIAGNOSIS — D70.1 CHEMOTHERAPY INDUCED NEUTROPENIA (H): ICD-10-CM

## 2017-12-11 DIAGNOSIS — T45.1X5A CHEMOTHERAPY INDUCED NEUTROPENIA (H): ICD-10-CM

## 2017-12-11 DIAGNOSIS — C71.9 OLIGODENDROGLIOMA, ANAPLASTIC (H): ICD-10-CM

## 2017-12-11 LAB
BASOPHILS # BLD AUTO: 0 10E9/L (ref 0–0.2)
BASOPHILS NFR BLD AUTO: 0.6 %
DIFFERENTIAL METHOD BLD: ABNORMAL
EOSINOPHIL # BLD AUTO: 0.2 10E9/L (ref 0–0.7)
EOSINOPHIL NFR BLD AUTO: 2.4 %
ERYTHROCYTE [DISTWIDTH] IN BLOOD BY AUTOMATED COUNT: 13.5 % (ref 10–15)
HCT VFR BLD AUTO: 39.5 % (ref 40–53)
HGB BLD-MCNC: 12.9 G/DL (ref 13.3–17.7)
LYMPHOCYTES # BLD AUTO: 1.6 10E9/L (ref 0.8–5.3)
LYMPHOCYTES NFR BLD AUTO: 24.1 %
MCH RBC QN AUTO: 32.5 PG (ref 26.5–33)
MCHC RBC AUTO-ENTMCNC: 32.7 G/DL (ref 31.5–36.5)
MCV RBC AUTO: 100 FL (ref 78–100)
MONOCYTES # BLD AUTO: 0.8 10E9/L (ref 0–1.3)
MONOCYTES NFR BLD AUTO: 11.7 %
NEUTROPHILS # BLD AUTO: 4 10E9/L (ref 1.6–8.3)
NEUTROPHILS NFR BLD AUTO: 61.2 %
PLATELET # BLD AUTO: 266 10E9/L (ref 150–450)
RBC # BLD AUTO: 3.97 10E12/L (ref 4.4–5.9)
WBC # BLD AUTO: 6.6 10E9/L (ref 4–11)

## 2017-12-11 PROCEDURE — 36415 COLL VENOUS BLD VENIPUNCTURE: CPT | Performed by: FAMILY MEDICINE

## 2017-12-11 PROCEDURE — 85025 COMPLETE CBC W/AUTO DIFF WBC: CPT | Performed by: FAMILY MEDICINE

## 2017-12-11 NOTE — TELEPHONE ENCOUNTER
Patient's mother Brittany called this AM stating that her son Joni is scheduled to have a tooth pulled at the dentist tomorrow and she is wondering if he should still start his Temodar tonight. Patient's mother asked for a call back at [phone]. Leeanna Storm RN

## 2017-12-11 NOTE — TELEPHONE ENCOUNTER
Consulted with Dr. Daniels, patient to hold his Temodar tonight. Patient has an exam with Dr. Daniels tomorrow, will clarify with Dr. Daniels tomorrow when to restart Temodar. Leeanna Storm RN

## 2017-12-12 ENCOUNTER — ONCOLOGY VISIT (OUTPATIENT)
Dept: ONCOLOGY | Facility: CLINIC | Age: 47
End: 2017-12-12
Attending: PSYCHIATRY & NEUROLOGY
Payer: COMMERCIAL

## 2017-12-12 VITALS
HEART RATE: 74 BPM | WEIGHT: 183 LBS | BODY MASS INDEX: 27.02 KG/M2 | SYSTOLIC BLOOD PRESSURE: 117 MMHG | OXYGEN SATURATION: 98 % | DIASTOLIC BLOOD PRESSURE: 82 MMHG | RESPIRATION RATE: 20 BRPM | TEMPERATURE: 98.6 F

## 2017-12-12 DIAGNOSIS — Z72.0 TOBACCO ABUSE: ICD-10-CM

## 2017-12-12 DIAGNOSIS — C71.9 OLIGODENDROGLIOMA, ANAPLASTIC (H): Primary | ICD-10-CM

## 2017-12-12 DIAGNOSIS — Z96.89 S/P PLACEMENT OF VNS (VAGUS NERVE STIMULATION) DEVICE: ICD-10-CM

## 2017-12-12 DIAGNOSIS — T45.1X5A CHEMOTHERAPY-INDUCED NAUSEA AND VOMITING: ICD-10-CM

## 2017-12-12 DIAGNOSIS — F41.9 ANXIETY: ICD-10-CM

## 2017-12-12 DIAGNOSIS — D70.1 CHEMOTHERAPY INDUCED NEUTROPENIA (H): ICD-10-CM

## 2017-12-12 DIAGNOSIS — R45.89 DEPRESSED MOOD: ICD-10-CM

## 2017-12-12 DIAGNOSIS — G40.209 PARTIAL EPILEPSY WITH IMPAIRMENT OF CONSCIOUSNESS (H): ICD-10-CM

## 2017-12-12 DIAGNOSIS — Z91.89 HIGH RISK FOR CHEMOTHERAPY-INDUCED INFECTIOUS COMPLICATION: ICD-10-CM

## 2017-12-12 DIAGNOSIS — K02.9 DENTAL CARIES: ICD-10-CM

## 2017-12-12 DIAGNOSIS — R51.9 CHRONIC DAILY HEADACHE: ICD-10-CM

## 2017-12-12 DIAGNOSIS — T45.1X5A CHEMOTHERAPY INDUCED NEUTROPENIA (H): ICD-10-CM

## 2017-12-12 DIAGNOSIS — K08.89 PAIN, DENTAL: ICD-10-CM

## 2017-12-12 DIAGNOSIS — R11.2 CHEMOTHERAPY-INDUCED NAUSEA AND VOMITING: ICD-10-CM

## 2017-12-12 DIAGNOSIS — Z51.11 CHEMOTHERAPY MANAGEMENT, ENCOUNTER FOR: ICD-10-CM

## 2017-12-12 DIAGNOSIS — Z65.9 OTHER SOCIAL STRESSOR: ICD-10-CM

## 2017-12-12 PROCEDURE — 99211 OFF/OP EST MAY X REQ PHY/QHP: CPT

## 2017-12-12 PROCEDURE — 99215 OFFICE O/P EST HI 40 MIN: CPT | Performed by: PSYCHIATRY & NEUROLOGY

## 2017-12-12 ASSESSMENT — PAIN SCALES - GENERAL: PAINLEVEL: NO PAIN (0)

## 2017-12-12 NOTE — PROGRESS NOTES
"Oncology Rooming Note    December 12, 2017 3:20 PM   Joni Borja is a 46 year old male who presents for:    Chief Complaint   Patient presents with     Oncology Clinic Visit     RETURN-3 month follow up-Oligodendroglioma, anaplastic      Initial Vitals: /82 (BP Location: Left arm, Patient Position: Chair, Cuff Size: Adult Regular)  Pulse 74  Resp 20  Wt 83 kg (183 lb)  SpO2 98%  BMI 27.02 kg/m2 Estimated body mass index is 27.02 kg/(m^2) as calculated from the following:    Height as of 11/17/17: 1.753 m (5' 9\").    Weight as of this encounter: 83 kg (183 lb). Body surface area is 2.01 meters squared.  No Pain (0) Comment: Data Unavailable   No LMP for male patient.  Allergies reviewed: Yes  Medications reviewed: Yes    Medications: Medication refills not needed today.  Pharmacy name entered into PLAXD:    [id number] - [place], [place] - [address] [facility] - [place], [place] - [address]  [facility] - [email or web address] PATIENT ASSISTANCE PROGRAM    Clinical concerns: none     5 minutes for nursing intake (face to face time)     Randall Colvin CMA            "
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
## 2017-12-12 NOTE — PATIENT INSTRUCTIONS
Imaging looked great today, stable to improved.   Repeat in 2 months.     Start date for cycle 8 on 12/18.    Labs needed every 2 weeks. Next due the week of [place].     RTC to 4 weeks, prior to starting cycle 9. Can cancel if not needed.  Scheduled/Alyssa Daniels MD  Neuro-oncology  12/12/2017    AVS printed and given to patient/Alyssa
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
## 2017-12-12 NOTE — MR AVS SNAPSHOT
After Visit Summary   12/12/2017    Joni Borja    MRN: [id number]           Patient Information     Date Of Birth          1970        Visit Information        Provider Department      12/12/2017 3:30 PM Berkley Daniels MD [facility]        Today's Diagnoses     Oligodendroglioma, anaplastic (H)    -  1    Chemotherapy induced neutropenia (H)        Other social stressor        Chemotherapy management, encounter for        Chemotherapy-induced nausea and vomiting        S/P placement of VNS (vagus nerve stimulation) device        Partial epilepsy with impairment of consciousness (H)        Dental caries        Depressed mood        Anxiety        Chronic daily headache        Pain, dental        High risk for chemotherapy-induced infectious complication        Tobacco abuse          Care Instructions    Imaging looked great today, stable to improved.   Repeat in 2 months.     Start date for cycle 8 on 12/18.    Labs needed every 2 weeks. Next due the week of Christmas.     RTC to 4 weeks, prior to starting cycle 9. Can cancel if not needed.     Berkley Daniels MD  Neuro-oncology  12/12/2017                       Follow-ups after your visit        Your next 10 appointments already scheduled     Dec 18, 2017  2:00 PM CST   LAB with RV LAB   [facility]    [address] [place] [id number] [phone]           Please do not eat 10-12 hours before your appointment if you are coming in fasting for labs on lipids, cholesterol, or glucose (sugar). This does not apply to pregnant women. Water, hot tea and black coffee (with nothing added) are okay. Do not drink other fluids, diet soda or chew gum.            Jan 09, 2018  4:00 PM CST   Return Visit with Berkley Daniels MD   [facility] ([facility])    [facility]  [address] [facility] [address]  [facility]  [address]   [phone]            Jun 20, 2018  2:00 PM CDT   Return Visit with Silvia Brown MD   [facility]    [address], [address]  [facility] [address] [phone]              Who to contact     If you have questions or need follow up information about today's clinic visit or your schedule please contact [facility] directly at [phone].  Normal or non-critical lab and imaging results will be communicated to you by [employer], letter or phone within 4 business days after the clinic has received the results. If you do not hear from us within 7 days, please contact the clinic through [facility] or phone. If you have a critical or abnormal lab result, we will notify you by phone as soon as possible.  Submit refill requests through [employer] or call your pharmacy and they will forward the refill request to us. Please allow 3 business days for your refill to be completed.          Additional Information About Your Visit        [employer] Information     [employer] gives you secure access to your electronic health record. If you see a primary care provider, you can also send messages to your care team and make appointments. If you have questions, please call your primary care clinic.  If you do not have a primary care provider, please call [phone] and they will assist you.        Care EveryWhere ID     This is your Care EveryWhere ID. This could be used by other organizations to access your [place] medical records  [id number]        Your Vitals Were     Pulse Temperature Respirations Pulse Oximetry BMI (Body Mass Index)       74 98.6  F (37  C) 20 98% 27.02 kg/m2        Blood Pressure from Last 3 Encounters:   12/12/17 117/82   11/17/17 109/76   11/06/17 108/66    Weight from Last 3 Encounters:   12/12/17 83 kg (183 lb)   11/17/17 83.6 kg (184 lb 3.2 oz)   11/06/17 84.3 kg (185 lb 12.8 oz)              Today, you had the following     No orders found  for display       Primary Care Provider Office Phone # Fax #    Brian Coon [phone] [address] [facility] [address]        Equal Access to Services     JONATHANDONNA CYNTHIA : Hadii lexis ku gabrielo Sonilayali, waaxda luqadaha, qaybta kaalmada adevelvet, es solorznaostar de la garza. So [facility] [place] [phone].    ATENCIÓN: Si habla español, tiene a kirby disposición servicios gratuitos de asistencia lingüística. Llame al [phone].    We comply with applicable federal civil rights laws and [place] laws. We do not discriminate on the basis of race, color, national origin, age, disability, sex, sexual orientation, or gender identity.            Thank you!     Thank you for choosing [facility]  for your care. Our goal is always to provide you with excellent care. Hearing back from our patients is one way we can continue to improve our services. Please take a few minutes to complete the written survey that you may receive in the mail after your visit with us. Thank you!             Your Updated Medication List - Protect others around you: Learn how to safely use, store and throw away your medicines at www.disposemymeds.org.          This list is accurate as of: 12/12/17  4:29 PM.  Always use your most recent med list.                   Brand Name Dispense Instructions for use Diagnosis    acetaminophen-codeine 300-30 MG per tablet    TYLENOL #3     Take 1-2 tablets by mouth every 4 hours as needed for moderate pain As needed for headaches        carBAMazepine 300 MG 12 hr capsule    CARBATROL    180 capsule    Take 1 capsule (300 mg) by mouth 2 times daily (at 10:00 & 22:00)    Partial epilepsy with impairment of consciousness, intractable (H)       clonazePAM 0.5 MG tablet    klonoPIN    90 tablet    1 tab in the AM and 2 tabs in PM.    Anxiety       escitalopram 20 MG tablet    LEXAPRO    90 tablet    TAKE ONE TABLET BY MOUTH ONCE DAILY AT  NIGHT    Major depressive  disorder with single episode, in partial remission (H), Anxiety       felbamate 600 MG tablet    FELBATOL    270 tablet    Take 900 mg (1.5 tablet) am and 900 mg (1.5 tablet) 2 pm    Partial epilepsy with impairment of consciousness, intractable (H)       ondansetron 4 MG tablet    ZOFRAN    60 tablet    Take 2 tablets (8 mg) by mouth At Bedtime or 30 minutes prior to chemotherapy dosing. Repeat every 8 hours as needed for nausea.    Chemotherapy management, encounter for, Chemotherapy-induced nausea and vomiting       pantoprazole 40 MG EC tablet    PROTONIX    90 tablet    TAKE ONE TABLET BY MOUTH ONCE DAILY    Gastroesophageal reflux disease without esophagitis       Riboflavin 400 MG Tabs     90 tablet    Take 400 mg by mouth daily    Chronic daily headache       temozolomide 100 MG capsule CHEMO    TEMODAR    20 capsule    Take 4 capsules (400 mg) by mouth daily for 5 days Take ondansetron 30-60 min before temozolomide. Take at bedtime on an empty stomach.    Oligodendroglioma, anaplastic (H)       topiramate 100 MG tablet    TOPAMAX    900 tablet    Take 2 tablets (200 mg) by mouth every morning 200 mg am and 300 mg pm    Partial epilepsy with impairment of consciousness, intractable (H)       VITAMIN B 12 PO      Take 1 tablet by mouth daily (with dinner)        ZYRTEC ALLERGY PO      Take 10 mg by mouth daily as needed (for misquitos.)    Localization-related (focal) (partial) epilepsy and epileptic syndromes with complex partial seizures, with intractable epilepsy, Depression, Anxiety, Unstable gait

## 2017-12-22 RX ORDER — TEMOZOLOMIDE 100 MG/1
200 CAPSULE ORAL DAILY
Qty: 20 CAPSULE | Refills: 0 | Status: SHIPPED | OUTPATIENT
Start: 2017-12-22 | End: 2017-12-27

## 2017-12-26 ENCOUNTER — TELEPHONE (OUTPATIENT)
Dept: INFUSION THERAPY | Facility: CLINIC | Age: 47
End: 2017-12-26

## 2017-12-26 DIAGNOSIS — T45.1X5A CHEMOTHERAPY INDUCED NEUTROPENIA (H): ICD-10-CM

## 2017-12-26 DIAGNOSIS — C71.9 OLIGODENDROGLIOMA, ANAPLASTIC (H): ICD-10-CM

## 2017-12-26 DIAGNOSIS — D70.1 CHEMOTHERAPY INDUCED NEUTROPENIA (H): ICD-10-CM

## 2017-12-26 DIAGNOSIS — C71.9 OLIGODENDROGLIOMA (H): Primary | ICD-10-CM

## 2017-12-26 LAB
BASOPHILS # BLD AUTO: 0 10E9/L (ref 0–0.2)
BASOPHILS NFR BLD AUTO: 0.5 %
DIFFERENTIAL METHOD BLD: ABNORMAL
EOSINOPHIL # BLD AUTO: 0.2 10E9/L (ref 0–0.7)
EOSINOPHIL NFR BLD AUTO: 2.8 %
ERYTHROCYTE [DISTWIDTH] IN BLOOD BY AUTOMATED COUNT: 13.4 % (ref 10–15)
HCT VFR BLD AUTO: 37.7 % (ref 40–53)
HGB BLD-MCNC: 12.8 G/DL (ref 13.3–17.7)
LYMPHOCYTES # BLD AUTO: 1.4 10E9/L (ref 0.8–5.3)
LYMPHOCYTES NFR BLD AUTO: 23.2 %
MCH RBC QN AUTO: 33 PG (ref 26.5–33)
MCHC RBC AUTO-ENTMCNC: 34 G/DL (ref 31.5–36.5)
MCV RBC AUTO: 97 FL (ref 78–100)
MONOCYTES # BLD AUTO: 0.5 10E9/L (ref 0–1.3)
MONOCYTES NFR BLD AUTO: 9 %
NEUTROPHILS # BLD AUTO: 3.9 10E9/L (ref 1.6–8.3)
NEUTROPHILS NFR BLD AUTO: 64.5 %
PLATELET # BLD AUTO: 298 10E9/L (ref 150–450)
RBC # BLD AUTO: 3.88 10E12/L (ref 4.4–5.9)
WBC # BLD AUTO: 6 10E9/L (ref 4–11)

## 2017-12-26 PROCEDURE — 36415 COLL VENOUS BLD VENIPUNCTURE: CPT | Performed by: FAMILY MEDICINE

## 2017-12-26 PROCEDURE — 85025 COMPLETE CBC W/AUTO DIFF WBC: CPT | Performed by: FAMILY MEDICINE

## 2017-12-26 NOTE — TELEPHONE ENCOUNTER
Oral Chemotherapy Monitoring Program.     Patient currently on temodar therapy.     Reviewed labs from 12/26/17     No concerning abnormalities.    Will follow up in 2 week.    Sandro Egan, PharmD, BCOP  December 26, 2017

## 2018-01-01 ENCOUNTER — ONCOLOGY VISIT (OUTPATIENT)
Dept: ONCOLOGY | Facility: CLINIC | Age: 48
End: 2018-01-01
Attending: PSYCHIATRY & NEUROLOGY
Payer: COMMERCIAL

## 2018-01-01 ENCOUNTER — HOSPITAL ENCOUNTER (OUTPATIENT)
Dept: MRI IMAGING | Facility: CLINIC | Age: 48
End: 2018-12-18
Attending: PSYCHIATRY & NEUROLOGY
Payer: MEDICARE

## 2018-01-01 ENCOUNTER — TELEPHONE (OUTPATIENT)
Dept: NEUROSURGERY | Facility: CLINIC | Age: 48
End: 2018-01-01

## 2018-01-01 ENCOUNTER — TELEPHONE (OUTPATIENT)
Dept: ONCOLOGY | Facility: CLINIC | Age: 48
End: 2018-01-01

## 2018-01-01 ENCOUNTER — TRANSFERRED RECORDS (OUTPATIENT)
Dept: HEALTH INFORMATION MANAGEMENT | Facility: CLINIC | Age: 48
End: 2018-01-01

## 2018-01-01 ENCOUNTER — OFFICE VISIT (OUTPATIENT)
Dept: INTERNAL MEDICINE | Facility: OTHER | Age: 48
End: 2018-01-01
Attending: INTERNAL MEDICINE
Payer: MEDICARE

## 2018-01-01 ENCOUNTER — FCC EXTENDED DOCUMENTATION (OUTPATIENT)
Dept: PSYCHOLOGY | Facility: CLINIC | Age: 48
End: 2018-01-01

## 2018-01-01 VITALS
SYSTOLIC BLOOD PRESSURE: 126 MMHG | WEIGHT: 161.13 LBS | DIASTOLIC BLOOD PRESSURE: 70 MMHG | HEART RATE: 80 BPM | BODY MASS INDEX: 23.79 KG/M2 | TEMPERATURE: 97.7 F

## 2018-01-01 VITALS
DIASTOLIC BLOOD PRESSURE: 88 MMHG | SYSTOLIC BLOOD PRESSURE: 137 MMHG | WEIGHT: 162.8 LBS | BODY MASS INDEX: 24.04 KG/M2 | HEART RATE: 52 BPM | TEMPERATURE: 98.3 F | RESPIRATION RATE: 16 BRPM

## 2018-01-01 DIAGNOSIS — F32.1 MAJOR DEPRESSIVE DISORDER, SINGLE EPISODE, MODERATE WITH MELANCHOLIC FEATURES (H): Primary | ICD-10-CM

## 2018-01-01 DIAGNOSIS — H92.01 RIGHT EAR PAIN: Primary | ICD-10-CM

## 2018-01-01 DIAGNOSIS — C71.9 OLIGODENDROGLIOMA, ANAPLASTIC (H): Primary | ICD-10-CM

## 2018-01-01 DIAGNOSIS — G40.219 PARTIAL EPILEPSY WITH IMPAIRMENT OF CONSCIOUSNESS, INTRACTABLE (H): ICD-10-CM

## 2018-01-01 DIAGNOSIS — Z96.89 STATUS POST VNS (VAGUS NERVE STIMULATOR) PLACEMENT: ICD-10-CM

## 2018-01-01 DIAGNOSIS — Z23 NEED FOR PROPHYLACTIC VACCINATION AND INOCULATION AGAINST INFLUENZA: ICD-10-CM

## 2018-01-01 DIAGNOSIS — Z51.11 CHEMOTHERAPY MANAGEMENT, ENCOUNTER FOR: ICD-10-CM

## 2018-01-01 DIAGNOSIS — C71.9 OLIGODENDROGLIOMA (H): ICD-10-CM

## 2018-01-01 DIAGNOSIS — B07.8 COMMON WART: ICD-10-CM

## 2018-01-01 DIAGNOSIS — C71.9 OLIGODENDROGLIOMA, ANAPLASTIC (H): ICD-10-CM

## 2018-01-01 PROCEDURE — G0463 HOSPITAL OUTPT CLINIC VISIT: HCPCS

## 2018-01-01 PROCEDURE — 90686 IIV4 VACC NO PRSV 0.5 ML IM: CPT

## 2018-01-01 PROCEDURE — A9585 GADOBUTROL INJECTION: HCPCS | Performed by: PSYCHIATRY & NEUROLOGY

## 2018-01-01 PROCEDURE — G0463 HOSPITAL OUTPT CLINIC VISIT: HCPCS | Mod: 25

## 2018-01-01 PROCEDURE — 17110 DESTRUCTION B9 LES UP TO 14: CPT | Performed by: INTERNAL MEDICINE

## 2018-01-01 PROCEDURE — 99213 OFFICE O/P EST LOW 20 MIN: CPT | Performed by: INTERNAL MEDICINE

## 2018-01-01 PROCEDURE — 25500064 ZZH RX 255 OP 636: Performed by: PSYCHIATRY & NEUROLOGY

## 2018-01-01 PROCEDURE — 70553 MRI BRAIN STEM W/O & W/DYE: CPT

## 2018-01-01 PROCEDURE — 99215 OFFICE O/P EST HI 40 MIN: CPT | Performed by: PSYCHIATRY & NEUROLOGY

## 2018-01-01 PROCEDURE — G0008 ADMIN INFLUENZA VIRUS VAC: HCPCS

## 2018-01-01 RX ORDER — TOPIRAMATE 100 MG/1
TABLET, FILM COATED ORAL
Qty: 450 TABLET | Refills: 1 | Status: SHIPPED | OUTPATIENT
Start: 2018-01-01 | End: 2019-01-01

## 2018-01-01 RX ORDER — CARBAMAZEPINE 300 MG/1
CAPSULE, EXTENDED RELEASE ORAL
Qty: 180 CAPSULE | Refills: 3 | Status: SHIPPED | OUTPATIENT
Start: 2018-01-01 | End: 2019-01-01

## 2018-01-01 RX ORDER — GADOBUTROL 604.72 MG/ML
7 INJECTION INTRAVENOUS ONCE
Status: COMPLETED | OUTPATIENT
Start: 2018-01-01 | End: 2018-01-01

## 2018-01-01 RX ORDER — CLONAZEPAM 0.5 MG/1
TABLET ORAL
Qty: 90 TABLET | Refills: 5 | Status: SHIPPED | OUTPATIENT
Start: 2018-01-01 | End: 2019-01-01

## 2018-01-01 RX ADMIN — GADOBUTROL 7 ML: 604.72 INJECTION INTRAVENOUS at 11:06

## 2018-01-01 ASSESSMENT — ANXIETY QUESTIONNAIRES
5. BEING SO RESTLESS THAT IT IS HARD TO SIT STILL: NOT AT ALL
3. WORRYING TOO MUCH ABOUT DIFFERENT THINGS: NOT AT ALL
GAD7 TOTAL SCORE: 0
1. FEELING NERVOUS, ANXIOUS, OR ON EDGE: NOT AT ALL
2. NOT BEING ABLE TO STOP OR CONTROL WORRYING: NOT AT ALL
7. FEELING AFRAID AS IF SOMETHING AWFUL MIGHT HAPPEN: NOT AT ALL
6. BECOMING EASILY ANNOYED OR IRRITABLE: NOT AT ALL
IF YOU CHECKED OFF ANY PROBLEMS ON THIS QUESTIONNAIRE, HOW DIFFICULT HAVE THESE PROBLEMS MADE IT FOR YOU TO DO YOUR WORK, TAKE CARE OF THINGS AT HOME, OR GET ALONG WITH OTHER PEOPLE: NOT DIFFICULT AT ALL
GAD7 TOTAL SCORE: 0

## 2018-01-01 ASSESSMENT — ENCOUNTER SYMPTOMS
CHILLS: 0
CONFUSION: 1
FEVER: 0

## 2018-01-01 ASSESSMENT — PAIN SCALES - GENERAL: PAINLEVEL: NO PAIN (0)

## 2018-01-01 ASSESSMENT — PATIENT HEALTH QUESTIONNAIRE - PHQ9
SUM OF ALL RESPONSES TO PHQ QUESTIONS 1-9: 0
SUM OF ALL RESPONSES TO PHQ QUESTIONS 1-9: 25
5. POOR APPETITE OR OVEREATING: NOT AT ALL

## 2018-01-02 NOTE — TELEPHONE ENCOUNTER
Patient Information     Patient Name MRN Joni Escalona [id number] Male 1970      Telephone Encounter by Brenda Robles RN at 2017  1:57 PM     Author:  Brenda Robles RN Service:  (none) Author Type:  NURS- Registered Nurse     Filed:  2017  1:59 PM Encounter Date:  1/3/2017 Status:  Signed     :  Brenda Robles RN (NURS- Registered Nurse)            Call and spoke with wife Eleanor and she states she does not know where the paper RX would be for Klonopin if Dr. Espinoza would please send a new one and also mentioned that Joni needs a refills on Tylenol #3.    BRENDA ROBLES, LISA ....................  2017   1:58 PM

## 2018-01-02 NOTE — TELEPHONE ENCOUNTER
Patient Information     Patient Name MRN Joni Escalona [id number] Male 1970      Telephone Encounter by Brenda Robles RN at 2017  9:06 AM     Author:  Brenda Robles RN Service:  (none) Author Type:  NURS- Registered Nurse     Filed:  2017  9:08 AM Encounter Date:  1/3/2017 Status:  Signed     :  Brenda Robles RN (NURS- Registered Nurse)            Left message with [facility] to have patient/patients wife call us if they speak with them to check and see if they have copy of RX for Klonopin from visit on 16 if not will need to resend to Renato Espinoza MD for review and refill.  BRENDA ROBLES RN ....................  2017   9:08 AM

## 2018-01-02 NOTE — TELEPHONE ENCOUNTER
Patient Information     Patient Name MRN Joni Escalona [id number] Male 1970      Telephone Encounter by Brenda Robles RN at 2017 11:12 AM     Author:  Brenda Robles RN Service:  (none) Author Type:  NURS- Registered Nurse     Filed:  2017 11:13 AM Encounter Date:  2017 Status:  Signed     :  Brenda Robles RN (NURS- Registered Nurse)            Noted as refilled on 16  #90 x 5 refills  Unable to complete prescription refill per RN Medication Refill Policy.................... BRENDA ROBLES RN ....................  2017   11:12 AM

## 2018-01-02 NOTE — NURSING NOTE
"Patient Information     Patient Name MRJoni Wynne [id number] Male 1970      Nursing Note by Jenni Badillo at 2017  3:10 PM     Author:  Jenni Badillo Service:  (none) Author Type:  (none)     Filed:  2017  1:27 PM Encounter Date:  2017 Status:  Signed     :  Jenni Badillo            Patient presents to the clinic for headaches, light sensativity, loss of appetite, decrease in fluid intake, \"feels clammy,\" increase in sleep and hot flashes noted over the past couple of weeks.  Vomiting a couple of times over the past couple of weeks.    Jenni Badillo LPN        2017 12:49 PM          "

## 2018-01-02 NOTE — TELEPHONE ENCOUNTER
Patient Information     Patient Name MRN Joni Escalona [id number] Male 1970      Telephone Encounter by Jenni Badillo at 2017 12:18 PM     Author:  Jenni Badillo Service:  (none) Author Type:  (none)     Filed:  2017 12:18 PM Encounter Date:  1/3/2017 Status:  Signed     :  Jenni Badillo            Signed prescription was faxed to pharmacy.  Jenni Badillo LPN        2017 12:18 PM
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
## 2018-01-03 NOTE — PATIENT INSTRUCTIONS
Patient Information     Patient Name MRJoni Wynne [id number] Male 1970      Patient Instructions by Renato Espinoza MD at 2017  3:10 PM     Author:  Renato Espinoza MD  Service:  (none) Author Type:  Physician     Filed:  2017  1:46 PM  Encounter Date:  2017 Status:  Addendum     :  Renato Espinoza MD (Physician)        Related Notes: Original Note by Renato Espinoza MD (Physician) filed at 2017  1:40 PM            Suspect your symptoms might be related to your body needing less of the Tegretol.    Take 1 tablet of Tegretol in AM and 1 tablet of Tegretol in PM.     -- restart 2 tablet in the evening -- once you start eating more and feeling better.         Balance problems and fatigue --    Vitamin B12 deficiency:    Vitamin B12 deficiency can cause numerous symptoms.  Fatigue and malaise, low energy levels, low blood counts or anemia, poor mood or depression, neuropathy or pins and needles/burning sensation of the hands and feet.    -- trial of B12 shot today.    -- If B12 shot improves your energy and your blood counts, we can do B12 shots every 3-4 weeks up to every 2 or 3 months if needed.    -- Consider B12 tablet or B-complex tablet -- once daily.     -- Some people are able to take and absorb oral B12 or B complex tablets.   -- Some people are NOT able to absorb oral B12 very well.    If you decide to proceed with oral B12 replacement, but your B12 levels do not improve, you likely will need to use B12 shots instead.      Get your MRI of Brain at [facility] -- with your [facility].       Return as needed for follow-up with Dr. Espinoza.    Clinic : [phone]  Appointment line: [phone]

## 2018-01-08 DIAGNOSIS — D70.1 CHEMOTHERAPY INDUCED NEUTROPENIA (H): ICD-10-CM

## 2018-01-08 DIAGNOSIS — C71.9 OLIGODENDROGLIOMA, ANAPLASTIC (H): ICD-10-CM

## 2018-01-08 DIAGNOSIS — T45.1X5A CHEMOTHERAPY INDUCED NEUTROPENIA (H): ICD-10-CM

## 2018-01-08 LAB
BASOPHILS # BLD AUTO: 0 10E9/L (ref 0–0.2)
BASOPHILS NFR BLD AUTO: 0.3 %
DIFFERENTIAL METHOD BLD: ABNORMAL
EOSINOPHIL # BLD AUTO: 0.1 10E9/L (ref 0–0.7)
EOSINOPHIL NFR BLD AUTO: 1.2 %
ERYTHROCYTE [DISTWIDTH] IN BLOOD BY AUTOMATED COUNT: 13.5 % (ref 10–15)
HCT VFR BLD AUTO: 37.4 % (ref 40–53)
HGB BLD-MCNC: 12.1 G/DL (ref 13.3–17.7)
LYMPHOCYTES # BLD AUTO: 1.1 10E9/L (ref 0.8–5.3)
LYMPHOCYTES NFR BLD AUTO: 17.9 %
MCH RBC QN AUTO: 32.4 PG (ref 26.5–33)
MCHC RBC AUTO-ENTMCNC: 32.4 G/DL (ref 31.5–36.5)
MCV RBC AUTO: 100 FL (ref 78–100)
MONOCYTES # BLD AUTO: 0.6 10E9/L (ref 0–1.3)
MONOCYTES NFR BLD AUTO: 10 %
NEUTROPHILS # BLD AUTO: 4.3 10E9/L (ref 1.6–8.3)
NEUTROPHILS NFR BLD AUTO: 70.6 %
PLATELET # BLD AUTO: 288 10E9/L (ref 150–450)
RBC # BLD AUTO: 3.74 10E12/L (ref 4.4–5.9)
WBC # BLD AUTO: 6 10E9/L (ref 4–11)

## 2018-01-08 PROCEDURE — 36415 COLL VENOUS BLD VENIPUNCTURE: CPT | Performed by: FAMILY MEDICINE

## 2018-01-08 PROCEDURE — 85025 COMPLETE CBC W/AUTO DIFF WBC: CPT | Performed by: FAMILY MEDICINE

## 2018-01-09 ENCOUNTER — ONCOLOGY VISIT (OUTPATIENT)
Dept: ONCOLOGY | Facility: CLINIC | Age: 48
End: 2018-01-09
Attending: PSYCHIATRY & NEUROLOGY
Payer: COMMERCIAL

## 2018-01-09 ENCOUNTER — DOCUMENTATION ONLY (OUTPATIENT)
Dept: PHARMACY | Facility: CLINIC | Age: 48
End: 2018-01-09

## 2018-01-09 VITALS
WEIGHT: 178.6 LBS | BODY MASS INDEX: 26.37 KG/M2 | HEART RATE: 72 BPM | RESPIRATION RATE: 16 BRPM | SYSTOLIC BLOOD PRESSURE: 118 MMHG | TEMPERATURE: 98.4 F | OXYGEN SATURATION: 98 % | DIASTOLIC BLOOD PRESSURE: 81 MMHG

## 2018-01-09 DIAGNOSIS — F41.9 ANXIETY: ICD-10-CM

## 2018-01-09 DIAGNOSIS — T45.1X5A CHEMOTHERAPY INDUCED NEUTROPENIA (H): ICD-10-CM

## 2018-01-09 DIAGNOSIS — D70.1 CHEMOTHERAPY INDUCED NEUTROPENIA (H): ICD-10-CM

## 2018-01-09 DIAGNOSIS — G40.209 PARTIAL EPILEPSY WITH IMPAIRMENT OF CONSCIOUSNESS (H): ICD-10-CM

## 2018-01-09 DIAGNOSIS — R51.9 CHRONIC DAILY HEADACHE: ICD-10-CM

## 2018-01-09 DIAGNOSIS — R45.89 DEPRESSED MOOD: ICD-10-CM

## 2018-01-09 DIAGNOSIS — K02.9 DENTAL CARIES: ICD-10-CM

## 2018-01-09 DIAGNOSIS — Z65.9 OTHER SOCIAL STRESSOR: ICD-10-CM

## 2018-01-09 DIAGNOSIS — R11.2 CHEMOTHERAPY-INDUCED NAUSEA AND VOMITING: ICD-10-CM

## 2018-01-09 DIAGNOSIS — Z96.89 S/P PLACEMENT OF VNS (VAGUS NERVE STIMULATION) DEVICE: ICD-10-CM

## 2018-01-09 DIAGNOSIS — Z51.11 CHEMOTHERAPY MANAGEMENT, ENCOUNTER FOR: ICD-10-CM

## 2018-01-09 DIAGNOSIS — Z91.89 HIGH RISK FOR CHEMOTHERAPY-INDUCED INFECTIOUS COMPLICATION: ICD-10-CM

## 2018-01-09 DIAGNOSIS — K08.89 PAIN, DENTAL: ICD-10-CM

## 2018-01-09 DIAGNOSIS — Z72.0 TOBACCO ABUSE: ICD-10-CM

## 2018-01-09 DIAGNOSIS — T45.1X5A CHEMOTHERAPY-INDUCED NAUSEA AND VOMITING: ICD-10-CM

## 2018-01-09 DIAGNOSIS — C71.9 OLIGODENDROGLIOMA, ANAPLASTIC (H): Primary | ICD-10-CM

## 2018-01-09 PROCEDURE — 99215 OFFICE O/P EST HI 40 MIN: CPT | Performed by: PSYCHIATRY & NEUROLOGY

## 2018-01-09 PROCEDURE — G0463 HOSPITAL OUTPT CLINIC VISIT: HCPCS

## 2018-01-09 ASSESSMENT — PAIN SCALES - GENERAL: PAINLEVEL: MODERATE PAIN (4)

## 2018-01-09 NOTE — MR AVS SNAPSHOT
After Visit Summary   1/9/2018    Joni Borja    MRN: [id number]           Patient Information     Date Of Birth          1970        Visit Information        Provider Department      1/9/2018 4:00 PM Berkley Daniels MD [facility]        Today's Diagnoses     Oligodendroglioma, anaplastic (H)    -  1    Chemotherapy induced neutropenia (H)        Other social stressor        Chemotherapy management, encounter for        Chemotherapy-induced nausea and vomiting        S/P placement of VNS (vagus nerve stimulation) device        Partial epilepsy with impairment of consciousness (H)        Dental caries        Anxiety        Depressed mood        Chronic daily headache        Pain, dental        High risk for chemotherapy-induced infectious complication        Tobacco abuse          Care Instructions    Plan for teeth:  1/18 Impression    If holding chemo  1/16 NO chemo  1/31 Teeth pulled  2/20 Next cycle of chemo    If continue with this cycle of chemo  1/16 Next chemo  2/16 Teeth pulled  3/6 Then, delayed start date      For issues with sleep, consider referral to sleep medicine (Dr. Boo).      Berkley Daniels MD  Neuro-oncology  1/9/2018             Follow-ups after your visit        Your next 10 appointments already scheduled     Jan 22, 2018  2:00 PM CST   LAB with [facility] LAB   [facility]    [address] [place] [id number] [phone]           Please do not eat 10-12 hours before your appointment if you are coming in fasting for labs on lipids, cholesterol, or glucose (sugar). This does not apply to pregnant women. Water, hot tea and black coffee (with nothing added) are okay. Do not drink other fluids, diet soda or chew gum.            Jun 20, 2018  2:00 PM CDT   Return Visit with Silvia Brown MD   [facility]    [address], [address]  [place]  [place] [id number] [phone]              Who to contact     If you have questions or need follow up information about today's clinic visit or your schedule please contact [facility] directly at [phone].  Normal or non-critical lab and imaging results will be communicated to you by MyChart, letter or phone within 4 business days after the clinic has received the results. If you do not hear from us within 7 days, please contact the clinic through [employer] or phone. If you have a critical or abnormal lab result, we will notify you by phone as soon as possible.  Submit refill requests through [employer] or call your pharmacy and they will forward the refill request to us. Please allow 3 business days for your refill to be completed.          Additional Information About Your Visit        [employer] [place] Information     [employer] gives you secure access to your electronic health record. If you see a primary care provider, you can also send messages to your care team and make appointments. If you have questions, please call your primary care clinic.  If you do not have a primary care provider, please call [phone] and they will assist you.        Care EveryWhere ID     This is your Care EveryWhere ID. This could be used by other organizations to access your [place] medical records  [id number]        Your Vitals Were     Pulse Temperature Respirations Pulse Oximetry BMI (Body Mass Index)       72 98.4  F (36.9  C) (Oral) 16 98% 26.37 kg/m2        Blood Pressure from Last 3 Encounters:   01/09/18 118/81   12/12/17 117/82   11/17/17 109/76    Weight from Last 3 Encounters:   01/09/18 81 kg (178 lb 9.6 oz)   12/12/17 83 kg (183 lb)   11/17/17 83.6 kg (184 lb 3.2 oz)              Today, you had the following     No orders found for display       Primary Care Provider Office Phone # Fax #    Brian Coon [phone]       [address] [place] [id number]        Equal Access to Services      KAYLIE MARIE : Hadii aad ku pramod Orellana, waaxda luqadaha, qaybta kaalmada marcie, es evens rose mariejona javier venancioyinka arriaga . So [facility] [phone].    ATENCIÓN: Si habla nichole, tiene a kirby disposición servicios gratuitos de asistencia lingüística. Yossiame al [phone].    We comply with applicable federal civil rights laws and [place] laws. We do not discriminate on the basis of race, color, national origin, age, disability, sex, sexual orientation, or gender identity.            Thank you!     Thank you for choosing [facility]  for your care. Our goal is always to provide you with excellent care. Hearing back from our patients is one way we can continue to improve our services. Please take a few minutes to complete the written survey that you may receive in the mail after your visit with us. Thank you!             Your Updated Medication List - Protect others around you: Learn how to safely use, store and throw away your medicines at www.disposemymeds.org.          This list is accurate as of: 1/9/18 10:39 PM.  Always use your most recent med list.                   Brand Name Dispense Instructions for use Diagnosis    acetaminophen-codeine 300-30 MG per tablet    TYLENOL #3     Take 1-2 tablets by mouth every 4 hours as needed for moderate pain As needed for headaches        carBAMazepine 300 MG 12 hr capsule    CARBATROL    180 capsule    Take 1 capsule (300 mg) by mouth 2 times daily (at 10:00 & 22:00)    Partial epilepsy with impairment of consciousness, intractable (H)       clonazePAM 0.5 MG tablet    klonoPIN    90 tablet    1 tab in the AM and 2 tabs in PM.    Anxiety       escitalopram 20 MG tablet    LEXAPRO    90 tablet    TAKE ONE TABLET BY MOUTH ONCE DAILY AT  NIGHT    Major depressive disorder with single episode, in partial remission (H), Anxiety       felbamate 600 MG tablet    FELBATOL    270 tablet    Take 900 mg (1.5 tablet) am and 900 mg (1.5 tablet) 2 pm    Partial  epilepsy with impairment of consciousness, intractable (H)       ondansetron 4 MG tablet    ZOFRAN    60 tablet    Take 2 tablets (8 mg) by mouth At Bedtime or 30 minutes prior to chemotherapy dosing. Repeat every 8 hours as needed for nausea.    Chemotherapy management, encounter for, Chemotherapy-induced nausea and vomiting       pantoprazole 40 MG EC tablet    PROTONIX    90 tablet    TAKE ONE TABLET BY MOUTH ONCE DAILY    Gastroesophageal reflux disease without esophagitis       Riboflavin 400 MG Tabs     90 tablet    Take 400 mg by mouth daily    Chronic daily headache       temozolomide 100 MG capsule CHEMO    TEMODAR    20 capsule    Take 4 capsules (400 mg) by mouth daily for 5 days Take ondansetron 30-60 min before temozolomide. Take at bedtime on an empty stomach.    Oligodendroglioma, anaplastic (H)       topiramate 100 MG tablet    TOPAMAX    900 tablet    Take 2 tablets (200 mg) by mouth every morning 200 mg am and 300 mg pm    Partial epilepsy with impairment of consciousness, intractable (H)       VITAMIN B 12 PO      Take 1 tablet by mouth daily (with dinner)        ZYRTEC ALLERGY PO      Take 10 mg by mouth daily as needed (for misquitos.)    Localization-related (focal) (partial) epilepsy and epileptic syndromes with complex partial seizures, with intractable epilepsy, Depression, Anxiety, Unstable gait
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
## 2018-01-09 NOTE — PROGRESS NOTES
Oral Chemotherapy Monitoring Program.    Patient currently on temodar therapy.    Reviewed labs from 1/8/18.    No concerning abnormalities. Patient has had oral infections. Will verify today if he is ok to proceed with next cycle on 1/15/18    Questions answered to patient's satisfaction.    Will follow up in 2 weeks with repeat labs.    Malinda Morris PharmD  January 9, 2018

## 2018-01-09 NOTE — PATIENT INSTRUCTIONS
Plan for teeth:  1/18 Impression    If holding chemo----Pt did NOT choose this route  1/16 NO chemo  1/31 Teeth pulled  2/20 Next cycle of chemo    If continue with this cycle of chemo-----Pt opted to go this route  1/16 Next chemo--Pt started his chemo on 1-5-18 2/16 Teeth pulled--Pt has an appt on 2-15-18 at 12noon to remove all of his teeth  3/6 Then, delayed start date--Pt to restart Temodar on 3/6 after EXAM appt 3-6-18 w labs.      For issues with sleep, consider referral to sleep medicine (Dr. Boo).      Berkley Daniels MD  Neuro-oncology  1/9/2018

## 2018-01-09 NOTE — PROGRESS NOTES
"Oncology Rooming Note    January 9, 2018 3:49 PM   Joni Borja is a 47 year old male who presents for:    Chief Complaint   Patient presents with     Oncology Clinic Visit     Oligodendroglioma, anaplastic (H)     Initial Vitals: /81 (BP Location: Right arm, Patient Position: Sitting, Cuff Size: Adult Regular)  Pulse 72  Temp 98.4  F (36.9  C) (Oral)  Resp 16  Wt 81 kg (178 lb 9.6 oz)  SpO2 98%  BMI 26.37 kg/m2 Estimated body mass index is 26.37 kg/(m^2) as calculated from the following:    Height as of 11/17/17: 1.753 m (5' 9\").    Weight as of this encounter: 81 kg (178 lb 9.6 oz). Body surface area is 1.99 meters squared.  Moderate Pain (4) Comment: Data Unavailable   No LMP for male patient.  Allergies reviewed: Yes  Medications reviewed: Yes    Medications: MEDICATION REFILLS NEEDED TODAY. Provider was notified. Refill ACETAMINOPHEN-CODINE,  Pharmacy name entered into [facility]:    [id number] - [place], [place] - [address] [facility] - [place], [place] - [address]  [facility] [place] PATIENT ASSISTANCE PROGRAM    Clinical concerns: NO 15 minutes for nursing intake (face to face time)          Linda Phillips MA            "
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
## 2018-01-12 ENCOUNTER — TELEPHONE (OUTPATIENT)
Dept: PHARMACY | Facility: CLINIC | Age: 48
End: 2018-01-12

## 2018-01-12 NOTE — TELEPHONE ENCOUNTER
Patient has been re-approved for free Temodar from [employer], this is good until 12/31/2018. Pt received a supply of temodar the week of 01/08/18.    [facility]    Gaye Jenkins [facility] Oncology Pharmacy Liaison  [phone]

## 2018-01-16 PROBLEM — L30.1 DYSHIDROTIC ECZEMA: Status: ACTIVE | Noted: 2018-01-16

## 2018-01-16 PROBLEM — G47.00 INSOMNIA: Status: ACTIVE | Noted: 2017-02-07

## 2018-01-16 PROBLEM — M91.10 LEGG-CALVE-PERTHES DISEASE: Status: ACTIVE | Noted: 2018-01-16

## 2018-01-16 PROBLEM — K21.9 ESOPHAGEAL REFLUX: Status: ACTIVE | Noted: 2018-01-16

## 2018-01-16 RX ORDER — IBUPROFEN 800 MG/1
800 TABLET, FILM COATED ORAL
COMMUNITY
Start: 2016-06-10 | End: 2018-06-26

## 2018-01-18 ENCOUNTER — DOCUMENTATION ONLY (OUTPATIENT)
Dept: PHARMACY | Facility: CLINIC | Age: 48
End: 2018-01-18

## 2018-01-18 NOTE — PROGRESS NOTES
Oral Chemotherapy Monitoring Program.    Patient currently on temodar therapy.    Called to discuss plan for temodar/tooth extraction. Per Joni's father, he decided to start C9 of temodar on 1/15/18. He is getting impressions right now at the dentist and will know more on what date he is scheduled for that shortly. C10 will likely be delayed until 3/6 per Dr. Daniels's notes.    Will follow up on Monday with labs. Getting labs every 2 weeks currently.    Malinda Morris PharmD  January 18, 2018

## 2018-01-24 ENCOUNTER — DOCUMENTATION ONLY (OUTPATIENT)
Dept: PHARMACY | Facility: CLINIC | Age: 48
End: 2018-01-24

## 2018-01-24 DIAGNOSIS — T45.1X5A CHEMOTHERAPY INDUCED NEUTROPENIA (H): ICD-10-CM

## 2018-01-24 DIAGNOSIS — C71.9 OLIGODENDROGLIOMA, ANAPLASTIC (H): ICD-10-CM

## 2018-01-24 DIAGNOSIS — D70.1 CHEMOTHERAPY INDUCED NEUTROPENIA (H): ICD-10-CM

## 2018-01-24 LAB
BASOPHILS # BLD AUTO: 0 10E9/L (ref 0–0.2)
BASOPHILS NFR BLD AUTO: 0.3 %
DIFFERENTIAL METHOD BLD: ABNORMAL
EOSINOPHIL # BLD AUTO: 0.1 10E9/L (ref 0–0.7)
EOSINOPHIL NFR BLD AUTO: 1.2 %
ERYTHROCYTE [DISTWIDTH] IN BLOOD BY AUTOMATED COUNT: 13 % (ref 10–15)
HCT VFR BLD AUTO: 38.6 % (ref 40–53)
HGB BLD-MCNC: 12.8 G/DL (ref 13.3–17.7)
LYMPHOCYTES # BLD AUTO: 1.5 10E9/L (ref 0.8–5.3)
LYMPHOCYTES NFR BLD AUTO: 21.9 %
MCH RBC QN AUTO: 32.3 PG (ref 26.5–33)
MCHC RBC AUTO-ENTMCNC: 33.2 G/DL (ref 31.5–36.5)
MCV RBC AUTO: 98 FL (ref 78–100)
MONOCYTES # BLD AUTO: 0.7 10E9/L (ref 0–1.3)
MONOCYTES NFR BLD AUTO: 10.7 %
NEUTROPHILS # BLD AUTO: 4.4 10E9/L (ref 1.6–8.3)
NEUTROPHILS NFR BLD AUTO: 65.9 %
PLATELET # BLD AUTO: 260 10E9/L (ref 150–450)
RBC # BLD AUTO: 3.96 10E12/L (ref 4.4–5.9)
WBC # BLD AUTO: 6.6 10E9/L (ref 4–11)

## 2018-01-24 PROCEDURE — 85025 COMPLETE CBC W/AUTO DIFF WBC: CPT | Performed by: FAMILY MEDICINE

## 2018-01-24 PROCEDURE — 36415 COLL VENOUS BLD VENIPUNCTURE: CPT | Performed by: FAMILY MEDICINE

## 2018-01-24 NOTE — PROGRESS NOTES
Oral Chemotherapy Monitoring Program.     Patient currently on adjuvant temodar therapy. C9D10 today, 1/24/18.      Reviewed labs from 1/24/18.     No concerning abnormalities. Patient has tooth extraction coming up. Still TBD.     C10 will likely be delayed until 3/6 per Dr. Daniels's notes.     Will follow up in 2 weeks with repeat labs.     Marcella Malhotra, PharmD  1/24/18

## 2018-01-26 VITALS
DIASTOLIC BLOOD PRESSURE: 80 MMHG | BODY MASS INDEX: 27.88 KG/M2 | TEMPERATURE: 99 F | WEIGHT: 188.25 LBS | HEART RATE: 84 BPM | SYSTOLIC BLOOD PRESSURE: 124 MMHG | HEIGHT: 69 IN

## 2018-01-27 DIAGNOSIS — F41.9 ANXIETY: ICD-10-CM

## 2018-01-28 ASSESSMENT — PATIENT HEALTH QUESTIONNAIRE - PHQ9: SUM OF ALL RESPONSES TO PHQ QUESTIONS 1-9: 0

## 2018-01-29 NOTE — TELEPHONE ENCOUNTER
Controlled Substance Refill Request for   clonazePAM (KLONOPIN) 0.5 MG tablet 90 tablet 2 11/3/2017  --   Si tab in the AM and 2 tabs in PM.   Class: Local Print   Order: [id number]       Problem List Complete:  No     PROVIDER TO CONSIDER COMPLETION OF PROBLEM LIST AND OVERVIEW/CONTROLLED SUBSTANCE AGREEMENT        [facility] Office Visit with [facility] [place]: 2017    Future Office visit:     Controlled substance agreement on file: No.     Processing:  Fax Rx to see above  pharmacy   checked in past 6 months?  No, route to RN     Pepper Leigh RN, BSN  [facility]

## 2018-01-30 RX ORDER — CLONAZEPAM 0.5 MG/1
TABLET ORAL
Qty: 90 TABLET | Refills: 2 | Status: SHIPPED | OUTPATIENT
Start: 2018-02-02 | End: 2018-03-05

## 2018-01-30 RX ORDER — CLONAZEPAM 0.5 MG/1
TABLET ORAL
Qty: 90 TABLET | Refills: 2 | Status: SHIPPED | OUTPATIENT
Start: 2018-01-30 | End: 2018-01-30

## 2018-02-05 ENCOUNTER — DOCUMENTATION ONLY (OUTPATIENT)
Dept: PHARMACY | Facility: CLINIC | Age: 48
End: 2018-02-05

## 2018-02-05 DIAGNOSIS — D70.1 CHEMOTHERAPY INDUCED NEUTROPENIA (H): ICD-10-CM

## 2018-02-05 DIAGNOSIS — T45.1X5A CHEMOTHERAPY INDUCED NEUTROPENIA (H): ICD-10-CM

## 2018-02-05 DIAGNOSIS — C71.9 OLIGODENDROGLIOMA, ANAPLASTIC (H): ICD-10-CM

## 2018-02-05 LAB
BASOPHILS # BLD AUTO: 0 10E9/L (ref 0–0.2)
BASOPHILS NFR BLD AUTO: 0.4 %
DIFFERENTIAL METHOD BLD: ABNORMAL
EOSINOPHIL # BLD AUTO: 0.1 10E9/L (ref 0–0.7)
EOSINOPHIL NFR BLD AUTO: 1 %
ERYTHROCYTE [DISTWIDTH] IN BLOOD BY AUTOMATED COUNT: 13.5 % (ref 10–15)
HCT VFR BLD AUTO: 36.7 % (ref 40–53)
HGB BLD-MCNC: 12.5 G/DL (ref 13.3–17.7)
LYMPHOCYTES # BLD AUTO: 1.6 10E9/L (ref 0.8–5.3)
LYMPHOCYTES NFR BLD AUTO: 22.3 %
MCH RBC QN AUTO: 33.1 PG (ref 26.5–33)
MCHC RBC AUTO-ENTMCNC: 34.1 G/DL (ref 31.5–36.5)
MCV RBC AUTO: 97 FL (ref 78–100)
MONOCYTES # BLD AUTO: 0.9 10E9/L (ref 0–1.3)
MONOCYTES NFR BLD AUTO: 12.1 %
NEUTROPHILS # BLD AUTO: 4.6 10E9/L (ref 1.6–8.3)
NEUTROPHILS NFR BLD AUTO: 64.2 %
PLATELET # BLD AUTO: 305 10E9/L (ref 150–450)
RBC # BLD AUTO: 3.78 10E12/L (ref 4.4–5.9)
WBC # BLD AUTO: 7.1 10E9/L (ref 4–11)

## 2018-02-05 PROCEDURE — 36415 COLL VENOUS BLD VENIPUNCTURE: CPT | Performed by: FAMILY MEDICINE

## 2018-02-05 PROCEDURE — 85025 COMPLETE CBC W/AUTO DIFF WBC: CPT | Performed by: FAMILY MEDICINE

## 2018-02-05 NOTE — PROGRESS NOTES
Oral Chemotherapy Monitoring Program.    Patient currently on temodar therapy.    Reviewed labs from 2/5/18    No concerning abnormalities.    Questions answered to patient's satisfaction.    Will follow up in 1 week with repeat labs.    Malinda Morris PharmD  February 5, 2018

## 2018-02-19 ENCOUNTER — TELEPHONE (OUTPATIENT)
Dept: PSYCHOLOGY | Facility: CLINIC | Age: 48
End: 2018-02-19

## 2018-02-19 ENCOUNTER — DOCUMENTATION ONLY (OUTPATIENT)
Dept: PHARMACY | Facility: CLINIC | Age: 48
End: 2018-02-19

## 2018-02-19 DIAGNOSIS — C71.9 OLIGODENDROGLIOMA, ANAPLASTIC (H): ICD-10-CM

## 2018-02-19 DIAGNOSIS — D70.1 CHEMOTHERAPY INDUCED NEUTROPENIA (H): ICD-10-CM

## 2018-02-19 DIAGNOSIS — T45.1X5A CHEMOTHERAPY INDUCED NEUTROPENIA (H): ICD-10-CM

## 2018-02-19 LAB
BASOPHILS # BLD AUTO: 0 10E9/L (ref 0–0.2)
BASOPHILS NFR BLD AUTO: 0.4 %
DIFFERENTIAL METHOD BLD: ABNORMAL
EOSINOPHIL # BLD AUTO: 0.1 10E9/L (ref 0–0.7)
EOSINOPHIL NFR BLD AUTO: 0.9 %
ERYTHROCYTE [DISTWIDTH] IN BLOOD BY AUTOMATED COUNT: 13 % (ref 10–15)
HCT VFR BLD AUTO: 37.5 % (ref 40–53)
HGB BLD-MCNC: 12.2 G/DL (ref 13.3–17.7)
LYMPHOCYTES # BLD AUTO: 1.4 10E9/L (ref 0.8–5.3)
LYMPHOCYTES NFR BLD AUTO: 17.7 %
MCH RBC QN AUTO: 32.1 PG (ref 26.5–33)
MCHC RBC AUTO-ENTMCNC: 32.5 G/DL (ref 31.5–36.5)
MCV RBC AUTO: 99 FL (ref 78–100)
MONOCYTES # BLD AUTO: 0.7 10E9/L (ref 0–1.3)
MONOCYTES NFR BLD AUTO: 8.6 %
NEUTROPHILS # BLD AUTO: 5.6 10E9/L (ref 1.6–8.3)
NEUTROPHILS NFR BLD AUTO: 72.4 %
PLATELET # BLD AUTO: 268 10E9/L (ref 150–450)
RBC # BLD AUTO: 3.8 10E12/L (ref 4.4–5.9)
WBC # BLD AUTO: 7.7 10E9/L (ref 4–11)

## 2018-02-19 PROCEDURE — 36415 COLL VENOUS BLD VENIPUNCTURE: CPT | Performed by: FAMILY MEDICINE

## 2018-02-19 PROCEDURE — 85025 COMPLETE CBC W/AUTO DIFF WBC: CPT | Performed by: FAMILY MEDICINE

## 2018-02-19 NOTE — PROGRESS NOTES
Oral Chemotherapy Monitoring Program.    Patient currently on Temodar therapy.    Reviewed lab results from 2/19/18.    There were no concerning abnormalities.    Will follow up in 2 weeks    Rodríguez Smith PharmD  Oral Chemotherapy Program

## 2018-02-20 ENCOUNTER — TELEPHONE (OUTPATIENT)
Dept: ONCOLOGY | Facility: CLINIC | Age: 48
End: 2018-02-20

## 2018-02-20 ENCOUNTER — TELEPHONE (OUTPATIENT)
Dept: PHARMACY | Facility: CLINIC | Age: 48
End: 2018-02-20

## 2018-02-20 DIAGNOSIS — Z79.899 LONG TERM USE OF DRUG: Primary | ICD-10-CM

## 2018-02-20 NOTE — TELEPHONE ENCOUNTER
Received a call from patient that he has a new address starting now and would like to have the Temodar delivered to that address. New address is [address] [place], [place] [address] [address]. I confirmed with patient that he is due to restart on 3/6/18. Patient also confirmed that he has an appt that day with  but was under the impression that if his lab work was fine he did not need to attend the appointment.    I explained that I wasn't sure that was the case and that I would have 's nurse call him back to confirm this. Patient said he is driving to this new address with his friend and should be in the car for the next hour or so.    Please call the patient at [phone].

## 2018-02-21 NOTE — TELEPHONE ENCOUNTER
"Was informed by Pharmacy Liaison Gaye of the following:     Received a call from patient that he has a new address starting now and would like to have the Temodar delivered to that address. New [place] [address] [place] [address]. I confirmed with patient that he is due to restart on 3/6/18. Patient also confirmed that he has an appt that day with  but was under the impression that if his lab work was fine he did not need to attend the appointment.     I explained that I wasn't sure that was the case and that I would have 's nurse call him back to confirm this. Patient said he is driving to this new address with his friend and should be in the car for the next hour or so.     Please call the patient at [phone].           Writer called Pt's mother Brittany & confirmed the above to be accurate & that Pt's move just came about today (18).  Pt is going to be living in an apt with some of his childhood friends 3.5 hours away.  Brittany reports that Pt has behavior has become more \"bull-headed\" & that Pt's insight & judgement are poor.  Pt did proceed with his oral surgery & is due to restart his TMZ on 3-6-18 after he sees .  Pt's last brain MRI imaging was done in December so Pt is due again in April.    Discussed this with  & Pt can choose from the followin.) Continue with po chemo Temodar.           * Needs to have a local oncologist & a local neurologist as well as weekly labs (labs possibly at [facility])      2.) Can stop po chemo Temodar          * would need repeat brain MRI imaging in early April.    Will call Pt to see which direction he would like to proceed with.  "

## 2018-02-23 NOTE — TELEPHONE ENCOUNTER
Patient called stating he had not hear back regarding his request for the Temodar to be delivered to his new address. I reviewed phone encounter and provided the 2 options. He would like a call back on Monday or Tuesday from Dr. Daniels or Dr. Daniels's nurse Sunitha.    I will route call to Sunitha RN to review and follow upas needed. Lynn Montgomery

## 2018-02-24 DIAGNOSIS — F41.9 ANXIETY: ICD-10-CM

## 2018-02-24 DIAGNOSIS — F32.4 MAJOR DEPRESSIVE DISORDER WITH SINGLE EPISODE, IN PARTIAL REMISSION (H): ICD-10-CM

## 2018-02-24 NOTE — TELEPHONE ENCOUNTER
"Requested Prescriptions   Pending Prescriptions Disp Refills     escitalopram (LEXAPRO) 20 MG tablet  Last Written Prescription Date:  8/24/17  Last Fill Quantity: 90 TABLET,  # refills: 1   Last office visit: 3/10/17 with prescribing provider:  WERO   Future Office Visit:   Next 5 appointments (look out 90 days)     Mar 06, 2018  1:30 PM CST   Return Visit with Berkley Daniels MD   [facility] ([facility])    [facility]  [address]  [facility] [address]   [phone]            Mar 08, 2018  2:00 PM CST   Office Visit with Renato Espinoza MD   [facility] ([facility])    [address]  [place] [address]   [phone]                  90 tablet 1    SSRIs Protocol Failed    2/24/2018  8:15 AM       Failed - PHQ-9 score less than 5 in past 6 months    The PHQ-9 criteria is meant to fail. It requires a PHQ-9 score review  PHQ-9 SCORE 7/6/2017 8/24/2017 9/20/2017   Total Score - - -   Total Score MyChart - - -   Total Score 7 9 6     TONI-7 SCORE 7/6/2017 8/24/2017 9/20/2017   Total Score 15 10 6                Passed - Patient is age 18 or older       Passed - Recent (6 mo) or future visit with authorizing provider's specialty    Patient had office visit in the last 6 months or has a visit in the next 30 days with authorizing provider.  See \"Patient Info\" tab in inbasket, or \"Choose Columns\" in Meds & Orders section of the refill encounter.              "

## 2018-02-26 NOTE — TELEPHONE ENCOUNTER
Noted that Pt called & attempted to reach Pt but had to LVM for Pt to call back to discuss Temodar decision.

## 2018-02-27 NOTE — TELEPHONE ENCOUNTER
Attempted to reach Pt again on his cell phone but his voicemail was full & unable to take any messages.    Was able to reach Pt's mother Brittany & she will text Pt to call back to our clinic.

## 2018-02-28 DIAGNOSIS — C71.9 OLIGODENDROGLIOMA, ANAPLASTIC (H): Primary | ICD-10-CM

## 2018-02-28 RX ORDER — ESCITALOPRAM OXALATE 20 MG/1
TABLET ORAL
Qty: 30 TABLET | Refills: 0 | Status: SHIPPED | OUTPATIENT
Start: 2018-02-28 | End: 2018-03-05

## 2018-02-28 NOTE — TELEPHONE ENCOUNTER
Pt returned call & Writer reviewed with him the two options between continuing Temodar vs stopping it.  Pt said that he would like to continue with taking his Temodar & would like it to be delivered to his [place] address.     Explained to Pt that he, therefore, needs to have a local oncologist & a local neurologist as well as weekly labs (labs possibly at [facility]).    Pt said that he called & re-established care with his PCP at [facility] in [facility], Dr.Darin Espinoza (ph: [phone]) & he has an appt for Thursday, 3-8-18.    Notified Pt that he will continue to need weekly labs for his Temodar & to have them drawn at [facility] prior to his appt with Dr.Darin Espinoza--due for CBCpd & CMP.   Explained the importance of making sure that  examines his mouth due to his extensive oral surgery & that Pt needs his labs drawn--BEFORE Pt can be cleared to take his next cycle of Temodar.    Was able to get Pt transferred to [place] Oncologist Dr. Serena Coyle at [facility] (ph: [phone])--appt Wed 3-14-18 1pm.    Per :  Will reach out to Pt's Epileptologist  to assist Pt with transfer of care to a Neurology Specialist of Epileptologist closer to [place], [place].    Updated both Pt & his mother Brittany & they verbalized understanding.

## 2018-02-28 NOTE — TELEPHONE ENCOUNTER
Patient due for fasting px - no future appt scheduled  30 day supply sent    Gina Roberts RN  [facility]

## 2018-03-01 RX ORDER — TEMOZOLOMIDE 100 MG/1
200 CAPSULE ORAL DAILY
Qty: 20 CAPSULE | Refills: 0 | Status: SHIPPED | OUTPATIENT
Start: 2018-03-01 | End: 2018-03-06

## 2018-03-02 ENCOUNTER — TELEPHONE (OUTPATIENT)
Dept: INTERNAL MEDICINE | Facility: OTHER | Age: 48
End: 2018-03-02

## 2018-03-02 NOTE — TELEPHONE ENCOUNTER
FYI:  Patient moved by to [place], [place].  Patient will be following with Dr. Coyle to manage his Temodar.  Patient had his upper teeth removed for dentures and patient needs his mouth checked to confirm that his mouth is healing okay.  Next office visit is on 3-8-18, it is okay to wait until then.      Jenni Badillo LPN 3/2/2018 11:24 AM

## 2018-03-02 NOTE — TELEPHONE ENCOUNTER
Called Dr.Darin Espinoza' office ([phone]) & left ms with Kat to have ' nurse call back as Pt is transferring his care back up there.

## 2018-03-02 NOTE — TELEPHONE ENCOUNTER
Received call back, gave report on Pt's brain cancer, VNS, Temodar & recent oral surgery--needing mouth assessed to see if healed so he can restart Temodar.

## 2018-03-02 NOTE — TELEPHONE ENCOUNTER
Patient offered appointment time on 3-5-18, and accepted.      Jenni Badillo LPN 3/2/2018 1:55 PM

## 2018-03-05 ENCOUNTER — TELEPHONE (OUTPATIENT)
Dept: ONCOLOGY | Facility: CLINIC | Age: 48
End: 2018-03-05

## 2018-03-05 ENCOUNTER — OFFICE VISIT (OUTPATIENT)
Dept: INTERNAL MEDICINE | Facility: OTHER | Age: 48
End: 2018-03-05
Attending: INTERNAL MEDICINE
Payer: MEDICARE

## 2018-03-05 ENCOUNTER — TELEPHONE (OUTPATIENT)
Dept: INTERNAL MEDICINE | Facility: OTHER | Age: 48
End: 2018-03-05

## 2018-03-05 VITALS
DIASTOLIC BLOOD PRESSURE: 74 MMHG | BODY MASS INDEX: 25.03 KG/M2 | SYSTOLIC BLOOD PRESSURE: 128 MMHG | HEART RATE: 72 BPM | WEIGHT: 169.5 LBS

## 2018-03-05 DIAGNOSIS — Z79.899 MEDICAL MARIJUANA USE: ICD-10-CM

## 2018-03-05 DIAGNOSIS — Z98.890 STATUS POST CRANIOTOMY: ICD-10-CM

## 2018-03-05 DIAGNOSIS — F41.1 ANXIETY STATE: Primary | ICD-10-CM

## 2018-03-05 DIAGNOSIS — T45.1X5A CHEMOTHERAPY INDUCED NEUTROPENIA (H): ICD-10-CM

## 2018-03-05 DIAGNOSIS — C71.9 OLIGODENDROGLIOMA, ANAPLASTIC (H): ICD-10-CM

## 2018-03-05 DIAGNOSIS — Z72.0 TOBACCO USE: ICD-10-CM

## 2018-03-05 DIAGNOSIS — Z79.899 LONG TERM USE OF DRUG: ICD-10-CM

## 2018-03-05 DIAGNOSIS — Z96.89 STATUS POST VNS (VAGUS NERVE STIMULATOR) PLACEMENT: ICD-10-CM

## 2018-03-05 DIAGNOSIS — Z91.89 DRIVING SAFETY ISSUE: ICD-10-CM

## 2018-03-05 DIAGNOSIS — D70.1 CHEMOTHERAPY INDUCED NEUTROPENIA (H): ICD-10-CM

## 2018-03-05 DIAGNOSIS — R47.82 FLUENCY DISORDER ASSOCIATED WITH UNDERLYING DISEASE: ICD-10-CM

## 2018-03-05 DIAGNOSIS — Z79.899 LONG TERM USE OF DRUG: Primary | ICD-10-CM

## 2018-03-05 DIAGNOSIS — Z86.59 HISTORY OF DEPRESSION: ICD-10-CM

## 2018-03-05 DIAGNOSIS — G40.219 PARTIAL EPILEPSY WITH IMPAIRMENT OF CONSCIOUSNESS, INTRACTABLE (H): ICD-10-CM

## 2018-03-05 PROBLEM — M91.10 LEGG-CALVE-PERTHES DISEASE: Status: RESOLVED | Noted: 2018-01-16 | Resolved: 2018-03-05

## 2018-03-05 PROBLEM — K21.9 GASTROESOPHAGEAL REFLUX DISEASE: Status: RESOLVED | Noted: 2017-02-07 | Resolved: 2018-03-05

## 2018-03-05 LAB
ALBUMIN SERPL-MCNC: 3.7 G/DL (ref 3.5–5.7)
ALP SERPL-CCNC: 88 U/L (ref 34–104)
ALT SERPL W P-5'-P-CCNC: 12 U/L (ref 7–52)
ANION GAP SERPL CALCULATED.3IONS-SCNC: 5 MMOL/L (ref 3–14)
AST SERPL W P-5'-P-CCNC: 12 U/L (ref 13–39)
BASOPHILS # BLD AUTO: 0.1 10E9/L (ref 0–0.2)
BASOPHILS NFR BLD AUTO: 1.1 %
BILIRUB SERPL-MCNC: 0.4 MG/DL (ref 0.3–1)
BUN SERPL-MCNC: 22 MG/DL (ref 7–25)
CALCIUM SERPL-MCNC: 8.6 MG/DL (ref 8.6–10.3)
CHLORIDE SERPL-SCNC: 113 MMOL/L (ref 98–107)
CO2 SERPL-SCNC: 24 MMOL/L (ref 21–31)
CREAT SERPL-MCNC: 0.98 MG/DL (ref 0.7–1.3)
DIFFERENTIAL METHOD BLD: ABNORMAL
EOSINOPHIL # BLD AUTO: 0.1 10E9/L (ref 0–0.7)
EOSINOPHIL NFR BLD AUTO: 1.1 %
ERYTHROCYTE [DISTWIDTH] IN BLOOD BY AUTOMATED COUNT: 14 % (ref 10–15)
GFR SERPL CREATININE-BSD FRML MDRD: 82 ML/MIN/1.7M2
GLUCOSE SERPL-MCNC: 92 MG/DL (ref 70–105)
HCT VFR BLD AUTO: 35.8 % (ref 40–53)
HGB BLD-MCNC: 11.9 G/DL (ref 13.3–17.7)
IMM GRANULOCYTES # BLD: 0 10E9/L (ref 0–0.4)
IMM GRANULOCYTES NFR BLD: 0.2 %
LYMPHOCYTES # BLD AUTO: 0.9 10E9/L (ref 0.8–5.3)
LYMPHOCYTES NFR BLD AUTO: 19.2 %
MCH RBC QN AUTO: 32.1 PG (ref 26.5–33)
MCHC RBC AUTO-ENTMCNC: 33.2 G/DL (ref 31.5–36.5)
MCV RBC AUTO: 97 FL (ref 78–100)
MONOCYTES # BLD AUTO: 0.6 10E9/L (ref 0–1.3)
MONOCYTES NFR BLD AUTO: 13.7 %
NEUTROPHILS # BLD AUTO: 3 10E9/L (ref 1.6–8.3)
NEUTROPHILS NFR BLD AUTO: 64.7 %
PLATELET # BLD AUTO: 305 10E9/L (ref 150–450)
POTASSIUM SERPL-SCNC: 4 MMOL/L (ref 3.5–5.1)
PROT SERPL-MCNC: 7.1 G/DL (ref 6.4–8.9)
RBC # BLD AUTO: 3.71 10E12/L (ref 4.4–5.9)
SODIUM SERPL-SCNC: 142 MMOL/L (ref 134–144)
WBC # BLD AUTO: 4.7 10E9/L (ref 4–11)

## 2018-03-05 PROCEDURE — 80053 COMPREHEN METABOLIC PANEL: CPT | Performed by: PSYCHIATRY & NEUROLOGY

## 2018-03-05 PROCEDURE — 85025 COMPLETE CBC W/AUTO DIFF WBC: CPT | Performed by: PSYCHIATRY & NEUROLOGY

## 2018-03-05 PROCEDURE — 36415 COLL VENOUS BLD VENIPUNCTURE: CPT | Performed by: PSYCHIATRY & NEUROLOGY

## 2018-03-05 PROCEDURE — G0463 HOSPITAL OUTPT CLINIC VISIT: HCPCS

## 2018-03-05 PROCEDURE — 99214 OFFICE O/P EST MOD 30 MIN: CPT | Performed by: INTERNAL MEDICINE

## 2018-03-05 RX ORDER — ESCITALOPRAM OXALATE 20 MG/1
TABLET ORAL
Qty: 90 TABLET | Refills: 3 | Status: SHIPPED | OUTPATIENT
Start: 2018-03-05 | End: 2018-08-14

## 2018-03-05 RX ORDER — CLONAZEPAM 0.5 MG/1
TABLET ORAL
Qty: 90 TABLET | Refills: 5 | Status: SHIPPED | OUTPATIENT
Start: 2018-03-05 | End: 2018-05-07

## 2018-03-05 ASSESSMENT — ENCOUNTER SYMPTOMS
VOMITING: 0
FATIGUE: 0
MYALGIAS: 0
DYSURIA: 0
DIARRHEA: 0
NAUSEA: 0
HEMATURIA: 0
PALPITATIONS: 0
ABDOMINAL PAIN: 0
FEVER: 0
BRUISES/BLEEDS EASILY: 0
CHILLS: 0
NERVOUS/ANXIOUS: 1
SPEECH DIFFICULTY: 1
COUGH: 0
ARTHRALGIAS: 0
WHEEZING: 0
SEIZURES: 1
AGITATION: 0
SHORTNESS OF BREATH: 0
DIZZINESS: 0
EYE PAIN: 0

## 2018-03-05 ASSESSMENT — PATIENT HEALTH QUESTIONNAIRE - PHQ9: 5. POOR APPETITE OR OVEREATING: NOT AT ALL

## 2018-03-05 ASSESSMENT — ANXIETY QUESTIONNAIRES
IF YOU CHECKED OFF ANY PROBLEMS ON THIS QUESTIONNAIRE, HOW DIFFICULT HAVE THESE PROBLEMS MADE IT FOR YOU TO DO YOUR WORK, TAKE CARE OF THINGS AT HOME, OR GET ALONG WITH OTHER PEOPLE: NOT DIFFICULT AT ALL
3. WORRYING TOO MUCH ABOUT DIFFERENT THINGS: NOT AT ALL
2. NOT BEING ABLE TO STOP OR CONTROL WORRYING: NOT AT ALL
5. BEING SO RESTLESS THAT IT IS HARD TO SIT STILL: NOT AT ALL
6. BECOMING EASILY ANNOYED OR IRRITABLE: NOT AT ALL
7. FEELING AFRAID AS IF SOMETHING AWFUL MIGHT HAPPEN: NOT AT ALL
GAD7 TOTAL SCORE: 0
1. FEELING NERVOUS, ANXIOUS, OR ON EDGE: NOT AT ALL

## 2018-03-05 ASSESSMENT — PAIN SCALES - GENERAL: PAINLEVEL: NO PAIN (0)

## 2018-03-05 NOTE — NURSING NOTE
Patient presents to the clinic for hospital follow up.      Jenni Badillo LPN 3/5/2018 10:06 AM

## 2018-03-05 NOTE — PATIENT INSTRUCTIONS
1. Partial epilepsy with impairment of consciousness, intractable (H)  - clonazePAM (KLONOPIN) 0.5 MG tablet; TAKE ONE TABLET BY MOUTH ONCE DAILY IN THE MORNING AND TWO ONCE DAILY IN THE EVENING  Dispense: 90 tablet; Refill: 5    2. Status post VNS (vagus nerve stimulator) placement - for epilepsy    3. Oligodendroglioma, anaplastic (H)    4. Status post craniotomy    5. Tobacco use  QUIT SMOKING!!     -- Choose a quit date (within 1 month). Quitting smoking abruptly is more successful than gradually cutting back.   -- Tell everyone about it (friends, family, coworkers)   -- Think about when yousmoke the most, and what you'll do during those times (eg when in the car, work breaks, etc)     Consider:   -- Start varenicline (Chantix) 1 week before your quit date   -- Start bupropion (Wellbutrin/Zyban) 1 week before your quit date -- Welbutrin 1 pill daily for 1 week then 1 pill twice daily      -- Stop smoking onquit date   -- Starting with quit date, use nicotine lozenges/gum as needed for cravings     -- Quit Plan - Call them in the next 1-2 weeks to help you quitsmoking.                        [phone] [phone]                        http://www.quitplan.com   -- http://smokefree.gov/       6. History of depression  - escitalopram (LEXAPRO) 20 MG tablet; TAKE ONE TABLET BY MOUTH ONCE DAILY AT  NIGHT  Dispense: 90 tablet; Refill: 3    7. Medical marijuana use    8. Fluency disorder associated with underlying disease    9. Anxiety state  - clonazePAM (KLONOPIN) 0.5 MG tablet; TAKE ONE TABLET BY MOUTH ONCE DAILY IN THE MORNING AND TWO ONCE DAILY IN THE EVENING  Dispense: 90 tablet; Refill: 5  - escitalopram (LEXAPRO) 20 MG tablet; TAKE ONE TABLET BY MOUTH ONCE DAILY AT  NIGHT  Dispense: 90 tablet; Refill: 3      - NO Driving - Still having seizures    Return in approximately 6 month(s), or sooner as needed for follow-up with Dr. Espinoza.  -- follow-up [facility] : [phone]  Appointment line:  [phone]

## 2018-03-05 NOTE — TELEPHONE ENCOUNTER
Pt was seen today at [facility] in [place], [place].  Called & LVM for Dr. Espinoza' nurse to see if  can please document on Pt's mouth healing status post surgical removal of all of Pt's top teeth (for a top denture) to see if Pt is cleared to restart his po chemo Temodar for his brain cancer. (ph: [phone])
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
## 2018-03-05 NOTE — MR AVS SNAPSHOT
After Visit Summary   3/5/2018    Joni Borja    MRN: [id number]           Patient Information     Date Of Birth          1970        Visit Information        Provider Department      3/5/2018 9:20 AM Renato Espinoza MD [facility]        Today's Diagnoses     Anxiety state    -  1    Partial epilepsy with impairment of consciousness, intractable (H)        Status post VNS (vagus nerve stimulator) placement - for epilepsy        Oligodendroglioma, anaplastic (H)        Status post craniotomy        Tobacco use        History of depression        Medical marijuana use        Fluency disorder associated with underlying disease        Driving safety issue - NO Driving - Still having seizures          Care Instructions    1. Partial epilepsy with impairment of consciousness, intractable (H)  - clonazePAM (KLONOPIN) 0.5 MG tablet; TAKE ONE TABLET BY MOUTH ONCE DAILY IN THE MORNING AND TWO ONCE DAILY IN THE EVENING  Dispense: 90 tablet; Refill: 5    2. Status post VNS (vagus nerve stimulator) placement - for epilepsy    3. Oligodendroglioma, anaplastic (H)    4. Status post craniotomy    5. Tobacco use  QUIT SMOKING!!     -- Choose a quit date (within 1 month). Quitting smoking abruptly is more successful than gradually cutting back.   -- Tell everyone about it (friends, family, coworkers)   -- Think about when yousmoke the most, and what you'll do during those times (eg when in the car, work breaks, etc)     Consider:   -- Start varenicline (Chantix) 1 week before your quit date   -- Start bupropion (Wellbutrin/Zyban) 1 week before your quit date -- Welbutrin 1 pill daily for 1 week then 1 pill twice daily      -- Stop smoking onquit date   -- Starting with quit date, use nicotine lozenges/gum as needed for cravings     -- Quit Plan - Call them in the next 1-2 weeks to help you quitsmoking.                        3-270-989-PLAN ([phone])                         http://www.quitplan.com   -- http://smokefree.gov/       6. History of depression  - escitalopram (LEXAPRO) 20 MG tablet; TAKE ONE TABLET BY MOUTH ONCE DAILY AT  NIGHT  Dispense: 90 tablet; Refill: 3    7. Medical marijuana use    8. Fluency disorder associated with underlying disease    9. Anxiety state  - clonazePAM (KLONOPIN) 0.5 MG tablet; TAKE ONE TABLET BY MOUTH ONCE DAILY IN THE MORNING AND TWO ONCE DAILY IN THE EVENING  Dispense: 90 tablet; Refill: 5  - escitalopram (LEXAPRO) 20 MG tablet; TAKE ONE TABLET BY MOUTH ONCE DAILY AT  NIGHT  Dispense: 90 tablet; Refill: 3      - NO Driving - Still having seizures    Return in approximately 6 month(s), or sooner as needed for follow-up with Dr. Espinoza.  -- follow-up [facility] : [phone]  Appointment line: [phone]            Follow-ups after your visit        Follow-up notes from your care team     Return in about 6 months (around 9/5/2018) for -- follow-up Klonopin.      Your next 10 appointments already scheduled     Mar 14, 2018  1:00 PM CDT   New Visit with Serena Coyle MD   [facility] ([facility])    [address]  [place] [facility] [address] [phone]            Jun 20, 2018  2:00 PM CDT   Return Visit with Silvia Brown MD   [facility] ([facility])    [address], [address]  [facility] [address] [phone]              Who to contact     If you have questions or need follow up information about today's clinic visit or your schedule please contact [facility] directly at [phone].  Normal or non-critical lab and imaging results will be communicated to you by MyChart, letter or phone within 4 business days after the clinic has received the results. If you do not hear from us within 7 days, please contact the clinic through MyChart or phone. If you have a critical or abnormal lab result, we will notify you by  phone as soon as possible.  Submit refill requests through [employer] or call your pharmacy and they will forward the refill request to us. Please allow 3 business days for your refill to be completed.          Additional Information About Your Visit        [email or web address] Information     [employer] gives you secure access to your electronic health record. If you see a primary care provider, you can also send messages to your care team and make appointments. If you have questions, please call your primary care clinic.  If you do not have a primary care provider, please call [phone] and they will assist you.        Care EveryWhere ID     This is your Care EveryWhere ID. This could be used by other organizations to access your [facility] medical records  [id number]        Your Vitals Were     Pulse BMI (Body Mass Index)                72 25.03 kg/m2           Blood Pressure from Last 3 Encounters:   03/05/18 128/74   01/09/18 118/81   12/12/17 117/82    Weight from Last 3 Encounters:   03/05/18 169 lb 8 oz (76.9 kg)   01/09/18 178 lb 9.6 oz (81 kg)   12/12/17 183 lb (83 kg)              Today, you had the following     No orders found for display         Where to get your medicines      These medications were sent to [facility] [address] [place] [address]     Phone:  [phone]     escitalopram 20 MG tablet         Some of these will need a paper prescription and others can be bought over the counter.  Ask your nurse if you have questions.     Bring a paper prescription for each of these medications     clonazePAM 0.5 MG tablet          Primary Care Provider Office Phone # Fax #    Brian Coon [phone]       [address] [place] [address]        Equal Access to Services     [facility] CYNTHIA AH: Abril Orellana, yuan day, es rodas. So [facility]  [phone].    ATENCIÓN: Si lo varela, tiene a kirby disposición servicios gratuitos de asistencia lingüística. Sixto sanchez [phone].    We comply with applicable federal civil rights laws and [place] laws. We do not discriminate on the basis of race, color, national origin, age, disability, sex, sexual orientation, or gender identity.            Thank you!     Thank you for choosing [facility]  for your care. Our goal is always to provide you with excellent care. Hearing back from our patients is one way we can continue to improve our services. Please take a few minutes to complete the written survey that you may receive in the mail after your visit with us. Thank you!             Your Updated Medication List - Protect others around you: Learn how to safely use, store and throw away your medicines at www.disposemymeds.org.          This list is accurate as of 3/5/18 11:05 AM.  Always use your most recent med list.                   Brand Name Dispense Instructions for use Diagnosis    acetaminophen-codeine 300-30 MG per tablet    TYLENOL #3     Take 1-2 tablets by mouth every 4 hours as needed for moderate pain As needed for headaches        carBAMazepine 300 MG 12 hr capsule    CARBATROL    180 capsule    Take 1 capsule (300 mg) by mouth 2 times daily (at 10:00 & 22:00)    Partial epilepsy with impairment of consciousness, intractable (H)       clonazePAM 0.5 MG tablet    klonoPIN    90 tablet    TAKE ONE TABLET BY MOUTH ONCE DAILY IN THE MORNING AND TWO ONCE DAILY IN THE EVENING    Anxiety state, Partial epilepsy with impairment of consciousness, intractable (H)       escitalopram 20 MG tablet    LEXAPRO    90 tablet    TAKE ONE TABLET BY MOUTH ONCE DAILY AT  NIGHT    History of depression, Anxiety state       felbamate 600 MG tablet    FELBATOL    270 tablet    Take 900 mg (1.5 tablet) am and 900 mg (1.5 tablet) 2 pm    Partial epilepsy with impairment of consciousness, intractable  (H)       ibuprofen 800 MG tablet    ADVIL/MOTRIN     Take 800 mg by mouth        ondansetron 4 MG tablet    ZOFRAN    60 tablet    Take 2 tablets (8 mg) by mouth At Bedtime or 30 minutes prior to chemotherapy dosing. Repeat every 8 hours as needed for nausea.    Chemotherapy management, encounter for, Chemotherapy-induced nausea and vomiting       pantoprazole 40 MG EC tablet    PROTONIX    90 tablet    TAKE ONE TABLET BY MOUTH ONCE DAILY    Gastroesophageal reflux disease without esophagitis       Riboflavin 400 MG Tabs     90 tablet    Take 400 mg by mouth daily    Chronic daily headache       temozolomide 100 MG capsule CHEMO    TEMODAR    20 capsule    Take 4 capsules (400 mg) by mouth daily for 5 days Take ondansetron 30-60 min before temozolomide. Take at bedtime on an empty stomach.    Oligodendroglioma, anaplastic (H)       topiramate 100 MG tablet    TOPAMAX    900 tablet    Take 2 tablets (200 mg) by mouth every morning 200 mg am and 300 mg pm    Partial epilepsy with impairment of consciousness, intractable (H)       VITAMIN B 12 PO      Take 1 tablet by mouth daily (with dinner)        ZYRTEC ALLERGY PO      Take 10 mg by mouth daily as needed (for misquitos.)    Localization-related (focal) (partial) epilepsy and epileptic syndromes with complex partial seizures, with intractable epilepsy, Depression, Anxiety, Unstable gait

## 2018-03-05 NOTE — TELEPHONE ENCOUNTER
Contacted chauncey at his oncology treatment facility and she is hoping that Renato Espinoza MD checked his mouth and the sores at his appointment today. They need to know if his sores have healed and that the patient is able to start chemo again. Please address.  ALANNA VILLA LPN 3/5/2018 3:50 PM

## 2018-03-05 NOTE — LETTER
Joni Borja  [address]  [facility] [address]    3/6/2018      Dear Joni Borja,    The result of your recent tests are included below:    Results for orders placed or performed in visit on 03/05/18   CBC with platelets differential   Result Value Ref Range    WBC 4.7 4.0 - 11.0 10e9/L    RBC Count 3.71 (L) 4.4 - 5.9 10e12/L    Hemoglobin 11.9 (L) 13.3 - 17.7 g/dL    Hematocrit 35.8 (L) 40.0 - 53.0 %    MCV 97 78 - 100 fl    MCH 32.1 26.5 - 33.0 pg    MCHC 33.2 31.5 - 36.5 g/dL    RDW 14.0 10.0 - 15.0 %    Platelet Count 305 150 - 450 10e9/L    Diff Method Automated Method     % Neutrophils 64.7 %    % Lymphocytes 19.2 %    % Monocytes 13.7 %    % Eosinophils 1.1 %    % Basophils 1.1 %    % Immature Granulocytes 0.2 %    Absolute Neutrophil 3.0 1.6 - 8.3 10e9/L    Absolute Lymphocytes 0.9 0.8 - 5.3 10e9/L    Absolute Monocytes 0.6 0.0 - 1.3 10e9/L    Absolute Eosinophils 0.1 0.0 - 0.7 10e9/L    Absolute Basophils 0.1 0.0 - 0.2 10e9/L    Abs Immature Granulocytes 0.0 0 - 0.4 10e9/L   Comprehensive metabolic panel (BMP + Alb, Alk Phos, ALT, AST, Total. Bili, TP)   Result Value Ref Range    Sodium 142 134 - 144 mmol/L    Potassium 4.0 3.5 - 5.1 mmol/L    Chloride 113 (H) 98 - 107 mmol/L    Carbon Dioxide 24 21 - 31 mmol/L    Anion Gap 5 3 - 14 mmol/L    Glucose 92 70 - 105 mg/dL    Urea Nitrogen 22 7 - 25 mg/dL    Creatinine 0.98 0.70 - 1.30 mg/dL    GFR Estimate 82 >60 mL/min/1.7m2    GFR Estimate If Black >90 >60 mL/min/1.7m2    Calcium 8.6 8.6 - 10.3 mg/dL    Bilirubin Total 0.4 0.3 - 1.0 mg/dL    Albumin 3.7 3.5 - 5.7 g/dL    Protein Total 7.1 6.4 - 8.9 g/dL    Alkaline Phosphatase 88 34 - 104 U/L    ALT 12 7 - 52 U/L    AST 12 (L) 13 - 39 U/L       If you have any further questions or problems, please contact my office at [phone] and schedule an appointment.    Clinic : [phone]  Appointment line: [phone]     Thank you,    Renato Espinoza MD    Internal Medicine  [facility]     Reviewed and electronically signed by provider.

## 2018-03-06 ASSESSMENT — ANXIETY QUESTIONNAIRES: GAD7 TOTAL SCORE: 0

## 2018-03-06 ASSESSMENT — PATIENT HEALTH QUESTIONNAIRE - PHQ9: SUM OF ALL RESPONSES TO PHQ QUESTIONS 1-9: 0

## 2018-03-06 NOTE — TELEPHONE ENCOUNTER
Notified oncology nurse. Patient called back again, he wanting his lab results mailed to him.    Swati Byrne LPN on 3/6/2018 at 9:45 AM

## 2018-03-06 NOTE — TELEPHONE ENCOUNTER
Returned call to Kathia, Nurse in oncology. I let her know, Dr. Espinoza is unsure what the criteria for the sores is for continuing the chemo. He didn't see the sores before. Sunitha suggested he speak with Dr. Daniels. Please advise.    Swati Byrne LPN on 3/6/2018 at 9:01 AM

## 2018-03-08 ENCOUNTER — DOCUMENTATION ONLY (OUTPATIENT)
Dept: PHARMACY | Facility: CLINIC | Age: 48
End: 2018-03-08

## 2018-03-08 NOTE — PROGRESS NOTES
Oral Chemotherapy Monitoring Program.    Patient currently on Temodar therapy.    Reviewed lab results from 3/5/2017.    No concerning abnormalities.    Will follow up in 2 weeks. Make sure patient is set up with [facility] up [place]. Gaye reaching out to provider's team up there to make sure they know how to order.    Scot Wolff, PharmD Student    Malinda Morris PharmD  March 9, 2018

## 2018-03-13 ENCOUNTER — TELEPHONE (OUTPATIENT)
Dept: ONCOLOGY | Facility: OTHER | Age: 48
End: 2018-03-13

## 2018-03-13 DIAGNOSIS — G40.219 PARTIAL EPILEPSY WITH IMPAIRMENT OF CONSCIOUSNESS, INTRACTABLE (H): Primary | ICD-10-CM

## 2018-03-13 DIAGNOSIS — Z86.59 HISTORY OF DEPRESSION: ICD-10-CM

## 2018-03-13 DIAGNOSIS — C71.9 OLIGODENDROGLIOMA (H): ICD-10-CM

## 2018-03-13 NOTE — TELEPHONE ENCOUNTER
Would like call from Dr. Espinoza office ASAP. Would like psychiatrist referral. To internal med to address. Lashon Richard RN...........3/13/2018 4:00 PM

## 2018-03-13 NOTE — TELEPHONE ENCOUNTER
ASP-Pts mother would like to share some info prior to Joni's appt tomorrow. Thank You.   Beatriz Pagan

## 2018-03-14 ENCOUNTER — ONCOLOGY VISIT (OUTPATIENT)
Dept: ONCOLOGY | Facility: OTHER | Age: 48
End: 2018-03-14
Attending: INTERNAL MEDICINE
Payer: MEDICARE

## 2018-03-14 VITALS
BODY MASS INDEX: 25.21 KG/M2 | DIASTOLIC BLOOD PRESSURE: 84 MMHG | HEART RATE: 69 BPM | SYSTOLIC BLOOD PRESSURE: 110 MMHG | HEIGHT: 69 IN | TEMPERATURE: 98.5 F | WEIGHT: 170.2 LBS

## 2018-03-14 DIAGNOSIS — C71.9 OLIGODENDROGLIOMA, ANAPLASTIC (H): Primary | ICD-10-CM

## 2018-03-14 DIAGNOSIS — K21.9 GASTROESOPHAGEAL REFLUX DISEASE WITHOUT ESOPHAGITIS: ICD-10-CM

## 2018-03-14 LAB
ALBUMIN SERPL-MCNC: 3.8 G/DL (ref 3.5–5.7)
ALP SERPL-CCNC: 104 U/L (ref 34–104)
ALT SERPL W P-5'-P-CCNC: 18 U/L (ref 7–52)
ANION GAP SERPL CALCULATED.3IONS-SCNC: 3 MMOL/L (ref 3–14)
AST SERPL W P-5'-P-CCNC: 17 U/L (ref 13–39)
BASOPHILS # BLD AUTO: 0.1 10E9/L (ref 0–0.2)
BASOPHILS NFR BLD AUTO: 1.3 %
BILIRUB SERPL-MCNC: 0.3 MG/DL (ref 0.3–1)
BUN SERPL-MCNC: 18 MG/DL (ref 7–25)
CALCIUM SERPL-MCNC: 8.6 MG/DL (ref 8.6–10.3)
CHLORIDE SERPL-SCNC: 112 MMOL/L (ref 98–107)
CO2 SERPL-SCNC: 25 MMOL/L (ref 21–31)
CREAT SERPL-MCNC: 1.01 MG/DL (ref 0.7–1.3)
DIFFERENTIAL METHOD BLD: ABNORMAL
EOSINOPHIL # BLD AUTO: 0.1 10E9/L (ref 0–0.7)
EOSINOPHIL NFR BLD AUTO: 1.3 %
ERYTHROCYTE [DISTWIDTH] IN BLOOD BY AUTOMATED COUNT: 13.6 % (ref 10–15)
GFR SERPL CREATININE-BSD FRML MDRD: 79 ML/MIN/1.7M2
GLUCOSE SERPL-MCNC: 87 MG/DL (ref 70–105)
HCT VFR BLD AUTO: 39.4 % (ref 40–53)
HGB BLD-MCNC: 13.1 G/DL (ref 13.3–17.7)
IMM GRANULOCYTES # BLD: 0 10E9/L (ref 0–0.4)
IMM GRANULOCYTES NFR BLD: 0.4 %
LDH SERPL L TO P-CCNC: 167 U/L (ref 140–271)
LYMPHOCYTES # BLD AUTO: 1.2 10E9/L (ref 0.8–5.3)
LYMPHOCYTES NFR BLD AUTO: 22.4 %
MCH RBC QN AUTO: 32.7 PG (ref 26.5–33)
MCHC RBC AUTO-ENTMCNC: 33.2 G/DL (ref 31.5–36.5)
MCV RBC AUTO: 98 FL (ref 78–100)
MONOCYTES # BLD AUTO: 0.5 10E9/L (ref 0–1.3)
MONOCYTES NFR BLD AUTO: 9.8 %
NEUTROPHILS # BLD AUTO: 3.5 10E9/L (ref 1.6–8.3)
NEUTROPHILS NFR BLD AUTO: 64.8 %
PLATELET # BLD AUTO: 296 10E9/L (ref 150–450)
POTASSIUM SERPL-SCNC: 3.8 MMOL/L (ref 3.5–5.1)
PROT SERPL-MCNC: 6.8 G/DL (ref 6.4–8.9)
RBC # BLD AUTO: 4.01 10E12/L (ref 4.4–5.9)
SODIUM SERPL-SCNC: 140 MMOL/L (ref 134–144)
WBC # BLD AUTO: 5.4 10E9/L (ref 4–11)

## 2018-03-14 PROCEDURE — 99204 OFFICE O/P NEW MOD 45 MIN: CPT | Performed by: INTERNAL MEDICINE

## 2018-03-14 PROCEDURE — 36415 COLL VENOUS BLD VENIPUNCTURE: CPT | Performed by: INTERNAL MEDICINE

## 2018-03-14 PROCEDURE — 83615 LACTATE (LD) (LDH) ENZYME: CPT | Performed by: INTERNAL MEDICINE

## 2018-03-14 PROCEDURE — 80053 COMPREHEN METABOLIC PANEL: CPT | Performed by: INTERNAL MEDICINE

## 2018-03-14 PROCEDURE — 85025 COMPLETE CBC W/AUTO DIFF WBC: CPT | Performed by: INTERNAL MEDICINE

## 2018-03-14 PROCEDURE — G0463 HOSPITAL OUTPT CLINIC VISIT: HCPCS

## 2018-03-14 RX ORDER — PANTOPRAZOLE SODIUM 40 MG/1
TABLET, DELAYED RELEASE ORAL
Qty: 90 TABLET | Refills: 3 | Status: SHIPPED | OUTPATIENT
Start: 2018-03-14 | End: 2019-01-01

## 2018-03-14 ASSESSMENT — PAIN SCALES - GENERAL: PAINLEVEL: MODERATE PAIN (5)

## 2018-03-14 NOTE — NURSING NOTE
Patient presents for a consult regarding Oligodendroglioma.  No current concerns or complaints.   Sanjana Marquez CMA ([facility])................ 3/14/2018 1:20 PM

## 2018-03-14 NOTE — TELEPHONE ENCOUNTER
Mother would like patient to have a psychiatrist referral.  Mother informed that there are multiple local mental health providers that she can call them.  Mother informed that local information can be given to patient an Oncology visit later today.      Jenni Badillo LPN 3/14/2018 10:34 AM

## 2018-03-14 NOTE — TELEPHONE ENCOUNTER
Noted.  No referrals are required for the local psychiatry/psychology providers.  It is patient or family initiated.  Renato Espinoza

## 2018-03-14 NOTE — TELEPHONE ENCOUNTER
Prescription approved per [facility] Refill Protocol.  Kesha Murphy RN on 3/14/2018 at 12:37 PM

## 2018-03-14 NOTE — MR AVS SNAPSHOT
After Visit Summary   3/14/2018    Joni Borja    MRN: [id number]           Patient Information     Date Of Birth          1970        Visit Information        Provider Department      3/14/2018 1:00 PM Serena Coyle MD [facility]        Today's Diagnoses     Oligodendroglioma, anaplastic (H)    -  1       Follow-ups after your visit        Your next 10 appointments already scheduled     Jun 20, 2018  2:00 PM CDT   Return Visit with Slivia Brown MD   [facility]    [address], [address]  [facility] [address] [phone]              Future tests that were ordered for you today     Open Standing Orders        Priority Remaining Interval Expires Ordered    CBC with platelets differential Routine 99/99 weekly 3/14/2019 3/14/2018            Who to contact     If you have questions or need follow up information about today's clinic visit or your schedule please contact [facility] [place] directly at [phone].  Normal or non-critical lab and imaging results will be communicated to you by [employer], letter or phone within 4 business days after the clinic has received the results. If you do not hear from us within 7 days, please contact the clinic through [employer] or phone. If you have a critical or abnormal lab result, we will notify you by phone as soon as possible.  Submit refill requests through [employer] ! or call your pharmacy and they will forward the refill request to us. Please allow 3 business days for your refill to be completed.          Additional Information About Your Visit        [employer] Information     [employer] ! gives you secure access to your electronic health record. If you see a primary care provider, you can also send messages to your care team and make appointments. If you have questions, please call your primary care clinic.  If you do not have a primary care provider, please call  "[phone] and they will assist you.        Care EveryWhere ID     This is your Care EveryWhere ID. This could be used by other organizations to access your [facility] medical records  [id number]        Your Vitals Were     Pulse Temperature Height BMI (Body Mass Index)          69 98.5  F (36.9  C) (Oral) 1.753 m (5' 9.02\") 25.12 kg/m2         Blood Pressure from Last 3 Encounters:   03/14/18 110/84   03/05/18 128/74   01/09/18 118/81    Weight from Last 3 Encounters:   03/14/18 77.2 kg (170 lb 3.2 oz)   03/05/18 76.9 kg (169 lb 8 oz)   01/09/18 81 kg (178 lb 9.6 oz)              We Performed the Following     CBC with platelets differential     Comprehensive metabolic panel     Lactate Dehydrogenase          Where to get your medicines      These medications were sent to [facility] [address] [place] [address]     Phone:  [phone]     pantoprazole 40 MG EC tablet          Primary Care Provider Office Phone # Fax #    Brian Coon [phone]       [address] [place] [address]        Equal Access to Services     KAYLIE MARIE AH: Hadii lexis mirza hadasho Soomaali, waaxda luqadaha, qaybta kaalmada adeegyada, es de la garza. So [facility] [phone].    ATENCIÓN: Si habla español, tiene a kirby disposición servicios gratuitos de asistencia lingüística. ame al [phone].    We comply with applicable federal civil rights laws and [place] laws. We do not discriminate on the basis of race, color, national origin, age, disability, sex, sexual orientation, or gender identity.            Thank you!     Thank you for choosing [facility]  for your care. Our goal is always to provide you with excellent care. Hearing back from our patients is one way we can continue to improve our services. Please take a few minutes to complete the written survey that you may receive in the " mail after your visit with us. Thank you!             Your Updated Medication List - Protect others around you: Learn how to safely use, store and throw away your medicines at www.disposemymeds.org.          This list is accurate as of 3/14/18  5:45 PM.  Always use your most recent med list.                   Brand Name Dispense Instructions for use Diagnosis    acetaminophen-codeine 300-30 MG per tablet    TYLENOL #3     Take 1-2 tablets by mouth every 4 hours as needed for moderate pain As needed for headaches        carBAMazepine 300 MG 12 hr capsule    CARBATROL    180 capsule    Take 1 capsule (300 mg) by mouth 2 times daily (at 10:00 & 22:00)    Partial epilepsy with impairment of consciousness, intractable (H)       clonazePAM 0.5 MG tablet    klonoPIN    90 tablet    TAKE ONE TABLET BY MOUTH ONCE DAILY IN THE MORNING AND TWO ONCE DAILY IN THE EVENING    Anxiety state, Partial epilepsy with impairment of consciousness, intractable (H)       escitalopram 20 MG tablet    LEXAPRO    90 tablet    TAKE ONE TABLET BY MOUTH ONCE DAILY AT  NIGHT    History of depression, Anxiety state       felbamate 600 MG tablet    FELBATOL    270 tablet    Take 900 mg (1.5 tablet) am and 900 mg (1.5 tablet) 2 pm    Partial epilepsy with impairment of consciousness, intractable (H)       ibuprofen 800 MG tablet    ADVIL/MOTRIN     Take 800 mg by mouth        ondansetron 4 MG tablet    ZOFRAN    60 tablet    Take 2 tablets (8 mg) by mouth At Bedtime or 30 minutes prior to chemotherapy dosing. Repeat every 8 hours as needed for nausea.    Chemotherapy management, encounter for, Chemotherapy-induced nausea and vomiting       pantoprazole 40 MG EC tablet    PROTONIX    90 tablet    TAKE ONE TABLET BY MOUTH ONCE DAILY    Gastroesophageal reflux disease without esophagitis       Riboflavin 400 MG Tabs     90 tablet    Take 400 mg by mouth daily    Chronic daily headache       temozolomide 100 MG capsule CHEMO    TEMODAR    20 capsule     Take 4 capsules (400 mg) by mouth daily for 5 days Take ondansetron 30-60 min before temozolomide. Take at bedtime on an empty stomach.    Oligodendroglioma, anaplastic (H)       topiramate 100 MG tablet    TOPAMAX    900 tablet    Take 2 tablets (200 mg) by mouth every morning 200 mg am and 300 mg pm    Partial epilepsy with impairment of consciousness, intractable (H)       VITAMIN B 12 PO      Take 1 tablet by mouth daily (with dinner)        ZYRTEC ALLERGY PO      Take 10 mg by mouth daily as needed (for misquitos.)    Localization-related (focal) (partial) epilepsy and epileptic syndromes with complex partial seizures, with intractable epilepsy, Depression, Anxiety, Unstable gait

## 2018-03-15 NOTE — PROGRESS NOTES
Visit Date:   03/14/2018      REASON FOR CONSULTATION:  Anaplastic oligodendroglioma.        REFERRING PHYSICIAN:  The patient is self-referred, but is a patient of Dr. Berkley Daniels, neuro-oncologist at the [facility], Dr. Silvia Brown of the [facility].  Also, Dr. Esipnoza.      HISTORY OF PRESENT ILLNESS:  Mr. Borja is a 47-year-old white male with a prior history of depression and anxiety we are asked to evaluate concerning ongoing treatment for malignantly transformed anaplastic oligodendroglioma.  The patient states that he is being followed by Dr. Daniels and Dr. Brown at the  and was being cared for in the [facility].  Now, he has decided to move up here with a new girlfriend and try to establish care with us.  Apparently, he wears a VNS device which can only be used in specialized MRIs of the brain.  His history is as follows:  He apparently had developed initially a seizure on 04/22/2002.  At that time, he was diagnosed with a right frontal oligodendroglioma grade 2 after a biopsy.  The patient was treated with radiation therapy alone and then he underwent resection in 2007 with whole brain radiation, chemotherapy with his PCP, which is procarbazine, CCNU, vincristine.  From 06/2007 to 07/2008 his pathologic evaluation of the tumor demonstrated abnormalities of 1p and 19q.  He was tried on multiple seizure medications, including carbamazepine, which is his best medication for his seizures.  The patient subsequently underwent a right frontal lobe resection on 09/19/2013 at the [facility] by Dr. Linder.  Surgical pathology on 09/18/2013 revealed the majority of cells demonstrated an astrocytic morphology rather than that of oligodendrocytes.  Immunohistochemical staining recognized the common mutation 90, H1 and was positive and many cells indicated these cells were neoplastic.  On MRI of the brain, there was still residual tumor or T2 signal change posterior  to the tumor resection cavity.  This portion of the brain was not removed.  Prior to surgery, he had 10 seizures per day and after surgery he has 1-2 seizures per week.  Subsequently, in 02/2017 he was found to have a new contrast-enhancing lesion, prompting a third surgery.  Pathology was most consistent with malignantly transformed anaplastic oligodendroglioma.  He was seen by Dr. Berkley Daniels, who placed him on adjuvant temozolomide.  He has been on this since 05/09/2017.  He just completed cycle #8, which was delayed due to a dental infection.  He says his last MRI of the brain at the  was normal.  This was performed on 12/07/2017.  It was a stable exam; no evidence for tumor progression.  The patient states he is due to have another MRI of the brain in April.  He has followup appointments with Dr. Daniels as well as Dr. Brown.  We are willing to follow him with Dr. Daniels in terms of weekly CBCs, but we have recommended that he ideally be seen by his neuro-oncologist to have MRIs of the brain done there due to the VNS device, which the MRI equipment here is inferior and cannot be utilized and it is probably reasonable to maintain a relationship with Dr. Daniels and Dr. Brown.  The patient is in agreement at this point.  Otherwise, he has no other complaints.  He denies any seizure activity, any fevers, night sweats, weight loss, any change in mental status.  He is a poor historian and tends to ramble on different tangents.      PAST MEDICAL HISTORY:  As above, history of anxiety/depression, partial epilepsy, status post VNS vagus nerve stimulator placement for epilepsy, tobacco use, medical marijuana use.      ALLERGIES:  Include DILANTIN, MOSQUITOES.        MEDICATIONS:  Medications he is currently on include:   1.  Protonix 40 mg daily.   2.  Klonopin 0.5 mg in a.m., 1 mg in the p.m.   3.  Lexapro 20 mg daily.   4.  Temodar 400 mg by mouth daily for 5 days of a 28-day cycle.   5.  Ibuprofen 800 mg by mouth p.r.n.     6.  Riboflavin 400 mg daily.   7.      8.  Topamax 200 mg in the morning and 300 mg in the p.m.    9.  Carbamazepine 300 mg 2 times daily.   10.  Zofran 8 mg at bedtime and p.r.n.   11.  Tylenol with codeine #3, q. 4 hours p.r.n.   12.  Vitamin B12 1 tablet daily.   13.  Zyrtec 10 mg by mouth daily p.r.n.      SOCIAL HISTORY:  He smokes at least a pack per day for at least 20 years.  He also smokes marijuana.  Alcohol is negative.  He is currently disabled.  He worked for a local [employer] company.      FAMILY HISTORY:  Noncontributory.      REVIEW OF SYSTEMS:  As per HPI.      PHYSICAL EXAMINATION:   GENERAL:  He is a middle-aged white male in no acute distress.   VITAL SIGNS:  Blood pressure 110/84, pulse 69, temperature 98.5.   HEENT:  Atraumatic and normocephalic.  Oropharynx is nonerythematous.   NECK:  Supple.   LUNGS:  Clear to auscultation and percussion.   HEART:  Regular rhythm, S1, S2 normal.   ABDOMEN:  Soft, normoactive bowel sounds.  No mass, nontender.   LYMPHATICS:  No cervical, supraclavicular, axillary or inguinal nodes.   EXTREMITIES:  Without edema.   NEUROLOGIC:  Grossly nonfocal.      IMPRESSION:  Recurrent anaplastic oligodendroglioma, currently on adjuvant temozolomide.  He is in the midst of his eighth cycle.  We have encouraged the patient to go back to Dr. Daniels and have MRIs done at the , as well as followup with Dr. Brown and Dr. Daniels.  He will continue weekly CBCs here and can work with the [facility] to monitor CBCs, but otherwise the primary decision making should be made at the  as we do have inferior MRI equipment here as compared to the  and we do not have any neuroradiologist.  Therefore, the patient is in agreement and wants to continue to follow up with the [facility].  We will continue weekly CBCs.      Forty minutes was spent with the patient, greater than half the time was spent on counseling and coordination of care.         ROBERT SUN MD             D:  2018   T: 2018   MT: INDIO      Name:     MADHU HAYES   MRN:      [id number]        Account:      [id number]   :      1970           Visit Date:   2018      Document: [id number]       cc: Renato Daniels MD

## 2018-03-21 ENCOUNTER — TELEPHONE (OUTPATIENT)
Dept: ONCOLOGY | Facility: CLINIC | Age: 48
End: 2018-03-21

## 2018-03-21 DIAGNOSIS — Z79.899 ENCOUNTER FOR LONG-TERM (CURRENT) USE OF MEDICATIONS: Primary | ICD-10-CM

## 2018-03-21 DIAGNOSIS — C71.9 OLIGODENDROGLIOMA, ANAPLASTIC (H): ICD-10-CM

## 2018-03-21 NOTE — TELEPHONE ENCOUNTER
Spoke with Pt to get an update on where he is living & whether he plans to follow-up with  here or with an Oncologist (Friday) in [place].    Pt's preference is to follow-up with  & do his brain MRI imaging at the [facility] rather than in [place].    Will update  to further advise.

## 2018-03-22 ENCOUNTER — TELEPHONE (OUTPATIENT)
Dept: ONCOLOGY | Facility: CLINIC | Age: 48
End: 2018-03-22

## 2018-03-22 NOTE — TELEPHONE ENCOUNTER
LVM for Pt that he is actually due for his Brain MRI & exam with  on 4-24-18 due to his last Temodar cycle beginning 3-5-18.    Requested Pt to call back.

## 2018-03-22 NOTE — TELEPHONE ENCOUNTER
Per : could see him back on 4/10 or 5/8 prior to starting his next cycle of chemotherapy + a same day MRI.    Called Pt & he has divorce court up there in [place] on 4-9-18 so 5-8-18 would be better.    Pt is no longer living with same friend as his current address in [place].  He said that he is staying very temporarily with another friend so he does not have a current address to give us for his Temodar but he agreed to call back as soon as he does have one.  Pt said that he last took his (5-day) Temodar:  3/5/18-3/10/18. (?: 3/6-3/10 or 3/5-3/9)    Pt said he missed a call from [facility] yesterday--strongly encouraged Pt to call them back & follow-up with Epileptologist  especially because he has a VNS stimulator.    Will set up Brain MRI (w VNS stimulator management) at [facility] along with a follow-up with  on 5-8-18.    Pt verbalized agreement with this plan & will call back with an address update.

## 2018-03-22 NOTE — TELEPHONE ENCOUNTER
Left voicemail message requesting patient return call regarding scheduling MRI and follow up appointment with DR Daniels.

## 2018-03-22 NOTE — TELEPHONE ENCOUNTER
Sent the following staff msg to scheduling pool:    Can you please assist with scheduling this Pt to have a Brain MRI (with VNS--vagus nerve stimulator--device turn off/on) along with a follow-up hours later with  on Tuesday 5-8

## 2018-03-22 NOTE — TELEPHONE ENCOUNTER
Was able to get an address for Pt's next Temodar delivery at a friend's home:  [address], [place] [address].    Also reminded Pt that he needs to have weekly labs drawn (CBCpd) at [facility] in [place].  Pt said that he will have them drawn tomorrow.    Will clarify w  date she would like Pt to come down here for Brain MRI & exam appt with her as Pt's next Temodar cycles are due: 4-2-18 & 4-30-18 then will have scheduling assist.

## 2018-03-23 DIAGNOSIS — D70.1 CHEMOTHERAPY INDUCED NEUTROPENIA (H): ICD-10-CM

## 2018-03-23 DIAGNOSIS — T45.1X5A CHEMOTHERAPY INDUCED NEUTROPENIA (H): ICD-10-CM

## 2018-03-23 DIAGNOSIS — C71.9 OLIGODENDROGLIOMA, ANAPLASTIC (H): ICD-10-CM

## 2018-03-23 LAB
BASOPHILS # BLD AUTO: 0 10E9/L (ref 0–0.2)
BASOPHILS NFR BLD AUTO: 0.5 %
DIFFERENTIAL METHOD BLD: ABNORMAL
EOSINOPHIL # BLD AUTO: 0 10E9/L (ref 0–0.7)
EOSINOPHIL NFR BLD AUTO: 0.5 %
ERYTHROCYTE [DISTWIDTH] IN BLOOD BY AUTOMATED COUNT: 13.5 % (ref 10–15)
HCT VFR BLD AUTO: 38.5 % (ref 40–53)
HGB BLD-MCNC: 12.9 G/DL (ref 13.3–17.7)
IMM GRANULOCYTES # BLD: 0 10E9/L (ref 0–0.4)
IMM GRANULOCYTES NFR BLD: 0.2 %
LYMPHOCYTES # BLD AUTO: 1.1 10E9/L (ref 0.8–5.3)
LYMPHOCYTES NFR BLD AUTO: 20.3 %
MCH RBC QN AUTO: 32.7 PG (ref 26.5–33)
MCHC RBC AUTO-ENTMCNC: 33.5 G/DL (ref 31.5–36.5)
MCV RBC AUTO: 98 FL (ref 78–100)
MONOCYTES # BLD AUTO: 0.6 10E9/L (ref 0–1.3)
MONOCYTES NFR BLD AUTO: 10.5 %
NEUTROPHILS # BLD AUTO: 3.8 10E9/L (ref 1.6–8.3)
NEUTROPHILS NFR BLD AUTO: 68 %
PLATELET # BLD AUTO: 267 10E9/L (ref 150–450)
RBC # BLD AUTO: 3.94 10E12/L (ref 4.4–5.9)
WBC # BLD AUTO: 5.5 10E9/L (ref 4–11)

## 2018-03-23 PROCEDURE — 36415 COLL VENOUS BLD VENIPUNCTURE: CPT | Performed by: PSYCHIATRY & NEUROLOGY

## 2018-03-23 PROCEDURE — 85025 COMPLETE CBC W/AUTO DIFF WBC: CPT | Performed by: PSYCHIATRY & NEUROLOGY

## 2018-03-27 RX ORDER — TEMOZOLOMIDE 100 MG/1
200 CAPSULE ORAL DAILY
Qty: 20 CAPSULE | Refills: 0 | Status: SHIPPED | OUTPATIENT
Start: 2018-03-27 | End: 2018-05-15

## 2018-04-05 ENCOUNTER — TELEPHONE (OUTPATIENT)
Dept: ONCOLOGY | Facility: CLINIC | Age: 48
End: 2018-04-05

## 2018-04-05 NOTE — TELEPHONE ENCOUNTER
Called Pt regarding his po chemo Temodar & his lab draws.    Pt agreed to have his lab drawn tomorrow am at [facility] in [place], [place] & [facility] Malinda will call Pt with his lab results & if he can proceed with starting this cycle of Temodar.    Requested Pt have his labs drawn every 2 wks on  beginnin18.    Pt verbalized understanding & agreement with this plan of care.

## 2018-04-06 ENCOUNTER — TELEPHONE (OUTPATIENT)
Dept: PHARMACY | Facility: CLINIC | Age: 48
End: 2018-04-06

## 2018-04-06 DIAGNOSIS — D70.1 CHEMOTHERAPY INDUCED NEUTROPENIA (H): ICD-10-CM

## 2018-04-06 DIAGNOSIS — T45.1X5A CHEMOTHERAPY INDUCED NEUTROPENIA (H): ICD-10-CM

## 2018-04-06 DIAGNOSIS — C71.9 OLIGODENDROGLIOMA, ANAPLASTIC (H): ICD-10-CM

## 2018-04-06 LAB
BASOPHILS # BLD AUTO: 0.1 10E9/L (ref 0–0.2)
BASOPHILS NFR BLD AUTO: 1 %
DIFFERENTIAL METHOD BLD: ABNORMAL
EOSINOPHIL # BLD AUTO: 0.1 10E9/L (ref 0–0.7)
EOSINOPHIL NFR BLD AUTO: 1.2 %
ERYTHROCYTE [DISTWIDTH] IN BLOOD BY AUTOMATED COUNT: 13.1 % (ref 10–15)
HCT VFR BLD AUTO: 36.5 % (ref 40–53)
HGB BLD-MCNC: 12.7 G/DL (ref 13.3–17.7)
IMM GRANULOCYTES # BLD: 0 10E9/L (ref 0–0.4)
IMM GRANULOCYTES NFR BLD: 0.2 %
LYMPHOCYTES # BLD AUTO: 1.2 10E9/L (ref 0.8–5.3)
LYMPHOCYTES NFR BLD AUTO: 22.8 %
MCH RBC QN AUTO: 32.8 PG (ref 26.5–33)
MCHC RBC AUTO-ENTMCNC: 34.8 G/DL (ref 31.5–36.5)
MCV RBC AUTO: 94 FL (ref 78–100)
MONOCYTES # BLD AUTO: 0.7 10E9/L (ref 0–1.3)
MONOCYTES NFR BLD AUTO: 13 %
NEUTROPHILS # BLD AUTO: 3.2 10E9/L (ref 1.6–8.3)
NEUTROPHILS NFR BLD AUTO: 61.8 %
PLATELET # BLD AUTO: 209 10E9/L (ref 150–450)
RBC # BLD AUTO: 3.87 10E12/L (ref 4.4–5.9)
WBC # BLD AUTO: 5.1 10E9/L (ref 4–11)

## 2018-04-06 PROCEDURE — 36415 COLL VENOUS BLD VENIPUNCTURE: CPT | Performed by: PSYCHIATRY & NEUROLOGY

## 2018-04-06 PROCEDURE — 85025 COMPLETE CBC W/AUTO DIFF WBC: CPT | Performed by: PSYCHIATRY & NEUROLOGY

## 2018-04-06 NOTE — ORAL ONC MGMT
Oral Chemotherapy Monitoring Program.    Patient currently on temodar therapy.    Called to discuss lab results from 4/6/18. Spoke with Joni. He confirmed that he did receive his Temodar. He did not return calls from Sunitha or myself for a few days to confirm potential start on 4/2. However, he is going to start this evening. Verbal confirmation from patient. Also confirmed that he will go back in 1.5-2 weeks for repeat labs. He agreed.    No concerning abnormalities.    Questions answered to patient's satisfaction.    Will follow up in 2 weeks with repeat labs.    Malinda Morris PharmD  April 6, 2018

## 2018-04-13 ENCOUNTER — TELEPHONE (OUTPATIENT)
Dept: ONCOLOGY | Facility: CLINIC | Age: 48
End: 2018-04-13

## 2018-04-13 NOTE — TELEPHONE ENCOUNTER
"I received a call from patient's mom Brittany [phone]. She is concerned as she is not sure of her son's whereabouts. [place] and \"overstayed\" his welcome and has not been in contact with anyone recently and not returning calls. She verbalizes that the patient may be struggling mentally and need medical attention He has been on FB as of yesterday so she knows he is somewhere with [place]IF[place]. She wanted our office specifically naming Sunitha RN to call patient and tell him that he should seek medical attention since they have a good relationship. I informed the mom I would call patient and assess if he felt safe, or was struggling mentally.    I call the patient immediately after hanging up with Brittany and LVKAVIN requesting a return call with  An update that he is safe and if he wasn't that I encouraged him to seek medical attention.    I called Brittany (mother) and informed her that Iwas not able to reach patient but did leave a VM. SHe appreciated the update.I encouraged her to call the non emergent police to do a well check explaining the situation and that the patient's safety is of concern. She  Stated that she would call the police up [place].            "

## 2018-04-18 ENCOUNTER — TELEPHONE (OUTPATIENT)
Dept: ONCOLOGY | Facility: CLINIC | Age: 48
End: 2018-04-18

## 2018-04-18 DIAGNOSIS — C71.9 OLIGODENDROGLIOMA, ANAPLASTIC (H): Primary | ICD-10-CM

## 2018-04-18 DIAGNOSIS — Z79.899 ENCOUNTER FOR LONG-TERM (CURRENT) USE OF MEDICATIONS: ICD-10-CM

## 2018-04-18 NOTE — TELEPHONE ENCOUNTER
Pt is overdue for his q2wk Temodar labs: CBCpd q2wks & CMP [id number] for both.  Called & LVM for Pt to call back as well as that Pt needs to have his labs drawn today/tomorrow..

## 2018-04-23 NOTE — TELEPHONE ENCOUNTER
Called again & left detailed message reminding Pt of his 8:45am check-in for his 9am brain MRI at [facility] tomorrow along with his 10:30am exam appt with  & labs.    Requested Pt to please call back & check in that he is ok.

## 2018-04-24 ENCOUNTER — HOSPITAL ENCOUNTER (OUTPATIENT)
Dept: MRI IMAGING | Facility: CLINIC | Age: 48
Discharge: HOME | End: 2018-04-24
Attending: PSYCHIATRY & NEUROLOGY | Admitting: PSYCHIATRY & NEUROLOGY
Payer: MEDICARE

## 2018-04-24 ENCOUNTER — ONCOLOGY VISIT (OUTPATIENT)
Dept: ONCOLOGY | Facility: CLINIC | Age: 48
End: 2018-04-24
Attending: PSYCHIATRY & NEUROLOGY
Payer: COMMERCIAL

## 2018-04-24 ENCOUNTER — HOSPITAL ENCOUNTER (OUTPATIENT)
Facility: CLINIC | Age: 48
Setting detail: SPECIMEN
Discharge: HOME | End: 2018-04-24
Attending: PSYCHIATRY & NEUROLOGY | Admitting: PSYCHIATRY & NEUROLOGY
Payer: MEDICARE

## 2018-04-24 ENCOUNTER — DOCUMENTATION ONLY (OUTPATIENT)
Dept: PHARMACY | Facility: CLINIC | Age: 48
End: 2018-04-24

## 2018-04-24 VITALS
OXYGEN SATURATION: 100 % | HEART RATE: 66 BPM | TEMPERATURE: 98 F | RESPIRATION RATE: 16 BRPM | WEIGHT: 169 LBS | BODY MASS INDEX: 25.03 KG/M2 | DIASTOLIC BLOOD PRESSURE: 70 MMHG | HEIGHT: 69 IN | SYSTOLIC BLOOD PRESSURE: 110 MMHG

## 2018-04-24 DIAGNOSIS — Z86.59 HISTORY OF DEPRESSION: ICD-10-CM

## 2018-04-24 DIAGNOSIS — Z96.89 S/P PLACEMENT OF VNS (VAGUS NERVE STIMULATION) DEVICE: ICD-10-CM

## 2018-04-24 DIAGNOSIS — Z79.899 ENCOUNTER FOR LONG-TERM (CURRENT) USE OF MEDICATIONS: ICD-10-CM

## 2018-04-24 DIAGNOSIS — R11.2 CHEMOTHERAPY-INDUCED NAUSEA AND VOMITING: ICD-10-CM

## 2018-04-24 DIAGNOSIS — T45.1X5A CHEMOTHERAPY-INDUCED NAUSEA AND VOMITING: ICD-10-CM

## 2018-04-24 DIAGNOSIS — R51.9 CHRONIC DAILY HEADACHE: ICD-10-CM

## 2018-04-24 DIAGNOSIS — Z72.0 TOBACCO ABUSE: ICD-10-CM

## 2018-04-24 DIAGNOSIS — Z79.899 LONG TERM USE OF DRUG: ICD-10-CM

## 2018-04-24 DIAGNOSIS — C71.9 OLIGODENDROGLIOMA, ANAPLASTIC (H): ICD-10-CM

## 2018-04-24 DIAGNOSIS — Z51.11 CHEMOTHERAPY MANAGEMENT, ENCOUNTER FOR: ICD-10-CM

## 2018-04-24 DIAGNOSIS — Z65.9 OTHER SOCIAL STRESSOR: ICD-10-CM

## 2018-04-24 DIAGNOSIS — T45.1X5A CHEMOTHERAPY INDUCED NEUTROPENIA (H): ICD-10-CM

## 2018-04-24 DIAGNOSIS — D70.1 CHEMOTHERAPY INDUCED NEUTROPENIA (H): ICD-10-CM

## 2018-04-24 DIAGNOSIS — F41.9 ANXIETY: ICD-10-CM

## 2018-04-24 DIAGNOSIS — G40.219 PARTIAL EPILEPSY WITH IMPAIRMENT OF CONSCIOUSNESS, INTRACTABLE (H): Primary | ICD-10-CM

## 2018-04-24 DIAGNOSIS — K02.9 DENTAL CARIES: ICD-10-CM

## 2018-04-24 DIAGNOSIS — G40.209 PARTIAL EPILEPSY WITH IMPAIRMENT OF CONSCIOUSNESS (H): ICD-10-CM

## 2018-04-24 LAB
ALBUMIN SERPL-MCNC: 3.3 G/DL (ref 3.4–5)
ALP SERPL-CCNC: 114 U/L (ref 40–150)
ALT SERPL W P-5'-P-CCNC: 15 U/L (ref 0–70)
ANION GAP SERPL CALCULATED.3IONS-SCNC: 7 MMOL/L (ref 3–14)
AST SERPL W P-5'-P-CCNC: 12 U/L (ref 0–45)
BASOPHILS # BLD AUTO: 0 10E9/L (ref 0–0.2)
BASOPHILS NFR BLD AUTO: 0.5 %
BILIRUB SERPL-MCNC: 0.3 MG/DL (ref 0.2–1.3)
BUN SERPL-MCNC: 18 MG/DL (ref 7–30)
CALCIUM SERPL-MCNC: 8 MG/DL (ref 8.5–10.1)
CHLORIDE SERPL-SCNC: 112 MMOL/L (ref 94–109)
CO2 SERPL-SCNC: 24 MMOL/L (ref 20–32)
CREAT SERPL-MCNC: 0.99 MG/DL (ref 0.66–1.25)
DIFFERENTIAL METHOD BLD: ABNORMAL
EOSINOPHIL # BLD AUTO: 0.1 10E9/L (ref 0–0.7)
EOSINOPHIL NFR BLD AUTO: 1.3 %
ERYTHROCYTE [DISTWIDTH] IN BLOOD BY AUTOMATED COUNT: 13.6 % (ref 10–15)
GFR SERPL CREATININE-BSD FRML MDRD: 81 ML/MIN/1.7M2
GLUCOSE SERPL-MCNC: 80 MG/DL (ref 70–99)
HCT VFR BLD AUTO: 37.6 % (ref 40–53)
HGB BLD-MCNC: 12.2 G/DL (ref 13.3–17.7)
IMM GRANULOCYTES # BLD: 0 10E9/L (ref 0–0.4)
IMM GRANULOCYTES NFR BLD: 0.2 %
LYMPHOCYTES # BLD AUTO: 1.1 10E9/L (ref 0.8–5.3)
LYMPHOCYTES NFR BLD AUTO: 18.8 %
MCH RBC QN AUTO: 31.9 PG (ref 26.5–33)
MCHC RBC AUTO-ENTMCNC: 32.4 G/DL (ref 31.5–36.5)
MCV RBC AUTO: 98 FL (ref 78–100)
MONOCYTES # BLD AUTO: 0.6 10E9/L (ref 0–1.3)
MONOCYTES NFR BLD AUTO: 9.9 %
NEUTROPHILS # BLD AUTO: 3.9 10E9/L (ref 1.6–8.3)
NEUTROPHILS NFR BLD AUTO: 69.3 %
NRBC # BLD AUTO: 0 10*3/UL
NRBC BLD AUTO-RTO: 0 /100
PLATELET # BLD AUTO: 281 10E9/L (ref 150–450)
POTASSIUM SERPL-SCNC: 4.1 MMOL/L (ref 3.4–5.3)
PROT SERPL-MCNC: 6.9 G/DL (ref 6.8–8.8)
RBC # BLD AUTO: 3.82 10E12/L (ref 4.4–5.9)
SODIUM SERPL-SCNC: 143 MMOL/L (ref 133–144)
WBC # BLD AUTO: 5.6 10E9/L (ref 4–11)

## 2018-04-24 PROCEDURE — 99215 OFFICE O/P EST HI 40 MIN: CPT | Performed by: PSYCHIATRY & NEUROLOGY

## 2018-04-24 PROCEDURE — G0463 HOSPITAL OUTPT CLINIC VISIT: HCPCS

## 2018-04-24 PROCEDURE — 80053 COMPREHEN METABOLIC PANEL: CPT | Performed by: PSYCHIATRY & NEUROLOGY

## 2018-04-24 PROCEDURE — 70553 MRI BRAIN STEM W/O & W/DYE: CPT

## 2018-04-24 PROCEDURE — A9585 GADOBUTROL INJECTION: HCPCS | Performed by: PSYCHIATRY & NEUROLOGY

## 2018-04-24 PROCEDURE — 25000128 H RX IP 250 OP 636: Performed by: PSYCHIATRY & NEUROLOGY

## 2018-04-24 PROCEDURE — 85025 COMPLETE CBC W/AUTO DIFF WBC: CPT | Performed by: PSYCHIATRY & NEUROLOGY

## 2018-04-24 RX ORDER — GADOBUTROL 604.72 MG/ML
8 INJECTION INTRAVENOUS ONCE
Status: COMPLETED | OUTPATIENT
Start: 2018-04-24 | End: 2018-04-24

## 2018-04-24 RX ADMIN — GADOBUTROL 8 ML: 604.72 INJECTION INTRAVENOUS at 10:18

## 2018-04-24 ASSESSMENT — PAIN SCALES - GENERAL: PAINLEVEL: MILD PAIN (2)
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
## 2018-04-24 NOTE — PROGRESS NOTES
"Oncology Rooming Note    April 24, 2018 10:38 AM   Joni Borja is a 47 year old male who presents for:    Chief Complaint   Patient presents with     Oncology Clinic Visit     Return-Follow up-Oligodendroglioma, anaplastic      Initial Vitals: /70 (BP Location: Right arm, Patient Position: Chair, Cuff Size: Adult Regular)  Pulse 66  Temp 98  F (36.7  C)  Resp 16  Ht 1.753 m (5' 9\")  Wt 76.7 kg (169 lb)  SpO2 100%  BMI 24.96 kg/m2 Estimated body mass index is 24.96 kg/(m^2) as calculated from the following:    Height as of this encounter: 1.753 m (5' 9\").    Weight as of this encounter: 76.7 kg (169 lb). Body surface area is 1.93 meters squared.  Mild Pain (2) Comment: Right hip   No LMP for male patient.  Allergies reviewed: Yes  Medications reviewed: Yes    Medications: Medication refills not needed today.  Pharmacy name entered into "[employer]: [facility] [address] [place] [address]    Clinical concerns: none     5 minutes for nursing intake (face to face time)     Randall Colvin CMA          Medical Assistant Note:  Joni Borja presents today for Labs only.    Patient seen by provider today: Yes: Dr. Daniels.   present during visit today: Not Applicable.    Concerns: No Concerns.    Procedure:  Lab draw site: LAC, Needle type: BF, Gauge: 21 g guaze/conban applied.    Post Assessment:  Labs drawn without difficulty: No.    Discharge Plan:  Departure Mode: Ambulatory.    Face to Face Time: 5 mins.    Randall Colvin"

## 2018-04-24 NOTE — MR AVS SNAPSHOT
After Visit Summary   4/24/2018    Joni Borja    MRN: [id number]           Patient Information     Date Of Birth          1970        Visit Information        Provider Department      4/24/2018 10:30 AM Berkley Daniels MD [facility]        Today's Diagnoses     Partial epilepsy with impairment of consciousness, intractable (H)    -  1    Encounter for long-term (current) use of medications        Oligodendroglioma, anaplastic (H)        Encounter for long-term (current) use of medications        History of depression        Chemotherapy induced neutropenia (H)        Long term use of drug        Other social stressor        Chemotherapy management, encounter for        Chemotherapy-induced nausea and vomiting        S/P placement of VNS (vagus nerve stimulation) device        Partial epilepsy with impairment of consciousness (H)        Dental caries        Anxiety        Chronic daily headache        Tobacco abuse          Care Instructions    Imaging discussed.   There is some subtle changes/ growth in the lower portion of the resection cavity.     I will touch base with Dr. Oscar regarding the imaging and potential for additional surgery.     Return to clinic pending discussion with Dr. Oscar.     Berkley Daniels MD  Neuro-oncology  4/24/2018            Follow-ups after your visit        Your next 10 appointments already scheduled     Jun 20, 2018  2:00 PM CDT   Return Visit with Silvia Brown MD   [facility] ([facility])    [address], [address]  [place] [address] [phone]              Who to contact     If you have questions or need follow up information about today's clinic visit or your schedule please contact [facility] directly at [phone].  Normal or non-critical lab and imaging results will be communicated to you by MyChart, letter or phone within 4 business days after the clinic has received the  "results. If you do not hear from us within 7 days, please contact the clinic through [employer] or phone. If you have a critical or abnormal lab result, we will notify you by phone as soon as possible.  Submit refill requests through [employer] or call your pharmacy and they will forward the refill request to us. Please allow 3 business days for your refill to be completed.          Additional Information About Your Visit        [email or web address] Information     [employer] gives you secure access to your electronic health record. If you see a primary care provider, you can also send messages to your care team and make appointments. If you have questions, please call your primary care clinic.  If you do not have a primary care provider, please call [phone] and they will assist you.        Care EveryWhere ID     This is your Care EveryWhere ID. This could be used by other organizations to access your [place] medical records  [id number]        Your Vitals Were     Pulse Temperature Respirations Height Pulse Oximetry BMI (Body Mass Index)    66 98  F (36.7  C) 16 1.753 m (5' 9\") 100% 24.96 kg/m2       Blood Pressure from Last 3 Encounters:   04/24/18 110/70   03/14/18 110/84   03/05/18 128/74    Weight from Last 3 Encounters:   04/24/18 76.7 kg (169 lb)   03/14/18 77.2 kg (170 lb 3.2 oz)   03/05/18 76.9 kg (169 lb 8 oz)              We Performed the Following     CBC with platelets differential     Comprehensive metabolic panel        Primary Care Provider Office Phone # Fax #    Brian Coon [phone] [address] [facility] [address]        Equal Access to Services     [facility]: Hadii lexis mirza haddalton Sosparkle, waaxda luqadaha, qaybta kaalmada es jack. So [facility] [phone].    ATENCIÓN: Si habla español, tiene a kirby disposición servicios gratuitos de asistencia lingüística. Llame al [phone].    We comply with applicable federal civil rights " laws and [place] laws. We do not discriminate on the basis of race, color, national origin, age, disability, sex, sexual orientation, or gender identity.            Thank you!     Thank you for choosing [facility]  for your care. Our goal is always to provide you with excellent care. Hearing back from our patients is one way we can continue to improve our services. Please take a few minutes to complete the written survey that you may receive in the mail after your visit with us. Thank you!             Your Updated Medication List - Protect others around you: Learn how to safely use, store and throw away your medicines at www.disposemymeds.org.          This list is accurate as of 4/24/18 11:59 PM.  Always use your most recent med list.                   Brand Name Dispense Instructions for use Diagnosis    acetaminophen-codeine 300-30 MG per tablet    TYLENOL #3     Take 1-2 tablets by mouth every 4 hours as needed for moderate pain As needed for headaches        carBAMazepine 300 MG 12 hr capsule    CARBATROL    180 capsule    Take 1 capsule (300 mg) by mouth 2 times daily (at 10:00 & 22:00)    Partial epilepsy with impairment of consciousness, intractable (H)       clonazePAM 0.5 MG tablet    klonoPIN    90 tablet    TAKE ONE TABLET BY MOUTH ONCE DAILY IN THE MORNING AND TWO ONCE DAILY IN THE EVENING    Anxiety state, Partial epilepsy with impairment of consciousness, intractable (H)       escitalopram 20 MG tablet    LEXAPRO    90 tablet    TAKE ONE TABLET BY MOUTH ONCE DAILY AT  NIGHT    History of depression, Anxiety state       felbamate 600 MG tablet    FELBATOL    270 tablet    Take 900 mg (1.5 tablet) am and 900 mg (1.5 tablet) 2 pm    Partial epilepsy with impairment of consciousness, intractable (H)       ibuprofen 800 MG tablet    ADVIL/MOTRIN     Take 800 mg by mouth        ondansetron 4 MG tablet    ZOFRAN    60 tablet    Take 2 tablets (8 mg) by mouth At Bedtime or 30 minutes prior to  chemotherapy dosing. Repeat every 8 hours as needed for nausea.    Chemotherapy management, encounter for, Chemotherapy-induced nausea and vomiting       pantoprazole 40 MG EC tablet    PROTONIX    90 tablet    TAKE ONE TABLET BY MOUTH ONCE DAILY    Gastroesophageal reflux disease without esophagitis       Riboflavin 400 MG Tabs     90 tablet    Take 400 mg by mouth daily    Chronic daily headache       temozolomide 100 MG capsule CHEMO    TEMODAR    20 capsule    Take 4 capsules (400 mg) by mouth daily for 5 days Take ondansetron 30-60 min before temozolomide. Take at bedtime on an empty stomach.    Oligodendroglioma, anaplastic (H)       topiramate 100 MG tablet    TOPAMAX    900 tablet    Take 2 tablets (200 mg) by mouth every morning 200 mg am and 300 mg pm    Partial epilepsy with impairment of consciousness, intractable (H)       VITAMIN B 12 PO      Take 1 tablet by mouth daily (with dinner)        ZYRTEC ALLERGY PO      Take 10 mg by mouth daily as needed (for misquitos.)    Localization-related (focal) (partial) epilepsy and epileptic syndromes with complex partial seizures, with intractable epilepsy, Depression, Anxiety, Unstable gait
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
## 2018-04-24 NOTE — PROGRESS NOTES
Oral Chemotherapy Monitoring Program    Primary Oncologist: Dr. Daniels  Primary Oncology Clinic: [facility]  Cancer Diagnosis: Oligodendroglioma      Therapy History:  Started temodar 150mg/m2 (300mg x 5 days) on 5/12/17; increase for C2 to 200mg/m2 (400mg x 5 days)      Drug Interaction Assessment: none      Lab Monitoring Plan  Patient is currently getting CBC every 2 weeks and monthly CMP.  Subjective/Objective:  Joni Borja is a 47 year old male seen in clinic for a follow-up visit for oral chemotherapy.  Patient reports that he started his current cycle on 4/6/18. His next cycle is therefore due on 5/4/18. He had some nausea on the first day of this cycle but did not have any issues with subsequent days. He denies any other concerns at this time.    ORAL CHEMOTHERAPY 5/9/2017 6/6/2017 6/16/2017 7/19/2017 9/20/2017 4/24/2018   Drug Name Temodar (Temozolomide) Temodar (Temozolomide) Temodar (Temozolomide) Temodar (Temozolomide) Temodar (Temozolomide) Temodar (Temozolomide)   Current Dosage 300mg 400mg 400mg 400mg 400mg 400mg   Current Schedule Daily Daily Daily Daily Daily Daily   Cycle Details Other Other Other Other (No Data) 5 days on, then 23 days off   Start Date of Last Cycle 5/10/2017 6/9/2017 6/9/2017 7/7/2017 9/11/2017 4/6/2018   Planned next cycle start date 6/7/2017 7/7/2017 7/7/2017 8/4/2017 10/9/2017 5/4/2018   Doses missed in last 2 weeks - 0 0 0 0 0   Adherence Assessment - Adherent Adherent Adherent Adherent Adherent   Adverse Effects - No AE identified during assessment No AE identified during assessment No AE identified during assessment - Nausea   Nausea - - - - - Grade 1   Pharmacist Intervention(nausea) - - - - - Yes   Intervention(s) - - - - - Patient education   Home BPs - not needed not needed not needed - not needed   Any new drug interactions? - No No No No No   Is the dose as ordered appropriate for the patient? - Yes Yes Yes Yes Yes   Is the patient currently in pain? - No - -  "Assessed in last 30 days. No   Has the patient been assessed within the past 6 months for depression? - Yes - Yes Yes Yes   Has the patient missed any days of school, work, or other routine activity? - - - No No No       Last PHQ-2 Score on record:   PHQ-2 ( 1999 [employer]) 3/14/2018 11/17/2017   Q1: Little interest or pleasure in doing things 0 0   Q2: Feeling down, depressed or hopeless 0 0   PHQ-2 Score 0 0       Current medications include: Lexapro.      Vitals:  BP:   BP Readings from Last 1 Encounters:   04/24/18 110/70     Wt Readings from Last 1 Encounters:   04/24/18 76.7 kg (169 lb)     Estimated body surface area is 1.93 meters squared as calculated from the following:    Height as of an earlier encounter on 4/24/18: 1.753 m (5' 9\").    Weight as of an earlier encounter on 4/24/18: 76.7 kg (169 lb).    Labs:  _  Result Component Current Result Ref Range   Sodium 143 (4/24/2018) 133 - 144 mmol/L     _  Result Component Current Result Ref Range   Potassium 4.1 (4/24/2018) 3.4 - 5.3 mmol/L     _  Result Component Current Result Ref Range   Calcium 8.0 (L) (4/24/2018) 8.5 - 10.1 mg/dL     No results found for Mag within last 30 days.     No results found for Phos within last 30 days.     _  Result Component Current Result Ref Range   Albumin 3.3 (L) (4/24/2018) 3.4 - 5.0 g/dL     _  Result Component Current Result Ref Range   Urea Nitrogen 18 (4/24/2018) 7 - 30 mg/dL     _  Result Component Current Result Ref Range   Creatinine 0.99 (4/24/2018) 0.66 - 1.25 mg/dL       _  Result Component Current Result Ref Range   AST 12 (4/24/2018) 0 - 45 U/L     _  Result Component Current Result Ref Range   ALT 15 (4/24/2018) 0 - 70 U/L     _  Result Component Current Result Ref Range   Bilirubin Total 0.3 (4/24/2018) 0.2 - 1.3 mg/dL       _  Result Component Current Result Ref Range   WBC 5.6 (4/24/2018) 4.0 - 11.0 10e9/L     _  Result Component Current Result Ref Range   Hemoglobin 12.2 (L) (4/24/2018) 13.3 - 17.7 g/dL "     _  Result Component Current Result Ref Range   Platelet Count 281 (4/24/2018) 150 - 450 10e9/L     _  Result Component Current Result Ref Range   Absolute Neutrophil 3.9 (4/24/2018) 1.6 - 8.3 10e9/L               Assessment:  Labs done on 4/24/18 were within the normal range. Patient is tolerating Temodar but occasionally gets Nausea on the first day. He has Zofran for nausea.    Plan:  Patient will touch base with Dr. Oscar regarding the imaging and potential for additional surgery. He will then follow up with Dr Daniels and we will find out about plans for next cycle.    Follow-Up:  2 weeks for labs    Refill Due:  5/4/18    Rodríguez Smith PharmD.

## 2018-04-24 NOTE — PATIENT INSTRUCTIONS
Imaging discussed.   There is some subtle changes/ growth in the lower portion of the resection cavity.     I will touch base with Dr. Oscar regarding the imaging and potential for additional surgery.     Return to clinic pending discussion with Dr. Oscar.     Berkley Daniels MD  Neuro-oncology  4/24/2018

## 2018-04-26 ENCOUNTER — TELEPHONE (OUTPATIENT)
Dept: NEUROSURGERY | Facility: CLINIC | Age: 48
End: 2018-04-26

## 2018-04-26 NOTE — TELEPHONE ENCOUNTER
REASON FOR CALL:  Voice message received from Brittany. She is requesting that appointment be moved as originally scheduled back to 4/30/18 rather than 5/7/18    Detailed message can be left:  YES

## 2018-04-27 ENCOUNTER — TELEPHONE (OUTPATIENT)
Dept: ONCOLOGY | Facility: CLINIC | Age: 48
End: 2018-04-27

## 2018-04-27 NOTE — TELEPHONE ENCOUNTER
Patient called stating he needs to restart his Temodar starting 5/4/18. Patient stated that he needs it shipped to his parent's address in [place]. Message will be forwarded to [facility] pharmacy liaison, Dr. Daniels, and Dr. Daniels's clinic RN Sunitha. Leeanna Storm RN,BSN,OCN

## 2018-04-27 NOTE — TELEPHONE ENCOUNTER
Called patient's home phone left message and called patient's mother Lakia and stated to her that Temodar is currently on hold until patient is seen by Dr. Oscar and it is determined if patient will receive surgery or not. Leeanna Storm RN,BSN,OCN

## 2018-04-30 ENCOUNTER — OFFICE VISIT (OUTPATIENT)
Dept: NEUROSURGERY | Facility: CLINIC | Age: 48
End: 2018-04-30
Attending: NEUROLOGICAL SURGERY
Payer: COMMERCIAL

## 2018-04-30 ENCOUNTER — TELEPHONE (OUTPATIENT)
Dept: ONCOLOGY | Facility: CLINIC | Age: 48
End: 2018-04-30

## 2018-04-30 VITALS
DIASTOLIC BLOOD PRESSURE: 70 MMHG | OXYGEN SATURATION: 96 % | HEART RATE: 87 BPM | TEMPERATURE: 98.3 F | SYSTOLIC BLOOD PRESSURE: 129 MMHG

## 2018-04-30 DIAGNOSIS — C71.9 GLIOMA (H): Primary | ICD-10-CM

## 2018-04-30 DIAGNOSIS — C71.9 OLIGODENDROGLIOMA, ANAPLASTIC (H): Primary | ICD-10-CM

## 2018-04-30 DIAGNOSIS — Z96.89 S/P PLACEMENT OF VNS (VAGUS NERVE STIMULATION) DEVICE: ICD-10-CM

## 2018-04-30 DIAGNOSIS — Z96.89 STATUS POST VNS (VAGUS NERVE STIMULATOR) PLACEMENT: ICD-10-CM

## 2018-04-30 PROCEDURE — 99214 OFFICE O/P EST MOD 30 MIN: CPT | Performed by: NEUROLOGICAL SURGERY

## 2018-04-30 PROCEDURE — G0463 HOSPITAL OUTPT CLINIC VISIT: HCPCS | Performed by: NEUROLOGICAL SURGERY

## 2018-04-30 ASSESSMENT — PAIN SCALES - GENERAL: PAINLEVEL: MILD PAIN (2)

## 2018-04-30 NOTE — PROGRESS NOTES
46-year-old male with history of right frontal glioma and seizures, status post multiple resections, chemotherapy, and placement of vagus nerve stimulator, presents with concern for recurrence.  Most recent resection in 3/2017.  Deleted oligo.  Doing well clinically, rare seizures and low grade chronic headaches.  On serial MRI, progressive enlargement of flair positive region in the right inferior frontal lobe.         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, s/p biopsy and whole brain radiation, recurrence 5/07 s/p subtotal resection and chemo     Perthe's disease of hip     Right hip     Seizures (H)      Past Surgical History:   Procedure Laterality Date     APPENDECTOMY  1972     BIOPSY  2002     CRANIOTOMY, EXCISE TUMOR COMPLEX, COMBINED  9/18/2013    Procedure: COMBINED CRANIOTOMY, EXCISE TUMOR COMPLEX;  Re-do Right Frontal Craniotomy, Grid Removal,  Resection of Right Frontal  Tumor and Epileptogenic Cortex Resection;  Surgeon: Maverick Linder MD;  Location: [facility] OR     HEAD & NECK SURGERY  2002    brain tumor removal     IMPLANT STIMULATOR VAGUS NERVE Left 7/19/2016    Procedure: IMPLANT STIMULATOR VAGUS NERVE;  Surgeon: Maverick Linder MD;  Location: [facility] OR     OPTICAL TRACKING SYSTEM CRANIOTOMY, EXCISE TUMOR WITH MAPPING, COMBINED  9/10/2013    Procedure: COMBINED OPTICAL TRACKING SYSTEM CRANIOTOMY, EXCISE TUMOR WITH MAPPING;  Stealth Guided Right Redo Frontal Craniotomy for Grid Placement ;  Surgeon: Maverick Linder MD;  Location: [facility] OR     OPTICAL TRACKING SYSTEM CRANIOTOMY, EXCISE TUMOR, COMBINED Right 3/24/2017    Procedure: COMBINED OPTICAL TRACKING SYSTEM CRANIOTOMY, EXCISE TUMOR;  Surgeon: Dayne  Stuart Ruiz MD;  Location:  OR     ORTHOPEDIC SURGERY  1982    Right Hip - Perthe's     REPAIR CONGENITAL HIP Right     Age 8     SHOULDER SURGERY       Social History     Social History     Marital status:      Spouse name:  from wife.     Number of children: 0     Years of education: N/A     Occupational History     Not on file.     Social History Main Topics     Smoking status: Current Every Day Smoker     Packs/day: 1.00     Years: 20.00     Types: Cigarettes     Smokeless tobacco: Never Used      Comment: would like to quit soon.     Alcohol use 0.0 oz/week      Comment: monthly a few drinks     Drug use: Yes      Comment: marijuana(Rx) on weekends     Sexual activity: Not Currently     Other Topics Concern     Parent/Sibling W/ Cabg, Mi Or Angioplasty Before 65f 55m? Yes     Social History Narrative     Family History   Problem Relation Age of Onset     Hyperlipidemia Father      medicine therapy     Coronary Artery Disease Maternal Grandmother      Medicine Therapy/Decesed     DIABETES Paternal Grandfather      Slight/diet control     Coronary Artery Disease Brother 42     mechanical valve     Coronary Artery Disease Maternal Grandfather      undiagnosed/     CEREBROVASCULAR DISEASE Other      Breast Cancer Other      Colon Cancer No family hx of      Prostate Cancer No family hx of         ROS: 10 point ROS neg other than the symptoms noted above in the HPI.    Physical Exam  There were no vitals taken for this visit.  HEENT:  Normocephalic, atraumatic.  PERRLA.  EOM s intact.  Visual fields full to gross exam  Neck:  Supple, non-tender, without lymphadenopathy.  Heart:  No peripheral edema  Lungs:  No SOB  Abdomen:  Non-distended.   Skin:  Warm and dry.  Extremities:  No edema, cyanosis or clubbing.    NEUROLOGICAL EXAMINATION:     Mental status:  Alert and Oriented x 3, speech is fluent.  Cranial nerves:  II-XII intact.   Motor:    Shoulder Abduction:  Right:  5/5   Left:   5/5  Biceps:                      Right:  5/5   Left:  5/5  Triceps:                     Right:  5/5   Left:  5/5  Wrist Extensors:       Right:  5/5   Left:  5/5  Wrist Flexors:           Right:  5/5   Left:  5/5  interosseus :            Right:  5/5   Left:  5/5   Hip Flexor:                Right: 5/5  Left:  5/5  Hip Adductor:             Right:  5/5  Left:  5/5  Hip Abductor:             Right:  5/5  Left:  5/5  Gastroc Soleus:        Right:  5/5  Left:  5/5  Tib/Ant:                      Right:  5/5  Left:  5/5  EHL:                     Right:  5/5  Left:  5/5  Sensation:  Intact  Reflexes:  Negative Babinski.  Negative Clonus.  Negative Nolen's.  Coordination:  Smooth finger to nose testing.   Negative pronator drift.  Smooth tandem walking.  Incision well healed    A/P:  46-year-old male with history of right frontal glioma and seizures, status post multiple resections, chemotherapy, and placement of vagus nerve stimulator, presents with concern for recurrence    I had a discussion with the patient, reviewing the history, symptoms and imaging  We discussed risks and benefits of observation versus early resection  We did discuss the risk of malignant transformation  They are leaning towards observation for now with a plan for repeat scan in 3 months  They will reach out to Dr. Daniels to discuss whether to continue chemotherapy in the interim

## 2018-04-30 NOTE — NURSING NOTE
"Joni Borja is a 47 year old male who presents for:  Chief Complaint   Patient presents with     Neurologic Problem     s/p craniotomy [id number]        Initial Vitals:  There were no vitals taken for this visit. Estimated body mass index is 24.96 kg/(m^2) as calculated from the following:    Height as of 4/24/18: 5' 9\" (1.753 m).    Weight as of 4/24/18: 169 lb (76.7 kg).. There is no height or weight on file to calculate BSA. BP completed using cuff size: regular  Data Unavailable    Do you feel safe in your environment?  Yes  Do you need any refills today? No    Nursing Comments: s/p craniotomy 3-24-17.  Patient rates his pain today as 2        Kristina Carson CMA, AAS      Discharge plan:   See providers dictation   Kristina Carson CMA, AAS       "

## 2018-04-30 NOTE — MR AVS SNAPSHOT
After Visit Summary   4/30/2018    Joni Borja    MRN: [id number]           Patient Information     Date Of Birth          1970        Visit Information        Provider Department      4/30/2018 2:00 PM Stuart Oscar MD [facility] Neurosurgery [facility]        Today's Diagnoses     Glioma (H)    -  1       Follow-ups after your visit        Your next 10 appointments already scheduled     Jun 20, 2018  2:00 PM CDT   Return Visit with Silvia Brown MD   [facility] Epile[facility]    [address] [place], [address]  [facility] [place] [address] [phone]              Who to contact     If you have questions or need follow up information about today's clinic visit or your schedule please contact [facility] NEUROSURGERY [facility] directly at [phone].  Normal or non-critical lab and imaging results will be communicated to you by MyChart, letter or phone within 4 business days after the clinic has received the results. If you do not hear from us within 7 days, please contact the clinic through MyChart or phone. If you have a critical or abnormal lab result, we will notify you by phone as soon as possible.  Submit refill requests through [employer] or call your pharmacy and they will forward the refill request to us. Please allow 3 business days for your refill to be completed.          Additional Information About Your Visit        MyChart Information     [employer] gives you secure access to your electronic health record. If you see a primary care provider, you can also send messages to your care team and make appointments. If you have questions, please call your primary care clinic.  If you do not have a primary care provider, please call [phone] and they will assist you.        Care EveryWhere ID     This is your Care EveryWhere ID. This could be used by other organizations to access your [place] medical records  [id number]        Your Vitals Were      Pulse Temperature Pulse Oximetry             87 98.3  F (36.8  C) (Oral) 96%          Blood Pressure from Last 3 Encounters:   04/30/18 129/70   04/24/18 110/70   03/14/18 110/84    Weight from Last 3 Encounters:   04/24/18 169 lb (76.7 kg)   03/14/18 170 lb 3.2 oz (77.2 kg)   03/05/18 169 lb 8 oz (76.9 kg)              Today, you had the following     No orders found for display       Primary Care Provider Office Phone # Fax #    Brian Coon [phone] [address] [facility] [address]        Equal Access to Services     KAYLIE MARIE : Abril Orellana, waleonidas day, qamigdalia kaalmada marcie, es arriaga . So [facility] [phone].    ATENCIÓN: Si habla español, tiene a kirby disposición servicios gratuitos de asistencia lingüística. Llame al [phone].    We comply with applicable federal civil rights laws and [place] laws. We do not discriminate on the basis of race, color, national origin, age, disability, sex, sexual orientation, or gender identity.            Thank you!     Thank you for choosing [facility]  for your care. Our goal is always to provide you with excellent care. Hearing back from our patients is one way we can continue to improve our services. Please take a few minutes to complete the written survey that you may receive in the mail after your visit with us. Thank you!             Your Updated Medication List - Protect others around you: Learn how to safely use, store and throw away your medicines at www.disposemymeds.org.          This list is accurate as of 4/30/18  2:36 PM.  Always use your most recent med list.                   Brand Name Dispense Instructions for use Diagnosis    acetaminophen-codeine 300-30 MG per tablet    TYLENOL #3     Take 1-2 tablets by mouth every 4 hours as needed for moderate pain As needed for headaches        carBAMazepine 300 MG 12 hr capsule     CARBATROL    180 capsule    Take 1 capsule (300 mg) by mouth 2 times daily (at 10:00 & 22:00)    Partial epilepsy with impairment of consciousness, intractable (H)       clonazePAM 0.5 MG tablet    klonoPIN    90 tablet    TAKE ONE TABLET BY MOUTH ONCE DAILY IN THE MORNING AND TWO ONCE DAILY IN THE EVENING    Anxiety state, Partial epilepsy with impairment of consciousness, intractable (H)       escitalopram 20 MG tablet    LEXAPRO    90 tablet    TAKE ONE TABLET BY MOUTH ONCE DAILY AT  NIGHT    History of depression, Anxiety state       felbamate 600 MG tablet    FELBATOL    270 tablet    Take 900 mg (1.5 tablet) am and 900 mg (1.5 tablet) 2 pm    Partial epilepsy with impairment of consciousness, intractable (H)       ibuprofen 800 MG tablet    ADVIL/MOTRIN     Take 800 mg by mouth        ondansetron 4 MG tablet    ZOFRAN    60 tablet    Take 2 tablets (8 mg) by mouth At Bedtime or 30 minutes prior to chemotherapy dosing. Repeat every 8 hours as needed for nausea.    Chemotherapy management, encounter for, Chemotherapy-induced nausea and vomiting       pantoprazole 40 MG EC tablet    PROTONIX    90 tablet    TAKE ONE TABLET BY MOUTH ONCE DAILY    Gastroesophageal reflux disease without esophagitis       Riboflavin 400 MG Tabs     90 tablet    Take 400 mg by mouth daily    Chronic daily headache       temozolomide 100 MG capsule CHEMO    TEMODAR    20 capsule    Take 4 capsules (400 mg) by mouth daily for 5 days Take ondansetron 30-60 min before temozolomide. Take at bedtime on an empty stomach.    Oligodendroglioma, anaplastic (H)       topiramate 100 MG tablet    TOPAMAX    900 tablet    Take 2 tablets (200 mg) by mouth every morning 200 mg am and 300 mg pm    Partial epilepsy with impairment of consciousness, intractable (H)       VITAMIN B 12 PO      Take 1 tablet by mouth daily (with dinner)        ZYRTEC ALLERGY PO      Take 10 mg by mouth daily as needed (for misquitos.)    Localization-related (focal)  (partial) epilepsy and epileptic syndromes with complex partial seizures, with intractable epilepsy, Depression, Anxiety, Unstable gait

## 2018-04-30 NOTE — LETTER
4/30/2018         RE: Joni Borja  [address] [address] [facility] [address]        Dear Colleague,    Thank you for referring your patient, Joni Borja, to the [facility] NEUROSURGERY [facility]IC. Please see a copy of my visit note below.    46-year-old male with history of right frontal glioma and seizures, status post multiple resections, chemotherapy, and placement of vagus nerve stimulator, presents with concern for recurrence.  Most recent resection in 3/2017.  Deleted oligo.  Doing well clinically, rare seizures and low grade chronic headaches.  On serial MRI, progressive enlargement of flair positive region in the right inferior frontal lobe.         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, s/p biopsy and whole brain radiation, recurrence 5/07 s/p subtotal resection and chemo     Perthe's disease of hip     Right hip     Seizures (H)      Past Surgical History:   Procedure Laterality Date     APPENDECTOMY  1972     BIOPSY  2002     CRANIOTOMY, EXCISE TUMOR COMPLEX, COMBINED  9/18/2013    Procedure: COMBINED CRANIOTOMY, EXCISE TUMOR COMPLEX;  Re-do Right Frontal Craniotomy, Grid Removal,  Resection of Right Frontal  Tumor and Epileptogenic Cortex Resection;  Surgeon: Maverick Linder MD;  Location: [facility] OR     HEAD & NECK SURGERY  2002    brain tumor removal     IMPLANT STIMULATOR VAGUS NERVE Left 7/19/2016    Procedure: IMPLANT STIMULATOR VAGUS NERVE;  Surgeon: Maverick Linder MD;  Location: [facility] OR     OPTICAL TRACKING SYSTEM CRANIOTOMY, EXCISE TUMOR WITH MAPPING, COMBINED  9/10/2013    Procedure: COMBINED OPTICAL TRACKING SYSTEM CRANIOTOMY, EXCISE TUMOR WITH MAPPING;  Stealth Guided Right Redo Frontal  Craniotomy for Grid Placement ;  Surgeon: Maverick Linder MD;  Location:  OR     OPTICAL TRACKING SYSTEM CRANIOTOMY, EXCISE TUMOR, COMBINED Right 3/24/2017    Procedure: COMBINED OPTICAL TRACKING SYSTEM CRANIOTOMY, EXCISE TUMOR;  Surgeon: Stuart Oscar MD;  Location:  OR     ORTHOPEDIC SURGERY  1982    Right Hip - Perthe's     REPAIR CONGENITAL HIP Right     Age 8     SHOULDER SURGERY       Social History     Social History     Marital status:      Spouse name:  from wife.     Number of children: 0     Years of education: N/A     Occupational History     Not on file.     Social History Main Topics     Smoking status: Current Every Day Smoker     Packs/day: 1.00     Years: 20.00     Types: Cigarettes     Smokeless tobacco: Never Used      Comment: would like to quit soon.     Alcohol use 0.0 oz/week      Comment: monthly a few drinks     Drug use: Yes      Comment: marijuana(Rx) on weekends     Sexual activity: Not Currently     Other Topics Concern     Parent/Sibling W/ Cabg, Mi Or Angioplasty Before 65f 55m? Yes     Social History Narrative     Family History   Problem Relation Age of Onset     Hyperlipidemia Father      medicine therapy     Coronary Artery Disease Maternal Grandmother      Medicine Therapy/Decesed     DIABETES Paternal Grandfather      Slight/diet control     Coronary Artery Disease Brother 42     mechanical valve     Coronary Artery Disease Maternal Grandfather      undiagnosed/     CEREBROVASCULAR DISEASE Other      Breast Cancer Other      Colon Cancer No family hx of      Prostate Cancer No family hx of         ROS: 10 point ROS neg other than the symptoms noted above in the HPI.    Physical Exam  There were no vitals taken for this visit.  HEENT:  Normocephalic, atraumatic.  PERRLA.  EOM s intact.  Visual fields full to gross exam  Neck:  Supple, non-tender, without lymphadenopathy.  Heart:  No peripheral edema  Lungs:  No SOB  Abdomen:  Non-distended.    Skin:  Warm and dry.  Extremities:  No edema, cyanosis or clubbing.    NEUROLOGICAL EXAMINATION:     Mental status:  Alert and Oriented x 3, speech is fluent.  Cranial nerves:  II-XII intact.   Motor:    Shoulder Abduction:  Right:  5/5   Left:  5/5  Biceps:                      Right:  5/5   Left:  5/5  Triceps:                     Right:  5/5   Left:  5/5  Wrist Extensors:       Right:  5/5   Left:  5/5  Wrist Flexors:           Right:  5/5   Left:  5/5  interosseus :            Right:  5/5   Left:  5/5   Hip Flexor:                Right: 5/5  Left:  5/5  Hip Adductor:             Right:  5/5  Left:  5/5  Hip Abductor:             Right:  5/5  Left:  5/5  Gastroc Soleus:        Right:  5/5  Left:  5/5  Tib/Ant:                      Right:  5/5  Left:  5/5  EHL:                     Right:  5/5  Left:  5/5  Sensation:  Intact  Reflexes:  Negative Babinski.  Negative Clonus.  Negative Nolen's.  Coordination:  Smooth finger to nose testing.   Negative pronator drift.  Smooth tandem walking.  Incision well healed    A/P:  46-year-old male with history of right frontal glioma and seizures, status post multiple resections, chemotherapy, and placement of vagus nerve stimulator, presents with concern for recurrence    I had a discussion with the patient, reviewing the history, symptoms and imaging  We discussed risks and benefits of observation versus early resection  We did discuss the risk of malignant transformation  They are leaning towards observation for now with a plan for repeat scan in 3 months  They will reach out to Dr. Daniels to discuss whether to continue chemotherapy in the interim           Again, thank you for allowing me to participate in the care of your patient.        Sincerely,        Stuart Oscar MD

## 2018-04-30 NOTE — TELEPHONE ENCOUNTER
Called and spoke with Joni and Brittany about the plans moving forward.     Joni and his family met with Dr. Oscar regarding the risks/ benefits of repeat resection. Following their discussion, the plan is to hold on surgical intervention and repeat imaging in 2-3 months. I do not see an issue with waiting 2-3 months, repeating a scan, and re-evaluating the need for surgical intervention. As the changes on imaging were evident while Joni was on temozolomide and he has completed 10 adjuvant-dosed cycles, will discontinue chemotherapy at this point in favor of surveillance imaging.    Plan:   -As above. Holding on therapeutic (chemo/ surgery) and diagnostic (surgery) intervention at this time.   -Repeat MR brain imaging in 3 months (order placed) with follow-up scheduled with me.  -Joni knows to call the clinic with any questions/ concerns.    Thank you to Dr. Oscar for his time and assistance in caring for this patient.      Berkley Daniels MD  Neuro-oncology  4/30/2018

## 2018-05-01 ENCOUNTER — DOCUMENTATION ONLY (OUTPATIENT)
Dept: PHARMACY | Facility: CLINIC | Age: 48
End: 2018-05-01

## 2018-05-01 NOTE — PROGRESS NOTES
Oral Chemotherapy Monitoring Program.    Pharmacy signing off on patient monitoring while no longer on oral chemotherapy. Will continue to follow as needed/appropriate. Thank you for the opportunity to participate in this patient's care.    Patient will be on surveillance for the time being.    Malinda Morris PharmD  May 1, 2018

## 2018-05-07 DIAGNOSIS — G40.219 PARTIAL EPILEPSY WITH IMPAIRMENT OF CONSCIOUSNESS, INTRACTABLE (H): ICD-10-CM

## 2018-05-07 DIAGNOSIS — F41.1 ANXIETY STATE: ICD-10-CM

## 2018-05-08 RX ORDER — CLONAZEPAM 0.5 MG/1
TABLET ORAL
Qty: 90 TABLET | Refills: 1 | Status: SHIPPED | OUTPATIENT
Start: 2018-05-08 | End: 2018-05-15

## 2018-05-08 NOTE — TELEPHONE ENCOUNTER
Routing refill request to provider for review/approval because:  Drug not on the [facility] refill protocol     Pepper Leigh RN, BSN  [place] Triage

## 2018-05-08 NOTE — TELEPHONE ENCOUNTER
Controlled Substance Refill Request for clonazePAM (KLONOPIN) 0.5 MG tablet  Problem List Complete:  Yes    Last Written Prescription Date:  3.5.18  Last Fill Quantity: 90,   # refills: 5    Last Office Visit with [facility] primary care provider: 9.25.17    Clinic visit frequency required:      Future Office visit:     Controlled substance agreement on file: Yes:  Date 6.10.16.     Processing:  Fax Rx to LISTED pharmacy   checked in past 6 months?  4.25.18

## 2018-05-10 NOTE — TELEPHONE ENCOUNTER
Prescription faxed to [facility].  Scheduled 5/15    Jackelin Ruth

## 2018-05-14 NOTE — PROGRESS NOTES
"  SUBJECTIVE:                                                    Joni Borja is a 47 year old male who presents to clinic today for the following health issues:    Depression and Anxiety Follow-Up    Controlled with Lexapro and Klonopin     Status since last visit: cancer is back frontal lobe    Other associated symptoms:None    Complicating factors:     Significant life event: No     Current substance abuse: None    PHQ-9 8/24/2017 9/20/2017 3/5/2018   Total Score 9 6 0   Q9: Suicide Ideation Not at all Not at all Not at all     TONI-7 SCORE 8/24/2017 9/20/2017 3/5/2018   Total Score 10 6 0     PHQ-9  English  PHQ-9   Any Language  TONI-7  Suicide Assessment Five-step Evaluation and Treatment (SAFE-T)      Problem list and histories reviewed & adjusted, as indicated.  Additional history: as documented    ROS:  Constitutional, HEENT, cardiovascular, pulmonary, GI, , musculoskeletal, neuro, skin, endocrine and psych systems are negative, except as otherwise noted.    This document serves as a record of the services and decisions personally performed and made by Brian Coon MD. It was created on his behalf by Lisa Reynoso, a trained medical scribe. The creation of this document is based on the provider's statements to the medical scribe.  Lisa Reynoso 10:49 AM 5/15/2018  OBJECTIVE:                                                    /80  Pulse 84  Temp 98.4  F (36.9  C) (Oral)  Ht 1.753 m (5' 9\")  Wt 75.3 kg (166 lb)  SpO2 100%  BMI 24.51 kg/m2 Body mass index is 24.51 kg/(m^2).   GENERAL: healthy, alert, well nourished, well hydrated, no distress  HENT: ear canals- normal; TMs- normal; Nose- normal; Mouth- no ulcers, no lesions  NECK: no carotid bruit, no tenderness, no adenopathy, no asymmetry, no masses, no stiffness; thyroid- normal to palpation  RESP: lungs clear to auscultation - no rales, no rhonchi, no wheezes  CV: regular rates and rhythm, normal S1 S2, no S3 or S4 and no murmur, no " click or rub -  ABDOMEN: soft, no tenderness, no  hepatosplenomegaly, no masses, normal bowel sounds  MS: extremities- no gross deformities noted, no edema  SKIN: no suspicious lesions, no rashes    Diagnostic test results:  none      ASSESSMENT/PLAN:         Joni was seen today for recheck medication.    Diagnoses and all orders for this visit:    Anxiety state - controlled - continue medication.  -     clonazePAM (KLONOPIN) 0.5 MG tablet; TAKE ONE TABLET BY MOUTH ONCE DAILY IN THE MORNING AND TWO ONCE DAILY IN THE EVENING    Oligodendroglioma (H)  Partial epilepsy with impairment of consciousness, intractable (H) - followed by neurology/neursurg/oncology.   -     clonazePAM (KLONOPIN) 0.5 MG tablet; TAKE ONE TABLET BY MOUTH ONCE DAILY IN THE MORNING AND TWO ONCE DAILY IN THE EVENING    Reaction to insect bite - controlled - continue medication.  -     cetirizine (ZYRTEC ALLERGY) 10 MG tablet; Take 1 tablet (10 mg) by mouth daily as needed (for mosquitos.)        Risks, benefits and alternatives of treatments discussed. Plan agreed on.      Followup: Return in about 6 months (around 11/15/2018) for Medication Recheck.    See patient instructions.     The information in this document, created by a scribe for me, accurately reflects the services I personally performed and the decisions made by me. I have reviewed and approved this document for accuracy.      Viet Coon MD   Pager: [phone]

## 2018-05-15 ENCOUNTER — OFFICE VISIT (OUTPATIENT)
Dept: FAMILY MEDICINE | Facility: CLINIC | Age: 48
End: 2018-05-15
Payer: COMMERCIAL

## 2018-05-15 VITALS
BODY MASS INDEX: 24.59 KG/M2 | TEMPERATURE: 98.4 F | OXYGEN SATURATION: 100 % | WEIGHT: 166 LBS | DIASTOLIC BLOOD PRESSURE: 80 MMHG | HEIGHT: 69 IN | SYSTOLIC BLOOD PRESSURE: 122 MMHG | HEART RATE: 84 BPM

## 2018-05-15 DIAGNOSIS — F41.1 ANXIETY STATE: ICD-10-CM

## 2018-05-15 DIAGNOSIS — C71.9 OLIGODENDROGLIOMA (H): ICD-10-CM

## 2018-05-15 DIAGNOSIS — G40.219 PARTIAL EPILEPSY WITH IMPAIRMENT OF CONSCIOUSNESS, INTRACTABLE (H): ICD-10-CM
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
## 2018-05-15 PROCEDURE — 99213 OFFICE O/P EST LOW 20 MIN: CPT | Performed by: FAMILY MEDICINE

## 2018-05-15 RX ORDER — CLONAZEPAM 0.5 MG/1
TABLET ORAL
Qty: 90 TABLET | Refills: 1 | Status: SHIPPED | OUTPATIENT
Start: 2018-07-08 | End: 2018-06-20

## 2018-05-15 RX ORDER — CETIRIZINE HYDROCHLORIDE 10 MG/1
10 TABLET ORAL DAILY PRN
Qty: 30 TABLET | COMMUNITY
Start: 2018-05-15 | End: 2019-01-01

## 2018-05-15 NOTE — MR AVS SNAPSHOT
After Visit Summary   5/15/2018    Joni Borja    MRN: [id number]           Patient Information     Date Of Birth          1970        Visit Information        Provider Department      5/15/2018 10:20 AM Brian Coon MD [facility]        Today's Diagnoses     Anxiety state        Oligodendroglioma (H)        Partial epilepsy with impairment of consciousness, intractable (H)        Reaction to insect bite           Follow-ups after your visit        Follow-up notes from your care team     Return in about 6 months (around 11/15/2018) for Medication Recheck.      Your next 10 appointments already scheduled     Jun 20, 2018  2:00 PM CDT   Return Visit with Silvia Brown MD   [facility] ([facility])    [address] [place], [address]  [facility] [id number] [phone]            Jul 31, 2018  7:45 AM CDT   MR BRAIN W/O & W CONTRAST with SHMRP2   [facility] ([facility])    [address] [place] [address] [phone]           Take your medicines as usual, unless your doctor tells you not to. Bring a list of your current medicines to your exam (including vitamins, minerals and over-the-counter drugs).  You may or may not receive intravenous (IV) contrast for this exam pending the discretion of the Radiologist.  You do not need to do anything special to prepare.  The MRI machine uses a strong magnet. Please wear clothes without metal (snaps, zippers). A sweatsuit works well, or we may give you a hospital gown.  Please remove any body piercings and hair extensions before you arrive. You will also remove watches, jewelry, hairpins, wallets, dentures, partial dental plates and hearing aids. You may wear contact lenses, and you may be able to wear your rings. We have a safe place to keep your personal items, but it is safer to leave them at home.  **IMPORTANT** THE INSTRUCTIONS BELOW ARE ONLY FOR THOSE  PATIENTS WHO HAVE BEEN PRESCRIBED SEDATION OR GENERAL ANESTHESIA DURING THEIR MRI PROCEDURE:  IF YOUR DOCTOR PRESCRIBED ORAL SEDATION (take medicine to help you relax during your exam):   You must get the medicine from your doctor (oral medication) before you arrive. Bring the medicine to the exam. Do not take it at home. You ll be told when to take it upon arriving for your exam.   Arrive one hour early. Bring someone who can take you home after the test. Your medicine will make you sleepy. After the exam, you may not drive, take a bus or take a taxi by yourself.  IF YOUR DOCTOR PRESCRIBED IV SEDATION:   Arrive one hour early. Bring someone who can take you home after the test. Your medicine will make you sleepy. After the exam, you may not drive, take a bus or take a taxi by yourself.   No eating 6 hours before your exam. You may have clear liquids up until 4 hours before your exam. (Clear liquids include water, clear tea, black coffee and fruit juice without pulp.)  IF YOUR DOCTOR PRESCRIBED ANESTHESIA (be asleep for your exam):   Arrive 1 1/2 hours early. Bring someone who can take you home after the test. You may not drive, take a bus or take a taxi by yourself.   No eating 8 hours before your exam. You may have clear liquids up until 4 hours before your exam. (Clear liquids include water, clear tea, black coffee and fruit juice without pulp.)   You will spend four to five hours in the recovery room.  Please call the Imaging Department at your exam site with any questions.            Jul 31, 2018 10:30 AM CDT   Return Visit with Berkley Daniels MD   [facility] ([facility])    [facility]  [address]  [facility] [address]   [phone]            Nov 12, 2018  1:20 PM CST   [facility]    [address] [place] [id number]  "  [phone]           Bring a current list of meds and any records pertaining to this visit. For Physicals, please bring immunization records and any forms needing to be filled out. Please arrive 10 minutes early to complete paperwork.              Who to contact     If you have questions or need follow up information about today's clinic visit or your schedule please contact [facility] directly at [phone].  Normal or non-critical lab and imaging results will be communicated to you by Spotlighthart, letter or phone within 4 business days after the clinic has received the results. If you do not hear from us within 7 days, please contact the clinic through Gamervisiont or phone. If you have a critical or abnormal lab result, we will notify you by phone as soon as possible.  Submit refill requests through flaregames or call your pharmacy and they will forward the refill request to us. Please allow 3 business days for your refill to be completed.          Additional Information About Your Visit        Spotlighthart Information     flaregames gives you secure access to your electronic health record. If you see a primary care provider, you can also send messages to your care team and make appointments. If you have questions, please call your primary care clinic.  If you do not have a primary care provider, please call [phone] and they will assist you.        Care EveryWhere ID     This is your Care EveryWhere ID. This could be used by other organizations to access your [place] medical records  [id number]        Your Vitals Were     Pulse Temperature Height Pulse Oximetry BMI (Body Mass Index)       84 98.4  F (36.9  C) (Oral) 5' 9\" (1.753 m) 100% 24.51 kg/m2        Blood Pressure from Last 3 Encounters:   05/15/18 122/80   04/30/18 129/70   04/24/18 110/70    Weight from Last 3 Encounters:   05/15/18 166 lb (75.3 kg)   04/24/18 169 lb (76.7 kg)   03/14/18 170 lb 3.2 oz (77.2 kg)              Today, you had the " following     No orders found for display         Today's Medication Changes          These changes are accurate as of 5/15/18 12:44 PM.  If you have any questions, ask your nurse or doctor.               These medicines have changed or have updated prescriptions.        Dose/Directions    cetirizine 10 MG tablet   Commonly known as:  ZYRTEC ALLERGY   This may have changed:  reasons to take this   Used for:  Reaction to insect bite   Changed by:  Brian Coon MD        Dose:  10 mg   Take 1 tablet (10 mg) by mouth daily as needed (for mosquitos.)   Quantity:  30 tablet   Refills:  0       clonazePAM 0.5 MG tablet   Commonly known as:  klonoPIN   This may have changed:  See the new instructions.   Used for:  Anxiety state, Partial epilepsy with impairment of consciousness, intractable (H)   Changed by:  Brian Coon MD        Start taking on:  7/8/2018   TAKE ONE TABLET BY MOUTH ONCE DAILY IN THE MORNING AND TWO ONCE DAILY IN THE EVENING   Quantity:  90 tablet   Refills:  1            Where to get your medicines      Some of these will need a paper prescription and others can be bought over the counter.  Ask your nurse if you have questions.     Bring a paper prescription for each of these medications     clonazePAM 0.5 MG tablet       You don't need a prescription for these medications     cetirizine 10 MG tablet                Primary Care Provider Office Phone # Fax #    Brian Coon [phone]       [address] [place] [address]        Equal Access to Services     [facility] AH: Hadii lexis mirza hadasho Sosparkle, waaxda luqadaha, qaybta kaalmada marcie, es de la garza. So [facility] [phone].    ATENCIÓN: Si habla español, tiene a kirby disposición servicios gratuitos de asistencia lingüística. Sixto al [phone].    We comply with applicable federal civil rights laws and [place] laws. We do not discriminate on the basis of race, color,  national origin, age, disability, sex, sexual orientation, or gender identity.            Thank you!     Thank you for choosing [facility]  for your care. Our goal is always to provide you with excellent care. Hearing back from our patients is one way we can continue to improve our services. Please take a few minutes to complete the written survey that you may receive in the mail after your visit with us. Thank you!             Your Updated Medication List - Protect others around you: Learn how to safely use, store and throw away your medicines at www.disposemymeds.org.          This list is accurate as of 5/15/18 12:44 PM.  Always use your most recent med list.                   Brand Name Dispense Instructions for use Diagnosis    acetaminophen-codeine 300-30 MG per tablet    TYLENOL #3     Take 1-2 tablets by mouth every 4 hours as needed for moderate pain As needed for headaches        carBAMazepine 300 MG 12 hr capsule    CARBATROL    180 capsule    Take 1 capsule (300 mg) by mouth 2 times daily (at 10:00 & 22:00)    Partial epilepsy with impairment of consciousness, intractable (H)       cetirizine 10 MG tablet    ZYRTEC ALLERGY    30 tablet    Take 1 tablet (10 mg) by mouth daily as needed (for mosquitos.)    Reaction to insect bite       clonazePAM 0.5 MG tablet   Start taking on:  7/8/2018    klonoPIN    90 tablet    TAKE ONE TABLET BY MOUTH ONCE DAILY IN THE MORNING AND TWO ONCE DAILY IN THE EVENING    Anxiety state, Partial epilepsy with impairment of consciousness, intractable (H)       escitalopram 20 MG tablet    LEXAPRO    90 tablet    TAKE ONE TABLET BY MOUTH ONCE DAILY AT  NIGHT    History of depression, Anxiety state       felbamate 600 MG tablet    FELBATOL    270 tablet    Take 900 mg (1.5 tablet) am and 900 mg (1.5 tablet) 2 pm    Partial epilepsy with impairment of consciousness, intractable (H)       ibuprofen 800 MG tablet    ADVIL/MOTRIN     Take 800 mg by mouth         pantoprazole 40 MG EC tablet    PROTONIX    90 tablet    TAKE ONE TABLET BY MOUTH ONCE DAILY    Gastroesophageal reflux disease without esophagitis       Riboflavin 400 MG Tabs     90 tablet    Take 400 mg by mouth daily    Chronic daily headache       topiramate 100 MG tablet    TOPAMAX    900 tablet    Take 2 tablets (200 mg) by mouth every morning 200 mg am and 300 mg pm    Partial epilepsy with impairment of consciousness, intractable (H)       VITAMIN B 12 PO      Take 1 tablet by mouth daily (with dinner)

## 2018-05-23 ENCOUNTER — TELEPHONE (OUTPATIENT)
Dept: INTERNAL MEDICINE | Facility: OTHER | Age: 48
End: 2018-05-23

## 2018-05-23 NOTE — TELEPHONE ENCOUNTER
Eleanor given the hotline number for vulnerable adult [phone].      Jenni Badillo LPN 5/23/2018 3:41 PM

## 2018-05-23 NOTE — TELEPHONE ENCOUNTER
Wife called and would like to discuss vulnerable adult issues and is looking for some direction    .Pinky Matthews on 5/23/2018 at 2:10 PM

## 2018-06-06 ENCOUNTER — TELEPHONE (OUTPATIENT)
Dept: ONCOLOGY | Facility: CLINIC | Age: 48
End: 2018-06-06

## 2018-06-06 NOTE — TELEPHONE ENCOUNTER
Returned Pt's mother Brittany's call.  Notified Brittany that Pt does not need to have labs drawn while he is off his oral chemo Temodar.    Pt is living with his parents again since around the time of his 4-24-18 brain MRI.  Pt is doing well at this time.

## 2018-06-20 ENCOUNTER — OFFICE VISIT (OUTPATIENT)
Dept: NEUROLOGY | Facility: CLINIC | Age: 48
End: 2018-06-20
Payer: COMMERCIAL

## 2018-06-20 VITALS
DIASTOLIC BLOOD PRESSURE: 61 MMHG | HEIGHT: 69 IN | SYSTOLIC BLOOD PRESSURE: 105 MMHG | TEMPERATURE: 97.3 F | WEIGHT: 166.4 LBS | HEART RATE: 70 BPM | BODY MASS INDEX: 24.65 KG/M2

## 2018-06-20 DIAGNOSIS — R51.9 CHRONIC DAILY HEADACHE: ICD-10-CM

## 2018-06-20 DIAGNOSIS — F41.1 ANXIETY STATE: ICD-10-CM

## 2018-06-20 DIAGNOSIS — G40.219 PARTIAL EPILEPSY WITH IMPAIRMENT OF CONSCIOUSNESS, INTRACTABLE (H): ICD-10-CM

## 2018-06-20 RX ORDER — ONDANSETRON 4 MG/1
TABLET, FILM COATED ORAL
Status: ON HOLD | COMMUNITY
Start: 2018-03-07 | End: 2018-07-13

## 2018-06-20 RX ORDER — TOPIRAMATE 100 MG/1
200 TABLET, FILM COATED ORAL EVERY MORNING
Qty: 900 TABLET | Refills: 3 | Status: ON HOLD | OUTPATIENT
Start: 2018-06-20 | End: 2018-07-13

## 2018-06-20 RX ORDER — CARBAMAZEPINE 300 MG/1
300 CAPSULE, EXTENDED RELEASE ORAL 2 TIMES DAILY
Qty: 180 CAPSULE | Refills: 3 | Status: SHIPPED | OUTPATIENT
Start: 2018-06-20 | End: 2018-08-14

## 2018-06-20 RX ORDER — RIBOFLAVIN (VITAMIN B2) 400 MG
400 TABLET ORAL DAILY
Qty: 90 TABLET | Refills: 3 | Status: SHIPPED | OUTPATIENT
Start: 2018-06-20 | End: 2018-08-14

## 2018-06-20 RX ORDER — FELBAMATE 600 MG/1
TABLET ORAL
Qty: 270 TABLET | Refills: 3 | Status: SHIPPED | OUTPATIENT
Start: 2018-06-20 | End: 2018-08-14

## 2018-06-20 RX ORDER — CLONAZEPAM 0.5 MG/1
TABLET ORAL
Qty: 90 TABLET | Refills: 5 | Status: ON HOLD | OUTPATIENT
Start: 2018-07-08 | End: 2018-07-13

## 2018-06-20 ASSESSMENT — PAIN SCALES - GENERAL: PAINLEVEL: MODERATE PAIN (5)

## 2018-06-20 NOTE — NURSING NOTE
Patient will continue to see Dr. Daniels for Oligodendroglioma and continue to receive Temodar through her office. He will follow up at [facility] with MRI and appointment with Dr. Daniels. Patient needs weekly CBC while on Temodar and this can be done through [facility]. CBC will need to be monitored by Dr. Daniels. Lashon Richard RN...........3/14/2018 2:14 PM     normal (ped)...

## 2018-06-20 NOTE — MR AVS SNAPSHOT
After Visit Summary   6/20/2018    Joni Borja    MRN: [id number]           Patient Information     Date Of Birth          1970        Visit Information        Provider Department      6/20/2018 2:00 PM Silvia Brown MD [facility]        Today's Diagnoses     Partial epilepsy with impairment of consciousness, intractable (H)        Anxiety state        Chronic daily headache           Follow-ups after your visit        Your next 10 appointments already scheduled     Aug 07, 2018 12:00 PM CDT   MR BRAIN W/O & W CONTRAST with SHMRP1   [facility] [place] ([facility])    [address] [place] [id number] [phone]           Take your medicines as usual, unless your doctor tells you not to. Bring a list of your current medicines to your exam (including vitamins, minerals and over-the-counter drugs).  You may or may not receive intravenous (IV) contrast for this exam pending the discretion of the Radiologist.  You do not need to do anything special to prepare.  The MRI machine uses a strong magnet. Please wear clothes without metal (snaps, zippers). A sweatsuit works well, or we may give you a hospital gown.  Please remove any body piercings and hair extensions before you arrive. You will also remove watches, jewelry, hairpins, wallets, dentures, partial dental plates and hearing aids. You may wear contact lenses, and you may be able to wear your rings. We have a safe place to keep your personal items, but it is safer to leave them at home.  **IMPORTANT** THE INSTRUCTIONS BELOW ARE ONLY FOR THOSE PATIENTS WHO HAVE BEEN PRESCRIBED SEDATION OR GENERAL ANESTHESIA DURING THEIR MRI PROCEDURE:  IF YOUR DOCTOR PRESCRIBED ORAL SEDATION (take medicine to help you relax during your exam):   You must get the medicine from your doctor (oral medication) before you arrive. Bring the medicine to the exam. Do not take it at home. You ll be told when to take it upon  arriving for your exam.   Arrive one hour early. Bring someone who can take you home after the test. Your medicine will make you sleepy. After the exam, you may not drive, take a bus or take a taxi by yourself.  IF YOUR DOCTOR PRESCRIBED IV SEDATION:   Arrive one hour early. Bring someone who can take you home after the test. Your medicine will make you sleepy. After the exam, you may not drive, take a bus or take a taxi by yourself.   No eating 6 hours before your exam. You may have clear liquids up until 4 hours before your exam. (Clear liquids include water, clear tea, black coffee and fruit juice without pulp.)  IF YOUR DOCTOR PRESCRIBED ANESTHESIA (be asleep for your exam):   Arrive 1 1/2 hours early. Bring someone who can take you home after the test. You may not drive, take a bus or take a taxi by yourself.   No eating 8 hours before your exam. You may have clear liquids up until 4 hours before your exam. (Clear liquids include water, clear tea, black coffee and fruit juice without pulp.)   You will spend four to five hours in the recovery room.  Please call the Imaging Department at your exam site with any questions.            Aug 07, 2018  2:00 PM CDT   Return Visit with Berkley Daniels MD   [facility] ([facility])    [facility]  [address] [facility] [address]  [facility] [address]   [phone]            Nov 12, 2018  1:20 PM CST   Office Visit with Brian Coon MD   [facility] ([facility])    [address] [place] [id number] [phone]           Bring a current list of meds and any records pertaining to this visit. For Physicals, please bring immunization records and any forms needing to be filled out. Please arrive 10 minutes early to complete paperwork.              Who to contact     Please call your clinic at [phone] to:    Ask questions about your health    Make or cancel  "appointments    Discuss your medicines    Learn about your test results    Speak to your doctor            Additional Information About Your Visit        [email or web address] IT Information     [email or web address] gives you secure access to your electronic health record. If you see a primary care provider, you can also send messages to your care team and make appointments. If you have questions, please call your primary care clinic.  If you do not have a primary care provider, please call [phone] and they will assist you.      [email or web address] is an electronic gateway that provides easy, online access to your medical records. With [email or web address], you can request a clinic appointment, read your test results, renew a prescription or communicate with your care team.     To access your existing account, please contact your [facility] or call [phone] for assistance.        Care EveryWhere ID     This is your Care EveryWhere ID. This could be used by other organizations to access your [place] medical records  [id number]        Your Vitals Were     Pulse Temperature Height BMI (Body Mass Index)          70 97.3  F (36.3  C) (Tympanic) 5' 9\" (175.3 cm) 24.57 kg/m2         Blood Pressure from Last 3 Encounters:   06/20/18 105/61   05/15/18 122/80   04/30/18 129/70    Weight from Last 3 Encounters:   06/20/18 166 lb 6.4 oz (75.5 kg)   05/15/18 166 lb (75.3 kg)   04/24/18 169 lb (76.7 kg)              Today, you had the following     No orders found for display         Today's Medication Changes          These changes are accurate as of 6/20/18  3:16 PM.  If you have any questions, ask your nurse or doctor.               These medicines have changed or have updated prescriptions.        Dose/Directions    clonazePAM 0.5 MG tablet   Commonly known as:  klonoPIN   This may have changed:  These instructions start on 7/8/2018. If you are unsure what to do until then, ask your doctor or other care provider.   Used for:  Anxiety " state, Partial epilepsy with impairment of consciousness, intractable (H)   Changed by:  Silvia Brown MD        Start taking on:  7/8/2018   TAKE ONE TABLET BY MOUTH ONCE DAILY IN THE MORNING AND TWO ONCE DAILY IN THE EVENING   Quantity:  90 tablet   Refills:  5            Where to get your medicines      These medications were sent to [facility]  [place] - [address]  [address], [place] [address]     Phone:  [phone]     carBAMazepine 300 MG 12 hr capsule    felbamate 600 MG tablet    Riboflavin 400 MG Tabs    topiramate 100 MG tablet         Some of these will need a paper prescription and others can be bought over the counter.  Ask your nurse if you have questions.     Bring a paper prescription for each of these medications     clonazePAM 0.5 MG tablet                Primary Care Provider Office Phone # Fax #    Brian Coon [phone]       [address] [place] [address]        Equal Access to Services     [facility] AH: Hadii lexis ku hadasho Soomaali, waaxda luqadaha, qaybta kaalmada adeegyada, waxay lesleyin haystar arriaga . So [facility] [phone].    ATENCIÓN: Si habla español, tiene a kirby disposición servicios gratuitos de asistencia lingüística. Llame al [phone].    We comply with applicable federal civil rights laws and [place] laws. We do not discriminate on the basis of race, color, national origin, age, disability, sex, sexual orientation, or gender identity.            Thank you!     Thank you for choosing [facility]  for your care. Our goal is always to provide you with excellent care. Hearing back from our patients is one way we can continue to improve our services. Please take a few minutes to complete the written survey that you may receive in the mail after your visit with us. Thank you!             Your Updated Medication List - Protect others around you: Learn how to safely use, store and  throw away your medicines at www.disposemymeds.org.          This list is accurate as of 6/20/18  3:16 PM.  Always use your most recent med list.                   Brand Name Dispense Instructions for use Diagnosis    acetaminophen-codeine 300-30 MG per tablet    TYLENOL #3     Take 1-2 tablets by mouth every 4 hours as needed for moderate pain As needed for headaches        carBAMazepine 300 MG 12 hr capsule    CARBATROL    180 capsule    Take 1 capsule (300 mg) by mouth 2 times daily (at 10:00 & 22:00)    Partial epilepsy with impairment of consciousness, intractable (H)       cetirizine 10 MG tablet    ZYRTEC ALLERGY    30 tablet    Take 1 tablet (10 mg) by mouth daily as needed (for mosquitos.)    Reaction to insect bite       clonazePAM 0.5 MG tablet   Start taking on:  7/8/2018    klonoPIN    90 tablet    TAKE ONE TABLET BY MOUTH ONCE DAILY IN THE MORNING AND TWO ONCE DAILY IN THE EVENING    Anxiety state, Partial epilepsy with impairment of consciousness, intractable (H)       escitalopram 20 MG tablet    LEXAPRO    90 tablet    TAKE ONE TABLET BY MOUTH ONCE DAILY AT  NIGHT    History of depression, Anxiety state       felbamate 600 MG tablet    FELBATOL    270 tablet    Take 900 mg (1.5 tablet) am and 900 mg (1.5 tablet) 2 pm    Partial epilepsy with impairment of consciousness, intractable (H)       ibuprofen 800 MG tablet    ADVIL/MOTRIN     Take 800 mg by mouth        ondansetron 4 MG tablet    ZOFRAN          pantoprazole 40 MG EC tablet    PROTONIX    90 tablet    TAKE ONE TABLET BY MOUTH ONCE DAILY    Gastroesophageal reflux disease without esophagitis       Riboflavin 400 MG Tabs     90 tablet    Take 400 mg by mouth daily    Chronic daily headache       topiramate 100 MG tablet    TOPAMAX    900 tablet    Take 2 tablets (200 mg) by mouth every morning 200 mg am and 300 mg pm    Partial epilepsy with impairment of consciousness, intractable (H)       VITAMIN B 12 PO      Take 1 tablet by mouth daily  (with dinner)

## 2018-06-20 NOTE — PROGRESS NOTES
"[facility] Epilepsy Care Progress Note    Patient:  Joni Borja  :  1970   Age:  47 year old   Today's Office Visit:  2018    Epilepsy Data:  Patient History  Primary Epileptologist/Provider: Silvia Brown M.D.  Patient Status: Chronic Intractable  Epilepsy Syndrome: Localization-related epilepsy unspecified  Epilepsy Syndrome Status: Final  Age of Onset: 29  Etiology  : Tumor  Other Relevant Dx/ Issues: Grade 2 oligodendroglioma , s/p subtotal resection (), chemo (5128-3197), and whole brain radiation. Depression with paranoid thinking. 2013 right frontal resection, path notable for residual glioma with glial scarring.    Tests/Surgery History  Last EE2012  Last MRI: 2012  Last Neuropsych Testin2012  Last Case Management Conference: 2013  Epilepsy Surgery #1 Date: 13 (right frontal lobectomy. )  Epilepsy Surgery #2 Date: 16  Epilepsy Related Surgery #2 : Type : VNS  Seizure Record  Current Visit Date: 18  Previous Visit Date: 17  Months since last visit: 7.06  Seizure Type 1: Complex partial seizures unspecified  Description of Sz Type 1: Turns head to right, has blank stare, will repeat a phrase.  usually last minutes. Tends to push things around   # of Type 1 Seizure since last visit: 56  Freq. Type 1 / Month: 7.93  Seizure Type 2: Tonic-clonic seizures  Description of Sz Type 2:  (in setting of missed medication )  # of Type 2 Seizure since last visit: 0  Freq. Type 2 / Month: 0  Seizure Type 3: Localization related seizures unspecified  Description of Sz Type 3: \"drop attacks\" has loss of awareness, lasts seconds, body is stiff when falling, and on the fall has non-rhythmic  (Drop attacks)  # of Type 3 Seizure since last visit: 0  Freq. Type 3 / Month: 0       EPILEPSY HISTORY:  Copied forward from prior notes: The patient had his 1st seizure at the age of 29 on 2002; and, at that time, he was diagnosed with a right frontal " "oligodendroglioma grade II and underwent resection (2007), whole brain radiation and chemotherapy (procarbazine, CCNU, vincristine from 06/2007-07/2008).  His pathological evaluation of the tumor demonstrated abnormalities of 1p and 19q.  The patient has been tried on multiple seizure medications; carbamazepine is his mainstay medication, and he has responded the best to this medication.  The patient had a right frontal lobe resection on 09/19/2013 at [facility] by Dr. Linder.   Surgical pathology 9/18/2013 showed \"majority of cells demonstrate an astrocytic morphology rather than that of oligodendrocytes. The immunohistochemical stain that recognizes a common mutation in IDH-1 is positive in many of the cells, indicating that these cells are a neoplastic population.\" On MRI of the brain, there is still residual tumor or T2 signal changes posterior to the tumor resection cavity. This portion of the brain was not removed primarily because on cortical mapping there was concern post operative hemiparesis. Prior to his surgery had 10 seizure per day (hypermotor) and after surgery he has 1- 2 seizures per week. Vagus nerve stimulator was placed 7/19/2016, which may help his seizures.     History of Present Illness:   He came with parents (Brittany) his dad Niko was not here. He is averaging 2-3 seizure (staring, unresponsive, moving around) per week. It seems felbamate has helped, he has also lost weight. Prior to felbamate increase he had 1-2 seizure per day. His headaches are worsen in the last 2 weeks, no weakness, continue to have vision changes, more lethargic in the last 2 weeks, no new sensory changes, he states he has increased anxiety, sleep issues.  He has severe headache. He wanted more valium and tylenol 3 for headaches. He had tumor recurrence and s/p resection of tumor resection on 3/24/2017. Prior to surgery he had a couple seizure per week and parents state after surgery he has 1 seizure per month. I asked him to " see Dr. Daniels and emailed her also.     Prior to Admission medications    Medication Sig Start Date End Date Taking? Authorizing Provider   carBAMazepine (CARBATROL) 300 MG 12 hr capsule Take 1 capsule (300 mg) by mouth 2 times daily (at 10:00 & 22:00) 11/17/17  Yes Silvia Brown MD   cetirizine (ZYRTEC ALLERGY) 10 MG tablet Take 1 tablet (10 mg) by mouth daily as needed (for mosquitos.) 5/15/18  Yes Brian Coon MD   clonazePAM (KLONOPIN) 0.5 MG tablet TAKE ONE TABLET BY MOUTH ONCE DAILY IN THE MORNING AND TWO ONCE DAILY IN THE EVENING 7/8/18  Yes Brian Coon MD   Cyanocobalamin (VITAMIN B 12 PO) Take 1 tablet by mouth daily (with dinner)   Yes Reported, Patient   escitalopram (LEXAPRO) 20 MG tablet TAKE ONE TABLET BY MOUTH ONCE DAILY AT  NIGHT 3/5/18  Yes Renato Espinoza MD   felbamate (FELBATOL) 600 MG tablet Take 900 mg (1.5 tablet) am and 900 mg (1.5 tablet) 2 pm 11/17/17  Yes Silvia Brown MD   pantoprazole (PROTONIX) 40 MG EC tablet TAKE ONE TABLET BY MOUTH ONCE DAILY 3/14/18  Yes Renato Espinoza MD   Riboflavin 400 MG TABS Take 400 mg by mouth daily 11/17/17  Yes Silvia Brown MD   topiramate (TOPAMAX) 100 MG tablet Take 2 tablets (200 mg) by mouth every morning 200 mg am and 300 mg pm 11/17/17  Yes Silvia Brown MD   acetaminophen-codeine (TYLENOL #3) 300-30 MG per tablet Take 1-2 tablets by mouth every 4 hours as needed for moderate pain As needed for headaches    Reported, Patient   ibuprofen (ADVIL/MOTRIN) 800 MG tablet Take 800 mg by mouth 6/10/16   Reported, Patient   ondansetron (ZOFRAN) 4 MG tablet  3/7/18   Reported, Patient         Patient has been filling his own medication box  Felbamate 900 mg morning, 900 mg 2pm   Topiramate 200 mg AM and 300 mg PM   Tegretol  mg am and 300 mg pm   Klonopin 0.5mg PM morning and 1 mg pm (prescribed by PCP for anxiety and RLS)     Medication Notes:     AED Medication Compliance:  compliant most of the time. Using a pill box, yes.   1.  Levetiracetam - mood issues. Weaned off Levetiracetam 2/12/2015 after getting a DUI.   2. Vimpat 200 mg twice a day, which caused increased sedation, toxicity, and was not able to tolerate.   3. Carbamazepine: Higher dose of carbamazepine causes ataxia and falls (> 1600 mg per day).   4. Fycompa was started November 2015 and was not helpful and weaned off 5/2016, global toxicity and was not able to tolerate higher than 6 mg.  5. Felbamate was started in May 2016. No major side effects.      Review of Systems:  Lethargy / Tiredness:  yes  Nausea / Vomiting: no  Double Vision:  yes  Sleepiness:  yes  Depression:  yes  Slowed Cognitive Function:  yes  Memory Problems:  yes  Poor Balance:  no  Dizziness:  no  Appetite Changes:  stable  Blurred Vision:  No  Sleep Changes:  No  Behavioral Changes:  No  Skin: negative  Respiratory: No shortness of breath, dyspnea on exertion, cough, or hemoptysis  Cardiovascular: negative  Have you experienced a traumatic fall since your last visit: no  Are these falls related to your seizures: NO  No bleeding    Other Issues:  Is patient safe to drive:  No. Not working.     Exam:    There were no vitals taken for this visit.     Wt Readings from Last 5 Encounters:   05/15/18 166 lb (75.3 kg)   04/24/18 169 lb (76.7 kg)   03/14/18 170 lb 3.2 oz (77.2 kg)   03/05/18 169 lb 8 oz (76.9 kg)   01/09/18 178 lb 9.6 oz (81 kg)     NEUROLOGICAL EXAMINATION:   There were no vitals taken for this visit.  He has lost weight. The patient is awake, alert.  Speech is fluent.  Extraocular movements intact. No nystagmus.  No focal weakness noted.  No tremor noted.   Stable gait, not ataxic.      IMPRESSION:   1.  Right frontal lobe epilepsy, intractable, etiology anaplastic oligodendroglioma/astrocytoma. S/P right frontal lobe resection (9/19/2013 for epilepsy surgery) and vagus nerve stimulator placement (7/2016).  After his recent tumor resection surgery (3/24/2017) he had a reduction in his seizures. He   continues to have complex partial seizure seizures 2-3 per week (prior to frontal resection epilepsy surgery he had 50 per week). We will keep antiepileptic drug the same.      2.  Depression. He is following with a psychologist. Taking Celexa. Encouraged him to see psychiatrist, but, he has not gone in the past year.      3.  Anaplastic Oligodendroglioma.  He had tumor recurrence and s/p resection of tumor resection on 3/24/2017 and started chemotherapy. He is following Dr. Daniels, in the last two week he has increased fatigue, headaches.I emailed her about getting MRI brain sooner due to increased headache and lethargy in the last 2 weeks.     4. Headaches: worsening headache in the last two weeks with increase lethargy. I asked him to call Dr. Daniels. I suspect he should get MRI ivana sooner. I was reluctant to give him headache medications because his may be related to tumor size change.     5. Social: He and his wife were , but, are now reconnecting. He is living with his parents. He has health care directive that he has to fill out.      PLAN:   1. Continue antiepileptic drug. Increase felbamate.   Felbamate 900 mg morning, 900 mg 2pm   Topiramate 200 mg AM and 300 mg PM   Tegretol  mg am and 300 mg pm     2. MRI brain with Dr. Daniels pending, I emailed her about getting MRI brain sooner    3. Follow up  8 months    4. Did not interrograted vagus nerve stimulator. For now, he can tape magnet onto chest to turn vagus nerve stimulator off at night. Sandro Anglin will reach out to vagus nerve stimulator about [employer] vagus nerve stimulator software, which should come out in 2018, we can decrease night time vagus nerve stimulator current setting to decrease stimulation irritability which will help him sleep.     I spent 45 minutes with the patient and family. During this time counseling and coordination of care exceeded 50% of the visit time. I addressed all questions and concerns the family raised in  regards to the patients care.          PETER MONTIEL MD

## 2018-06-20 NOTE — LETTER
"2018       RE: Joni Borja  : 1970   MRN: [id number]      Dear Colleague,    Thank you for referring your patient, Jnoi Borja, to the [facility] EPILEPSY CARE at [facility]. Please see a copy of my visit note below.    [facility] Progress Note    Patient:  Joni Borja  :  1970   Age:  47 year old   Today's Office Visit:  2018    Epilepsy Data:  Patient History  Primary Epileptologist/Provider: Silvia Brown M.D.  Patient Status: Chronic Intractable  Epilepsy Syndrome: Localization-related epilepsy unspecified  Epilepsy Syndrome Status: Final  Age of Onset: 29  Etiology  : Tumor  Other Relevant Dx/ Issues: Grade 2 oligodendroglioma , s/p subtotal resection (), chemo (5907-1890), and whole brain radiation. Depression with paranoid thinking. 2013 right frontal resection, path notable for residual glioma with glial scarring.    Tests/Surgery History  Last EE2012  Last MRI: 2012  Last Neuropsych Testin2012  Last Case Management Conference: 2013  Epilepsy Surgery #1 Date: 13 (right frontal lobectomy. )  Epilepsy Surgery #2 Date: 16  Epilepsy Related Surgery #2 : Type : VNS  Seizure Record  Current Visit Date: 18  Previous Visit Date: 17  Months since last visit: 7.06  Seizure Type 1: Complex partial seizures unspecified  Description of Sz Type 1: Turns head to right, has blank stare, will repeat a phrase.  usually last minutes. Tends to push things around   # of Type 1 Seizure since last visit: 56  Freq. Type 1 / Month: 7.93  Seizure Type 2: Tonic-clonic seizures  Description of Sz Type 2:  (in setting of missed medication )  # of Type 2 Seizure since last visit: 0  Freq. Type 2 / Month: 0  Seizure Type 3: Localization related seizures unspecified  Description of Sz Type 3: \"drop attacks\" has loss of awareness, lasts seconds, body is stiff when falling, and on the fall has " "non-rhythmic  (Drop attacks)  # of Type 3 Seizure since last visit: 0  Freq. Type 3 / Month: 0       EPILEPSY HISTORY:  Copied forward from prior notes: The patient had his 1st seizure at the age of 29 on 04/22/2002; and, at that time, he was diagnosed with a right frontal oligodendroglioma grade II and underwent resection (2007), whole brain radiation and chemotherapy (procarbazine, CCNU, vincristine from 06/2007-07/2008).  His pathological evaluation of the tumor demonstrated abnormalities of 1p and 19q.  The patient has been tried on multiple seizure medications; carbamazepine is his mainstay medication, and he has responded the best to this medication.  The patient had a right frontal lobe resection on 09/19/2013 at [facility] by Dr. Linder.   Surgical pathology 9/18/2013 showed \"majority of cells demonstrate an astrocytic morphology rather than that of oligodendrocytes. The immunohistochemical stain that recognizes a common mutation in IDH-1 is positive in many of the cells, indicating that these cells are a neoplastic population.\" On MRI of the brain, there is still residual tumor or T2 signal changes posterior to the tumor resection cavity. This portion of the brain was not removed primarily because on cortical mapping there was concern post operative hemiparesis. Prior to his surgery had 10 seizure per day (hypermotor) and after surgery he has 1- 2 seizures per week. Vagus nerve stimulator was placed 7/19/2016, which may help his seizures.     History of Present Illness:   He came with parents (Brittany) his dad Niko was not here. He is averaging 2-3 seizure (staring, unresponsive, moving around) per week. It seems felbamate has helped, he has also lost weight. Prior to felbamate increase he had 1-2 seizure per day. His headaches are worsen in the last 2 weeks, no weakness, continue to have vision changes, more lethargic in the last 2 weeks, no new sensory changes, he states he has increased anxiety, sleep issues.  He " has severe headache. He wanted more valium and tylenol 3 for headaches. He had tumor recurrence and s/p resection of tumor resection on 3/24/2017. Prior to surgery he had a couple seizure per week and parents state after surgery he has 1 seizure per month. I asked him to see Dr. Daniels and emailed her also.     Prior to Admission medications    Medication Sig Start Date End Date Taking? Authorizing Provider   carBAMazepine (CARBATROL) 300 MG 12 hr capsule Take 1 capsule (300 mg) by mouth 2 times daily (at 10:00 & 22:00) 11/17/17  Yes Silvia Brown MD   cetirizine (ZYRTEC ALLERGY) 10 MG tablet Take 1 tablet (10 mg) by mouth daily as needed (for mosquitos.) 5/15/18  Yes Brian Coon MD   clonazePAM (KLONOPIN) 0.5 MG tablet TAKE ONE TABLET BY MOUTH ONCE DAILY IN THE MORNING AND TWO ONCE DAILY IN THE EVENING 7/8/18  Yes Brian Coon MD   Cyanocobalamin (VITAMIN B 12 PO) Take 1 tablet by mouth daily (with dinner)   Yes Reported, Patient   escitalopram (LEXAPRO) 20 MG tablet TAKE ONE TABLET BY MOUTH ONCE DAILY AT  NIGHT 3/5/18  Yes Renato Espinoza MD   felbamate (FELBATOL) 600 MG tablet Take 900 mg (1.5 tablet) am and 900 mg (1.5 tablet) 2 pm 11/17/17  Yes Silvia Brown MD   pantoprazole (PROTONIX) 40 MG EC tablet TAKE ONE TABLET BY MOUTH ONCE DAILY 3/14/18  Yes Renato Espinoza MD   Riboflavin 400 MG TABS Take 400 mg by mouth daily 11/17/17  Yes Silvia Brown MD   topiramate (TOPAMAX) 100 MG tablet Take 2 tablets (200 mg) by mouth every morning 200 mg am and 300 mg pm 11/17/17  Yes Silvia Brown MD   acetaminophen-codeine (TYLENOL #3) 300-30 MG per tablet Take 1-2 tablets by mouth every 4 hours as needed for moderate pain As needed for headaches    Reported, Patient   ibuprofen (ADVIL/MOTRIN) 800 MG tablet Take 800 mg by mouth 6/10/16   Reported, Patient   ondansetron (ZOFRAN) 4 MG tablet  3/7/18   Reported, Patient         Patient has been filling his own medication box  Felbamate 900 mg morning, 900 mg 2pm    Topiramate 200 mg AM and 300 mg PM   Tegretol  mg am and 300 mg pm   Klonopin 0.5mg PM morning and 1 mg pm (prescribed by PCP for anxiety and RLS)     Medication Notes:     AED Medication Compliance:  compliant most of the time. Using a pill box, yes.   1. Levetiracetam - mood issues. Weaned off Levetiracetam 2/12/2015 after getting a DUI.   2. Vimpat 200 mg twice a day, which caused increased sedation, toxicity, and was not able to tolerate.   3. Carbamazepine: Higher dose of carbamazepine causes ataxia and falls (> 1600 mg per day).   4. Fycompa was started November 2015 and was not helpful and weaned off 5/2016, global toxicity and was not able to tolerate higher than 6 mg.  5. Felbamate was started in May 2016. No major side effects.      Review of Systems:  Lethargy / Tiredness:  yes  Nausea / Vomiting: no  Double Vision:  yes  Sleepiness:  yes  Depression:  yes  Slowed Cognitive Function:  yes  Memory Problems:  yes  Poor Balance:  no  Dizziness:  no  Appetite Changes:  stable  Blurred Vision:  No  Sleep Changes:  No  Behavioral Changes:  No  Skin: negative  Respiratory: No shortness of breath, dyspnea on exertion, cough, or hemoptysis  Cardiovascular: negative  Have you experienced a traumatic fall since your last visit: no  Are these falls related to your seizures: NO  No bleeding    Other Issues:  Is patient safe to drive:  No. Not working.     Exam:    There were no vitals taken for this visit.     Wt Readings from Last 5 Encounters:   05/15/18 166 lb (75.3 kg)   04/24/18 169 lb (76.7 kg)   03/14/18 170 lb 3.2 oz (77.2 kg)   03/05/18 169 lb 8 oz (76.9 kg)   01/09/18 178 lb 9.6 oz (81 kg)     NEUROLOGICAL EXAMINATION:   There were no vitals taken for this visit.  He has lost weight. The patient is awake, alert.  Speech is fluent.  Extraocular movements intact. No nystagmus.  No focal weakness noted.  No tremor noted.   Stable gait, not ataxic.      IMPRESSION:   1.  Right frontal lobe epilepsy,  intractable, etiology anaplastic oligodendroglioma/astrocytoma. S/P right frontal lobe resection (9/19/2013 for epilepsy surgery) and vagus nerve stimulator placement (7/2016).  After his recent tumor resection surgery (3/24/2017) he had a reduction in his seizures. He  continues to have complex partial seizure seizures 2-3 per week (prior to frontal resection epilepsy surgery he had 50 per week). We will keep antiepileptic drug the same.      2.  Depression. He is following with a psychologist. Taking Celexa. Encouraged him to see psychiatrist, but, he has not gone in the past year.      3.  Anaplastic Oligodendroglioma.  He had tumor recurrence and s/p resection of tumor resection on 3/24/2017 and started chemotherapy. He is following Dr. Daniels, in the last two week he has increased fatigue, headaches.I emailed her about getting MRI brain sooner due to increased headache and lethargy in the last 2 weeks.     4. Headaches: worsening headache in the last two weeks with increase lethargy. I asked him to call Dr. Daniels. I suspect he should get MRI ivana sooner. I was reluctant to give him headache medications because his may be related to tumor size change.     5. Social: He and his wife were , but, are now reconnecting. He is living with his parents. He has health care directive that he has to fill out.      PLAN:   1. Continue antiepileptic drug. Increase felbamate.   Felbamate 900 mg morning, 900 mg 2pm   Topiramate 200 mg AM and 300 mg PM   Tegretol  mg am and 300 mg pm     2. MRI brain with Dr. Daniels pending, I emailed her about getting MRI brain sooner    3. Follow up  8 months    4. Did not interrograted vagus nerve stimulator. For now, he can tape magnet onto chest to turn vagus nerve stimulator off at night. Sandro Anglin will reach out to vagus nerve stimulator about [employer] vagus nerve stimulator software, which should come out in 2018, we can decrease night time vagus nerve stimulator current  setting to decrease stimulation irritability which will help him sleep.     I spent 45 minutes with the patient and family. During this time counseling and coordination of care exceeded 50% of the visit time. I addressed all questions and concerns the family raised in regards to the patients care.     Again, thank you for allowing me to participate in the care of your patient.      Sincerely,    Silvia Brown MD

## 2018-06-21 ENCOUNTER — HOSPITAL ENCOUNTER (OUTPATIENT)
Dept: MRI IMAGING | Facility: CLINIC | Age: 48
Discharge: HOME | End: 2018-06-21
Attending: PSYCHIATRY & NEUROLOGY | Admitting: PSYCHIATRY & NEUROLOGY
Payer: MEDICARE

## 2018-06-21 DIAGNOSIS — C71.9 OLIGODENDROGLIOMA, ANAPLASTIC (H): ICD-10-CM

## 2018-06-21 DIAGNOSIS — Z96.89 S/P PLACEMENT OF VNS (VAGUS NERVE STIMULATION) DEVICE: ICD-10-CM

## 2018-06-21 DIAGNOSIS — Z96.89 STATUS POST VNS (VAGUS NERVE STIMULATOR) PLACEMENT: ICD-10-CM

## 2018-06-21 PROCEDURE — 25000128 H RX IP 250 OP 636: Performed by: PSYCHIATRY & NEUROLOGY

## 2018-06-21 PROCEDURE — 70553 MRI BRAIN STEM W/O & W/DYE: CPT

## 2018-06-21 PROCEDURE — A9585 GADOBUTROL INJECTION: HCPCS | Performed by: PSYCHIATRY & NEUROLOGY

## 2018-06-21 RX ORDER — GADOBUTROL 604.72 MG/ML
7.5 INJECTION INTRAVENOUS ONCE
Status: COMPLETED | OUTPATIENT
Start: 2018-06-21 | End: 2018-06-21

## 2018-06-21 RX ADMIN — GADOBUTROL 7.5 ML: 604.72 INJECTION INTRAVENOUS at 15:12

## 2018-06-26 ENCOUNTER — ONCOLOGY VISIT (OUTPATIENT)
Dept: ONCOLOGY | Facility: CLINIC | Age: 48
End: 2018-06-26
Attending: PSYCHIATRY & NEUROLOGY
Payer: COMMERCIAL

## 2018-06-26 VITALS
DIASTOLIC BLOOD PRESSURE: 67 MMHG | OXYGEN SATURATION: 98 % | HEART RATE: 64 BPM | BODY MASS INDEX: 24.84 KG/M2 | TEMPERATURE: 98.4 F | RESPIRATION RATE: 16 BRPM | SYSTOLIC BLOOD PRESSURE: 100 MMHG | WEIGHT: 168.2 LBS

## 2018-06-26 DIAGNOSIS — Z86.59 HISTORY OF DEPRESSION: ICD-10-CM

## 2018-06-26 DIAGNOSIS — C71.9 OLIGODENDROGLIOMA, ANAPLASTIC (H): Primary | ICD-10-CM

## 2018-06-26 DIAGNOSIS — G40.219 PARTIAL EPILEPSY WITH IMPAIRMENT OF CONSCIOUSNESS, INTRACTABLE (H): ICD-10-CM

## 2018-06-26 DIAGNOSIS — Z96.89 S/P PLACEMENT OF VNS (VAGUS NERVE STIMULATION) DEVICE: ICD-10-CM

## 2018-06-26 DIAGNOSIS — Z79.899 ENCOUNTER FOR LONG-TERM (CURRENT) USE OF MEDICATIONS: ICD-10-CM

## 2018-06-26 PROCEDURE — 99215 OFFICE O/P EST HI 40 MIN: CPT | Performed by: PSYCHIATRY & NEUROLOGY

## 2018-06-26 PROCEDURE — G0463 HOSPITAL OUTPT CLINIC VISIT: HCPCS

## 2018-06-26 ASSESSMENT — PAIN SCALES - GENERAL: PAINLEVEL: NO PAIN (0)
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
## 2018-06-26 NOTE — PATIENT INSTRUCTIONS
I will touch base with Dr. Oscar.   Plan for radiation therapy regardless of resection; referral to [facility] in [place].     Consideration for another trial of Procarbazine, CCNU, vincristine.     Take vitamin B2 (200mg twice a day) and B12.    Referral to radiation oncology placed. [facility] [place] contacted/Jaimie Daniels MD  Neuro-oncology  6/26/2018
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
## 2018-06-26 NOTE — MR AVS SNAPSHOT
After Visit Summary   6/26/2018    Joni Borja    MRN: [id number]           Patient Information     Date Of Birth          1970        Visit Information        Provider Department      6/26/2018 1:30 PM Berkley Daniels MD [facility]        Today's Diagnoses     Oligodendroglioma, anaplastic (H)    -  1    S/P placement of VNS (vagus nerve stimulation) device        Encounter for long-term (current) use of medications        Partial epilepsy with impairment of consciousness, intractable (H)        History of depression          Care Instructions    I will touch base with Dr. Oscar.   Plan for radiation therapy regardless of resection; referral to [facility] in [place].     Consideration for another trial of Procarbazine, CCNU, vincristine.     Take vitamin B2 (200mg twice a day) and B12.    Referral to radiation oncology placed.     Berkley Daniels MD  Neuro-oncology  6/26/2018            Follow-ups after your visit        Additional Services     RAD ONCOLOGY REFERRAL       Your provider has referred you to: [facility]    Please be aware that coverage of these services is subject to the terms and limitations of your health insurance plan.  Call member services at your health plan with any benefit or coverage questions.      Please bring the following with you to your appointment:    (1) Any X-Rays, CTs or MRIs which have been performed.  Contact the facility where they were done to arrange for  prior to your scheduled appointment.    (2) List of current medications   (3) This referral request   (4) Any documents/labs given to you for this referral                  Your next 10 appointments already scheduled     Nov 12, 2018  1:20 PM CST   Office Visit with Brian Coon MD   [facility] ([facility])    [address] [place] [id number] [phone]           Bring a current list of meds and any  records pertaining to this visit. For Physicals, please bring immunization records and any forms needing to be filled out. Please arrive 10 minutes early to complete paperwork.              Who to contact     If you have questions or need follow up information about today's clinic visit or your schedule please contact [facility] directly at [phone].  Normal or non-critical lab and imaging results will be communicated to you by MyChart, letter or phone within 4 business days after the clinic has received the results. If you do not hear from us within 7 days, please contact the clinic through [employer] or phone. If you have a critical or abnormal lab result, we will notify you by phone as soon as possible.  Submit refill requests through [employer] or call your pharmacy and they will forward the refill request to us. Please allow 3 business days for your refill to be completed.          Additional Information About Your Visit        MyChart Information     [employer] gives you secure access to your electronic health record. If you see a primary care provider, you can also send messages to your care team and make appointments. If you have questions, please call your primary care clinic.  If you do not have a primary care provider, please call [phone] and they will assist you.        Care EveryWhere ID     This is your Care EveryWhere ID. This could be used by other organizations to access your [facility] medical records  [id number]        Your Vitals Were     Pulse Temperature Respirations Pulse Oximetry BMI (Body Mass Index)       64 98.4  F (36.9  C) (Oral) 16 98% 24.84 kg/m2        Blood Pressure from Last 3 Encounters:   06/26/18 100/67   06/20/18 105/61   05/15/18 122/80    Weight from Last 3 Encounters:   06/26/18 76.3 kg (168 lb 3.2 oz)   06/20/18 75.5 kg (166 lb 6.4 oz)   05/15/18 75.3 kg (166 lb)              We Performed the Following     RAD ONCOLOGY REFERRAL          Today's Medication  Changes          These changes are accurate as of 6/26/18 11:59 PM.  If you have any questions, ask your nurse or doctor.               Stop taking these medicines if you haven't already. Please contact your care team if you have questions.     ibuprofen 800 MG tablet   Commonly known as:  ADVIL/MOTRIN   Stopped by:  Berkley Daniels MD                    Primary Care Provider Office Phone # Fax #    Brian Coon [phone]       [facility] [address]        Equal Access to Services     [facility]: Hadii aad ku hadasho Soomaali, waaxda luqadaha, qaybta kaalmada adeegyada, waxay evens haystar arriaga . So [facility] [phone].    ATENCIÓN: Si habla español, tiene a kirby disposición servicios gratuitos de asistencia lingüística. Ll[facility] [phone].    We comply with applicable federal civil rights laws and [place] laws. We do not discriminate on the basis of race, color, national origin, age, disability, sex, sexual orientation, or gender identity.            Thank you!     Thank you for choosing [facility]  for your care. Our goal is always to provide you with excellent care. Hearing back from our patients is one way we can continue to improve our services. Please take a few minutes to complete the written survey that you may receive in the mail after your visit with us. Thank you!             Your Updated Medication List - Protect others around you: Learn how to safely use, store and throw away your medicines at www.disposemymeds.org.          This list is accurate as of 6/26/18 11:59 PM.  Always use your most recent med list.                   Brand Name Dispense Instructions for use Diagnosis    acetaminophen-codeine 300-30 MG per tablet    TYLENOL #3     Take 1-2 tablets by mouth every 4 hours as needed for moderate pain As needed for headaches        carBAMazepine 300 MG 12 hr capsule    CARBATROL    180 capsule    Take 1 capsule  (300 mg) by mouth 2 times daily (at 10:00 & 22:00)    Partial epilepsy with impairment of consciousness, intractable (H)       cetirizine 10 MG tablet    ZYRTEC ALLERGY    30 tablet    Take 1 tablet (10 mg) by mouth daily as needed (for mosquitos.)    Reaction to insect bite       clonazePAM 0.5 MG tablet   Start taking on:  7/8/2018    klonoPIN    90 tablet    TAKE ONE TABLET BY MOUTH ONCE DAILY IN THE MORNING AND TWO ONCE DAILY IN THE EVENING    Anxiety state, Partial epilepsy with impairment of consciousness, intractable (H)       escitalopram 20 MG tablet    LEXAPRO    90 tablet    TAKE ONE TABLET BY MOUTH ONCE DAILY AT  NIGHT    History of depression, Anxiety state       felbamate 600 MG tablet    FELBATOL    270 tablet    Take 900 mg (1.5 tablet) am and 900 mg (1.5 tablet) 2 pm    Partial epilepsy with impairment of consciousness, intractable (H)       ondansetron 4 MG tablet    ZOFRAN          pantoprazole 40 MG EC tablet    PROTONIX    90 tablet    TAKE ONE TABLET BY MOUTH ONCE DAILY    Gastroesophageal reflux disease without esophagitis       Riboflavin 400 MG Tabs     90 tablet    Take 400 mg by mouth daily    Chronic daily headache       topiramate 100 MG tablet    TOPAMAX    900 tablet    Take 2 tablets (200 mg) by mouth every morning 200 mg am and 300 mg pm    Partial epilepsy with impairment of consciousness, intractable (H)       VITAMIN B 12 PO      Take 1 tablet by mouth daily (with dinner)

## 2018-06-26 NOTE — PROGRESS NOTES
"Oncology Rooming Note    June 26, 2018 1:22 PM   Joni Borja is a 47 year old male who presents for:    Chief Complaint   Patient presents with     Oncology Clinic Visit     Oligodendroglioma (H) - right temporal     Initial Vitals: /67 (BP Location: Right arm, Patient Position: Sitting, Cuff Size: Adult Regular)  Pulse 64  Temp 98.4  F (36.9  C) (Oral)  Resp 16  Wt 76.3 kg (168 lb 3.2 oz)  SpO2 98%  BMI 24.84 kg/m2 Estimated body mass index is 24.84 kg/(m^2) as calculated from the following:    Height as of 6/20/18: 1.753 m (5' 9\").    Weight as of this encounter: 76.3 kg (168 lb 3.2 oz). Body surface area is 1.93 meters squared.  No Pain (0) Comment: Data Unavailable   No LMP for male patient.  Allergies reviewed: Yes  Medications reviewed: Yes    Medications: Medication refills not needed today.  Pharmacy name entered into [facility]:    [facility] - [place], [place] - [address] [facility] - [place], [place] - [address]    Clinical concerns: no   5 minutes for nursing intake (face to face time)      Linda Phillips MA            "

## 2018-06-27 ENCOUNTER — TELEPHONE (OUTPATIENT)
Dept: ONCOLOGY | Facility: CLINIC | Age: 48
End: 2018-06-27

## 2018-06-27 NOTE — TELEPHONE ENCOUNTER
Received a phone call from [facility] radiation stating that consult was received but patient is waiting to hear back from his surgeon Dr. Oscar before he starts radiation treatment. Informed Dr. Daniels and she is aware and stated that she will call patient. Dr. Oscar is currently on vacation. Leeanna Storm RN,BSN,OCN

## 2018-06-29 ENCOUNTER — TELEPHONE (OUTPATIENT)
Dept: ONCOLOGY | Facility: CLINIC | Age: 48
End: 2018-06-29

## 2018-06-29 NOTE — TELEPHONE ENCOUNTER
Oncology Distress Screening Follow-up  Clinical Social Work  [facility]    Identified Concern and Score From Distress Screenin. How concerned are you about your ability to eat?   7           2. How concerned are you about unintended weight loss or your current weight?   0           3. How concerned are you about feeling depressed or very sad?   8           4. How concerned are you about feeling anxious or very scared?   5           5. How concerned are you about your spiritual wellbeing or sense of peace?   0           6. How concerned are you about work and home life issues that may be affected by your cancer?   0           7. How concerned are you about knowing what resources are available to help you?                  Date of Distress Screenin18    Intervention:   Joni is a 47-year-old gentleman with a diagnosis of right frontal low grade oligodendroglioma, originally diagnosed in . Joni came to clinic 18 to see Dr. Daniels for planned follow-up. Joni is a  gentleman who moved 1.5 years ago from living with partner to now living with his brother, mother, and father. Joni reports that he has been having ongoing discussion with wife about divorce, and has been coping with court proceedings over past 1 yr after 23 years together. Joni reports today that his greatest concern has been sleepiness, reporting that he is sleeping 12 hrs a day. Pt reports that his concentration is poor, and that he has been feeling more depressed in the context of learning that he will need to have surgery or radiation, and the psychosocial stressors of divorce. Pt also reports that he has experienced 40lbs of weight loss in the past year.     Provided a safe place for Joni to express range of emotions pertaining to divorce and medical care. Made recommendations for Joni to consider engaging in more daily physical activity to assist with fatigue, discussed approaches to enhance sleep hygiene, and encouraged ongoing  therapeutic support. Joni receptive to idea of biking more, and open to considering different approaches to stay out of bed to promote wakefulness in day. Joni reports that he has been followed therapeutically in the past year, and is not interested in engaging with these services at present time.     Resources/Education Provided:   Disability Linkage Line  [employer]  Oncology Social Work  Brain Tumor Support Group    Follow-up Required:   1) This clinician will reach out to RD to ask her to follow-up with pt regarding weight loss and reported concerns about ability to eat, despite not rendering positive distress screen.   2) Pt given this clinician's contact information and is aware that I will continue to be available for ongoing psychosocial support as family continues to adjust to treatment and realize its impacts as needed.     DELANEY Cooper, LICSW  Phone: [phone]  Pager: [phone]    Ozzie Jackson: M, T  *every other Thursday, 8am-4:30pm  Ozzie Fair: W, F, *every other Thursday, 8am-4:30pm

## 2018-07-02 ENCOUNTER — OFFICE VISIT (OUTPATIENT)
Dept: NEUROSURGERY | Facility: CLINIC | Age: 48
End: 2018-07-02
Attending: NEUROLOGICAL SURGERY
Payer: COMMERCIAL

## 2018-07-02 VITALS
SYSTOLIC BLOOD PRESSURE: 109 MMHG | OXYGEN SATURATION: 99 % | DIASTOLIC BLOOD PRESSURE: 66 MMHG | HEART RATE: 75 BPM | TEMPERATURE: 98.7 F

## 2018-07-02 DIAGNOSIS — D49.6 BRAIN TUMOR (H): Primary | ICD-10-CM

## 2018-07-02 PROCEDURE — 99214 OFFICE O/P EST MOD 30 MIN: CPT | Performed by: NEUROLOGICAL SURGERY

## 2018-07-02 PROCEDURE — G0463 HOSPITAL OUTPT CLINIC VISIT: HCPCS | Performed by: NEUROLOGICAL SURGERY

## 2018-07-02 ASSESSMENT — PAIN SCALES - GENERAL: PAINLEVEL: NO PAIN (0)

## 2018-07-02 NOTE — NURSING NOTE
"Joni Borja is a 47 year old male who presents for:  Chief Complaint   Patient presents with     Neurologic Problem     follow up MRI - discuss surgical options   s/p craniotomy 3-24-17        Vitals:    There were no vitals filed for this visit.    BMI:  Estimated body mass index is 24.84 kg/(m^2) as calculated from the following:    Height as of 6/20/18: 5' 9\" (1.753 m).    Weight as of 6/26/18: 168 lb 3.2 oz (76.3 kg).    Pain Score:  Data Unavailable        Kristina Carson CMA, AAS      "

## 2018-07-02 NOTE — PATIENT INSTRUCTIONS
Surgery scheduled for Right Craniotomy for evacuation of brain tumor  Location: [facility]    Pre-Operative:     -Surgical risks: blood clots in the leg or lung, problems urinating, nerve damage, drainage from the incision, infection, stiffness    -Pre-operative physical with primary care physician within 30 days of surgical date.   -Stop all solid foods and liquids 8 hours prior to surgery.    - Discontinue Aspirin, NSAIDs (Advil/Ibuprofen, Naproxen, Nuprin, Diclofenac, Meloxicam, Aleve, Celebrex) x 7 days prior to surgical date.   -After surgery, do not begin taking these medications until given clearance. May cause bleeding and interfere with healing.    Post-Operative:     -Hospitalization: 2-3 days  -Incision care: Watch for signs of infection- redness, swelling, warmth, drainage, and fever of 101 degrees or higher. If these occur, please notify clinic or visit nearest Emergency Room. No submerging incision in water, may shower and use baby shampoo. Do not scrub incision. Gently pat or air dry.  -Restrictions for 4 weeks after surgery: Limited bending forward, no lifting anything greater than 10 pounds, limit strenuous activities   -If you are currently employed, you will need to be off work for recovery and healing. Please fax any FMLA/short term disability paperwork to [phone]. You may call our clinic when you'd like to return to work and we can provide a work letter.   -Go to the nearest Emergency Room if you develop: Acute changes in the level of consciousness (increased confusion, memory loss, speech abnormalities), any change in hearing or vision, increased headaches, or new onset of seizures.  - Follow up appointments: 2 weeks after surgery for suture/staple removal and 4 weeks after surgery. Please call to schedule follow up appointment at [phone].

## 2018-07-02 NOTE — PROGRESS NOTES
46-year-old male with history of right frontal glioma and seizures, status post multiple resections, chemotherapy, and placement of vagus nerve stimulator, presents with concern for recurrence.  Most recent resection in 3/2017.  Deleted oligo.  Doing well clinically, rare seizures and low grade chronic headaches.  On serial MRI, progressive enlargement of flair positive region in the right inferior frontal lobe.    Returns for follow up.  Was recently having increased sleep disturbance and lethargy, which prompted repeat MRI.  This was concerning for continued right frontal tumor progression.       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 Ryza-Qadtm-Flnoqxv disease 2/7/2017          Localization-related epilepsy (H)      Meningitis, viral      Oligodendroglioma (H) 4/02    right frontal grade 2, s/p biopsy and whole brain radiation, recurrence 5/07 s/p subtotal resection and chemo     Perthe's disease of hip     Right hip     Seizures (H)      Past Surgical History:   Procedure Laterality Date     APPENDECTOMY  1972     BIOPSY  2002     CRANIOTOMY, EXCISE TUMOR COMPLEX, COMBINED  9/18/2013    Procedure: COMBINED CRANIOTOMY, EXCISE TUMOR COMPLEX;  Re-do Right Frontal Craniotomy, Grid Removal,  Resection of Right Frontal  Tumor and Epileptogenic Cortex Resection;  Surgeon: Maverick Linder MD;  Location: [facility] OR     HEAD & NECK SURGERY  2002    brain tumor removal     IMPLANT STIMULATOR VAGUS NERVE Left 7/19/2016    Procedure: IMPLANT STIMULATOR VAGUS NERVE;  Surgeon: Maverick Linder MD;  Location: [facility] OR     OPTICAL TRACKING SYSTEM CRANIOTOMY, EXCISE TUMOR WITH MAPPING, COMBINED  9/10/2013    Procedure: COMBINED OPTICAL TRACKING SYSTEM CRANIOTOMY, EXCISE TUMOR WITH MAPPING;  Stealth Guided Right Redo Frontal Craniotomy for Grid Placement ;  Surgeon: Maverick Linder MD;   Location:  OR     OPTICAL TRACKING SYSTEM CRANIOTOMY, EXCISE TUMOR, COMBINED Right 3/24/2017    Procedure: COMBINED OPTICAL TRACKING SYSTEM CRANIOTOMY, EXCISE TUMOR;  Surgeon: Stuart Oscar MD;  Location:  OR     ORTHOPEDIC SURGERY  1982    Right Hip - Perthe's     REPAIR CONGENITAL HIP Right     Age 8     SHOULDER SURGERY       Social History     Social History     Marital status:      Spouse name:  from wife.     Number of children: 0     Years of education: N/A     Occupational History     Not on file.     Social History Main Topics     Smoking status: Current Every Day Smoker     Packs/day: 1.00     Years: 20.00     Types: Cigarettes     Smokeless tobacco: Never Used      Comment: would like to quit soon.     Alcohol use 0.0 oz/week      Comment: monthly a few drinks     Drug use: Yes      Comment: marijuana(Rx) on weekends     Sexual activity: Not Currently     Other Topics Concern     Parent/Sibling W/ Cabg, Mi Or Angioplasty Before 65f 55m? Yes     Social History Narrative     Family History   Problem Relation Age of Onset     Hyperlipidemia Father      medicine therapy     Coronary Artery Disease Maternal Grandmother      Medicine Therapy/Decesed     Diabetes Paternal Grandfather      Slight/diet control     Coronary Artery Disease Brother 42     mechanical valve     Coronary Artery Disease Maternal Grandfather      undiagnosed/     Cerebrovascular Disease Other      Breast Cancer Other      Colon Cancer No family hx of      Prostate Cancer No family hx of         ROS: 10 point ROS neg other than the symptoms noted above in the HPI.    Physical Exam  /66 (BP Location: Left arm, Cuff Size: Adult Regular)  Pulse 75  Temp 98.7  F (37.1  C) (Oral)  SpO2 99%  HEENT:  Normocephalic, atraumatic.  PERRLA.  EOM s intact.  Visual fields full to gross exam  Neck:  Supple, non-tender, without lymphadenopathy.  Heart:  No peripheral edema  Lungs:  No SOB  Abdomen:  Non-distended.    Skin:  Warm and dry.  Extremities:  No edema, cyanosis or clubbing.    NEUROLOGICAL EXAMINATION:     Mental status:  Alert and Oriented x 3, speech is fluent.  Cranial nerves:  II-XII intact.   Motor:    Shoulder Abduction:  Right:  5/5   Left:  5/5  Biceps:                      Right:  5/5   Left:  5/5  Triceps:                     Right:  5/5   Left:  5/5  Wrist Extensors:       Right:  5/5   Left:  5/5  Wrist Flexors:           Right:  5/5   Left:  5/5  interosseus :            Right:  5/5   Left:  5/5   Hip Flexor:                Right: 5/5  Left:  5/5  Hip Adductor:             Right:  5/5  Left:  5/5  Hip Abductor:             Right:  5/5  Left:  5/5  Gastroc Soleus:        Right:  5/5  Left:  5/5  Tib/Ant:                      Right:  5/5  Left:  5/5  EHL:                     Right:  5/5  Left:  5/5  Sensation:  Intact  Reflexes:  Negative Babinski.  Negative Clonus.  Negative Nolen's.  Coordination:  Smooth finger to nose testing.   Negative pronator drift.  Smooth tandem walking.  Incision well healed    A/P:  46-year-old male with history of right frontal glioma and seizures, status post multiple resections, chemotherapy, and placement of vagus nerve stimulator, presents with concern for recurrence    I had a discussion with the patient, reviewing the history, symptoms and imaging  Will plan for surgery  Risks and benefits discussed

## 2018-07-02 NOTE — LETTER
7/2/2018         RE: Joni Borja  [id number] [facility] [id number]        Dear Colleague,    Thank you for referring your patient, Joni Borja, to the [facility]. Please see a copy of my visit note below.      Patient Education    Education included but not limited to:  -Surgical risks: blood clots in the leg or lung, problems urinating, nerve damage, drainage from the incision, infection, stiffness.  - Pre-operative physical with primary care physician within 30 days of surgical date. Pre-operative clearance from other pertaining specialty (cardiology, hematology, etc).  - Discontinue Aspirin, NSAIDS x 7 days prior to surgical date. After surgery, do not begin taking until given clearance.  -Smoking cessation  -Preparation timeline: NPO, medications, etc.    -Hospital stay: checking in, surgery, recovery room, hospital room     - Post operative pain management: narcotics, muscle relaxants, ice, etc.   -No driving while taking narcotics   -Post operative incision care:   Keep your incision clean and dry.   Okay to shower. No bathing, swimming or submerging in water.    Monitor incision for signs of infection. Notify clinic if drainage or fever develops.  - Post operative activity limitations for 6 weeks: No bending forward, no lifting anything greater than 10 pounds, limit strenuous activities.   -Discussed being off work, short term disability, FMLA, etc.   - Follow up appointments: 2 weeks for suture/staple removal and 4-6 weeks with repeat imaging.   -Go to the nearest Emergency Room for evaluation: acute changes in the level of consciousness (increased confusion, memory loss, speech abnormalities), any change in hearing or vision, increased headaches, new onset of seizures.  -Craniotomy Precautions hand out given to patient.     Patient verbalized understanding of above instructions. All questions were answered to the best of my ability and the patient's  satisfaction. Patient advised to call with any additional questions or concerns.            46-year-old male with history of right frontal glioma and seizures, status post multiple resections, chemotherapy, and placement of vagus nerve stimulator, presents with concern for recurrence.  Most recent resection in 3/2017.  Deleted oligo.  Doing well clinically, rare seizures and low grade chronic headaches.  On serial MRI, progressive enlargement of flair positive region in the right inferior frontal lobe.    Returns for follow up.  Was recently having increased sleep disturbance and lethargy, which prompted repeat MRI.  This was concerning for continued right frontal tumor progression.       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 Qzxr-Bukzx-Ysgpviw disease 2/7/2017          Localization-related epilepsy (H)      Meningitis, viral      Oligodendroglioma (H) 4/02    right frontal grade 2, s/p biopsy and whole brain radiation, recurrence 5/07 s/p subtotal resection and chemo     Perthe's disease of hip     Right hip     Seizures (H)      Past Surgical History:   Procedure Laterality Date     APPENDECTOMY  1972     BIOPSY  2002     CRANIOTOMY, EXCISE TUMOR COMPLEX, COMBINED  9/18/2013    Procedure: COMBINED CRANIOTOMY, EXCISE TUMOR COMPLEX;  Re-do Right Frontal Craniotomy, Grid Removal,  Resection of Right Frontal  Tumor and Epileptogenic Cortex Resection;  Surgeon: Maverick Linder MD;  Location: [facility] OR     HEAD & NECK SURGERY  2002    brain tumor removal     IMPLANT STIMULATOR VAGUS NERVE Left 7/19/2016    Procedure: IMPLANT STIMULATOR VAGUS NERVE;  Surgeon: Maverick Linder MD;  Location: [facility] OR     OPTICAL TRACKING SYSTEM CRANIOTOMY, EXCISE TUMOR WITH MAPPING, COMBINED  9/10/2013    Procedure: COMBINED OPTICAL TRACKING SYSTEM CRANIOTOMY, EXCISE TUMOR WITH MAPPING;  Stealth Guided  Right Redo Frontal Craniotomy for Grid Placement ;  Surgeon: Maverick Linder MD;  Location: [facility] OR     OPTICAL TRACKING SYSTEM CRANIOTOMY, EXCISE TUMOR, COMBINED Right 3/24/2017    Procedure: COMBINED OPTICAL TRACKING SYSTEM CRANIOTOMY, EXCISE TUMOR;  Surgeon: Stuart Oscar MD;  Location:  OR     ORTHOPEDIC SURGERY      Right Hip - Perthe's     REPAIR CONGENITAL HIP Right     Age 8     SHOULDER SURGERY       Social History     Social History     Marital status:      Spouse name:  from wife.     Number of children: 0     Years of education: N/A     Occupational History     Not on file.     Social History Main Topics     Smoking status: Current Every Day Smoker     Packs/day: 1.00     Years: 20.00     Types: Cigarettes     Smokeless tobacco: Never Used      Comment: would like to quit soon.     Alcohol use 0.0 oz/week      Comment: monthly a few drinks     Drug use: Yes      Comment: marijuana(Rx) on weekends     Sexual activity: Not Currently     Other Topics Concern     Parent/Sibling W/ Cabg, Mi Or Angioplasty Before 65f 55m? Yes     Social History Narrative     Family History   Problem Relation Age of Onset     Hyperlipidemia Father      medicine therapy     Coronary Artery Disease Maternal Grandmother      Medicine Therapy/Decesed     Diabetes Paternal Grandfather      Slight/diet control     Coronary Artery Disease Brother 42     mechanical valve     Coronary Artery Disease Maternal Grandfather      undiagnosed/     Cerebrovascular Disease Other      Breast Cancer Other      Colon Cancer No family hx of      Prostate Cancer No family hx of         ROS: 10 point ROS neg other than the symptoms noted above in the HPI.    Physical Exam  /66 (BP Location: Left arm, Cuff Size: Adult Regular)  Pulse 75  Temp 98.7  F (37.1  C) (Oral)  SpO2 99%  HEENT:  Normocephalic, atraumatic.  PERRLA.  EOM s intact.  Visual fields full to gross exam  Neck:  Supple, non-tender,  without lymphadenopathy.  Heart:  No peripheral edema  Lungs:  No SOB  Abdomen:  Non-distended.   Skin:  Warm and dry.  Extremities:  No edema, cyanosis or clubbing.    NEUROLOGICAL EXAMINATION:     Mental status:  Alert and Oriented x 3, speech is fluent.  Cranial nerves:  II-XII intact.   Motor:    Shoulder Abduction:  Right:  5/5   Left:  5/5  Biceps:                      Right:  5/5   Left:  5/5  Triceps:                     Right:  5/5   Left:  5/5  Wrist Extensors:       Right:  5/5   Left:  5/5  Wrist Flexors:           Right:  5/5   Left:  5/5  interosseus :            Right:  5/5   Left:  5/5   Hip Flexor:                Right: 5/5  Left:  5/5  Hip Adductor:             Right:  5/5  Left:  5/5  Hip Abductor:             Right:  5/5  Left:  5/5  Gastroc Soleus:        Right:  5/5  Left:  5/5  Tib/Ant:                      Right:  5/5  Left:  5/5  EHL:                     Right:  5/5  Left:  5/5  Sensation:  Intact  Reflexes:  Negative Babinski.  Negative Clonus.  Negative Nolen's.  Coordination:  Smooth finger to nose testing.   Negative pronator drift.  Smooth tandem walking.  Incision well healed    A/P:  46-year-old male with history of right frontal glioma and seizures, status post multiple resections, chemotherapy, and placement of vagus nerve stimulator, presents with concern for recurrence    I had a discussion with the patient, reviewing the history, symptoms and imaging  Will plan for surgery  Risks and benefits discussed           Again, thank you for allowing me to participate in the care of your patient.        Sincerely,        Stuart Oscar MD

## 2018-07-02 NOTE — PROGRESS NOTES
Patient Education    Education included but not limited to:  -Surgical risks: blood clots in the leg or lung, problems urinating, nerve damage, drainage from the incision, infection, stiffness.  - Pre-operative physical with primary care physician within 30 days of surgical date. Pre-operative clearance from other pertaining specialty (cardiology, hematology, etc).  - Discontinue Aspirin, NSAIDS x 7 days prior to surgical date. After surgery, do not begin taking until given clearance.  -Smoking cessation  -Preparation timeline: NPO, medications, etc.    -Hospital stay: checking in, surgery, recovery room, hospital room     - Post operative pain management: narcotics, muscle relaxants, ice, etc.   -No driving while taking narcotics   -Post operative incision care:   Keep your incision clean and dry.   Okay to shower. No bathing, swimming or submerging in water.    Monitor incision for signs of infection. Notify clinic if drainage or fever develops.  - Post operative activity limitations for 6 weeks: No bending forward, no lifting anything greater than 10 pounds, limit strenuous activities.   -Discussed being off work, short term disability, FMLA, etc.   - Follow up appointments: 2 weeks for suture/staple removal and 4-6 weeks with repeat imaging.   -Go to the nearest Emergency Room for evaluation: acute changes in the level of consciousness (increased confusion, memory loss, speech abnormalities), any change in hearing or vision, increased headaches, new onset of seizures.  -Craniotomy Precautions hand out given to patient.     Patient verbalized understanding of above instructions. All questions were answered to the best of my ability and the patient's satisfaction. Patient advised to call with any additional questions or concerns.

## 2018-07-02 NOTE — MR AVS SNAPSHOT
After Visit Summary   7/2/2018    Joni Borja    MRN: [id number]           Patient Information     Date Of Birth          1970        Visit Information        Provider Department      7/2/2018 3:20 PM Stuart Oscar MD [facility]        Care Instructions    Surgery scheduled for Right Craniotomy for evacuation of brain tumor  Location: [facility]    Pre-Operative:     -Surgical risks: blood clots in the leg or lung, problems urinating, nerve damage, drainage from the incision, infection, stiffness    -Pre-operative physical with primary care physician within 30 days of surgical date.   -Stop all solid foods and liquids 8 hours prior to surgery.    - Discontinue Aspirin, NSAIDs (Advil/Ibuprofen, Naproxen, Nuprin, Diclofenac, Meloxicam, Aleve, Celebrex) x 7 days prior to surgical date.   -After surgery, do not begin taking these medications until given clearance. May cause bleeding and interfere with healing.    Post-Operative:     -Hospitalization: 2-3 days  -Incision care: Watch for signs of infection- redness, swelling, warmth, drainage, and fever of 101 degrees or higher. If these occur, please notify clinic or visit nearest Emergency Room. No submerging incision in water, may shower and use baby shampoo. Do not scrub incision. Gently pat or air dry.  -Restrictions for 4 weeks after surgery: Limited bending forward, no lifting anything greater than 10 pounds, limit strenuous activities   -If you are currently employed, you will need to be off work for recovery and healing. Please fax any FMLA/short term disability paperwork to [phone]. You may call our clinic when you'd like to return to work and we can provide a work letter.   -Go to the nearest Emergency Room if you develop: Acute changes in the level of consciousness (increased confusion, memory loss, speech abnormalities), any change in hearing or vision, increased headaches, or new  onset of seizures.  - Follow up appointments: 2 weeks after surgery for suture/staple removal and 4 weeks after surgery. Please call to schedule follow up appointment at [phone].                Follow-ups after your visit        Your next 10 appointments already scheduled     Nov 12, 2018  1:20 PM CST   Office Visit with Brian Coon MD   [facility] ([facility])    [address] [place] [id number] [phone]           Bring a current list of meds and any records pertaining to this visit. For Physicals, please bring immunization records and any forms needing to be filled out. Please arrive 10 minutes early to complete paperwork.              Who to contact     If you have questions or need follow up information about today's clinic visit or your schedule please contact [facility] directly at [phone].  Normal or non-critical lab and imaging results will be communicated to you by BRIKAhart, letter or phone within 4 business days after the clinic has received the results. If you do not hear from us within 7 days, please contact the clinic through [employer] or phone. If you have a critical or abnormal lab result, we will notify you by phone as soon as possible.  Submit refill requests through GiveNext or call your pharmacy and they will forward the refill request to us. Please allow 3 business days for your refill to be completed.          Additional Information About Your Visit        MyChart Information     GiveNext gives you secure access to your electronic health record. If you see a primary care provider, you can also send messages to your care team and make appointments. If you have questions, please call your primary care clinic.  If you do not have a primary care provider, please call [phone] and they will assist you.        Care EveryWhere ID     This is your Care EveryWhere ID. This could be used by other  organizations to access your [facility] medical records  [id number]        Your Vitals Were     Pulse Temperature Pulse Oximetry             75 98.7  F (37.1  C) (Oral) 99%          Blood Pressure from Last 3 Encounters:   07/02/18 109/66   06/26/18 100/67   06/20/18 105/61    Weight from Last 3 Encounters:   06/26/18 168 lb 3.2 oz (76.3 kg)   06/20/18 166 lb 6.4 oz (75.5 kg)   05/15/18 166 lb (75.3 kg)              Today, you had the following     No orders found for display       Primary Care Provider Office Phone # Fax #    Brian Coon [phone] [address] [facility] [address]        Equal Access to Services     KAYLIE MARIE : Hadii aad ku hadasho Soomaali, waaxda luqadaha, qaybta kaalmada adeegyada, es arriaga . So [facility] [phone].    ATENCIÓN: Si habla español, tiene a kirby disposición servicios gratuitos de asistencia lingüística. Llame al [phone].    We comply with applicable federal civil rights laws and [place] laws. We do not discriminate on the basis of race, color, national origin, age, disability, sex, sexual orientation, or gender identity.            Thank you!     Thank you for choosing [facility]  for your care. Our goal is always to provide you with excellent care. Hearing back from our patients is one way we can continue to improve our services. Please take a few minutes to complete the written survey that you may receive in the mail after your visit with us. Thank you!             Your Updated Medication List - Protect others around you: Learn how to safely use, store and throw away your medicines at www.disposemymeds.org.          This list is accurate as of 7/2/18  4:05 PM.  Always use your most recent med list.                   Brand Name Dispense Instructions for use Diagnosis    acetaminophen-codeine 300-30 MG per tablet    TYLENOL #3     Take 1-2 tablets by mouth every 4 hours as  needed for moderate pain As needed for headaches        carBAMazepine 300 MG 12 hr capsule    CARBATROL    180 capsule    Take 1 capsule (300 mg) by mouth 2 times daily (at 10:00 & 22:00)    Partial epilepsy with impairment of consciousness, intractable (H)       cetirizine 10 MG tablet    ZYRTEC ALLERGY    30 tablet    Take 1 tablet (10 mg) by mouth daily as needed (for mosquitos.)    Reaction to insect bite       clonazePAM 0.5 MG tablet   Start taking on:  7/8/2018    klonoPIN    90 tablet    TAKE ONE TABLET BY MOUTH ONCE DAILY IN THE MORNING AND TWO ONCE DAILY IN THE EVENING    Anxiety state, Partial epilepsy with impairment of consciousness, intractable (H)       escitalopram 20 MG tablet    LEXAPRO    90 tablet    TAKE ONE TABLET BY MOUTH ONCE DAILY AT  NIGHT    History of depression, Anxiety state       felbamate 600 MG tablet    FELBATOL    270 tablet    Take 900 mg (1.5 tablet) am and 900 mg (1.5 tablet) 2 pm    Partial epilepsy with impairment of consciousness, intractable (H)       ondansetron 4 MG tablet    ZOFRAN          pantoprazole 40 MG EC tablet    PROTONIX    90 tablet    TAKE ONE TABLET BY MOUTH ONCE DAILY    Gastroesophageal reflux disease without esophagitis       Riboflavin 400 MG Tabs     90 tablet    Take 400 mg by mouth daily    Chronic daily headache       topiramate 100 MG tablet    TOPAMAX    900 tablet    Take 2 tablets (200 mg) by mouth every morning 200 mg am and 300 mg pm    Partial epilepsy with impairment of consciousness, intractable (H)       VITAMIN B 12 PO      Take 1 tablet by mouth daily (with dinner)

## 2018-07-03 ENCOUNTER — TELEPHONE (OUTPATIENT)
Dept: NEUROSURGERY | Facility: CLINIC | Age: 48
End: 2018-07-03

## 2018-07-03 NOTE — TELEPHONE ENCOUNTER
Type of surgery: Right craniotomy for evacuation of brain tumor  Location of surgery: [facility] OR  Date and time of surgery: 07/13/2018  Surgeon: Dr. Oscar  Pre-Op Appt Date: 07/09/2018  Post-Op Appt Date: 08/07/2018   Packet sent out: Yes  Pre-cert/Authorization completed:  Yes  Date: MONSE Hoffman on 7/3/2018 at 9:26 AM

## 2018-07-05 ENCOUNTER — TELEPHONE (OUTPATIENT)
Dept: INTERNAL MEDICINE | Facility: OTHER | Age: 48
End: 2018-07-05

## 2018-07-05 ENCOUNTER — TELEPHONE (OUTPATIENT)
Dept: ONCOLOGY | Facility: CLINIC | Age: 48
End: 2018-07-05

## 2018-07-05 NOTE — TELEPHONE ENCOUNTER
Received request by , Indira, to contact patient regarding patient's weight loss during the past year.  Called and spoke with patient.  Patient states he eats 3 sqaures per day and does not feel he has any eating concerns.  Patient states he lost weight because he was receiving chemotherapy.  Patient also states taking 17 medications per day does not help his appetite.  Patient declined discussing nutrition any further.  Encouraged patient to let Dr. Daniels's nurse know if patient wants to speak with RD again.  Patient verbalized understanding.     Dao Simpson, RD, LD  Clinical Dietitian  [facility]  [phone] (direct)

## 2018-07-05 NOTE — TELEPHONE ENCOUNTER
Patient is going to have COMBINED OPTICAL TRACKING SYSTEM CRANIOTOMY, EXCISE TUMOR Right on 7-13-18.  Patient is scheduled with another provider for Pre-op on 7-9-18.  Patient wants Renato Espinoza MD to do the pre-op instead.  Patient is contacting the surgeon now to confirm if it is okay for another provider to do the pre-op versus [facility] in [place].      Jenni Badillo LPN 7/5/2018 10:36 AM

## 2018-07-05 NOTE — TELEPHONE ENCOUNTER
Patient indicates that the surgeon is okay with any provider doing the pre-op with patient.      Jenni Badillo LPN 7/5/2018 10:52 AM

## 2018-07-09 NOTE — PROGRESS NOTES
[address] [address]  [phone]  Dept: [phone]    PRE-OP EVALUATION:  Today's date: 7/10/2018    Joni Borja (: 1970) presents for pre-operative evaluation assessment as requested by Dr. Ruiz.  He requires evaluation and anesthesia risk assessment prior to undergoing surgery/procedure for treatment of brain tumor .    Proposed Surgery/ Procedure: COMBINED OPTICAL TRACKING SYSTEM CRANIOTOMY, EXCISE TUMOR [RIGHT TEMPORAL]  Date of Surgery/ Procedure: 2018  Time of Surgery/ Procedure: 7:30 AM  Hospital/Surgical Facility: [facility]  Primary Physician: Brian Coon  Type of Anesthesia Anticipated: General    Patient has a Health Care Directive or Living Will:  NO    1. NO - Do you have a history of heart attack, stroke, stent, bypass or surgery on an artery in the head, neck, heart or legs?  2. NO - Do you ever have any pain or discomfort in your chest?  3. NO - Do you have a history of  Heart Failure?  4. NO - Are you troubled by shortness of breath when: walking on the level, up a slight hill or at night?  5. NO - Do you currently have a cold, bronchitis or other respiratory infection?  6. NO - Do you have a cough, shortness of breath or wheezing?  7. NO - Do you sometimes get pains in the calves of your legs when you walk?  8. NO - Do you or anyone in your family have previous history of blood clots?  9. NO - Do you or does anyone in your family have a serious bleeding problem such as prolonged bleeding following surgeries or cuts?  10. NO - Have you ever had problems with anemia or been told to take iron pills?  11. NO - Have you had any abnormal blood loss such as black, tarry or bloody stools, or abnormal vaginal bleeding?  12. NO - Have you ever had a blood transfusion?  13. NO - Have you or any of your relatives ever had problems with anesthesia?  14. NO - Do you have sleep apnea, excessive snoring or  daytime drowsiness?  15. NO - Do you have any prosthetic heart valves?  16. NO - Do you have prosthetic joints?      HPI:     HPI related to upcoming procedure: Pt will be undergoing a COMBINED OPTICAL TRACKING SYSTEM CRANIOTOMY, EXCISE TUMOR [RIGHT TEMPORAL] for treatment of a recurrent brain tumor.                                                                                                                                    .    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 Sfva-Lhjoz-Nyjwinu disease 02/07/2017     Priority: Medium              Status post VNS (vagus nerve stimulator) placement - for epilepsy 02/07/2017     Priority: Medium     Oligodendroglioma (H) - right temporal      Priority: Medium     Overview:   Right temporal brain tumor, oligodendroglioma  right frontal grade 2, s/p biopsy and whole brain radiation, recurrence 5/07 s/p subtotal resection and chemo       History of biliary T-tube placement 01/25/2017     Priority: Medium     Medical marijuana use 06/10/2016     Priority: Medium     Pain medication agreement 06/10/2016     Priority: Medium     Fluency disorder associated with underlying disease 06/09/2015     Priority: Medium     Unstable gait 06/18/2014     Priority: Medium     Tobacco use 03/14/2014     Priority: Medium     S/P brain surgery 03/14/2014     Priority: Medium      Partial epilepsy with impairment of consciousness, intractable (H) 08/19/2013     Priority: Medium     Headache 04/09/2013     Priority: Medium     Anxiety state 07/13/2011     Priority: Medium     HCD (health care directive) 05/24/2007     Priority: Medium      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 Oyml-Wtcrv-Lzsvoeu disease 2/7/2017          Localization-related epilepsy (H)      Meningitis, viral      Oligodendroglioma (H) 4/02    right frontal grade 2, s/p biopsy and whole brain radiation, recurrence 5/07 s/p subtotal resection and chemo     Perthe's disease of hip     Right hip     Seizures (H)      Past Surgical History:   Procedure Laterality Date     APPENDECTOMY  1972     BIOPSY  2002     CRANIOTOMY, EXCISE TUMOR COMPLEX, COMBINED  9/18/2013    Procedure: COMBINED CRANIOTOMY, EXCISE TUMOR COMPLEX;  Re-do Right Frontal Craniotomy, Grid Removal,  Resection of Right Frontal  Tumor and Epileptogenic Cortex Resection;  Surgeon: Maverick Linder MD;  Location:  OR     HEAD & NECK SURGERY  2002    brain tumor removal     IMPLANT STIMULATOR VAGUS NERVE Left 7/19/2016    Procedure: IMPLANT STIMULATOR VAGUS NERVE;  Surgeon: Maverick Linder MD;  Location:  OR     OPTICAL TRACKING SYSTEM CRANIOTOMY, EXCISE TUMOR WITH MAPPING, COMBINED  9/10/2013    Procedure: COMBINED OPTICAL TRACKING SYSTEM CRANIOTOMY, EXCISE TUMOR WITH MAPPING;  Stealth Guided Right Redo Frontal Craniotomy for Grid Placement ;  Surgeon: Maverick Linder MD;  Location:  OR     OPTICAL TRACKING SYSTEM CRANIOTOMY, EXCISE TUMOR, COMBINED Right 3/24/2017    Procedure: COMBINED OPTICAL TRACKING SYSTEM CRANIOTOMY, EXCISE TUMOR;  Surgeon: Stuart Oscar MD;  Location:  OR     ORTHOPEDIC SURGERY  1982    Right Hip - Perthe's     REPAIR CONGENITAL HIP Right     Age 8     SHOULDER SURGERY        Current Outpatient Prescriptions   Medication Sig Dispense Refill     carBAMazepine (CARBATROL) 300 MG 12 hr capsule Take 1 capsule (300 mg) by mouth 2 times daily (at 10:00 & 22:00) 180 capsule 3     clonazePAM (KLONOPIN) 0.5 MG tablet TAKE ONE TABLET BY MOUTH ONCE DAILY IN THE MORNING AND TWO ONCE DAILY IN THE EVENING 90 tablet 5     Cyanocobalamin (VITAMIN B 12 PO) Take 1 tablet by mouth daily (with dinner)       escitalopram (LEXAPRO) 20 MG tablet TAKE ONE TABLET BY MOUTH ONCE DAILY AT  NIGHT 90 tablet 3     felbamate (FELBATOL) 600 MG tablet Take 900 mg (1.5 tablet) am and 900 mg (1.5 tablet) 2 pm 270 tablet 3     pantoprazole (PROTONIX) 40 MG EC tablet TAKE ONE TABLET BY MOUTH ONCE DAILY 90 tablet 3     Riboflavin 400 MG TABS Take 400 mg by mouth daily 90 tablet 3     topiramate (TOPAMAX) 100 MG tablet Take 2 tablets (200 mg) by mouth every morning 200 mg am and 300 mg pm 900 tablet 3     acetaminophen-codeine (TYLENOL #3) 300-30 MG per tablet Take 1-2 tablets by mouth every 4 hours as needed for moderate pain As needed for headaches       cetirizine (ZYRTEC ALLERGY) 10 MG tablet Take 1 tablet (10 mg) by mouth daily as needed (for mosquitos.) 30 tablet      ondansetron (ZOFRAN) 4 MG tablet        OTC products: None, except as noted above    Allergies   Allergen Reactions     Dilantin [Phenytoin] Hives     Mosquitoes (Informational Only)      blisters      Latex Allergy: NO    Social History   Substance Use Topics     Smoking status: Current Every Day Smoker     Packs/day: 1.00     Years: 20.00     Types: Cigarettes     Smokeless tobacco: Never Used      Comment: would like to quit soon.     Alcohol use 0.0 oz/week      Comment: monthly a few drinks     History   Drug Use     Yes     Comment: marijuana(Rx) on weekends       REVIEW OF SYSTEMS:   Constitutional, HEENT, cardiovascular, pulmonary, GI, , musculoskeletal, neuro, skin, endocrine and psych systems are negative, except as otherwise noted.    This  "document serves as a record of the services and decisions personally performed and made by Brian Coon MD. It was created on his behalf by Malena Guerrero, a trained medical scribe. The creation of this document is based the provider's statements to the medical scribe.  Scribe Malena Guerrero 4:22 PM, July 10, 2018    EXAM:   /72  Pulse 85  Temp 98  F (36.7  C) (Oral)  Ht 1.753 m (5' 9\")  Wt 74.8 kg (165 lb)  SpO2 100%  BMI 24.37 kg/m2    GENERAL APPEARANCE: healthy, alert and no distress     EYES: EOMI,  PERRL     HENT: ear canals and TM's normal and nose and mouth without ulcers or lesions     NECK: no adenopathy, no asymmetry, masses, or scars and thyroid normal to palpation     RESP: lungs clear to auscultation - no rales, rhonchi or wheezes     CV: regular rates and rhythm, normal S1 S2, no S3 or S4 and no murmur, click or rub     ABDOMEN:  soft, nontender, no HSM or masses and bowel sounds normal     MS: extremities normal- no gross deformities noted, no evidence of inflammation in joints, FROM in all extremities.     SKIN: no suspicious lesions or rashes     NEURO: Normal strength and tone, sensory exam grossly normal, mentation intact and speech normal     PSYCH: mentation appears normal. and affect normal/bright     LYMPHATICS: No cervical adenopathy    DIAGNOSTICS:   EKG: sinus bradycardia, normal axis, normal intervals, no acute ST/T changes c/w ischemia, no LVH by voltage criteria, unchanged from previous tracings    Recent Labs   Lab Test  04/24/18   1043  04/06/18   1139   03/14/18   1406   02/12/15   2213   09/24/13   0533   HGB  12.2*  12.7*   < >  13.1*   < >   --    < >  9.1*   PLT  281  209   < >  296   < >   --    < >  465*   INR   --    --    --    --    --   1.2   --   1.09   NA  143   --    --   140   < >   --    < >  143   POTASSIUM  4.1   --    --   3.8   < >   --    < >  3.8   CR  0.99   --    --   1.01   < >   --    < >  0.90    < > = values in this interval not displayed.    "     IMPRESSION:   Reason for surgery/procedure: Pt will be undergoing a COMBINED OPTICAL TRACKING SYSTEM CRANIOTOMY, EXCISE TUMOR [RIGHT TEMPORAL] for treatment of a recurrent brain tumor.    The proposed surgical procedure is considered INTERMEDIATE risk.    REVISED CARDIAC RISK INDEX  The patient has the following serious cardiovascular risks for perioperative complications such as (MI, PE, VFib and 3  AV Block):  No serious cardiac risks  INTERPRETATION: 0 risks: Class I (very low risk - 0.4% complication rate)    The patient has the following additional risks for perioperative complications:  No identified additional risks      ICD-10-CM    1. Preop general physical exam Z01.818 EKG 12-lead complete w/read - Clinics   2. Oligodendroglioma (H) - right temporal C71.9    3. Partial epilepsy with impairment of consciousness, intractable (H) G40.219    4. Anxiety state F41.1    5. Tobacco use Z72.0    6. Medical marijuana use Z79.899        RECOMMENDATIONS:         --Patient is to take all scheduled medications on the day of surgery     APPROVAL GIVEN to proceed with proposed procedure, without further diagnostic evaluation       The information in this document, created by the medical scribe for me, accurately reflects the services I personally performed and the decisions made by me. I have reviewed and approved this document for accuracy prior to leaving the patient care area.  4:22 PM, 07/10/18    Signed Electronically by: Brian Coon MD    Copy of this evaluation report is provided to requesting physician.    [facility] Preop Guidelines    Revised Cardiac Risk Index

## 2018-07-10 ENCOUNTER — OFFICE VISIT (OUTPATIENT)
Dept: FAMILY MEDICINE | Facility: CLINIC | Age: 48
End: 2018-07-10
Payer: COMMERCIAL

## 2018-07-10 VITALS
HEIGHT: 69 IN | BODY MASS INDEX: 24.44 KG/M2 | DIASTOLIC BLOOD PRESSURE: 72 MMHG | OXYGEN SATURATION: 100 % | SYSTOLIC BLOOD PRESSURE: 100 MMHG | TEMPERATURE: 98 F | HEART RATE: 85 BPM | WEIGHT: 165 LBS

## 2018-07-10 DIAGNOSIS — Z01.818 PREOP GENERAL PHYSICAL EXAM: Primary | ICD-10-CM

## 2018-07-10 DIAGNOSIS — F41.1 ANXIETY STATE: ICD-10-CM

## 2018-07-10 DIAGNOSIS — G40.219 PARTIAL EPILEPSY WITH IMPAIRMENT OF CONSCIOUSNESS, INTRACTABLE (H): ICD-10-CM

## 2018-07-10 DIAGNOSIS — Z79.899 MEDICAL MARIJUANA USE: ICD-10-CM

## 2018-07-10 DIAGNOSIS — C71.9 OLIGODENDROGLIOMA (H): ICD-10-CM

## 2018-07-10 DIAGNOSIS — Z72.0 TOBACCO USE: ICD-10-CM

## 2018-07-10 LAB
ERYTHROCYTE [DISTWIDTH] IN BLOOD BY AUTOMATED COUNT: 12.9 % (ref 10–15)
HCT VFR BLD AUTO: 39.7 % (ref 40–53)
HGB BLD-MCNC: 12.8 G/DL (ref 13.3–17.7)
MCH RBC QN AUTO: 31.6 PG (ref 26.5–33)
MCHC RBC AUTO-ENTMCNC: 32.2 G/DL (ref 31.5–36.5)
MCV RBC AUTO: 98 FL (ref 78–100)
PLATELET # BLD AUTO: 284 10E9/L (ref 150–450)
RBC # BLD AUTO: 4.05 10E12/L (ref 4.4–5.9)
WBC # BLD AUTO: 6.6 10E9/L (ref 4–11)

## 2018-07-10 PROCEDURE — 85027 COMPLETE CBC AUTOMATED: CPT | Performed by: FAMILY MEDICINE

## 2018-07-10 PROCEDURE — 93000 ELECTROCARDIOGRAM COMPLETE: CPT | Performed by: FAMILY MEDICINE

## 2018-07-10 PROCEDURE — 99214 OFFICE O/P EST MOD 30 MIN: CPT | Performed by: FAMILY MEDICINE

## 2018-07-10 PROCEDURE — 36415 COLL VENOUS BLD VENIPUNCTURE: CPT | Performed by: FAMILY MEDICINE

## 2018-07-10 RX ORDER — CLONAZEPAM 0.5 MG/1
TABLET ORAL
Qty: 90 TABLET | Refills: 5 | Status: CANCELLED | OUTPATIENT
Start: 2018-07-10

## 2018-07-10 NOTE — MR AVS SNAPSHOT
After Visit Summary   7/10/2018    Joni Borja    MRN: [id number]           Patient Information     Date Of Birth          1970        Visit Information        Provider Department      7/10/2018 3:20 PM Brian Coon MD [facility] [place]        Today's Diagnoses     Preop general physical exam    -  1    Oligodendroglioma (H) - right temporal        Partial epilepsy with impairment of consciousness, intractable (H)        Anxiety state        Tobacco use        Medical marijuana use          Care Instructions      Before Your Surgery      Call your surgeon if there is any change in your health. This includes signs of a cold or flu (such as a sore throat, runny nose, cough, rash or fever).    Do not smoke, drink alcohol or take over the counter medicine (unless your surgeon or primary care doctor tells you to) for the 24 hours before and after surgery.    If you take prescribed drugs: Follow your doctor s orders about which medicines to take and which to stop until after surgery.    Eating and drinking prior to surgery: follow the instructions from your surgeon    Take a shower or bath the night before surgery. Use the soap your surgeon gave you to gently clean your skin. If you do not have soap from your surgeon, use your regular soap. Do not shave or scrub the surgery site.  Wear clean pajamas and have clean sheets on your bed.           Follow-ups after your visit        Your next 10 appointments already scheduled     Jul 12, 2018  9:00 AM CDT   MR BRAIN W CONTRAST STEREOTACTIC with [id number]   [facility] ([facility])    [address] [place] [address]   [phone]           Take your medicines as usual, unless your doctor tells you not to. Bring a list of your current medicines to your exam (including vitamins, minerals and over-the-counter drugs).  You may or may not receive intravenous (IV) contrast for this exam pending the  discretion of the Radiologist.  You do not need to do anything special to prepare.  The MRI machine uses a strong magnet. Please wear clothes without metal (snaps, zippers). A sweatsuit works well, or we may give you a hospital gown.  Please remove any body piercings and hair extensions before you arrive. You will also remove watches, jewelry, hairpins, wallets, dentures, partial dental plates and hearing aids. You may wear contact lenses, and you may be able to wear your rings. We have a safe place to keep your personal items, but it is safer to leave them at home.  **IMPORTANT** THE INSTRUCTIONS BELOW ARE ONLY FOR THOSE PATIENTS WHO HAVE BEEN PRESCRIBED SEDATION OR GENERAL ANESTHESIA DURING THEIR MRI PROCEDURE:  IF YOUR DOCTOR PRESCRIBED ORAL SEDATION (take medicine to help you relax during your exam):   You must get the medicine from your doctor (oral medication) before you arrive. Bring the medicine to the exam. Do not take it at home. You ll be told when to take it upon arriving for your exam.   Arrive one hour early. Bring someone who can take you home after the test. Your medicine will make you sleepy. After the exam, you may not drive, take a bus or take a taxi by yourself.  IF YOUR DOCTOR PRESCRIBED IV SEDATION:   Arrive one hour early. Bring someone who can take you home after the test. Your medicine will make you sleepy. After the exam, you may not drive, take a bus or take a taxi by yourself.   No eating 6 hours before your exam. You may have clear liquids up until 4 hours before your exam. (Clear liquids include water, clear tea, black coffee and fruit juice without pulp.)  IF YOUR DOCTOR PRESCRIBED ANESTHESIA (be asleep for your exam):   Arrive 1 1/2 hours early. Bring someone who can take you home after the test. You may not drive, take a bus or take a taxi by yourself.   No eating 8 hours before your exam. You may have clear liquids up until 4 hours before your exam. (Clear liquids include water,  clear tea, black coffee and fruit juice without pulp.)   You will spend four to five hours in the recovery room.  Please call the Imaging Department at your exam site with any questions.            Jul 13, 2018   Procedure with Stuart Oscar MD   [facility] (--)    [address], [address]  [place] [address]   [phone]            Aug 07, 2018  3:10 PM CDT   Return Visit with Carley Auguste PA-C   [facility] ([facility])    [address] [place] [address]  [address]  [facility] [address]   [phone]            Oct 15, 2018  3:00 PM CDT   Return Visit with Stuart Oscar MD   [facility] ([facility])    [address] [place] [address]  [facility] [address]   [phone]            Nov 12, 2018  1:20 PM CST   [facility] Visit [facility]on [facility]   [facility]    [address] [place] [address]   [phone]           Bring a current list of meds and any records pertaining to this visit. For Physicals, please bring immunization records and any forms needing to be filled out. Please arrive 10 minutes early to complete paperwork.              Who to contact     If you have questions or need follow up information about today's clinic visit or your schedule please contact [facility] directly at [phone].  Normal or non-critical lab and imaging results will be communicated to you by MyChart, letter or phone within 4 business days after the clinic has received the results. If you do not hear from us within 7 days, please contact the clinic through [employer] or phone. If you have a critical or abnormal lab result, we will notify you by phone as soon as possible.  Submit refill requests through [employer] or call your pharmacy and they will forward the refill request to us. Please allow 3 business days for your  "refill to be completed.          Additional Information About Your Visit        MyChart Information     [facility] gives you secure access to your electronic health record. If you see a primary care provider, you can also send messages to your care team and make appointments. If you have questions, please call your primary care clinic.  If you do not have a primary care provider, please call [phone] and they will assist you.        Care EveryWhere ID     This is your Care EveryWhere ID. This could be used by other organizations to access your [facility] medical records  [id number]        Your Vitals Were     Pulse Temperature Height Pulse Oximetry BMI (Body Mass Index)       85 98  F (36.7  C) (Oral) 5' 9\" (1.753 m) 100% 24.37 kg/m2        Blood Pressure from Last 3 Encounters:   07/10/18 100/72   07/02/18 109/66   06/26/18 100/67    Weight from Last 3 Encounters:   07/10/18 165 lb (74.8 kg)   06/26/18 168 lb 3.2 oz (76.3 kg)   06/20/18 166 lb 6.4 oz (75.5 kg)              We Performed the Following     CBC with platelets     EKG 12-lead complete w/read - Clinics        Primary Care Provider Office Phone # Fax #    Brian Coon [phone] [address] [facility] [address]        Equal Access to Services     [facility] CYNTHIA : Hadii aad ku hadasho Soomaali, waaxda luqadaha, qaybta kaalmada adeegyada, es arriaga . So [facility] [phone].    ATENCIÓN: Si habla español, tiene a kirby disposición servicios gratuitos de asistencia lingüística. Llame al [phone].    We comply with applicable federal civil rights laws and [place] laws. We do not discriminate on the basis of race, color, national origin, age, disability, sex, sexual orientation, or gender identity.            Thank you!     Thank you for choosing [facility]  for your care. Our goal is always to provide you with excellent care. Hearing back from our patients is one way we can " continue to improve our services. Please take a few minutes to complete the written survey that you may receive in the mail after your visit with us. Thank you!             Your Updated Medication List - Protect others around you: Learn how to safely use, store and throw away your medicines at www.disposemymeds.org.          This list is accurate as of 7/10/18  4:34 PM.  Always use your most recent med list.                   Brand Name Dispense Instructions for use Diagnosis    acetaminophen-codeine 300-30 MG per tablet    TYLENOL #3     Take 1-2 tablets by mouth every 4 hours as needed for moderate pain As needed for headaches        carBAMazepine 300 MG 12 hr capsule    CARBATROL    180 capsule    Take 1 capsule (300 mg) by mouth 2 times daily (at 10:00 & 22:00)    Partial epilepsy with impairment of consciousness, intractable (H)       cetirizine 10 MG tablet    ZYRTEC ALLERGY    30 tablet    Take 1 tablet (10 mg) by mouth daily as needed (for mosquitos.)    Reaction to insect bite       clonazePAM 0.5 MG tablet    klonoPIN    90 tablet    TAKE ONE TABLET BY MOUTH ONCE DAILY IN THE MORNING AND TWO ONCE DAILY IN THE EVENING    Anxiety state, Partial epilepsy with impairment of consciousness, intractable (H)       escitalopram 20 MG tablet    LEXAPRO    90 tablet    TAKE ONE TABLET BY MOUTH ONCE DAILY AT  NIGHT    History of depression, Anxiety state       felbamate 600 MG tablet    FELBATOL    270 tablet    Take 900 mg (1.5 tablet) am and 900 mg (1.5 tablet) 2 pm    Partial epilepsy with impairment of consciousness, intractable (H)       ondansetron 4 MG tablet    ZOFRAN          pantoprazole 40 MG EC tablet    PROTONIX    90 tablet    TAKE ONE TABLET BY MOUTH ONCE DAILY    Gastroesophageal reflux disease without esophagitis       Riboflavin 400 MG Tabs     90 tablet    Take 400 mg by mouth daily    Chronic daily headache       topiramate 100 MG tablet    TOPAMAX    900 tablet    Take 2 tablets (200 mg) by mouth  every morning 200 mg am and 300 mg pm    Partial epilepsy with impairment of consciousness, intractable (H)       VITAMIN B 12 PO      Take 1 tablet by mouth daily (with dinner)

## 2018-07-11 NOTE — H&P (VIEW-ONLY)
[address] [address]  [phone]  Dept: [phone]    PRE-OP EVALUATION:  Today's date: 7/10/2018    Joni Borja (: 1970) presents for pre-operative evaluation assessment as requested by Dr. Ruiz.  He requires evaluation and anesthesia risk assessment prior to undergoing surgery/procedure for treatment of brain tumor .    Proposed Surgery/ Procedure: COMBINED OPTICAL TRACKING SYSTEM CRANIOTOMY, EXCISE TUMOR [RIGHT TEMPORAL]  Date of Surgery/ Procedure: 2018  Time of Surgery/ Procedure: 7:30 AM  Hospital/Surgical Facility: [facility]  Primary Physician: Brian Coon  Type of Anesthesia Anticipated: General    Patient has a Health Care Directive or Living Will:  NO    1. NO - Do you have a history of heart attack, stroke, stent, bypass or surgery on an artery in the head, neck, heart or legs?  2. NO - Do you ever have any pain or discomfort in your chest?  3. NO - Do you have a history of  Heart Failure?  4. NO - Are you troubled by shortness of breath when: walking on the level, up a slight hill or at night?  5. NO - Do you currently have a cold, bronchitis or other respiratory infection?  6. NO - Do you have a cough, shortness of breath or wheezing?  7. NO - Do you sometimes get pains in the calves of your legs when you walk?  8. NO - Do you or anyone in your family have previous history of blood clots?  9. NO - Do you or does anyone in your family have a serious bleeding problem such as prolonged bleeding following surgeries or cuts?  10. NO - Have you ever had problems with anemia or been told to take iron pills?  11. NO - Have you had any abnormal blood loss such as black, tarry or bloody stools, or abnormal vaginal bleeding?  12. NO - Have you ever had a blood transfusion?  13. NO - Have you or any of your relatives ever had problems with anesthesia?  14. NO - Do you have sleep apnea, excessive snoring or  daytime drowsiness?  15. NO - Do you have any prosthetic heart valves?  16. NO - Do you have prosthetic joints?      HPI:     HPI related to upcoming procedure: Pt will be undergoing a COMBINED OPTICAL TRACKING SYSTEM CRANIOTOMY, EXCISE TUMOR [RIGHT TEMPORAL] for treatment of a recurrent brain tumor.                                                                                                                                    .    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 Cnic-Jlaec-Ssictag disease 02/07/2017     Priority: Medium              Status post VNS (vagus nerve stimulator) placement - for epilepsy 02/07/2017     Priority: Medium     Oligodendroglioma (H) - right temporal      Priority: Medium     Overview:   Right temporal brain tumor, oligodendroglioma  right frontal grade 2, s/p biopsy and whole brain radiation, recurrence 5/07 s/p subtotal resection and chemo       History of biliary T-tube placement 01/25/2017     Priority: Medium     Medical marijuana use 06/10/2016     Priority: Medium     Pain medication agreement 06/10/2016     Priority: Medium     Fluency disorder associated with underlying disease 06/09/2015     Priority: Medium     Unstable gait 06/18/2014     Priority: Medium     Tobacco use 03/14/2014     Priority: Medium     S/P brain surgery 03/14/2014     Priority: Medium      Partial epilepsy with impairment of consciousness, intractable (H) 08/19/2013     Priority: Medium     Headache 04/09/2013     Priority: Medium     Anxiety state 07/13/2011     Priority: Medium     HCD (health care directive) 05/24/2007     Priority: Medium      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 Fsmp-Rasuc-Zdnhlhx disease 2/7/2017          Localization-related epilepsy (H)      Meningitis, viral      Oligodendroglioma (H) 4/02    right frontal grade 2, s/p biopsy and whole brain radiation, recurrence 5/07 s/p subtotal resection and chemo     Perthe's disease of hip     Right hip     Seizures (H)      Past Surgical History:   Procedure Laterality Date     APPENDECTOMY  1972     BIOPSY  2002     CRANIOTOMY, EXCISE TUMOR COMPLEX, COMBINED  9/18/2013    Procedure: COMBINED CRANIOTOMY, EXCISE TUMOR COMPLEX;  Re-do Right Frontal Craniotomy, Grid Removal,  Resection of Right Frontal  Tumor and Epileptogenic Cortex Resection;  Surgeon: Maverick Linder MD;  Location:  OR     HEAD & NECK SURGERY  2002    brain tumor removal     IMPLANT STIMULATOR VAGUS NERVE Left 7/19/2016    Procedure: IMPLANT STIMULATOR VAGUS NERVE;  Surgeon: Maverick Linder MD;  Location:  OR     OPTICAL TRACKING SYSTEM CRANIOTOMY, EXCISE TUMOR WITH MAPPING, COMBINED  9/10/2013    Procedure: COMBINED OPTICAL TRACKING SYSTEM CRANIOTOMY, EXCISE TUMOR WITH MAPPING;  Stealth Guided Right Redo Frontal Craniotomy for Grid Placement ;  Surgeon: Maverick Linder MD;  Location:  OR     OPTICAL TRACKING SYSTEM CRANIOTOMY, EXCISE TUMOR, COMBINED Right 3/24/2017    Procedure: COMBINED OPTICAL TRACKING SYSTEM CRANIOTOMY, EXCISE TUMOR;  Surgeon: Stuart Oscar MD;  Location:  OR     ORTHOPEDIC SURGERY  1982    Right Hip - Perthe's     REPAIR CONGENITAL HIP Right     Age 8     SHOULDER SURGERY        Current Outpatient Prescriptions   Medication Sig Dispense Refill     carBAMazepine (CARBATROL) 300 MG 12 hr capsule Take 1 capsule (300 mg) by mouth 2 times daily (at 10:00 & 22:00) 180 capsule 3     clonazePAM (KLONOPIN) 0.5 MG tablet TAKE ONE TABLET BY MOUTH ONCE DAILY IN THE MORNING AND TWO ONCE DAILY IN THE EVENING 90 tablet 5     Cyanocobalamin (VITAMIN B 12 PO) Take 1 tablet by mouth daily (with dinner)       escitalopram (LEXAPRO) 20 MG tablet TAKE ONE TABLET BY MOUTH ONCE DAILY AT  NIGHT 90 tablet 3     felbamate (FELBATOL) 600 MG tablet Take 900 mg (1.5 tablet) am and 900 mg (1.5 tablet) 2 pm 270 tablet 3     pantoprazole (PROTONIX) 40 MG EC tablet TAKE ONE TABLET BY MOUTH ONCE DAILY 90 tablet 3     Riboflavin 400 MG TABS Take 400 mg by mouth daily 90 tablet 3     topiramate (TOPAMAX) 100 MG tablet Take 2 tablets (200 mg) by mouth every morning 200 mg am and 300 mg pm 900 tablet 3     acetaminophen-codeine (TYLENOL #3) 300-30 MG per tablet Take 1-2 tablets by mouth every 4 hours as needed for moderate pain As needed for headaches       cetirizine (ZYRTEC ALLERGY) 10 MG tablet Take 1 tablet (10 mg) by mouth daily as needed (for mosquitos.) 30 tablet      ondansetron (ZOFRAN) 4 MG tablet        OTC products: None, except as noted above    Allergies   Allergen Reactions     Dilantin [Phenytoin] Hives     Mosquitoes (Informational Only)      blisters      Latex Allergy: NO    Social History   Substance Use Topics     Smoking status: Current Every Day Smoker     Packs/day: 1.00     Years: 20.00     Types: Cigarettes     Smokeless tobacco: Never Used      Comment: would like to quit soon.     Alcohol use 0.0 oz/week      Comment: monthly a few drinks     History   Drug Use     Yes     Comment: marijuana(Rx) on weekends       REVIEW OF SYSTEMS:   Constitutional, HEENT, cardiovascular, pulmonary, GI, , musculoskeletal, neuro, skin, endocrine and psych systems are negative, except as otherwise noted.    This  "document serves as a record of the services and decisions personally performed and made by Brian Coon MD. It was created on his behalf by Malena Guerrero, a trained medical scribe. The creation of this document is based the provider's statements to the medical scribe.  Scribe Malena Guerrero 4:22 PM, July 10, 2018    EXAM:   /72  Pulse 85  Temp 98  F (36.7  C) (Oral)  Ht 1.753 m (5' 9\")  Wt 74.8 kg (165 lb)  SpO2 100%  BMI 24.37 kg/m2    GENERAL APPEARANCE: healthy, alert and no distress     EYES: EOMI,  PERRL     HENT: ear canals and TM's normal and nose and mouth without ulcers or lesions     NECK: no adenopathy, no asymmetry, masses, or scars and thyroid normal to palpation     RESP: lungs clear to auscultation - no rales, rhonchi or wheezes     CV: regular rates and rhythm, normal S1 S2, no S3 or S4 and no murmur, click or rub     ABDOMEN:  soft, nontender, no HSM or masses and bowel sounds normal     MS: extremities normal- no gross deformities noted, no evidence of inflammation in joints, FROM in all extremities.     SKIN: no suspicious lesions or rashes     NEURO: Normal strength and tone, sensory exam grossly normal, mentation intact and speech normal     PSYCH: mentation appears normal. and affect normal/bright     LYMPHATICS: No cervical adenopathy    DIAGNOSTICS:   EKG: sinus bradycardia, normal axis, normal intervals, no acute ST/T changes c/w ischemia, no LVH by voltage criteria, unchanged from previous tracings    Recent Labs   Lab Test  04/24/18   1043  04/06/18   1139   03/14/18   1406   02/12/15   2213   09/24/13   0533   HGB  12.2*  12.7*   < >  13.1*   < >   --    < >  9.1*   PLT  281  209   < >  296   < >   --    < >  465*   INR   --    --    --    --    --   1.2   --   1.09   NA  143   --    --   140   < >   --    < >  143   POTASSIUM  4.1   --    --   3.8   < >   --    < >  3.8   CR  0.99   --    --   1.01   < >   --    < >  0.90    < > = values in this interval not displayed.    "     IMPRESSION:   Reason for surgery/procedure: Pt will be undergoing a COMBINED OPTICAL TRACKING SYSTEM CRANIOTOMY, EXCISE TUMOR [RIGHT TEMPORAL] for treatment of a recurrent brain tumor.    The proposed surgical procedure is considered INTERMEDIATE risk.    REVISED CARDIAC RISK INDEX  The patient has the following serious cardiovascular risks for perioperative complications such as (MI, PE, VFib and 3  AV Block):  No serious cardiac risks  INTERPRETATION: 0 risks: Class I (very low risk - 0.4% complication rate)    The patient has the following additional risks for perioperative complications:  No identified additional risks      ICD-10-CM    1. Preop general physical exam Z01.818 EKG 12-lead complete w/read - Clinics   2. Oligodendroglioma (H) - right temporal C71.9    3. Partial epilepsy with impairment of consciousness, intractable (H) G40.219    4. Anxiety state F41.1    5. Tobacco use Z72.0    6. Medical marijuana use Z79.899        RECOMMENDATIONS:         --Patient is to take all scheduled medications on the day of surgery     APPROVAL GIVEN to proceed with proposed procedure, without further diagnostic evaluation       The information in this document, created by the medical scribe for me, accurately reflects the services I personally performed and the decisions made by me. I have reviewed and approved this document for accuracy prior to leaving the patient care area.  4:22 PM, 07/10/18    Signed Electronically by: Brian Coon MD    Copy of this evaluation report is provided to requesting physician.    [facility] Preop Guidelines    Revised Cardiac Risk Index

## 2018-07-11 NOTE — OR NURSING
1300  TALKED WITH DR MONTIEL, HIS NEUROLOGIST AND TO DR GALVEZ REGARDING THE PATIENT'S VAGUS NERVE STIMULATOR.  PT WILL HAVE AN MRI TOMORROW MORNING AT 8:30, [facility] REP WILL TURN OFF THE STIMULATOR AT THAT TIME AND LEAVE IT OFF UNTIL [facility]'S POST-OP MRI ON Friday EARLY AFTERNOON.  CYRUS FROM [facility] IS AWARE OF THIS INFORMATION.  FAMILY NOTIFIED OF THESE PLANS.

## 2018-07-12 ENCOUNTER — HOSPITAL ENCOUNTER (OUTPATIENT)
Dept: MRI IMAGING | Facility: CLINIC | Age: 48
Discharge: HOME | DRG: 026 | End: 2018-07-12
Attending: NEUROLOGICAL SURGERY | Admitting: NEUROLOGICAL SURGERY
Payer: MEDICARE

## 2018-07-12 ENCOUNTER — TELEPHONE (OUTPATIENT)
Dept: NEUROSURGERY | Facility: CLINIC | Age: 48
End: 2018-07-12

## 2018-07-12 DIAGNOSIS — D49.6 BRAIN TUMOR (H): ICD-10-CM

## 2018-07-12 PROCEDURE — 70552 MRI BRAIN STEM W/DYE: CPT

## 2018-07-12 PROCEDURE — A9585 GADOBUTROL INJECTION: HCPCS | Performed by: NEUROLOGICAL SURGERY

## 2018-07-12 PROCEDURE — 25000128 H RX IP 250 OP 636: Performed by: NEUROLOGICAL SURGERY

## 2018-07-12 RX ORDER — GADOBUTROL 604.72 MG/ML
20 INJECTION INTRAVENOUS ONCE
Status: COMPLETED | OUTPATIENT
Start: 2018-07-12 | End: 2018-07-12

## 2018-07-12 RX ADMIN — GADOBUTROL 20 ML: 604.72 INJECTION INTRAVENOUS at 11:28

## 2018-07-12 NOTE — PROGRESS NOTES
Pt has VNS device implanted that was turned off for safe scanning in MRI.  Pt was brought out to mobile unit for MR.  Pt was coherent and understanding of procedure.  Provided verification of identity with ease.  Screened for MRI safety and permitted into MRI suite.  Pt was injected with MRI contrast for procedure and placed inside bore of MRI equipment.  Significant artifact was noted on localizing images.  Pt was asked if he had removable hard ware in his mouth via microphone.  Unresponsive after several attempts.  Pt removed from scanner and asked simple questions, name, , day of week, where he was, all with confused and unclear responses.  Pt was removed from MRI suite and RET was called.  By the time respondered arrived, patient returned to baseline and was aware of surroundings.  RET deescalated and cancelled.  MRI Exam completed without any further issues.    Rupa Ring on 2018 at 10:10 AM

## 2018-07-12 NOTE — TELEPHONE ENCOUNTER
MRI called, they are using the mobile truck for imaging and want to know if Dr Oscar has the imaging he needs for the patient as one of the sequences are in question (patient has surgery with Dr Oscar at 0730 tomorrow).  Images are in PAX for him to review  They would like return call at    Routed to Dr Oscar for review  Zac Mata RN, BSN

## 2018-07-13 ENCOUNTER — SURGERY (OUTPATIENT)
Age: 48
End: 2018-07-13

## 2018-07-13 ENCOUNTER — ANESTHESIA EVENT (OUTPATIENT)
Dept: SURGERY | Facility: CLINIC | Age: 48
DRG: 026 | End: 2018-07-13
Payer: MEDICARE

## 2018-07-13 ENCOUNTER — APPOINTMENT (OUTPATIENT)
Dept: MRI IMAGING | Facility: CLINIC | Age: 48
DRG: 026 | End: 2018-07-13
Attending: PHYSICIAN ASSISTANT
Payer: MEDICARE

## 2018-07-13 ENCOUNTER — ANESTHESIA (OUTPATIENT)
Dept: SURGERY | Facility: CLINIC | Age: 48
DRG: 026 | End: 2018-07-13
Payer: MEDICARE

## 2018-07-13 ENCOUNTER — HOSPITAL ENCOUNTER (INPATIENT)
Facility: CLINIC | Age: 48
LOS: 2 days | Discharge: HOME | DRG: 026 | End: 2018-07-15
Attending: NEUROLOGICAL SURGERY | Admitting: NEUROLOGICAL SURGERY
Payer: MEDICARE

## 2018-07-13 ENCOUNTER — TRANSFERRED RECORDS (OUTPATIENT)
Dept: HEALTH INFORMATION MANAGEMENT | Facility: CLINIC | Age: 48
End: 2018-07-13

## 2018-07-13 DIAGNOSIS — R11.0 NAUSEA: ICD-10-CM

## 2018-07-13 DIAGNOSIS — Z98.890 S/P BRAIN SURGERY: Primary | ICD-10-CM

## 2018-07-13 LAB
ABO + RH BLD: NORMAL
ABO + RH BLD: NORMAL
BLD GP AB SCN SERPL QL: NORMAL
BLOOD BANK CMNT PATIENT-IMP: NORMAL
GLUCOSE BLDC GLUCOMTR-MCNC: 133 MG/DL (ref 70–99)
SPECIMEN EXP DATE BLD: NORMAL

## 2018-07-13 PROCEDURE — 88331 PATH CONSLTJ SURG 1 BLK 1SPC: CPT | Performed by: NEUROLOGICAL SURGERY

## 2018-07-13 PROCEDURE — 8E09XBZ COMPUTER ASSISTED PROCEDURE OF HEAD AND NECK REGION: ICD-10-PCS | Performed by: NEUROLOGICAL SURGERY

## 2018-07-13 PROCEDURE — 25000128 H RX IP 250 OP 636: Performed by: STUDENT IN AN ORGANIZED HEALTH CARE EDUCATION/TRAINING PROGRAM

## 2018-07-13 PROCEDURE — 25000125 ZZHC RX 250: Performed by: NEUROLOGICAL SURGERY

## 2018-07-13 PROCEDURE — 88341 IMHCHEM/IMCYTCHM EA ADD ANTB: CPT | Performed by: NEUROLOGICAL SURGERY

## 2018-07-13 PROCEDURE — 71000014 ZZH RECOVERY PHASE 1 LEVEL 2 FIRST HR: Performed by: NEUROLOGICAL SURGERY

## 2018-07-13 PROCEDURE — 25000128 H RX IP 250 OP 636: Performed by: NEUROLOGICAL SURGERY

## 2018-07-13 PROCEDURE — 27210995 ZZH RX 272: Performed by: NEUROLOGICAL SURGERY

## 2018-07-13 PROCEDURE — 27210794 ZZH OR GENERAL SUPPLY STERILE: Performed by: NEUROLOGICAL SURGERY

## 2018-07-13 PROCEDURE — 00000146 ZZHCL STATISTIC GLUCOSE BY METER IP

## 2018-07-13 PROCEDURE — 36000075 ZZH SURGERY LEVEL 6 EA 15 ADDTL MIN: Performed by: NEUROLOGICAL SURGERY

## 2018-07-13 PROCEDURE — A9270 NON-COVERED ITEM OR SERVICE: HCPCS | Mod: GY | Performed by: PHYSICIAN ASSISTANT

## 2018-07-13 PROCEDURE — 25000301 ZZH OR RX SURGIFLO W/THROMBIN KIT 2ML 1991 OPNP: Performed by: NEUROLOGICAL SURGERY

## 2018-07-13 PROCEDURE — 25000125 ZZHC RX 250: Performed by: STUDENT IN AN ORGANIZED HEALTH CARE EDUCATION/TRAINING PROGRAM

## 2018-07-13 PROCEDURE — 40000171 ZZH STATISTIC PRE-PROCEDURE ASSESSMENT III: Performed by: NEUROLOGICAL SURGERY

## 2018-07-13 PROCEDURE — 86850 RBC ANTIBODY SCREEN: CPT | Performed by: NEUROLOGICAL SURGERY

## 2018-07-13 PROCEDURE — A9585 GADOBUTROL INJECTION: HCPCS | Performed by: NEUROLOGICAL SURGERY

## 2018-07-13 PROCEDURE — 86901 BLOOD TYPING SEROLOGIC RH(D): CPT | Performed by: NEUROLOGICAL SURGERY

## 2018-07-13 PROCEDURE — 61510 CRNEC TREPH EXC BRN TUM STTL: CPT | Mod: AS | Performed by: PHYSICIAN ASSISTANT

## 2018-07-13 PROCEDURE — C1763 CONN TISS, NON-HUMAN: HCPCS | Performed by: NEUROLOGICAL SURGERY

## 2018-07-13 PROCEDURE — 70553 MRI BRAIN STEM W/O & W/DYE: CPT

## 2018-07-13 PROCEDURE — 25000128 H RX IP 250 OP 636: Performed by: ANESTHESIOLOGY

## 2018-07-13 PROCEDURE — 88307 TISSUE EXAM BY PATHOLOGIST: CPT | Mod: 26,59 | Performed by: NEUROLOGICAL SURGERY

## 2018-07-13 PROCEDURE — 61510 CRNEC TREPH EXC BRN TUM STTL: CPT | Performed by: NEUROLOGICAL SURGERY

## 2018-07-13 PROCEDURE — 61781 SCAN PROC CRANIAL INTRA: CPT | Performed by: NEUROLOGICAL SURGERY

## 2018-07-13 PROCEDURE — 25000566 ZZH SEVOFLURANE, EA 15 MIN: Performed by: NEUROLOGICAL SURGERY

## 2018-07-13 PROCEDURE — 88342 IMHCHEM/IMCYTCHM 1ST ANTB: CPT | Performed by: NEUROLOGICAL SURGERY

## 2018-07-13 PROCEDURE — 37000009 ZZH ANESTHESIA TECHNICAL FEE, EACH ADDTL 15 MIN: Performed by: NEUROLOGICAL SURGERY

## 2018-07-13 PROCEDURE — 37000008 ZZH ANESTHESIA TECHNICAL FEE, 1ST 30 MIN: Performed by: NEUROLOGICAL SURGERY

## 2018-07-13 PROCEDURE — 36000073 ZZH SURGERY LEVEL 6 1ST 30 MIN: Performed by: NEUROLOGICAL SURGERY

## 2018-07-13 PROCEDURE — 00000159 ZZHCL STATISTIC H-SEND OUTS PREP: Performed by: NEUROLOGICAL SURGERY

## 2018-07-13 PROCEDURE — 88275 CYTOGENETICS 100-300: CPT | Performed by: NEUROLOGICAL SURGERY

## 2018-07-13 PROCEDURE — 25000128 H RX IP 250 OP 636: Performed by: PHYSICIAN ASSISTANT

## 2018-07-13 PROCEDURE — 00B70ZZ EXCISION OF CEREBRAL HEMISPHERE, OPEN APPROACH: ICD-10-PCS | Performed by: NEUROLOGICAL SURGERY

## 2018-07-13 PROCEDURE — 88161 CYTOPATH SMEAR OTHER SOURCE: CPT | Performed by: NEUROLOGICAL SURGERY

## 2018-07-13 PROCEDURE — 84999 UNLISTED CHEMISTRY PROCEDURE: CPT | Performed by: NEUROLOGICAL SURGERY

## 2018-07-13 PROCEDURE — 86900 BLOOD TYPING SEROLOGIC ABO: CPT | Performed by: NEUROLOGICAL SURGERY

## 2018-07-13 PROCEDURE — 88331 PATH CONSLTJ SURG 1 BLK 1SPC: CPT | Mod: 26 | Performed by: NEUROLOGICAL SURGERY

## 2018-07-13 PROCEDURE — 25000132 ZZH RX MED GY IP 250 OP 250 PS 637: Mod: GY | Performed by: PHYSICIAN ASSISTANT

## 2018-07-13 PROCEDURE — C1713 ANCHOR/SCREW BN/BN,TIS/BN: HCPCS | Performed by: NEUROLOGICAL SURGERY

## 2018-07-13 PROCEDURE — 88307 TISSUE EXAM BY PATHOLOGIST: CPT | Performed by: NEUROLOGICAL SURGERY

## 2018-07-13 PROCEDURE — 88161 CYTOPATH SMEAR OTHER SOURCE: CPT | Mod: 26 | Performed by: NEUROLOGICAL SURGERY

## 2018-07-13 PROCEDURE — 99207 ZZC CONSULT E&M CHANGED TO SUBSEQUENT LEVEL: CPT | Performed by: PHYSICIAN ASSISTANT

## 2018-07-13 PROCEDURE — 20000003 ZZH R&B ICU

## 2018-07-13 PROCEDURE — 88271 CYTOGENETICS DNA PROBE: CPT | Performed by: NEUROLOGICAL SURGERY

## 2018-07-13 PROCEDURE — 99232 SBSQ HOSP IP/OBS MODERATE 35: CPT | Performed by: PHYSICIAN ASSISTANT

## 2018-07-13 DEVICE — GRAFT DURAGEN 3X3" ID330: Type: IMPLANTABLE DEVICE | Status: FUNCTIONAL

## 2018-07-13 DEVICE — IMP SCR SYN MATRIX LOW PRO 1.5X04MM SELF DRILL 04.503.104.01: Type: IMPLANTABLE DEVICE | Site: SKULL | Status: FUNCTIONAL

## 2018-07-13 RX ORDER — HYDROMORPHONE HYDROCHLORIDE 1 MG/ML
.3-.5 INJECTION, SOLUTION INTRAMUSCULAR; INTRAVENOUS; SUBCUTANEOUS
Status: DISCONTINUED | OUTPATIENT
Start: 2018-07-13 | End: 2018-07-15 | Stop reason: HOSPADM

## 2018-07-13 RX ORDER — NALOXONE HYDROCHLORIDE 0.4 MG/ML
.1-.4 INJECTION, SOLUTION INTRAMUSCULAR; INTRAVENOUS; SUBCUTANEOUS
Status: ACTIVE | OUTPATIENT
Start: 2018-07-13 | End: 2018-07-14

## 2018-07-13 RX ORDER — ONDANSETRON 2 MG/ML
INJECTION INTRAMUSCULAR; INTRAVENOUS PRN
Status: DISCONTINUED | OUTPATIENT
Start: 2018-07-13 | End: 2018-07-13

## 2018-07-13 RX ORDER — BACITRACIN ZINC 500 [USP'U]/G
OINTMENT TOPICAL PRN
Status: DISCONTINUED | OUTPATIENT
Start: 2018-07-13 | End: 2018-07-13 | Stop reason: HOSPADM

## 2018-07-13 RX ORDER — HYDROMORPHONE HYDROCHLORIDE 1 MG/ML
.3-.5 INJECTION, SOLUTION INTRAMUSCULAR; INTRAVENOUS; SUBCUTANEOUS EVERY 5 MIN PRN
Status: DISCONTINUED | OUTPATIENT
Start: 2018-07-13 | End: 2018-07-13 | Stop reason: HOSPADM

## 2018-07-13 RX ORDER — CALCIUM CARBONATE 500 MG/1
1000 TABLET, CHEWABLE ORAL 4 TIMES DAILY PRN
Status: DISCONTINUED | OUTPATIENT
Start: 2018-07-13 | End: 2018-07-15 | Stop reason: HOSPADM

## 2018-07-13 RX ORDER — RIBOFLAVIN (VITAMIN B2) 100 MG
400 TABLET ORAL 2 TIMES DAILY
Status: DISCONTINUED | OUTPATIENT
Start: 2018-07-13 | End: 2018-07-15 | Stop reason: HOSPADM

## 2018-07-13 RX ORDER — METOCLOPRAMIDE 10 MG/1
10 TABLET ORAL EVERY 6 HOURS PRN
Status: DISCONTINUED | OUTPATIENT
Start: 2018-07-13 | End: 2018-07-15 | Stop reason: HOSPADM

## 2018-07-13 RX ORDER — ESCITALOPRAM OXALATE 10 MG/1
20 TABLET ORAL DAILY
Status: DISCONTINUED | OUTPATIENT
Start: 2018-07-14 | End: 2018-07-15 | Stop reason: HOSPADM

## 2018-07-13 RX ORDER — LEVETIRACETAM 10 MG/ML
1000 INJECTION INTRAVASCULAR ONCE
Status: COMPLETED | OUTPATIENT
Start: 2018-07-13 | End: 2018-07-13

## 2018-07-13 RX ORDER — DEXAMETHASONE SODIUM PHOSPHATE 4 MG/ML
4 INJECTION, SOLUTION INTRA-ARTICULAR; INTRALESIONAL; INTRAMUSCULAR; INTRAVENOUS; SOFT TISSUE
Status: DISCONTINUED | OUTPATIENT
Start: 2018-07-15 | End: 2018-07-15

## 2018-07-13 RX ORDER — NEOSTIGMINE METHYLSULFATE 1 MG/ML
VIAL (ML) INJECTION PRN
Status: DISCONTINUED | OUTPATIENT
Start: 2018-07-13 | End: 2018-07-13

## 2018-07-13 RX ORDER — DEXAMETHASONE SODIUM PHOSPHATE 4 MG/ML
2 INJECTION, SOLUTION INTRA-ARTICULAR; INTRALESIONAL; INTRAMUSCULAR; INTRAVENOUS; SOFT TISSUE EVERY 24 HOURS
Status: DISCONTINUED | OUTPATIENT
Start: 2018-07-21 | End: 2018-07-15

## 2018-07-13 RX ORDER — NALOXONE HYDROCHLORIDE 0.4 MG/ML
.1-.4 INJECTION, SOLUTION INTRAMUSCULAR; INTRAVENOUS; SUBCUTANEOUS
Status: DISCONTINUED | OUTPATIENT
Start: 2018-07-13 | End: 2018-07-15 | Stop reason: HOSPADM

## 2018-07-13 RX ORDER — CLONAZEPAM 1 MG/1
1 TABLET ORAL AT BEDTIME
Status: DISCONTINUED | OUTPATIENT
Start: 2018-07-13 | End: 2018-07-15 | Stop reason: HOSPADM

## 2018-07-13 RX ORDER — CLONAZEPAM 0.5 MG/1
0.5 TABLET ORAL DAILY
Status: DISCONTINUED | OUTPATIENT
Start: 2018-07-14 | End: 2018-07-15 | Stop reason: HOSPADM

## 2018-07-13 RX ORDER — METOCLOPRAMIDE HYDROCHLORIDE 5 MG/ML
10 INJECTION INTRAMUSCULAR; INTRAVENOUS EVERY 6 HOURS PRN
Status: DISCONTINUED | OUTPATIENT
Start: 2018-07-13 | End: 2018-07-15 | Stop reason: HOSPADM

## 2018-07-13 RX ORDER — ONDANSETRON 2 MG/ML
4 INJECTION INTRAMUSCULAR; INTRAVENOUS EVERY 6 HOURS PRN
Status: DISCONTINUED | OUTPATIENT
Start: 2018-07-13 | End: 2018-07-15 | Stop reason: HOSPADM

## 2018-07-13 RX ORDER — OXYCODONE HYDROCHLORIDE 5 MG/1
5-10 TABLET ORAL
Status: DISCONTINUED | OUTPATIENT
Start: 2018-07-13 | End: 2018-07-15 | Stop reason: HOSPADM

## 2018-07-13 RX ORDER — PROCHLORPERAZINE MALEATE 10 MG
10 TABLET ORAL EVERY 6 HOURS PRN
Status: DISCONTINUED | OUTPATIENT
Start: 2018-07-13 | End: 2018-07-15 | Stop reason: HOSPADM

## 2018-07-13 RX ORDER — DIAPER,BRIEF,INFANT-TODD,DISP
EACH MISCELLANEOUS DAILY PRN
COMMUNITY
End: 2018-08-14

## 2018-07-13 RX ORDER — PANTOPRAZOLE SODIUM 40 MG/1
40 TABLET, DELAYED RELEASE ORAL DAILY
Status: DISCONTINUED | OUTPATIENT
Start: 2018-07-14 | End: 2018-07-15 | Stop reason: HOSPADM

## 2018-07-13 RX ORDER — HYDRALAZINE HYDROCHLORIDE 20 MG/ML
10-20 INJECTION INTRAMUSCULAR; INTRAVENOUS EVERY 30 MIN PRN
Status: DISCONTINUED | OUTPATIENT
Start: 2018-07-13 | End: 2018-07-15 | Stop reason: HOSPADM

## 2018-07-13 RX ORDER — DEXAMETHASONE SODIUM PHOSPHATE 4 MG/ML
2 INJECTION, SOLUTION INTRA-ARTICULAR; INTRALESIONAL; INTRAMUSCULAR; INTRAVENOUS; SOFT TISSUE EVERY 12 HOURS
Status: DISCONTINUED | OUTPATIENT
Start: 2018-07-19 | End: 2018-07-15

## 2018-07-13 RX ORDER — ONDANSETRON 4 MG/1
4 TABLET, ORALLY DISINTEGRATING ORAL EVERY 6 HOURS PRN
Status: DISCONTINUED | OUTPATIENT
Start: 2018-07-13 | End: 2018-07-15 | Stop reason: HOSPADM

## 2018-07-13 RX ORDER — MANNITOL 20 G/100ML
INJECTION, SOLUTION INTRAVENOUS PRN
Status: DISCONTINUED | OUTPATIENT
Start: 2018-07-13 | End: 2018-07-13

## 2018-07-13 RX ORDER — ACETAMINOPHEN 325 MG/1
975 TABLET ORAL EVERY 8 HOURS
Status: DISCONTINUED | OUTPATIENT
Start: 2018-07-13 | End: 2018-07-15 | Stop reason: HOSPADM

## 2018-07-13 RX ORDER — AMOXICILLIN 250 MG
2 CAPSULE ORAL 2 TIMES DAILY
Status: DISCONTINUED | OUTPATIENT
Start: 2018-07-13 | End: 2018-07-15 | Stop reason: HOSPADM

## 2018-07-13 RX ORDER — SODIUM CHLORIDE, SODIUM LACTATE, POTASSIUM CHLORIDE, CALCIUM CHLORIDE 600; 310; 30; 20 MG/100ML; MG/100ML; MG/100ML; MG/100ML
INJECTION, SOLUTION INTRAVENOUS CONTINUOUS
Status: DISCONTINUED | OUTPATIENT
Start: 2018-07-13 | End: 2018-07-13 | Stop reason: HOSPADM

## 2018-07-13 RX ORDER — GADOBUTROL 604.72 MG/ML
8 INJECTION INTRAVENOUS ONCE
Status: COMPLETED | OUTPATIENT
Start: 2018-07-13 | End: 2018-07-13

## 2018-07-13 RX ORDER — CYCLOBENZAPRINE HCL 10 MG
10 TABLET ORAL 3 TIMES DAILY PRN
Status: DISCONTINUED | OUTPATIENT
Start: 2018-07-13 | End: 2018-07-15 | Stop reason: HOSPADM

## 2018-07-13 RX ORDER — DEXAMETHASONE SODIUM PHOSPHATE 4 MG/ML
INJECTION, SOLUTION INTRA-ARTICULAR; INTRALESIONAL; INTRAMUSCULAR; INTRAVENOUS; SOFT TISSUE PRN
Status: DISCONTINUED | OUTPATIENT
Start: 2018-07-13 | End: 2018-07-13

## 2018-07-13 RX ORDER — GLYCOPYRROLATE 0.2 MG/ML
INJECTION, SOLUTION INTRAMUSCULAR; INTRAVENOUS PRN
Status: DISCONTINUED | OUTPATIENT
Start: 2018-07-13 | End: 2018-07-13

## 2018-07-13 RX ORDER — LIDOCAINE HYDROCHLORIDE 20 MG/ML
INJECTION, SOLUTION INFILTRATION; PERINEURAL PRN
Status: DISCONTINUED | OUTPATIENT
Start: 2018-07-13 | End: 2018-07-13

## 2018-07-13 RX ORDER — HYDROXYZINE HYDROCHLORIDE 25 MG/1
25 TABLET, FILM COATED ORAL EVERY 6 HOURS PRN
Status: DISCONTINUED | OUTPATIENT
Start: 2018-07-13 | End: 2018-07-15 | Stop reason: HOSPADM

## 2018-07-13 RX ORDER — DEXAMETHASONE SODIUM PHOSPHATE 4 MG/ML
4 INJECTION, SOLUTION INTRA-ARTICULAR; INTRALESIONAL; INTRAMUSCULAR; INTRAVENOUS; SOFT TISSUE EVERY 12 HOURS
Status: DISCONTINUED | OUTPATIENT
Start: 2018-07-17 | End: 2018-07-15

## 2018-07-13 RX ORDER — DEXAMETHASONE SODIUM PHOSPHATE 4 MG/ML
4 INJECTION, SOLUTION INTRA-ARTICULAR; INTRALESIONAL; INTRAMUSCULAR; INTRAVENOUS; SOFT TISSUE EVERY 6 HOURS
Status: DISCONTINUED | OUTPATIENT
Start: 2018-07-13 | End: 2018-07-15

## 2018-07-13 RX ORDER — FENTANYL CITRATE 50 UG/ML
INJECTION, SOLUTION INTRAMUSCULAR; INTRAVENOUS PRN
Status: DISCONTINUED | OUTPATIENT
Start: 2018-07-13 | End: 2018-07-13

## 2018-07-13 RX ORDER — FENTANYL CITRATE 50 UG/ML
25-50 INJECTION, SOLUTION INTRAMUSCULAR; INTRAVENOUS
Status: DISCONTINUED | OUTPATIENT
Start: 2018-07-13 | End: 2018-07-13 | Stop reason: HOSPADM

## 2018-07-13 RX ORDER — ACETAMINOPHEN 325 MG/1
650 TABLET ORAL EVERY 4 HOURS PRN
Status: DISCONTINUED | OUTPATIENT
Start: 2018-07-16 | End: 2018-07-15 | Stop reason: HOSPADM

## 2018-07-13 RX ORDER — ONDANSETRON 2 MG/ML
4 INJECTION INTRAMUSCULAR; INTRAVENOUS EVERY 30 MIN PRN
Status: DISCONTINUED | OUTPATIENT
Start: 2018-07-13 | End: 2018-07-13 | Stop reason: HOSPADM

## 2018-07-13 RX ORDER — LABETALOL HYDROCHLORIDE 5 MG/ML
10-40 INJECTION, SOLUTION INTRAVENOUS EVERY 10 MIN PRN
Status: DISCONTINUED | OUTPATIENT
Start: 2018-07-13 | End: 2018-07-15 | Stop reason: HOSPADM

## 2018-07-13 RX ORDER — SODIUM CHLORIDE 9 MG/ML
INJECTION, SOLUTION INTRAVENOUS CONTINUOUS
Status: DISCONTINUED | OUTPATIENT
Start: 2018-07-13 | End: 2018-07-14

## 2018-07-13 RX ORDER — BUPIVACAINE HYDROCHLORIDE AND EPINEPHRINE 5; 5 MG/ML; UG/ML
INJECTION, SOLUTION PERINEURAL PRN
Status: DISCONTINUED | OUTPATIENT
Start: 2018-07-13 | End: 2018-07-13 | Stop reason: HOSPADM

## 2018-07-13 RX ORDER — AMOXICILLIN 250 MG
1 CAPSULE ORAL 2 TIMES DAILY
Status: DISCONTINUED | OUTPATIENT
Start: 2018-07-13 | End: 2018-07-15 | Stop reason: HOSPADM

## 2018-07-13 RX ORDER — CARBAMAZEPINE 300 MG/1
300 CAPSULE, EXTENDED RELEASE ORAL 2 TIMES DAILY
Status: DISCONTINUED | OUTPATIENT
Start: 2018-07-13 | End: 2018-07-15 | Stop reason: HOSPADM

## 2018-07-13 RX ORDER — TOPIRAMATE 50 MG/1
200 TABLET, FILM COATED ORAL DAILY
Status: DISCONTINUED | OUTPATIENT
Start: 2018-07-14 | End: 2018-07-15 | Stop reason: HOSPADM

## 2018-07-13 RX ORDER — EPHEDRINE SULFATE 50 MG/ML
INJECTION, SOLUTION INTRAMUSCULAR; INTRAVENOUS; SUBCUTANEOUS PRN
Status: DISCONTINUED | OUTPATIENT
Start: 2018-07-13 | End: 2018-07-13

## 2018-07-13 RX ORDER — CEFAZOLIN SODIUM 1 G/3ML
1 INJECTION, POWDER, FOR SOLUTION INTRAMUSCULAR; INTRAVENOUS SEE ADMIN INSTRUCTIONS
Status: DISCONTINUED | OUTPATIENT
Start: 2018-07-13 | End: 2018-07-13 | Stop reason: HOSPADM

## 2018-07-13 RX ORDER — CEFAZOLIN SODIUM 2 G/100ML
2 INJECTION, SOLUTION INTRAVENOUS
Status: COMPLETED | OUTPATIENT
Start: 2018-07-13 | End: 2018-07-13

## 2018-07-13 RX ORDER — ONDANSETRON 4 MG/1
4 TABLET, ORALLY DISINTEGRATING ORAL EVERY 30 MIN PRN
Status: DISCONTINUED | OUTPATIENT
Start: 2018-07-13 | End: 2018-07-13 | Stop reason: HOSPADM

## 2018-07-13 RX ORDER — ALUMINA, MAGNESIA, AND SIMETHICONE 2400; 2400; 240 MG/30ML; MG/30ML; MG/30ML
30 SUSPENSION ORAL EVERY 4 HOURS PRN
Status: DISCONTINUED | OUTPATIENT
Start: 2018-07-13 | End: 2018-07-15 | Stop reason: HOSPADM

## 2018-07-13 RX ORDER — PROPOFOL 10 MG/ML
INJECTION, EMULSION INTRAVENOUS PRN
Status: DISCONTINUED | OUTPATIENT
Start: 2018-07-13 | End: 2018-07-13

## 2018-07-13 RX ORDER — LIDOCAINE 40 MG/G
CREAM TOPICAL
Status: DISCONTINUED | OUTPATIENT
Start: 2018-07-13 | End: 2018-07-15 | Stop reason: HOSPADM

## 2018-07-13 RX ADMIN — PROPOFOL 170 MG: 10 INJECTION, EMULSION INTRAVENOUS at 07:33

## 2018-07-13 RX ADMIN — PROPOFOL 30 MG: 10 INJECTION, EMULSION INTRAVENOUS at 09:19

## 2018-07-13 RX ADMIN — ROCURONIUM BROMIDE 50 MG: 10 INJECTION INTRAVENOUS at 07:33

## 2018-07-13 RX ADMIN — SODIUM CHLORIDE, POTASSIUM CHLORIDE, SODIUM LACTATE AND CALCIUM CHLORIDE: 600; 310; 30; 20 INJECTION, SOLUTION INTRAVENOUS at 07:26

## 2018-07-13 RX ADMIN — Medication 400 MG: at 21:39

## 2018-07-13 RX ADMIN — ONDANSETRON 4 MG: 2 INJECTION INTRAMUSCULAR; INTRAVENOUS at 09:53

## 2018-07-13 RX ADMIN — BACITRACIN ZINC 1 G: 500 OINTMENT TOPICAL at 08:41

## 2018-07-13 RX ADMIN — GENTAMICIN SULFATE 1000 ML: 40 INJECTION, SOLUTION INTRAMUSCULAR; INTRAVENOUS at 09:48

## 2018-07-13 RX ADMIN — HYDROMORPHONE HYDROCHLORIDE 0.5 MG: 1 INJECTION, SOLUTION INTRAMUSCULAR; INTRAVENOUS; SUBCUTANEOUS at 11:46

## 2018-07-13 RX ADMIN — Medication 900 MG: at 21:40

## 2018-07-13 RX ADMIN — RANITIDINE 150 MG: 150 TABLET ORAL at 14:13

## 2018-07-13 RX ADMIN — NEOSTIGMINE METHYLSULFATE 4 MG: 1 INJECTION, SOLUTION INTRAVENOUS at 10:10

## 2018-07-13 RX ADMIN — LEVETIRACETAM 500 MG: 100 INJECTION, SOLUTION INTRAVENOUS at 14:24

## 2018-07-13 RX ADMIN — ACETAMINOPHEN 975 MG: 325 TABLET, FILM COATED ORAL at 14:13

## 2018-07-13 RX ADMIN — GLYCOPYRROLATE 0.6 MG: 0.2 INJECTION, SOLUTION INTRAMUSCULAR; INTRAVENOUS at 10:10

## 2018-07-13 RX ADMIN — ROCURONIUM BROMIDE 20 MG: 10 INJECTION INTRAVENOUS at 08:20

## 2018-07-13 RX ADMIN — MIDAZOLAM HYDROCHLORIDE 2 MG: 1 INJECTION, SOLUTION INTRAMUSCULAR; INTRAVENOUS at 07:10

## 2018-07-13 RX ADMIN — PROPOFOL 20 MG: 10 INJECTION, EMULSION INTRAVENOUS at 07:57

## 2018-07-13 RX ADMIN — LIDOCAINE HYDROCHLORIDE 60 MG: 20 INJECTION, SOLUTION INFILTRATION; PERINEURAL at 07:33

## 2018-07-13 RX ADMIN — SENNOSIDES AND DOCUSATE SODIUM 1 TABLET: 8.6; 5 TABLET ORAL at 21:39

## 2018-07-13 RX ADMIN — CLONAZEPAM 1 MG: 1 TABLET ORAL at 21:38

## 2018-07-13 RX ADMIN — BUPIVACAINE HYDROCHLORIDE AND EPINEPHRINE BITARTRATE 5 ML: 5; .005 INJECTION, SOLUTION PERINEURAL at 08:42

## 2018-07-13 RX ADMIN — SODIUM CHLORIDE, POTASSIUM CHLORIDE, SODIUM LACTATE AND CALCIUM CHLORIDE: 600; 310; 30; 20 INJECTION, SOLUTION INTRAVENOUS at 08:43

## 2018-07-13 RX ADMIN — ROCURONIUM BROMIDE 10 MG: 10 INJECTION INTRAVENOUS at 08:49

## 2018-07-13 RX ADMIN — Medication 0.5 MG: at 17:28

## 2018-07-13 RX ADMIN — PHENYLEPHRINE HYDROCHLORIDE 100 MCG: 10 INJECTION, SOLUTION INTRAMUSCULAR; INTRAVENOUS; SUBCUTANEOUS at 07:51

## 2018-07-13 RX ADMIN — CEFAZOLIN SODIUM 1 G: 2 INJECTION, SOLUTION INTRAVENOUS at 09:40

## 2018-07-13 RX ADMIN — FENTANYL CITRATE 50 MCG: 50 INJECTION, SOLUTION INTRAMUSCULAR; INTRAVENOUS at 07:33

## 2018-07-13 RX ADMIN — SODIUM CHLORIDE: 9 INJECTION, SOLUTION INTRAVENOUS at 14:14

## 2018-07-13 RX ADMIN — TOPIRAMATE 300 MG: 200 TABLET ORAL at 21:37

## 2018-07-13 RX ADMIN — HYDROMORPHONE HYDROCHLORIDE 0.5 MG: 1 INJECTION, SOLUTION INTRAMUSCULAR; INTRAVENOUS; SUBCUTANEOUS at 11:13

## 2018-07-13 RX ADMIN — CEFAZOLIN SODIUM 2 G: 2 INJECTION, SOLUTION INTRAVENOUS at 07:40

## 2018-07-13 RX ADMIN — PROPOFOL 50 MG: 10 INJECTION, EMULSION INTRAVENOUS at 07:56

## 2018-07-13 RX ADMIN — DEXAMETHASONE SODIUM PHOSPHATE 10 MG: 4 INJECTION, SOLUTION INTRA-ARTICULAR; INTRALESIONAL; INTRAMUSCULAR; INTRAVENOUS; SOFT TISSUE at 08:00

## 2018-07-13 RX ADMIN — GENTAMICIN SULFATE 1000 ML: 40 INJECTION, SOLUTION INTRAMUSCULAR; INTRAVENOUS at 09:43

## 2018-07-13 RX ADMIN — THROMBIN, TOPICAL (BOVINE) 20000 UNITS: KIT at 08:41

## 2018-07-13 RX ADMIN — GADOBUTROL 8 ML: 604.72 INJECTION INTRAVENOUS at 12:50

## 2018-07-13 RX ADMIN — MANNITOL 30 G: 20 INJECTION, SOLUTION INTRAVENOUS at 08:10

## 2018-07-13 RX ADMIN — ACETAMINOPHEN 975 MG: 325 TABLET, FILM COATED ORAL at 21:31

## 2018-07-13 RX ADMIN — DEXAMETHASONE SODIUM PHOSPHATE 4 MG: 4 INJECTION, SOLUTION INTRAMUSCULAR; INTRAVENOUS at 14:13

## 2018-07-13 RX ADMIN — LEVETIRACETAM 1 G: 10 INJECTION INTRAVENOUS at 08:10

## 2018-07-13 RX ADMIN — CARBAMAZEPINE 300 MG: 300 CAPSULE, EXTENDED RELEASE ORAL at 21:40

## 2018-07-13 RX ADMIN — ONDANSETRON 4 MG: 2 INJECTION INTRAMUSCULAR; INTRAVENOUS at 20:14

## 2018-07-13 RX ADMIN — HYDRALAZINE HYDROCHLORIDE 10 MG: 20 INJECTION INTRAMUSCULAR; INTRAVENOUS at 16:46

## 2018-07-13 RX ADMIN — Medication 0.5 MG: at 20:07

## 2018-07-13 RX ADMIN — ROCURONIUM BROMIDE 10 MG: 10 INJECTION INTRAVENOUS at 09:06

## 2018-07-13 RX ADMIN — PHENYLEPHRINE HYDROCHLORIDE 100 MCG: 10 INJECTION, SOLUTION INTRAMUSCULAR; INTRAVENOUS; SUBCUTANEOUS at 09:49

## 2018-07-13 RX ADMIN — PHENYLEPHRINE HYDROCHLORIDE 100 MCG: 10 INJECTION, SOLUTION INTRAMUSCULAR; INTRAVENOUS; SUBCUTANEOUS at 09:00

## 2018-07-13 RX ADMIN — DEXAMETHASONE SODIUM PHOSPHATE 4 MG: 4 INJECTION, SOLUTION INTRAMUSCULAR; INTRAVENOUS at 20:08

## 2018-07-13 RX ADMIN — Medication 10 MG: at 07:50

## 2018-07-13 ASSESSMENT — ENCOUNTER SYMPTOMS: SEIZURES: 1

## 2018-07-13 ASSESSMENT — PAIN DESCRIPTION - DESCRIPTORS: DESCRIPTORS: ACHING

## 2018-07-13 ASSESSMENT — LIFESTYLE VARIABLES: TOBACCO_USE: 1

## 2018-07-13 NOTE — ANESTHESIA CARE TRANSFER NOTE
Patient: Joni Borja    Procedure(s):  RIGHT CRANIOTOMY FOR EVACUATION OF BRAIN TUMOR  - Wound Class: I-Clean    Diagnosis: RECUURENT BRAIN TUMOR   Diagnosis Additional Information: No value filed.    Anesthesia Type:   General, ETT     Note:  Airway :Face Mask  Patient transferred to:PACU  Comments: Neuromuscular blockade reversed after TOF 4/4, spontaneous respirations, adequate tidal volumes, followed commands to voice, oropharynx suctioned with soft flexible catheter, extubated atraumatically, extubated with suction, airway patent after extubation.  Oxygen via facemask at 10 liters per minute to PACU. Oxygen tubing connected to wall O2 in PACU, SpO2, NiBP, and EKG monitors and alarms on and functioning, Tomas Hugger warmer connected to patient gown, report on patient's clinical status given to PACU RN, RN questions answered. Handoff Report: Identifed the Patient, Identified the Reponsible Provider, Reviewed the pertinent medical history, Discussed the surgical course, Reviewed Intra-OP anesthesia mangement and issues during anesthesia, Set expectations for post-procedure period and Allowed opportunity for questions and acknowledgement of understanding      Vitals: (Last set prior to Anesthesia Care Transfer)    CRNA VITALS  7/13/2018 0958 - 7/13/2018 1032      7/13/2018             Pulse: 73    ART BP: 129/63    ART Mean: 91    SpO2: 100 %    Resp Rate (observed): 15    Resp Rate (set): 12                Electronically Signed By: TONY Romero CRNA  July 13, 2018  10:32 AM

## 2018-07-13 NOTE — IP AVS SNAPSHOT
[facility]    [address]    [place] [address]    Phone:  [phone]                                       After Visit Summary   7/13/2018    Joni Borja    MRN: [id number]           After Visit Summary Signature Page     I have received my discharge instructions, and my questions have been answered. I have discussed any challenges I see with this plan with the nurse or doctor.    ..........................................................................................................................................  Patient/Patient Representative Signature      ..........................................................................................................................................  Patient Representative Print Name and Relationship to Patient    ..................................................               ................................................  Date                                            Time    ..........................................................................................................................................  Reviewed by Signature/Title    ...................................................              ..............................................  Date                                                            Time

## 2018-07-13 NOTE — OP NOTE
Date of surgery: 7/13/2018  Surgeon: Stuart Oscar MD  Assistant:  FERNY Hill  Note: Carley Auguste was present for and assisted with the entire surgery, and her role as an assistant was crucial for aid in positioning, exposure, suctioning, retraction, and closure.      Preoperative diagnosis: Right frontal recurrent brain tumor  Postoperative diagnosis: Right frontal recurrent brain tumor      Procedure:  1. Right frontal craniotomy for resection of intra-axial brain tumor  2. 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  We discussed the need for surgery to establish pathology and to resect the masses.  Risks, benefits, indications, and alternatives were discussed with the patient and his family in detail.  All their questions were answered, and they wished to proceed with surgery.      Description of surgery: The patient was positioned supine with the head turned 90 degrees in Hilton pins.  Med[employer] Safe Drive[employer] navigation was registered with MRI guidance.  Sterile prepping and draping procedures were performed.  Antibiotics were administered and timeout was performed.  The 10 blade was used to re-open the previous incision.  The monopolar was used to expose the previous titanium mesh cranioplasty. The cranial screws were removed, and the prior cranial graft was removed with the penfield elevator.  The dura was opened with the geralds and metzenbaum scissors.  The enhancing lesions were visualized and their location confirmed with navigation.  The lesions were removed with use of bipolar, suction, and tumor forceps.  Specimen was sent to pathology.  Irrigation was performed, the brain was noted to be decompressed, and  hemostasis was achieved.  The dura was closed with 4-0 Nurolon.  The titanium mesh was fixed back into place with the cranial plating system.  Antibiotic irrigation was performed, the galea was closed with 2-0 Vicryls, and the skin was closed with staples.   There were no intraprocedural complications.

## 2018-07-13 NOTE — ANESTHESIA PROCEDURE NOTES
ARTERIAL LINE PROCEDURE NOTE:   Pre-Procedure  Performed by AUTUMN VIRAMONTES  Location: pre-op      Pre-Anesthestic Checklist: patient identified, IV checked, risks and benefits discussed and informed consent    Timeout  Correct Patient: Yes   Correct Procedure: Yes   Correct Site: Yes   Correct Laterality: N/A   Correct Position: Yes   Site Marked: N/A   .   Procedure Documentation  Procedure: arterial line    Supine  Insertion Site:left, radial.Skin infiltrated with mL of 1% lidocaine. Injection technique: Seldinger Technique  .  .  Patient Prep;chlorhexidine gluconate and isopropyl alcohol, patient draped    Assessment/Narrative    Catheter: 20 gauge, 12 cm     Secured by suture  Tegaderm dressing used.    Arterial waveform: Yes     Comments:  Arterial Line  No complications

## 2018-07-13 NOTE — ANESTHESIA POSTPROCEDURE EVALUATION
Patient: Joni Borja    Procedure(s):  RIGHT CRANIOTOMY FOR EVACUATION OF BRAIN TUMOR  - Wound Class: I-Clean    Diagnosis:RECUURENT BRAIN TUMOR   Diagnosis Additional Information: No value filed.    Anesthesia Type:  General, ETT    Note:  Anesthesia Post Evaluation    Patient location during evaluation: PACU  Patient participation: Able to fully participate in evaluation  Level of consciousness: awake  Pain management: adequate  Airway patency: patent  Cardiovascular status: acceptable  Respiratory status: acceptable  Hydration status: acceptable  PONV: controlled     Anesthetic complications: None          Last vitals:  Vitals:    07/13/18 1715 07/13/18 1728 07/13/18 1730   BP:      Pulse:      Resp: 10 12 11   Temp:      SpO2:            Electronically Signed By: Carey Garsia MD  July 13, 2018  5:48 PM

## 2018-07-13 NOTE — PLAN OF CARE
Per Dr. Oscar, HUAN Nathan at this time.    Decadron taper over 2 weeks.    Carley Auguste PA-C  [facility]  [address] [address]  Tel [phone]  Fax [phone]

## 2018-07-13 NOTE — PLAN OF CARE
"Problem: Patient Care Overview  Goal: Plan of Care/Patient Progress Review  Outcome: Improving  Patient remains alert and orientated with neuro assessment intact.  Some slurred speech that is improving.  C/o some head pain but explains it as \"incisional\".  Remains SR.  BP stable with only one PRN dose of hydralazine.  Giron in place with good UO. Tolerating regular diet.        "

## 2018-07-13 NOTE — PROGRESS NOTES
Admission medication history interview status for the 7/13/2018  admission is complete. See EPIC admission navigator for prior to admission medications     Medication history source reliability:Good    Medication history interview source(s):Patient    Medication history resources (including written lists, pill bottles, clinic record):Patient brought a list with him    Primary pharmacy.[facility]    Additional medication history information not noted on PTA med list :None    Time spent in this activity: 40 minutes    Prior to Admission medications    Medication Sig Last Dose Taking? Auth Provider   Acetaminophen (TYLENOL PO) Take 500 mg by mouth 2 times daily as needed for mild pain or fever Past Week at PRN Yes Reported, Patient   acetaminophen-codeine (TYLENOL #3) 300-30 MG per tablet Take 1-2 tablets by mouth every 4 hours as needed for moderate pain As needed for headaches One Month Ago at PRN Yes Reported, Patient   carBAMazepine (CARBATROL) 300 MG 12 hr capsule Take 1 capsule (300 mg) by mouth 2 times daily (at 10:00 & 22:00)  Patient taking differently: Take 300 mg by mouth 2 times daily (at 09:00 & 21:00) 7/12/2018 at 2100 Yes Silvia Brown MD   cetirizine (ZYRTEC ALLERGY) 10 MG tablet Take 1 tablet (10 mg) by mouth daily as needed (for mosquitos.) Past Month at PRN Yes Brian Coon MD   ClonazePAM (KLONOPIN PO) Take 0.5 mg by mouth daily 7/12/2018 at 0900 Yes Reported, Patient   ClonazePAM (KLONOPIN PO) Take 1 mg by mouth At Bedtime (2 x 0.5 mg = 1 mg dose) 7/12/2018 at 2100 Yes Reported, Patient   Cyanocobalamin (VITAMIN B 12 PO) Take 1 tablet by mouth daily (with dinner) 7/12/2018 at Unknown time Yes Reported, Patient   escitalopram (LEXAPRO) 20 MG tablet TAKE ONE TABLET BY MOUTH ONCE DAILY AT  NIGHT  Patient taking differently: Take 20 mg by mouth daily  7/12/2018 at 0900 Yes Renato Espinoza MD   felbamate (FELBATOL) 600 MG tablet Take 900 mg (1.5 tablet) am and 900 mg (1.5 tablet) 2  pm  Patient taking differently: Take 900 mg by mouth 2 times daily Take 900 mg (1.5 tablet) am and 900 mg (1.5 tablet) 2 pm 7/12/2018 at 1400 Yes Silvia Brown MD   hydrocortisone 0.5 % cream Apply topically daily as needed for itching Past Week at PRN Yes Reported, Patient   pantoprazole (PROTONIX) 40 MG EC tablet TAKE ONE TABLET BY MOUTH ONCE DAILY 7/12/2018 at 0900 Yes Renato Espinoza MD   Riboflavin 400 MG TABS Take 400 mg by mouth daily  Patient taking differently: Take 400 mg by mouth 2 times daily  7/12/2018 at 1400 Yes Silvia Brown MD   Topiramate (TOPAMAX PO) Take 200 mg by mouth daily (2 x 100 mg = 200 mg dose) 7/12/2018 at 0900 Yes Reported, Patient   Topiramate (TOPAMAX PO) Take 300 mg by mouth At Bedtime (3 x 100 mg = 300 mg dose) 7/12/2018 at 2100 Yes Reported, Patient

## 2018-07-13 NOTE — IP AVS SNAPSHOT
MRN:[id number]                      After Visit Summary   7/13/2018    Joni Borja    MRN: [id number]           Thank you!     Thank you for choosing [facility] for your care. Our goal is always to provide you with excellent care. Hearing back from our patients is one way we can continue to improve our services. Please take a few minutes to complete the written survey that you may receive in the mail after you visit with us. Thank you!        Patient Information     Date Of Birth          1970        Designated Caregiver       Most Recent Value    Caregiver    Will someone help with your care after discharge? yes    Name of designated caregiver Lakia/Steve    Phone number of caregiver cell     Caregiver address [address] [place] [address]      About your hospital stay     You were admitted on:  July 13, 2018 You last received care in the:  [facility] [address] [facility]    You were discharged on:  July 15, 2018        Reason for your hospital stay       You were in the hospital for a tumor resection.                  Who to Call     For medical emergencies, please call 911.  For non-urgent questions about your medical care, please call your primary care provider or clinic, [phone]  For questions related to your surgery, please call your surgery clinic        Attending Provider     Provider Specialty    Stuart Oscar MD Neurosurgery       Primary Care Provider Office Phone # Fax #    Brian Coon [phone]      After Care Instructions     Activity       Discharge instructions:  No lifting of more than 10 pounds, no bending, no twisting until follow up visit.    Ok to shampoo hair, shower or bathe, but, do not scrub or submerge incision under water until evaluated post-operatively in clinic.    Ok to walk as tolerated, avoid bed rest and prolonged sitting.    No contact sports until after follow up visit  No high impact activities  such as; running/jogging, snowmobile or 4 bernard riding or any other recreational vehicles.    Do not take NSAIDS (ibuprofen, advil, aleve, naproxen, etc) for pain control.    Do NOT drive while taking narcotic pain medication.    Call the [facility] at [phone] for increasing redness, swelling or pus draining from the incision, increased pain or any other questions and concerns.            Diet       Follow this diet upon discharge: Pre admission diet            Wound care and dressings       Keep your incision clean and dry at all times.  OK to remove dressing on postop day 2.  OK to shower on postop day 3 and allow water to run over incision, pat dry after shower.  No bathing swimming or submerging in water.  Call immediately or come to ED if any drainage occurs, or you develop new pain, redness, or swelling.                  Follow-up Appointments     Follow-up and recommended labs and tests        Please follow up at the [facility] in 10-14 days for staple removal and at 4 weeks post op.  Please call the clinic at [phone] to schedule your appointment with Stepan Smith PA-C or Carley Auguste PA-C.                  Your next 10 appointments already scheduled     Aug 07, 2018  3:10 PM CDT   Return Visit with Carley Auguste PA-C   [facility] ([facility])    [address] [place] [id number]   [phone]            Oct 15, 2018  3:00 PM CDT   Return Visit with Stuart Oscar MD   [facility] ([facility])    [address] [place] [id number]   [phone]            Nov 12, 2018  1:20 PM CST   [facility] Visit with Brian [facility]on MD   [facility]    [address] [place] [phone] [phone]           Bring a current list of meds and any records pertaining to this visit. For  "Physicals, please bring immunization records and any forms needing to be filled out. Please arrive 10 minutes early to complete paperwork.              Additional Services     Occupational Therapy Referral       *This therapy referral will be filtered to a centralized scheduling office at [facility] and the patient will receive a call to schedule an appointment at a [facility] location most convenient for them. *     [facility] provides Occupational Therapy evaluation and treatment and many specialty services across the [facility] system.  If requesting a specialty program, please choose from the list below.    If you have not heard from the scheduling office within 2 business days, please call [phone] for all locations, with the exception of [place], please call [phone] and [facility], please call [phone]    Treatment: Evaluation & Treatment  Special Instructions/Modalities: post brain surgery   Special Programs: Cognition Assessment     Please be aware that coverage of these services is subject to the terms and limitations of your health insurance plan.  Call member services at your health plan with any benefit or coverage questions.      **Note to Provider:  If you are referring outside of [facility] for the therapy appointment, please list the name of the location in the \"special instructions\" above, print the referral and give to the patient to schedule the appointment.                  Further instructions from your care team       Steroid Decadron instructions:    Decadron 4mg one every 6 hours for 1 day then  Decadron 4mg one every 8 hours for 3 days then  Decadron 2 mg one every 6 hours for 3 days then  Decadron 2 mg one every 8 hours for 3 days then  Decadron 2mg one every 12 hours for 3 days then  Decadron 2 mg one tab a day for 3 days then stop     ** Take with Zofran or food if it causes nausea*      Pending Results     Date and Time Order Name Status " "Description    7/13/2018 0859 Surgical pathology exam In process             Statement of Approval     Ordered          07/15/18 0919  I have reviewed and agree with all the recommendations and orders detailed in this document.  EFFECTIVE NOW     Approved and electronically signed by:  Yolanda Beltran APRN CNP             Admission Information     Date & Time Provider Department Dept. Phone    7/13/2018 Stuart Oscar MD [facility] [address] [facility] [phone]      Your Vitals Were     Blood Pressure Pulse Temperature Respirations Height Weight    101/60 (BP Location: Left arm) 73 97.8  F (36.6  C) (Oral) 16 1.753 m (5' 9\") 77.6 kg (171 lb 1.2 oz)    Pulse Oximetry BMI (Body Mass Index)                96% 25.26 kg/m2          MyChart Information     [employer] gives you secure access to your electronic health record. If you see a primary care provider, you can also send messages to your care team and make appointments. If you have questions, please call your primary care clinic.  If you do not have a primary care provider, please call [phone] and they will assist you.        Care EveryWhere ID     This is your Care EveryWhere ID. This could be used by other organizations to access your [facility] medical records  [id number]        Equal Access to Services     KAYLIE MARIE : Abril rioso Esteban, waaxda luqadaha, qaybta kaalmada ademaureenyada, es de la garza. So [facility] [place] [phone].    ATENCIÓN: Si habla español, tiene a kirby disposición servicios gratuitos de asistencia lingüística. Llame al [phone].    We comply with applicable federal civil rights laws and [place] laws. We do not discriminate on the basis of race, color, national origin, age, disability, sex, sexual orientation, or gender identity.               Review of your medicines      START taking        Dose / Directions    cyclobenzaprine 10 MG tablet   Commonly known as:  FLEXERIL   Used for:  " S/P brain surgery        Dose:  10 mg   Take 1 tablet (10 mg) by mouth 3 times daily as needed for muscle spasms   Quantity:  90 tablet   Refills:  0       * dexamethasone 4 MG tablet   Commonly known as:  DECADRON   Used for:  S/P brain surgery   Notes to Patient:  Decadron 4mg one every 6 hours for 1 day then   (7/16/18)Decadron 4mg one every 8 hours for 3 days then             Dose:  4 mg   Take 1 tablet (4 mg) by mouth 4 times daily   Quantity:  13 tablet   Refills:  0       * dexamethasone 2 MG tablet   Commonly known as:  DECADRON   Used for:  S/P brain surgery   Notes to Patient:  (7/19/18)Decadron 2 mg one every 6 hours for 3 days then   (7/22/18) Decadron 2 mg one every 8 hours for 3 days then   (7/25/18)Decadron 2mg one every 12 hours for 3 days then   (7/28/18) Decadron 2 mg one tab a day for 3 days then stop                        Dose:  2 mg   Take 1 tablet (2 mg) by mouth every 6 hours   Quantity:  30 tablet   Refills:  0       hydrOXYzine 25 MG tablet   Commonly known as:  ATARAX   Used for:  S/P brain surgery        Dose:  25 mg   Take 1 tablet (25 mg) by mouth every 6 hours as needed for itching   Quantity:  60 tablet   Refills:  0       ondansetron 4 MG ODT tab   Commonly known as:  ZOFRAN-ODT   Used for:  Nausea        Dose:  4 mg   Take 1 tablet (4 mg) by mouth every 6 hours as needed for nausea or vomiting   Quantity:  120 tablet   Refills:  0       oxyCODONE IR 5 MG tablet   Commonly known as:  ROXICODONE   Used for:  S/P brain surgery        Dose:  5-10 mg   Take 1-2 tablets (5-10 mg) by mouth every 3 hours as needed for other (pain control or improvement in physical function. Hold dose for analgesic side effects.)   Quantity:  75 tablet   Refills:  0       * Notice:  This list has 2 medication(s) that are the same as other medications prescribed for you. Read the directions carefully, and ask your doctor or other care provider to review them with you.      CONTINUE these medicines which may  have CHANGED, or have new prescriptions. If we are uncertain of the size of tablets/capsules you have at home, strength may be listed as something that might have changed.        Dose / Directions    carBAMazepine 300 MG 12 hr capsule   Commonly known as:  CARBATROL   This may have changed:  additional instructions   Used for:  Partial epilepsy with impairment of consciousness, intractable (H)        Dose:  300 mg   Take 1 capsule (300 mg) by mouth 2 times daily (at 10:00 & 22:00)   Quantity:  180 capsule   Refills:  3       escitalopram 20 MG tablet   Commonly known as:  LEXAPRO   This may have changed:    - how much to take  - how to take this  - when to take this  - additional instructions   Used for:  History of depression, Anxiety state        TAKE ONE TABLET BY MOUTH ONCE DAILY AT  NIGHT   Quantity:  90 tablet   Refills:  3       felbamate 600 MG tablet   Commonly known as:  FELBATOL   This may have changed:    - how much to take  - how to take this  - when to take this  - additional instructions   Used for:  Partial epilepsy with impairment of consciousness, intractable (H)        Take 900 mg (1.5 tablet) am and 900 mg (1.5 tablet) 2 pm   Quantity:  270 tablet   Refills:  3       Riboflavin 400 MG Tabs   This may have changed:  when to take this   Used for:  Chronic daily headache        Dose:  400 mg   Take 400 mg by mouth daily   Quantity:  90 tablet   Refills:  3         CONTINUE these medicines which have NOT CHANGED        Dose / Directions    cetirizine 10 MG tablet   Commonly known as:  ZYRTEC ALLERGY   Used for:  Reaction to insect bite        Dose:  10 mg   Take 1 tablet (10 mg) by mouth daily as needed (for mosquitos.)   Quantity:  30 tablet   Refills:  0       hydrocortisone 0.5 % cream        Apply topically daily as needed for itching   Refills:  0       * KLONOPIN PO        Dose:  0.5 mg   Take 0.5 mg by mouth daily   Refills:  0       * KLONOPIN PO        Dose:  1 mg   Take 1 mg by mouth At  Bedtime (2 x 0.5 mg = 1 mg dose)   Refills:  0       pantoprazole 40 MG EC tablet   Commonly known as:  PROTONIX   Used for:  Gastroesophageal reflux disease without esophagitis        TAKE ONE TABLET BY MOUTH ONCE DAILY   Quantity:  90 tablet   Refills:  3       * TOPAMAX PO        Dose:  200 mg   Take 200 mg by mouth daily (2 x 100 mg = 200 mg dose)   Refills:  0       * TOPAMAX PO        Dose:  300 mg   Take 300 mg by mouth At Bedtime (3 x 100 mg = 300 mg dose)   Refills:  0       TYLENOL PO        Dose:  500 mg   Take 500 mg by mouth 2 times daily as needed for mild pain or fever   Refills:  0       VITAMIN B 12 PO        Dose:  1 tablet   Take 1 tablet by mouth daily (with dinner)   Refills:  0       * Notice:  This list has 4 medication(s) that are the same as other medications prescribed for you. Read the directions carefully, and ask your doctor or other care provider to review them with you.      STOP taking     acetaminophen-codeine 300-30 MG per tablet   Commonly known as:  TYLENOL #3                Where to get your medicines      Some of these will need a paper prescription and others can be bought over the counter. Ask your nurse if you have questions.     Bring a paper prescription for each of these medications     cyclobenzaprine 10 MG tablet    dexamethasone 2 MG tablet    dexamethasone 4 MG tablet    hydrOXYzine 25 MG tablet    ondansetron 4 MG ODT tab    oxyCODONE IR 5 MG tablet                Protect others around you: Learn how to safely use, store and throw away your medicines at www.disposemymeds.org.        Information about OPIOIDS     PRESCRIPTION OPIOIDS: WHAT YOU NEED TO KNOW   We gave you an opioid (narcotic) pain medicine. It is important to manage your pain, but opioids are not always the best choice. You should first try all the other options your care team gave you. Take this medicine for as short a time (and as few doses) as possible.     These medicines have risks:    DO NOT drive  when on new or higher doses of pain medicine. These medicines can affect your alertness and reaction times, and you could be arrested for driving under the influence (DUI). If you need to use opioids long-term, talk to your care team about driving.    DO NOT operate heave machinery    DO NOT do any other dangerous activities while taking these medicines.     DO NOT drink any alcohol while taking these medicines.      If the opioid prescribed includes acetaminophen, DO NOT take with any other medicines that contain acetaminophen. Read all labels carefully. Look for the word  acetaminophen  or  Tylenol.  Ask your pharmacist if you have questions or are unsure.    You can get addicted to pain medicines, especially if you have a history of addiction (chemical, alcohol or substance dependence). Talk to your care team about ways to reduce this risk.    Store your pills in a secure place, locked if possible. We will not replace any lost or stolen medicine. If you don t finish your medicine, please throw away (dispose) as directed by your pharmacist. The [place] [employer]ution [employer] has more information about safe disposal: https://www.pca.[facility].[place].us/living-green/managing-unwanted-medications.     All opioids tend to cause constipation. Drink plenty of water and eat foods that have a lot of fiber, such as fruits, vegetables, prune juice, apple juice and high-fiber cereal. Take a laxative (Miralax, milk of magnesia, Colace, Senna) if you don t move your bowels at least every other day.              Medication List: This is a list of all your medications and when to take them. Check marks below indicate your daily home schedule. Keep this list as a reference.      Medications           Morning Afternoon Evening Bedtime As Needed    carBAMazepine 300 MG 12 hr capsule   Commonly known as:  CARBATROL   Take 1 capsule (300 mg) by mouth 2 times daily (at 10:00 & 22:00)   Last time this was given:  300 mg on 7/15/2018  9:20  AM   Next Dose Due:  7/15/18 10:00 PM            10:00 AM               10:00 PM           cetirizine 10 MG tablet   Commonly known as:  ZYRTEC ALLERGY   Take 1 tablet (10 mg) by mouth daily as needed (for mosquitos.)   Next Dose Due:  7/15/18 anytime as needed                                   cyclobenzaprine 10 MG tablet   Commonly known as:  FLEXERIL   Take 1 tablet (10 mg) by mouth 3 times daily as needed for muscle spasms   Next Dose Due:  7/15/18 anytime as needed                                   * dexamethasone 4 MG tablet   Commonly known as:  DECADRON   Take 1 tablet (4 mg) by mouth 4 times daily   Last time this was given:  4 mg on 7/15/2018  9:21 AM   Next Dose Due:  7/15/18 12:00 PM   Notes to Patient:  Decadron 4mg one every 6 hours for 1 day then   (7/16/18)Decadron 4mg one every 8 hours for 3 days then                 8:00 AM       12:00 PM       4:00 PM       Between 8:00 and 10:00 PM           * dexamethasone 2 MG tablet   Commonly known as:  DECADRON   Take 1 tablet (2 mg) by mouth every 6 hours   Last time this was given:  4 mg on 7/15/2018  9:21 AM   Next Dose Due:  7/19/18 8:00 AM   Notes to Patient:  (7/19/18)Decadron 2 mg one every 6 hours for 3 days then   (7/22/18) Decadron 2 mg one every 8 hours for 3 days then   (7/25/18)Decadron 2mg one every 12 hours for 3 days then   (7/28/18) Decadron 2 mg one tab a day for 3 days then stop                            8:00 AM       2:00 PM       Between 8:00 and 10:00 PM               escitalopram 20 MG tablet   Commonly known as:  LEXAPRO   TAKE ONE TABLET BY MOUTH ONCE DAILY AT  NIGHT   Last time this was given:  20 mg on 7/15/2018  9:20 AM   Next Dose Due:  7/16/18 AM            9:00 AM                       felbamate 600 MG tablet   Commonly known as:  FELBATOL   Take 900 mg (1.5 tablet) am and 900 mg (1.5 tablet) 2 pm   Last time this was given:  900 mg on 7/15/2018  9:21 AM   Next Dose Due:  7/15/18 2:00 PM            9:00 AM       2:00 PM                    hydrocortisone 0.5 % cream   Apply topically daily as needed for itching   Next Dose Due:  7/15/18 anytime as needed                                   hydrOXYzine 25 MG tablet   Commonly known as:  ATARAX   Take 1 tablet (25 mg) by mouth every 6 hours as needed for itching   Next Dose Due:  7/15/18 anytime as needed                                   * KLONOPIN PO   Take 0.5 mg by mouth daily   Last time this was given:  0.5 mg on 7/15/2018  9:21 AM            9:00 AM                       * KLONOPIN PO   Take 1 mg by mouth At Bedtime (2 x 0.5 mg = 1 mg dose)   Last time this was given:  0.5 mg on 7/15/2018  9:21 AM   Next Dose Due:  7/15/18 bedtime                                     ondansetron 4 MG ODT tab   Commonly known as:  ZOFRAN-ODT   Take 1 tablet (4 mg) by mouth every 6 hours as needed for nausea or vomiting   Last time this was given:  7/13/18 8:15 PM   Next Dose Due:  7/15/18 anytime as needed                                   oxyCODONE IR 5 MG tablet   Commonly known as:  ROXICODONE   Take 1-2 tablets (5-10 mg) by mouth every 3 hours as needed for other (pain control or improvement in physical function. Hold dose for analgesic side effects.)   Last time this was given:  5 mg on 7/15/2018  9:54 AM   Next Dose Due:  7/15/18 1:00 PM                                   pantoprazole 40 MG EC tablet   Commonly known as:  PROTONIX   TAKE ONE TABLET BY MOUTH ONCE DAILY   Last time this was given:  40 mg on 7/15/2018  9:20 AM   Next Dose Due:  7/16/18 AM            9:00 AM                       Riboflavin 400 MG Tabs   Take 400 mg by mouth daily   Last time this was given:  400 mg on 7/15/2018  9:21 AM   Next Dose Due:  7/16/18 AM            9:00 AM                       * TOPAMAX PO   Take 200 mg by mouth daily (2 x 100 mg = 200 mg dose)   Last time this was given:  200 mg on 7/15/2018  9:20 AM   Next Dose Due:  7/15/18 AM            9:00 AM                       * TOPAMAX PO   Take 300 mg by mouth At  Bedtime (3 x 100 mg = 300 mg dose)   Last time this was given:  200 mg on 7/15/2018  9:20 AM   Next Dose Due:  7/16/18 evening                                   TYLENOL PO   Take 500 mg by mouth 2 times daily as needed for mild pain or fever   Last time this was given:  975 mg on 7/15/2018  6:23 AM   Next Dose Due:  7/15/18 anytime as needed                                   VITAMIN B 12 PO   Take 1 tablet by mouth daily (with dinner)   Next Dose Due:  7/15/18 evening                                   * Notice:  This list has 6 medication(s) that are the same as other medications prescribed for you. Read the directions carefully, and ask your doctor or other care provider to review them with you.              More Information        Discharge Instructions for Craniotomy  You had a craniotomy. This means your healthcare provider made an opening in your skull. Your provider needed to do this to do brain surgery. Recovery after a craniotomy varies, depending on why you had the procedure. The guidelines provided here are for general care. Ask your healthcare provider to provide more information based on your own condition.  Home care  Do's and don'ts include:     Increase your activity slowly. Talk with your healthcare provider about which activities you can start with.    Don t drive until your healthcare provider says it s OK.    Don t lift anything until your healthcare provider says it s OK. Your provider may tell you not to lift more than 10 pounds for a period of time.    Take your medicine exactly as directed.    Shower as needed. But keep your incision dry. You can wash your hair with mild soap after your stitches or staples have been removed.    Don t put creams, lotions, or other ointments to your incision unless your provider tells you to. Keeping the incision clean and dry will help it to heal quickly. Most stitches or staples in the scalp are removed in 7 to 10 days.    Don't drink alcohol or use  recreational drugs.    Get plenty of rest and sleep.    Don't take aspirin, ibuprofen, or similar medicines unless your healthcare provider says it's OK.   Follow-up  Make a follow-up appointment.     When to call your healthcare provider  Call your healthcare provider right away if you have any of the following:    Swelling on the face or scalp    Incision that becomes red and hot or drainage from incision    Fever of 100.4 F (38 C) or higher, or as directed by your healthcare provider    Chills    Confusion, memory loss, trouble speaking, or hallucinations    Fainting or  blacking out     Double or blurred vision, or partial or total loss of vision    Numbness, tingling, or weakness in your face, arms, hands, legs, or feet    Stiffness in your neck    Severe sensitivity to light (photophobia) or severe headache    Seizure    Trouble controlling your bowels or bladder    Nausea or vomiting    Fluid draining from the nose or ear    Changes in behavior      Date Last Reviewed: 11/5/2015 2000-2017 [employer]. [address] [place] [address]. All rights reserved. This information is not intended as a substitute for professional medical care. Always follow your healthcare professional's instructions.                After Your Hospital Stay for Craniotomy  You may be able to go home as soon as you can walk, eat, and drink normally. Back home, family and friends may offer help and support. Accept help when you need it. But it s important to strike a balance. Keep in mind that you re striving to become independent again.  Keep follow-up visits  You may have an office visit 7 to 10 days after the craniotomy. At this time, any remaining stitches or staples may be removed. You can expect to meet with your surgeon about every 4 weeks for the first few months. You may also have follow-up imaging tests to ensure your condition is stable.  Coping after surgery  Accepting what has happened can be hard  for you and your loved ones. Your recovery will take time. You may feel more tired than normal for a few months or even a year. Coming to terms with your emotions can help ease the process:    It s harder to cope some days than others. So be patient with yourself. If you feel sad or depressed, talk with a member of your health care team. Depression is common and can be treated.    It s normal to have fears or to feel angry. Counseling or a support group may help you cope with your feelings and the demands of any ongoing treatment. Sharing information with your family can also help.  Start by walking  Walking is a great way to rebuild your strength. If directed by your surgeon, start out with short, frequent walks. Even if it s just to get a glass of water or to change the TV channel, get up and walk each day. Gradually try walking greater distances, such as to the corner mailbox.  Returning to daily life  The following hints might help in your recovery:    Increase your level of activity little by little.    Accept help from those who offer to do household tasks like cooking and yard work.    Arrange for rides if you re told not to drive for a while. A  or discharge planner can help with this.    Ask your employer about returning to work for fewer hours or working at home.  When to call your doctor  Call your surgeon at once if you have any of the following:    Increased drowsiness    Ongoing nausea or vomiting    Extreme headaches    Fever of 100.4 F (38 C) or greater    Increased muscle weakness    Involuntary movements, seizures, or personality changes    Shortness of breath    Pain or swelling in a leg    Redness or drainage from the incision or an IV site    Burning during urination    Pain and stiffness in the neck    Fainting or loss of consciousness    A fall    Altered mental status    Before stopping any medication   Date Last Reviewed: 9/19/2015 2000-2017 [employer] [address]  [place] [address]. All rights reserved. This information is not intended as a substitute for professional medical care. Always follow your healthcare professional's instructions.

## 2018-07-13 NOTE — CONSULTS
Consult Date:  07/13/2018      REQUESTING PROVIDER:  Jon Auguste PA-C      REASON FOR CONSULTATION:  Status post craniotomy.      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  Hospitalist Service was consulted for postoperative medical co-management.  The patient was initially diagnosed with a brain tumor in 2002, at which time he experienced his first seizure.  He underwent resection as well as chemotherapy and radiation.  Since that time, he has had multiple subsequent resections for recurrence.  He is presently evaluated in the ICU after surgery.  He reports he is feeling well at present.  He has no pain currently.  He denies any chest pain, shortness of breath, nausea, vomiting, visual changes or focal weakness.  He is tolerating clear liquids.  No concerns per nursing at this time.      REVIEW OF SYSTEMS:  A 10-point review of systems was conducted and is negative aside from the information in the HPI.      PAST MEDICAL HISTORY:   1.  Seizure disorder.  The patient reports most of his seizures are petit mal seizures.  He thinks they may be happening on a weekly basis at this point.  Denies recent changes in medications.   2.  Depression.   3.  GERD.   4.  Tobacco use, ongoing.   5.  Anxiety.   6.  History of recurrent anaplastic oligodendroglioma status post multiple resections, most recent resection 03/2017.      PAST SURGICAL HISTORY:    Past Surgical History:   Procedure Laterality Date     APPENDECTOMY  1972     BIOPSY  2002     CRANIOTOMY, EXCISE TUMOR COMPLEX, COMBINED  9/18/2013    Procedure: COMBINED CRANIOTOMY, EXCISE TUMOR COMPLEX;  Re-do Right Frontal Craniotomy,  Grid Removal,  Resection of Right Frontal  Tumor and Epileptogenic Cortex Resection;  Surgeon: Maverick Linder MD;  Location: [facility] OR     HEAD & NECK SURGERY  2002    brain tumor removal     IMPLANT STIMULATOR VAGUS NERVE Left 7/19/2016    Procedure: IMPLANT STIMULATOR VAGUS NERVE;  Surgeon: Maverick Linder MD;  Location: [facility] OR     OPTICAL TRACKING SYSTEM CRANIOTOMY, EXCISE TUMOR WITH MAPPING, COMBINED  9/10/2013    Procedure: COMBINED OPTICAL TRACKING SYSTEM CRANIOTOMY, EXCISE TUMOR WITH MAPPING;  Stealth Guided Right Redo Frontal Craniotomy for Grid Placement ;  Surgeon: Maverick Linder MD;  Location: [facility] OR     OPTICAL TRACKING SYSTEM CRANIOTOMY, EXCISE TUMOR, COMBINED Right 3/24/2017    Procedure: COMBINED OPTICAL TRACKING SYSTEM CRANIOTOMY, EXCISE TUMOR;  Surgeon: Stuart Oscar MD;  Location:  OR     ORTHOPEDIC SURGERY  1982    Right Hip - Perthe's     REPAIR CONGENITAL HIP Right     Age 8     SHOULDER SURGERY             ALLERGIES:    1.  DILANTIN.   2.  MOSQUITOS      PRIOR TO ADMISSION MEDICATIONS:    Prescriptions Prior to Admission   Medication Sig Dispense Refill Last Dose     Acetaminophen (TYLENOL PO) Take 500 mg by mouth 2 times daily as needed for mild pain or fever   Past Week at PRN     acetaminophen-codeine (TYLENOL #3) 300-30 MG per tablet Take 1-2 tablets by mouth every 4 hours as needed for moderate pain As needed for headaches   One Month Ago at PRN     carBAMazepine (CARBATROL) 300 MG 12 hr capsule Take 1 capsule (300 mg) by mouth 2 times daily (at 10:00 & 22:00) (Patient taking differently: Take 300 mg by mouth 2 times daily (at 09:00 & 21:00)) 180 capsule 3 7/13/2018 at 0645     cetirizine (ZYRTEC ALLERGY) 10 MG tablet Take 1 tablet (10 mg) by mouth daily as needed (for mosquitos.) 30 tablet  Past Month at PRN     ClonazePAM (KLONOPIN PO) Take 0.5 mg by mouth daily   7/13/2018 at 0645     ClonazePAM (KLONOPIN PO) Take 1 mg by mouth At Bedtime (2 x 0.5 mg = 1 mg  dose)   7/12/2018 at 2100     Cyanocobalamin (VITAMIN B 12 PO) Take 1 tablet by mouth daily (with dinner)   7/12/2018 at Unknown time     escitalopram (LEXAPRO) 20 MG tablet TAKE ONE TABLET BY MOUTH ONCE DAILY AT  NIGHT (Patient taking differently: Take 20 mg by mouth daily ) 90 tablet 3 7/13/2018 at 0645     felbamate (FELBATOL) 600 MG tablet Take 900 mg (1.5 tablet) am and 900 mg (1.5 tablet) 2 pm (Patient taking differently: Take 900 mg by mouth 2 times daily Take 900 mg (1.5 tablet) am and 900 mg (1.5 tablet) 2 pm) 270 tablet 3 7/13/2018 at 0645     hydrocortisone 0.5 % cream Apply topically daily as needed for itching   Past Week at PRN     pantoprazole (PROTONIX) 40 MG EC tablet TAKE ONE TABLET BY MOUTH ONCE DAILY 90 tablet 3 7/13/2018 at 0645     Riboflavin 400 MG TABS Take 400 mg by mouth daily (Patient taking differently: Take 400 mg by mouth 2 times daily ) 90 tablet 3 7/12/2018 at 1400     Topiramate (TOPAMAX PO) Take 200 mg by mouth daily (2 x 100 mg = 200 mg dose)   7/13/2018 at 0630     Topiramate (TOPAMAX PO) Take 300 mg by mouth At Bedtime (3 x 100 mg = 300 mg dose)   7/12/2018 at 2100           SOCIAL HISTORY:  Reports alcohol use a few times monthly, denies abuse or issues with withdrawal.  Denies drug use.  He is a current every day smoker, smokes 1 pack per day.  He has smoked for over 20 years.  He has used medical marijuana.      FAMILY HISTORY:  He reports he has a few grandparents with heart disease.  His brother had a valve replaced.      LABORATORY DATA:  I see only a CBC obtained preop, showing a hemoglobin of 12.8, hematocrit 39.7 and platelets of 284.  White blood cell count was normal at that time at 6.6.  Preoperative EKG showed sinus bradycardia with an incomplete right bundle branch block.      PHYSICAL EXAMINATION:   VITAL SIGNS:  Temperature 97, heart rate 70, blood pressure 120/71, respiratory rate 18, oxygen saturation is 98% on 2 liters.   GENERAL:  Alert and oriented x 4, very  pleasant gentleman sitting in bed, appears comfortable and is appropriately conversant.   HEENT:  Pupils are equal and reactive to light, EOMI.   ENT:  Mucous membranes are moist.   CARDIOVASCULAR:  Regular rate and rhythm, no murmurs appreciated.   RESPIRATORY:  Lungs are clear to auscultation in anterior fields, no increased work of breathing or wheezing.   GASTROINTESTINAL:  Positive bowel sounds.  Abdomen is soft and nontender.   SKIN:  Warm and dry.   EXTREMITIES:  No lower extremity edema.  PCDs are in place.   NEUROLOGIC:  Cranial nerves II-XII are grossly intact.  No focal deficits.  Speech is clear.  Face is symmetric.  Finger-to-nose testing is intact.  Strength is intact and equal bilaterally.      ASSESSMENT AND PLAN:  Joni Borja is a 47-year-old gentleman with a history of recurrent brain tumor status post multiple resections, seizure disorder, gastroesophageal reflux disease, tobacco use, anxiety and depression who is postoperative day 0 status post a right frontal craniotomy for resection of intraaxial brain tumor.  Hospitalist Service was consulted for postoperative medical co-management.   1.  Postoperative day 0 status post craniotomy for resection of recurrent oligodendroglioma.  The procedure was performed by Dr. Oscar under general anesthesia with an estimated blood loss documented of 100 mL.  No intraoperative complications have been identified.  He will remain in the intensive care unit overnight.  He is presently hemodynamically stable without pain, neurologically intact.  Primary management is per Neurosurgery Service.  Perioperative Ancef.  Decadron taper has been ordered per Neurosurgery.  Nicardipine drip as available p.r.n. while in the intensive care unit for a systolic blood pressure greater than 140 per Neurosurgery.  He is currently on a clear liquid diet.  We will order CBC, BMP for the a.m.  Neurologic checks per primary service.   2.  Seizure disorder.  The patient reports that  he primarily has petite mal seizures.  He believes these are occurring approximately weekly at this time.  Continue prior to admission carbamazepine, felbamate, Topamax.  Recommend seizure precautions.   3.  Gastroesophageal reflux disease.  Continue prior to admission Protonix.   4.  Depression and anxiety.  Continue prior to admission Lexapro and Klonopin.  Recommend holding Klonopin for sedation.      Hospitalist Service will continue to follow along.        The patient was seen and examined by Dr. Neo Saab who agrees with the above plan.         NEO SAAB MD       As dictated by MITCH HERNANDEZ PA-C            D: 2018   T: 2018   MT: FRANCISCO J      Name:     MADHU HAYES   MRN:      [id number]        Account:       [id number]   :      1970           Consult Date:  2018      Document: [id number]       cc: MERCY Coon MD

## 2018-07-13 NOTE — BRIEF OP NOTE
[facility]   Brief Operative Note    Pre-operative diagnosis: RECUURENT BRAIN TUMOR    Post-operative diagnosis Same   Procedure: Procedure(s):  RIGHT CRANIOTOMY FOR EVACUATION OF BRAIN TUMOR  - Wound Class: I-Clean   Surgeon(s): Surgeon(s) and Role:     * Stuart Oscar MD - Primary     * Carley Auguste PA-C - Assisting   Estimated blood loss: 100 mL    Specimens:   ID Type Source Tests Collected by Time Destination   A : Right Frontal (Convexity) Capsule   Tissue Brain SURGICAL PATHOLOGY EXAM Stuart Oscar MD 7/13/2018  8:58 AM    B : Right Frontal Capsule with enhancing tissue Tissue Brain SURGICAL PATHOLOGY EXAM Stuart Oscar MD 7/13/2018  9:02 AM    C : Right Frontal Brain Tumor Tissue Brain SURGICAL PATHOLOGY EXAM Stuart Oscar MD 7/13/2018  9:10 AM    D : Right Frontal Brain Tumor Tissue Brain SURGICAL PATHOLOGY EXAM Stuart Oscar MD 7/13/2018  9:10 AM       Findings: See op note

## 2018-07-13 NOTE — ANESTHESIA PREPROCEDURE EVALUATION
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-Salfcov disease 2/7/2017          Localization-related epilepsy (H)      Meningitis, viral      Oligodendroglioma (H) 4/02    right frontal grade 2, s/p biopsy and whole brain radiation, recurrence 5/07 s/p subtotal resection and chemo     Perthe's disease of hip     Right hip     Seizures (H)      Past Surgical History:   Procedure Laterality Date     APPENDECTOMY  1972     BIOPSY  2002     CRANIOTOMY, EXCISE TUMOR COMPLEX, COMBINED  9/18/2013    Procedure: COMBINED CRANIOTOMY, EXCISE TUMOR COMPLEX;  Re-do Right Frontal Craniotomy, Grid Removal,  Resection of Right Frontal  Tumor and Epileptogenic Cortex Resection;  Surgeon: Maverick Linder MD;  Location:  OR     HEAD & NECK SURGERY  2002    brain tumor removal     IMPLANT STIMULATOR VAGUS NERVE Left 7/19/2016    Procedure: IMPLANT STIMULATOR VAGUS NERVE;  Surgeon: Maverick Linder MD;  Location:  OR     OPTICAL TRACKING SYSTEM CRANIOTOMY, EXCISE TUMOR WITH MAPPING, COMBINED  9/10/2013    Procedure: COMBINED OPTICAL TRACKING SYSTEM CRANIOTOMY, EXCISE TUMOR WITH MAPPING;  Stealth Guided Right Redo Frontal Craniotomy for Grid Placement ;  Surgeon: Maverick Linder MD;  Location:  OR     OPTICAL TRACKING SYSTEM CRANIOTOMY, EXCISE TUMOR, COMBINED Right 3/24/2017    Procedure: COMBINED OPTICAL TRACKING SYSTEM CRANIOTOMY, EXCISE TUMOR;  Surgeon: Stuart Oscar MD;  Location:  OR     ORTHOPEDIC SURGERY  1982    Right Hip - Perthe's     REPAIR CONGENITAL HIP Right     Age 8     SHOULDER SURGERY       Social History   Substance  Use Topics     Smoking status: Current Every Day Smoker     Packs/day: 1.00     Years: 20.00     Types: Cigarettes     Smokeless tobacco: Never Used      Comment: would like to quit soon.     Alcohol use 0.0 oz/week      Comment: monthly a few drinks     Prior to Admission medications    Medication Sig Start Date End Date Taking? Authorizing Provider   Acetaminophen (TYLENOL PO) Take 500 mg by mouth 2 times daily as needed for mild pain or fever   Yes Reported, Patient   acetaminophen-codeine (TYLENOL #3) 300-30 MG per tablet Take 1-2 tablets by mouth every 4 hours as needed for moderate pain As needed for headaches   Yes Reported, Patient   carBAMazepine (CARBATROL) 300 MG 12 hr capsule Take 1 capsule (300 mg) by mouth 2 times daily (at 10:00 & 22:00)  Patient taking differently: Take 300 mg by mouth 2 times daily (at 09:00 & 21:00) 6/20/18  Yes Silvia Brown MD   cetirizine (ZYRTEC ALLERGY) 10 MG tablet Take 1 tablet (10 mg) by mouth daily as needed (for mosquitos.) 5/15/18  Yes Brian Coon MD   ClonazePAM (KLONOPIN PO) Take 0.5 mg by mouth daily   Yes Reported, Patient   ClonazePAM (KLONOPIN PO) Take 1 mg by mouth At Bedtime (2 x 0.5 mg = 1 mg dose)   Yes Reported, Patient   Cyanocobalamin (VITAMIN B 12 PO) Take 1 tablet by mouth daily (with dinner)   Yes Reported, Patient   escitalopram (LEXAPRO) 20 MG tablet TAKE ONE TABLET BY MOUTH ONCE DAILY AT  NIGHT  Patient taking differently: Take 20 mg by mouth daily  3/5/18  Yes Renato Espinoza MD   felbamate (FELBATOL) 600 MG tablet Take 900 mg (1.5 tablet) am and 900 mg (1.5 tablet) 2 pm  Patient taking differently: Take 900 mg by mouth 2 times daily Take 900 mg (1.5 tablet) am and 900 mg (1.5 tablet) 2 pm 6/20/18  Yes Silvia Brown MD   hydrocortisone 0.5 % cream Apply topically daily as needed for itching   Yes Reported, Patient   pantoprazole (PROTONIX) 40 MG EC tablet TAKE ONE TABLET BY MOUTH ONCE DAILY 3/14/18  Yes Renato Espinoza MD   Riboflavin 400 MG  TABS Take 400 mg by mouth daily  Patient taking differently: Take 400 mg by mouth 2 times daily  6/20/18  Yes Silvia Brown MD   Topiramate (TOPAMAX PO) Take 200 mg by mouth daily (2 x 100 mg = 200 mg dose)   Yes Reported, Patient   Topiramate (TOPAMAX PO) Take 300 mg by mouth At Bedtime (3 x 100 mg = 300 mg dose)   Yes Reported, Patient     Current Facility-Administered Medications Ordered in Epic   Medication Dose Route Frequency Last Rate Last Dose     ceFAZolin (ANCEF) 1 g vial to attach to  ml bag for ADULT or 50 ml bag for PEDS  1 g Intravenous See Admin Instructions         ceFAZolin (ANCEF) intermittent infusion 2 g in 100 mL dextrose PRE-MIX  2 g Intravenous Pre-Op/Pre-procedure x 1 dose         lactated ringers infusion   Intravenous Continuous         lidocaine 1 % 1 mL  1 mL Other Q1H PRN         No current [facility]-ordered outpatient prescriptions on file.       lactated ringers       Wt Readings from Last 1 Encounters:   07/13/18 76.2 kg (168 lb)     Temp Readings from Last 1 Encounters:   07/13/18 36.4  C (97.5  F) (Temporal)     BP Readings from Last 6 Encounters:   07/13/18 100/70   07/10/18 100/72   07/02/18 109/66   06/26/18 100/67   06/20/18 105/61   05/15/18 122/80     Pulse Readings from Last 4 Encounters:   07/13/18 58   07/10/18 85   07/02/18 75   06/26/18 64     Resp Readings from Last 1 Encounters:   07/13/18 16     SpO2 Readings from Last 1 Encounters:   07/13/18 98%     Recent Labs   Lab Test  04/24/18   1043  03/14/18   1406   NA  143  140   POTASSIUM  4.1  3.8   CHLORIDE  112*  112*   CO2  24  25   ANIONGAP  7  3   GLC  80  87   BUN  18  18   CR  0.99  1.01   SHABBIR  8.0*  8.6     Recent Labs   Lab Test  04/24/18   1043  03/14/18   1406   AST  12  17   ALT  15  18   ALKPHOS  114  104   BILITOTAL  0.3  0.3     Recent Labs   Lab Test  07/10/18   1634  04/24/18   1043   WBC  6.6  5.6   HGB  12.8*  12.2*   PLT  284  281     Recent Labs   Lab Test  03/24/17   1003   ABO  O   RH   Neg      Recent Labs   Lab Test  02/12/15   2213  09/24/13   0533   09/19/13   0321   09/17/13   2145   INR  1.2  1.09   < >  1.19*   < >  1.09   PTT   --    --    --   29   --   29    < > = values in this interval not displayed.      No results for input(s): TROPI in the last 13793 hours.  No results for input(s): PH, PCO2, PO2, HCO3 in the last 76599 hours.  No results for input(s): HCG in the last 23067 hours.  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 Initial treatment was with biopsy followed by radiation  therapy alone then, the chemotherapy regimen of PCV. In 2007, a  recurrence was noted and he underwent a resection followed by PCV.  Imaging in 2/2017 showing a new contrast enhancing lesion, prompted a  third surgery and pathology was most consistent with a malignantly  transformed anaplastic oligodendroglioma. Now managed on adjuvant  temozolomide.     ICD-10: Oligodendroglioma, anaplastic (H); S/P placement of VNS (vagus  nerve stimulation) device; Status post VNS (vagus nerve stimulator)  placement    Comparison: MR brain 4/24/2018, 12/7/2017, 9/19/2017, 8/1/2017,  3/27/2017. Outside brain MRI 2/7/2012.    Technique: Multiplanar T1-weighted, axial FLAIR, and susceptibility  images were obtained without intravenous contrast. Following  intravenous gadolinium-based contrast administration, axial  T2-weighted, diffusion, and T1-weighted images (in multiple planes)  were obtained. Perfusion images were acquired and processed by a  radiologist.    Contrast: 7.5mL Gadavist     Findings:  Postoperative changes of the right frontal craniotomy with underlying  resection cavity and marginal hemosiderin staining.     New 5 mm nodular of enhancement within the right orbitofrontal  gyrus  (series 102, image 60), which demonstrates mildly elevated relative  cerebral blood volume, since 4/24/2018. Over serial exams dating back  to 3/27/2017, there has been progressive masslike expansile  cortical/subcortical T2 hyperintense signal abnormality involving the  residual right orbitofrontal gyrus, right insula, right temporal stem,  anterior aspect of the right inferior frontal gyrus, right amygdala  and hippocampal head, concerning for progressive recurrent tumor.    Mild/moderate scattered patchy foci of T2 hyperintense signal in the  bilateral frontoparietal cerebral white matter due to posttreatment  related changes. Stable linear focus of enhancement in the  juxtacortical white matter of the inferior aspect of the right  superior frontal gyrus dating back to 2/7/2012, likely representing a  developmental venous anomaly.    No midline shift, herniation or hydrocephalus. Stable ex vacuo  dilatation of the right lateral ventricle. Stable small chronic  wedge-shaped infarcts in the inferior left cerebellar hemisphere. No  acute infarct. Patent major intracranial vascular flow voids. No  suspicious marrow signal abnormality. Trace left mastoid effusion and  scattered paranasal sinus mucosal thickening. Orbits are unremarkable.      Impression    Impression:  Findings highly suspicious for tumor progression since 4/24/2018 with  new nodular enhancement in the residual right orbitofrontal gyrus and  progressive nonenhancing masslike T2 hyperintense signal abnormality  over serial exams dating back to 3/27/2017 about the margins of the  right frontal resection cavity in the right frontotemporal region and  insula.    I have personally reviewed the examination and initial interpretation  and I agree with the findings.    PAZ BURT MD   MR Brain w Contrast Stereotactic    Narrative    MRI BRAIN WITH CONTRAST  7/12/2018 11:00 AM    HISTORY:  Brain tumor.     TECHNIQUE:  Postcontrast MRI sequences  of the brain with 20 mL  Gadavist including axial diffusion, sagittal 3-D T1, and sagittal 3-D  T2.    COMPARISON:  None.    FINDINGS:  Scans are obtained mainly for preoperative stereotactic  localization. There is increased size of the enhancing lesions around  the resection cavity including 8 mm nodule along the inferior aspect  of the resection cavity, previously 4 mm and a 5 mm nodular area of  enhancement within the right frontal lobe cortex (series 5 image 66),  previously 3 mm. New area of nodular enhancement also seen along the  posterior superior aspect of the resection cavity.    No restricted diffusion to suggest infarct. Resection cavity again  seen.      Impression    IMPRESSION:  Imaging performed for stereotactic localization purposes.  Nodular enhancing lesions in the right frontal lobe appear to be  increased since previous MR 6/21/2018.    PARAMJIT BUCIO MD       Anesthesia Evaluation     . Pt has had prior anesthetic.     No history of anesthetic complications          ROS/MED HX    ENT/Pulmonary:     (+)tobacco use, Current use , . .   (-) sleep apnea   Neurologic: Comment: oligodendroglioma    (+)seizures features: petite mals,     Cardiovascular:         METS/Exercise Tolerance:     Hematologic:         Musculoskeletal:         GI/Hepatic:     (+) GERD       Renal/Genitourinary:         Endo:         Psychiatric:     (+) psychiatric history anxiety and depression      Infectious Disease:         Malignancy:   (+) Malignancy   oligodendroglioma        Other:                     Physical Exam      Airway   Mallampati: III  TM distance: >3 FB  Neck ROM: full    Dental   (+) upper dentures    Cardiovascular   Rhythm and rate: regular      Pulmonary    breath sounds clear to auscultation                    Anesthesia Plan      History & Physical Review  History and physical reviewed and following examination; no interval change.    ASA Status:  3 .    NPO Status:  > 8 hours    Plan for General and  ETT   PONV prophylaxis:  Ondansetron (or other 5HT-3)  Additional equipment: Arterial Line Steroids per surgeon      Postoperative Care      Consents  Anesthetic plan, risks, benefits and alternatives discussed with:  Patient..                          .

## 2018-07-14 ENCOUNTER — APPOINTMENT (OUTPATIENT)
Dept: OCCUPATIONAL THERAPY | Facility: CLINIC | Age: 48
DRG: 026 | End: 2018-07-14
Attending: PHYSICIAN ASSISTANT
Payer: MEDICARE

## 2018-07-14 ENCOUNTER — APPOINTMENT (OUTPATIENT)
Dept: PHYSICAL THERAPY | Facility: CLINIC | Age: 48
DRG: 026 | End: 2018-07-14
Attending: PHYSICIAN ASSISTANT
Payer: MEDICARE

## 2018-07-14 LAB
ANION GAP SERPL CALCULATED.3IONS-SCNC: 7 MMOL/L (ref 3–14)
BUN SERPL-MCNC: 12 MG/DL (ref 7–30)
CALCIUM SERPL-MCNC: 7.6 MG/DL (ref 8.5–10.1)
CHLORIDE SERPL-SCNC: 115 MMOL/L (ref 94–109)
CO2 SERPL-SCNC: 23 MMOL/L (ref 20–32)
CREAT SERPL-MCNC: 0.81 MG/DL (ref 0.66–1.25)
ERYTHROCYTE [DISTWIDTH] IN BLOOD BY AUTOMATED COUNT: 12.8 % (ref 10–15)
GFR SERPL CREATININE-BSD FRML MDRD: >90 ML/MIN/1.7M2
GLUCOSE SERPL-MCNC: 93 MG/DL (ref 70–99)
HCT VFR BLD AUTO: 34.5 % (ref 40–53)
HGB BLD-MCNC: 11.5 G/DL (ref 13.3–17.7)
MCH RBC QN AUTO: 31.4 PG (ref 26.5–33)
MCHC RBC AUTO-ENTMCNC: 33.3 G/DL (ref 31.5–36.5)
MCV RBC AUTO: 94 FL (ref 78–100)
PLATELET # BLD AUTO: 264 10E9/L (ref 150–450)
POTASSIUM SERPL-SCNC: 3.7 MMOL/L (ref 3.4–5.3)
RBC # BLD AUTO: 3.66 10E12/L (ref 4.4–5.9)
SODIUM SERPL-SCNC: 145 MMOL/L (ref 133–144)
WBC # BLD AUTO: 11.6 10E9/L (ref 4–11)

## 2018-07-14 PROCEDURE — 97112 NEUROMUSCULAR REEDUCATION: CPT | Mod: GP | Performed by: PHYSICAL THERAPIST

## 2018-07-14 PROCEDURE — 25000128 H RX IP 250 OP 636: Performed by: PHYSICIAN ASSISTANT

## 2018-07-14 PROCEDURE — 99232 SBSQ HOSP IP/OBS MODERATE 35: CPT | Performed by: INTERNAL MEDICINE

## 2018-07-14 PROCEDURE — 85027 COMPLETE CBC AUTOMATED: CPT | Performed by: NEUROLOGICAL SURGERY

## 2018-07-14 PROCEDURE — 25000132 ZZH RX MED GY IP 250 OP 250 PS 637: Mod: GY | Performed by: PHYSICIAN ASSISTANT

## 2018-07-14 PROCEDURE — 25000125 ZZHC RX 250: Performed by: PHYSICIAN ASSISTANT

## 2018-07-14 PROCEDURE — 97166 OT EVAL MOD COMPLEX 45 MIN: CPT | Mod: GO | Performed by: OCCUPATIONAL THERAPIST

## 2018-07-14 PROCEDURE — 40000193 ZZH STATISTIC PT WARD VISIT: Performed by: PHYSICAL THERAPIST

## 2018-07-14 PROCEDURE — 97161 PT EVAL LOW COMPLEX 20 MIN: CPT | Mod: GP | Performed by: PHYSICAL THERAPIST

## 2018-07-14 PROCEDURE — 12000000 ZZH R&B MED SURG/OB

## 2018-07-14 PROCEDURE — 97535 SELF CARE MNGMENT TRAINING: CPT | Mod: GO | Performed by: OCCUPATIONAL THERAPIST

## 2018-07-14 PROCEDURE — 40000133 ZZH STATISTIC OT WARD VISIT: Performed by: OCCUPATIONAL THERAPIST

## 2018-07-14 PROCEDURE — A9270 NON-COVERED ITEM OR SERVICE: HCPCS | Mod: GY | Performed by: PHYSICIAN ASSISTANT

## 2018-07-14 PROCEDURE — 80048 BASIC METABOLIC PNL TOTAL CA: CPT | Performed by: NEUROLOGICAL SURGERY

## 2018-07-14 RX ORDER — DEXAMETHASONE 2 MG/1
2 TABLET ORAL EVERY 6 HOURS
Qty: 30 TABLET | Refills: 0 | Status: SHIPPED | OUTPATIENT
Start: 2018-07-14 | End: 2018-08-14

## 2018-07-14 RX ORDER — HYDROXYZINE HYDROCHLORIDE 25 MG/1
25 TABLET, FILM COATED ORAL EVERY 6 HOURS PRN
Qty: 60 TABLET | Refills: 0 | Status: SHIPPED | OUTPATIENT
Start: 2018-07-14 | End: 2018-08-14

## 2018-07-14 RX ORDER — DEXAMETHASONE 4 MG/1
4 TABLET ORAL 4 TIMES DAILY
Qty: 13 TABLET | Refills: 0 | Status: SHIPPED | OUTPATIENT
Start: 2018-07-14 | End: 2018-08-14

## 2018-07-14 RX ORDER — CYCLOBENZAPRINE HCL 10 MG
10 TABLET ORAL 3 TIMES DAILY PRN
Qty: 90 TABLET | Refills: 0 | Status: SHIPPED | OUTPATIENT
Start: 2018-07-14 | End: 2018-08-14

## 2018-07-14 RX ORDER — ONDANSETRON 4 MG/1
4 TABLET, ORALLY DISINTEGRATING ORAL EVERY 6 HOURS PRN
Qty: 120 TABLET | Refills: 0 | Status: SHIPPED | OUTPATIENT
Start: 2018-07-14 | End: 2018-08-14

## 2018-07-14 RX ADMIN — ESCITALOPRAM 20 MG: 20 TABLET, FILM COATED ORAL at 08:03

## 2018-07-14 RX ADMIN — SODIUM CHLORIDE: 9 INJECTION, SOLUTION INTRAVENOUS at 02:49

## 2018-07-14 RX ADMIN — Medication 900 MG: at 08:02

## 2018-07-14 RX ADMIN — ACETAMINOPHEN 975 MG: 325 TABLET, FILM COATED ORAL at 22:27

## 2018-07-14 RX ADMIN — DEXAMETHASONE SODIUM PHOSPHATE 4 MG: 4 INJECTION, SOLUTION INTRAMUSCULAR; INTRAVENOUS at 13:35

## 2018-07-14 RX ADMIN — CLONAZEPAM 0.5 MG: 0.5 TABLET ORAL at 08:02

## 2018-07-14 RX ADMIN — OXYCODONE HYDROCHLORIDE 5 MG: 5 TABLET ORAL at 20:39

## 2018-07-14 RX ADMIN — SENNOSIDES AND DOCUSATE SODIUM 2 TABLET: 8.6; 5 TABLET ORAL at 20:39

## 2018-07-14 RX ADMIN — Medication 400 MG: at 08:02

## 2018-07-14 RX ADMIN — Medication 0.5 MG: at 01:56

## 2018-07-14 RX ADMIN — CLONAZEPAM 1 MG: 1 TABLET ORAL at 22:27

## 2018-07-14 RX ADMIN — DEXAMETHASONE SODIUM PHOSPHATE 4 MG: 4 INJECTION, SOLUTION INTRAMUSCULAR; INTRAVENOUS at 02:06

## 2018-07-14 RX ADMIN — FAMOTIDINE 20 MG: 10 INJECTION INTRAVENOUS at 02:06

## 2018-07-14 RX ADMIN — SENNOSIDES AND DOCUSATE SODIUM 1 TABLET: 8.6; 5 TABLET ORAL at 08:03

## 2018-07-14 RX ADMIN — CARBAMAZEPINE 300 MG: 300 CAPSULE, EXTENDED RELEASE ORAL at 08:02

## 2018-07-14 RX ADMIN — DEXAMETHASONE SODIUM PHOSPHATE 4 MG: 4 INJECTION, SOLUTION INTRAMUSCULAR; INTRAVENOUS at 20:39

## 2018-07-14 RX ADMIN — CARBAMAZEPINE 300 MG: 300 CAPSULE, EXTENDED RELEASE ORAL at 20:39

## 2018-07-14 RX ADMIN — TOPIRAMATE 200 MG: 200 TABLET ORAL at 08:02

## 2018-07-14 RX ADMIN — ACETAMINOPHEN 975 MG: 325 TABLET, FILM COATED ORAL at 06:32

## 2018-07-14 RX ADMIN — TOPIRAMATE 300 MG: 200 TABLET ORAL at 22:26

## 2018-07-14 RX ADMIN — DEXAMETHASONE SODIUM PHOSPHATE 4 MG: 4 INJECTION, SOLUTION INTRAMUSCULAR; INTRAVENOUS at 08:03

## 2018-07-14 RX ADMIN — ACETAMINOPHEN 975 MG: 325 TABLET, FILM COATED ORAL at 13:35

## 2018-07-14 RX ADMIN — Medication 0.5 MG: at 10:32

## 2018-07-14 RX ADMIN — OXYCODONE HYDROCHLORIDE 5 MG: 5 TABLET ORAL at 17:16

## 2018-07-14 RX ADMIN — Medication 900 MG: at 17:13

## 2018-07-14 RX ADMIN — PANTOPRAZOLE SODIUM 40 MG: 40 TABLET, DELAYED RELEASE ORAL at 08:03

## 2018-07-14 RX ADMIN — Medication 400 MG: at 20:38

## 2018-07-14 ASSESSMENT — PAIN DESCRIPTION - DESCRIPTORS
DESCRIPTORS: ACHING

## 2018-07-14 ASSESSMENT — VISUAL ACUITY
OU: NORMAL ACUITY

## 2018-07-14 NOTE — PROGRESS NOTES
07/14/18 1400   Quick Adds   Type of Visit Initial Occupational Therapy Evaluation   Living Environment   Lives With parent(s)   Living Arrangements house   Home Accessibility stairs to enter home;stairs within home   Number of Stairs to Enter Home 2   Number of Stairs Within Home 11  (to basement, pt does not need to use)   Stair Railings at Home inside, present on left side   Transportation Available (parents provide transportation)   Living Environment Comment (mom is home during the day to help)   Self-Care   Dominant Hand right   Usual Activity Tolerance good   Current Activity Tolerance moderate   Regular Exercise yes   Activity/Exercise Type biking   Exercise Amount/Frequency daily   Equipment Currently Used at Home none   Activity/Exercise/Self-Care Comment pt reports doing yard work, can do his basic ADLS prior to admission   Functional Level Prior   Ambulation 0-->independent   Transferring 0-->independent   Toileting 0-->independent   Bathing 0-->independent   Dressing 0-->independent   Eating 0-->independent   Communication 0-->understands/communicates without difficulty   Swallowing 0-->swallows foods/liquids without difficulty   Cognition 0 - no cognition issues reported   Fall history within last six months no   Which of the above functional risks had a recent onset or change? 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  Craniotomy performed.  Tumor first appeared in 2002.  Pt also complains of some right hip pain, says he has to be careful how he moves the hip.   Precautions/Limitations fall  precautions;seizure precautions   General Observations Pt laying in bed, willing to participate   Cognitive Status Examination   Orientation place  (partial place and time)   Level of Consciousness alert   Able to Follow Commands mild impairment   Personal Safety (Cognitive) mild impairment   Memory intact   Attention No deficits were identified   Cognitive Comment pt a little slow to respond at times   Visual Perception   Visual Perception No deficits were identified   Visual Perception Comments pt does feel he needs glasses, says he has a hard time reading   Pain Assessment   Patient Currently in Pain No   Range of Motion (ROM)   ROM Quick Adds No deficits were identified   ROM Comment B UEs   Strength   Manual Muscle Testing Quick Adds No deficits were identified   Strength Comments B UEs   Hand Strength   Hand Strength Comments gross grasp good and equal   Coordination   Upper Extremity Coordination No deficits were identified   Mobility   Bed Mobility Bed mobility skill: Supine to sit   Bed Mobility Skill: Supine to Sit   Level of [place]: Supine/Sit stand-by assist   Physical Assist/Nonphysical Assist: Supine/Sit verbal cues;1 person assist   Assistive Device: Supine/Sit bedrail   Transfer Skill: Sit to Stand   Level of [place]: Sit/Stand stand-by assist   Physical Assist/Nonphysical Assist: Sit/Stand verbal cues;1 person assist   Transfer Skill: Sit to Stand full weight-bearing   Activities of Daily Living Analysis   Impairments Contributing to Impaired Activities of Daily Living cognition impaired;pain;strength decreased   General Therapy Interventions   Planned Therapy Interventions ADL retraining;cognition;neuromuscular re-education   Clinical Impression   Criteria for Skilled Therapeutic Interventions Met yes, treatment indicated   OT Diagnosis decreased independence in ADLS   Influenced by the following impairments Pt has some slowness of response, possible cognitive issues, especially related  "to problem solving.   Assessment of Occupational Performance 1-3 Performance Deficits   Identified Performance Deficits decrased independence in bathing, yard work, possible difficulties with reasoning and sequencing skills   Clinical Decision Making (Complexity) Moderate complexity   Therapy Frequency daily   Predicted Duration of Therapy Intervention (days/wks) 4   Anticipated Discharge Disposition Home with Outpatient Therapy   Risks and Benefits of Treatment have been explained. Yes   Patient, Family & other staff in agreement with plan of care Yes   [facility] TM \"6 Clicks\"   2016, Trustees of [facility], under license to [employer].  All rights reserved.   6 Clicks Short Forms Daily Activity Inpatient Short Form   [facility] AM-PAC  \"6 Clicks\" Daily Activity Inpatient Short Form   1. Putting on and taking off regular lower body clothing? 4 - None   2. Bathing (including washing, rinsing, drying)? 3 - A Little   3. Toileting, which includes using toilet, bedpan or urinal? 3 - A Little   4. Putting on and taking off regular upper body clothing? 4 - None   5. Taking care of personal grooming such as brushing teeth? 3 - A Little   6. Eating meals? 4 - None   Daily Activity Raw Score (Score out of 24.Lower scores equate to lower levels of function) 21   Total Evaluation Time   Total Evaluation Time (Minutes) 15     "

## 2018-07-14 NOTE — PLAN OF CARE
Problem: Patient Care Overview  Goal: Plan of Care/Patient Progress Review  Discharge Planner PT   Patient plan for discharge: To go home. His mom is home during the day to assist if needed.   Current status: Patient seen for initial eval and treatment initiated. Patient lives with his parents in a house with 2 steps to enter and 11 steps to the basement. He can stay on the main level. He does not drive. His parents provide transportation. He is independent without an AD at baseline. Currently patient is able to transfers sup to sit with just supervision and assist with management of multiple lines. Transfers sit to stand with close CGA. Once standing, initially needing support of IV pole for balance. Eventually able to maintain static standing balance without UE support but decreased balance with marching in place. Patient complaining of increased headache so returned to supine. Rated pain 3 at rest and increased to 5 with standing. VSS. Nurse aware and going to provide meds.   Barriers to return to prior living situation: impaired balance, decreased activity tolerance  Recommendations for discharge: Home with OPPT  Rationale for recommendations: Patient currently with decreased balance and decreased activity tolerance. Anticipate both to improve quickly with increased activity.        Entered by: Christiana Bang 07/14/2018 10:41 AM

## 2018-07-14 NOTE — PROGRESS NOTES
07/14/18 1001   Quick Adds   Type of Visit Initial PT Evaluation   Living Environment   Lives With parent(s)   Living Arrangements house   Home Accessibility stairs to enter home;stairs within home   Number of Stairs to Enter Home 2   Number of Stairs Within Home 11  (Patient doesn't have to use. )   Stair Railings at Home inside, present on left side   Transportation Available (Parents provide transportation. )   Self-Care   Dominant Hand right   Usual Activity Tolerance good   Current Activity Tolerance moderate   Regular Exercise yes   Activity/Exercise Type biking   Functional Level Prior   Ambulation 0-->independent   Transferring 0-->independent   Toileting 0-->independent   Bathing 0-->independent   Dressing 0-->independent   Eating 0-->independent   Communication 0-->understands/communicates without difficulty   Swallowing 0-->swallows foods/liquids without difficulty   Cognition 0 - no cognition issues reported   Fall history within last six months no   Which of the above functional risks had a recent onset or change? none   General Information   Onset of Illness/Injury or Date of Surgery - Date 07/10/18   Referring Physician Carley Auguste   Patient/Family Goals Statement To go home. Patients mom is home during the day to assist if needed   Pertinent History of Current Problem (include personal factors and/or comorbidities that impact the POC) Patient is a 47 year old male who was admitted on 7/13/2018. The pt presented with recurrent right frontal brain tumor.  He underwent a right frontal craniotomy for resection with Dr. Stuart Oscar on 7-.   Precautions/Limitations fall precautions;seizure precautions   General Observations Patients parents present and very supportive.    General Info Comments Patient still in ICU with arterial line.    Cognitive Status Examination   Orientation orientation to person, place and time   Cognitive Comment Slightly slower to respond to questions.    Pain Assessment  "  Patient Currently in Pain Yes, see Vital Sign flowsheet  (3 at rest and increased to 5 with standing. )   Integumentary/Edema   Integumentary/Edema no deficits were identifed   Posture    Posture Not impaired   Range of Motion (ROM)   ROM Quick Adds No deficits were identified   Strength   Strength Comments Bialteral DF 5/5, quads 5/5, hip flex 4/5, did not formally assess UE's but able to left both against gravity.    Bed Mobility   Bed Mobility Comments Sup to/from sit with supervision and therapist managing lines.    Transfer Skills   Transfer Comments Sit to stand with close CGA.    Gait   Gait Comments Unable due to increasing HA with standing.   Balance   Balance Comments Good static and dynamic sitting balance. Fair static standing balance that improved to good with practice. Fair dynamic standing balance. LOB with tapping each foot and unsteady with marching.    Sensory Examination   Sensory Perception no deficits were identified   Coordination   Coordination no deficits were identified   Clinical Impression   Criteria for Skilled Therapeutic Intervention yes, treatment indicated   PT Diagnosis Impaired balance and activity tolerance   Influenced by the following impairments Headache, impaired balance   Functional limitations due to impairments Patient unsteady with standing and unable to amb due to headache   Clinical Presentation Evolving/Changing   Clinical Presentation Rationale Based on PMH and current observation   Clinical Decision Making (Complexity) Low complexity   Therapy Frequency` 2 times/day   Predicted Duration of Therapy Intervention (days/wks) 3 days   Anticipated Discharge Disposition Home with Outpatient Therapy   Risk & Benefits of therapy have been explained Yes   Patient, Family & other staff in agreement with plan of care Yes   [facility] AM-PAC TM \"6 Clicks\"   2016, Trustees of [facility], under license to [employer].  All rights reserved.   6 Clicks Short Forms " "Basic Mobility Inpatient Short Form   [facility] AM-PAC  \"6 Clicks\" V.2 Basic Mobility Inpatient Short Form   1. Turning from your back to your side while in a flat bed without using bedrails? 4 - None   2. Moving from lying on your back to sitting on the side of a flat bed without using bedrails? 4 - None   3. Moving to and from a bed to a chair (including a wheelchair)? 3 - A Little   4. Standing up from a chair using your arms (e.g., wheelchair, or bedside chair)? 3 - A Little   5. To walk in hospital room? 2 - A Lot   6. Climbing 3-5 steps with a railing? 2 - A Lot   Basic Mobility Raw Score (Score out of 24.Lower scores equate to lower levels of function) 18   Total Evaluation Time   Total Evaluation Time (Minutes) 15     "

## 2018-07-14 NOTE — PLAN OF CARE
Problem: Patient Care Overview  Goal: Plan of Care/Patient Progress Review  Pt sleep intermittently through the night, medicated for incisional discomfort and pain above right eye x2 during the shift, neuro assess remains intact, vital signs stable no need for prn b/p meds to be given.  Bree put out good amt of clear ashley urine 1150cc/12 hours.

## 2018-07-14 NOTE — PROGRESS NOTES
"[facility]    Neurosurgery Progress Note    Date of Service (when I saw the patient): 07/14/2018     Assessment & Plan   Joni Borja is a 47 year old male who was admitted on 7/13/2018. The pt presented with recurrent right frontal brain tumor.  He underwent a right frontal craniotomy for resection with Dr. Stuart Oscar on 7-.  Today pt is sitting up in bed. He is alert and oriented. He denies any pain.  Pt stable to transfer to the floor.      Active Problems:    Status post craniotomy    Assessment: stable     Plan: PT/OT and ambulation  Pain control  Encourage use of IS and deep breathing   Staple removal on 7-         I have discussed the following assessment and plan Dr. Stuart Oscar  who is in agreement with initial plan and will follow up with further consultation recommendations.    Yolanda Beltran [facility]  [facility]  [address]  [address] [place] [id number]    Tel [phone]  Pager [phone]      Interval History   Stable     Physical Exam   Temp: 98.4  F (36.9  C) Temp src: Oral BP: 121/71   Heart Rate: 70 Resp: 14 SpO2: 97 % O2 Device: None (Room air) Oxygen Delivery: 2 LPM  Vitals:    07/13/18 0624 07/13/18 1330 07/14/18 0315   Weight: 168 lb (76.2 kg) 169 lb 15.6 oz (77.1 kg) 171 lb 1.2 oz (77.6 kg)     Vital Signs with Ranges  Temp:  [97  F (36.1  C)-99.6  F (37.6  C)] 98.4  F (36.9  C)  Heart Rate:  [] 70  Resp:  [7-26] 14  BP: (112-124)/(65-75) 121/71  MAP:  [73 mmHg-109 mmHg] 85 mmHg  Arterial Line BP: (116-154)/(54-80) 126/65  SpO2:  [96 %-100 %] 97 %  I/O last 3 completed shifts:  In: 2807.5 [P.O.:500; I.V.:2307.5]  Out: 3200 [Urine:3100; Blood:100]    Heart Rate: 70, Blood pressure 121/71, pulse 58, temperature 98.4  F (36.9  C), temperature source Oral, resp. rate 14, height 5' 9\" (1.753 m), weight 171 lb 1.2 oz (77.6 kg), SpO2 97 %.  171 lbs 1.23 oz  HEENT:  Normocephalic, atraumatic.  PERRLA.  EOM s " intact.    Neck:  Supple, non-tender, without lymphadenopathy.  Heart:  No peripheral edema  Lungs:  No SOB  Abdomen:  Soft, non-tender, non-distended.   Skin:  Warm and dry, good capillary refill. Right cranial staples intact and open to air.   Extremities:  Good radial and dorsalis pedis pulses bilaterally, no edema, cyanosis or clubbing.    NEUROLOGICAL EXAMINATION:     Mental status:  Alert and Oriented x 3, speech is fluent.  Cranial nerves:  II-XII intact.   Motor:  Strength is 5/5 throughout the upper and lower extremities  Shoulder Abduction:  Right:  5/5   Left:  5/5  Biceps:                      Right:  5/5   Left:  5/5  Triceps:                     Right:  5/5   Left:  5/5  Wrist Extensors:       Right:  5/5   Left:  5/5  Wrist Flexors:           Right:  5/5   Left:  5/5  interosseus :            Right:  5/5   Left:  5/5   Hip Flexor:                Right: 5/5  Left:  5/5  Hip Adductor:             Right:  5/5  Left:  5/5  Hip Abductor:             Right:  5/5  Left:  5/5  Gastroc Soleus:        Right:  5/5  Left:  5/5  Tib/Ant:                      Right:  5/5  Left:  5/5  EHL:                     Right:  5/5  Left:  5/5  Sensation:  intact  Coordination:  Smooth finger to nose testing.   Negative pronator drift.    Gait:  Up with therapies    Medications     niCARdipine 40 mg in 200 mL 0.9% NaCl Stopped (07/13/18 1414)     sodium chloride 75 mL/hr at 07/14/18 0700       acetaminophen  975 mg Oral Q8H     carBAMazepine  300 mg Oral BID     clonazePAM (klonoPIN) tablet 0.5 mg  0.5 mg Oral Daily     clonazePAM  1 mg Oral At Bedtime     dexamethasone  4 mg Intravenous Q6H    Followed by     [START ON 7/15/2018] dexamethasone  4 mg Intravenous Q8H    Followed by     [START ON 7/17/2018] dexamethasone  4 mg Intravenous Q12H    Followed by     [START ON 7/19/2018] dexamethasone  2 mg Intravenous Q12H    Followed by     [START ON 7/21/2018] dexamethasone  2 mg Intravenous Q24H     escitalopram  20 mg Oral Daily      ranitidine  150 mg Oral Q12H    Or     famotidine  20 mg Intravenous Q12H     felbamate  900 mg Oral BID     pantoprazole  40 mg Oral Daily     riboflavin  400 mg Oral BID     senna-docusate  1 tablet Oral BID    Or     senna-docusate  2 tablet Oral BID     sodium chloride (PF)  3 mL Intracatheter Q8H     topiramate (TOPAMAX) tablet 200 mg  200 mg Oral Daily     topiramate (TOPAMAX) tablet 300 mg  300 mg Oral At Bedtime       Data     All new lab and imaging data was personally reviewed by me.  CBC RESULTS:   Recent Labs   Lab Test  07/14/18   0248   WBC  11.6*   RBC  3.66*   HGB  11.5*   HCT  34.5*   MCV  94   MCH  31.4   MCHC  33.3   RDW  12.8   PLT  264     Basic Metabolic Panel:  Lab Results   Component Value Date     07/14/2018      Lab Results   Component Value Date    POTASSIUM 3.7 07/14/2018     Lab Results   Component Value Date    CHLORIDE 115 07/14/2018     Lab Results   Component Value Date    SHABBIR 7.6 07/14/2018     Lab Results   Component Value Date    CO2 23 07/14/2018     Lab Results   Component Value Date    BUN 12 07/14/2018     Lab Results   Component Value Date    CR 0.81 07/14/2018     Lab Results   Component Value Date    GLC 93 07/14/2018     INR:  Lab Results   Component Value Date    INR 1.2 02/12/2015    INR 1.09 09/24/2013    INR 1.23 09/23/2013    INR 1.17 09/22/2013    INR 1.57 09/21/2013    INR 1.19 09/19/2013    INR 1.12 09/18/2013    INR 1.09 09/17/2013    INR 1.10 09/17/2013    INR 1.09 09/16/2013    INR 1.07 09/15/2013    INR 1.12 09/14/2013    INR 1.15 09/13/2013

## 2018-07-14 NOTE — PLAN OF CARE
Problem: Patient Care Overview  Goal: Plan of Care/Patient Progress Review  Outcome: Improving  Patient transferred to [facility] for neuro step-down.  Neuro assessment is intact.  Up with PT/OT.  C/o head pain and experiences relief of symptoms with dilaudid and tylenol.  BP stable.  Remains SR without ectopy.  Good pulses. No edema.  Left radial eleazar removed and BP cuff on left arm-some hypotension when in a deep sleep, but resolves when awake.  Remains on RA with good O2Sats.  Head incision CDI.  Staples intact.  No drainage.  Giron removed and IVF d/c'd.  Pt voided on toilet and missed measuring device at 1430.

## 2018-07-14 NOTE — PROGRESS NOTES
[facility]    Hospitalist Progress Note    Date of Service (when I saw the patient): 07/14/2018    Assessment & Plan    Joni Borja is a pleasant 47-year-old gentleman with a past medical history significant for recurrent oligodendroglioma with multiple prior resections, seizure disorder, GERD, tobacco use, depression and anxiety who is postoperative day 0 status post a right frontal craniotomy for resection of intraaxial brain tumor for recurrent anaplastic oligodendroglioma.  Hospitalist Service was consulted for postoperative medical co-management.  The patient was initially diagnosed with a brain tumor in 2002, at which time he experienced his first seizure.  He underwent resection as well as chemotherapy and radiation.  Since that time, he has had multiple subsequent resections for recurrence.  Current problems include:    Recurrent oligodendroglioma; now Postoperative day 1 status post craniotomy for resection of recurrent tumor.  The procedure was performed by Dr. Oscar under general anesthesia with an estimated blood loss documented of 100 mL.  No intraoperative complications have been identified.   - transfer from ICU to [address] today; overnight has been hemodynamically stable without pain; no new neurologic findings.  Primary management is per Neurosurgery Service.   - Decadron taper per Neurosurgery.    - advance diet.    Seizure disorder; no recurrence while here.  Joni reports that he primarily has petite mal seizures, and believes these were occurring approximately weekly, prior to admit.    - Continue prior to admission carbamazepine, felbamate, Topamax and seizure precautions.     Gastroesophageal reflux disease.    - Continue prior to admission Protonix.     Depression and anxiety.   - Continue prior to admission Lexapro and hold Klonopin for over-sedation.    DVT Prophylaxis: Pneumatic Compression Devices and Ambulate every shift  Code Status: Full code    Disposition: Expected  discharge in 1-3 days once destination can be confirmed.      SHELLIE Houston MD, [facility] [address] [facility]     Internal Medicine Hospitalist  Text Page (7am - 6pm)    Interval History   Doing well; had R. Upper scalp/orbital pain earlier but now resolved; eating well. Last BM ? Yesterday before admission.  No f/c/SOB; no chest or abdom. Sx.    -Data reviewed today: I reviewed all new labs and imaging results over the last 24 hours.    Physical Exam   Temp: 98.4  F (36.9  C) Temp src: Oral BP: 121/71   Heart Rate: 65 Resp: 12 SpO2: 97 % O2 Device: None (Room air) Oxygen Delivery: 2 LPM  Vitals:    07/13/18 0624 07/13/18 1330 07/14/18 0315   Weight: 76.2 kg (168 lb) 77.1 kg (169 lb 15.6 oz) 77.6 kg (171 lb 1.2 oz)     Vital Signs with Ranges  Temp:  [97  F (36.1  C)-99.6  F (37.6  C)] 98.4  F (36.9  C)  Heart Rate:  [] 65  Resp:  [7-26] 12  BP: (112-124)/(65-75) 121/71  MAP:  [73 mmHg-109 mmHg] 92 mmHg  Arterial Line BP: (116-154)/(54-80) 124/73  SpO2:  [96 %-100 %] 97 %  I/O last 3 completed shifts:  In: 2807.5 [P.O.:500; I.V.:2307.5]  Out: 3200 [Urine:3100; Blood:100]    Constitutional: no acute distress; lying in bed  HEENT: NC/AT; surgical staples in C shape R. Fronto-parietal scalp - c/d/i; no drainage or ecchymosis.  MM's moist  Respiratory: Good air entry bilaterally, no crackles or wheezing  Cardiovascular: S1, S2 present, regular rate and rhythm, without murmur, rubs or gallops  GI: flat; soft, positive bowel sounds, non-tender, non-distended  Skin/Integumen: no edema, no cyanosis, no rashes  Neurologic: awake, conversant; follows directions well. No focal deficits     Medications     niCARdipine 40 mg in 200 mL 0.9% NaCl Stopped (07/13/18 1414)     sodium chloride 75 mL/hr at 07/14/18 0700       acetaminophen  975 mg Oral Q8H     carBAMazepine  300 mg Oral BID     clonazePAM (klonoPIN) tablet 0.5 mg  0.5 mg Oral Daily     clonazePAM  1 mg Oral At Bedtime     dexamethasone  4 mg Intravenous Q6H    Followed by     [START ON  7/15/2018] dexamethasone  4 mg Intravenous Q8H    Followed by     [START ON 7/17/2018] dexamethasone  4 mg Intravenous Q12H    Followed by     [START ON 7/19/2018] dexamethasone  2 mg Intravenous Q12H    Followed by     [START ON 7/21/2018] dexamethasone  2 mg Intravenous Q24H     escitalopram  20 mg Oral Daily     ranitidine  150 mg Oral Q12H    Or     famotidine  20 mg Intravenous Q12H     felbamate  900 mg Oral BID     pantoprazole  40 mg Oral Daily     riboflavin  400 mg Oral BID     senna-docusate  1 tablet Oral BID    Or     senna-docusate  2 tablet Oral BID     sodium chloride (PF)  3 mL Intracatheter Q8H     topiramate (TOPAMAX) tablet 200 mg  200 mg Oral Daily     topiramate (TOPAMAX) tablet 300 mg  300 mg Oral At Bedtime       Data     Recent Labs  Lab 07/14/18  0248 07/10/18  1634   WBC 11.6* 6.6   HGB 11.5* 12.8*   MCV 94 98    284   *  --    POTASSIUM 3.7  --    CHLORIDE 115*  --    CO2 23  --    BUN 12  --    CR 0.81  --    ANIONGAP 7  --    SHABBIR 7.6*  --    GLC 93  --        Recent Results (from the past 24 hour(s))   MR Brain w/o & w Contrast    Narrative    MRI OF THE BRAIN WITHOUT AND WITH CONTRAST 7/13/2018 12:56 PM     COMPARISON: Preoperative brain MRI 7/12/2018.    HISTORY: Status post craniotomy.      TECHNIQUE: Axial diffusion-weighted with ADC map, axial T2-weighted  with fat saturation, axial T1-weighted, axial turboFLAIR and coronal  T1-weighted images of the brain were acquired without intravenous  contrast.  Following intravenous administration of gadolinium (8 mL  Gadavist), axial T1-weighted images of the brain were acquired.     FINDINGS: There has been interval surgical change including a repeat  right frontal craniotomy with resection of small portions of the right  frontal lobe at the posterior aspect of the previously defined  surgical resection cavity. Specifically, there was an area of abnormal  T2 signal hyperintensity that has been resected as well as a  small  round focus of abnormal contrast enhancement that is no longer  visible. There is gas and fluid in the surgical resection cavity.  There is gas in the frontal horn of the left lateral ventricle.    There is no midline shift. There is no evidence for recent ischemic  infarct. There is no intracranial hemorrhage. There is no abnormal  contrast enhancement in the brain or its coverings. There is no  sinusitis or mastoiditis.      Impression    IMPRESSION:  1. Interval surgical change including further resection of the right  frontal lobe that demonstrated areas of increasing abnormal contrast  enhancement and increasing abnormal T2 signal hypertensity on recent  preoperative MRI studies.  2. No evidence for acute intracranial pathology.    GREG JOE MD

## 2018-07-14 NOTE — PLAN OF CARE
Problem: Patient Care Overview  Goal: Plan of Care/Patient Progress Review  Outcome: Improving  Neuro exam intact except right sided headache rating 6/10 and slight photophobia.  Slight right periorbital swelling.  Staples intact with scant dried drainage.  Up with assist of one and GB.  Up in chair for meal. Tolerating regular diet.  Giron removed in ICU, patient reports voiding once, DTV on 73.  Patient reports nausea with IV Decadron administration, should have anti-emetic prior to administration. Plan for possible D/C to home tomorrow.

## 2018-07-14 NOTE — PLAN OF CARE
Problem: Patient Care Overview  Goal: Plan of Care/Patient Progress Review  Outcome: Therapy, progress toward functional goals is gradual  Discharge Planner OT      Pt is a 47 year old male admitted for a resection and craniotomy of a right frontal brain tumor.  Tumor is reoccurring but initially was found more than ten years ago.  Pt lives in a home with his parents, can stay on the main floor.  Reports doing some yard work and says he has been independent in basic ADLS prior to admission.    Patient plan for discharge: Home with parents  Current status: Initial evaluation complete.  Pt laying in bed, willing to participate.  Pt worked on in room mobility and grooming tasks for treatment.  Stated that he was having headaches when up but had no complaints other than initial mild dizziness when sitting EOB.  Came up to EOB with SBA and verbal cues.  Stood with SBA and walked over to the toilet where he completed a toilet transfer with SBA.  Stood and then urinated standing up.  Pt has some hitch in his gait in his right leg, states he has had a right hip injury and surgery.  Was not a hip replacement but pt could not be more specific.  Pt was able to don and doff his socks by pulling socks off his feet with his heels and putting his right sock on by bringing his right leg behind his left and using one hand.  May benefit from the use of AE.  Pt was slow to respond at times.  Knew he was in a hospital but initially got the name of the hospital wrong and then self corrected.  Also had some trouble operating the foot pedals on the sink to wash his hands.   Pt able to transfer to  with SBA, was moving to 7th floor after this session.  Barriers to return to prior living situation: Pt has some slowed responses, may have some cognitive impairment.  Recommendations for discharge: Home with OP OT.  Rationale for recommendations: Pt appears to be improving with overall mobility around room, has had some slowness of response  and may have issues with sequencing tasks.  Would benefit from IP OT to increase skills and determine what cognitive issues he has if any.       Entered by: Ambrosio Melgar 07/14/2018 3:06 PM

## 2018-07-15 ENCOUNTER — APPOINTMENT (OUTPATIENT)
Dept: PHYSICAL THERAPY | Facility: CLINIC | Age: 48
DRG: 026 | End: 2018-07-15
Attending: NEUROLOGICAL SURGERY
Payer: MEDICARE

## 2018-07-15 VITALS
SYSTOLIC BLOOD PRESSURE: 101 MMHG | RESPIRATION RATE: 16 BRPM | BODY MASS INDEX: 25.34 KG/M2 | WEIGHT: 171.08 LBS | OXYGEN SATURATION: 96 % | TEMPERATURE: 97.8 F | DIASTOLIC BLOOD PRESSURE: 60 MMHG | HEART RATE: 73 BPM | HEIGHT: 69 IN

## 2018-07-15 LAB
ANION GAP SERPL CALCULATED.3IONS-SCNC: 7 MMOL/L (ref 3–14)
BUN SERPL-MCNC: 17 MG/DL (ref 7–30)
CALCIUM SERPL-MCNC: 8.1 MG/DL (ref 8.5–10.1)
CHLORIDE SERPL-SCNC: 114 MMOL/L (ref 94–109)
CO2 SERPL-SCNC: 23 MMOL/L (ref 20–32)
CREAT SERPL-MCNC: 0.84 MG/DL (ref 0.66–1.25)
ERYTHROCYTE [DISTWIDTH] IN BLOOD BY AUTOMATED COUNT: 12.9 % (ref 10–15)
GFR SERPL CREATININE-BSD FRML MDRD: >90 ML/MIN/1.7M2
GLUCOSE BLDC GLUCOMTR-MCNC: 99 MG/DL (ref 70–99)
GLUCOSE SERPL-MCNC: 100 MG/DL (ref 70–99)
HCT VFR BLD AUTO: 34 % (ref 40–53)
HGB BLD-MCNC: 11.3 G/DL (ref 13.3–17.7)
MCH RBC QN AUTO: 31.5 PG (ref 26.5–33)
MCHC RBC AUTO-ENTMCNC: 33.2 G/DL (ref 31.5–36.5)
MCV RBC AUTO: 95 FL (ref 78–100)
PLATELET # BLD AUTO: 254 10E9/L (ref 150–450)
POTASSIUM SERPL-SCNC: 3.7 MMOL/L (ref 3.4–5.3)
RBC # BLD AUTO: 3.59 10E12/L (ref 4.4–5.9)
SODIUM SERPL-SCNC: 144 MMOL/L (ref 133–144)
WBC # BLD AUTO: 10.2 10E9/L (ref 4–11)

## 2018-07-15 PROCEDURE — 25000132 ZZH RX MED GY IP 250 OP 250 PS 637: Mod: GY | Performed by: PHYSICIAN ASSISTANT

## 2018-07-15 PROCEDURE — 85027 COMPLETE CBC AUTOMATED: CPT | Performed by: NURSE PRACTITIONER

## 2018-07-15 PROCEDURE — 97530 THERAPEUTIC ACTIVITIES: CPT | Mod: GP

## 2018-07-15 PROCEDURE — 36415 COLL VENOUS BLD VENIPUNCTURE: CPT | Performed by: NURSE PRACTITIONER

## 2018-07-15 PROCEDURE — A9270 NON-COVERED ITEM OR SERVICE: HCPCS | Mod: GY | Performed by: PHYSICIAN ASSISTANT

## 2018-07-15 PROCEDURE — 80048 BASIC METABOLIC PNL TOTAL CA: CPT | Performed by: NURSE PRACTITIONER

## 2018-07-15 PROCEDURE — 25000128 H RX IP 250 OP 636: Performed by: PHYSICIAN ASSISTANT

## 2018-07-15 PROCEDURE — 97750 PHYSICAL PERFORMANCE TEST: CPT | Mod: GP

## 2018-07-15 PROCEDURE — 40000193 ZZH STATISTIC PT WARD VISIT

## 2018-07-15 PROCEDURE — 25000131 ZZH RX MED GY IP 250 OP 636 PS 637: Mod: GY | Performed by: NEUROLOGICAL SURGERY

## 2018-07-15 PROCEDURE — 97116 GAIT TRAINING THERAPY: CPT | Mod: GP

## 2018-07-15 PROCEDURE — 00000146 ZZHCL STATISTIC GLUCOSE BY METER IP

## 2018-07-15 RX ORDER — DEXAMETHASONE 4 MG/1
4 TABLET ORAL EVERY 8 HOURS SCHEDULED
Status: DISCONTINUED | OUTPATIENT
Start: 2018-07-15 | End: 2018-07-15 | Stop reason: HOSPADM

## 2018-07-15 RX ORDER — DEXAMETHASONE 2 MG/1
2 TABLET ORAL EVERY 12 HOURS SCHEDULED
Status: DISCONTINUED | OUTPATIENT
Start: 2018-07-19 | End: 2018-07-15 | Stop reason: HOSPADM

## 2018-07-15 RX ORDER — DEXAMETHASONE 2 MG/1
2 TABLET ORAL DAILY
Status: DISCONTINUED | OUTPATIENT
Start: 2018-07-21 | End: 2018-07-15 | Stop reason: HOSPADM

## 2018-07-15 RX ORDER — OXYCODONE HYDROCHLORIDE 5 MG/1
5-10 TABLET ORAL
Qty: 75 TABLET | Refills: 0 | Status: SHIPPED | OUTPATIENT
Start: 2018-07-15 | End: 2018-08-14

## 2018-07-15 RX ORDER — DEXAMETHASONE 4 MG/1
4 TABLET ORAL EVERY 6 HOURS
Status: COMPLETED | OUTPATIENT
Start: 2018-07-15 | End: 2018-07-15

## 2018-07-15 RX ORDER — DEXAMETHASONE 4 MG/1
4 TABLET ORAL EVERY 12 HOURS SCHEDULED
Status: DISCONTINUED | OUTPATIENT
Start: 2018-07-17 | End: 2018-07-15 | Stop reason: HOSPADM

## 2018-07-15 RX ADMIN — CLONAZEPAM 0.5 MG: 0.5 TABLET ORAL at 09:21

## 2018-07-15 RX ADMIN — ACETAMINOPHEN 975 MG: 325 TABLET, FILM COATED ORAL at 06:23

## 2018-07-15 RX ADMIN — DEXAMETHASONE SODIUM PHOSPHATE 4 MG: 4 INJECTION, SOLUTION INTRAMUSCULAR; INTRAVENOUS at 03:10

## 2018-07-15 RX ADMIN — OXYCODONE HYDROCHLORIDE 5 MG: 5 TABLET ORAL at 03:10

## 2018-07-15 RX ADMIN — SENNOSIDES AND DOCUSATE SODIUM 2 TABLET: 8.6; 5 TABLET ORAL at 09:54

## 2018-07-15 RX ADMIN — Medication 400 MG: at 09:21

## 2018-07-15 RX ADMIN — ESCITALOPRAM 20 MG: 20 TABLET, FILM COATED ORAL at 09:20

## 2018-07-15 RX ADMIN — CARBAMAZEPINE 300 MG: 300 CAPSULE, EXTENDED RELEASE ORAL at 09:20

## 2018-07-15 RX ADMIN — Medication 900 MG: at 09:21

## 2018-07-15 RX ADMIN — OXYCODONE HYDROCHLORIDE 5 MG: 5 TABLET ORAL at 09:54

## 2018-07-15 RX ADMIN — DEXAMETHASONE 4 MG: 4 TABLET ORAL at 09:21

## 2018-07-15 RX ADMIN — PANTOPRAZOLE SODIUM 40 MG: 40 TABLET, DELAYED RELEASE ORAL at 09:20

## 2018-07-15 RX ADMIN — TOPIRAMATE 200 MG: 200 TABLET ORAL at 09:20

## 2018-07-15 ASSESSMENT — VISUAL ACUITY
OU: NORMAL ACUITY
OU: NORMAL ACUITY
OU: NORMAL ACUITY;BASELINE

## 2018-07-15 NOTE — PLAN OF CARE
Problem: Patient Care Overview  Goal: Plan of Care/Patient Progress Review  Discharge Planner PT   Patient plan for discharge: Home with assist PRN  Current status: Pt met all IP PT goals during session. IND bed mobility, STS trasnfers, SBA gait no ', up/down 10 steps, reciprocal pattern, unilateral HR, no LOB, SBA. Completed BBS 50/56    Barriers to return to prior living situation: None  Recommendations for discharge: Home with assist  Rationale for recommendations: Pt reports he is at his baseline for all functional mobility and gait, scored 50/56 on BBS. Pt has R hip pain at baseline with antalgic gait, declined use of AD, states he has had this for years. No OP PT needs at this time     Vega Balance Scale (BBS) Cutoff Scores: A score of ? 45/56 indicates an increased risk for falls.   Patient Score: 50/56    The BBS is a measure of static and dynamic standing balance that has been validated in community dwelling elderly individuals and individuals who have Parkinson's Disease, MS, and those who are s/p CVA and TBI. The test is administered without an assistive device. Scores from the Vega are used to determine the probability of falling based on the patient's previous history of falls and their test performance.     Minimal Detectable Change = 6.5   & Minimal Detectable Change (Parkinson's Disease) = 5 according to Geovani & Joshey 2008    Assessment (rationale for performing, application to patient s function & care plan): Gait instability  Minutes billed as physical performance test: 9             Entered by: iKmberlyn Vo 07/15/2018 8:58 AM

## 2018-07-15 NOTE — DISCHARGE INSTRUCTIONS
Steroid Decadron instructions:    Decadron 4mg one every 6 hours for 1 day then  Decadron 4mg one every 8 hours for 3 days then  Decadron 2 mg one every 6 hours for 3 days then  Decadron 2 mg one every 8 hours for 3 days then  Decadron 2mg one every 12 hours for 3 days then  Decadron 2 mg one tab a day for 3 days then stop     ** Take with Zofran or food if it causes nausea*

## 2018-07-15 NOTE — DISCHARGE SUMMARY
[facility]    Discharge Summary  Neurosurgery    Date of Admission:  7/13/2018  Date of Discharge:  7/15/2018  Discharging Provider: Yolanda Beltran  Date of Service (when I saw the patient): 07/15/18    Discharge Diagnoses   Active Problems:    Status post craniotomy      History of Present Illness   Joni Borja is a 47 year old male who was admitted on 7/13/2018. The pt presented with recurrent right frontal brain tumor.  He underwent a right frontal craniotomy for resection with Dr. Stuart Oscar on 7-. Pt progressed well post op without difficulty. He denies pain with the use of PRN Oxycodone. He is ready to DC home. He has slight right periorbital facial swelling. No vision changes. He is stable to DC. He will have his parents at home to help.         Hospital Course   Joni Borja was admitted on 7/13/2018.  The following problems were addressed during his hospitalization:    Active Problems:    Status post craniotomy    Assessment: stable     Plan: DC home        I have discussed the following assessment and plan with Dr. Stuart Oscar  who is in agreement with initial plan and will follow up with further consultation recommendations.    Yolanda Beltran [facility]  [facility]  [place] [address]    Tel [phone]  Pager [phone]        Significant Results and Procedures   Post craniotomy     Pending Results     Unresulted Labs Ordered in the Past 30 Days of this Admission     Date and Time Order Name Status Description    7/13/2018 0859 Surgical pathology exam In process           Code Status   Full Code    Primary Care Physician   Brian Coon    Physical Exam   Temp: 97.8  F (36.6  C) Temp src: Oral BP: 101/60 Pulse: 73 Heart Rate: 70 Resp: 16 SpO2: 96 % O2 Device: None (Room air)    Vitals:    07/13/18 0624 07/13/18 1330 07/14/18 0315   Weight: 168 lb (76.2 kg) 169 lb 15.6 oz (77.1 kg) 171 lb 1.2 oz  (77.6 kg)     Vital Signs with Ranges  Temp:  [97.8  F (36.6  C)-99  F (37.2  C)] 97.8  F (36.6  C)  Pulse:  [69-73] 73  Heart Rate:  [64-80] 70  Resp:  [9-20] 16  BP: ()/(52-74) 101/60  MAP:  [80 mmHg-85 mmHg] 80 mmHg  Arterial Line BP: (106-126)/(62-65) 106/62  SpO2:  [95 %-99 %] 96 %  I/O last 3 completed shifts:  In: 1140 [P.O.:840; I.V.:300]  Out: 560 [Urine:560]    Constitutional: Awake, alert, cooperative, no apparent distress, and appears stated age.  Eyes: Lids and lashes normal, pupils equal, round and reactive to light, extra ocular muscles intact  ENT: Normocephalic, without obvious abnormality, atraumatic  Respiratory: No increased work of breathing  Skin: No bruising or bleeding, normal skin color, texture, turgor, no redness, warmth, or swelling.  Incision with staples intact, minimal drainage, open to air.  Musculoskeletal: There is no redness, warmth, or swelling of the joints.  Full range of motion noted.  Motor strength is 5 out of 5 all extremities bilaterally.  Tone is normal.  Neurologic: Awake, alert, oriented to name, place and time.  Cranial nerves II-XII are grossly intact.  Motor is 5 out of 5 bilaterally.     Neuropsychiatric: Calm, normal eye contact, alert, normal affect, oriented to self, place, time and situation, memory for past and recent events intact and thought process normal.       Time Spent on this Encounter   I, Yolanda Beltran, personally saw the patient today and spent greater than 30 minutes discharging this patient.    Discharge Disposition   Discharged to home  Condition at discharge: Stable    Consultations This Hospital Stay   PHYSICAL THERAPY ADULT IP CONSULT  OCCUPATIONAL THERAPY ADULT IP CONSULT  HOSPITALIST IP CONSULT    Discharge Orders     Occupational Therapy Referral     Follow-up and recommended labs and tests    Please follow up at the [facility] in 10-14 days for staple removal and at 4 weeks post op.  Please call the clinic at  [phone] to schedule your appointment with Stepan Smith PA-C or Carley Auguste PA-C.     Reason for your hospital stay   You were in the hospital for a tumor resection.     Activity   Discharge instructions:  No lifting of more than 10 pounds, no bending, no twisting until follow up visit.    Ok to shampoo hair, shower or bathe, but, do not scrub or submerge incision under water until evaluated post-operatively in clinic.    Ok to walk as tolerated, avoid bed rest and prolonged sitting.    No contact sports until after follow up visit  No high impact activities such as; running/jogging, snowmobile or 4 bernard riding or any other recreational vehicles.    Do not take NSAIDS (ibuprofen, advil, aleve, naproxen, etc) for pain control.    Do NOT drive while taking narcotic pain medication.    Call the [facility] at [phone] for increasing redness, swelling or pus draining from the incision, increased pain or any other questions and concerns.     Wound care and dressings   Keep your incision clean and dry at all times.  OK to remove dressing on postop day 2.  OK to shower on postop day 3 and allow water to run over incision, pat dry after shower.  No bathing swimming or submerging in water.  Call immediately or come to ED if any drainage occurs, or you develop new pain, redness, or swelling.     Full Code     Diet   Follow this diet upon discharge: Pre admission diet       Discharge Medications   Current Discharge Medication List      START taking these medications    Details   cyclobenzaprine (FLEXERIL) 10 MG tablet Take 1 tablet (10 mg) by mouth 3 times daily as needed for muscle spasms  Qty: 90 tablet, Refills: 0    Associated Diagnoses: S/P brain surgery      !! dexamethasone (DECADRON) 2 MG tablet Take 1 tablet (2 mg) by mouth every 6 hours  Qty: 30 tablet, Refills: 0    Comments: One every 6 hours for 3 days, then one every 8 hours for 3 days, then one every 12 hours for 3 days, then one a day for 3  days then off.  Associated Diagnoses: S/P brain surgery      !! dexamethasone (DECADRON) 4 MG tablet Take 1 tablet (4 mg) by mouth 4 times daily  Qty: 13 tablet, Refills: 0    Comments: One four times per day for 1 day, then One three times per day for 3 days  Associated Diagnoses: S/P brain surgery      hydrOXYzine (ATARAX) 25 MG tablet Take 1 tablet (25 mg) by mouth every 6 hours as needed for itching  Qty: 60 tablet, Refills: 0    Associated Diagnoses: S/P brain surgery      ondansetron (ZOFRAN-ODT) 4 MG ODT tab Take 1 tablet (4 mg) by mouth every 6 hours as needed for nausea or vomiting  Qty: 120 tablet, Refills: 0    Associated Diagnoses: Nausea      oxyCODONE IR (ROXICODONE) 5 MG tablet Take 1-2 tablets (5-10 mg) by mouth every 3 hours as needed for other (pain control or improvement in physical function. Hold dose for analgesic side effects.)  Qty: 75 tablet, Refills: 0    Associated Diagnoses: S/P brain surgery       !! - Potential duplicate medications found. Please discuss with provider.      CONTINUE these medications which have NOT CHANGED    Details   Acetaminophen (TYLENOL PO) Take 500 mg by mouth 2 times daily as needed for mild pain or fever      carBAMazepine (CARBATROL) 300 MG 12 hr capsule Take 1 capsule (300 mg) by mouth 2 times daily (at 10:00 & 22:00)  Qty: 180 capsule, Refills: 3    Associated Diagnoses: Partial epilepsy with impairment of consciousness, intractable (H)      cetirizine (ZYRTEC ALLERGY) 10 MG tablet Take 1 tablet (10 mg) by mouth daily as needed (for mosquitos.)  Qty: 30 tablet    Associated Diagnoses: Reaction to insect bite      !! ClonazePAM (KLONOPIN PO) Take 0.5 mg by mouth daily      !! ClonazePAM (KLONOPIN PO) Take 1 mg by mouth At Bedtime (2 x 0.5 mg = 1 mg dose)      Cyanocobalamin (VITAMIN B 12 PO) Take 1 tablet by mouth daily (with dinner)      escitalopram (LEXAPRO) 20 MG tablet TAKE ONE TABLET BY MOUTH ONCE DAILY AT  NIGHT  Qty: 90 tablet, Refills: 3    Comments: -  note refills, don't fill now  Associated Diagnoses: History of depression; Anxiety state      felbamate (FELBATOL) 600 MG tablet Take 900 mg (1.5 tablet) am and 900 mg (1.5 tablet) 2 pm  Qty: 270 tablet, Refills: 3    Associated Diagnoses: Partial epilepsy with impairment of consciousness, intractable (H)      hydrocortisone 0.5 % cream Apply topically daily as needed for itching      pantoprazole (PROTONIX) 40 MG EC tablet TAKE ONE TABLET BY MOUTH ONCE DAILY  Qty: 90 tablet, Refills: 3    Associated Diagnoses: Gastroesophageal reflux disease without esophagitis      Riboflavin 400 MG TABS Take 400 mg by mouth daily  Qty: 90 tablet, Refills: 3    Associated Diagnoses: Chronic daily headache      !! Topiramate (TOPAMAX PO) Take 200 mg by mouth daily (2 x 100 mg = 200 mg dose)      !! Topiramate (TOPAMAX PO) Take 300 mg by mouth At Bedtime (3 x 100 mg = 300 mg dose)       !! - Potential duplicate medications found. Please discuss with provider.      STOP taking these medications       acetaminophen-codeine (TYLENOL #3) 300-30 MG per tablet Comments:   Reason for Stopping:             Allergies   Allergies   Allergen Reactions     Dilantin [Phenytoin] Hives     Mosquitoes (Informational Only)      blisters

## 2018-07-15 NOTE — PLAN OF CARE
Problem: Patient Care Overview  Goal: Plan of Care/Patient Progress Review  Occupational Therapy Discharge Summary    Reason for therapy discharge:    Discharged to home.    Progress towards therapy goal(s). See goals on Care Plan in [facility] electronic health record for goal details.  Goals not met.  Barriers to achieving goals:   discharge from facility.    Therapy recommendation(s):    Continued therapy is recommended.  Rationale/Recommendations:  Pt appears to be improving with overall mobility around room, has had some slowness of response and may have issues with sequencing tasks.  Would benefit from IP OT to increase skills and determine what cognitive issues he has if any.

## 2018-07-15 NOTE — PLAN OF CARE
Problem: Craniotomy/Craniectomy/Cranioplasty (Adult)  Goal: Signs and Symptoms of Listed Potential Problems Will be Absent, Minimized or Managed (Craniotomy/Craniectomy/Cranioplasty)  Signs and symptoms of listed potential problems will be absent, minimized or managed by discharge/transition of care (reference Craniotomy/Craniectomy/Cranioplasty (Adult) CPG).   Outcome: No Change  A&Ox4. Neuros intact except headache. Incision/staples CDI, mild swelling on R side of face. VSS, RA. Regular diet. Up with SBA. Voiding, PVR 46 and 198. Headache decreased with cold pack, oxycodone and tylenol. Possible discharge home today.

## 2018-07-15 NOTE — PLAN OF CARE
Problem: Patient Care Overview  Goal: Plan of Care/Patient Progress Review  Outcome: Adequate for Discharge Date Met: 07/15/18  Neuro exam intact except for right sided headache rating 2/10, decreased with Oxycodone.  Right juan-orbital swelling increased from yesterday.  Incision approximated with staples, BRETT.  Up with SBA and gait belt.  Shuffles from side to side when walking which is baseline, no need for OPPT/OT per therapies.  Discharge instructions reviewed with patient and parents.  Prescriptions given, all questions answered.

## 2018-07-15 NOTE — PROGRESS NOTES
Chart reviewed, discussed with nursing staff.    Note plan for discharge.    No charge.  Bhumi Llanos MD

## 2018-07-15 NOTE — PROGRESS NOTES
07/15/18 0800   Signing Clinician's Name / Credentials   Signing clinician's name / credentials Kimberlyn Vo, DPT   Vega Balance Scale (YAIMA SNOW, SRUTHI S, CHACHA ALMONTE, ELBA HENAO: MEASURING BALANCE IN THE ELDERLY: VALIDATION OF AN INSTRUMENT. CAN. J. PUB. HEALTH, JULY/AUGUST SUPPLEMENT 2:S7-11, 1992.)   Sit To Stand 4   Standing Unsupported 4   Sitting Unsupported 4   Stand to Sit 3   Transfers 4   Standing with Eyes Closed 4   Standing Unsupported, Feet Together 4   Reach Forward With Outstretched Arm 4   Retrieve Object From Floor 3   Turning to Look Behind 4   Turn 360 Degrees 2   Placing Alternate Foot on Stool (4-6 inches) 4   Unsupported Tandem Stand (Demonstrate to Subject) 3   One Leg Stand 3   Total Score (A score of 45 or less has been correlated with an increased risk of falls)   Total Score (out of 56) 50

## 2018-07-15 NOTE — PLAN OF CARE
Problem: Patient Care Overview  Goal: Plan of Care/Patient Progress Review  Physical Therapy Discharge Summary    Reason for therapy discharge:    All goals and outcomes met, no further needs identified.    Progress towards therapy goal(s). See goals on Care Plan in [facility] electronic health record for goal details.  Goals met    Therapy recommendation(s):    No further therapy is recommended.

## 2018-07-16 ENCOUNTER — TELEPHONE (OUTPATIENT)
Dept: FAMILY MEDICINE | Facility: CLINIC | Age: 48
End: 2018-07-16

## 2018-07-16 LAB — COPATH REPORT: NORMAL

## 2018-07-16 NOTE — TELEPHONE ENCOUNTER
"Patient returning call    ED/Discharge Protocol    \"Hi, my name is Gina Roberts, a registered nurse, and I am calling on behalf of Dr. Coon's office at [place].  I am calling to follow up and see how things are going for you after your recent visit.\"    \"I see that you were in the (ER/UC/IP) on [facility] for inpatient hospital stay on 7/15 for s/p brain surgery.    How are you doing now that you are home?\" Doing good.     Is patient experiencing symptoms that may require a hospital visit?  No    Discharge Instructions    \"Let's review your discharge instructions.  What is/are the follow-up recommendations?  Pt. Response: FU with surgeon    \"Were you instructed to make a follow-up appointment?\"  Pt. Response: No.       \"When you see the provider, I would recommend that you bring your discharge instructions with you.    Medications    \"How many new medications are you on since your hospitalization/ED visit?\"    0-1  \"How many of your current medicines changed (dose, timing, name, etc.) while you were in the hospital/ED visit?\"   0-1  \"Do you have questions about your medications?\"   No  \"Were you newly diagnosed with heart failure, COPD, diabetes or did you have a heart attack?\"   No  For patients on insulin: \"Did you start on insulin in the hospital or did you have your insulin dose changed?\"   No    Medication reconciliation completed? Yes    Was MTM referral placed (*Make sure to put transitions as reason for referral)?   No    Call Summary    \"Do you have any questions or concerns about your condition or care plan at the moment?\"    No  Triage nurse advice given: Advised patient that if new or worsening symptoms appear (reviewed new & worsening symptoms) to call the clinic or be seen in the the ER  Patient stated an understanding and agreed with plan.    Patient was in ER 1 in the past year (assess appropriateness of ER visits.)      \"If you have questions or things don't continue to improve, " "we encourage you contact us through the main clinic number,  [phone].  Even if the clinic is not open, triage nurses are available 24/7 to help you.     We would like you to know that our clinic has extended hours (provide information).  We also have urgent care (provide details on closest location and hours/contact info)\"      \"Thank you for your time and take care!\"        Gina Roberts RN  [facility]    "

## 2018-07-16 NOTE — TELEPHONE ENCOUNTER
ED / Discharge Outreach Protocol    Patient Contact    Attempt # 1    Was call answered?  No.  Left message on voicemail with information to call me back.  Eneida Villeda RN  [facility]

## 2018-07-16 NOTE — TELEPHONE ENCOUNTER
Patient discharged from [facility] for inpatient hospital stay on 7/15 for s/p brain surgery.    Please contact patient to follow up; no appointment scheduled at this time - med check scheduled in November.    ER / IP:  0/1    Care Coordination:  jack Ruth

## 2018-07-26 ENCOUNTER — OFFICE VISIT (OUTPATIENT)
Dept: NEUROSURGERY | Facility: CLINIC | Age: 48
End: 2018-07-26
Attending: PHYSICIAN ASSISTANT
Payer: MEDICARE

## 2018-07-26 VITALS
DIASTOLIC BLOOD PRESSURE: 74 MMHG | SYSTOLIC BLOOD PRESSURE: 109 MMHG | HEART RATE: 76 BPM | OXYGEN SATURATION: 100 % | TEMPERATURE: 98 F

## 2018-07-26 DIAGNOSIS — Z98.890 POST-OPERATIVE STATE: Primary | ICD-10-CM

## 2018-07-26 PROCEDURE — 40000269 ZZH STATISTIC NO CHARGE FACILITY FEE

## 2018-07-26 ASSESSMENT — PAIN SCALES - GENERAL: PAINLEVEL: NO PAIN (0)

## 2018-07-26 NOTE — MR AVS SNAPSHOT
After Visit Summary   7/26/2018    Joni Borja    MRN: [id number]           Patient Information     Date Of Birth          1970        Visit Information        Provider Department      7/26/2018 11:10 AM [facility]        Today's Diagnoses     Post-operative state    -  1       Follow-ups after your visit        Your next 10 appointments already scheduled     Aug 14, 2018 10:00 AM CDT   Return Visit with Berkley Daniels MD   [facility] ([facility])    [facility]  [address]  [facility] [address]   [phone]            Oct 15, 2018  3:00 PM CDT   Return Visit with Stuart Oscar MD   [facility] ([facility])    [address]  [address]  [facility] [address]   [phone]            Nov 12, 2018  1:20 PM CST   Office Visit with Brian Coon MD   [facility] ([facility])    [address] [place] [id number] [phone]           Bring a current list of meds and any records pertaining to this visit. For Physicals, please bring immunization records and any forms needing to be filled out. Please arrive 10 minutes early to complete paperwork.              Who to contact     If you have questions or need follow up information about today's clinic visit or your schedule please contact [facility] NEUROSURGERY [facility] directly at [phone].  Normal or non-critical lab and imaging results will be communicated to you by MyChart, letter or phone within 4 business days after the clinic has received the results. If you do not hear from us within 7 days, please contact the clinic through [employer] or phone. If you have a critical or abnormal lab result, we will notify you by phone as soon as possible.  Submit refill requests through "[employer] or call your pharmacy and  they will forward the refill request to us. Please allow 3 business days for your refill to be completed.          Additional Information About Your Visit        [employer] Information     [employer] gives you secure access to your electronic health record. If you see a primary care provider, you can also send messages to your care team and make appointments. If you have questions, please call your primary care clinic.  If you do not have a primary care provider, please call [phone] and they will assist you.        Care EveryWhere ID     This is your Care EveryWhere ID. This could be used by other organizations to access your [facility] medical records  [id number]        Your Vitals Were     Pulse Temperature Pulse Oximetry             76 98  F (36.7  C) (Oral) 100%          Blood Pressure from Last 3 Encounters:   08/07/18 108/71   07/27/18 (!) 120/91   07/26/18 109/74    Weight from Last 3 Encounters:   07/27/18 165 lb (74.8 kg)   07/14/18 171 lb 1.2 oz (77.6 kg)   07/10/18 165 lb (74.8 kg)              Today, you had the following     No orders found for display         Today's Medication Changes          These changes are accurate as of 7/26/18 11:59 PM.  If you have any questions, ask your nurse or doctor.               These medicines have changed or have updated prescriptions.        Dose/Directions    carBAMazepine 300 MG 12 hr capsule   Commonly known as:  CARBATROL   This may have changed:  additional instructions   Used for:  Partial epilepsy with impairment of consciousness, intractable (H)        Dose:  300 mg   Take 1 capsule (300 mg) by mouth 2 times daily (at 10:00 & 22:00)   Quantity:  180 capsule   Refills:  3       escitalopram 20 MG tablet   Commonly known as:  LEXAPRO   This may have changed:    - how much to take  - how to take this  - when to take this  - additional instructions   Used for:  History of depression, Anxiety state        TAKE ONE TABLET BY MOUTH ONCE DAILY AT  NIGHT   Quantity:   90 tablet   Refills:  3       felbamate 600 MG tablet   Commonly known as:  FELBATOL   This may have changed:    - how much to take  - how to take this  - when to take this  - additional instructions   Used for:  Partial epilepsy with impairment of consciousness, intractable (H)        Take 900 mg (1.5 tablet) am and 900 mg (1.5 tablet) 2 pm   Quantity:  270 tablet   Refills:  3       Riboflavin 400 MG Tabs   This may have changed:  when to take this   Used for:  Chronic daily headache        Dose:  400 mg   Take 400 mg by mouth daily   Quantity:  90 tablet   Refills:  3                Primary Care Provider Office Phone # Fax #    Brian Coon [phone]       [address] [place] [address]        Equal Access to Services     KAYLIE MARIE : Abril Orellana, waaxda luqadaha, qaybta kaalmada marcie, es arriaga . [facility] [phone].    ATENCIÓN: Si habla español, tiene a kirby disposición servicios gratuitos de asistencia lingüística. [facility] [place] [facility] [phone].    We comply with applicable federal civil rights laws and [place] laws. We do not discriminate on the basis of race, color, national origin, age, disability, sex, sexual orientation, or gender identity.            Thank you!     Thank you for choosing [facility]  for your care. Our goal is always to provide you with excellent care. Hearing back from our patients is one way we can continue to improve our services. Please take a few minutes to complete the written survey that you may receive in the mail after your visit with us. Thank you!             Your Updated Medication List - Protect others around you: Learn how to safely use, store and throw away your medicines at www.disposemymeds.org.          This list is accurate as of 7/26/18 11:59 PM.  Always use your most recent med list.                   Brand Name Dispense Instructions for use Diagnosis     carBAMazepine 300 MG 12 hr capsule    CARBATROL    180 capsule    Take 1 capsule (300 mg) by mouth 2 times daily (at 10:00 & 22:00)    Partial epilepsy with impairment of consciousness, intractable (H)       cetirizine 10 MG tablet    ZYRTEC ALLERGY    30 tablet    Take 1 tablet (10 mg) by mouth daily as needed (for mosquitos.)    Reaction to insect bite       cyclobenzaprine 10 MG tablet    FLEXERIL    90 tablet    Take 1 tablet (10 mg) by mouth 3 times daily as needed for muscle spasms    S/P brain surgery       * dexamethasone 4 MG tablet    DECADRON    13 tablet    Take 1 tablet (4 mg) by mouth 4 times daily    S/P brain surgery       * dexamethasone 2 MG tablet    DECADRON    30 tablet    Take 1 tablet (2 mg) by mouth every 6 hours    S/P brain surgery       escitalopram 20 MG tablet    LEXAPRO    90 tablet    TAKE ONE TABLET BY MOUTH ONCE DAILY AT  NIGHT    History of depression, Anxiety state       felbamate 600 MG tablet    FELBATOL    270 tablet    Take 900 mg (1.5 tablet) am and 900 mg (1.5 tablet) 2 pm    Partial epilepsy with impairment of consciousness, intractable (H)       hydrocortisone 0.5 % cream      Apply topically daily as needed for itching        hydrOXYzine 25 MG tablet    ATARAX    60 tablet    Take 1 tablet (25 mg) by mouth every 6 hours as needed for itching    S/P brain surgery       * KLONOPIN PO      Take 0.5 mg by mouth daily        * KLONOPIN PO      Take 1 mg by mouth At Bedtime (2 x 0.5 mg = 1 mg dose)        ondansetron 4 MG ODT tab    ZOFRAN-ODT    120 tablet    Take 1 tablet (4 mg) by mouth every 6 hours as needed for nausea or vomiting    Nausea       oxyCODONE IR 5 MG tablet    ROXICODONE    75 tablet    Take 1-2 tablets (5-10 mg) by mouth every 3 hours as needed for other (pain control or improvement in physical function. Hold dose for analgesic side effects.)    S/P brain surgery       pantoprazole 40 MG EC tablet    PROTONIX    90 tablet    TAKE ONE TABLET BY MOUTH ONCE  DAILY    Gastroesophageal reflux disease without esophagitis       Riboflavin 400 MG Tabs     90 tablet    Take 400 mg by mouth daily    Chronic daily headache       * TOPAMAX PO      Take 200 mg by mouth daily (2 x 100 mg = 200 mg dose)        * TOPAMAX PO      Take 300 mg by mouth At Bedtime (3 x 100 mg = 300 mg dose)        TYLENOL PO      Take 500 mg by mouth 2 times daily as needed for mild pain or fever        VITAMIN B 12 PO      Take 1 tablet by mouth daily (with dinner)        * Notice:  This list has 6 medication(s) that are the same as other medications prescribed for you. Read the directions carefully, and ask your doctor or other care provider to review them with you.

## 2018-07-26 NOTE — NURSING NOTE
RN viewed incision site after staple removal    Incision is C/D/I no redness or drainage    Patient states that its itchy, advised not to pick at or directly itch incision.    Patient given education on wound care, gradual activity improvement and falls precautions ( states that he has dizziness when bending over) and slow rising from lying or sitting positions,    Patient declines AVS at this time as hes done this once before    States verbal understanding of the above  Zac Mata RN, BSN

## 2018-07-26 NOTE — NURSING NOTE
"Joni Borja is a 47 year old male who presents for:  Chief Complaint   Patient presents with     Neurologic Problem     s/p craniotomy 7-13-8, staple removal        Vitals:    There were no vitals filed for this visit.    BMI:  Estimated body mass index is 25.26 kg/(m^2) as calculated from the following:    Height as of 7/13/18: 5' 9\" (1.753 m).    Weight as of 7/14/18: 171 lb 1.2 oz (77.6 kg).    Pain Score:  Data Unavailable        Kristina Carson, LULY, AAS      "

## 2018-07-26 NOTE — LETTER
7/26/2018         RE: Joni Borja  [address] [address]        Dear Colleague,    Thank you for referring your patient, Joni Borja, to the [facility]IC. Please see a copy of my visit note below.    See nursing notes    Again, thank you for allowing me to participate in the care of your patient.        Sincerely,         Neurosurgery Nurse

## 2018-07-27 ENCOUNTER — APPOINTMENT (OUTPATIENT)
Dept: CT IMAGING | Facility: CLINIC | Age: 48
End: 2018-07-27
Attending: EMERGENCY MEDICINE
Payer: MEDICARE

## 2018-07-27 ENCOUNTER — TELEPHONE (OUTPATIENT)
Dept: NEUROLOGY | Facility: CLINIC | Age: 48
End: 2018-07-27

## 2018-07-27 ENCOUNTER — HOSPITAL ENCOUNTER (EMERGENCY)
Facility: CLINIC | Age: 48
Discharge: HOME | End: 2018-07-27
Attending: EMERGENCY MEDICINE | Admitting: EMERGENCY MEDICINE
Payer: MEDICARE

## 2018-07-27 VITALS
BODY MASS INDEX: 24.37 KG/M2 | OXYGEN SATURATION: 100 % | DIASTOLIC BLOOD PRESSURE: 91 MMHG | SYSTOLIC BLOOD PRESSURE: 120 MMHG | WEIGHT: 165 LBS | RESPIRATION RATE: 15 BRPM | HEART RATE: 74 BPM | TEMPERATURE: 98.5 F

## 2018-07-27 DIAGNOSIS — G40.909 RECURRENT SEIZURES (H): ICD-10-CM

## 2018-07-27 LAB
ALBUMIN UR-MCNC: NEGATIVE MG/DL
ANION GAP SERPL CALCULATED.3IONS-SCNC: 6 MMOL/L (ref 3–14)
APPEARANCE UR: CLEAR
BASOPHILS # BLD AUTO: 0.1 10E9/L (ref 0–0.2)
BASOPHILS NFR BLD AUTO: 0.9 %
BILIRUB UR QL STRIP: NEGATIVE
BUN SERPL-MCNC: 22 MG/DL (ref 7–30)
CALCIUM SERPL-MCNC: 7.9 MG/DL (ref 8.5–10.1)
CHLORIDE SERPL-SCNC: 109 MMOL/L (ref 94–109)
CO2 SERPL-SCNC: 27 MMOL/L (ref 20–32)
COLOR UR AUTO: YELLOW
CREAT SERPL-MCNC: 0.92 MG/DL (ref 0.66–1.25)
DIFFERENTIAL METHOD BLD: ABNORMAL
EOSINOPHIL # BLD AUTO: 0.3 10E9/L (ref 0–0.7)
EOSINOPHIL NFR BLD AUTO: 3.2 %
ERYTHROCYTE [DISTWIDTH] IN BLOOD BY AUTOMATED COUNT: 13.4 % (ref 10–15)
GFR SERPL CREATININE-BSD FRML MDRD: 88 ML/MIN/1.7M2
GLUCOSE SERPL-MCNC: 79 MG/DL (ref 70–99)
GLUCOSE UR STRIP-MCNC: NEGATIVE MG/DL
HCT VFR BLD AUTO: 36.4 % (ref 40–53)
HGB BLD-MCNC: 12 G/DL (ref 13.3–17.7)
HGB UR QL STRIP: NEGATIVE
IMM GRANULOCYTES # BLD: 0 10E9/L (ref 0–0.4)
IMM GRANULOCYTES NFR BLD: 0.3 %
KETONES UR STRIP-MCNC: NEGATIVE MG/DL
LEUKOCYTE ESTERASE UR QL STRIP: NEGATIVE
LYMPHOCYTES # BLD AUTO: 1.7 10E9/L (ref 0.8–5.3)
LYMPHOCYTES NFR BLD AUTO: 21.8 %
MCH RBC QN AUTO: 31.5 PG (ref 26.5–33)
MCHC RBC AUTO-ENTMCNC: 33 G/DL (ref 31.5–36.5)
MCV RBC AUTO: 96 FL (ref 78–100)
MONOCYTES # BLD AUTO: 0.8 10E9/L (ref 0–1.3)
MONOCYTES NFR BLD AUTO: 10.6 %
MUCOUS THREADS #/AREA URNS LPF: PRESENT /LPF
NEUTROPHILS # BLD AUTO: 4.9 10E9/L (ref 1.6–8.3)
NEUTROPHILS NFR BLD AUTO: 63.2 %
NITRATE UR QL: NEGATIVE
NRBC # BLD AUTO: 0 10*3/UL
NRBC BLD AUTO-RTO: 0 /100
PH UR STRIP: 6 PH (ref 5–7)
PLATELET # BLD AUTO: 304 10E9/L (ref 150–450)
POTASSIUM SERPL-SCNC: 3.7 MMOL/L (ref 3.4–5.3)
RBC # BLD AUTO: 3.81 10E12/L (ref 4.4–5.9)
RBC #/AREA URNS AUTO: <1 /HPF (ref 0–2)
SODIUM SERPL-SCNC: 142 MMOL/L (ref 133–144)
SOURCE: ABNORMAL
SP GR UR STRIP: 1.01 (ref 1–1.03)
UROBILINOGEN UR STRIP-MCNC: NORMAL MG/DL (ref 0–2)
WBC # BLD AUTO: 7.7 10E9/L (ref 4–11)
WBC #/AREA URNS AUTO: 1 /HPF (ref 0–5)

## 2018-07-27 PROCEDURE — 99284 EMERGENCY DEPT VISIT MOD MDM: CPT | Mod: 25

## 2018-07-27 PROCEDURE — A9270 NON-COVERED ITEM OR SERVICE: HCPCS | Mod: GY | Performed by: EMERGENCY MEDICINE

## 2018-07-27 PROCEDURE — 85025 COMPLETE CBC W/AUTO DIFF WBC: CPT | Performed by: EMERGENCY MEDICINE

## 2018-07-27 PROCEDURE — 80048 BASIC METABOLIC PNL TOTAL CA: CPT | Performed by: EMERGENCY MEDICINE

## 2018-07-27 PROCEDURE — 70450 CT HEAD/BRAIN W/O DYE: CPT

## 2018-07-27 PROCEDURE — 25000132 ZZH RX MED GY IP 250 OP 250 PS 637: Mod: GY | Performed by: EMERGENCY MEDICINE

## 2018-07-27 PROCEDURE — 80201 ASSAY OF TOPIRAMATE: CPT | Performed by: EMERGENCY MEDICINE

## 2018-07-27 PROCEDURE — 81001 URINALYSIS AUTO W/SCOPE: CPT | Performed by: EMERGENCY MEDICINE

## 2018-07-27 RX ORDER — ACETAMINOPHEN 500 MG
1000 TABLET ORAL ONCE
Status: COMPLETED | OUTPATIENT
Start: 2018-07-27 | End: 2018-07-27

## 2018-07-27 RX ADMIN — ACETAMINOPHEN 1000 MG: 500 TABLET, FILM COATED ORAL at 14:47

## 2018-07-27 ASSESSMENT — ENCOUNTER SYMPTOMS
NUMBNESS: 1
DIARRHEA: 0
FEVER: 0
HEADACHES: 1
NAUSEA: 0
VOMITING: 0
COUGH: 0

## 2018-07-27 NOTE — ED AVS SNAPSHOT
Emergency Department    [address] [place] [address]    Phone:  [phone]    Fax:  [phone]                                       Joni Borja   MRN: [id number]    Department:   Emergency Department   Date of Visit:  7/27/2018           After Visit Summary Signature Page     I have received my discharge instructions, and my questions have been answered. I have discussed any challenges I see with this plan with the nurse or doctor.    ..........................................................................................................................................  Patient/Patient Representative Signature      ..........................................................................................................................................  Patient Representative Print Name and Relationship to Patient    ..................................................               ................................................  Date                                            Time    ..........................................................................................................................................  Reviewed by Signature/Title    ...................................................              ..............................................  Date                                                            Time

## 2018-07-27 NOTE — ED PROVIDER NOTES
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 The patient reports that this morning around 0830 he had a \"grand-mal seizure.\" They did call EMS this morning who decided to not bring him in at this time. He has been having quick short seizures a couple of times per week. Since the surgery, he has had 4 of these types of seizures since the surgery. He usually does not remember these seizures and then lays down after this. Today, the patient states he had another seizure where he fell and remembered the seizure. He had left arm numbness during this seizure-like activity. This lasted around five minutes. He was found by his parents lying on his right side stiff and hyperventilating. He did call Dr. Oscar, his neurosurgeon who suggested he come in prompting his visit to the emergency department. At arrival, he states that he is experiencing a headache. He has been taking his seizure medications, but did not take this last night. He denies urinary incontinence, tongue biting, fevers, nausea, diarrhea, vomiting, and cough.     Allergies:  Dilantin [Phenytoin]  Mosquitoes    Medications:    carBAMazepine (CARBATROL) 300 MG 12 hr capsule  cetirizine (ZYRTEC ALLERGY) 10 MG tablet  ClonazePAM (KLONOPIN PO)  Cyanocobalamin (VITAMIN B 12 PO)  cyclobenzaprine (FLEXERIL) 10 MG tablet  dexamethasone (DECADRON) 2 MG tablet  escitalopram (LEXAPRO) 20 MG tablet  felbamate (FELBATOL) 600 MG tablet  hydrOXYzine (ATARAX) 25 MG tablet  ondansetron (ZOFRAN-ODT) 4 MG ODT tab  oxyCODONE IR (ROXICODONE) 5 MG tablet  pantoprazole (PROTONIX) 40 MG EC tablet  Riboflavin 400 MG TABS  Topiramate (TOPAMAX PO)    Past Medical History:    Depression  GERD  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 Xgpo-Qkfrv-Eyliysa " disease  Localization-related epilepsy  Meningitis  Oligodendroglioma  Perthes' disease of hip   Seizures    Past Surgical History:    APPENDECTOMY   BIOPSY   CRANIOTOMY, EXCISE TUMOR COMPLEX, COMBINED    HEAD & NECK SURGERY- brain tumor removal  IMPLANT STIMULATOR VAGUS NERVE  OPTICAL TRACKING SYSTEM CRANIOTOMY, EXCISE TUMOR WITH MAPPING, COMBINED    OPTICAL TRACKING SYSTEM CRANIOTOMY, EXCISE TUMOR, COMBINED- Right x2   ORTHOPEDIC SURGERY- Right Hip  REPAIR CONGENITAL HIP- Right   SHOULDER SURGERY     Family History:    Hyperlipidemia  CAD    Social History:  The patient was accompanied to the ED by parents.  Smoking Status: Current Every Day Smoker, 1.00 ppd, 20 years  Smokeless Tobacco: Never  Alcohol Use: Yes, occasional   Marital Status:       Review of Systems   Constitutional: Negative for fever.   Respiratory: Negative for cough.    Gastrointestinal: Negative for diarrhea, nausea and vomiting.   Genitourinary:        Negative incontinence   Neurological: Positive for numbness and headaches.   All other systems reviewed and are negative.    Physical Exam     Patient Vitals for the past 24 hrs:   BP Temp Temp src Pulse Heart Rate Resp SpO2 Weight   07/27/18 1431 - - - - 66 15 100 % -   07/27/18 1430 (!) 120/91 - - - 66 - 100 % -   07/27/18 1413 - - - - 67 10 99 % -   07/27/18 1400 111/86 - - - 67 12 98 % -   07/27/18 1317 - - - - 70 17 - -   07/27/18 1305 - - - - 75 11 - -   07/27/18 1239 101/67 98.5  F (36.9  C) Oral 74 - 16 99 % 74.8 kg (165 lb)         Physical Exam  General: Appears well-developed and well-nourished.   Head: Incision intact without erythema or drainage.  Mouth/Throat: Oropharynx is clear and moist.   Eyes: Conjunctivae are normal. Pupils are equal, round, and reactive to light.   Neck: Normal range of motion. No nuchal rigidity. No cervical adenopathy  CV: Normal rate and regular rhythm.    Resp: Effort normal and breath sounds normal. No respiratory distress.   GI: Soft. There is  no tenderness.  No rebound or guarding.  Normal bowel sounds.    MSK: Normal range of motion. no edema. No Calf tenderness.  Neuro: The patient is alert and oriented to person, place, and time.  PERRLA, EOMI, strength in upper/lower extremities normal and symmetrical.   Sensation normal. Speech normal.  GCS 15  Skin: Skin is warm and dry. No rash noted.   Psych: normal mood and affect. behavior is normal.       Emergency Department Course   Imaging:  Radiology findings were communicated with the patient and family who voiced understanding of the findings.  Head CT w/o contrast  IMPRESSION:   1. Postoperative changes including a right frontal craniotomy for  resection of a large portion of the anterior aspect of the right  frontal lobe again noted.  2. Otherwise, normal head CT. No evidence for acute intracranial  pathology.  Report per radiology     Laboratory:  Laboratory findings were communicated with the patient and family who voiced understanding of the findings.  CBC: HGB 12.0 (L) o/w WNL. (WBC 7.7, )   BMP: calcium 7.9 (L) o/w WNL (Creatinine 0.92)  UA: clear, yellow urine, mucous present o/w WNL  Topiramate: Pending    Interventions:  1447 tylenol 1000 mg PO    Emergency Department Course:  Nursing notes and vitals reviewed.  IV was inserted and blood was drawn for laboratory testing, results above.  The patient was sent for a Head CT w/o contrast while in the emergency department, results above.   1307: I performed an exam of the patient as documented above.   1403: I spoke with Yolanda ISAACS of the neurosurgery service regarding patient's presentation, findings, and plan of care.  Findings and plan explained to the Patient and parents. Patient discharged home with instructions regarding supportive care, medications, and reasons to return. The importance of close follow-up was reviewed.  I personally reviewed the laboratory and imaging results with the Patient and parents and answered all related  questions prior to discharge.    Impression & Plan    Medical Decision Making:  Joni Borja is a 47-year-old gentleman with a history of brain tumor and recent resection who presents after apparent seizure-like activity.  He apparently regularly has a couple of small short seizures a week, but today had a larger episode.  Patient reports that he was conscious and awake throughout the episode which sounds somewhat atypical.  At the time of evaluation, patient had no neurologic deficits or other complaints.  He actually had turned away EMS immediately after the event and stayed at home for a few hours before coming in.  Did obtain a CT scan which did not show any signs of acute process such as bleed or fluid collection.  Blood work was unremarkable.  Patient apparently had missed his seizure medication last night, but states that he did take it this morning.  I did speak with neurosurgery, who recommended continuing with his current regimen and follow-up in clinic but did not recommend any further changes or adjustments to his treatment regimen.  Patient and family were given follow-up and return instructions.    Diagnosis:    ICD-10-CM    1. Recurrent seizures (H) G40.909        Disposition:  discharged to home    Scribe Disclosure:  Orville BUTLER, am serving as a scribe at 12:58 PM on 7/27/2018 to document services personally performed by Ambrosio Quispe MD based on my observations and the provider's statements to me.     7/27/2018    EMERGENCY DEPARTMENT       Ambrosio Quispe MD  07/29/18 [id number]

## 2018-07-27 NOTE — TELEPHONE ENCOUNTER
Tara Pink, LULY  P Me Mince[facility] Rn Pool                   Caller: Brittany   Relationship to Patient: mother   Call Back Number: [phone]   Reason for Call: pt had a grand mal seizure today. Pt also had a brain tumor remove about 10 days ago      Brittany (mom) calls today reporting that Joni fell on the kitchen floor this morning. Mom was outside when this occurred. She could hear Joni moaning. She reports his eyes were twitching. Mom is not certain if he had a seizure as she did not observe what occurred. Joni is 2 1/2 weeks post brain surgery.  Paramedics are on-site and evaluating Joni.     Brittany calls back stating Joni signed a consent form and does not want to be transported anywhere by paramedics.    Plan per Dr. Brown:    1) Mother or patient should contact Neurosurgery (Dr. Stuart Oscar) to be seen for evaluation and possible additional imaging.

## 2018-07-27 NOTE — ED AVS SNAPSHOT
Emergency Department    [address] [facility] [address]    Phone:  [phone]    Fax:  [phone]                                       Joni Borja   MRN: [id number]    Department:   Emergency Department   Date of Visit:  7/27/2018           Patient Information     Date Of Birth          1970        Your diagnoses for this visit were:     Recurrent seizures (H)        You were seen by Ambrosio Quispe MD.      Follow-up Information     Call Stuart Oscar MD.    Specialty:  Neurosurgery    Contact information:    [address]  [facility] [address] [phone]          Follow up with  Emergency Department.    Specialty:  EMERGENCY MEDICINE    Why:  As needed, If symptoms worsen    Contact information:    [address] [facility] [address] [phone]        Discharge Instructions         Recurrent Seizure (Adult)    You have had another seizure today. A common cause of seizures that keep happening (recurrent seizures) is missing doses of seizure medicine. But sometimes seizures are hard to control even when you take the medicine correctly. If this is the case for you, your healthcare provider may need to increase your dosage. Or you may need to add or change to another medicine.  Home care  Follow these tips when caring for yourself at home. For this seizure:    Seizures aren t predictable. So avoid doing anything that might cause danger to you or other people if you have another seizure. Until the seizures are under good control, take these precautions:  ? Don t drive, ride a motorcycle, or ride a bike.  ? Don t operate dangerous equipment such as power tools  ? Take showers instead of baths.  ? Don t swim or climb ladders, trees, or roofs.    Tell your close friends and relatives about your seizure. Teach them what to do for you if it happens again.    If medicine was prescribed to prevent seizures, take it exactly as directed. It does not  "work when taken \"as needed.\" Missing doses will increase the risk of having another seizure.    If you miss a dose, take the missed dose as soon as you remember. If it is almost time for your next dose, skip the missed dose. Restart the medicine at your next scheduled time. Don t take extra medicine to make up the missed dose.    Wear a \"Medic-Alert\" bracelet to let emergency personnel know about your condition.    Follow a regular sleep schedule such that you get at least 6 to 8 hours of restful sleep every night. This is especially important when you are sick with a cold or flu and/or another type of infection.  For future seizures, if you are alone:  If you feel a seizure coming on, lie down on a bed or on the floor with something soft under your head. Lie on your left side, not on your back. This will keep you from falling. It will also let fluid drain out of your mouth and prevent choking. Be sure you are clear of any objects that might injure you during the seizure. Call for help if there is time.  For future seizures, if someone is with you:  The person should help you get into a safe position and call for help. The person shouldn t try to force anything in your mouth once the seizure begins. This could harm your teeth or jaw.  Follow-up care  Follow up with your healthcare provider. Keep a seizure calendar to record how often you have a seizure. If you are being started on anti-seizure medicine, make sure that you use additional birth control. Seizure medicine can affect how well birth control pills work, and you could become pregnant. Avoid alcohol until your doctor tells you it s OK.  Note: For the safety of yourself and others on the road, certain states require that the treating doctor tell the Public Health Department about any adult who is treated for a seizure and is at risk of more seizures. In this case, the Department of Motor Vehicles will be told. A restriction will be put on your  s " license until a doctor gives you medical clearance to drive again. Contact your treating doctor to find out if your state requires the reporting of patients with a seizures condition.  When to seek medical advice  Call your healthcare provider right away if any of these occur:    Seizures happen more often or last longer than usual    A seizure lasts over 5 minutes    You don t wake up between seizures    Confusion that lasts more than 30 minutes after a seizure    Injury during a seizure    Fever over 100.4 F (38.0 C), or as advised    Unusual irritability, drowsiness, or confusion    Stiff or painful neck    Headache that gets worse   Date Last Reviewed: 8/1/2016 2000-2017 [employer]. [address] [place] [address]. All rights reserved. This information is not intended as a substitute for professional medical care. Always follow your healthcare professional's instructions.          Your next 10 appointments already scheduled     Aug 06, 2018  1:45 PM CDT   Neuro Eval with Jenni Ward OT   [facility] ([facility])    [address] [facility] [address]   [phone]            Aug 07, 2018  3:10 PM CDT   Return Visit with Carley Auguste PA-C   [facility] ([facility])    [address] [place] [address]   [phone]            Oct 15, 2018  3:00 PM CDT   Return Visit with Stuart Oscar MD   [facility] ([facility])    [address] [place] [address]   [phone]            Nov 12, 2018  1:20 PM CST   [facility] Coon MD   [facility]    [address] [place] [id number] [phone]           Bring a current list of meds and any records pertaining to this visit. For Physicals, please bring  immunization records and any forms needing to be filled out. Please arrive 10 minutes early to complete paperwork.              24 Hour Appointment Hotline       To make an appointment at any [facility], call [phone] ([phone]). If you don't have a family doctor or clinic, we will help you find one. [facility] clinics are conveniently located to serve the needs of you and your family.             Review of your medicines      Our records show that you are taking the medicines listed below. If these are incorrect, please call your family doctor or clinic.        Dose / Directions Last dose taken    carBAMazepine 300 MG 12 hr capsule   Commonly known as:  CARBATROL   Dose:  300 mg   Quantity:  180 capsule        Take 1 capsule (300 mg) by mouth 2 times daily (at 10:00 & 22:00)   Refills:  3        cetirizine 10 MG tablet   Commonly known as:  ZYRTEC ALLERGY   Dose:  10 mg   Quantity:  30 tablet        Take 1 tablet (10 mg) by mouth daily as needed (for mosquitos.)   Refills:  0        cyclobenzaprine 10 MG tablet   Commonly known as:  FLEXERIL   Dose:  10 mg   Quantity:  90 tablet        Take 1 tablet (10 mg) by mouth 3 times daily as needed for muscle spasms   Refills:  0        * dexamethasone 4 MG tablet   Commonly known as:  DECADRON   Dose:  4 mg   Quantity:  13 tablet        Take 1 tablet (4 mg) by mouth 4 times daily   Refills:  0        * dexamethasone 2 MG tablet   Commonly known as:  DECADRON   Dose:  2 mg   Quantity:  30 tablet        Take 1 tablet (2 mg) by mouth every 6 hours   Refills:  0        escitalopram 20 MG tablet   Commonly known as:  LEXAPRO   Quantity:  90 tablet        TAKE ONE TABLET BY MOUTH ONCE DAILY AT  NIGHT   Refills:  3        felbamate 600 MG tablet   Commonly known as:  FELBATOL   Quantity:  270 tablet        Take 900 mg (1.5 tablet) am and 900 mg (1.5 tablet) 2 pm   Refills:  3        hydrocortisone 0.5 % cream        Apply topically daily as needed for itching    Refills:  0        hydrOXYzine 25 MG tablet   Commonly known as:  ATARAX   Dose:  25 mg   Quantity:  60 tablet        Take 1 tablet (25 mg) by mouth every 6 hours as needed for itching   Refills:  0        * KLONOPIN PO   Dose:  0.5 mg        Take 0.5 mg by mouth daily   Refills:  0        * KLONOPIN PO   Dose:  1 mg        Take 1 mg by mouth At Bedtime (2 x 0.5 mg = 1 mg dose)   Refills:  0        ondansetron 4 MG ODT tab   Commonly known as:  ZOFRAN-ODT   Dose:  4 mg   Quantity:  120 tablet        Take 1 tablet (4 mg) by mouth every 6 hours as needed for nausea or vomiting   Refills:  0        oxyCODONE IR 5 MG tablet   Commonly known as:  ROXICODONE   Dose:  5-10 mg   Quantity:  75 tablet        Take 1-2 tablets (5-10 mg) by mouth every 3 hours as needed for other (pain control or improvement in physical function. Hold dose for analgesic side effects.)   Refills:  0        pantoprazole 40 MG EC tablet   Commonly known as:  PROTONIX   Quantity:  90 tablet        TAKE ONE TABLET BY MOUTH ONCE DAILY   Refills:  3        Riboflavin 400 MG Tabs   Dose:  400 mg   Quantity:  90 tablet        Take 400 mg by mouth daily   Refills:  3        * TOPAMAX PO   Dose:  200 mg        Take 200 mg by mouth daily (2 x 100 mg = 200 mg dose)   Refills:  0        * TOPAMAX PO   Dose:  300 mg        Take 300 mg by mouth At Bedtime (3 x 100 mg = 300 mg dose)   Refills:  0        TYLENOL PO   Dose:  500 mg        Take 500 mg by mouth 2 times daily as needed for mild pain or fever   Refills:  0        VITAMIN B 12 PO   Dose:  1 tablet        Take 1 tablet by mouth daily (with dinner)   Refills:  0        * Notice:  This list has 6 medication(s) that are the same as other medications prescribed for you. Read the directions carefully, and ask your doctor or other care provider to review them with you.            Procedures and tests performed during your visit     Basic metabolic panel    CBC with platelets + differential    Head CT w/o  contrast    Topiramate Level    UA with Microscopic      Orders Needing Specimen Collection     None      Pending Results     Date and Time Order Name Status Description    7/27/2018 1317 Topiramate Level In process     7/27/2018 1315 Head CT w/o contrast Preliminary             Pending Culture Results     No orders found from 7/25/2018 to 7/28/2018.            Pending Results Instructions     If you had any lab results that were not finalized at the time of your Discharge, you can call the ED Lab Result RN at [phone]. You will be contacted by this team for any positive Lab results or changes in treatment. The nurses are available 7 days a week from 10A to 6:30P.  You can leave a message 24 hours per day and they will return your call.        Test Results From Your Hospital Stay        7/27/2018  1:39 PM      Component Results     Component Value Ref Range & Units Status    WBC 7.7 4.0 - 11.0 10e9/L Final    RBC Count 3.81 (L) 4.4 - 5.9 10e12/L Final    Hemoglobin 12.0 (L) 13.3 - 17.7 g/dL Final    Hematocrit 36.4 (L) 40.0 - 53.0 % Final    MCV 96 78 - 100 fl Final    MCH 31.5 26.5 - 33.0 pg Final    MCHC 33.0 31.5 - 36.5 g/dL Final    RDW 13.4 10.0 - 15.0 % Final    Platelet Count 304 150 - 450 10e9/L Final    Diff Method Automated Method  Final    % Neutrophils 63.2 % Final    % Lymphocytes 21.8 % Final    % Monocytes 10.6 % Final    % Eosinophils 3.2 % Final    % Basophils 0.9 % Final    % Immature Granulocytes 0.3 % Final    Nucleated RBCs 0 0 /100 Final    Absolute Neutrophil 4.9 1.6 - 8.3 10e9/L Final    Absolute Lymphocytes 1.7 0.8 - 5.3 10e9/L Final    Absolute Monocytes 0.8 0.0 - 1.3 10e9/L Final    Absolute Eosinophils 0.3 0.0 - 0.7 10e9/L Final    Absolute Basophils 0.1 0.0 - 0.2 10e9/L Final    Abs Immature Granulocytes 0.0 0 - 0.4 10e9/L Final    Absolute Nucleated RBC 0.0  Final         7/27/2018  1:53 PM      Component Results     Component Value Ref Range & Units Status    Sodium 142 133 - 144  mmol/L Final    Potassium 3.7 3.4 - 5.3 mmol/L Final    Chloride 109 94 - 109 mmol/L Final    Carbon Dioxide 27 20 - 32 mmol/L Final    Anion Gap 6 3 - 14 mmol/L Final    Glucose 79 70 - 99 mg/dL Final    Urea Nitrogen 22 7 - 30 mg/dL Final    Creatinine 0.92 0.66 - 1.25 mg/dL Final    GFR Estimate 88 >60 mL/min/1.7m2 Final    Non  GFR Calc    GFR Estimate If Black >90 >60 mL/min/1.7m2 Final    African American GFR Calc    Calcium 7.9 (L) 8.5 - 10.1 mg/dL Final         7/27/2018  2:22 PM      Component Results     Component Value Ref Range & Units Status    Color Urine Yellow  Final    Appearance Urine Clear  Final    Glucose Urine Negative NEG^Negative mg/dL Final    Bilirubin Urine Negative NEG^Negative Final    Ketones Urine Negative NEG^Negative mg/dL Final    Specific Gravity Urine 1.009 1.003 - 1.035 Final    Blood Urine Negative NEG^Negative Final    pH Urine 6.0 5.0 - 7.0 pH Final    Protein Albumin Urine Negative NEG^Negative mg/dL Final    Urobilinogen mg/dL Normal 0.0 - 2.0 mg/dL Final    Nitrite Urine Negative NEG^Negative Final    Leukocyte Esterase Urine Negative NEG^Negative Final    Source Midstream Urine  Final    WBC Urine 1 0 - 5 /HPF Final    RBC Urine <1 0 - 2 /HPF Final    Mucous Urine Present (A) NEG^Negative /LPF Final         7/27/2018  1:35 PM      Narrative     CT OF THE HEAD WITHOUT CONTRAST 7/27/2018 1:27 PM     COMPARISON: Brain MRI 7/13/2018    HISTORY: Recent NSG, today with seizure like activity.     TECHNIQUE: Axial CT images of the head from the skull base to the  vertex were acquired without IV contrast.    FINDINGS: Recent postoperative changes consisting of a right frontal  craniotomy and resection of a large portion of the anterior right  frontal lobe again noted. Surgical resection cavity remains filled  with CSF density. Previously described extra-axial pneumocephalus has  resolved. The ventricles and basal cisterns are within normal limits  in configuration.  There is no midline shift. There are no extra-axial  fluid collections.    No intracranial hemorrhage, mass or recent infarct.    The visualized paranasal sinuses are well-aerated. There is no  mastoiditis. There are no fractures of the visualized bones.        Impression     IMPRESSION:   1. Postoperative changes including a right frontal craniotomy for  resection of a large portion of the anterior aspect of the right  frontal lobe again noted.  2. Otherwise, normal head CT. No evidence for acute intracranial  pathology.      Radiation dose for this scan was reduced using automated exposure  control, adjustment of the mA and/or kV according to patient size, or  iterative reconstruction technique.         7/27/2018  1:30 PM                Clinical Quality Measure: Blood Pressure Screening     Your blood pressure was checked while you were in the emergency department today. The last reading we obtained was  BP: 111/86 . Please read the guidelines below about what these numbers mean and what you should do about them.  If your systolic blood pressure (the top number) is less than 120 and your diastolic blood pressure (the bottom number) is less than 80, then your blood pressure is normal. There is nothing more that you need to do about it.  If your systolic blood pressure (the top number) is 120-139 or your diastolic blood pressure (the bottom number) is 80-89, your blood pressure may be higher than it should be. You should have your blood pressure rechecked within a year by a primary care provider.  If your systolic blood pressure (the top number) is 140 or greater or your diastolic blood pressure (the bottom number) is 90 or greater, you may have high blood pressure. High blood pressure is treatable, but if left untreated over time it can put you at risk for heart attack, stroke, or kidney failure. You should have your blood pressure rechecked by a primary care provider within the next 4 weeks.  If your provider in  the emergency department today gave you specific instructions to follow-up with your doctor or provider even sooner than that, you should follow that instruction and not wait for up to 4 weeks for your follow-up visit.        Thank you for choosing [facility]       Thank you for choosing [facility] for your care. Our goal is always to provide you with excellent care. Hearing back from our patients is one way we can continue to improve our services. Please take a few minutes to complete the written survey that you may receive in the mail after you visit with us. Thank you!        [employer] Information     [employer] gives you secure access to your electronic health record. If you see a primary care provider, you can also send messages to your care team and make appointments. If you have questions, please call your primary care clinic.  If you do not have a primary care provider, please call [phone] and they will assist you.        Care EveryWhere ID     This is your Care EveryWhere ID. This could be used by other organizations to access your [facility] medical records  [id number]        Equal Access to Services     KAYLIE MARIE : Abril Orellana, yuan day, qamigdalia jack, es arriaga . So [facility] [phone].    ATENCIÓN: Si habla español, tiene a kirby disposición servicios gratuitos de asistencia lingüística. Llame al [phone].    We comply with applicable federal civil rights laws and [place] laws. We do not discriminate on the basis of race, color, national origin, age, disability, sex, sexual orientation, or gender identity.            After Visit Summary       This is your record. Keep this with you and show to your community pharmacist(s) and doctor(s) at your next visit.

## 2018-07-27 NOTE — DISCHARGE INSTRUCTIONS
"  Recurrent Seizure (Adult)    You have had another seizure today. A common cause of seizures that keep happening (recurrent seizures) is missing doses of seizure medicine. But sometimes seizures are hard to control even when you take the medicine correctly. If this is the case for you, your healthcare provider may need to increase your dosage. Or you may need to add or change to another medicine.  Home care  Follow these tips when caring for yourself at home. For this seizure:    Seizures aren t predictable. So avoid doing anything that might cause danger to you or other people if you have another seizure. Until the seizures are under good control, take these precautions:  ? Don t drive, ride a motorcycle, or ride a bike.  ? Don t operate dangerous equipment such as power tools  ? Take showers instead of baths.  ? Don t swim or climb ladders, trees, or roofs.    Tell your close friends and relatives about your seizure. Teach them what to do for you if it happens again.    If medicine was prescribed to prevent seizures, take it exactly as directed. It does not work when taken \"as needed.\" Missing doses will increase the risk of having another seizure.    If you miss a dose, take the missed dose as soon as you remember. If it is almost time for your next dose, skip the missed dose. Restart the medicine at your next scheduled time. Don t take extra medicine to make up the missed dose.    Wear a \"Medic-Alert\" bracelet to let emergency personnel know about your condition.    Follow a regular sleep schedule such that you get at least 6 to 8 hours of restful sleep every night. This is especially important when you are sick with a cold or flu and/or another type of infection.  For future seizures, if you are alone:  If you feel a seizure coming on, lie down on a bed or on the floor with something soft under your head. Lie on your left side, not on your back. This will keep you from falling. It will also let fluid drain out " of your mouth and prevent choking. Be sure you are clear of any objects that might injure you during the seizure. Call for help if there is time.  For future seizures, if someone is with you:  The person should help you get into a safe position and call for help. The person shouldn t try to force anything in your mouth once the seizure begins. This could harm your teeth or jaw.  Follow-up care  Follow up with your healthcare provider. Keep a seizure calendar to record how often you have a seizure. If you are being started on anti-seizure medicine, make sure that you use additional birth control. Seizure medicine can affect how well birth control pills work, and you could become pregnant. Avoid alcohol until your doctor tells you it s OK.  Note: For the safety of yourself and others on the road, certain states require that the treating doctor tell the Public Health Department about any adult who is treated for a seizure and is at risk of more seizures. In this case, the Department of Motor Vehicles will be told. A restriction will be put on your  s license until a doctor gives you medical clearance to drive again. Contact your treating doctor to find out if your state requires the reporting of patients with a seizures condition.  When to seek medical advice  Call your healthcare provider right away if any of these occur:    Seizures happen more often or last longer than usual    A seizure lasts over 5 minutes    You don t wake up between seizures    Confusion that lasts more than 30 minutes after a seizure    Injury during a seizure    Fever over 100.4 F (38.0 C), or as advised    Unusual irritability, drowsiness, or confusion    Stiff or painful neck    Headache that gets worse   Date Last Reviewed: 8/1/2016 2000-2017 [employer]. [address] [place] [address], [place] [address]. All rights reserved. This information is not intended as a substitute for professional medical care. Always follow your  healthcare professional's instructions.

## 2018-07-28 LAB — TOPIRAMATE SERPL-MCNC: 6.3 UG/ML (ref 5–20)

## 2018-08-06 ENCOUNTER — HOSPITAL ENCOUNTER (OUTPATIENT)
Dept: OCCUPATIONAL THERAPY | Facility: CLINIC | Age: 48
Setting detail: THERAPIES SERIES
End: 2018-08-06
Attending: NURSE PRACTITIONER
Payer: MEDICARE

## 2018-08-06 PROCEDURE — G8990 OTHER PT/OT CURRENT STATUS: HCPCS | Mod: GO,CK | Performed by: OCCUPATIONAL THERAPIST

## 2018-08-06 PROCEDURE — 96125 COGNITIVE TEST BY HC PRO: CPT | Mod: GO | Performed by: OCCUPATIONAL THERAPIST

## 2018-08-06 PROCEDURE — G8991 OTHER PT/OT GOAL STATUS: HCPCS | Mod: GO,CJ | Performed by: OCCUPATIONAL THERAPIST

## 2018-08-06 PROCEDURE — 40000125 ZZHC STATISTIC OT OUTPT VISIT: Performed by: OCCUPATIONAL THERAPIST

## 2018-08-06 PROCEDURE — 97535 SELF CARE MNGMENT TRAINING: CPT | Mod: GO | Performed by: OCCUPATIONAL THERAPIST

## 2018-08-06 PROCEDURE — 97166 OT EVAL MOD COMPLEX 45 MIN: CPT | Mod: GO | Performed by: OCCUPATIONAL THERAPIST

## 2018-08-06 NOTE — PROGRESS NOTES
Cognitive Assessment of [place]    SUMMARY OF TEST:  The Cognitive Assessment of [place] (CAM) is a standardized measure used to assess cognitive abilities and deficits.  The CAM is formatted to assess cognitive skills in the areas of attention, orientation, memory, following directions, temporal awareness, discrimination, sequencing, calculation, planning, verbal safety/judgment, problem solving and abstract reasoning.    DATE OF TESTIN18    RESULTS OF TESTING:    The patient scores with no impairment in Attention, Remote memory, Appropriate yes/no, One step verbal directions, Written directions, Immediate auditory memory, Immediate motor memory, Temporal awareness, Matching, Object identification, Auditory recall/recognition, Motor recall/recognition, Planning, Simple problem solving and Safety/judgment    The patient scores with mild impairment in Recent memory, Visual neglect, Visual memory/sequencing backward and Auditory memory/sequencing backward    The patient scores with moderate impairment in Auditory memory/sequencing forward, Money skills mental manipulation, Single digit math skills, Moderate problem solving and Mental flexibility    The patient scores with severe impairment in Visual memory/sequencing forward, Multiple digit math skills, Complex problem solving and Abstract reasoning    Test results comments: Patient demonstrating decreased insight into the extent of his deficits.  With visual neglect task, patient scanned from right to left (abnormal) and initially missed four lines on the left side of the page.  Patient eventually able to find the lines he missed prior to stating he was done.  Recommend further assessment of visual attention on the left side.  Patient reports he utilized repetition for the auditory recall - utilized with good success.  With complex problem solving task, patient disorganized with approach and identified only 5 of 9 details.    INTERPRETATION OF TEST  RESULTS:      Patient demonstrating deficits in areas of visual and auditory memory, recent memory, money and math skills, concrete problem solving skills, abstract reasoning and possibly visual attention.  These skills are all necessary to perform his ADLs and IADLs independently and safely, including medication management, meal prep, financial management, etc.    Factors affecting performance:    No additional problems noted     Recommendations: Continue skilled occupational therapy services to address the above deficit areas to maximize his ADL and IADL independence and safety and for less dependence on others.  Based on results today, recommend supervision with all stove and oven tasks, all financial tasks and medication management.  Do not feel that 24 supervision is indicated if patient is able to follow these recommendations and his home is set up accordingly.  Do recommend daily physical check-ins by family and/or friends.  Supervision and assist levels to be updated with further occupational therapy sessions/intervention.    TIME ADMINISTERING TEST: 32 minutes    TIME FOR INTERPRETATION AND PREPARATION OF REPORT: 15 minutes    TOTAL TIME: 47 minutes

## 2018-08-07 ENCOUNTER — OFFICE VISIT (OUTPATIENT)
Dept: NEUROSURGERY | Facility: CLINIC | Age: 48
End: 2018-08-07
Attending: PHYSICIAN ASSISTANT
Payer: MEDICARE

## 2018-08-07 VITALS
SYSTOLIC BLOOD PRESSURE: 108 MMHG | TEMPERATURE: 98.1 F | DIASTOLIC BLOOD PRESSURE: 71 MMHG | HEART RATE: 73 BPM | OXYGEN SATURATION: 99 %

## 2018-08-07 DIAGNOSIS — Z98.890 STATUS POST CRANIOTOMY: Primary | ICD-10-CM

## 2018-08-07 PROCEDURE — G0463 HOSPITAL OUTPT CLINIC VISIT: HCPCS

## 2018-08-07 PROCEDURE — 99024 POSTOP FOLLOW-UP VISIT: CPT | Performed by: PHYSICIAN ASSISTANT

## 2018-08-07 ASSESSMENT — PAIN SCALES - GENERAL: PAINLEVEL: NO PAIN (0)

## 2018-08-07 NOTE — MR AVS SNAPSHOT
After Visit Summary   8/7/2018    Joni Borja    MRN: [id number]           Patient Information     Date Of Birth          1970        Visit Information        Provider Department      8/7/2018 3:10 PM Carley Auguste PA-C [facility]        Care Instructions    -continue follow up with Dr. Daniels  -followup at 3 months post op with Dr. Oscar  - Call the clinic at [phone] for increased pain or any other questions and concerns.  - Go to ER with any seizure activity, mental status change (increasing confusion), difficulty with speech or increasing or acute weakness           Follow-ups after your visit        Your next 10 appointments already scheduled     Oct 15, 2018  3:00 PM CDT   Return Visit with Stuart Oscar MD   [facility] ([facility])    [address] [place] [address] [id number] [phone]            Nov 12, 2018  1:20 PM CST   Office Visit with Brian [facility]on MD   [facility]    [address] [place] [address] [phone]           Bring a current list of meds and any records pertaining to this visit. For Physicals, please bring immunization records and any forms needing to be filled out. Please arrive 10 minutes early to complete paperwork.              Who to contact     If you have questions or need follow up information about today's clinic visit or your schedule please contact [facility] directly at [phone].  Normal or non-critical lab and imaging results will be communicated to you by MyChart, letter or phone within 4 business days after the clinic has received the results. If you do not hear from us within 7 days, please contact the clinic through MyChart or phone. If you have a critical or abnormal lab result, we will notify you by phone as soon as possible.  Submit refill  requests through [employer] or call your pharmacy and they will forward the refill request to us. Please allow 3 business days for your refill to be completed.          Additional Information About Your Visit        [employer] Information     [employer] gives you secure access to your electronic health record. If you see a primary care provider, you can also send messages to your care team and make appointments. If you have questions, please call your primary care clinic.  If you do not have a primary care provider, please call [phone] and they will assist you.        Care EveryWhere ID     This is your Care EveryWhere ID. This could be used by other organizations to access your [facility] medical records  [id number]        Your Vitals Were     Pulse Temperature Pulse Oximetry             73 98.1  F (36.7  C) (Oral) 99%          Blood Pressure from Last 3 Encounters:   08/07/18 108/71   07/27/18 (!) 120/91   07/26/18 109/74    Weight from Last 3 Encounters:   07/27/18 165 lb (74.8 kg)   07/14/18 171 lb 1.2 oz (77.6 kg)   07/10/18 165 lb (74.8 kg)              Today, you had the following     No orders found for display         Today's Medication Changes          These changes are accurate as of 8/7/18  3:12 PM.  If you have any questions, ask your nurse or doctor.               These medicines have changed or have updated prescriptions.        Dose/Directions    carBAMazepine 300 MG 12 hr capsule   Commonly known as:  CARBATROL   This may have changed:  additional instructions   Used for:  Partial epilepsy with impairment of consciousness, intractable (H)        Dose:  300 mg   Take 1 capsule (300 mg) by mouth 2 times daily (at 10:00 & 22:00)   Quantity:  180 capsule   Refills:  3       escitalopram 20 MG tablet   Commonly known as:  LEXAPRO   This may have changed:    - how much to take  - how to take this  - when to take this  - additional instructions   Used for:  History of depression, Anxiety state        TAKE ONE  TABLET BY MOUTH ONCE DAILY AT  NIGHT   Quantity:  90 tablet   Refills:  3       felbamate 600 MG tablet   Commonly known as:  FELBATOL   This may have changed:    - how much to take  - how to take this  - when to take this  - additional instructions   Used for:  Partial epilepsy with impairment of consciousness, intractable (H)        Take 900 mg (1.5 tablet) am and 900 mg (1.5 tablet) 2 pm   Quantity:  270 tablet   Refills:  3       Riboflavin 400 MG Tabs   This may have changed:  when to take this   Used for:  Chronic daily headache        Dose:  400 mg   Take 400 mg by mouth daily   Quantity:  90 tablet   Refills:  3                Primary Care Provider Office Phone # Fax #    Brian Coon [phone]       [address] [place] [id number]        Equal Access to Services     KAYLIE MARIE : Abril Orellana, waleonidas luqadaha, qaybta kaalmada adevelvet, es arriaga . [facility] [phone].    ATENCIÓN: Si habla español, tiene a kirby disposición servicios gratuitos de asistencia lingüística. [facility] [phone].    We comply with applicable federal civil rights laws and [place] laws. We do not discriminate on the basis of race, color, national origin, age, disability, sex, sexual orientation, or gender identity.            Thank you!     Thank you for choosing [facility]  for your care. Our goal is always to provide you with excellent care. Hearing back from our patients is one way we can continue to improve our services. Please take a few minutes to complete the written survey that you may receive in the mail after your visit with us. Thank you!             Your Updated Medication List - Protect others around you: Learn how to safely use, store and throw away your medicines at www.disposemymeds.org.          This list is accurate as of 8/7/18  3:12 PM.  Always use your most recent med list.                   Brand  Name Dispense Instructions for use Diagnosis    carBAMazepine 300 MG 12 hr capsule    CARBATROL    180 capsule    Take 1 capsule (300 mg) by mouth 2 times daily (at 10:00 & 22:00)    Partial epilepsy with impairment of consciousness, intractable (H)       cetirizine 10 MG tablet    ZYRTEC ALLERGY    30 tablet    Take 1 tablet (10 mg) by mouth daily as needed (for mosquitos.)    Reaction to insect bite       cyclobenzaprine 10 MG tablet    FLEXERIL    90 tablet    Take 1 tablet (10 mg) by mouth 3 times daily as needed for muscle spasms    S/P brain surgery       * dexamethasone 4 MG tablet    DECADRON    13 tablet    Take 1 tablet (4 mg) by mouth 4 times daily    S/P brain surgery       * dexamethasone 2 MG tablet    DECADRON    30 tablet    Take 1 tablet (2 mg) by mouth every 6 hours    S/P brain surgery       escitalopram 20 MG tablet    LEXAPRO    90 tablet    TAKE ONE TABLET BY MOUTH ONCE DAILY AT  NIGHT    History of depression, Anxiety state       felbamate 600 MG tablet    FELBATOL    270 tablet    Take 900 mg (1.5 tablet) am and 900 mg (1.5 tablet) 2 pm    Partial epilepsy with impairment of consciousness, intractable (H)       hydrocortisone 0.5 % cream      Apply topically daily as needed for itching        hydrOXYzine 25 MG tablet    ATARAX    60 tablet    Take 1 tablet (25 mg) by mouth every 6 hours as needed for itching    S/P brain surgery       * KLONOPIN PO      Take 0.5 mg by mouth daily        * KLONOPIN PO      Take 1 mg by mouth At Bedtime (2 x 0.5 mg = 1 mg dose)        ondansetron 4 MG ODT tab    ZOFRAN-ODT    120 tablet    Take 1 tablet (4 mg) by mouth every 6 hours as needed for nausea or vomiting    Nausea       oxyCODONE IR 5 MG tablet    ROXICODONE    75 tablet    Take 1-2 tablets (5-10 mg) by mouth every 3 hours as needed for other (pain control or improvement in physical function. Hold dose for analgesic side effects.)    S/P brain surgery       pantoprazole 40 MG EC tablet    PROTONIX     90 tablet    TAKE ONE TABLET BY MOUTH ONCE DAILY    Gastroesophageal reflux disease without esophagitis       Riboflavin 400 MG Tabs     90 tablet    Take 400 mg by mouth daily    Chronic daily headache       * TOPAMAX PO      Take 200 mg by mouth daily (2 x 100 mg = 200 mg dose)        * TOPAMAX PO      Take 300 mg by mouth At Bedtime (3 x 100 mg = 300 mg dose)        TYLENOL PO      Take 500 mg by mouth 2 times daily as needed for mild pain or fever        VITAMIN B 12 PO      Take 1 tablet by mouth daily (with dinner)        * Notice:  This list has 6 medication(s) that are the same as other medications prescribed for you. Read the directions carefully, and ask your doctor or other care provider to review them with you.

## 2018-08-07 NOTE — NURSING NOTE
"Joni Borja is a 47 year old male who presents for:  Chief Complaint   Patient presents with     Neurologic Problem     4 week f/u S/P Craniotomy 7/13/2018        Vitals:    Vitals:    08/07/18 1505   BP: 108/71   BP Location: Right arm   Cuff Size: Adult Regular   Pulse: 73   Temp: 98.1  F (36.7  C)   TempSrc: Oral   SpO2: 99%       BMI:  Estimated body mass index is 24.37 kg/(m^2) as calculated from the following:    Height as of 7/13/18: 1.753 m (5' 9\").    Weight as of 7/27/18: 74.8 kg (165 lb).    Pain Score:  Data Unavailable        Kaley Lanza LPN      "

## 2018-08-07 NOTE — PATIENT INSTRUCTIONS
-continue follow up with Dr. Daniels  -followup at 3 months post op with Dr. Oscar  - Call the clinic at [phone] for increased pain or any other questions and concerns.  - Go to ER with any seizure activity, mental status change (increasing confusion), difficulty with speech or increasing or acute weakness

## 2018-08-07 NOTE — PROGRESS NOTES
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 States he has been doing well. He does get intermittent headaches, but states this is well controlled with tylenol. He did have 5-10 minute seizure per patient's mom on 7/27 and was evaluated at [facility] ED with unremarkable workup. He has not had any other episodes like this. Has not followed up with Dr. Daniels since his 2nd surgery. [place], but will return for appointments. Denies other neurologic changes.    Exam:  Constitutional:  Alert, well nourished, NAD.  HEENT: Normocephalic, atraumatic.   Pulm:  Without shortness of breath or audible adventitious respiratory sounds.  CV:  No pitting edema of BLE.  Brisk capillary refill, CMS intact.    Neurological:  Awake  Alert  Oriented x 3  Speech clear  Cranial nerves II - XII intact  PERRL  EOMI  Face symmetric  Tongue midline  Motor exam: 5/5 strength in all four extremities.   Sensation: intact  Finger to Nose: smooth  Pronator drift: negative  Gait: Able to stand from a seated position. Normal non-antalgic, non-myelopathic gait.  Incisions: Nicely healed.  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 Has been doing well post operatively. Incision nicely healed. Neuro intact on exam. We did discuss symptoms to monitor for. He will contact Dr. Daniels's office to determine when he should be seen in follow up. Will have him f/u 3 months post op with Dr. Oscar. Patient voiced understanding and agreement.  -continue follow up with Dr. Daniels  -followup at 3 months post op with Dr. Oscar  - Call the clinic at [phone] for increased pain or any other questions and concerns.  - Go to ER with any seizure activity, mental status change (increasing confusion), difficulty with speech or increasing or acute weakness         Carley Auguste PA-C  [facility]  [facility] [place]  [address]  [address]  [place] [address]    Tel [phone]  Pager [phone]

## 2018-08-07 NOTE — LETTER
8/7/2018         RE: Joni Borja  [id number] [facility] [address]        Dear Colleague,    Thank you for referring your patient, Joni Borja, to the [facility]. Please see a copy of my visit note below.    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 States he has been doing well. He does get intermittent headaches, but states this is well controlled with tylenol. He did have 5-10 minute seizure per patient's mom on 7/27 and was evaluated at [facility] ED with unremarkable workup. He has not had any other episodes like this. Has not followed up with Dr. Daniels since his 2nd surgery. He does [place] of [place], but will return for appointments. Denies other neurologic changes.    Exam:  Constitutional:  Alert, well nourished, NAD.  HEENT: Normocephalic, atraumatic.   Pulm:  Without shortness of breath or audible adventitious respiratory sounds.  CV:  No pitting edema of BLE.  Brisk capillary refill, CMS intact.    Neurological:  Awake  Alert  Oriented x 3  Speech clear  Cranial nerves II - XII intact  PERRL  EOMI  Face symmetric  Tongue midline  Motor exam: 5/5 strength in all four extremities.   Sensation: intact  Finger to Nose: smooth  Pronator drift: negative  Gait: Able to stand from a seated position. Normal non-antalgic, non-myelopathic gait.  Incisions: Nicely healed.  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 Has been doing well post operatively. Incision nicely healed. Neuro intact on exam. We did discuss symptoms to monitor for. He will contact Dr. Daniels's office to determine when he should be seen in follow up. Will have him f/u 3 months post op with Dr. Oscar. Patient voiced understanding and agreement.  -continue follow up with Dr. Daniels  -followup at 3 months post op with Dr. Oscar  - Call the clinic at [phone] for increased pain  or any other questions and concerns.  - Go to ER with any seizure activity, mental status change (increasing confusion), difficulty with speech or increasing or acute weakness         Carley Auguste PA-C  [facility]  [address] [address]    Tel [phone]  Pager [phone]        Again, thank you for allowing me to participate in the care of your patient.        Sincerely,        Carley Auguste PA-C

## 2018-08-08 ENCOUNTER — TELEPHONE (OUTPATIENT)
Dept: ONCOLOGY | Facility: CLINIC | Age: 48
End: 2018-08-08

## 2018-08-08 DIAGNOSIS — C71.9 OLIGODENDROGLIOMA (H): Primary | ICD-10-CM

## 2018-08-08 NOTE — TELEPHONE ENCOUNTER
[facility] results have not been obtained for tissue specimen dated 7/13/18.  I will dig into this further to see where the specimen is at in processing- last update was that we were waiting on pathology to send us the tissue on 7/19/18.     Lauren Aase, RN [facility]/ Renetta Ph. [phone]

## 2018-08-08 NOTE — TELEPHONE ENCOUNTER
Order placed for neuropathy consult at the [facility]. Called pathology and request was received. Requested pathologist Brenda Valdovinos to read. Called patient's mother Lakia and patient will be scheduled to see Dr. Daniels this Tuesday 8/14/18 at 1000 AM. Patient is planning on moving up North to [facility], however Lakia stated that he is willing to drive back to the [facility] for his appointments with Dr. Daniels.

## 2018-08-08 NOTE — TELEPHONE ENCOUNTER
"Patient's mother, Brittany [phone], called inquirng about patient's follow up. He had a craniotomy resection on 7/13 and has no future appointments with Dr. Daniels. She also states patient plans to go \"[place] north\" on 8/18.    Message routed to Sunitha BANDA RN, Leeanna FRAZIER, and SHELLIE Daniels. Lynn Montgomery    "

## 2018-08-08 NOTE — PROGRESS NOTES
" 08/06/18 1300   Quick Adds   Quick Adds Certification   Type of Visit Initial Outpatient Occupational Therapy Evaluation   General Information   Start Of Care Date 08/06/18   Referring Physician Yolanda Beltran APRN CNP    Orders Evaluate and treat as indicated  (Cognition Assessment)   Orders Date 07/15/18   Medical Diagnosis S/P brain surgery    Onset of Illness/Injury or Date of Surgery 07/13/18   Special Instructions Discharge instructions included: No lifting of more than 10 pounds, no bending, no twisting until follow up visit. No high impact activities such as; running/jogging, snowmobile or 4 benrard riding or any other recreational vehicles.  See discharge summary for other instructions and restrictions   Surgical/Medical History Reviewed Yes   Additional Occupational Profile Info/Pertinent History of Current Problem Patient with recent craniotomy on 7/13/18 due to recurrent right frontal brain tumor, was discharged to parents home on 7/15/18. Patient reports he had a grand mal seizure 1 week after surgery - see EPIC ED report.  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   Comments/Observations Brittany, his mom, present for session.  She reports he seems about the same re: physical and cognitive status since his surgery except he seems \"more mellow.\"  Has lost " "60# in past 1 1/2 years - may be related to medications he is on.  Mom concerned about him remembering to eat.  She is questioning if he needs 24-hour supervision.   Role/Living Environment   Current Community Support Family/friend caregiver  (parents, friends)   Patient role/Employment history Disabled  ( - disabled since 2002 or 2003)   Community/Avocational Activities living with parents since Feb of 2017 - was going towards divorce at this time with his wife and so patient ended up staying with his parents; now reconciling with wife and plans to move back to [place] in the next couple weeks; [place] - likes to [place]; did return to work since initial surgery in 2002 at one point as disability said he could work and MD said he couldn't work and hasn't worked since then; does  work for friends   Current Living Environment House  (his house: 4 level split)   Primary Bathroom Location/Comments walk-in shower in master bedroom   [place]/Community Accessibility [place]; plans to re-do his walk-in shower - plans to install grab bars   Role/Living Environment Comments Per his mom, patient has \"gotten out of the habit of cooking\" since living with parents; mom does help set up medications but he does as well and she reports he does pretty well with his medications - he sets phone alarm; he had DWI in 2015 and hasn't driven since then.  States NO Driving in his chart due to seizures.   Patient/family Goals Statement On health intake form, reports main deficit is memory.  Be as independent as possible, he is a \"person on the go\"   Pain   Patient currently in pain Yes   Pain location H/A   Pain rating 4   Pain comments has H/A's fairly frequently   Fall Risk Screen   Have you fallen 2 or more times in the past year? Yes   Have you fallen and had an injury in the past year? Yes  (hit his head, no known medical care sought after)   Is patient a fall risk? (PT " "did balance screen in hospital - was not in risk category)   Fall screen comments no known falls due to balance - 1 with seizure, 1 when he was drunk - hit his head   Cognitive Status Examination   Orientation Orientation to person, place and time   Level of Consciousness Alert   Follows Commands and Answers Questions 100% of the time;Able to follow single-step instructions   Personal Safety and Judgment At risk behaviors demonstrated  (see CAM)   Memory Impaired   Memory Comments has always had a hard time with short term memory per patient; mom reports she notices he takes longer to process information and respond at times   Cognitive Comment Patient appropriate for standardized cognitive testing.  Please see separate write up for the Cognitive Assessment of [place] (CAM).  Patient may also be appropriate for the Cognitive Performance Test in a future session to assist in determining supervision and assist levels.   Visual Perception   Visual Perception Comments never had to wear glasses but \"should have an exam soon;\" no double vision; small print getting harder to see - likely due to age per his report; has magnifier - hard to read cell phone at times; needs further vision screen   Sensation   Sensation Comments no c/o   Range of Motion (ROM)   ROM Quick Adds No deficits identified   Strength   Strength Comments 10# lifting restrictions; MMT not completed due to restrictions and time constraints; able to raise UE's anti-gravity without difficulty and no complaints of weakness per patient report   Hand Strength   Hand Dominance Right   Coordination   Upper Extremity Coordination No deficits were identified  (Per brief screening)   Functional Mobility   Functional Mobility Comments Slightly abnormal gait pattern; history of hip issues; see recent physical therapy note while patient hospitalized; modified independent without adaptive device   Bathing   Bathing Comments Patient reports independent and demonstrates " "necessary skills   Upper Body Dressing   Upper Body Dressing Comments Patient reports independent and demonstrates necessary skills   Lower Body Dressing   Lower Body Dressing Comments Patient reports independent and demonstrates necessary skills   Toileting   Toileting Comments Patient reports independent and demonstrates necessary skills   Grooming   Level of [place]: Grooming independent   Eating/Self-Feeding   Level of [place]: Eating independent   Activity Tolerance   Activity Tolerance naps after morning medications and breakfast; has a hard time sleeping at night, \"Type A\" personality; mom reports he tends to sleep more during the day and up more at night   Planned Therapy Interventions   Planned Therapy Interventions ADL training;IADL training;Cognitive performance testing;Self care/Home management;Visual perception   Adult OT Eval Goals   OT Eval Goals (Adult) 1;2;3;4;5   OT Goal 1   Goal Identifier memory skills   Goal Description Patient will demonstrate improved memory skills by completing short-term memory tasks in occupational therapy with 90% accuracy consistently using compensatory strategies for increased safety and independence with ADL/IADLS (remembering to take medications, turn off the stove when done, remember medical appointments and medical recommendations).   Target Date 10/01/18   OT Goal 2   Goal Identifier problem solving and numercial reasoning skills   Goal Description Patient will demonstrate the ability to complete simple to moderately complex money management tasks with 90% accuracy for increased numerical reasoning and problem solving skills to resume personal finances accurately and independently.   Target Date 10/01/18   OT Goal 3   Goal Identifier meal prep   Goal Description Patient to complete a familiar, simple to moderately complex meal prep task using the stove and/or oven with modified independence, for increased safety and independence in the home.   Target Date " 10/01/18   OT Goal 4   Goal Identifier medication management   Goal Description Patient will independently set up personal medications, using a pillbox, on 2 separate occasions for increased accuracy and independence with managing medications.   Target Date 10/01/18   OT Goal 5   Goal Identifier visual scanning skills   Goal Description Patient will demonstrate WFLs visual scanning skills (both peripersonal and extrapersonal space) for safe and independent community mobility (crossing the street, grocery shopping) and accurate pen/paper tasks by scoring 90% accuracy on visual scanning tasks.   Target Date 10/01/18   Clinical Impression   Criteria for Skilled Therapeutic Interventions Met Yes, treatment indicated   OT Diagnosis Decreased ADL/IADL independence   Influenced by the following impairments Decreased cognition, questionable visual perceptual deficits   Assessment of Occupational Performance 3-5 Performance Deficits   Identified Performance Deficits Decreased accuracy and skills for the following: Medication management, financial management, community mobility, higher-level meal prep,   Clinical Decision Making (Complexity) Moderate complexity   Therapy Frequency 2x/week, decreasing frequency prn   Predicted Duration of Therapy Intervention (days/wks) 8 weeks   Risks and Benefits of Treatment have been explained. Yes   Patient, Family & other staff in agreement with plan of care Yes   Education Assessment   Barriers To Learning Physical;Cognitive   Preferred Learning Style Listening;Demonstration;Pictures/video   Therapy Certification   Certification date from 08/06/18   Certification date to 10/01/18   Certification I certify the need for these services furnished under this plan of treatment and while under my care.  (Physician co-signature of this document indicates review and certification of the therapy plan)   Total Evaluation Time   Total Evaluation Time 41

## 2018-08-08 NOTE — TELEPHONE ENCOUNTER
Update from Pathology specimen was shipped 8/7/18 and is currently in the mail.     Lauren Aase, RN [facility]/ Renetta Ph. [phone]

## 2018-08-08 NOTE — PROGRESS NOTES
[facility]          OUTPATIENT OCCUPATIONAL THERAPY  EVALUATION  PLAN OF TREATMENT FOR OUTPATIENT REHABILITATION  (COMPLETE FOR INITIAL CLAIMS ONLY)  Patient's Last Name, First Name, M.I.  YOB: 1970  HuongJoni  LEANDER                        Provider's Name  [facility] Medical Record No.  [id number]                               Onset Date:     07/13/18   Start of Care Date:     08/06/18   Type:     ___PT   _X_OT   ___SLP Medical Diagnosis:     S/P brain surgery                           OT Diagnosis:     Decreased ADL/IADL independence Visits from SOC:  1   _________________________________________________________________________________  Plan of Treatment/Functional Goals:  ADL training, IADL training, Cognitive performance testing, Self care/Home management, Visual perception           Goals  Goal Identifier: memory skills  Goal Description: Patient will demonstrate improved memory skills by completing short-term memory tasks in occupational therapy with 90% accuracy consistently using compensatory strategies for increased safety and independence with ADL/IADLS (remembering to take medications, turn off the stove when done, remember medical appointments and medical recommendations).  Target Date: 10/01/18     Goal Identifier: problem solving and numercial reasoning skills  Goal Description: Patient will demonstrate the ability to complete simple to moderately complex money management tasks with 90% accuracy for increased numerical reasoning and problem solving skills to resume personal finances accurately and independently.  Target Date: 10/01/18     Goal Identifier: meal prep  Goal Description: Patient to complete a familiar, simple to moderately complex meal prep task using the stove and/or oven with modified independence, for increased safety and independence in the  home.  Target Date: 10/01/18     Goal Identifier: medication management  Goal Description: Patient will independently set up personal medications, using a pillbox, on 2 separate occasions for increased accuracy and independence with managing medications.  Target Date: 10/01/18     Goal Identifier: visual scanning skills  Goal Description: Patient will demonstrate WFLs visual scanning skills (both peripersonal and extrapersonal space) for safe and independent community mobility (crossing the street, grocery shopping) and accurate pen/paper tasks by scoring 90% accuracy on visual scanning tasks.  Target Date: 10/01/18              Therapy Frequency: 2x/week, decreasing frequency prn     Predicted Duration of Therapy Intervention (days/wks): 8 weeks  Jenni Ward, OTR/L, OT          I CERTIFY THE NEED FOR THESE SERVICES FURNISHED UNDER        THIS PLAN OF TREATMENT AND WHILE UNDER MY CARE     (Physician co-signature of this document indicates review and certification of the therapy plan).                 ,    Certification date from: 08/06/18, Certification date to: 10/01/18               Referring Physician: Yolanda Beltran APRN CNP      Initial Assessment        See Epic Evaluation      Start Of Care Date: 08/06/18

## 2018-08-09 ENCOUNTER — HOSPITAL PATHOLOGY (OUTPATIENT)
Dept: OTHER | Facility: CLINIC | Age: 48
End: 2018-08-09

## 2018-08-09 NOTE — TELEPHONE ENCOUNTER
Contacted [facility] pathology and consult was received. Slides are at [facility] pathology. Contacted Mike at [facility] pathology and she is aware that slides need to be sent to [facility], attn. Brenda Valdovinos MD. Leeanna Storm RN,BSN,OCN

## 2018-08-14 ENCOUNTER — PRE VISIT (OUTPATIENT)
Dept: RADIATION ONCOLOGY | Facility: CLINIC | Age: 48
End: 2018-08-14

## 2018-08-14 ENCOUNTER — ONCOLOGY VISIT (OUTPATIENT)
Dept: ONCOLOGY | Facility: CLINIC | Age: 48
End: 2018-08-14
Attending: PSYCHIATRY & NEUROLOGY
Payer: COMMERCIAL

## 2018-08-14 VITALS
WEIGHT: 173.6 LBS | TEMPERATURE: 98.7 F | BODY MASS INDEX: 25.64 KG/M2 | HEART RATE: 72 BPM | OXYGEN SATURATION: 98 % | RESPIRATION RATE: 16 BRPM | SYSTOLIC BLOOD PRESSURE: 110 MMHG | DIASTOLIC BLOOD PRESSURE: 75 MMHG

## 2018-08-14 DIAGNOSIS — Z86.59 HISTORY OF DEPRESSION: ICD-10-CM

## 2018-08-14 DIAGNOSIS — Z51.11 CHEMOTHERAPY MANAGEMENT, ENCOUNTER FOR: ICD-10-CM

## 2018-08-14 DIAGNOSIS — R45.89 DEPRESSED MOOD: ICD-10-CM

## 2018-08-14 DIAGNOSIS — Z72.0 TOBACCO ABUSE: ICD-10-CM

## 2018-08-14 DIAGNOSIS — Z96.89 S/P PLACEMENT OF VNS (VAGUS NERVE STIMULATION) DEVICE: ICD-10-CM

## 2018-08-14 DIAGNOSIS — C71.9 OLIGODENDROGLIOMA, ANAPLASTIC (H): Primary | ICD-10-CM

## 2018-08-14 DIAGNOSIS — R51.9 CHRONIC DAILY HEADACHE: ICD-10-CM

## 2018-08-14 DIAGNOSIS — F41.1 ANXIETY STATE: ICD-10-CM

## 2018-08-14 DIAGNOSIS — Z79.899 ENCOUNTER FOR LONG-TERM (CURRENT) USE OF MEDICATIONS: ICD-10-CM

## 2018-08-14 DIAGNOSIS — G40.219 PARTIAL EPILEPSY WITH IMPAIRMENT OF CONSCIOUSNESS, INTRACTABLE (H): ICD-10-CM

## 2018-08-14 PROCEDURE — 99215 OFFICE O/P EST HI 40 MIN: CPT | Performed by: PSYCHIATRY & NEUROLOGY

## 2018-08-14 RX ORDER — CARBAMAZEPINE 300 MG/1
300 CAPSULE, EXTENDED RELEASE ORAL 2 TIMES DAILY
COMMUNITY
Start: 2018-08-14 | End: 2019-01-01

## 2018-08-14 RX ORDER — FELBAMATE 600 MG/1
900 TABLET ORAL 2 TIMES DAILY
COMMUNITY
Start: 2018-08-14 | End: 2019-01-01

## 2018-08-14 RX ORDER — ESCITALOPRAM OXALATE 20 MG/1
20 TABLET ORAL DAILY
COMMUNITY
Start: 2018-08-14 | End: 2019-01-01

## 2018-08-14 RX ORDER — RIBOFLAVIN (VITAMIN B2) 400 MG
400 TABLET ORAL 2 TIMES DAILY
COMMUNITY
Start: 2018-08-14 | End: 2018-10-22

## 2018-08-14 ASSESSMENT — PAIN SCALES - GENERAL: PAINLEVEL: NO PAIN (0)
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
## 2018-08-14 NOTE — TELEPHONE ENCOUNTER
Sent email with YADIEL for patient to sign. [facility] will not release RT records until they receive the YADIEL. I gave the patient the instructions of where to fax it.
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
## 2018-08-14 NOTE — MR AVS SNAPSHOT
After Visit Summary   8/14/2018    Joni Borja    MRN: [id number]           Patient Information     Date Of Birth          1970        Visit Information        Provider Department      8/14/2018 10:00 AM Berkley Daniels MD [facility]        Today's Diagnoses     Oligodendroglioma, anaplastic (H)    -  1      Care Instructions    Plan:  Awaiting results of pathology; degree of active cancer and driving mutations.   Consideration for chemotherapy/ targeted therapy.   Consideration for radiation therapy at [place].  -SW involvement on transportation/ lodging.   -Agree with OT referral in [place].     I will continue to update you as information becomes available.     Berkley Daniels MD  Neuro-oncology  8/14/2018            Follow-ups after your visit        Your next 10 appointments already scheduled     Oct 15, 2018  3:00 PM CDT   Return Visit with Stuart Oscar MD   [facility] ([facility])    [address] [place] [id number] [phone]            Nov 12, 2018  1:20 PM CST   [facility]    [address] [place] [address] [phone] [phone]           Bring a current list of meds and any records pertaining to this visit. For Physicals, please bring immunization records and any forms needing to be filled out. Please arrive 10 minutes early to complete paperwork.              Who to contact     If you have questions or need follow up information about today's clinic visit or your schedule please contact [facility] directly at [phone].  Normal or non-critical lab and imaging results will be communicated to you by MyChart, letter or phone within 4 business days after the clinic has received the results. If you do not hear from us within 7 days, please contact the clinic  through [employer] or phone. If you have a critical or abnormal lab result, we will notify you by phone as soon as possible.  Submit refill requests through [employer] or call your pharmacy and they will forward the refill request to us. Please allow 3 business days for your refill to be completed.          Additional Information About Your Visit        [id number] Information     [employer] gives you secure access to your electronic health record. If you see a primary care provider, you can also send messages to your care team and make appointments. If you have questions, please call your primary care clinic.  If you do not have a primary care provider, please call [phone] and they will assist you.        Care EveryWhere ID     This is your Care EveryWhere ID. This could be used by other organizations to access your [place] medical records  [id number]        Your Vitals Were     Pulse Temperature Respirations Pulse Oximetry BMI (Body Mass Index)       72 98.7  F (37.1  C) (Oral) 16 98% 25.64 kg/m2        Blood Pressure from Last 3 Encounters:   08/14/18 110/75   08/07/18 108/71   07/27/18 (!) 120/91    Weight from Last 3 Encounters:   08/14/18 78.7 kg (173 lb 9.6 oz)   07/27/18 74.8 kg (165 lb)   07/14/18 77.6 kg (171 lb 1.2 oz)              Today, you had the following     No orders found for display         Today's Medication Changes          These changes are accurate as of 8/14/18 10:46 AM.  If you have any questions, ask your nurse or doctor.               These medicines have changed or have updated prescriptions.        Dose/Directions    carBAMazepine 300 MG 12 hr capsule   Commonly known as:  CARBATROL   This may have changed:  additional instructions   Used for:  Partial epilepsy with impairment of consciousness, intractable (H)        Dose:  300 mg   Take 1 capsule (300 mg) by mouth 2 times daily (at 10:00 & 22:00)   Quantity:  180 capsule   Refills:  3       escitalopram 20 MG tablet   Commonly known as:   LEXAPRO   This may have changed:    - how much to take  - how to take this  - when to take this  - additional instructions   Used for:  History of depression, Anxiety state        TAKE ONE TABLET BY MOUTH ONCE DAILY AT  NIGHT   Quantity:  90 tablet   Refills:  3       felbamate 600 MG tablet   Commonly known as:  FELBATOL   This may have changed:    - how much to take  - how to take this  - when to take this  - additional instructions   Used for:  Partial epilepsy with impairment of consciousness, intractable (H)        Take 900 mg (1.5 tablet) am and 900 mg (1.5 tablet) 2 pm   Quantity:  270 tablet   Refills:  3       Riboflavin 400 MG Tabs   This may have changed:  when to take this   Used for:  Chronic daily headache        Dose:  400 mg   Take 400 mg by mouth daily   Quantity:  90 tablet   Refills:  3         Stop taking these medicines if you haven't already. Please contact your care team if you have questions.     dexamethasone 2 MG tablet   Commonly known as:  DECADRON   Stopped by:  Berkley Daniels MD           dexamethasone 4 MG tablet   Commonly known as:  DECADRON   Stopped by:  Berkley Daniels MD                    Primary Care Provider Office Phone # Fax #    Brian Coon [phone]       [address] [place] [address]        Equal Access to Services     DONNA MARIE AH: Abril rioso Sosparkle, waaxda luqadaha, qaybta kaalmada adeegyada, es de la garza. So [facility] [phone].    ATENCIÓN: Si habla español, tiene a kirby disposición servicios gratuitos de asistencia lingüística. Llame al [phone].    We comply with applicable federal civil rights laws and [place] laws. We do not discriminate on the basis of race, color, national origin, age, disability, sex, sexual orientation, or gender identity.            Thank you!     Thank you for choosing [facility]  for your care. Our goal is always to provide  you with excellent care. Hearing back from our patients is one way we can continue to improve our services. Please take a few minutes to complete the written survey that you may receive in the mail after your visit with us. Thank you!             Your Updated Medication List - Protect others around you: Learn how to safely use, store and throw away your medicines at www.disposemymeds.org.          This list is accurate as of 8/14/18 10:46 AM.  Always use your most recent med list.                   Brand Name Dispense Instructions for use Diagnosis    carBAMazepine 300 MG 12 hr capsule    CARBATROL    180 capsule    Take 1 capsule (300 mg) by mouth 2 times daily (at 10:00 & 22:00)    Partial epilepsy with impairment of consciousness, intractable (H)       cetirizine 10 MG tablet    ZYRTEC ALLERGY    30 tablet    Take 1 tablet (10 mg) by mouth daily as needed (for mosquitos.)    Reaction to insect bite       cyclobenzaprine 10 MG tablet    FLEXERIL    90 tablet    Take 1 tablet (10 mg) by mouth 3 times daily as needed for muscle spasms    S/P brain surgery       escitalopram 20 MG tablet    LEXAPRO    90 tablet    TAKE ONE TABLET BY MOUTH ONCE DAILY AT  NIGHT    History of depression, Anxiety state       felbamate 600 MG tablet    FELBATOL    270 tablet    Take 900 mg (1.5 tablet) am and 900 mg (1.5 tablet) 2 pm    Partial epilepsy with impairment of consciousness, intractable (H)       hydrocortisone 0.5 % cream      Apply topically daily as needed for itching        hydrOXYzine 25 MG tablet    ATARAX    60 tablet    Take 1 tablet (25 mg) by mouth every 6 hours as needed for itching    S/P brain surgery       * KLONOPIN PO      Take 0.5 mg by mouth daily        * KLONOPIN PO      Take 1 mg by mouth At Bedtime (2 x 0.5 mg = 1 mg dose)        ondansetron 4 MG ODT tab    ZOFRAN-ODT    120 tablet    Take 1 tablet (4 mg) by mouth every 6 hours as needed for nausea or vomiting    Nausea       oxyCODONE IR 5 MG tablet     ROXICODONE    75 tablet    Take 1-2 tablets (5-10 mg) by mouth every 3 hours as needed for other (pain control or improvement in physical function. Hold dose for analgesic side effects.)    S/P brain surgery       pantoprazole 40 MG EC tablet    PROTONIX    90 tablet    TAKE ONE TABLET BY MOUTH ONCE DAILY    Gastroesophageal reflux disease without esophagitis       Riboflavin 400 MG Tabs     90 tablet    Take 400 mg by mouth daily    Chronic daily headache       * TOPAMAX PO      Take 200 mg by mouth daily (2 x 100 mg = 200 mg dose)        * TOPAMAX PO      Take 300 mg by mouth At Bedtime (3 x 100 mg = 300 mg dose)        TYLENOL PO      Take 500 mg by mouth 2 times daily as needed for mild pain or fever        VITAMIN B 12 PO      Take 1 tablet by mouth daily (with dinner)        * Notice:  This list has 4 medication(s) that are the same as other medications prescribed for you. Read the directions carefully, and ask your doctor or other care provider to review them with you.

## 2018-08-14 NOTE — PROGRESS NOTES
"Oncology Rooming Note    August 14, 2018 10:05 AM   Joni Borja is a 47 year old male who presents for:    Chief Complaint   Patient presents with     Oncology Clinic Visit     Oligodendroglioma (H) - right temporal     Initial Vitals: /75 (BP Location: Right arm, Patient Position: Sitting, Cuff Size: Adult Regular)  Pulse 72  Temp 98.7  F (37.1  C) (Oral)  Resp 16  Wt 78.7 kg (173 lb 9.6 oz)  SpO2 98%  BMI 25.64 kg/m2 Estimated body mass index is 25.64 kg/(m^2) as calculated from the following:    Height as of 7/13/18: 1.753 m (5' 9\").    Weight as of this encounter: 78.7 kg (173 lb 9.6 oz). Body surface area is 1.96 meters squared.  No Pain (0) Comment: Data Unavailable   No LMP for male patient.  Allergies reviewed: Yes  Medications reviewed: Yes    Medications: Medication refills not needed today.  Pharmacy name entered into [facility]:    [facility] - [place], [place] - [address] [facility] - [place], [place] - [address]    Clinical concerns: no      5 minutes for nursing intake (face to face time)          Linda Phillips MA              "

## 2018-08-16 NOTE — TELEPHONE ENCOUNTER
Per Юлия at [facility]-They received the YADIEL and will be getting the previous radiation tx

## 2018-08-20 ENCOUNTER — TELEPHONE (OUTPATIENT)
Dept: ONCOLOGY | Facility: CLINIC | Age: 48
End: 2018-08-20

## 2018-08-20 LAB — COPATH REPORT: NORMAL

## 2018-08-20 NOTE — TELEPHONE ENCOUNTER
Social Work Progress Note      Data/Intervention:  Patient Name:  Joni Borja  /Age:  1970 (47 year old)    Reason for Follow-Up:  Joni is a 47-year-old gentleman with a diagnosis of right frontal low grade oligodendroglioma which was initially diagnosed in  following a seizure. This clinician received referral from Dr. Daniels for transportation and lodging support near [place], [place]. This clinician called pt to follow-up regarding options for support.     Intervention:   This clinician called pt's home phone, mother answered, she reported that pt had relocated to [place], [place]. Per mother, pt would like to receive care in [place], [place] as this clinic is 20min drive from pt's home. Mother reports that her greatest concern is transportation for pt to get to and from treatments as pt's partner works nights. Mother also reported that she would like to start exploring options for additional support in the home, she reports that Joni does not have any personal care needs at present, but reported that she has started to look into options for support if needs change. Mother reports that pt has 35 hrs available to him through insurance as needed.     This clinician attempted to reach pt on cell phone numbers mother provided ([phone], [phone]), messages left with request for return call at pt's earliest convenience.     This clinician called [facility] and learned that pt does not currently have transportation benefit with current insurance plan. This clinician explored lodging options: [facility] or [employer]'s hotel program may be options available to pt as needed. This clinician contacted [facility] ([phone]) and learned that they would be able to do a reduced fair for transportation for pt if needed. This clinician will plan to review options with pt when he returns this clinician's call.     Plan:  Previously provided patient/family with writer's contact  information and availability. Pt knows to reach out to this clinician in the case of psychosocial distress or need.     Please call or page if needs or concerns arise.     DELANEY Cooper, [facility] Center[facility]  Direct Phone: [phone]  Pager: [phone]

## 2018-08-21 ENCOUNTER — TELEPHONE (OUTPATIENT)
Dept: ONCOLOGY | Facility: CLINIC | Age: 48
End: 2018-08-21

## 2018-08-23 NOTE — ADDENDUM NOTE
Encounter addended by: Jenni Ward, OT on: 8/23/2018  4:19 PM<BR>     Actions taken: Flowsheet accepted

## 2018-08-24 ENCOUNTER — TELEPHONE (OUTPATIENT)
Dept: ONCOLOGY | Facility: CLINIC | Age: 48
End: 2018-08-24

## 2018-08-24 NOTE — TELEPHONE ENCOUNTER
Was able to reach Pt & he wants to proceed with radiation in [place]. Previously reached out to [facility] & they have called Pt.     Pt saw the following MD's this past winter & is interested in continuing with them:  [facility] in [facility], PCP: Dr.Darin Espinoza (ph: [phone])   [facility] Onc[facility] Dr. [place] Coyle at [facility] (ph: [phone])     Pt & his wife live about half way between [place] & [place] so Pt could stay with  in [place] as he has hospital privileges at both [place] & [place].hospitals.       Pt's camping so he's harder to reach currently.  Encouraged Pt to call his Mom as LARISA Indira updated her with options locally re: transportation/housing etc.     Pt's wife was supportive in the background of the call on speaker phone assisting Pt with remembering his MD names.

## 2018-08-27 LAB — COPATH REPORT: NORMAL

## 2018-08-28 NOTE — TELEPHONE ENCOUNTER
Called & LVM for [facility] to call back.    Pt needs to have consult there & start radiation soon (this week if possible) per .

## 2018-08-29 ENCOUNTER — HOSPITAL ENCOUNTER (OUTPATIENT)
Dept: OCCUPATIONAL THERAPY | Facility: OTHER | Age: 48
Setting detail: THERAPIES SERIES
End: 2018-08-29
Attending: PHYSICIAN ASSISTANT
Payer: MEDICARE

## 2018-08-29 PROCEDURE — 97530 THERAPEUTIC ACTIVITIES: CPT | Mod: GO

## 2018-08-29 PROCEDURE — 40000125 ZZHC STATISTIC OT OUTPT VISIT

## 2018-08-29 NOTE — TELEPHONE ENCOUNTER
Received call back from [facility] that they have called Pt & left numerous messages for Pt to call back.    Pt did call back after hours (7:30am-4pm) & LVM stating that he will call back today.  If Pt doesn't call there by 3pm today, they will again call him to schedule.

## 2018-09-05 ENCOUNTER — HOSPITAL ENCOUNTER (OUTPATIENT)
Dept: OCCUPATIONAL THERAPY | Facility: OTHER | Age: 48
Setting detail: THERAPIES SERIES
End: 2018-09-05
Attending: PHYSICIAN ASSISTANT
Payer: MEDICARE

## 2018-09-05 PROCEDURE — 40000125 ZZHC STATISTIC OT OUTPT VISIT

## 2018-09-05 PROCEDURE — 97530 THERAPEUTIC ACTIVITIES: CPT | Mod: GO

## 2018-09-05 PROCEDURE — 97535 SELF CARE MNGMENT TRAINING: CPT | Mod: GO,XU

## 2018-09-07 NOTE — TELEPHONE ENCOUNTER
Pt has a radiation oncology consult appt scheduled for Tuesday, 9-11-18 at 9:45am with Dr.Paul Lara in [place].

## 2018-09-10 ENCOUNTER — HOSPITAL ENCOUNTER (OUTPATIENT)
Dept: OCCUPATIONAL THERAPY | Facility: OTHER | Age: 48
Setting detail: THERAPIES SERIES
End: 2018-09-10
Attending: PHYSICIAN ASSISTANT
Payer: MEDICARE

## 2018-09-10 PROCEDURE — 40000125 ZZHC STATISTIC OT OUTPT VISIT

## 2018-09-10 PROCEDURE — 97535 SELF CARE MNGMENT TRAINING: CPT | Mod: GO

## 2018-09-11 ENCOUNTER — TELEPHONE (OUTPATIENT)
Dept: ONCOLOGY | Facility: CLINIC | Age: 48
End: 2018-09-11

## 2018-09-11 NOTE — TELEPHONE ENCOUNTER
Received VM from pt's wife Eleanor inquiring about transportation option in [place]. This clinician had previously provided information to pt and pt's mother. This clinician called and LVM with telephone number for [facility] Transportation service.     Family aware of how to reach out to this clinician in the case of psychosocial distress or need.     DELANEY Cooper, [facility]  Phone: [phone]  Pager: [phone]    [facility]: M, T  *every other Thursday, 8am-4:30pm  [facility]: W, F, *every other Thursday, 8am-4:30pm

## 2018-09-12 ENCOUNTER — HOSPITAL ENCOUNTER (OUTPATIENT)
Dept: OCCUPATIONAL THERAPY | Facility: OTHER | Age: 48
Setting detail: THERAPIES SERIES
End: 2018-09-12
Attending: PHYSICIAN ASSISTANT
Payer: MEDICARE

## 2018-09-12 PROCEDURE — 97535 SELF CARE MNGMENT TRAINING: CPT | Mod: GO,XU

## 2018-09-12 PROCEDURE — 40000125 ZZHC STATISTIC OT OUTPT VISIT

## 2018-09-12 PROCEDURE — 97530 THERAPEUTIC ACTIVITIES: CPT | Mod: GO

## 2018-09-17 ENCOUNTER — HOSPITAL ENCOUNTER (OUTPATIENT)
Dept: OCCUPATIONAL THERAPY | Facility: OTHER | Age: 48
Setting detail: THERAPIES SERIES
End: 2018-09-17
Attending: PHYSICIAN ASSISTANT
Payer: MEDICARE

## 2018-09-17 PROCEDURE — 97535 SELF CARE MNGMENT TRAINING: CPT | Mod: GO

## 2018-09-17 PROCEDURE — 40000125 ZZHC STATISTIC OT OUTPT VISIT

## 2018-09-19 ENCOUNTER — HOSPITAL ENCOUNTER (OUTPATIENT)
Dept: OCCUPATIONAL THERAPY | Facility: OTHER | Age: 48
Setting detail: THERAPIES SERIES
End: 2018-09-19
Attending: PHYSICIAN ASSISTANT
Payer: MEDICARE

## 2018-09-19 PROCEDURE — 97530 THERAPEUTIC ACTIVITIES: CPT | Mod: GO

## 2018-09-19 PROCEDURE — 40000125 ZZHC STATISTIC OT OUTPT VISIT

## 2018-09-19 PROCEDURE — 97535 SELF CARE MNGMENT TRAINING: CPT | Mod: GO,XU

## 2018-09-20 ENCOUNTER — TELEPHONE (OUTPATIENT)
Dept: ONCOLOGY | Facility: CLINIC | Age: 48
End: 2018-09-20

## 2018-09-20 NOTE — TELEPHONE ENCOUNTER
Received call from Pt's mother Brittany that Pt did not attend his radiation oncology consult appt set up for Tuesday, 9-11-18 in [place] due to transportation issues.    Pt has not called our clinic about this.    Pt currently lives in [place], [place] which is about 28 miles from [place] & about 10 miles from [place].

## 2018-09-21 ENCOUNTER — TELEPHONE (OUTPATIENT)
Dept: ONCOLOGY | Facility: CLINIC | Age: 48
End: 2018-09-21

## 2018-09-21 NOTE — TELEPHONE ENCOUNTER
Confirmed with [facility] that there is not Radiation Oncology in [place].  Only other Radiation Oncology location in that area is [place].    [place] is 85 miles from [place], [place] which is even further away than [place].

## 2018-09-21 NOTE — TELEPHONE ENCOUNTER
This clinician received message from [facility] that transportation continues to be ongoing concern for Joni in getting to [place] for outpatient procedure. This clinician LVM on Joni's two phone numbers that mother had previously given this clinician ([phone], [phone]) as pt's current mobile number in chart was disconnected at time of this clinician's call. This clinician called and LVM for Joni's wife and with Joni's mother with this clinician's contact information and request for return call when able. This clinician had previously provided family with contact information for [facility] Transportation (see note from 8/20/2018). This clinician anticipates that family could also benefit from contacting Disability Linkage Line ([phone]). This clinician called and LVM with Jaiden, oncology care coordinator at [facility] requesting additional information for local transportation resources from [place], [place] to [place], [place]. Family has been given this clinician's contact information and knows to reach out in the case of concern or need.    DELANEY Cooper, [employer]  Phone: [phone]  Pager: [phone]    [facility]: M, T  *every other Thursday, 8am-4:30pm  [place] [facility]: W, F, *every other Thursday, 8am-4:30pm

## 2018-09-21 NOTE — TELEPHONE ENCOUNTER
Updated Pt's Mom & encouraged her to talk LARISA Jacobson about transportation options.  Closest Radiation Oncology to [facility] is in [place].

## 2018-09-24 ENCOUNTER — HOSPITAL ENCOUNTER (OUTPATIENT)
Dept: OCCUPATIONAL THERAPY | Facility: OTHER | Age: 48
Setting detail: THERAPIES SERIES
End: 2018-09-24
Attending: PHYSICIAN ASSISTANT
Payer: MEDICARE

## 2018-09-24 PROCEDURE — 97530 THERAPEUTIC ACTIVITIES: CPT | Mod: GO

## 2018-09-24 PROCEDURE — 40000125 ZZHC STATISTIC OT OUTPT VISIT

## 2018-09-25 ENCOUNTER — TELEPHONE (OUTPATIENT)
Dept: ONCOLOGY | Facility: CLINIC | Age: 48
End: 2018-09-25

## 2018-09-25 NOTE — TELEPHONE ENCOUNTER
Called and left VM at both contact numbers; [phone] and [phone] (updated # in chart).    I stressed the importance of completing his radiation therapy as planned. I stated that the pathology of the tumor from his recent resection showed active cancer. Next generation sequencing was without any targetable mutations. There are limited treatment options remaining and the tumor has demonstrated growth on sequential MR scans. As a result, it is critical that he undergoes treatment.     Encouraged him to call the clinic with questions/ concerns.     Berkley Daniels MD  Neuro-oncology  9/25/2018

## 2018-09-26 ENCOUNTER — HOSPITAL ENCOUNTER (OUTPATIENT)
Dept: OCCUPATIONAL THERAPY | Facility: OTHER | Age: 48
Setting detail: THERAPIES SERIES
End: 2018-09-26
Attending: PHYSICIAN ASSISTANT
Payer: MEDICARE

## 2018-09-26 PROCEDURE — 97535 SELF CARE MNGMENT TRAINING: CPT | Mod: GO,XU

## 2018-09-26 PROCEDURE — 40000125 ZZHC STATISTIC OT OUTPT VISIT

## 2018-09-26 PROCEDURE — 97530 THERAPEUTIC ACTIVITIES: CPT | Mod: GO

## 2018-10-01 ENCOUNTER — HOSPITAL ENCOUNTER (OUTPATIENT)
Dept: OCCUPATIONAL THERAPY | Facility: OTHER | Age: 48
Setting detail: THERAPIES SERIES
End: 2018-10-01
Attending: PHYSICIAN ASSISTANT
Payer: MEDICARE

## 2018-10-01 PROCEDURE — 97530 THERAPEUTIC ACTIVITIES: CPT | Mod: GO

## 2018-10-01 PROCEDURE — 40000125 ZZHC STATISTIC OT OUTPT VISIT

## 2018-10-02 NOTE — PROGRESS NOTES
[facility]      OUTPATIENT OCCUPATIONAL THERAPY  PLAN OF TREATMENT FOR OUTPATIENT REHABILITATION    Patient's Last Name, First Name, M.I.                YOB: 1970  Joni Borja                        Provider's Name  [facility] Medical Record No.  [id number]                               Onset Date: 7/13/18   Start of Care Date: 8/9/18   Type:     ___PT   _X_OT   ___SLP Medical Diagnosis: S/P Brain Surgery                       OT Diagnosis: Decreased ADL/IADL [place]      _________________________________________________________________________________  Plan of Treatment:    Frequency/Duration: 1-2x/week for 4 weeks     Goals:    Goal Identifier memory skills   Goal Description Patient will demonstrate improved memory skills by completing short-term memory tasks in occupational therapy with 90% accuracy consistently using compensatory strategies for increased safety and independence with ADL/IADLS (remembering to take medications, turn off the stove when done, remember medical appointments and medical recommendations).    Target Date 10/29/18 (Continued from goal date of 10/1/18)   Date Met      Progress: Patient making slow progress towards increasing memory skills and utilizing compensatory strategies.  Goal continued. (10/1: Patient ranging from 33-80% success on short-term memory activities)     Goal Identifier problem solving and numercial reasoning skills   Goal Description Patient will demonstrate the ability to complete simple to moderately complex money management tasks with 90% accuracy for increased numerical reasoning and problem solving skills to resume personal finances accurately and independently.   Target Date 10/01/18   Date Met  09/26/18 (90% accuracy during bill paying)   Progress: Patient met goal, however may continue addressing different aspects  of money management to show consistency and generalization.      Goal Identifier meal prep   Goal Description Patient to complete a familiar, simple to moderately complex meal prep task using the stove and/or oven with modified independence, for increased safety and independence in the home.    Target Date 10/29/18 (Continue goal from target date 10/1/18)   Date Met      Progress: Patient completed moderate complex meal prep with supervision, however major safety concerns.  Goal continued.      Goal Identifier medication management   Goal Description Patient will independently set up personal medications, using a pillbox, on 2 separate occasions for increased accuracy and independence with managing medications.   Target Date 10/29/18 (Continue from goal date 10/1/18)   Date Met     Progress: Partially met goal 9/10/18- 1 of 2 trials completed with independence.  Goal continued.     Goal Identifier visual scanning skills   Goal Description Patient will demonstrate [facility] visual scanning skills (both peripersonal and extrapersonal space) for safe and independent community mobility (crossing the street, grocery shopping) and accurate pen/paper tasks by scoring 90% accuracy on visual scanning tasks.   Target Date 10/01/18   Date Met  09/19/18 (97% accuracy pen and paper; Dynavision: 56 in 60s)   Progress: Patient has improved visual scanning in all planes.  Goal met.        Progress Toward Goals:   Progress this reporting period: Patient has made progress towards goals, however he still has deficits with short-term memory, problem solving, attention, and safety.  Patient has been provided with a lot of educational materials on compensatory techniques for on-going deficits, however application of compensatory strategies during occupational therapy tasks has been lacking.      Certification date from 10/1/2018 to 10/29/2018.    Joyce Rahman OTR/L         I CERTIFY THE NEED FOR THESE SERVICES FURNISHED UNDER        THIS  PLAN OF TREATMENT AND WHILE UNDER MY CARE     (Physician co-signature of this document indicates review and certification of the therapy plan).                Referring Provider: Carley Auguste PA-C

## 2018-10-03 ENCOUNTER — HOSPITAL ENCOUNTER (OUTPATIENT)
Dept: OCCUPATIONAL THERAPY | Facility: OTHER | Age: 48
Setting detail: THERAPIES SERIES
End: 2018-10-03
Attending: PHYSICIAN ASSISTANT
Payer: MEDICARE

## 2018-10-03 PROCEDURE — 97535 SELF CARE MNGMENT TRAINING: CPT | Mod: GO

## 2018-10-03 PROCEDURE — 40000125 ZZHC STATISTIC OT OUTPT VISIT

## 2018-10-03 NOTE — PROGRESS NOTES
Outpatient Occupational Therapy Progress Note     Patient: Joni Borja  : 1970    Beginning/End Dates of Reporting Period:  2018 to 10/3/2018    Referring Provider: Carley Auguste PA-C    Therapy Diagnosis: Decreased independence in ADLs/IADLs s/p brain surgery    Client Self Report: Patient reports that over the weekend he felt dizzy and off balance and ended up falling down once.  He stated he felt better after he went to sleep for a while.     Objective Measurements:     Objective Measure: Functional Memory   Details: Patient completed functional memory task of memorizing 1 piece of information with 80% recall, however when remembering 2 pieces of information he only had 33% recall. When memorizing a list of 15 words he was able to recall 26% on trial 1 and 33% on trial 2.  When read a multiple paragraph piece of information, he was able to answer questions with 75% accuracy.    Objective Measure: Direction Following   Details: Patient completed a verbal directions following task with 80% accuracy when given 3-step directions.  Completed a following written directions task- 4 step directions: trial 1: 50%, trial 2: 75%.    Goals:     Goal Identifier memory skills   Goal Description Patient will demonstrate improved memory skills by completing short-term memory tasks in occupational therapy with 90% accuracy consistently using compensatory strategies for increased safety and independence with ADL/IADLS (remembering to take medications, turn off the stove when done, remember medical appointments and medical recommendations). (10/1: Patient ranging from 33-80% success on STM activities)   Target Date 10/29/18 (Continued from goal date of 10/1/18)   Date Met      Progress:     Goal Identifier problem solving and numercial reasoning skills   Goal Description Patient will demonstrate the ability to complete simple to moderately complex money management tasks with 90% accuracy for increased numerical  reasoning and problem solving skills to resume personal finances accurately and independently.   Target Date 10/01/18   Date Met  09/26/18 (90% accuracy during bill paying)   Progress:     Goal Identifier meal prep   Goal Description Patient to complete a familiar, simple to moderately complex meal prep task using the stove and/or oven with modified independence, for increased safety and independence in the home. (10/1: continues to require supervision and VCs)   Target Date 10/29/18 (Continue goal from target date 10/1/18)   Date Met      Progress:     Goal Identifier medication management   Goal Description Patient will independently set up personal medications, using a pillbox, on 2 separate occasions for increased accuracy and independence with managing medications.   Target Date 10/29/18 (Continue from goal date 10/1/18)   Date Met   (Partially met 9/10/18- 1 of 2 trials completed)   Progress:     Goal Identifier visual scanning skills   Goal Description Patient will demonstrate [facility] visual scanning skills (both peripersonal and extrapersonal space) for safe and independent community mobility (crossing the street, grocery shopping) and accurate pen/paper tasks by scoring 90% accuracy on visual scanning tasks.   Target Date 10/01/18   Date Met  09/19/18 (97% accuracy pen and paper; Dynavision: 56 in 60s)   Progress:       Progress Toward Goals:   Progress this reporting period: For progress see re-certification note from same date.     Plan:  Continue therapy per current plan of care.    Discharge:  No

## 2018-10-15 ENCOUNTER — OFFICE VISIT (OUTPATIENT)
Dept: NEUROSURGERY | Facility: CLINIC | Age: 48
End: 2018-10-15
Attending: NEUROLOGICAL SURGERY
Payer: MEDICARE

## 2018-10-15 VITALS
HEART RATE: 85 BPM | SYSTOLIC BLOOD PRESSURE: 116 MMHG | TEMPERATURE: 99.8 F | OXYGEN SATURATION: 97 % | DIASTOLIC BLOOD PRESSURE: 72 MMHG

## 2018-10-15 DIAGNOSIS — Z98.890 STATUS POST CRANIOTOMY: Primary | ICD-10-CM

## 2018-10-15 PROCEDURE — G0463 HOSPITAL OUTPT CLINIC VISIT: HCPCS

## 2018-10-15 PROCEDURE — 99024 POSTOP FOLLOW-UP VISIT: CPT | Performed by: PHYSICIAN ASSISTANT

## 2018-10-15 ASSESSMENT — PAIN SCALES - GENERAL: PAINLEVEL: MODERATE PAIN (4)

## 2018-10-15 NOTE — LETTER
10/15/2018         RE: Joni Borja  [id number] [address] [address]        Dear Colleague,    Thank you for referring your patient, Joni Borja, to the [facility]. Please see a copy of my visit note below.    [facility]  Neurosurgery followup:    HPI: 3 months s/p right craniotomy for resection of recurrent tumor. States he has been doing relatively well post op. He does have intermittent occipital headache roughly 1x per week, lessened with tylenol. Wife states he has had increase in seizures as of late. He has not been following up with Dr. Daniels and it appears Dr. Daniels has been trying to get in touch with them often, but has been unable to do so. He is scheduled to undergo radiation in [place] Nov 22nd.     Exam:  Constitutional:  Alert, well nourished, NAD.  HEENT: Normocephalic, atraumatic.   Pulm:  Without shortness of breath or audible adventitious respiratory sounds.  CV:  No pitting edema of BLE.  Brisk capillary refill, CMS intact.    Neurological:  Awake  Alert  Oriented x 3  Speech clear  Cranial nerves II - XII intact  PERRL  EOMI  Face symmetric  Tongue midline  Motor exam: 5/5 strength in all four extremities.   Sensation: intact  Finger to Nose: smooth  Pronator drift: negative  Gait: Able to stand from a seated position. Normal non-antalgic, non-myelopathic gait.  Incisions: Nicely healed  A/P: 3 months s/p right craniotomy for resection of recurrent tumor. Continued headaches and seizure activity, which has been increasing per wife. Neuro intact on exam today. I did reiterate to them how important it is to follow up with Dr. Daniels and provided them with her number and listed an updated contact (patient's wife Eleanor). Radiation scheduled to begin 11/22 per wife. Advised they may follow up as needed and discussed symptoms to watch for. Patient voiced understanding and agreement.      - Go to ER with any seizure activity, mental status change  (increasing confusion), difficulty with speech or increasing or acute weakness       Carley Auguste PA-C  [facility]  [address] [address]    Tel [phone]  Pager [phone]        Again, thank you for allowing me to participate in the care of your patient.        Sincerely,        Carley Auguste PA-C

## 2018-10-15 NOTE — PATIENT INSTRUCTIONS
1. VERY IMPORTANT to follow up with Dr. Daniels. Please contact her office at [phone]    2. Go to ER with any seizure activity, mental status change (increasing confusion), difficulty with speech or increasing or acute weakness     3. Follow up with our clinic as needed

## 2018-10-15 NOTE — PROGRESS NOTES
[facility]  Neurosurgery followup:    HPI: 3 months s/p right craniotomy for resection of recurrent tumor. States he has been doing relatively well post op. He does have intermittent occipital headache roughly 1x per week, lessened with tylenol. Wife states he has had increase in seizures as of late. He has not been following up with Dr. Daniels and it appears Dr. Daniels has been trying to get in touch with them often, but has been unable to do so. He is scheduled to undergo radiation in [place] Nov 22nd.     Exam:  Constitutional:  Alert, well nourished, NAD.  HEENT: Normocephalic, atraumatic.   Pulm:  Without shortness of breath or audible adventitious respiratory sounds.  CV:  No pitting edema of BLE.  Brisk capillary refill, CMS intact.    Neurological:  Awake  Alert  Oriented x 3  Speech clear  Cranial nerves II - XII intact  PERRL  EOMI  Face symmetric  Tongue midline  Motor exam: 5/5 strength in all four extremities.   Sensation: intact  Finger to Nose: smooth  Pronator drift: negative  Gait: Able to stand from a seated position. Normal non-antalgic, non-myelopathic gait.  Incisions: Nicely healed  A/P: 3 months s/p right craniotomy for resection of recurrent tumor. Continued headaches and seizure activity, which has been increasing per wife. Neuro intact on exam today. I did reiterate to them how important it is to follow up with Dr. Daniels and provided them with her number and listed an updated contact (patient's wife Eleanor). Radiation scheduled to begin 11/22 per wife. Advised they may follow up as needed and discussed symptoms to watch for. Patient voiced understanding and agreement.      - Go to ER with any seizure activity, mental status change (increasing confusion), difficulty with speech or increasing or acute weakness       Carley Auguste PA-C  [facility]  [address]  [address] [place] [id number]    Tel [phone]  Pager [phone]

## 2018-10-15 NOTE — NURSING NOTE
"Joni Borja is a 47 year old male who presents for:  Chief Complaint   Patient presents with     Neurologic Problem     s/p craniotomy 7-13-18        Vitals:    There were no vitals filed for this visit.    BMI:  Estimated body mass index is 25.64 kg/(m^2) as calculated from the following:    Height as of 7/13/18: 5' 9\" (1.753 m).    Weight as of 8/14/18: 173 lb 9.6 oz (78.7 kg).    Pain Score:  Data Unavailable        Kristina Carson, LULY, AAS      "

## 2018-10-15 NOTE — MR AVS SNAPSHOT
After Visit Summary   10/15/2018    Joni Borja    MRN: [id number]           Patient Information     Date Of Birth          1970        Visit Information        Provider Department      10/15/2018 3:00 PM Carley Auguste PA-C [facility] Neurosurgery [facility]        Today's Diagnoses     Status post craniotomy    -  1      Care Instructions    1. VERY IMPORTANT to follow up with Dr. Daniels. Please contact her office at [phone]    2. Go to ER with any seizure activity, mental status change (increasing confusion), difficulty with speech or increasing or acute weakness     3. Follow up with our clinic as needed          Follow-ups after your visit        Your next 10 appointments already scheduled     Oct 17, 2018  1:45 PM CDT   Treatment with BEAU Orozco   [facility] ([facility]    [address] [id number] [phone]            Oct 22, 2018  1:15 PM CDT   CONSULT with Zev Lara MD   [facility] ([facility] )    [address] [place] [address] [phone]            Nov 12, 2018  1:20 PM CST   [facility]    [address] [place] [id number] [phone]           Bring a current list of meds and any records pertaining to this visit. For Physicals, please bring immunization records and any forms needing to be filled out. Please arrive 10 minutes early to complete paperwork.              Who to contact     If you have questions or need follow up information about today's clinic visit or your schedule please contact [facility]UROS[facility]INIC directly at [phone].  Normal or non-critical lab and imaging results will be communicated to you by MyChart, letter or phone within 4 business days after the clinic has received the results. If you do not  hear from us within 7 days, please contact the clinic through [employer] or phone. If you have a critical or abnormal lab result, we will notify you by phone as soon as possible.  Submit refill requests through [employer] or call your pharmacy and they will forward the refill request to us. Please allow 3 business days for your refill to be completed.          Additional Information About Your Visit        [employer] Information     [employer] gives you secure access to your electronic health record. If you see a primary care provider, you can also send messages to your care team and make appointments. If you have questions, please call your primary care clinic.  If you do not have a primary care provider, please call [phone] and they will assist you.        Care EveryWhere ID     This is your Care EveryWhere ID. This could be used by other organizations to access your [facility] medical records  [id number]        Your Vitals Were     Pulse Temperature Pulse Oximetry             85 99.8  F (37.7  C) (Oral) 97%          Blood Pressure from Last 3 Encounters:   10/15/18 116/72   08/14/18 110/75   08/07/18 108/71    Weight from Last 3 Encounters:   08/14/18 173 lb 9.6 oz (78.7 kg)   07/27/18 165 lb (74.8 kg)   07/14/18 171 lb 1.2 oz (77.6 kg)              Today, you had the following     No orders found for display       Primary Care Provider Office Phone # Fax #    Brian Coon [phone] [address] [facility] [address]        Equal Access to Services     KAYLIE MARIE : Hadii aad ku hadasho Soomaali, waaxda luqadaha, qaybta kaalmada adeegyada, waxay lesleyin yue arriaga . So [facility] [phone].    ATENCIÓN: Si habla español, tiene a kirby disposición servicios gratuitos de asistencia lingüística. Llame al [phone].    We comply with applicable federal civil rights laws and [place] laws. We do not discriminate on the basis of race, color, national origin, age, disability,  sex, sexual orientation, or gender identity.            Thank you!     Thank you for choosing [facility]  for your care. Our goal is always to provide you with excellent care. Hearing back from our patients is one way we can continue to improve our services. Please take a few minutes to complete the written survey that you may receive in the mail after your visit with us. Thank you!             Your Updated Medication List - Protect others around you: Learn how to safely use, store and throw away your medicines at www.disposemymeds.org.          This list is accurate as of 10/15/18  3:25 PM.  Always use your most recent med list.                   Brand Name Dispense Instructions for use Diagnosis    CARBATROL 300 MG 12 hr capsule   Generic drug:  carBAMazepine      Take 1 capsule (300 mg) by mouth 2 times daily (at 09:00 & 21:00)    Partial epilepsy with impairment of consciousness, intractable (H)       cetirizine 10 MG tablet    ZYRTEC ALLERGY    30 tablet    Take 1 tablet (10 mg) by mouth daily as needed (for mosquitos.)    Reaction to insect bite       escitalopram 20 MG tablet    LEXAPRO     Take 1 tablet (20 mg) by mouth daily    History of depression, Anxiety state       FELBATOL 600 MG tablet   Generic drug:  felbamate      Take 1.5 tablets (900 mg) by mouth 2 times daily Take 900 mg (1.5 tablet) am and 900 mg (1.5 tablet) 2 pm    Partial epilepsy with impairment of consciousness, intractable (H)       KLONOPIN PO      Take 1 mg by mouth At Bedtime (2 x 0.5 mg = 1 mg dose)        pantoprazole 40 MG EC tablet    PROTONIX    90 tablet    TAKE ONE TABLET BY MOUTH ONCE DAILY    Gastroesophageal reflux disease without esophagitis       Riboflavin 400 MG Tabs      Take 400 mg by mouth 2 times daily    Chronic daily headache       TOPAMAX PO      Take 300 mg by mouth At Bedtime (3 x 100 mg = 300 mg dose)        TYLENOL PO      Take 500 mg by mouth 2 times daily as needed for mild pain  or fever        VITAMIN B 12 PO      Take 1 tablet by mouth daily (with dinner)

## 2018-10-16 ENCOUNTER — TELEPHONE (OUTPATIENT)
Dept: NEUROLOGY | Facility: CLINIC | Age: 48
End: 2018-10-16

## 2018-10-16 ENCOUNTER — TELEPHONE (OUTPATIENT)
Dept: ONCOLOGY | Facility: CLINIC | Age: 48
End: 2018-10-16

## 2018-10-16 DIAGNOSIS — G40.909 SEIZURE DISORDER (H): ICD-10-CM

## 2018-10-16 DIAGNOSIS — G40.219 PARTIAL EPILEPSY WITH IMPAIRMENT OF CONSCIOUSNESS, INTRACTABLE (H): Primary | ICD-10-CM

## 2018-10-16 NOTE — TELEPHONE ENCOUNTER
Nurse received In-Basket message as follows:    Hi. Please reach out to Joni and see how he is doing. He has more seizure and may not be well. Thank you. Silvia Brown MD     Nurse returned call to wife and spoke with her about Joni; she indicates that patient had a significant seizure yesterday at his Mother's home, wife indicates she had an expectation that this might happen due to increased stress.  Nurse inquired about a soon return appointment, but this is being put off for the immediate time dur momo schedules and distance from clinic, however they are interested in doing labs.  They can get these done at [facility]     Lab orders sent to [facility].

## 2018-10-16 NOTE — TELEPHONE ENCOUNTER
Called & LVM for Pt's wife Eleanor to call back.      Will request updates on how Pt is doing (recent seizures) & check to see if Pt is going to radiation treatments in [place], [place], as per 's recommendations from 2 months ago.

## 2018-10-17 ENCOUNTER — HOSPITAL ENCOUNTER (OUTPATIENT)
Dept: OCCUPATIONAL THERAPY | Facility: OTHER | Age: 48
Setting detail: THERAPIES SERIES
End: 2018-10-17
Attending: PHYSICIAN ASSISTANT
Payer: MEDICARE

## 2018-10-17 ENCOUNTER — TELEPHONE (OUTPATIENT)
Dept: ONCOLOGY | Facility: CLINIC | Age: 48
End: 2018-10-17

## 2018-10-17 DIAGNOSIS — Z79.899 ENCOUNTER FOR LONG-TERM (CURRENT) USE OF MEDICATIONS: ICD-10-CM

## 2018-10-17 DIAGNOSIS — C71.9 OLIGODENDROGLIOMA, ANAPLASTIC (H): ICD-10-CM

## 2018-10-17 DIAGNOSIS — G40.909 SEIZURE DISORDER (H): ICD-10-CM

## 2018-10-17 DIAGNOSIS — G40.219 PARTIAL EPILEPSY WITH IMPAIRMENT OF CONSCIOUSNESS, INTRACTABLE (H): ICD-10-CM

## 2018-10-17 LAB
ALBUMIN SERPL-MCNC: 4 G/DL (ref 3.5–5.7)
ALP SERPL-CCNC: 131 U/L (ref 34–104)
ALT SERPL W P-5'-P-CCNC: 8 U/L (ref 7–52)
ANION GAP SERPL CALCULATED.3IONS-SCNC: 5 MMOL/L (ref 3–14)
AST SERPL W P-5'-P-CCNC: 10 U/L (ref 13–39)
BASOPHILS # BLD AUTO: 0.1 10E9/L (ref 0–0.2)
BASOPHILS NFR BLD AUTO: 0.9 %
BILIRUB SERPL-MCNC: 0.4 MG/DL (ref 0.3–1)
BUN SERPL-MCNC: 10 MG/DL (ref 7–25)
CALCIUM SERPL-MCNC: 8.8 MG/DL (ref 8.6–10.3)
CHLORIDE SERPL-SCNC: 110 MMOL/L (ref 98–107)
CO2 SERPL-SCNC: 24 MMOL/L (ref 21–31)
CREAT SERPL-MCNC: 0.92 MG/DL (ref 0.7–1.3)
DIFFERENTIAL METHOD BLD: ABNORMAL
EOSINOPHIL # BLD AUTO: 0.1 10E9/L (ref 0–0.7)
EOSINOPHIL NFR BLD AUTO: 1.7 %
ERYTHROCYTE [DISTWIDTH] IN BLOOD BY AUTOMATED COUNT: 13.2 % (ref 10–15)
GFR SERPL CREATININE-BSD FRML MDRD: 88 ML/MIN/1.7M2
GLUCOSE SERPL-MCNC: 104 MG/DL (ref 70–105)
HCT VFR BLD AUTO: 39.9 % (ref 40–53)
HGB BLD-MCNC: 13.3 G/DL (ref 13.3–17.7)
IMM GRANULOCYTES # BLD: 0 10E9/L (ref 0–0.4)
IMM GRANULOCYTES NFR BLD: 0.2 %
LYMPHOCYTES # BLD AUTO: 0.9 10E9/L (ref 0.8–5.3)
LYMPHOCYTES NFR BLD AUTO: 15.9 %
MCH RBC QN AUTO: 31.7 PG (ref 26.5–33)
MCHC RBC AUTO-ENTMCNC: 33.3 G/DL (ref 31.5–36.5)
MCV RBC AUTO: 95 FL (ref 78–100)
MONOCYTES # BLD AUTO: 0.5 10E9/L (ref 0–1.3)
MONOCYTES NFR BLD AUTO: 8.1 %
NEUTROPHILS # BLD AUTO: 4.2 10E9/L (ref 1.6–8.3)
NEUTROPHILS NFR BLD AUTO: 73.2 %
PLATELET # BLD AUTO: 295 10E9/L (ref 150–450)
POTASSIUM SERPL-SCNC: 3.5 MMOL/L (ref 3.5–5.1)
PROT SERPL-MCNC: 7.3 G/DL (ref 6.4–8.9)
RBC # BLD AUTO: 4.2 10E12/L (ref 4.4–5.9)
SODIUM SERPL-SCNC: 139 MMOL/L (ref 134–144)
WBC # BLD AUTO: 5.8 10E9/L (ref 4–11)

## 2018-10-17 PROCEDURE — 85025 COMPLETE CBC W/AUTO DIFF WBC: CPT | Performed by: PSYCHIATRY & NEUROLOGY

## 2018-10-17 PROCEDURE — 80156 ASSAY CARBAMAZEPINE TOTAL: CPT | Performed by: PSYCHIATRY & NEUROLOGY

## 2018-10-17 PROCEDURE — 97530 THERAPEUTIC ACTIVITIES: CPT | Mod: GO

## 2018-10-17 PROCEDURE — 80157 ASSAY CARBAMAZEPINE FREE: CPT | Performed by: PSYCHIATRY & NEUROLOGY

## 2018-10-17 PROCEDURE — 80201 ASSAY OF TOPIRAMATE: CPT | Performed by: PSYCHIATRY & NEUROLOGY

## 2018-10-17 PROCEDURE — 80339 ANTIEPILEPTICS NOS 1-3: CPT | Performed by: PSYCHIATRY & NEUROLOGY

## 2018-10-17 PROCEDURE — 40000125 ZZHC STATISTIC OT OUTPT VISIT

## 2018-10-17 PROCEDURE — 36415 COLL VENOUS BLD VENIPUNCTURE: CPT | Performed by: PSYCHIATRY & NEUROLOGY

## 2018-10-17 PROCEDURE — 80339 ANTIEPILEPTICS NOS 1-3: CPT | Mod: 91 | Performed by: PSYCHIATRY & NEUROLOGY

## 2018-10-17 PROCEDURE — 80053 COMPREHEN METABOLIC PANEL: CPT | Performed by: PSYCHIATRY & NEUROLOGY

## 2018-10-17 PROCEDURE — 80299 QUANTITATIVE ASSAY DRUG: CPT | Performed by: PSYCHIATRY & NEUROLOGY

## 2018-10-17 NOTE — TELEPHONE ENCOUNTER
Received return call from Pt's wife Eleanor stating that Pt has not yet started his radiation treatments in [place] that were ordered 2 months ago.  Explained to Pt's wife Eleanor that Pt's recent seizure on Monday 10-15-18 was likely due to Pt's brain tumor worsening from not having received radiation treatments yet & Eleanor verbalized understanding.    Per Pt's wife Eleanor: Eleanor works 3 jobs to try to make ends meet & is not able to take Pt to [place] for radiation treatments.  [place] closest location for radiation to [place], [place], where Pt & his wife Eleanor reside.  Eleanor also said that the cost of transporting Pt in a Medi-van would be $200/day which they are unable to afford.    Explained to Eleanor that Writer & LARISA Jacobson have both made multiple (documented) attempts to reach Pt & assist him.  Eleanor acknowledged this & said that she now has volunteers lined up to assist Pt to his radiation appts.    Pt is scheduled to see Radiation Oncologist Dr. Zev Lara on Monday, 10-22-18 at 1:15pm in [place].    Eleanor also said that Pt has had a couple falls & has an unsteady gait.  Pt will have his labs drawn as per  Epileptologist whom Pt saw on Monday, 10-15-18.    Will update .

## 2018-10-17 NOTE — TELEPHONE ENCOUNTER
"Social Work Progress Note      Data/Intervention:  Patient Name:  Joni Borja  /Age:  1970 (47 year old)    Reason for Follow-Up:  Joni is a 47-year-old gentleman with a diagnosis of right frontal low grade oligodendroglioma. Eleanor called and spoke with pt's RNCC today, who transferred call to this clinician.     Intervention:   Eleanor reported that Joni will be starting radiation therapy on 10/22/18 and that she was able to find transportation support through volunteers at her work. Eleanor reported to this clinician today that pt made threat last night via text about shooting brother, Villa, and \"beating the shit out of him and taking him out at the knees.\" Wife reports that pt has a safe at home that contains two shotguns and that he is the only one who knows the code to it. Wife reports that she notified Villa of this threat, and that she believes that Joni would not follow through on this plan as he would be unable to get the 4 miles to brother's home. Reviewed with wife when to call the police if concerned about her or another's acute safety, or to consider bringing Joni to Emergency Room for psych evaluation if making statements about hurting himself or others. Eleanor agreed with plan. Discussed with Eleanor about importance of her getting access to safe in home and removing guns, and made recommendation that she get a lock for knives. Made recommendation that pt work to get connected with ongoing psychiatrist, which Eleanor agreed she would work to support. This clinician recommended that Eleanor update pt's mother about most recent event, and work to make sure that family is all on same page for support.    Provided safe place for Eleanor to express complex emotions that have arisen in the context of providing direct care to pt as his cancer is getting worse. Provided psychoeducation as to the changing nature of caregiving role, and emotional support surrounding multiple losses family has endured as a " result of pt's diagnosis. Family aware of SW support available and knows to reach out if in case of psychosocial distress or concern.     This clinician consulted with RNCC, Director of Psychosocial Care, Patient Care Supervisor, and Risk Management (Anisa: [phone]).     Resources Provided:  This clinician to mail: therapeutic resources near [place], [place]; information regarding caregiver support groups; contact information for county to explore eligibility for waiver program to enhance home support  This clinician provided SW contact information    Plan:  1) This clinician will continue to be involved as a resource for as long as pt is continued to be followed by Dr. Daniels.   2) Will continue to collaborate with ongoing care team.     Please call or page if needs or concerns arise.     DELANEY Cooper, [facility] HospitalSW  Direct Phone: [phone]  Pager: [phone]

## 2018-10-20 LAB
CARBAMAZEPINE EP FREE SERPL-MCNC: 0.9 UG/ML
CARBAMAZEPINE EP SERPL-MCNC: 2 UG/ML
CARBAMAZEPINE FREE SERPL-MCNC: 1.1 UG/ML
CARBAMAZEPINE SERPL-MCNC: 4.8 UG/ML
FELBAMATE SERPL-MCNC: 45 UG/ML (ref 30–60)
TOPIRAMATE SERPL-MCNC: 12.4 UG/ML (ref 5–20)

## 2018-10-22 ENCOUNTER — ONCOLOGY VISIT (OUTPATIENT)
Dept: RADIATION ONCOLOGY | Facility: HOSPITAL | Age: 48
End: 2018-10-22
Attending: PSYCHIATRY & NEUROLOGY
Payer: MEDICARE

## 2018-10-22 VITALS
RESPIRATION RATE: 16 BRPM | WEIGHT: 167 LBS | BODY MASS INDEX: 24.66 KG/M2 | HEART RATE: 64 BPM | DIASTOLIC BLOOD PRESSURE: 70 MMHG | SYSTOLIC BLOOD PRESSURE: 116 MMHG

## 2018-10-22 DIAGNOSIS — C71.9 OLIGODENDROGLIOMA (H): Primary | ICD-10-CM

## 2018-10-22 PROCEDURE — G0463 HOSPITAL OUTPT CLINIC VISIT: HCPCS | Performed by: RADIOLOGY

## 2018-10-22 ASSESSMENT — PAIN SCALES - GENERAL: PAINLEVEL: MODERATE PAIN (4)

## 2018-10-22 NOTE — PROGRESS NOTES
"INITIAL PATIENT ASSESSMENT    Chief Complaint   Patient presents with     Radiation Therapy       Initial /70 (BP Location: Left arm, Patient Position: Chair, Cuff Size: Adult Regular)  Pulse 64  Resp 16  Wt 75.8 kg (167 lb)  BMI 24.66 kg/m2 Estimated body mass index is 24.66 kg/(m^2) as calculated from the following:    Height as of 7/13/18: 1.753 m (5' 9\").    Weight as of this encounter: 75.8 kg (167 lb).  Medication Reconciliation: complete    Referring Physician: Berkley Daniels MD  Other Physicians: Alexis    Diagnosis: Oligodendroglioma    Prior radiation therapy:   Site Treated: brain  Facility:   Dates: 6/6-7/18/02  Dose: 54 Gy    Prior chemotherapy:   Protocol: Procarbazine, Vincristine  Facility:   Dates: 2002/2003      Protocol: Temodar  Facility:   Dates: 5660-6064      Prior hormonal therapy:N/A    Pain Eval:  Current history of pain associated with this visit:   Intensity: 4/10  Current: headache  Location: head  Treatment: tylenol    Psychosocial  Marital Status:    Spouse/Significant other: Eleanor   Children: 0   Occupation:     Retired: [employer]  Living arrangements: home with wife  Do you feel safe at home? Yes  Activity status: independent   referral needs: Not needed    Advanced Directive: Yes - Location: On file    Patient was assessed for the influenza, pneumo-poly, prevnar 13, Tdap, and shingles immunizations. \"Vaccinations and Cancer Treatment\" flyer given.  Instructed patient to discuss vaccination status with his/her PCM.     Patient was assessed using the NCCN psychosocial distress thermometer. Patient rated the score as a 10/10. Patient rated current stressors as fatigue, depression, not working, not driving. Stressors will be brought to the attention of provider or Oncology RN Care Coordinator for a score of 6 or greater or per nurses discretion.  Pt's wife is making an appointment with a psychologist.    Follow up needed for " patient's psychosocial distress screen of 10/10 related to fatigue, depression, not working, and not driving.  Pt's wife states she is in the process of making an appointment with a mental health practitioner at [facility], closer to their home.   Pt is here today for a consult for radiation therapy for oligodendroglioma.  Educated patient on the mapping process and the possible side effects of XRT to the head, to include: fatigue, skin reaction, hair loss, and headache.  Pt verbalizes an understanding and has no questions at this time. Pt is accompanied by his wife, Eleanor, today.    Jeanette Maldonado RN

## 2018-10-23 NOTE — CONSULTS
Consult Date:  10/22/2018      RADIATION THERAPY CONSULTATION      REFERRING PHYSICIANS:  Berkley Daniels MD, Renato Espinoza MD.        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      HISTORY:  The patient presented abruptly in 2002 with no prior neurologic problems or prodrome having a grand mal seizure.  He did undergo imaging which revealed a right frontal brain tumor.  Biopsy again showing oligodendroglioma.  He has had re-resection for recurrent or progressive tumor in 2007, 2013, 2017 and most recently 07/13/2018.  As far as systemic treatment, he has been on procarbazine, CCNU, vincristine from [id number] and I believe 2007 through 2008.  He has been on Temodar since 2017, I believe this was recently discontinued.  He was treated on a protocol at [facility] in 2002 with a total radiation dose of 54 Gy in 30 fractions.  Clinically, he is still having several petit mal seizures, I believe, per week; however, his wife states that at one time he was having multiple, as in hundreds of seizures in the same period of time.  Again, as mentioned, clinically, he feels about the same as he did a year ago.      PAST MEDICAL HISTORY:  Hip surgery for congenital problems, tonsillectomy, appendectomy, craniotomy, vagal nerve stimulator.      INTERCURRENT ILLNESSES:  Previous congenital hip disease, depression, gastroesophageal reflux.  No known events related to significant ischemic vascular disease, MI, CVA, now hypertensive.  He is not diabetic.      ALLERGIES:  DILANTIN AND MOSQUITO BITES.      REVIEW OF SYSTEMS:  He does have headaches which he rates approximately 4/10.  Does not describe any classic diplopia, although he does have subjective generalized vision problems.  No swallowing difficulties,  odynophagia or dysphagia.  No thermal or temperature intolerance.  No nausea, vomiting, heartburn.  No abdominal complaints, constipation, bloating, diarrhea.  No fevers, chills, night sweats.  Energy level is poor.  Diet, general.  Weight, stable.      FAMILY HISTORY:  Noncontributory.      HABITS:  Alcohol use, occasional.  Apparently has been intermittently heavy in the past.  Tobacco 32-pack-year history, remains a current smoker.      SOCIAL AND DEMOGRAPHIC:  The patient is , lives with his spouse.  Has no offspring.  He has worked as a  and has been on SSI disability.      PHYSICAL EXAMINATION:   GENERAL:  Reveals a fairly cooperative male.  He moves from chair to exam table without too much difficulty.  He is generally a bit unsteady on his feet but no specific focal issues.   VITAL SIGNS:  Weight is 167 pounds, blood pressure 116/70, heart rate 64.   HEENT:  Extraocular movements, full.  Pupils are equal, round, reactive.  Face, symmetric.  Palate and tongue, midline and symmetric.   NECK:  No cervical or supraclavicular lymphadenopathy.   LUNGS:  Clear to auscultation.   HEART:  Cardiac exam reveals regular rate and rhythm.   ABDOMEN:  Nontender, without appreciable organomegaly.  No inguinal adenopathy.   EXTREMITIES:  Motor strength, I believe, is generally intact and symmetric both upper and lower extremities with deep tendon reflexes symmetric as well.      RADIOGRAPHIC FINDINGS:  CT and MRI scans are available for review from approximately 2012.  There is basically a large surgical cavity in the right frontal area, fluid-filled.  It would appear that his most recent preoperative MRI did show a focal area of enhancement, 5 mm nodule within the right orbital frontal gyrus, possibly also progressive mass-like expansile cortical and subcortical T2 hyperintense signal abnormality also involving right orbital frontal gyrus, right insula and right temporal stem.  Recent craniotomy describes  significant removal of what was thought to be grossly identifiable tumor at the time of surgery.      IMPRESSION:  More or less clinically stable, although the patient does have somewhat difficult seizure control.  All in all, he and his wife expressed some trepidation about the goal of retreatment.  I told them that I truly thought clinically he was relatively stable and from a symptom standpoint, it would be unlikely that retreatment would improve this.  Certainly, the course of his disease at this point is unpredictable.  It has had a very indolent course so far in spite of its transformation to a more malignant histology.  I think retreatment to any significant doses would carry some risk of brain necrosis which even in the setting of a large residual cavity for potential expansile problems still carries a high risk of morbidity.  I certainly think re-irradiation would be considered by some radiation oncologists and I might be persuaded, however, the patient and his wife really do not express to have an aggressive approach this time and I think that is the wisest decision.         JOSE DUMAS MD             D: 10/22/2018   T: 10/22/2018   MT: DULCE      Name:     MADHU HAYES   MRN:      [id number]        Account:       [id number]   :      1970           Consult Date:  10/22/2018      Document: [id number]       cc: Renato Daniels MD

## 2018-12-01 NOTE — Clinical Note
Hi,  Client is no longer participating in therapy. Therefore they have been discharged from the active client list. Client can return to therapy with [facility] in the future if needed.  Please contact me if you have any questions.  Flex Lombardo MA, LMFT, LICSW

## 2018-12-01 NOTE — LETTER
12/1/2018      Joni Borja  [address] [address] [id number]     At the start of therapy with [facility] we ask clients to prioritize their mental health care with an attendance agreement.  In this agreement clients make a commitment to follow through with scheduled appointments. If a person cancels an appointment within 24 hours of the appointment time or does not make their appointment and does not contact the clinic it is considered a violation of the agreement.    It has come to the attention of [facility] administration that you were unable to honor that agreement.  Regretfully, we have to inform you that we are discharging you from [facility] due to multiple missed appointments.  Any future scheduled appointments have been cancelled.   If you wish to continue therapy with [facility] and are ready to make a commitment to regular attendance, you may call back in six months and we will be happy to review a new agreement with you.     To access current services we recommend that you contact your insurance plan s provider network to identify a therapist with whom you can schedule services.  For your convenience we have also listed contact information for two agencies which provide mental health services around the [facility] area.      [facility]  [place] [phone] [place] [phone]  [place] [phone] [place] [phone]  [place] [phone] [place] [phone]  [place] [phone] [place] [phone]  [place] [phone]     Sincerely,      [facility]  Administration  [phone]

## 2018-12-01 NOTE — PROGRESS NOTES
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           Presenting Concern:   - Sx of depression as a result of years of marital distress that led to present separation, working towards divorce               - difficulties coping with medical problems which had led to limitation (driving) and work disability      Reason for Discharge:  Client did not return      Disposition at Time of Last Encounter:   Comments:   Client was making minimal progress in therapy, would have benefits from additional services which he was not ready to participate in.      Risk Management:   Client denies a history of suicidal ideation, suicide attempts, self-injurious behavior, homicidal ideation, homicidal behavior and and other safety concerns  A safety and risk management plan has not been developed at this time, however client was given the after-hours number / 911 should there be a change in any of these risk factors.      Referred To:  If needed client was provided contact information to these 3 providers: [facility], [facility] and [facility].        EMILY Donahue, Northern Light Inland HospitalSW   12/1/2018

## 2018-12-01 NOTE — LETTER
12/1/2018    Joni J Huong  [address] [address] [address]      Greetings    Your last date of service at [facility] was November 16, 2017.  Since I have not heard from you regarding your desire to continue services with me through [facility], you will be discharged.  In the future, should you desire to seek services again through our clinic, please do not hesitate to call us.    If you need to re-establish care in the future, it will need to be with another [place] therapist. I have resigned from my practice at [facility] and my last day will be December 20th.  For your convenience we have also listed contact information for three agencies which provide mental health services around the Health system area.    [facility]         [facility]     [facility]    [place] [phone] [place] [phone]              [place] [phone]  [place] [phone] [place] [phone]        [place] [phone]  [place] [phone] [place] [phone]     [place] [phone]  [place] [phone] [place] [phone]          [facility] [phone]  [place] [phone]  Sincerely,  Flex Lombardo MA, LMFT, LICSW

## 2018-12-12 PROBLEM — B07.8 COMMON WART: Status: ACTIVE | Noted: 2018-01-01

## 2018-12-12 NOTE — PATIENT INSTRUCTIONS
No ear infection at this time.     Continue conservative treatments as you have been doing for the ear pain.     Right 3rd finger wart treated today.     What to Expect Following Cryosurgery (Liquid Nitrogen)   What is Cryosurgery?   Cryosurgery is a technique for removing skin lesions that primarily involve the surface of the skin, such as warts, seborrheic keratosis, or actinic keratosis. It is a quick method of removing the lesion with minimal scarring.   The liquid nitrogen needs to be applied long enough tofreeze the affected skin. By freezing the skin, a blister is created underneath the lesion. Ideally, as the new skin forms underneath the blister, the abnormal skin on the roof of the blister peels off. Occasionally, if thelesion is very thick (such as a large wart), only the surface is blistered off. The base or residual lesion may need to be frozen at another visit.   It takes about one to two weeks for the scab to fall off, which is whenthe new layer of skin has formed under the blister. Areas of thinner skin, such as the face, may heal a little faster.      What to Expect Over the Next Few Weeks     During Treatment - Area beingtreated will sting, burn, and then possibly itch.     Immediately After Treatment - Area will be red, sore, and swollen.     Next Day - Blister or blood blister has formed, tenderness starts to subside. Apply aBand-Aid if   necessary.     7 Days - Surface is dark red/brown and scab-like. Apply Vaseline  or an antibacterial ointment, such as Polysporin , if necessary.     2 to 4 Weeks - The surface startsto peel off. This may be encouraged gently during bathing, when the scab is softened.     No makeup shouldbe applied until area is fully healed.      Howto Take Care of the Skin after Cryosurgery     ABand-Aid can be used for larger blisters or blisters in areas that are more likely to be traumatized -such as fingers and toes. If the area becomes dry or crusted, an ointment  (Vaseline , Bacitracin , or Polysporin ) can also be applied.     Cleanse area with a mild cleanser and cool water.     Pat the area dry with a lint-free cloth and apply an ointment (Vaseline , Bacitracin , or Polysporin ).     Avoid glycolic acids, Vitamin C, scrubs, Tretinoin (Retin-A), and Retinol creams for 7 to 10 days.     If approved by your Provider, you may bathe, swim, exercise, and otherwise follow all of your normalactivities.     The area may get wet while bathing, but swimming or hot tub use should be avoided for oneweek following a treatment or while the skin is open.     Within 24 hours, you can expect the area to beswollen and/or blistered. The blister may not be visible to the naked eye.     Within one week, theswelling goes down. The top becomes dark red and scab-like. The scab will loosen over the next weeks, and should fall off within one month.      Adverse Effects     The most common adverse effects are pain, swelling/blistering, potential for infection, and pale discoloration of the skin after it heals.      Blisters        Anytime a blister surfaces, whether from ill-fitting shoes, an oven burn, or liquid nitrogen cryosurgery, it will be a bit painful. For most patients, the pain is a temporary stingwith some discomfort periodically over the next day as the blister forms.     The goal is to achieve ablister. This means, most commonly, patients will have a blister form following treatment. Sometimes, the blister is so thin that it can't be seen and may have minimal swelling. Occasionally, a blood blister forms that canbe quite dramatic but is harmless.     Rarely, the blister may become infected. When this happens, theblister becomes unusually tender, the fluid becomes cloudy, and the redness around it becomes more extensive (and may even form streaks). If this happens, contact our office.     Some lesions, especially those on theface, may leave a slight pale discoloration.     True  scarring,involving deeper layers of the skin is unlikely.     Return as needed for follow-up with Dr. Espinoza.    Clinic : [phone]  Appointment line: [phone]

## 2018-12-12 NOTE — PROGRESS NOTES
"Nursing Notes:   Jenni Badillo LPN  12/12/2018  2:23 PM  Signed  Patient presents to the clinic for right ear discomfort over the past week.      Chief Complaint   Patient presents with     Ear Problem       Initial There were no vitals taken for this visit. Estimated body mass index is 24.66 kg/m  as calculated from the following:    Height as of 7/13/18: 1.753 m (5' 9\").    Weight as of 10/22/18: 75.8 kg (167 lb).  Medication Reconciliation: complete    Jenni Badillo LPN    Nursing note reviewed with patient.  Accuracy and completeness verified.   Mr. Borja is a 47 year old male who:  Patient presents with:  Ear Problem      ICD-10-CM    1. Right ear pain H92.01    2. Need for prophylactic vaccination and inoculation against influenza Z23 HC FLU VAC PRESRV FREE QUAD SPLIT VIR 3+YRS IM   3. Common wart B07.8 DESTRUCT BENIGN LESION, UP TO 14   4. 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  States last week he was quite sure he probably had an ear infection.  There was significant pain.  Pain is gradually improving.  He used some warm packs and Tylenol.  There was never any drainage.  He does not recall any fevers.    His memory is becoming quite poor.  He has progressive right temporal brain cancer.  He had chemotherapy for a year and the tumor is now getting bigger.  It does not sound like treatment options are good at this time.  He continues to follow with oncology.    Flu shot today.    Common wart, wart noted on his right third finger.  He has had similar warts treated with cryotherapy in the past.  He would like to proceed with this again today.  See below.    Functional Capacity: about 4 METS.   Review of Systems   Constitutional: Negative for chills and fever.   HENT: Positive for ear pain (+ Right -- bad last week, getting better now). Negative for ear discharge.    Skin:        Common wart - right 3rd finger "   Neurological:        + brain tumor is getting bigger. Had 1 year chemotherapy   Psychiatric/Behavioral: Positive for confusion (+ worsening memory loss).        TONI:   TONI-7 SCORE 9/20/2017 3/5/2018 12/12/2018   Total Score 6 0 0     PHQ9:  PHQ-9 SCORE 3/5/2018 10/22/2018 12/12/2018   PHQ-9 Total Score - - -   PHQ-9 Total Score MyChart - - -   PHQ-9 Total Score 0 25 0       I have personally reviewed the past medical history, past surgical history, medications, allergies, family and social history as listed below, on 12/12/2018.    Allergies   Allergen Reactions     Dilantin [Phenytoin] Hives       Current Outpatient Medications   Medication Sig Dispense Refill     Acetaminophen (TYLENOL PO) Take 500 mg by mouth 2 times daily as needed for mild pain or fever       carBAMazepine (CARBATROL) 300 MG 12 hr capsule Take 1 capsule (300 mg) by mouth 2 times daily (at 09:00 & 21:00)       cetirizine (ZYRTEC ALLERGY) 10 MG tablet Take 1 tablet (10 mg) by mouth daily as needed (for mosquitos.) 30 tablet      ClonazePAM (KLONOPIN PO) Take 1 mg by mouth At Bedtime (2 x 0.5 mg = 1 mg dose)       escitalopram (LEXAPRO) 20 MG tablet Take 1 tablet (20 mg) by mouth daily       felbamate (FELBATOL) 600 MG tablet Take 1.5 tablets (900 mg) by mouth 2 times daily Take 900 mg (1.5 tablet) am and 900 mg (1.5 tablet) 2 pm       pantoprazole (PROTONIX) 40 MG EC tablet TAKE ONE TABLET BY MOUTH ONCE DAILY 90 tablet 3     RIBOFLAVIN PO Take 200 mg by mouth daily       Topiramate (TOPAMAX PO) Take 300 mg by mouth At Bedtime (3 x 100 mg = 300 mg dose)       topiramate (TOPAMAX) 100 MG tablet TAKE TWO TABLETS BY MOUTH ONCE DAILY IN THE MORNING AND THREE ONCE DAILY IN THE EVENING 450 tablet 1        Patient Active Problem List    Diagnosis Date Noted     Common wart 12/12/2018     Priority: Medium     Driving safety issue - NO Driving - Still having seizures 03/05/2018     Priority: Medium     Dyshidrotic eczema 01/16/2018     Priority: Medium      Esophageal reflux 01/16/2018     Priority: Medium     Status post craniotomy 03/24/2017     Priority: Medium     History of depression 02/28/2017     Priority: Medium     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     Priority: Medium     Insomnia 02/07/2017     Priority: Medium     Overview:   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  Overview:   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 Fshu-Zbfzb-Poavqrg disease 02/07/2017     Priority: Medium              Status post VNS (vagus nerve stimulator) placement - for epilepsy 02/07/2017     Priority: Medium     Oligodendroglioma (H) - right temporal      Priority: Medium     Overview:   Right temporal brain tumor, oligodendroglioma  right frontal grade 2, s/p biopsy and whole brain radiation, recurrence 5/07 s/p subtotal resection and chemo       History of biliary T-tube placement 01/25/2017     Priority: Medium     Medical marijuana use 06/10/2016     Priority: Medium     Pain medication agreement 06/10/2016     Priority: Medium     Fluency disorder associated with underlying disease 06/09/2015     Priority: Medium     Unstable gait 06/18/2014     Priority: Medium     Tobacco use 03/14/2014     Priority: Medium     S/P brain surgery 03/14/2014     Priority: Medium     Partial epilepsy with impairment of consciousness, intractable (H) 08/19/2013     Priority: Medium     Headache 04/09/2013     Priority: Medium     Anxiety state 07/13/2011     Priority: Medium     HCD (health care directive) 05/24/2007     Priority: Medium     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 Lpmu-Ivhcr-Kcnnekf disease 2/7/2017          Localization-related epilepsy (H)      Oligodendroglioma (H) 4/02    right frontal grade 2, s/p biopsy and whole brain radiation, recurrence 5/07 s/p subtotal resection and chemo      Perthe's disease of hip     Right hip     Seizures (H)      Past Surgical History:   Procedure Laterality Date     APPENDECTOMY  1972     BIOPSY  2002     CRANIOTOMY, EXCISE TUMOR COMPLEX, COMBINED  9/18/2013    Procedure: COMBINED CRANIOTOMY, EXCISE TUMOR COMPLEX;  Re-do Right Frontal Craniotomy, Grid Removal,  Resection of Right Frontal  Tumor and Epileptogenic Cortex Resection;  Surgeon: Maverick Linder MD;  Location: [facility]     HEAD & NECK SURGERY  2002    brain tumor removal     IMPLANT STIMULATOR VAGUS NERVE Left 7/19/2016    Procedure: IMPLANT STIMULATOR VAGUS NERVE;  Surgeon: Maverick Linder MD;  Location: [facility]     OPTICAL TRACKING SYSTEM CRANIOTOMY, EXCISE TUMOR WITH MAPPING, COMBINED  9/10/2013    Procedure: COMBINED OPTICAL TRACKING SYSTEM CRANIOTOMY, EXCISE TUMOR WITH MAPPING;  Stealth Guided Right Redo Frontal Craniotomy for Grid Placement ;  Surgeon: Maverick Linder MD;  Location:  OR     OPTICAL TRACKING SYSTEM CRANIOTOMY, EXCISE TUMOR, COMBINED Right 3/24/2017    Procedure: COMBINED OPTICAL TRACKING SYSTEM CRANIOTOMY, EXCISE TUMOR;  Surgeon: Stuart Oscar MD;  Location:  OR     OPTICAL TRACKING SYSTEM CRANIOTOMY, EXCISE TUMOR, COMBINED Right 7/13/2018    Procedure: COMBINED OPTICAL TRACKING SYSTEM CRANIOTOMY, EXCISE TUMOR;  RIGHT CRANIOTOMY FOR EVACUATION OF BRAIN TUMOR ;  Surgeon: Stuart Oscar MD;  Location:  OR     ORTHOPEDIC SURGERY  1982    Right Hip - Perthe's     REPAIR CONGENITAL HIP Right     Age 8     Social History     Socioeconomic History     Marital status:      Spouse name:  from wife.     Number of children: 0     Years of education: None     Highest education level: None   Social Needs     Financial resource strain: None     Food insecurity - worry: None     Food insecurity - inability: None     Transportation needs - medical: None     Transportation needs - non-medical: None   Occupational History     None   Tobacco Use      "Smoking status: Current Every Day Smoker     Packs/day: 1.50     Years: 32.00     Pack years: 48.00     Types: Cigarettes     Start date: 10/22/1985     Smokeless tobacco: Never Used     Tobacco comment: would like to quit soon.   Substance and Sexual Activity     Alcohol use: Yes     Alcohol/week: 0.0 oz     Comment: monthly a few drinks     Drug use: Yes     Comment: marijuana(Rx) on weekends     Sexual activity: Not Currently   Other Topics Concern     Parent/sibling w/ CABG, MI or angioplasty before 65F 55M? Yes   Social History Narrative     None     Family History   Problem Relation Age of Onset     Hyperlipidemia Father         medicine therapy     Coronary Artery Disease Maternal Grandmother         Medicine Therapy/Decesed     Diabetes Paternal Grandfather         Slight/diet control     Coronary Artery Disease Brother 42        mechanical valve     Coronary Artery Disease Maternal Grandfather         undiagnosed/     Cerebrovascular Disease Other      Breast Cancer Other      Colon Cancer No family hx of      Prostate Cancer No family hx of        EXAM:   Vitals:    18 1408   BP: 126/70   BP Location: Right arm   Patient Position: Sitting   Cuff Size: Adult Regular   Pulse: 80   Temp: 97.7  F (36.5  C)   TempSrc: Tympanic   Weight: 73.1 kg (161 lb 2 oz)       Current Pain Score: No Pain (0)     BP Readings from Last 3 Encounters:   18 126/70   10/22/18 116/70   10/15/18 116/72      Wt Readings from Last 3 Encounters:   18 73.1 kg (161 lb 2 oz)   10/22/18 75.8 kg (167 lb)   18 78.7 kg (173 lb 9.6 oz)      Estimated body mass index is 23.79 kg/m  as calculated from the following:    Height as of 18: 1.753 m (5' 9\").    Weight as of this encounter: 73.1 kg (161 lb 2 oz).     Physical Exam   Constitutional: He appears well-developed and well-nourished.   HENT:   Head: Normocephalic and atraumatic.   Left Ear: External ear normal.   Mouth/Throat: Oropharynx is clear and " moist. No oropharyngeal exudate.   Right TM clear, mild erythema of canal   Cardiovascular: Normal rate.   Pulmonary/Chest: Effort normal.   Musculoskeletal: He exhibits no deformity.   Neurological: He is alert.   Skin:   Common wart on right 3rd finger    PROCEDURE:   Reviewed risks and benefits of cryotherapy.After informed consent was obtained, patient elected to proceed. These 1 lesions were treated today.   Performed cryotherapy to #1 lesions x 2 rounds of 30 second freeze/thaw cycles.   Tolerated well. No obvious complications.   Return for signs of infection.    The patient and I reviewed safe sun practices today: the importance of staying out of the sun;especially between 10AM-2PM, wearing sun screen SPF > 30, and protective clothing.   We also discussed the ABC's of skin cancers including: changes in size, uniformity, borders, coloration, bleeding, or any irregularityor changes. If these occur, seek medical attention.   The patient should have a complete skin exam by a medical professional every 6-12 months, and any questionable/suspicious, or changing lesions should be removed.      Psychiatric: He has a normal mood and affect.      Procedures   INVESTIGATIONS:  Results for orders placed or performed in visit on 10/17/18   Carbamazepine and epoxide free and total   Result Value Ref Range    Carbamazepine Total Level 4.8 4.0 - 12.0 ug/mL    10, 11 Epoxide Level 2.0 0.4 - 4.0 ug/mL    Free Carbamazepine Level Ug/Ml 1.1 0.6 - 4.2 ug/mL    Free Epoxide Level 0.9 0.2 - 2.0 ug/mL   Felbamate Level   Result Value Ref Range    Felbamate Level 45 30 - 60 ug/mL   Topiramate Level   Result Value Ref Range    Topiramate Level 12.4 5.0 - 20.0 ug/mL   CBC with platelets differential   Result Value Ref Range    WBC 5.8 4.0 - 11.0 10e9/L    RBC Count 4.20 (L) 4.4 - 5.9 10e12/L    Hemoglobin 13.3 13.3 - 17.7 g/dL    Hematocrit 39.9 (L) 40.0 - 53.0 %    MCV 95 78 - 100 fl    MCH 31.7 26.5 - 33.0 pg    MCHC 33.3 31.5 - 36.5  g/dL    RDW 13.2 10.0 - 15.0 %    Platelet Count 295 150 - 450 10e9/L    Diff Method Automated Method     % Neutrophils 73.2 %    % Lymphocytes 15.9 %    % Monocytes 8.1 %    % Eosinophils 1.7 %    % Basophils 0.9 %    % Immature Granulocytes 0.2 %    Absolute Neutrophil 4.2 1.6 - 8.3 10e9/L    Absolute Lymphocytes 0.9 0.8 - 5.3 10e9/L    Absolute Monocytes 0.5 0.0 - 1.3 10e9/L    Absolute Eosinophils 0.1 0.0 - 0.7 10e9/L    Absolute Basophils 0.1 0.0 - 0.2 10e9/L    Abs Immature Granulocytes 0.0 0 - 0.4 10e9/L   Comprehensive metabolic panel   Result Value Ref Range    Sodium 139 134 - 144 mmol/L    Potassium 3.5 3.5 - 5.1 mmol/L    Chloride 110 (H) 98 - 107 mmol/L    Carbon Dioxide 24 21 - 31 mmol/L    Anion Gap 5 3 - 14 mmol/L    Glucose 104 70 - 105 mg/dL    Urea Nitrogen 10 7 - 25 mg/dL    Creatinine 0.92 0.70 - 1.30 mg/dL    GFR Estimate 88 >60 mL/min/1.7m2    GFR Estimate If Black >90 >60 mL/min/1.7m2    Calcium 8.8 8.6 - 10.3 mg/dL    Bilirubin Total 0.4 0.3 - 1.0 mg/dL    Albumin 4.0 3.5 - 5.7 g/dL    Protein Total 7.3 6.4 - 8.9 g/dL    Alkaline Phosphatase 131 (H) 34 - 104 U/L    ALT 8 7 - 52 U/L    AST 10 (L) 13 - 39 U/L       ASSESSMENT AND PLAN:  Problem List Items Addressed This Visit        Infectious/Inflammatory    Common wart    Relevant Orders    DESTRUCT BENIGN LESION, UP TO 14 (Completed)       Other    Oligodendroglioma (H) - right temporal      Other Visit Diagnoses     Right ear pain    -  Primary    Need for prophylactic vaccination and inoculation against influenza        Relevant Orders    HC FLU VAC PRESRV FREE QUAD SPLIT VIR 3+YRS IM (Completed)        -- Expected clinical course discussed    -- Medications and their side effects discussed    15 minutes spent in face-to-face interaction with patient and wife (separate from separately billed procedures) with greater than 50% spent in counseling and care coordination of listed medical problems.      Patient Instructions   No ear infection  at this time.     Continue conservative treatments as you have been doing for the ear pain.     Right 3rd finger wart treated today.     What to Expect Following Cryosurgery (Liquid Nitrogen)   What is Cryosurgery?   Cryosurgery is a technique for removing skin lesions that primarily involve the surface of the skin, such as warts, seborrheic keratosis, or actinic keratosis. It is a quick method of removing the lesion with minimal scarring.   The liquid nitrogen needs to be applied long enough tofreeze the affected skin. By freezing the skin, a blister is created underneath the lesion. Ideally, as the new skin forms underneath the blister, the abnormal skin on the roof of the blister peels off. Occasionally, if thelesion is very thick (such as a large wart), only the surface is blistered off. The base or residual lesion may need to be frozen at another visit.   It takes about one to two weeks for the scab to fall off, which is whenthe new layer of skin has formed under the blister. Areas of thinner skin, such as the face, may heal a little faster.      What to Expect Over the Next Few Weeks     During Treatment - Area beingtreated will sting, burn, and then possibly itch.     Immediately After Treatment - Area will be red, sore, and swollen.     Next Day - Blister or blood blister has formed, tenderness starts to subside. Apply aBand-Aid if   necessary.     7 Days - Surface is dark red/brown and scab-like. Apply Vaseline  or an antibacterial ointment, such as Polysporin , if necessary.     2 to 4 Weeks - The surface startsto peel off. This may be encouraged gently during bathing, when the scab is softened.     No makeup shouldbe applied until area is fully healed.      Howto Take Care of the Skin after Cryosurgery     ABand-Aid can be used for larger blisters or blisters in areas that are more likely to be traumatized -such as fingers and toes. If the area becomes dry or crusted, an ointment (Vaseline , Bacitracin ,  or Polysporin ) can also be applied.     Cleanse area with a mild cleanser and cool water.     Pat the area dry with a lint-free cloth and apply an ointment (Vaseline , Bacitracin , or Polysporin ).     Avoid glycolic acids, Vitamin C, scrubs, Tretinoin (Retin-A), and Retinol creams for 7 to 10 days.     If approved by your Provider, you may bathe, swim, exercise, and otherwise follow all of your normalactivities.     The area may get wet while bathing, but swimming or hot tub use should be avoided for oneweek following a treatment or while the skin is open.     Within 24 hours, you can expect the area to beswollen and/or blistered. The blister may not be visible to the naked eye.     Within one week, theswelling goes down. The top becomes dark red and scab-like. The scab will loosen over the next weeks, and should fall off within one month.      Adverse Effects     The most common adverse effects are pain, swelling/blistering, potential for infection, and pale discoloration of the skin after it heals.      Blisters        Anytime a blister surfaces, whether from ill-fitting shoes, an oven burn, or liquid nitrogen cryosurgery, it will be a bit painful. For most patients, the pain is a temporary stingwith some discomfort periodically over the next day as the blister forms.     The goal is to achieve ablister. This means, most commonly, patients will have a blister form following treatment. Sometimes, the blister is so thin that it can't be seen and may have minimal swelling. Occasionally, a blood blister forms that canbe quite dramatic but is harmless.     Rarely, the blister may become infected. When this happens, theblister becomes unusually tender, the fluid becomes cloudy, and the redness around it becomes more extensive (and may even form streaks). If this happens, contact our office.     Some lesions, especially those on theface, may leave a slight pale discoloration.     True scarring,involving deeper layers  of the skin is unlikely.     Return as needed for follow-up with Dr. Espinoza.    Clinic : [phone]  Appointment line: [phone]      Renato Espinoza MD  Internal Medicine  [facility]     Portions of this note were dictated using speech recognition software. The note has been proofread but errors in the text may have been overlooked. Please contact me if there are any concerns regarding the accuracy of the dictation.   WDL

## 2018-12-12 NOTE — PROGRESS NOTES

## 2018-12-12 NOTE — NURSING NOTE
"Patient presents to the clinic for right ear discomfort over the past week.      Chief Complaint   Patient presents with     Ear Problem       Initial There were no vitals taken for this visit. Estimated body mass index is 24.66 kg/m  as calculated from the following:    Height as of 7/13/18: 1.753 m (5' 9\").    Weight as of 10/22/18: 75.8 kg (167 lb).  Medication Reconciliation: complete    Jenni Badillo LPN    "

## 2018-12-14 NOTE — TELEPHONE ENCOUNTER
Joni called clinic this afternoon stating that he has to cancel his Brain MRI scheduled for 12/18/18 and his appointment with Dr. Daniels. Joni stated that he does not have transportation down to the [facility] and he cannot reschedule at this time.

## 2018-12-17 NOTE — TELEPHONE ENCOUNTER
Patient called clinic stating that he has now found a ride to the [facility] tomorrow 12/18/18 and that he would like his brain MRI and exam with Dr. Daniels rescheduled that was originally taken off the schedule secondary to him not having a ride. Message will be forwarded to scheduling to have Joni put back on schedule. Leeanna Storm RN,BSN,OCN

## 2018-12-17 NOTE — TELEPHONE ENCOUNTER
Schedulers have attempted to call patient twice to let him know that Brain MRI was rescheduled to 0730 PM this evening from what was canceled last week when patient called to let writer know that he could not appointment. Writer also called patient to let him know about his scheduled brain MRI prior to his exam 12/18/18 with Dr. Daniels. Left a third message on his voice mail and instructed him to call clinic to verify that message was received and that if he does not have MRI done that his appointment with Dr. Daniels will have to be rescheduled. Leeanna Storm RN,BSN,OCN

## 2018-12-17 NOTE — TELEPHONE ENCOUNTER
Patient was able to be scheduled tomorrow morning at 0945 AM prior to his appointment with Dr. Daniels.
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
## 2018-12-18 NOTE — NURSING NOTE
"Oncology Rooming Note    December 18, 2018 3:02 PM   Joni Borja is a 47 year old male who presents for:    Chief Complaint   Patient presents with     Oncology Clinic Visit     Follow up after MRI     Initial Vitals: /88   Pulse 52   Temp 98.3  F (36.8  C)   Resp 16   Wt 73.8 kg (162 lb 12.8 oz)   BMI 24.04 kg/m   Estimated body mass index is 24.04 kg/m  as calculated from the following:    Height as of 7/13/18: 1.753 m (5' 9\").    Weight as of this encounter: 73.8 kg (162 lb 12.8 oz). Body surface area is 1.9 meters squared.  Data Unavailable Comment: Data Unavailable   No LMP for male patient.  Allergies reviewed: Yes  Medications reviewed: Yes    Medications: Medication refills not needed today.  Pharmacy name entered into [facility]:    [id number] - [place], [place] - [address] [facility] - [address]    Clinical concerns: REview MRI    6 minutes for nursing intake (face to face time)     Chelly Hoffman RN              "
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
## 2018-12-18 NOTE — PATIENT INSTRUCTIONS
Imaging discussed; There are some changes that are concerning for cancer growth.    Radiation oncology referral to Dr. Mariella Montelongo at [facility]. /Clinic Called /Tia    Return to clinic pending treatment decision.     Berkley Daniels MD  Neuro-oncology  12/18/2018

## 2018-12-20 NOTE — PROGRESS NOTES
Outpatient Occupational Therapy Discharge Note     Patient: Joni Borja  : 1970    Beginning/End Dates of Reporting Period:  2018 to 2018    Referring Provider: Carley Auguste PA-C    Therapy Diagnosis: Decreased McKean in ADLs/IADLs s/p brain surgery    Client Self Report: Upon last visit: Patient reports he has been having increased prolonged seizures, but has not had a grand mal.  He states he met with his neurosurgeon  and that the visit went well.  He states that he is finished seeing his neurosurgeon now.  Patient reports he has his initial radiation consult in [place].    Objective Measurements:  10/17/2018:   Objective Measure: Functional Memory   Details: Patient participated in a short term memory activity during which he had to remember the rules of the game and match cards in 1 of 3 categories.  Patient required Min-Mod verbal cues throughout.      Objective Measure: Short-term memory   Details: Patient completed short term memory test during which letters were presented for 3 seconds and then patient was to remember them and write them down. He score 100% when presented 2 or 4 letters, but quickly declined to about 30% when presented with 6-12 letters at one time.       Goals:     Goal Identifier memory skills   Goal Description Patient will demonstrate improved memory skills by completing short-term memory tasks in occupational therapy with 90% accuracy consistently using compensatory strategies for increased safety and independence with ADL/IADLS (remembering to take medications, turn off the stove when done, remember medical appointments and medical recommendations).(10/1: Patient ranging from 33-80% success on STM activities)   Target Date 10/29/18(Continued from goal date of 10/1/18)   Date Met   Not Met   Progress: Goal not met: Patient continues to have difficulty with short term memory and applying compensatory techniques.      Goal Identifier problem solving and  numercial reasoning skills   Goal Description Patient will demonstrate the ability to complete simple to moderately complex money management tasks with 90% accuracy for increased numerical reasoning and problem solving skills to resume personal finances accurately and independently.   Target Date 10/01/18   Date Met  09/26/18(90% accuracy during bill paying)   Progress: Goal Met.     Goal Identifier meal prep   Goal Description Patient to complete a familiar, simple to moderately complex meal prep task using the stove and/or oven with modified independence, for increased safety and independence in the home.   Target Date 10/29/18(Continue goal from target date 10/1/18)   Date Met  10/03/18(Simple meal prep w/Mod I for safety, VCs for directions)   Progress: Goal Met.     Goal Identifier medication management   Goal Description Patient will independently set up personal medications, using a pillbox, on 2 separate occasions for increased accuracy and independence with managing medications.   Target Date 10/29/18(Continue from goal date 10/1/18)   Date Met  (Partially met 9/10/18- 1 of 2 trials completed)   Progress: Goal partially met, independently set-up pillbox accurately during 1 trial with home medications.  Did not complete second trial.      Goal Identifier visual scanning skills   Goal Description Patient will demonstrate [facility] visual scanning skills (both peripersonal and extrapersonal space) for safe and independent community mobility (crossing the street, grocery shopping) and accurate pen/paper tasks by scoring 90% accuracy on visual scanning tasks.   Target Date 10/01/18   Date Met  09/19/18(97% accuracy pen and paper; Dynavision: 56 in 60s)   Progress: Goal Met.      Progress Toward Goals:   Progress this reporting period: Patient made good progress during course of occupational therapy.  He was able to improve visual scanning skills, medication management skills, meal prep skills, and numerical reasoning  skills.  He continues to have difficulty with short term memory.  Patient and his wife were provided educational handouts focusing on compensatory strategies for memory impairments.     Plan:  Discharge from therapy.    Discharge:    Reason for Discharge: No further expectation of progress.  Patient has failed to schedule further appointments.    Equipment Issued: Educational handouts    Discharge Plan: No further OT required at this time.  Implement compensatory strategies in home environment.

## 2018-12-20 NOTE — ADDENDUM NOTE
Encounter addended by: Joyce Rahman OTR on: 12/20/2018 11:36 AM   Actions taken: Pend clinical note, Sign clinical note

## 2018-12-27 NOTE — TELEPHONE ENCOUNTER
Nurse received In-Basket message as follows:      Josseline Ann sent to [facility]             Caller: Brittany     Relationship to Patient: mom     Call Back Number: [phone]     Reason for Call: Mom is requesting that we transfer ClonazePAM (KLONOPIN PO) from the [facility] to the [facility] in [place].      Nurse reviewed EHR and noted that an order was entered today for requested medication.  Called pharmacy to see that they received this order.  Pharmacist  Indicates that it was not received - gave order as written to pharmacist .  Pharmacist  Indicated she would get it ready.

## 2019-01-01 ENCOUNTER — TELEPHONE (OUTPATIENT)
Dept: INTERNAL MEDICINE | Facility: OTHER | Age: 49
End: 2019-01-01

## 2019-01-01 ENCOUNTER — MYC MEDICAL ADVICE (OUTPATIENT)
Dept: INTERNAL MEDICINE | Facility: OTHER | Age: 49
End: 2019-01-01

## 2019-01-01 ENCOUNTER — OFFICE VISIT (OUTPATIENT)
Dept: INTERNAL MEDICINE | Facility: OTHER | Age: 49
End: 2019-01-01
Attending: INTERNAL MEDICINE
Payer: MEDICARE

## 2019-01-01 ENCOUNTER — TELEPHONE (OUTPATIENT)
Dept: FAMILY MEDICINE | Facility: CLINIC | Age: 49
End: 2019-01-01

## 2019-01-01 ENCOUNTER — MEDICAL CORRESPONDENCE (OUTPATIENT)
Dept: HEALTH INFORMATION MANAGEMENT | Facility: OTHER | Age: 49
End: 2019-01-01

## 2019-01-01 ENCOUNTER — OFFICE VISIT (OUTPATIENT)
Dept: NEUROLOGY | Facility: CLINIC | Age: 49
End: 2019-01-01

## 2019-01-01 ENCOUNTER — APPOINTMENT (OUTPATIENT)
Dept: CT IMAGING | Facility: OTHER | Age: 49
End: 2019-01-01
Attending: EMERGENCY MEDICINE
Payer: MEDICARE

## 2019-01-01 ENCOUNTER — HOSPITAL ENCOUNTER (OUTPATIENT)
Facility: OTHER | Age: 49
Setting detail: OBSERVATION
Discharge: NURSING, REHAB OR LONG-TERM CARE | End: 2019-09-04
Attending: EMERGENCY MEDICINE | Admitting: FAMILY MEDICINE
Payer: MEDICARE

## 2019-01-01 VITALS
HEIGHT: 70 IN | SYSTOLIC BLOOD PRESSURE: 148 MMHG | DIASTOLIC BLOOD PRESSURE: 100 MMHG | RESPIRATION RATE: 18 BRPM | HEART RATE: 72 BPM | TEMPERATURE: 97.4 F | BODY MASS INDEX: 23.48 KG/M2 | WEIGHT: 164 LBS

## 2019-01-01 VITALS
RESPIRATION RATE: 16 BRPM | WEIGHT: 159.8 LBS | DIASTOLIC BLOOD PRESSURE: 83 MMHG | HEART RATE: 74 BPM | OXYGEN SATURATION: 96 % | BODY MASS INDEX: 23.67 KG/M2 | HEIGHT: 69 IN | TEMPERATURE: 97.9 F | SYSTOLIC BLOOD PRESSURE: 146 MMHG

## 2019-01-01 VITALS
WEIGHT: 171 LBS | DIASTOLIC BLOOD PRESSURE: 62 MMHG | SYSTOLIC BLOOD PRESSURE: 116 MMHG | BODY MASS INDEX: 25.25 KG/M2 | RESPIRATION RATE: 16 BRPM

## 2019-01-01 DIAGNOSIS — Z86.59 HISTORY OF DEPRESSION: ICD-10-CM

## 2019-01-01 DIAGNOSIS — R29.6 FALLS FREQUENTLY: ICD-10-CM

## 2019-01-01 DIAGNOSIS — Z92.21 PERSONAL HISTORY OF CHEMOTHERAPY: ICD-10-CM

## 2019-01-01 DIAGNOSIS — G40.219 PARTIAL EPILEPSY WITH IMPAIRMENT OF CONSCIOUSNESS, INTRACTABLE (H): ICD-10-CM

## 2019-01-01 DIAGNOSIS — K21.9 GASTROESOPHAGEAL REFLUX DISEASE WITHOUT ESOPHAGITIS: ICD-10-CM

## 2019-01-01 DIAGNOSIS — Z79.899 MEDICAL MARIJUANA USE: ICD-10-CM

## 2019-01-01 DIAGNOSIS — R51.9 NONINTRACTABLE HEADACHE, UNSPECIFIED CHRONICITY PATTERN, UNSPECIFIED HEADACHE TYPE: ICD-10-CM

## 2019-01-01 DIAGNOSIS — F17.210 CIGARETTE SMOKER: ICD-10-CM

## 2019-01-01 DIAGNOSIS — G89.3 CANCER ASSOCIATED PAIN: ICD-10-CM

## 2019-01-01 DIAGNOSIS — R41.3 MEMORY LOSS: ICD-10-CM

## 2019-01-01 DIAGNOSIS — F41.1 ANXIETY STATE: ICD-10-CM

## 2019-01-01 DIAGNOSIS — I62.9 INTRACRANIAL HEMORRHAGE (H): ICD-10-CM

## 2019-01-01 DIAGNOSIS — Z91.89 DRIVING SAFETY ISSUE: ICD-10-CM

## 2019-01-01 DIAGNOSIS — C71.9 OLIGODENDROGLIOMA (H): Primary | ICD-10-CM

## 2019-01-01 DIAGNOSIS — C71.9 MALIGNANT NEOPLASM OF BRAIN, UNSPECIFIED LOCATION (H): ICD-10-CM

## 2019-01-01 DIAGNOSIS — Z98.890 STATUS POST CRANIOTOMY: ICD-10-CM

## 2019-01-01 DIAGNOSIS — R26.89 POOR BALANCE: ICD-10-CM

## 2019-01-01 DIAGNOSIS — C71.9 OLIGODENDROGLIOMA (H): ICD-10-CM

## 2019-01-01 DIAGNOSIS — I62.9 ACUTE INTRA-CRANIAL HEMORRHAGE (H): ICD-10-CM
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
## 2019-01-01 LAB
ALBUMIN SERPL-MCNC: 3.6 G/DL (ref 3.5–5.7)
ALBUMIN UR-MCNC: NEGATIVE MG/DL
ALP SERPL-CCNC: 108 U/L (ref 34–104)
ALT SERPL W P-5'-P-CCNC: 10 U/L (ref 7–52)
ANION GAP SERPL CALCULATED.3IONS-SCNC: 4 MMOL/L (ref 3–14)
APPEARANCE UR: CLEAR
AST SERPL W P-5'-P-CCNC: 15 U/L (ref 13–39)
BASOPHILS # BLD AUTO: 0.1 10E9/L (ref 0–0.2)
BASOPHILS NFR BLD AUTO: 0.9 %
BILIRUB SERPL-MCNC: 0.2 MG/DL (ref 0.3–1)
BILIRUB UR QL STRIP: NEGATIVE
BUN SERPL-MCNC: 14 MG/DL (ref 7–25)
CALCIUM SERPL-MCNC: 8.1 MG/DL (ref 8.6–10.3)
CHLORIDE SERPL-SCNC: 110 MMOL/L (ref 98–107)
CO2 SERPL-SCNC: 24 MMOL/L (ref 21–31)
COLOR UR AUTO: YELLOW
CREAT SERPL-MCNC: 0.96 MG/DL (ref 0.7–1.3)
DIFFERENTIAL METHOD BLD: ABNORMAL
EOSINOPHIL # BLD AUTO: 0.2 10E9/L (ref 0–0.7)
EOSINOPHIL NFR BLD AUTO: 2.5 %
ERYTHROCYTE [DISTWIDTH] IN BLOOD BY AUTOMATED COUNT: 12.9 % (ref 10–15)
GFR SERPL CREATININE-BSD FRML MDRD: 84 ML/MIN/{1.73_M2}
GLUCOSE SERPL-MCNC: 83 MG/DL (ref 70–105)
GLUCOSE UR STRIP-MCNC: NEGATIVE MG/DL
HCT VFR BLD AUTO: 38 % (ref 40–53)
HGB BLD-MCNC: 12.5 G/DL (ref 13.3–17.7)
HGB UR QL STRIP: NEGATIVE
IMM GRANULOCYTES # BLD: 0 10E9/L (ref 0–0.4)
IMM GRANULOCYTES NFR BLD: 0.4 %
KETONES UR STRIP-MCNC: NEGATIVE MG/DL
LEUKOCYTE ESTERASE UR QL STRIP: NEGATIVE
LYMPHOCYTES # BLD AUTO: 1.2 10E9/L (ref 0.8–5.3)
LYMPHOCYTES NFR BLD AUTO: 15.4 %
MAGNESIUM SERPL-MCNC: 2 MG/DL (ref 1.9–2.7)
MCH RBC QN AUTO: 31.1 PG (ref 26.5–33)
MCHC RBC AUTO-ENTMCNC: 32.9 G/DL (ref 31.5–36.5)
MCV RBC AUTO: 95 FL (ref 78–100)
MONOCYTES # BLD AUTO: 0.6 10E9/L (ref 0–1.3)
MONOCYTES NFR BLD AUTO: 8.1 %
NEUTROPHILS # BLD AUTO: 5.5 10E9/L (ref 1.6–8.3)
NEUTROPHILS NFR BLD AUTO: 72.7 %
NITRATE UR QL: NEGATIVE
PH UR STRIP: 7.5 PH (ref 5–9)
PLATELET # BLD AUTO: 280 10E9/L (ref 150–450)
POTASSIUM SERPL-SCNC: 4.1 MMOL/L (ref 3.5–5.1)
PROT SERPL-MCNC: 6.7 G/DL (ref 6.4–8.9)
RBC # BLD AUTO: 4.02 10E12/L (ref 4.4–5.9)
SODIUM SERPL-SCNC: 138 MMOL/L (ref 134–144)
SOURCE: NORMAL
SP GR UR STRIP: 1.02 (ref 1–1.03)
UROBILINOGEN UR STRIP-ACNC: 0.2 EU/DL (ref 0.2–1)
WBC # BLD AUTO: 7.6 10E9/L (ref 4–11)

## 2019-01-01 PROCEDURE — 25000128 H RX IP 250 OP 636: Performed by: FAMILY MEDICINE

## 2019-01-01 PROCEDURE — 25000131 ZZH RX MED GY IP 250 OP 636 PS 637: Mod: GY | Performed by: FAMILY MEDICINE

## 2019-01-01 PROCEDURE — 96376 TX/PRO/DX INJ SAME DRUG ADON: CPT

## 2019-01-01 PROCEDURE — 25000132 ZZH RX MED GY IP 250 OP 250 PS 637: Mod: GY | Performed by: FAMILY MEDICINE

## 2019-01-01 PROCEDURE — 99285 EMERGENCY DEPT VISIT HI MDM: CPT | Mod: Z6 | Performed by: EMERGENCY MEDICINE

## 2019-01-01 PROCEDURE — 81003 URINALYSIS AUTO W/O SCOPE: CPT | Performed by: EMERGENCY MEDICINE

## 2019-01-01 PROCEDURE — G0378 HOSPITAL OBSERVATION PER HR: HCPCS

## 2019-01-01 PROCEDURE — 99225 ZZC SUBSEQUENT OBSERVATION CARE,LEVEL II: CPT | Performed by: INTERNAL MEDICINE

## 2019-01-01 PROCEDURE — 25000132 ZZH RX MED GY IP 250 OP 250 PS 637: Mod: GY | Performed by: EMERGENCY MEDICINE

## 2019-01-01 PROCEDURE — 99217 ZZC OBSERVATION CARE DISCHARGE: CPT | Performed by: INTERNAL MEDICINE

## 2019-01-01 PROCEDURE — 81003 URINALYSIS AUTO W/O SCOPE: CPT | Mod: XU | Performed by: EMERGENCY MEDICINE

## 2019-01-01 PROCEDURE — 36415 COLL VENOUS BLD VENIPUNCTURE: CPT | Performed by: EMERGENCY MEDICINE

## 2019-01-01 PROCEDURE — 25000128 H RX IP 250 OP 636: Performed by: EMERGENCY MEDICINE

## 2019-01-01 PROCEDURE — 96374 THER/PROPH/DIAG INJ IV PUSH: CPT | Performed by: EMERGENCY MEDICINE

## 2019-01-01 PROCEDURE — 80053 COMPREHEN METABOLIC PANEL: CPT | Performed by: EMERGENCY MEDICINE

## 2019-01-01 PROCEDURE — 83735 ASSAY OF MAGNESIUM: CPT | Performed by: EMERGENCY MEDICINE

## 2019-01-01 PROCEDURE — 99214 OFFICE O/P EST MOD 30 MIN: CPT | Performed by: INTERNAL MEDICINE

## 2019-01-01 PROCEDURE — 70450 CT HEAD/BRAIN W/O DYE: CPT | Mod: TC

## 2019-01-01 PROCEDURE — G0463 HOSPITAL OUTPT CLINIC VISIT: HCPCS

## 2019-01-01 PROCEDURE — 99285 EMERGENCY DEPT VISIT HI MDM: CPT | Mod: 25 | Performed by: EMERGENCY MEDICINE

## 2019-01-01 PROCEDURE — 99225 ZZC SUBSEQUENT OBSERVATION CARE,LEVEL II: CPT | Performed by: FAMILY MEDICINE

## 2019-01-01 PROCEDURE — 99220 ZZC INITIAL OBSERVATION CARE,LEVL III: CPT | Performed by: FAMILY MEDICINE

## 2019-01-01 PROCEDURE — 85025 COMPLETE CBC W/AUTO DIFF WBC: CPT | Performed by: EMERGENCY MEDICINE

## 2019-01-01 RX ORDER — HYDROCODONE BITARTRATE AND ACETAMINOPHEN 5; 325 MG/1; MG/1
.5-1 TABLET ORAL EVERY 4 HOURS PRN
Status: DISCONTINUED | OUTPATIENT
Start: 2019-01-01 | End: 2019-01-01 | Stop reason: DRUGHIGH

## 2019-01-01 RX ORDER — PROCHLORPERAZINE MALEATE 10 MG
10 TABLET ORAL EVERY 6 HOURS PRN
Status: DISCONTINUED | OUTPATIENT
Start: 2019-01-01 | End: 2019-01-01 | Stop reason: HOSPADM

## 2019-01-01 RX ORDER — PROCHLORPERAZINE 25 MG
25 SUPPOSITORY, RECTAL RECTAL EVERY 12 HOURS PRN
Status: DISCONTINUED | OUTPATIENT
Start: 2019-01-01 | End: 2019-01-01 | Stop reason: HOSPADM

## 2019-01-01 RX ORDER — CARBAMAZEPINE 300 MG/1
CAPSULE, EXTENDED RELEASE ORAL
Qty: 180 CAPSULE | Refills: 3 | Status: SHIPPED | OUTPATIENT
Start: 2019-01-01

## 2019-01-01 RX ORDER — AMOXICILLIN 250 MG
2 CAPSULE ORAL 2 TIMES DAILY PRN
Status: DISCONTINUED | OUTPATIENT
Start: 2019-01-01 | End: 2019-01-01 | Stop reason: HOSPADM

## 2019-01-01 RX ORDER — PANTOPRAZOLE SODIUM 40 MG/1
40 TABLET, DELAYED RELEASE ORAL DAILY
Qty: 90 TABLET | Refills: 1 | Status: SHIPPED | OUTPATIENT
Start: 2019-01-01

## 2019-01-01 RX ORDER — CETIRIZINE HYDROCHLORIDE 10 MG/1
10 TABLET ORAL DAILY PRN
Qty: 90 TABLET | Refills: 3 | Status: SHIPPED | OUTPATIENT
Start: 2019-01-01

## 2019-01-01 RX ORDER — TOPIRAMATE 100 MG/1
300 TABLET, FILM COATED ORAL ONCE
Status: COMPLETED | OUTPATIENT
Start: 2019-01-01 | End: 2019-01-01

## 2019-01-01 RX ORDER — HYDROCODONE BITARTRATE AND ACETAMINOPHEN 5; 325 MG/1; MG/1
.5-1 TABLET ORAL EVERY 6 HOURS PRN
Qty: 120 TABLET | Refills: 0 | Status: ON HOLD | OUTPATIENT
Start: 2019-01-01 | End: 2019-01-01

## 2019-01-01 RX ORDER — TOPIRAMATE 100 MG/1
200 TABLET, FILM COATED ORAL DAILY
Status: DISCONTINUED | OUTPATIENT
Start: 2019-01-01 | End: 2019-01-01 | Stop reason: HOSPADM

## 2019-01-01 RX ORDER — FELBAMATE 600 MG/1
900 TABLET ORAL EVERY 12 HOURS SCHEDULED
Status: DISCONTINUED | OUTPATIENT
Start: 2019-01-01 | End: 2019-01-01 | Stop reason: DRUGHIGH

## 2019-01-01 RX ORDER — HYDROCODONE BITARTRATE AND ACETAMINOPHEN 5; 325 MG/1; MG/1
.5-1 TABLET ORAL EVERY 6 HOURS PRN
Qty: 28 TABLET | Refills: 0 | Status: SHIPPED | DISCHARGE
Start: 2019-01-01 | End: 2019-01-01

## 2019-01-01 RX ORDER — DEXAMETHASONE 1 MG
1 TABLET ORAL DAILY
Qty: 3 TABLET | DISCHARGE
Start: 2019-01-01

## 2019-01-01 RX ORDER — DEXAMETHASONE 0.5 MG/1
2 TABLET ORAL 2 TIMES DAILY
Status: DISCONTINUED | OUTPATIENT
Start: 2019-01-01 | End: 2019-01-01 | Stop reason: HOSPADM

## 2019-01-01 RX ORDER — DEXAMETHASONE 1 MG
1 TABLET ORAL 2 TIMES DAILY
Qty: 4 TABLET | DISCHARGE
Start: 2019-01-01

## 2019-01-01 RX ORDER — HYDROCODONE BITARTRATE AND ACETAMINOPHEN 5; 325 MG/1; MG/1
1 TABLET ORAL EVERY 4 HOURS PRN
Status: DISCONTINUED | OUTPATIENT
Start: 2019-01-01 | End: 2019-01-01 | Stop reason: HOSPADM

## 2019-01-01 RX ORDER — CARBAMAZEPINE 100 MG/1
300 TABLET, EXTENDED RELEASE ORAL 2 TIMES DAILY
Status: DISCONTINUED | OUTPATIENT
Start: 2019-01-01 | End: 2019-01-01 | Stop reason: CLARIF

## 2019-01-01 RX ORDER — ESCITALOPRAM OXALATE 10 MG/1
20 TABLET ORAL DAILY
Status: DISCONTINUED | OUTPATIENT
Start: 2019-01-01 | End: 2019-01-01 | Stop reason: HOSPADM

## 2019-01-01 RX ORDER — ONDANSETRON 2 MG/ML
4 INJECTION INTRAMUSCULAR; INTRAVENOUS EVERY 6 HOURS PRN
Status: DISCONTINUED | OUTPATIENT
Start: 2019-01-01 | End: 2019-01-01

## 2019-01-01 RX ORDER — TOPIRAMATE 100 MG/1
TABLET, FILM COATED ORAL
Qty: 450 TABLET | Refills: 3 | Status: SHIPPED | OUTPATIENT
Start: 2019-01-01

## 2019-01-01 RX ORDER — AMOXICILLIN 250 MG
1 CAPSULE ORAL 2 TIMES DAILY PRN
Status: DISCONTINUED | OUTPATIENT
Start: 2019-01-01 | End: 2019-01-01 | Stop reason: HOSPADM

## 2019-01-01 RX ORDER — CLONAZEPAM 0.5 MG/1
0.5 TABLET ORAL 2 TIMES DAILY
Status: DISCONTINUED | OUTPATIENT
Start: 2019-01-01 | End: 2019-01-01 | Stop reason: DRUGHIGH

## 2019-01-01 RX ORDER — CLONAZEPAM 0.5 MG/1
TABLET ORAL
Qty: 90 TABLET | Refills: 5 | Status: ON HOLD | OUTPATIENT
Start: 2019-01-01 | End: 2019-01-01

## 2019-01-01 RX ORDER — CLONAZEPAM 0.5 MG/1
TABLET ORAL
Qty: 90 TABLET | Refills: 5 | Status: SHIPPED | DISCHARGE
Start: 2019-01-01

## 2019-01-01 RX ORDER — DEXAMETHASONE 0.5 MG/1
1 TABLET ORAL DAILY
Status: DISCONTINUED | OUTPATIENT
Start: 2019-01-01 | End: 2019-01-01 | Stop reason: HOSPADM

## 2019-01-01 RX ORDER — CARBAMAZEPINE 100 MG/1
300 CAPSULE, EXTENDED RELEASE ORAL ONCE
Status: COMPLETED | OUTPATIENT
Start: 2019-01-01 | End: 2019-01-01

## 2019-01-01 RX ORDER — CLONAZEPAM 0.5 MG/1
0.5 TABLET ORAL DAILY
Status: DISCONTINUED | OUTPATIENT
Start: 2019-01-01 | End: 2019-01-01 | Stop reason: HOSPADM

## 2019-01-01 RX ORDER — ESCITALOPRAM OXALATE 20 MG/1
TABLET ORAL
Qty: 90 TABLET | Refills: 3 | Status: SHIPPED | OUTPATIENT
Start: 2019-01-01

## 2019-01-01 RX ORDER — ONDANSETRON 4 MG/1
4 TABLET, ORALLY DISINTEGRATING ORAL EVERY 6 HOURS PRN
Status: DISCONTINUED | OUTPATIENT
Start: 2019-01-01 | End: 2019-01-01

## 2019-01-01 RX ORDER — CARBAMAZEPINE 100 MG/1
300 TABLET, EXTENDED RELEASE ORAL ONCE
Status: COMPLETED | OUTPATIENT
Start: 2019-01-01 | End: 2019-01-01

## 2019-01-01 RX ORDER — CARBAMAZEPINE 100 MG/1
300 CAPSULE, EXTENDED RELEASE ORAL 2 TIMES DAILY
Status: DISCONTINUED | OUTPATIENT
Start: 2019-01-01 | End: 2019-01-01 | Stop reason: HOSPADM

## 2019-01-01 RX ORDER — ONDANSETRON 2 MG/ML
4 INJECTION INTRAMUSCULAR; INTRAVENOUS EVERY 6 HOURS PRN
Status: DISCONTINUED | OUTPATIENT
Start: 2019-01-01 | End: 2019-01-01 | Stop reason: HOSPADM

## 2019-01-01 RX ORDER — ACETAMINOPHEN 500 MG
500-1000 TABLET ORAL 2 TIMES DAILY PRN
COMMUNITY

## 2019-01-01 RX ORDER — PANTOPRAZOLE SODIUM 40 MG/1
40 TABLET, DELAYED RELEASE ORAL DAILY
Status: DISCONTINUED | OUTPATIENT
Start: 2019-01-01 | End: 2019-01-01 | Stop reason: HOSPADM

## 2019-01-01 RX ORDER — DEXAMETHASONE SODIUM PHOSPHATE 4 MG/ML
2 INJECTION, SOLUTION INTRA-ARTICULAR; INTRALESIONAL; INTRAMUSCULAR; INTRAVENOUS; SOFT TISSUE EVERY 6 HOURS
Status: DISCONTINUED | OUTPATIENT
Start: 2019-01-01 | End: 2019-01-01

## 2019-01-01 RX ORDER — DEXAMETHASONE 1 MG
1 TABLET ORAL DAILY
Qty: 3 TABLET | DISCHARGE
Start: 2019-01-01 | End: 2019-01-01

## 2019-01-01 RX ORDER — CLONAZEPAM 1 MG/1
1 TABLET ORAL AT BEDTIME
Status: DISCONTINUED | OUTPATIENT
Start: 2019-01-01 | End: 2019-01-01 | Stop reason: HOSPADM

## 2019-01-01 RX ORDER — ACETAMINOPHEN 325 MG/1
650 TABLET ORAL EVERY 4 HOURS PRN
Status: DISCONTINUED | OUTPATIENT
Start: 2019-01-01 | End: 2019-01-01 | Stop reason: HOSPADM

## 2019-01-01 RX ORDER — DEXAMETHASONE 4 MG/1
4 TABLET ORAL 2 TIMES DAILY
Status: COMPLETED | OUTPATIENT
Start: 2019-01-01 | End: 2019-01-01

## 2019-01-01 RX ORDER — CLONAZEPAM 0.5 MG/1
1 TABLET ORAL ONCE
Status: COMPLETED | OUTPATIENT
Start: 2019-01-01 | End: 2019-01-01

## 2019-01-01 RX ORDER — ESCITALOPRAM OXALATE 10 MG/1
20 TABLET ORAL ONCE
Status: COMPLETED | OUTPATIENT
Start: 2019-01-01 | End: 2019-01-01

## 2019-01-01 RX ORDER — ACETAMINOPHEN 650 MG/1
650 SUPPOSITORY RECTAL EVERY 4 HOURS PRN
Status: DISCONTINUED | OUTPATIENT
Start: 2019-01-01 | End: 2019-01-01 | Stop reason: HOSPADM

## 2019-01-01 RX ORDER — CLONAZEPAM 0.5 MG/1
TABLET ORAL
Qty: 90 TABLET | Refills: 5 | Status: SHIPPED | OUTPATIENT
Start: 2019-01-01 | End: 2019-01-01

## 2019-01-01 RX ORDER — NALOXONE HYDROCHLORIDE 0.4 MG/ML
.1-.4 INJECTION, SOLUTION INTRAMUSCULAR; INTRAVENOUS; SUBCUTANEOUS
Status: DISCONTINUED | OUTPATIENT
Start: 2019-01-01 | End: 2019-01-01 | Stop reason: HOSPADM

## 2019-01-01 RX ORDER — CLONAZEPAM 0.5 MG/1
TABLET ORAL
Qty: 90 TABLET | Refills: 5 | Status: SHIPPED | DISCHARGE
Start: 2019-01-01 | End: 2019-01-01

## 2019-01-01 RX ORDER — CARBAMAZEPINE 300 MG/1
CAPSULE, EXTENDED RELEASE ORAL
Qty: 180 CAPSULE | Refills: 3 | Status: SHIPPED | OUTPATIENT
Start: 2019-01-01 | End: 2019-01-01

## 2019-01-01 RX ORDER — TOPIRAMATE 100 MG/1
300 TABLET, FILM COATED ORAL EVERY EVENING
Status: DISCONTINUED | OUTPATIENT
Start: 2019-01-01 | End: 2019-01-01 | Stop reason: HOSPADM

## 2019-01-01 RX ORDER — DEXAMETHASONE SODIUM PHOSPHATE 4 MG/ML
10 INJECTION, SOLUTION INTRA-ARTICULAR; INTRALESIONAL; INTRAMUSCULAR; INTRAVENOUS; SOFT TISSUE ONCE
Status: COMPLETED | OUTPATIENT
Start: 2019-01-01 | End: 2019-01-01

## 2019-01-01 RX ORDER — TOPIRAMATE 100 MG/1
TABLET, FILM COATED ORAL
Qty: 450 TABLET | Refills: 3 | Status: SHIPPED | OUTPATIENT
Start: 2019-01-01 | End: 2019-01-01

## 2019-01-01 RX ORDER — HYDROCODONE BITARTRATE AND ACETAMINOPHEN 5; 325 MG/1; MG/1
.5-1 TABLET ORAL EVERY 6 HOURS PRN
Qty: 28 TABLET | Refills: 0 | Status: ON HOLD | OUTPATIENT
Start: 2019-01-01 | End: 2019-01-01

## 2019-01-01 RX ORDER — FELBAMATE 600 MG/1
TABLET ORAL
Qty: 270 TABLET | Refills: 3 | Status: SHIPPED | OUTPATIENT
Start: 2019-01-01

## 2019-01-01 RX ORDER — CLONAZEPAM 1 MG/1
TABLET ORAL
Qty: 20 TABLET | Refills: 1 | Status: SHIPPED | OUTPATIENT
Start: 2019-01-01 | End: 2019-01-01

## 2019-01-01 RX ORDER — ONDANSETRON 4 MG/1
4 TABLET, ORALLY DISINTEGRATING ORAL EVERY 6 HOURS PRN
Status: DISCONTINUED | OUTPATIENT
Start: 2019-01-01 | End: 2019-01-01 | Stop reason: HOSPADM

## 2019-01-01 RX ORDER — DEXAMETHASONE 0.5 MG/1
1 TABLET ORAL 2 TIMES DAILY
Status: DISCONTINUED | OUTPATIENT
Start: 2019-01-01 | End: 2019-01-01 | Stop reason: HOSPADM

## 2019-01-01 RX ORDER — HYDROCODONE BITARTRATE AND ACETAMINOPHEN 5; 325 MG/1; MG/1
.5-1 TABLET ORAL EVERY 6 HOURS PRN
Qty: 28 TABLET | Refills: 0 | Status: SHIPPED | DISCHARGE
Start: 2019-01-01

## 2019-01-01 RX ORDER — DEXAMETHASONE 1 MG
1 TABLET ORAL 2 TIMES DAILY
Qty: 4 TABLET | DISCHARGE
Start: 2019-01-01 | End: 2019-01-01

## 2019-01-01 RX ORDER — BISACODYL 10 MG
10 SUPPOSITORY, RECTAL RECTAL DAILY PRN
Status: DISCONTINUED | OUTPATIENT
Start: 2019-01-01 | End: 2019-01-01 | Stop reason: HOSPADM

## 2019-01-01 RX ADMIN — PANTOPRAZOLE SODIUM 40 MG: 40 TABLET, DELAYED RELEASE ORAL at 10:59

## 2019-01-01 RX ADMIN — CLONAZEPAM 0.5 MG: 0.5 TABLET ORAL at 11:25

## 2019-01-01 RX ADMIN — CARBAMAZEPINE 300 MG: 100 TABLET, EXTENDED RELEASE ORAL at 00:49

## 2019-01-01 RX ADMIN — PANTOPRAZOLE SODIUM 40 MG: 40 TABLET, DELAYED RELEASE ORAL at 15:19

## 2019-01-01 RX ADMIN — CLONAZEPAM 0.5 MG: 0.5 TABLET ORAL at 15:19

## 2019-01-01 RX ADMIN — DEXAMETHASONE 2 MG: 0.5 TABLET ORAL at 21:23

## 2019-01-01 RX ADMIN — TOPIRAMATE 200 MG: 100 TABLET, FILM COATED ORAL at 10:58

## 2019-01-01 RX ADMIN — DEXAMETHASONE 2 MG: 0.5 TABLET ORAL at 09:45

## 2019-01-01 RX ADMIN — PANTOPRAZOLE SODIUM 40 MG: 40 TABLET, DELAYED RELEASE ORAL at 11:24

## 2019-01-01 RX ADMIN — TOPIRAMATE 300 MG: 100 TABLET, FILM COATED ORAL at 20:15

## 2019-01-01 RX ADMIN — TOPIRAMATE 200 MG: 100 TABLET, FILM COATED ORAL at 10:57

## 2019-01-01 RX ADMIN — DEXAMETHASONE 4 MG: 4 TABLET ORAL at 10:59

## 2019-01-01 RX ADMIN — CLONAZEPAM 1 MG: 0.5 TABLET ORAL at 00:41

## 2019-01-01 RX ADMIN — TOPIRAMATE 300 MG: 100 TABLET, FILM COATED ORAL at 20:42

## 2019-01-01 RX ADMIN — ESCITALOPRAM OXALATE 20 MG: 10 TABLET ORAL at 15:19

## 2019-01-01 RX ADMIN — CARBAMAZEPINE 300 MG: 100 CAPSULE, EXTENDED RELEASE ORAL at 20:40

## 2019-01-01 RX ADMIN — CARBAMAZEPINE 300 MG: 100 CAPSULE, EXTENDED RELEASE ORAL at 15:19

## 2019-01-01 RX ADMIN — CLONAZEPAM 0.5 MG: 0.5 TABLET ORAL at 10:55

## 2019-01-01 RX ADMIN — DEXAMETHASONE SODIUM PHOSPHATE 10 MG: 4 INJECTION, SOLUTION INTRAMUSCULAR; INTRAVENOUS at 00:42

## 2019-01-01 RX ADMIN — Medication 900 MG: at 08:37

## 2019-01-01 RX ADMIN — CLONAZEPAM 1 MG: 1 TABLET ORAL at 20:40

## 2019-01-01 RX ADMIN — CLONAZEPAM 0.5 MG: 0.5 TABLET ORAL at 09:44

## 2019-01-01 RX ADMIN — TOPIRAMATE 300 MG: 100 TABLET, FILM COATED ORAL at 20:40

## 2019-01-01 RX ADMIN — ESCITALOPRAM OXALATE 20 MG: 10 TABLET ORAL at 11:24

## 2019-01-01 RX ADMIN — ESCITALOPRAM OXALATE 20 MG: 10 TABLET ORAL at 10:55

## 2019-01-01 RX ADMIN — PANTOPRAZOLE SODIUM 40 MG: 40 TABLET, DELAYED RELEASE ORAL at 09:44

## 2019-01-01 RX ADMIN — ESCITALOPRAM OXALATE 20 MG: 10 TABLET ORAL at 00:42

## 2019-01-01 RX ADMIN — PANTOPRAZOLE SODIUM 40 MG: 40 TABLET, DELAYED RELEASE ORAL at 10:55

## 2019-01-01 RX ADMIN — PROCHLORPERAZINE EDISYLATE 10 MG: 5 INJECTION INTRAMUSCULAR; INTRAVENOUS at 13:39

## 2019-01-01 RX ADMIN — DEXAMETHASONE 4 MG: 4 TABLET ORAL at 11:23

## 2019-01-01 RX ADMIN — CARBAMAZEPINE 300 MG: 100 CAPSULE, EXTENDED RELEASE ORAL at 20:43

## 2019-01-01 RX ADMIN — CARBAMAZEPINE 300 MG: 100 CAPSULE, EXTENDED RELEASE ORAL at 09:47

## 2019-01-01 RX ADMIN — FELBAMATE 1200 MG: 600 TABLET ORAL at 09:35

## 2019-01-01 RX ADMIN — ESCITALOPRAM OXALATE 20 MG: 10 TABLET ORAL at 09:43

## 2019-01-01 RX ADMIN — FELBAMATE 1200 MG: 600 TABLET ORAL at 21:16

## 2019-01-01 RX ADMIN — CLONAZEPAM 1 MG: 1 TABLET ORAL at 21:21

## 2019-01-01 RX ADMIN — TOPIRAMATE 200 MG: 100 TABLET, FILM COATED ORAL at 09:46

## 2019-01-01 RX ADMIN — CLONAZEPAM 1 MG: 1 TABLET ORAL at 20:43

## 2019-01-01 RX ADMIN — CLONAZEPAM 0.5 MG: 0.5 TABLET ORAL at 10:59

## 2019-01-01 RX ADMIN — CARBAMAZEPINE 300 MG: 100 CAPSULE, EXTENDED RELEASE ORAL at 21:16

## 2019-01-01 RX ADMIN — TOPIRAMATE 300 MG: 100 TABLET, FILM COATED ORAL at 21:16

## 2019-01-01 RX ADMIN — TOPIRAMATE 300 MG: 100 TABLET, FILM COATED ORAL at 00:42

## 2019-01-01 RX ADMIN — Medication 900 MG: at 20:15

## 2019-01-01 RX ADMIN — DEXAMETHASONE SODIUM PHOSPHATE 2 MG: 4 INJECTION, SOLUTION INTRAMUSCULAR; INTRAVENOUS at 20:38

## 2019-01-01 RX ADMIN — DEXAMETHASONE 2 MG: 0.5 TABLET ORAL at 10:56

## 2019-01-01 RX ADMIN — TOPIRAMATE 200 MG: 100 TABLET, FILM COATED ORAL at 11:26

## 2019-01-01 RX ADMIN — FELBAMATE 1200 MG: 600 TABLET ORAL at 07:47

## 2019-01-01 RX ADMIN — FELBAMATE 1200 MG: 600 TABLET ORAL at 20:41

## 2019-01-01 RX ADMIN — DEXAMETHASONE 4 MG: 4 TABLET ORAL at 21:15

## 2019-01-01 RX ADMIN — CARBAMAZEPINE 300 MG: 100 CAPSULE, EXTENDED RELEASE ORAL at 21:20

## 2019-01-01 RX ADMIN — CLONAZEPAM 1 MG: 1 TABLET ORAL at 21:16

## 2019-01-01 RX ADMIN — DEXAMETHASONE SODIUM PHOSPHATE 2 MG: 4 INJECTION, SOLUTION INTRAMUSCULAR; INTRAVENOUS at 09:36

## 2019-01-01 RX ADMIN — DEXAMETHASONE SODIUM PHOSPHATE 2 MG: 4 INJECTION, SOLUTION INTRAMUSCULAR; INTRAVENOUS at 14:00

## 2019-01-01 RX ADMIN — ESCITALOPRAM OXALATE 20 MG: 10 TABLET ORAL at 10:59

## 2019-01-01 RX ADMIN — DEXAMETHASONE 4 MG: 4 TABLET ORAL at 20:40

## 2019-01-01 RX ADMIN — DEXAMETHASONE SODIUM PHOSPHATE 2 MG: 4 INJECTION, SOLUTION INTRAMUSCULAR; INTRAVENOUS at 02:37

## 2019-01-01 RX ADMIN — CARBAMAZEPINE 300 MG: 100 CAPSULE, EXTENDED RELEASE ORAL at 10:59

## 2019-01-01 RX ADMIN — FELBAMATE 1200 MG: 600 TABLET ORAL at 08:43

## 2019-01-01 RX ADMIN — HYDROCODONE BITARTRATE AND ACETAMINOPHEN 1 TABLET: 5; 325 TABLET ORAL at 22:48

## 2019-01-01 RX ADMIN — CARBAMAZEPINE 300 MG: 100 CAPSULE, EXTENDED RELEASE ORAL at 11:27

## 2019-01-01 RX ADMIN — CARBAMAZEPINE 300 MG: 100 CAPSULE, EXTENDED RELEASE ORAL at 10:58

## 2019-01-01 RX ADMIN — ONDANSETRON 4 MG: 4 TABLET, ORALLY DISINTEGRATING ORAL at 13:37

## 2019-01-01 RX ADMIN — TOPIRAMATE 200 MG: 100 TABLET, FILM COATED ORAL at 16:26

## 2019-01-01 ASSESSMENT — ENCOUNTER SYMPTOMS
HEMATURIA: 0
SEIZURES: 1
AGITATION: 1
CARDIOVASCULAR NEGATIVE: 1
LIGHT-HEADEDNESS: 1
EYES NEGATIVE: 1
FEVER: 0
GASTROINTESTINAL NEGATIVE: 1
BACK PAIN: 0
MUSCULOSKELETAL NEGATIVE: 1
WEAKNESS: 1
DIZZINESS: 1
SHORTNESS OF BREATH: 0
VOMITING: 0
PALPITATIONS: 0
RESPIRATORY NEGATIVE: 1
ABDOMINAL PAIN: 0
CHILLS: 0
CONSTITUTIONAL NEGATIVE: 1
COUGH: 0
COLOR CHANGE: 0
ARTHRALGIAS: 0
EYE PAIN: 0
CONFUSION: 1
SORE THROAT: 0
HEADACHES: 1
DYSURIA: 0

## 2019-01-01 ASSESSMENT — ACTIVITIES OF DAILY LIVING (ADL): ADLS_ACUITY_SCORE: 24

## 2019-01-01 ASSESSMENT — PATIENT HEALTH QUESTIONNAIRE - PHQ9
5. POOR APPETITE OR OVEREATING: NEARLY EVERY DAY
SUM OF ALL RESPONSES TO PHQ QUESTIONS 1-9: 21

## 2019-01-01 ASSESSMENT — MIFFLIN-ST. JEOR
SCORE: 1586.14
SCORE: 1621.4
SCORE: 1597.02
SCORE: 1583.87
SCORE: 1558.01
SCORE: 1585.23

## 2019-01-01 ASSESSMENT — ANXIETY QUESTIONNAIRES
6. BECOMING EASILY ANNOYED OR IRRITABLE: NEARLY EVERY DAY
2. NOT BEING ABLE TO STOP OR CONTROL WORRYING: NEARLY EVERY DAY
GAD7 TOTAL SCORE: 21
GAD7 TOTAL SCORE: 21
5. BEING SO RESTLESS THAT IT IS HARD TO SIT STILL: NEARLY EVERY DAY
1. FEELING NERVOUS, ANXIOUS, OR ON EDGE: NEARLY EVERY DAY
7. FEELING AFRAID AS IF SOMETHING AWFUL MIGHT HAPPEN: NEARLY EVERY DAY
IF YOU CHECKED OFF ANY PROBLEMS ON THIS QUESTIONNAIRE, HOW DIFFICULT HAVE THESE PROBLEMS MADE IT FOR YOU TO DO YOUR WORK, TAKE CARE OF THINGS AT HOME, OR GET ALONG WITH OTHER PEOPLE: EXTREMELY DIFFICULT
3. WORRYING TOO MUCH ABOUT DIFFERENT THINGS: NEARLY EVERY DAY

## 2019-01-01 ASSESSMENT — PAIN SCALES - GENERAL
PAINLEVEL: SEVERE PAIN (6)
PAINLEVEL: MODERATE PAIN (5)

## 2019-01-31 NOTE — PATIENT INSTRUCTIONS
Felbamate 900 mg morning, 900 mg 2pm   Topiramate 200 mg AM and 300 mg PM   Tegretol  mg am and 300 mg pm     Clonazepam 0.5mg PM morning and 1 mg pm (seizure, RLS, anxiety)    Clonazepam 1 mg tablet for 3 or more seizure within a 24 hour period, may repeat once. Maximum dose of Clonazepam 2 mg per day for seizure rescue, you should also continue to take your regular Clonazepam dose . This is to prevent clustering.       Talk to your primary care provider about increasing lexapro if needed.     Silvia Brown MD

## 2019-01-31 NOTE — PROGRESS NOTES
"[facility] Epilepsy Care Progress Note    Patient:  Joni Borja  :  1970   Age:  48 year old   Today's Office Visit:  2019    Epilepsy Data:  Patient History  Primary Epileptologist/Provider: Silvia Brown M.D.  Patient Status: Chronic Intractable  Epilepsy Syndrome: Localization-related epilepsy unspecified  Epilepsy Syndrome Status: Final  Age of Onset: 29  Etiology  : Tumor  Other Relevant Dx/ Issues: Grade 2 oligodendroglioma , s/p subtotal resection (), chemo (8688-5732), and whole brain radiation. Depression with paranoid thinking. 2013 right frontal resection, path notable for residual glioma with glial scarring.    Tests/Surgery History  Last EE2012  Last MRI: 2012  Last Neuropsych Testin2012  Last Case Management Conference: 2013  Epilepsy Surgery #1 Date: 13(right frontal lobectomy. )  Epilepsy Surgery #2 Date: 16  Epilepsy Related Surgery #2 : Type : VNS  Seizure Record  Current Visit Date: 19  Previous Visit Date: 18  Months since last visit: 7.39  Seizure Type 1: Complex partial seizures unspecified  Description of Sz Type 1: Turns head to right, has blank stare, will repeat a phrase.  usually last minutes. Tends to push things around   # of Type 1 Seizure since last visit: 100  Freq. Type 1 / Month: 13.53  Seizure Type 2: Tonic-clonic seizures  Description of Sz Type 2: (in setting of missed medication )  # of Type 2 Seizure since last visit: 0  Freq. Type 2 / Month: 0  Seizure Type 3: Localization related seizures unspecified  Description of Sz Type 3: \"drop attacks\" has loss of awareness, lasts seconds, body is stiff when falling, and on the fall has non-rhythmic (Drop attacks)  # of Type 3 Seizure since last visit: 0  Freq. Type 3 / Month: 0          EPILEPSY HISTORY:  Copied forward from prior notes: The patient had his 1st seizure at the age of 29 on 2002; and, at that time, he was diagnosed with a right frontal " "oligodendroglioma grade II and underwent resection (2007), whole brain radiation and chemotherapy (procarbazine, CCNU, vincristine from 06/2007-07/2008).  His pathological evaluation of the tumor demonstrated abnormalities of 1p and 19q.  The patient has been tried on multiple seizure medications; carbamazepine is his mainstay medication, and he has responded the best to this medication.  The patient had a right frontal lobe resection on 09/19/2013 at [facility] by Dr. Linder.   Surgical pathology 9/18/2013 showed \"majority of cells demonstrate an astrocytic morphology rather than that of oligodendrocytes. The immunohistochemical stain that recognizes a common mutation in IDH-1 is positive in many of the cells, indicating that these cells are a neoplastic population.\" On MRI of the brain, there is still residual tumor or T2 signal changes posterior to the tumor resection cavity. This portion of the brain was not removed primarily because on cortical mapping there was concern post operative hemiparesis. Prior to his surgery had 10 seizure per day (hypermotor) and after surgery he has 1- 2 seizures per week. Vagus nerve stimulator was placed 7/19/2016, which may help his seizures.     History of Present Illness:   He came with father Niko.  His mother Brittany and wife were not here.  He continues to have 2-3 seizure (staring, unresponsive, moving around) per week. Prior to felbamate increase he had 1-2 seizure per day.  He had an MRI of the brain in December 2018.  Unfortunately his brain tumor has progressed and it seems that chemotherapy nor radiation will be utilized.  His seizures have not worsened despite brain tumor worsening.  On this visit we talked about continuing seizure medications with no increase in maintenance dose.  I recommended that he talk to his primary care doctor about increasing Lexapro for anxiety.  Since tumor is progressing I advised him to take extra Klonopin if he has seizure clusters.    Prior to " Admission medications    Medication Sig Start Date End Date Taking? Authorizing Provider   carBAMazepine (CARBATROL) 300 MG 12 hr capsule Take 1 capsule (300 mg) by mouth 2 times daily (at 10:00 & 22:00) 11/17/17  Yes Silvia Brown MD   cetirizine (ZYRTEC ALLERGY) 10 MG tablet Take 1 tablet (10 mg) by mouth daily as needed (for mosquitos.) 5/15/18  Yes Brian Coon MD   clonazePAM (KLONOPIN) 0.5 MG tablet TAKE ONE TABLET BY MOUTH ONCE DAILY IN THE MORNING AND TWO ONCE DAILY IN THE EVENING 7/8/18  Yes Brian Coon MD   Cyanocobalamin (VITAMIN B 12 PO) Take 1 tablet by mouth daily (with dinner)   Yes Reported, Patient   escitalopram (LEXAPRO) 20 MG tablet TAKE ONE TABLET BY MOUTH ONCE DAILY AT  NIGHT 3/5/18  Yes Renato Espinoza MD   felbamate (FELBATOL) 600 MG tablet Take 900 mg (1.5 tablet) am and 900 mg (1.5 tablet) 2 pm 11/17/17  Yes Silvia Brown MD   pantoprazole (PROTONIX) 40 MG EC tablet TAKE ONE TABLET BY MOUTH ONCE DAILY 3/14/18  Yes Renato Espinoza MD   Riboflavin 400 MG TABS Take 400 mg by mouth daily 11/17/17  Yes Silvia Brown MD   topiramate (TOPAMAX) 100 MG tablet Take 2 tablets (200 mg) by mouth every morning 200 mg am and 300 mg pm 11/17/17  Yes Silvia Brown MD   acetaminophen-codeine (TYLENOL #3) 300-30 MG per tablet Take 1-2 tablets by mouth every 4 hours as needed for moderate pain As needed for headaches    Reported, Patient   ibuprofen (ADVIL/MOTRIN) 800 MG tablet Take 800 mg by mouth 6/10/16   Reported, Patient   ondansetron (ZOFRAN) 4 MG tablet  3/7/18   Reported, Patient         Patient has been filling his own medication box  Felbamate 900 mg morning, 900 mg 2pm   Topiramate 200 mg AM and 300 mg PM   Tegretol  mg am and 300 mg pm   Klonopin 0.5mg PM morning and 1 mg pm (seizure, RLS, anxiety)    Clonazepam 0.5 mg for 3 or more seizure within a 24 hour period, may repeat. Maximum dose of Clonazepam 1.5 mg per day for seizure rescue, you should also continue to take your  regular Clonazepam dose. Monitoring breathing.  This is to prevent clustering.       Medication Notes:     AED Medication Compliance:  compliant most of the time. Using a pill box, yes.   1. Levetiracetam - mood issues. Weaned off Levetiracetam 2/12/2015 after getting a DUI.   2. Vimpat 200 mg twice a day, which caused increased sedation, toxicity, and was not able to tolerate.   3. Carbamazepine: Higher dose of carbamazepine causes ataxia and falls (> 1600 mg per day).   4. Fycompa was started November 2015 and was not helpful and weaned off 5/2016, global toxicity and was not able to tolerate higher than 6 mg.  5. Felbamate was started in May 2016. No major side effects.  He does have some weight loss felbamate    Review of Systems:  Lethargy / Tiredness:  yes  Nausea / Vomiting: no  Double Vision:  yes  Sleepiness:  yes  Depression:  yes  Slowed Cognitive Function:  yes  Memory Problems:  yes  Poor Balance:  no  Dizziness:  no  Appetite Changes:  stable  Blurred Vision:  No  Sleep Changes:  No  Behavioral Changes:  No  Skin: negative  Respiratory: No shortness of breath, dyspnea on exertion, cough, or hemoptysis  Cardiovascular: negative  Have you experienced a traumatic fall since your last visit: no  Are these falls related to your seizures: NO  No bleeding    Other Issues:  Is patient safe to drive:  No. Not working.  He is back with his wife.  He is spending his time at his parents is and at other times with his wife.  He is not driving and he is not working.    Exam:    /62   Resp 16   Wt 77.6 kg (171 lb)   BMI 25.25 kg/m       Wt Readings from Last 5 Encounters:   01/31/19 77.6 kg (171 lb)   12/18/18 73.8 kg (162 lb 12.8 oz)   12/12/18 73.1 kg (161 lb 2 oz)   10/22/18 75.8 kg (167 lb)   08/14/18 78.7 kg (173 lb 9.6 oz)     NEUROLOGICAL EXAMINATION:   /62   Resp 16   Wt 77.6 kg (171 lb)   BMI 25.25 kg/m    He has lost weight. The patient is awake, alert.  Speech is fluent.  Extraocular  movements intact. No nystagmus.  No focal weakness noted.  No tremor noted.   Stable gait, not ataxic.      IMPRESSION:   1. Right frontal lobe epilepsy, intractable, etiology anaplastic oligodendroglioma/astrocytoma  2. Aanaplastic oligodendroglioma/astrocytoma progression  3. Depression   4. Headaches    Discussion:   Right frontal lobe epilepsy, intractable, etiology anaplastic oligodendroglioma/astrocytoma. S/P right frontal lobe resection (9/19/2013 for epilepsy surgery) and vagus nerve stimulator placement (7/2016).  He  continues to have complex partial seizure seizures 2-3 per week (prior to frontal resection epilepsy surgery he had 50 per week). We will continue antiseizure medications.  As his tumor progresses his seizures may worsen.  I did not want to increase his antiseizure medications because he has increased side effects.  We will focus our efforts on managing seizure clusters as they occur with clonazepam as needed.  The clonazepam will also help his anxiety.      In regards to his oligodendroglioma/astrocytoma progression, it seems that he will not receive further radiation nor chemotherapy.  He is being followed by Dr. Daniels.  I have asked him to see his primary care doctor about increasing Lexapro for anxiety management.       PLAN:   1. Continue antiepileptic drug.   Felbamate 900 mg morning, 900 mg 2pm   Topiramate 200 mg AM and 300 mg PM   Tegretol  mg am and 300 mg pm   Clonazepam 0.5mg PM morning and 1 mg pm (seizure, RLS, anxiety)    2. Rescue Seizure plan: Clonazepam 0.5 mg for 3 or more seizure within a 24 hour period, may repeat. Maximum dose of Clonazepam 1.5 mg per day for seizure rescue, you should also continue to take your regular Clonazepam dose . Monitoring breathing.  This is to prevent clustering.     3. Follow up phone call in 6 months and follow-up in clinic visit in 12 months     4. Vagus nerve stimulator interrogated by Zac Cline battery is at 50%.     I spent 45  minutes with the patient and family. During this time counseling and coordination of care exceeded 50% of the visit time. I addressed all questions and concerns the family raised in regards to the patients care.          PETER MONTIEL MD

## 2019-01-31 NOTE — PROGRESS NOTES
[employer] Vagus Nerve Stimulator interrogated. Settings as follows:-  Patient ID EJV, Model ID Demetrio HENRY [id number], Serial # [id number], Implant date 7/19/2016.    NORMAL SETTINGS:  Output Current 1.125 mA,  Pulse/signal Frequency 20 Hz,  Pulse Width 250 ìsec,  On Time 30 sec,  Off Time 5 min,  IFI Green, 50-75%.    MAGNET SETTINGS:  Magnet Output Current 1.25mA,  Magnet Pulse Width 250 ìsec,  Magnet On Time 60 sec.    AUTOSTIM SETTINGS:   AutoStim Current 1.125 mA  AutoStim Pulse Width 250 ìsec  AutoStim On Time 60 Sec.    CONFIGURATION SETTINGS:  Tachycardia Detection Enabled  Threshold for AutoStim 50%  Heartbeat Detection (sensitivity) 3    OFFICE VISIT    Days Since Previous Office Visit not available      Avg.Stims per day % per day % therapy time since previous office visit   AutoStim  27.75   2   Magnet Stim 0.68   <1   Normal Stim  243.42    10   Total      11.73% of therapy on time     Avg. # of inhibited AutoStims per day 27.75

## 2019-01-31 NOTE — LETTER
"2019       RE: Joni Borja  : 1970   MRN: [id number]      Dear Colleague,    Thank you for referring your patient, Joni Borja, to the [facility] EPILEPSY CARE at [facility]. Please see a copy of my visit note below.    [facility]    Patient:  Joni Borja  :  1970   Age:  48 year old   Today's Office Visit:  2019    Epilepsy Data:  Patient History  Primary Epileptologist/Provider: Silvia Brown M.D.  Patient Status: Chronic Intractable  Epilepsy Syndrome: Localization-related epilepsy unspecified  Epilepsy Syndrome Status: Final  Age of Onset: 29  Etiology  : Tumor  Other Relevant Dx/ Issues: Grade 2 oligodendroglioma , s/p subtotal resection (), chemo (5366-0338), and whole brain radiation. Depression with paranoid thinking. 2013 right frontal resection, path notable for residual glioma with glial scarring.    Tests/Surgery History  Last EE2012  Last MRI: 2012  Last Neuropsych Testin2012  Last Case Management Conference: 2013  Epilepsy Surgery #1 Date: 13(right frontal lobectomy. )  Epilepsy Surgery #2 Date: 16  Epilepsy Related Surgery #2 : Type : VNS  Seizure Record  Current Visit Date: 19  Previous Visit Date: 18  Months since last visit: 7.39  Seizure Type 1: Complex partial seizures unspecified  Description of Sz Type 1: Turns head to right, has blank stare, will repeat a phrase.  usually last minutes. Tends to push things around   # of Type 1 Seizure since last visit: 100  Freq. Type 1 / Month: 13.53  Seizure Type 2: Tonic-clonic seizures  Description of Sz Type 2: (in setting of missed medication )  # of Type 2 Seizure since last visit: 0  Freq. Type 2 / Month: 0  Seizure Type 3: Localization related seizures unspecified  Description of Sz Type 3: \"drop attacks\" has loss of awareness, lasts seconds, body is stiff when falling, and on the fall has " "non-rhythmic (Drop attacks)  # of Type 3 Seizure since last visit: 0  Freq. Type 3 / Month: 0          EPILEPSY HISTORY:  Copied forward from prior notes: The patient had his 1st seizure at the age of 29 on 04/22/2002; and, at that time, he was diagnosed with a right frontal oligodendroglioma grade II and underwent resection (2007), whole brain radiation and chemotherapy (procarbazine, CCNU, vincristine from 06/2007-07/2008).  His pathological evaluation of the tumor demonstrated abnormalities of 1p and 19q.  The patient has been tried on multiple seizure medications; carbamazepine is his mainstay medication, and he has responded the best to this medication.  The patient had a right frontal lobe resection on 09/19/2013 at [facility] by Dr. Linder.   Surgical pathology 9/18/2013 showed \"majority of cells demonstrate an astrocytic morphology rather than that of oligodendrocytes. The immunohistochemical stain that recognizes a common mutation in IDH-1 is positive in many of the cells, indicating that these cells are a neoplastic population.\" On MRI of the brain, there is still residual tumor or T2 signal changes posterior to the tumor resection cavity. This portion of the brain was not removed primarily because on cortical mapping there was concern post operative hemiparesis. Prior to his surgery had 10 seizure per day (hypermotor) and after surgery he has 1- 2 seizures per week. Vagus nerve stimulator was placed 7/19/2016, which may help his seizures.     History of Present Illness:   He came with father Niko.  His mother Brittany and wife were not here.  He continues to have 2-3 seizure (staring, unresponsive, moving around) per week. Prior to felbamate increase he had 1-2 seizure per day.  He had an MRI of the brain in December 2018.  Unfortunately his brain tumor has progressed and it seems that chemotherapy nor radiation will be utilized.  His seizures have not worsened despite brain tumor worsening.  On this visit we " talked about continuing seizure medications with no increase in maintenance dose.  I recommended that he talk to his primary care doctor about increasing Lexapro for anxiety.  Since tumor is progressing I advised him to take extra Klonopin if he has seizure clusters.    Prior to Admission medications    Medication Sig Start Date End Date Taking? Authorizing Provider   carBAMazepine (CARBATROL) 300 MG 12 hr capsule Take 1 capsule (300 mg) by mouth 2 times daily (at 10:00 & 22:00) 11/17/17  Yes Silvia Brown MD   cetirizine (ZYRTEC ALLERGY) 10 MG tablet Take 1 tablet (10 mg) by mouth daily as needed (for mosquitos.) 5/15/18  Yes Brian Coon MD   clonazePAM (KLONOPIN) 0.5 MG tablet TAKE ONE TABLET BY MOUTH ONCE DAILY IN THE MORNING AND TWO ONCE DAILY IN THE EVENING 7/8/18  Yes Brian Coon MD   Cyanocobalamin (VITAMIN B 12 PO) Take 1 tablet by mouth daily (with dinner)   Yes Reported, Patient   escitalopram (LEXAPRO) 20 MG tablet TAKE ONE TABLET BY MOUTH ONCE DAILY AT  NIGHT 3/5/18  Yes Renato Espinoza MD   felbamate (FELBATOL) 600 MG tablet Take 900 mg (1.5 tablet) am and 900 mg (1.5 tablet) 2 pm 11/17/17  Yes Silvia Brown MD   pantoprazole (PROTONIX) 40 MG EC tablet TAKE ONE TABLET BY MOUTH ONCE DAILY 3/14/18  Yes Renato Espinoza MD   Riboflavin 400 MG TABS Take 400 mg by mouth daily 11/17/17  Yes Silvia Brown MD   topiramate (TOPAMAX) 100 MG tablet Take 2 tablets (200 mg) by mouth every morning 200 mg am and 300 mg pm 11/17/17  Yes Silvia Brown MD   acetaminophen-codeine (TYLENOL #3) 300-30 MG per tablet Take 1-2 tablets by mouth every 4 hours as needed for moderate pain As needed for headaches    Reported, Patient   ibuprofen (ADVIL/MOTRIN) 800 MG tablet Take 800 mg by mouth 6/10/16   Reported, Patient   ondansetron (ZOFRAN) 4 MG tablet  3/7/18   Reported, Patient         Patient has been filling his own medication box  Felbamate 900 mg morning, 900 mg 2pm   Topiramate 200 mg AM and 300 mg PM    Tegretol  mg am and 300 mg pm   Klonopin 0.5mg PM morning and 1 mg pm (seizure, RLS, anxiety)    Clonazepam 0.5 mg for 3 or more seizure within a 24 hour period, may repeat. Maximum dose of Clonazepam 1.5 mg per day for seizure rescue, you should also continue to take your regular Clonazepam dose. Monitoring breathing.  This is to prevent clustering.       Medication Notes:     AED Medication Compliance:  compliant most of the time. Using a pill box, yes.   1. Levetiracetam - mood issues. Weaned off Levetiracetam 2/12/2015 after getting a DUI.   2. Vimpat 200 mg twice a day, which caused increased sedation, toxicity, and was not able to tolerate.   3. Carbamazepine: Higher dose of carbamazepine causes ataxia and falls (> 1600 mg per day).   4. Fycompa was started November 2015 and was not helpful and weaned off 5/2016, global toxicity and was not able to tolerate higher than 6 mg.  5. Felbamate was started in May 2016. No major side effects.  He does have some weight loss felbamate    Review of Systems:  Lethargy / Tiredness:  yes  Nausea / Vomiting: no  Double Vision:  yes  Sleepiness:  yes  Depression:  yes  Slowed Cognitive Function:  yes  Memory Problems:  yes  Poor Balance:  no  Dizziness:  no  Appetite Changes:  stable  Blurred Vision:  No  Sleep Changes:  No  Behavioral Changes:  No  Skin: negative  Respiratory: No shortness of breath, dyspnea on exertion, cough, or hemoptysis  Cardiovascular: negative  Have you experienced a traumatic fall since your last visit: no  Are these falls related to your seizures: NO  No bleeding    Other Issues:  Is patient safe to drive:  No. Not working.  He is back with his wife.  He is spending his time at his parents is and at other times with his wife.  He is not driving and he is not working.    Exam:    /62   Resp 16   Wt 77.6 kg (171 lb)   BMI 25.25 kg/m        Wt Readings from Last 5 Encounters:   01/31/19 77.6 kg (171 lb)   12/18/18 73.8 kg (162 lb 12.8  oz)   12/12/18 73.1 kg (161 lb 2 oz)   10/22/18 75.8 kg (167 lb)   08/14/18 78.7 kg (173 lb 9.6 oz)     NEUROLOGICAL EXAMINATION:   /62   Resp 16   Wt 77.6 kg (171 lb)   BMI 25.25 kg/m     He has lost weight. The patient is awake, alert.  Speech is fluent.  Extraocular movements intact. No nystagmus.  No focal weakness noted.  No tremor noted.   Stable gait, not ataxic.      IMPRESSION:   1. Right frontal lobe epilepsy, intractable, etiology anaplastic oligodendroglioma/astrocytoma  2. Aanaplastic oligodendroglioma/astrocytoma progression  3. Depression   4. Headaches    Discussion:   Right frontal lobe epilepsy, intractable, etiology anaplastic oligodendroglioma/astrocytoma. S/P right frontal lobe resection (9/19/2013 for epilepsy surgery) and vagus nerve stimulator placement (7/2016).  He  continues to have complex partial seizure seizures 2-3 per week (prior to frontal resection epilepsy surgery he had 50 per week). We will continue antiseizure medications.  As his tumor progresses his seizures may worsen.  I did not want to increase his antiseizure medications because he has increased side effects.  We will focus our efforts on managing seizure clusters as they occur with clonazepam as needed.  The clonazepam will also help his anxiety.      In regards to his oligodendroglioma/astrocytoma progression, it seems that he will not receive further radiation nor chemotherapy.  He is being followed by Dr. Daniels.  I have asked him to see his primary care doctor about increasing Lexapro for anxiety management.       PLAN:   1. Continue antiepileptic drug.   Felbamate 900 mg morning, 900 mg 2pm   Topiramate 200 mg AM and 300 mg PM   Tegretol  mg am and 300 mg pm   Clonazepam 0.5mg PM morning and 1 mg pm (seizure, RLS, anxiety)    2. Rescue Seizure plan: Clonazepam 0.5 mg for 3 or more seizure within a 24 hour period, may repeat. Maximum dose of Clonazepam 1.5 mg per day for seizure rescue, you should also  continue to take your regular Clonazepam dose . Monitoring breathing.  This is to prevent clustering.     3. Follow up phone call in 6 months and follow-up in clinic visit in 12 months     4. Vagus nerve stimulator interrogated by Zac Cline battery is at 50%.     I spent 45 minutes with the patient and family. During this time counseling and coordination of care exceeded 50% of the visit time. I addressed all questions and concerns the family raised in regards to the patients care.          SILVIA MONTIEL MD                 [facility] Vagus Nerve Stimulator interrogated. Settings as follows:-  Patient ID EJV, Model ID Demetrio SR [id number], Serial # [id number], Implant date 7/19/2016.    NORMAL SETTINGS:  Output Current 1.125 mA,  Pulse/signal Frequency 20 Hz,  Pulse Width 250 ìsec,  On Time 30 sec,  Off Time 5 min,  IFI Green, 50-75%.    MAGNET SETTINGS:  Magnet Output Current 1.25mA,  Magnet Pulse Width 250 ìsec,  Magnet On Time 60 sec.    AUTOSTIM SETTINGS:   AutoStim Current 1.125 mA  AutoStim Pulse Width 250 ìsec  AutoStim On Time 60 Sec.    CONFIGURATION SETTINGS:  Tachycardia Detection Enabled  Threshold for AutoStim 50%  Heartbeat Detection (sensitivity) 3    OFFICE VISIT    Days Since Previous Office Visit not available      Avg.Stims per day % per day % therapy time since previous office visit   AutoStim  27.75   2   Magnet Stim 0.68   <1   Normal Stim  243.42    10   Total      11.73% of therapy on time     Avg. # of inhibited AutoStims per day 27.75      Again, thank you for allowing me to participate in the care of your patient.      Sincerely,    Silvia Montiel MD

## 2019-02-26 NOTE — TELEPHONE ENCOUNTER
Mom wanted to make sure that patient is still able to see Renato Espinoza MD whenever he needs to because he is back in the area.  Mother informed to contact [facility] about patient being home alone with possible seizures, along with local authorities for wellness checks.  Mom informed that she can send a [employer] message when patient wants to follow up or have him call the facility.    Mom wants Renato Espinoza MD to know that he no longer is getting radiation because they feel it is no longer needed.  Jenni Badillo LPN 2/26/2019 12:08 PM

## 2019-05-16 NOTE — TELEPHONE ENCOUNTER
Pres[place] per [facility] [place].  LOV: 12/12/18  Per refill request on 12/26/18 patients PCP is Dr. Alexis Sifuentes, RN on 5/16/2019 at 9:13 AM

## 2019-06-18 NOTE — TELEPHONE ENCOUNTER
Called and spoke with patient after proper verification. Patient states he needs a refill of his allergy medication. Order has been T'd up. Please review and sign.   Yoanna Suarez LPN............. June 18, 2019 10:25 AM

## 2019-08-08 NOTE — PROGRESS NOTES
"Nursing Notes:   Daisy Gordon LPN  8/15/2019 12:14 PM  Signed  Patient comes in for follow up on head aches and balance is bad.  Daisy Gordon LPN ....................8/15/2019   12:08 PM  Chief Complaint   Patient presents with     Follow Up     haeadache and falling       Initial BP (!) 148/100 (BP Location: Right arm, Patient Position: Sitting, Cuff Size: Adult Regular)   Pulse 72   Temp 97.4  F (36.3  C) (Tympanic)   Resp 18   Ht 1.78 m (5' 10.08\")   Wt 74.4 kg (164 lb)   BMI 23.48 kg/m    Estimated body mass index is 23.48 kg/m  as calculated from the following:    Height as of this encounter: 1.78 m (5' 10.08\").    Weight as of this encounter: 74.4 kg (164 lb).  Medication Reconciliation: complete    Daisy Gordon LPN      Nursing note reviewed with patient.  Accuracy and completeness verified.   Mr. Borja is a 48 year old male who:  Patient presents with:  Follow Up: haeadache and falling      ICD-10-CM    1. 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 Cancer associated pain G89.3 HYDROcodone-acetaminophen (NORCO) 5-325 MG tablet     HOME CARE NURSING REFERRAL     MD CERTIFICATION A PATIENT     HYDROcodone-acetaminophen (NORCO) 5-325 MG tablet   3. Nonintractable headache, unspecified chronicity pattern, unspecified headache type R51    4. Status post craniotomy Z98.890    5. Medical marijuana use Z79.899    6. Driving safety issue - NO Driving - Still having seizures Z91.89    7. Partial epilepsy with impairment of consciousness, intractable (H) G40.219 clonazePAM (KLONOPIN) 0.5 MG tablet     carBAMazepine (CARBATROL) 300 MG 12 hr capsule     topiramate (TOPAMAX) 100 MG tablet     HOME CARE NURSING REFERRAL     MD CERTIFICATION A PATIENT   8. Poor balance R26.89 HOME CARE NURSING " REFERRAL     MD CERTIFICATION [place] PATIENT   9. Falls frequently R29.6 HOME CARE NURSING REFERRAL     MD CERTIFICATION A PATIENT   10. Memory loss R41.3 HOME CARE NURSING REFERRAL     MD CERTIFICATION A PATIENT   11. History of depression Z86.59 escitalopram (LEXAPRO) 20 MG tablet   12. Anxiety state F41.1 escitalopram (LEXAPRO) 20 MG tablet     HPI  Patient comes in with his wife for follow-up of multiple issues.  He currently has incurable brain cancer.  Tumor is enlarging.  See MRI below.    He is been having continuous headaches and associated cancer pain.  Having ongoing seizures.  He is not driving.    Needs numerous medications refilled today.    Wife would like some help with PCA services, home care, home health aide, physical therapy.    At this point he is still somewhat functional and is not at the point of requiring hospice but we did discuss this today.  Wife will give us a call once his symptoms progress and he becomes less functional and we can get hospice involved at that point.    He has been having frequent falling, poor balance.  Memory loss.  Vision issues.  Home care referral sent.    Has issues with anxiety and depression.  Continue Lexapro.  Needs refills.    Epilepsy, probably had a grand mal seizure last week.  Continue Klonopin, carbamazepine.  Needs refills.    Hx of Right temporal brain tumor, oligodendroglioma  right frontal grade 2, s/p biopsy and whole brain radiation, recurrence 5/07 s/p subtotal resection and chemo.    -- not able to get any additional treatment at this time. Has enlarging brain tumor / chronic headaches.....     Functional Capacity: less than 4 METS.   Review of Systems   Constitutional: Negative for chills and fever.   HENT: Negative for ear discharge, ear pain and sore throat.    Eyes: Positive for visual disturbance. Negative for pain.   Respiratory: Negative for cough and shortness of breath.    Cardiovascular: Negative for chest pain and palpitations.    Gastrointestinal: Negative for abdominal pain and vomiting.   Genitourinary: Negative for dysuria and hematuria.   Musculoskeletal: Positive for gait problem. Negative for arthralgias and back pain.   Skin: Negative for color change and rash.   Neurological: Positive for dizziness, seizures, syncope, weakness, light-headedness and headaches.        + balance problems   Psychiatric/Behavioral: Positive for confusion (+ worsening memory loss).   All other systems reviewed and are negative.     TONI:   TONI-7 SCORE 3/5/2018 12/12/2018 8/15/2019   Total Score 0 0 21     PHQ9:  PHQ-9 SCORE 10/22/2018 12/12/2018 8/15/2019   PHQ-9 Total Score - - -   PHQ-9 Total Score MyChart - - -   PHQ-9 Total Score 25 0 21       I have personally reviewed the past medical history, past surgical history, medications, allergies, family and social history as listed below.     Allergies   Allergen Reactions     Dilantin [Phenytoin] Hives       Current Outpatient Medications   Medication Sig Dispense Refill     Acetaminophen (TYLENOL PO) Take 500 mg by mouth 2 times daily as needed for mild pain or fever       carBAMazepine (CARBATROL) 300 MG 12 hr capsule TAKE 1 CAPSULE BY MOUTH TWICE DAILY (AT  10  AM  AND  10PM) 180 capsule 3     cetirizine (ZYRTEC ALLERGY) 10 MG tablet Take 1 tablet (10 mg) by mouth daily as needed (for mosquitos.) 90 tablet 3     clonazePAM (KLONOPIN) 0.5 MG tablet TAKE 1 TABLET BY MOUTH ONCE DAILY IN THE MORNING AND 2 IN THE EVENING 90 tablet 5     escitalopram (LEXAPRO) 20 MG tablet TAKE 1 TABLET BY MOUTH ONCE DAILY AT  NIGHT 90 tablet 3     felbamate (FELBATOL) 600 MG tablet Take 900 mg (1.5 tablet) am and 900 mg (1.5 tablet) 2 pm 270 tablet 3     HYDROcodone-acetaminophen (NORCO) 5-325 MG tablet Take 0.5-1 tablets by mouth every 6 hours as needed for breakthrough pain 28 tablet 0     [START ON 8/22/2019] HYDROcodone-acetaminophen (NORCO) 5-325 MG tablet Take 0.5-1 tablets by mouth every 6 hours as needed for  breakthrough pain 120 tablet 0     pantoprazole (PROTONIX) 40 MG EC tablet Take 1 tablet (40 mg) by mouth daily 90 tablet 1     RIBOFLAVIN PO Take 200 mg by mouth daily       topiramate (TOPAMAX) 100 MG tablet TAKE TWO TABLETS BY MOUTH ONCE DAILY IN THE MORNING AND THREE ONCE DAILY IN THE EVENING 450 tablet 3        Patient Active Problem List    Diagnosis Date Noted     Poor balance 08/15/2019     Priority: Medium     Falls frequently 08/15/2019     Priority: Medium     Memory loss 08/15/2019     Priority: Medium     Cancer associated pain 08/15/2019     Priority: Medium     Common wart 12/12/2018     Priority: Medium     Driving safety issue - NO Driving - Still having seizures 03/05/2018     Priority: Medium     Dyshidrotic eczema 01/16/2018     Priority: Medium     Esophageal reflux 01/16/2018     Priority: Medium     Status post craniotomy 03/24/2017     Priority: Medium     History of depression 02/28/2017     Priority: Medium     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     Priority: Medium     Insomnia 02/07/2017     Priority: Medium     Overview:   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  Overview:   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 Tbxv-Aoytx-Endrtwd disease 02/07/2017     Priority: Medium              Status post VNS (vagus nerve stimulator) placement - for epilepsy 02/07/2017     Priority: Medium     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      Priority: Medium     Hx of Right temporal brain tumor, oligodendroglioma  right frontal grade 2, s/p biopsy and whole brain radiation, recurrence 5/07 s/p subtotal resection and chemo       History of biliary T-tube placement 01/25/2017     Priority: Medium     Medical marijuana use 06/10/2016     Priority: Medium     Pain medication agreement 06/10/2016     Priority: Medium     Fluency disorder associated with underlying disease 06/09/2015     Priority: Medium     Unstable  gait 06/18/2014     Priority: Medium     Tobacco use 03/14/2014     Priority: Medium     S/P brain surgery 03/14/2014     Priority: Medium     Partial epilepsy with impairment of consciousness, intractable (H) 08/19/2013     Priority: Medium     Headache 04/09/2013     Priority: Medium     Anxiety state 07/13/2011     Priority: Medium     HCD (health care directive) 05/24/2007     Priority: Medium     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 Kcnt-Ipzhj-Tvftjah disease 2/7/2017          Localization-related epilepsy (H)      Oligodendroglioma (H) 4/02    right frontal grade 2, s/p biopsy and whole brain radiation, recurrence 5/07 s/p subtotal resection and chemo     Perthe's disease of hip     Right hip     Seizures (H)      Past Surgical History:   Procedure Laterality Date     APPENDECTOMY  1972     BIOPSY  2002     CRANIOTOMY, EXCISE TUMOR COMPLEX, COMBINED  9/18/2013    Procedure: COMBINED CRANIOTOMY, EXCISE TUMOR COMPLEX;  Re-do Right Frontal Craniotomy, Grid Removal,  Resection of Right Frontal  Tumor and Epileptogenic Cortex Resection;  Surgeon: Maverick Linder MD;  Location:  OR     HEAD & NECK SURGERY  2002    brain tumor removal     IMPLANT STIMULATOR VAGUS NERVE Left 7/19/2016    Procedure: IMPLANT STIMULATOR VAGUS NERVE;  Surgeon: Maverick Linder MD;  Location:  OR     OPTICAL TRACKING SYSTEM CRANIOTOMY, EXCISE TUMOR WITH MAPPING, COMBINED  9/10/2013    Procedure: COMBINED OPTICAL TRACKING SYSTEM CRANIOTOMY, EXCISE TUMOR WITH MAPPING;  Stealth Guided Right Redo Frontal Craniotomy for Grid Placement ;  Surgeon: Maverick Linder MD;  Location:  OR     OPTICAL TRACKING SYSTEM CRANIOTOMY, EXCISE TUMOR, COMBINED Right 3/24/2017    Procedure: COMBINED OPTICAL TRACKING SYSTEM CRANIOTOMY, EXCISE TUMOR;  Surgeon: Stuart Oscar MD;  Location:  OR      OPTICAL TRACKING SYSTEM CRANIOTOMY, EXCISE TUMOR, COMBINED Right 7/13/2018    Procedure: COMBINED OPTICAL TRACKING SYSTEM CRANIOTOMY, EXCISE TUMOR;  RIGHT CRANIOTOMY FOR EVACUATION OF BRAIN TUMOR ;  Surgeon: Stuart Oscar MD;  Location: [facility] OR     ORTHOPEDIC SURGERY  1982    Right Hip - Perthe's     REPAIR CONGENITAL HIP Right     Age 8     Social History     Socioeconomic History     Marital status:      Spouse name:  from wife.     Number of children: 0     Years of education: None     Highest education level: None   Occupational History     None   Social Needs     Financial resource strain: None     Food insecurity:     Worry: None     Inability: None     Transportation needs:     Medical: None     Non-medical: None   Tobacco Use     Smoking status: Current Every Day Smoker     Packs/day: 1.50     Years: 32.00     Pack years: 48.00     Types: Cigarettes     Start date: 10/22/1985     Smokeless tobacco: Never Used     Tobacco comment: would like to quit soon.   Substance and Sexual Activity     Alcohol use: Yes     Alcohol/week: 0.0 oz     Comment: hard liquor 3-4 times a day     Drug use: Yes     Types: Marijuana     Comment: marijuana(Rx) daily     Sexual activity: Not Currently   Lifestyle     Physical activity:     Days per week: None     Minutes per session: None     Stress: None   Relationships     Social connections:     Talks on phone: None     Gets together: None     Attends [employer] service: None     Active member of club or organization: None     Attends meetings of clubs or organizations: None     Relationship status: None     Intimate partner violence:     Fear of current or ex partner: None     Emotionally abused: None     Physically abused: None     Forced sexual activity: None   Other Topics Concern     Parent/sibling w/ CABG, MI or angioplasty before 65F 55M? Yes   Social History Narrative     None     Family History   Problem Relation Age of Onset     Hyperlipidemia Father   "       medicine therapy     Coronary Artery Disease Maternal Grandmother         Medicine Therapy/Decesed     Diabetes Paternal Grandfather         Slight/diet control     Coronary Artery Disease Brother 42        mechanical valve     Coronary Artery Disease Maternal Grandfather         undiagnosed/     Cerebrovascular Disease Other      Breast Cancer Other      Colon Cancer No family hx of      Prostate Cancer No family hx of        EXAM:   Vitals:    08/15/19 1202   BP: (!) 148/100   BP Location: Right arm   Patient Position: Sitting   Cuff Size: Adult Regular   Pulse: 72   Resp: 18   Temp: 97.4  F (36.3  C)   TempSrc: Tympanic   Weight: 74.4 kg (164 lb)   Height: 1.78 m (5' 10.08\")       Current Pain Score: Severe Pain (6)     BP Readings from Last 3 Encounters:   08/15/19 (!) 148/100   19 116/62   18 137/88      Wt Readings from Last 3 Encounters:   08/15/19 74.4 kg (164 lb)   19 77.6 kg (171 lb)   18 73.8 kg (162 lb 12.8 oz)      Estimated body mass index is 23.48 kg/m  as calculated from the following:    Height as of this encounter: 1.78 m (5' 10.08\").    Weight as of this encounter: 74.4 kg (164 lb).     Physical Exam   Constitutional: He appears well-developed and well-nourished. No distress.   HENT:   Head: Normocephalic and atraumatic.   + healed craniotomy scar   Eyes: Conjunctivae are normal. No scleral icterus.   Neck: Neck supple.   Cardiovascular: Normal rate and regular rhythm.   Pulmonary/Chest: Effort normal and breath sounds normal.   Abdominal: Soft. There is no tenderness.   Musculoskeletal: He exhibits no deformity.   Lymphadenopathy:     He has no cervical adenopathy.   Neurological: He is alert.   + poor balance   Skin: Skin is warm and dry. No rash noted. He is not diaphoretic.   Psychiatric: He has a normal mood and affect.      Procedures   INVESTIGATIONS:  -- see below.     ASSESSMENT AND PLAN:  Problem List Items Addressed This Visit        Nervous and " Auditory    Partial epilepsy with impairment of consciousness, intractable (H)    Relevant Medications    clonazePAM (KLONOPIN) 0.5 MG tablet    carBAMazepine (CARBATROL) 300 MG 12 hr capsule    topiramate (TOPAMAX) 100 MG tablet    Other Relevant Orders    HOME CARE NURSING REFERRAL    MD CERTIFICATION A PATIENT (Completed)    Headache    Relevant Medications    clonazePAM (KLONOPIN) 0.5 MG tablet    escitalopram (LEXAPRO) 20 MG tablet    carBAMazepine (CARBATROL) 300 MG 12 hr capsule    topiramate (TOPAMAX) 100 MG tablet    HYDROcodone-acetaminophen (NORCO) 5-325 MG tablet    HYDROcodone-acetaminophen (NORCO) 5-325 MG tablet (Start on 8/22/2019)    Cancer associated pain    Relevant Medications    HYDROcodone-acetaminophen (NORCO) 5-325 MG tablet    HYDROcodone-acetaminophen (NORCO) 5-325 MG tablet (Start on 8/22/2019)    Other Relevant Orders    HOME CARE NURSING REFERRAL    MD CERTIFICATION [facility] PATIENT (Completed)       Other    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 - Primary    Relevant Medications    HYDROcodone-acetaminophen (NORCO) 5-325 MG tablet    HYDROcodone-acetaminophen (NORCO) 5-325 MG tablet (Start on 8/22/2019)    Other Relevant Orders    HOME CARE NURSING REFERRAL    MD CERTIFICATION A PATIENT (Completed)    History of depression    Relevant Medications    escitalopram (LEXAPRO) 20 MG tablet    Status post craniotomy    Anxiety state    Relevant Medications    clonazePAM (KLONOPIN) 0.5 MG tablet    escitalopram (LEXAPRO) 20 MG tablet    carBAMazepine (CARBATROL) 300 MG 12 hr capsule    topiramate (TOPAMAX) 100 MG tablet    Medical marijuana use    Driving safety issue - NO Driving - Still having seizures    Poor balance    Relevant Orders    HOME CARE NURSING REFERRAL    MD CERTIFICATION [facility] PATIENT (Completed)    Falls frequently    Relevant Orders    HOME CARE NURSING REFERRAL    MD CERTIFICATION [facility] PATIENT (Completed)    Memory loss    Relevant Orders    HOME CARE  NURSING REFERRAL    MD CERTIFICATION [facility] PATIENT (Completed)        very strongly urged to quit smoking to reduce cardiovascular risk  -- Expected clinical course discussed    -- Medications and their side effects discussed    25 minutes spent in face-to-face interaction with patient and wife (separate from separately billed procedures) with greater than 50% spent in counseling and care coordination of listed medical problems.      Home Health Certification/Face to Face Attestation:     I certify that this patient is under my care and that I, or a nurse practitioner or physician assistant working with me, had a face-to-face encounter that meets the physician face-to-face encounter requirements with this patient during this visit.    Date of the Face to Face Encounter: 8/15/2019     I certify, based on my findings, the following services are medically necessary home health services: Physical Therapy, Medical Social Worker and Home Health Aide    My clinical findings support the need for the above services for this patient because of: Activity intolerance, Debilitating pain, Significant weakness and decreased strength/endurance, Balance/coordination deficit,     -  Vision problems/deficit, frequent falls, balance problems, requires cueing  - Gait training - restore ability to walk, frequently tripping    I certify that my clinical findings support that this patient is homebound because:   There is a taxing effort to leave the home and requires assistance to leave the home for safety due to + seizure disorder, memory loss, balance problems with frequent falls. Can't drive.   + enlarging brain tumor.     - Fall risk because of inability to safely navigate stairs, uneven sidewalks and curbs    Patient requires the assistance of another individual, and may also need a walker or wheelchair in the near future in order to leave the home.    The services identified in this certification are medically necessary home health  services.    Electronically signed,   Renato Espinoza ....................  8/15/2019   12:49 PM       Patient Instructions     Talk with the county -- needs evaluation for PCA services.     Home care referral sent today.     Call when needing referral for hospice and we can place orders.     1. 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 Cancer associated pain  - HYDROcodone-acetaminophen (NORCO) 5-325 MG tablet; Take 0.5-1 tablets by mouth every 6 hours as needed for breakthrough pain  Dispense: 28 tablet; Refill: 0  - HOME CARE NURSING REFERRAL  - MD CERTIFICATION [facility] PATIENT  - HYDROcodone-acetaminophen (NORCO) 5-325 MG tablet; Take 0.5-1 tablets by mouth every 6 hours as needed for breakthrough pain  Dispense: 120 tablet; Refill: 0    3. Nonintractable headache, unspecified chronicity pattern, unspecified headache type  4. Status post craniotomy    5. Medical marijuana use    6. Driving safety issue - NO Driving - Still having seizures  7. Partial epilepsy with impairment of consciousness, intractable (H)    Continue:   - clonazePAM (KLONOPIN) 0.5 MG tablet; TAKE 1 TABLET BY MOUTH ONCE DAILY IN THE MORNING AND 2 IN THE EVENING  Dispense: 90 tablet; Refill: 5  - carBAMazepine (CARBATROL) 300 MG 12 hr capsule; TAKE 1 CAPSULE BY MOUTH TWICE DAILY (AT  10  AM  AND  10PM)  Dispense: 180 capsule; Refill: 3  - topiramate (TOPAMAX) 100 MG tablet; TAKE TWO TABLETS BY MOUTH ONCE DAILY IN THE MORNING AND THREE ONCE DAILY IN THE EVENING  Dispense: 450 tablet; Refill: 3  - HOME CARE NURSING REFERRAL  - MD  CERTIFICATION [facility] PATIENT    8. Poor balance  9. Falls frequently  10. Memory loss  - HOME CARE NURSING REFERRAL  - MD CERTIFICATION A PATIENT    11. History of depression  12. Anxiety state  - escitalopram (LEXAPRO) 20 MG tablet; TAKE 1 TABLET BY MOUTH ONCE DAILY AT  NIGHT  Dispense: 90 tablet; Refill: 3      Results for orders placed or performed during the hospital encounter of 12/18/18   MR Brain w/o & w Contrast    Narrative    MRI BRAIN WITHOUT AND WITH CONTRAST  12/18/2018 10:59 AM    HISTORY: Oligodendroglioma. Oligodendroglioma, anaplastic (H).     TECHNIQUE: Multiplanar, multisequence MRI of the brain without and  with 7 mL Gadavist.    COMPARISON: 7/13/2018    FINDINGS: There has been prior right frontal craniotomy. There is an  underlying CSF cavity at the prior tumor resection site. Postcontrast  images do not show any abnormal enhancement around the cavity. There  is also abnormal increased T2 signal intensity in the right medial  temporal lobe best seen on FLAIR images with involvement of the uncus  as well as the subinsular cortex. This abnormal signal intensity has  increased in thickness and conspicuity since prior exam. The right  subfrontal region is also involved. There is a spherical focus of  enhancement in the right globus pallidus measuring 0.8 cm in maximum  dimensions. This is new since the prior study. There is no evidence  for acute infarct or any acute hemorrhage. The blood seen in the  lateral ventricle on the left has resolved during the interval. There  is no evidence for hydrocephalus. No other abnormal areas of  enhancement are present. There are patchy white matter changes in both  hemispheres without mass effect. Vascular structures are patent at the  skull base.      Impression    IMPRESSION: Tumor progression with further involvement of the medial  temporal lobe, subinsular region, and subfrontal region as well as a  new enhancing lesion in the right globus  soledad.    JASWINDER YEE MD       Return as needed for follow-up with Dr. Espinoza.    Clinic : [phone]  Appointment line: [phone] or [phone]     Renato Espinoza MD  Internal Medicine  [facility]     Portions of this note were dictated using speech recognition software. The note has been proofread but errors in the text may have been overlooked. Please contact me if there are any concerns regarding the accuracy of the dictation.

## 2019-08-14 NOTE — TELEPHONE ENCOUNTER
Pt has appt tomorrow-Mom called he has moved up here due to illness-she just visited him and he is not in good shape and is now wanting help she states-needs home health care and besides headaches that he is having appt for tomorrow she states dizzy off balance and legs are weak-cancer progressing cant hardly make sandwich--mom is looking for help for him-she would like to talk to nurse today

## 2019-08-14 NOTE — TELEPHONE ENCOUNTER
Mother is concerned with patient falling asleep while making food-black pizza and the cleanliness of the house inside and outside.  Mom wants to have Home Care sent out for cleaning and checking on patient.  Mother was reminded in last conversation earlier this year to contact [employer] to voice concerns and/or to contact local authorities when patient doesn't answer the phone for wellness checks.    Mom does indicate that she is available at her main number if Renato Espinoza MD has any other questions.    [email or web address] Message from Yesterday:      We are Joni's parents Brittany & Steve Borja.  Joni has a appointment Thurs, 8/15/19 at 11:40. Joni has been having a very difficult time with his balance and is falling, also seizures due to brain cancer diagnosed in 2002. Joni is getting weaker and has a hard time getting out of bed and says his legs are weak, he has to hold onto the wall to get his balance and stand. There is stuff on the floor to trip on.  Joni is left alone a large majority of time while his wife is working and sometimes there is someone else that stays there too but they are either at work or sleeping. There is not anybody that comes in to check on him. His health is deteriorating quicker. Joni is unable to cook for himself lately but will make sandwiches. He cannot do job duties around the home such as laundry or cleaning.  Joni's home environment in the house and outside is not clean.  We've talked to medicare and [employer] and he can have home health care up to 8 hours a day and at least 28 or more hours a   week from a medicare approved agency to be covered by insurance. It has to be through a medicare approved agency to be paid for.   We were there to visit with him on Sat & Sun & take him out for meals. He hasn't had dr appointments for quite awhile, so I am hoping that you will be able to help him and prescribe home care so that he can stay at home for awhile, he has told us that he  is willing to accept the help. I hope that he will receive the help he needs.                     Thank you very much, Teresa Badillo LPN 8/14/2019 10:15 AM

## 2019-08-15 PROBLEM — R41.3 MEMORY LOSS: Status: ACTIVE | Noted: 2019-01-01

## 2019-08-15 PROBLEM — R26.89 POOR BALANCE: Status: ACTIVE | Noted: 2019-01-01

## 2019-08-15 PROBLEM — R29.6 FALLS FREQUENTLY: Status: ACTIVE | Noted: 2019-01-01

## 2019-08-15 PROBLEM — G89.3 CANCER ASSOCIATED PAIN: Status: ACTIVE | Noted: 2019-01-01

## 2019-08-15 NOTE — PATIENT INSTRUCTIONS
Talk with the county -- needs evaluation for PCA services.     Home care referral sent today.     Call when needing referral for hospice and we can place orders.     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 Cancer associated pain  - HYDROcodone-acetaminophen (NORCO) 5-325 MG tablet; Take 0.5-1 tablets by mouth every 6 hours as needed for breakthrough pain  Dispense: 28 tablet; Refill: 0  - HOME CARE NURSING REFERRAL  - MD CERTIFICATION [facility] PATIENT  - HYDROcodone-acetaminophen (NORCO) 5-325 MG tablet; Take 0.5-1 tablets by mouth every 6 hours as needed for breakthrough pain  Dispense: 120 tablet; Refill: 0    3. Nonintractable headache, unspecified chronicity pattern, unspecified headache type  4. Status post craniotomy    5. Medical marijuana use    6. Driving safety issue - NO Driving - Still having seizures  7. Partial epilepsy with impairment of consciousness, intractable (H)    Continue:   - clonazePAM (KLONOPIN) 0.5 MG tablet; TAKE 1 TABLET BY MOUTH ONCE DAILY IN THE MORNING AND 2 IN THE EVENING  Dispense: 90 tablet; Refill: 5  - carBAMazepine (CARBATROL) 300 MG 12 hr capsule; TAKE 1 CAPSULE BY MOUTH TWICE DAILY (AT  10  AM  AND  10PM)  Dispense: 180 capsule; Refill: 3  - topiramate (TOPAMAX) 100 MG tablet; TAKE TWO TABLETS BY MOUTH ONCE DAILY IN THE MORNING AND THREE ONCE DAILY IN THE EVENING  Dispense: 450 tablet; Refill: 3  - HOME CARE NURSING REFERRAL  - MD CERTIFICATION [facility] PATIENT    8. Poor balance  9. Falls frequently  10. Memory loss  - HOME CARE NURSING REFERRAL  - MD CERTIFICATION  A PATIENT    11. History of depression  12. Anxiety state  - escitalopram (LEXAPRO) 20 MG tablet; TAKE 1 TABLET BY MOUTH ONCE DAILY AT  NIGHT  Dispense: 90 tablet; Refill: 3      Results for orders placed or performed during the hospital encounter of 12/18/18   MR Brain w/o & w Contrast    Narrative    MRI BRAIN WITHOUT AND WITH CONTRAST  12/18/2018 10:59 AM    HISTORY: Oligodendroglioma. Oligodendroglioma, anaplastic (H).     TECHNIQUE: Multiplanar, multisequence MRI of the brain without and  with 7 mL Gadavist.    COMPARISON: 7/13/2018    FINDINGS: There has been prior right frontal craniotomy. There is an  underlying CSF cavity at the prior tumor resection site. Postcontrast  images do not show any abnormal enhancement around the cavity. There  is also abnormal increased T2 signal intensity in the right medial  temporal lobe best seen on FLAIR images with involvement of the uncus  as well as the subinsular cortex. This abnormal signal intensity has  increased in thickness and conspicuity since prior exam. The right  subfrontal region is also involved. There is a spherical focus of  enhancement in the right globus pallidus measuring 0.8 cm in maximum  dimensions. This is new since the prior study. There is no evidence  for acute infarct or any acute hemorrhage. The blood seen in the  lateral ventricle on the left has resolved during the interval. There  is no evidence for hydrocephalus. No other abnormal areas of  enhancement are present. There are patchy white matter changes in both  hemispheres without mass effect. Vascular structures are patent at the  skull base.      Impression    IMPRESSION: Tumor progression with further involvement of the medial  temporal lobe, subinsular region, and subfrontal region as well as a  new enhancing lesion in the right globus pallidus.    JASWINDER YEE MD       Return as needed for follow-up with Dr. Espinoza.    Clinic : [phone]  Appointment line: [phone] or  [phone]

## 2019-08-15 NOTE — NURSING NOTE
"Patient comes in for follow up on head aches and balance is bad.  Daisy Gordon LPN ....................8/15/2019   12:08 PM  Chief Complaint   Patient presents with     Follow Up     haeadache and falling       Initial BP (!) 148/100 (BP Location: Right arm, Patient Position: Sitting, Cuff Size: Adult Regular)   Pulse 72   Temp 97.4  F (36.3  C) (Tympanic)   Resp 18   Ht 1.78 m (5' 10.08\")   Wt 74.4 kg (164 lb)   BMI 23.48 kg/m   Estimated body mass index is 23.48 kg/m  as calculated from the following:    Height as of this encounter: 1.78 m (5' 10.08\").    Weight as of this encounter: 74.4 kg (164 lb).  Medication Reconciliation: complete    Daisy Gordon LPN      "

## 2019-08-21 NOTE — TELEPHONE ENCOUNTER
Verbal order okay is needed-patient had a different telephone note.      Jenni Badillo LPN 8/21/2019 3:50 PM

## 2019-08-21 NOTE — TELEPHONE ENCOUNTER
Mom wanted to clarify information on the AVS-mom pulled it up on [employer].  Mom wants to talk with [facility] to talk about the billing,  Phone number was found online and provided for mom.  Mom denies any further assistance at this time.      Jenni Badillo LPN 8/21/2019 1:30 PM

## 2019-08-26 NOTE — TELEPHONE ENCOUNTER
Just to clarify patients wife is not returning calls either.  Lizeth Fleming LPN on 8/26/2019 at 1:03 PM

## 2019-08-26 NOTE — TELEPHONE ENCOUNTER
Sarah from [facility] stated they have tried for over a week to get in touch with patient even talking to mother who states she has left messages on his wife's phone to call. Patient has not been admitted due to lack of contact.   Lizeth Fleming LPN on 8/26/2019 at 10:11 AM

## 2019-08-29 NOTE — TELEPHONE ENCOUNTER
Looking for orders for hospice, and they need an H&P.  There is an eval scheduled at the [facility] home today at 11:00am    Orders can be faxed to [phone]

## 2019-08-29 NOTE — TELEPHONE ENCOUNTER
[facility] for hospice.   Referral sent.   He was seen recently. Okay to use that note.     Renato Espinoza MD

## 2019-08-29 NOTE — TELEPHONE ENCOUNTER
Hospice faxed order and given office visit date for H&P, they need to fax YADIEL.      Jenni Badillo LPN 8/29/2019 10:56 AM

## 2019-08-31 PROBLEM — I62.9 INTRACRANIAL HEMORRHAGE (H): Status: ACTIVE | Noted: 2019-01-01

## 2019-08-31 PROBLEM — C71.9 MALIGNANT NEOPLASM OF BRAIN, UNSPECIFIED LOCATION (H): Status: ACTIVE | Noted: 2019-01-01

## 2019-08-31 NOTE — ED TRIAGE NOTES
Pt arrives to ED with family about c/o falling. Pt reports falling 50 times. Pt has brain cancer and is on hospice but hospice nurse and wife reports he is unsafe due to falling so much.    Meagan Jim RN on 8/30/2019 at 8:50 PM

## 2019-08-31 NOTE — PROGRESS NOTES
"[facility] ADMISSION NOTE    Patient admitted to room 301 at approximately 0100 via cart from emergency room. Patient was accompanied by transport tech.     Verbal SBAR report received from Rupa GONZALEZ prior to patient arrival.     Patient trasferred to bed via self. Patient alert and oriented X 3. Pain is not well controlled.  Medication(s) being used: none. 0-10 Pain Scale: 10. Admission vital signs: Blood pressure (!) 163/107, pulse 60, temperature 99  F (37.2  C), temperature source Tympanic, resp. rate 16, height 1.753 m (5' 9\"), weight 72.3 kg (159 lb 8 oz), SpO2 100 %. Patient was oriented to plan of care, call light, bed controls, tv, telephone, bathroom and visiting hours.     Risk Assessment    The following safety risks were identified during admission: fall. Yellow risk band applied: YES.     Skin Initial Assessment    This writer admitted this patient and completed a full skin assessment and Otis score in the Adult PCS flowsheet. Appropriate interventions initiated as needed.     Secondary skin check completed by Ligia GONZALEZ.    Otis Risk Assessment  Sensory Perception: 3-->slightly limited  Moisture: 4-->rarely moist  Activity: 1-->bedfast  Mobility: 2-->very limited  Nutrition: 3-->adequate  Friction and Shear: 1-->problem  Otis Score: 14  Otis Intervention(s) Implemented: draw sheets    Ligia Michaels RN    "

## 2019-08-31 NOTE — PHARMACY
"Pharmacy: Patient Own Medication Identification    The requirements of Policy 13-03 from the Pharmacy Policy Manual have been met and the patient will be allowed to use their own supply of: felbamate 600 mg tablets ( 2 bottles).    Antionette Beaulieu [facility] has verified the name, dose, route, and directions for each medication. The integrity has been assessed and deemed safe.     The product(s) have been labeled as being inspected by a pharmacist and the medication has been placed in the patient-specific bin in the medication room.    Nursing will remove medication at the appropriately scheduled times and document the administration on the MAR with the designation of \"patient own\".    Nursing to return medication back to patient at time of discharge.     Antionette Beaulieu RPH ....................  8/31/2019   5:28 PM    "

## 2019-08-31 NOTE — PLAN OF CARE
"Uneventful day. Vitals remain stable, no complaints of pain. /75   Pulse 60   Temp 98.1  F (36.7  C) (Tympanic)   Resp 16   Ht 1.753 m (5' 9\")   Wt 72.3 kg (159 lb 8 oz)   SpO2 98%   BMI 23.55 kg/m      "

## 2019-08-31 NOTE — ED NOTES
Patient watched observation video and given handout. Verbalized understanding.       Patient report given to LISA Strong. Patient declines getting into gown.

## 2019-08-31 NOTE — PLAN OF CARE
"Admit assessment complete, Patient wishes to have \"no visitors\". He also states he \"does not what his soon to be ex-wife to make any decisions on his behalf. Visitor information passed on. States he has a H/A does not need anything at this time. Is currently watching TV. Bed alarm on.  "

## 2019-08-31 NOTE — ED PROVIDER NOTES
History     Chief Complaint   Patient presents with     Fall     HPI  Joni Borja is a 48 year old male who was diagnosed with brain cancer a year 2002.  She has had multiple brain surgery plus chemotherapy and radiation.  Patient this point is not a candidate for any further chemotherapy radiation or brain surgeries.  Patient has been doing multiple falls every day for the last 2 months with his many as 20/day.  Patient is scheduled to go on to hospice in the immediate near future.  Because of continued falls with patient's wife and primary caregiver unable to supervise patient at this point at home patient was brought to the emergency room for further evaluation and possible hospitalization.  Patient apparently has become somewhat belligerent and difficult to take care of at home with patient occasionally having difficulty taking his medication because of uncooperativeness.  Patient otherwise has no new complaints noted himself.  Patient has had no appetite disturbance noted at this point per patient's wife.  Patient will be walking and spontaneously fall.    Allergies: Reviewed  Allergies   Allergen Reactions     Dilantin [Phenytoin] Hives       Problem List: Mood  Patient Active Problem List    Diagnosis Date Noted     Intracranial hemorrhage (H) 08/31/2019     Priority: Medium     Poor balance 08/15/2019     Priority: Medium     Falls frequently 08/15/2019     Priority: Medium     Memory loss 08/15/2019     Priority: Medium     Cancer associated pain 08/15/2019     Priority: Medium     Common wart 12/12/2018     Priority: Medium     Driving safety issue - NO Driving - Still having seizures 03/05/2018     Priority: Medium     Dyshidrotic eczema 01/16/2018     Priority: Medium     Esophageal reflux 01/16/2018     Priority: Medium     Status post craniotomy 03/24/2017     Priority: Medium     History of depression 02/28/2017     Priority: Medium     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     Priority:  Medium     Insomnia 02/07/2017     Priority: Medium     Overview:   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  Overview:   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 Lkxe-Wgoup-Ofmmpfi disease 02/07/2017     Priority: Medium              Status post VNS (vagus nerve stimulator) placement - for epilepsy 02/07/2017     Priority: Medium     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      Priority: Medium     Hx of Right temporal brain tumor, oligodendroglioma  right frontal grade 2, s/p biopsy and whole brain radiation, recurrence 5/07 s/p subtotal resection and chemo       History of biliary T-tube placement 01/25/2017     Priority: Medium     Medical marijuana use 06/10/2016     Priority: Medium     Pain medication agreement 06/10/2016     Priority: Medium     Fluency disorder associated with underlying disease 06/09/2015     Priority: Medium     Unstable gait 06/18/2014     Priority: Medium     Tobacco use 03/14/2014     Priority: Medium     S/P brain surgery 03/14/2014     Priority: Medium     Partial epilepsy with impairment of consciousness, intractable (H) 08/19/2013     Priority: Medium     Headache 04/09/2013     Priority: Medium     Anxiety state 07/13/2011     Priority: Medium     HCD (health care directive) 05/24/2007     Priority: Medium        Past Medical History: Reviewed  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 Mvlq-Kajat-Bhbltmx disease 2/7/2017     Localization-related epilepsy (H)      Oligodendroglioma (H) 4/02     Perthe's disease of hip      Seizures (H)        Past Surgical History:    Past Surgical History:   Procedure Laterality Date     APPENDECTOMY  1972     BIOPSY  2002     CRANIOTOMY, EXCISE TUMOR COMPLEX, COMBINED  9/18/2013    Procedure: COMBINED CRANIOTOMY, EXCISE TUMOR COMPLEX;  Re-do Right  Frontal Craniotomy, Grid Removal,  Resection of Right Frontal  Tumor and Epileptogenic Cortex Resection;  Surgeon: Maverick Linder MD;  [facility]     HEAD & NECK SURGERY  2002    brain tumor removal     IMPLANT STIMULATOR VAGUS NERVE Left 2016    Procedure: IMPLANT STIMULATOR VAGUS NERVE;  Surgeon: Maverick Linder MD;  Location: [facility]     OPTICAL TRACKING SYSTEM CRANIOTOMY, EXCISE TUMOR WITH MAPPING, COMBINED  9/10/2013    Procedure: COMBINED OPTICAL TRACKING SYSTEM CRANIOTOMY, EXCISE TUMOR WITH MAPPING;  Stealth Guided Right Redo Frontal Craniotomy for Grid Placement ;  Surgeon: Maverick Linder MD;  Location: [facility]     OPTICAL TRACKING SYSTEM CRANIOTOMY, EXCISE TUMOR, COMBINED Right 3/24/2017    Procedure: COMBINED OPTICAL TRACKING SYSTEM CRANIOTOMY, EXCISE TUMOR;  Surgeon: Stuart Oscar MD;  Location:  OR     OPTICAL TRACKING SYSTEM CRANIOTOMY, EXCISE TUMOR, COMBINED Right 2018    Procedure: COMBINED OPTICAL TRACKING SYSTEM CRANIOTOMY, EXCISE TUMOR;  RIGHT CRANIOTOMY FOR EVACUATION OF BRAIN TUMOR ;  Surgeon: Stuart Oscar MD;  Location:  OR     ORTHOPEDIC SURGERY  1982    Right Hip - Perthe's     REPAIR CONGENITAL HIP Right     Age 8       Family History:    Family History   Problem Relation Age of Onset     Hyperlipidemia Father         medicine therapy     Coronary Artery Disease Maternal Grandmother         Medicine Therapy/Decesed     Diabetes Paternal Grandfather         Slight/diet control     Coronary Artery Disease Brother 42        mechanical valve     Coronary Artery Disease Maternal Grandfather         undiagnosed/     Cerebrovascular Disease Other      Breast Cancer Other      Colon Cancer No family hx of      Prostate Cancer No family hx of        Social History:  Marital Status:   [2]  Social History     Tobacco Use     Smoking status: Current Every Day Smoker     Packs/day: 1.50     Years: 32.00     Pack years: 48.00     Types:  "Cigarettes     Start date: 10/22/1985     Smokeless tobacco: Never Used     Tobacco comment: would like to quit soon.   Substance Use Topics     Alcohol use: Yes     Alcohol/week: 0.0 oz     Comment: hard liquor 3-4 times a day     Drug use: Yes     Types: Marijuana     Comment: marijuana(Rx) daily        Medications: Reviewed    carBAMazepine (CARBATROL) 300 MG 12 hr capsule   cetirizine (ZYRTEC ALLERGY) 10 MG tablet   clonazePAM (KLONOPIN) 0.5 MG tablet   escitalopram (LEXAPRO) 20 MG tablet   felbamate (FELBATOL) 600 MG tablet   HYDROcodone-acetaminophen (NORCO) 5-325 MG tablet   pantoprazole (PROTONIX) 40 MG EC tablet   topiramate (TOPAMAX) 100 MG tablet   Acetaminophen (TYLENOL PO)   HYDROcodone-acetaminophen (NORCO) 5-325 MG tablet   RIBOFLAVIN PO         Review of Systems   Constitutional: Negative.    HENT: Negative.    Eyes: Negative.    Respiratory: Negative.    Cardiovascular: Negative.    Gastrointestinal: Negative.    Genitourinary: Negative.    Musculoskeletal: Negative.    Skin: Negative.    Neurological:        See HPI   Psychiatric/Behavioral: Positive for agitation.       Physical Exam   BP: (!) 160/93  Pulse: 76  Heart Rate: 71  Temp: 99.6  F (37.6  C)  Resp: 16  Height: 175.3 cm (5' 9\")  Weight: 72.6 kg (160 lb)  SpO2: 99 %      Physical Exam   Constitutional: He is oriented to person, place, and time. He appears well-developed and well-nourished.   HENT:   Multiple surgical scars noted patient's right frontal and occipital scalp.  Patient noted to have a a slight left facial droop noted.   Eyes: Pupils are equal, round, and reactive to light. Conjunctivae and EOM are normal.   Neck: Normal range of motion. Neck supple.   Cardiovascular: Normal rate, regular rhythm and normal heart sounds.   Pulmonary/Chest: Effort normal and breath sounds normal.   Abdominal: Soft. Bowel sounds are normal.   Musculoskeletal: Normal range of motion.   Neurological: He is alert and oriented to person, place, and " time.   Skin: Skin is warm and dry.   Psychiatric: He has a normal mood and affect.   Nursing note and vitals reviewed.      ED Course        Procedures         Results for orders placed or performed during the hospital encounter of 08/30/19 (from the past 24 hour(s))   CBC with platelets differential   Result Value Ref Range    WBC 7.6 4.0 - 11.0 10e9/L    RBC Count 4.02 (L) 4.4 - 5.9 10e12/L    Hemoglobin 12.5 (L) 13.3 - 17.7 g/dL    Hematocrit 38.0 (L) 40.0 - 53.0 %    MCV 95 78 - 100 fl    MCH 31.1 26.5 - 33.0 pg    MCHC 32.9 31.5 - 36.5 g/dL    RDW 12.9 10.0 - 15.0 %    Platelet Count 280 150 - 450 10e9/L    Diff Method Automated Method     % Neutrophils 72.7 %    % Lymphocytes 15.4 %    % Monocytes 8.1 %    % Eosinophils 2.5 %    % Basophils 0.9 %    % Immature Granulocytes 0.4 %    Absolute Neutrophil 5.5 1.6 - 8.3 10e9/L    Absolute Lymphocytes 1.2 0.8 - 5.3 10e9/L    Absolute Monocytes 0.6 0.0 - 1.3 10e9/L    Absolute Eosinophils 0.2 0.0 - 0.7 10e9/L    Absolute Basophils 0.1 0.0 - 0.2 10e9/L    Abs Immature Granulocytes 0.0 0 - 0.4 10e9/L   Comprehensive metabolic panel   Result Value Ref Range    Sodium 138 134 - 144 mmol/L    Potassium 4.1 3.5 - 5.1 mmol/L    Chloride 110 (H) 98 - 107 mmol/L    Carbon Dioxide 24 21 - 31 mmol/L    Anion Gap 4 3 - 14 mmol/L    Glucose 83 70 - 105 mg/dL    Urea Nitrogen 14 7 - 25 mg/dL    Creatinine 0.96 0.70 - 1.30 mg/dL    GFR Estimate 84 >60 mL/min/[1.73_m2]    GFR Estimate If Black >90 >60 mL/min/[1.73_m2]    Calcium 8.1 (L) 8.6 - 10.3 mg/dL    Bilirubin Total 0.2 (L) 0.3 - 1.0 mg/dL    Albumin 3.6 3.5 - 5.7 g/dL    Protein Total 6.7 6.4 - 8.9 g/dL    Alkaline Phosphatase 108 (H) 34 - 104 U/L    ALT 10 7 - 52 U/L    AST 15 13 - 39 U/L   Magnesium   Result Value Ref Range    Magnesium 2.0 1.9 - 2.7 mg/dL   *UA reflex to Microscopic   Result Value Ref Range    Color Urine Yellow     Appearance Urine Clear     Glucose Urine Negative NEG^Negative mg/dL    Bilirubin Urine  Negative NEG^Negative    Ketones Urine Negative NEG^Negative mg/dL    Specific Gravity Urine 1.020 1.000 - 1.030    Blood Urine Negative NEG^Negative    pH Urine 7.5 5.0 - 9.0 pH    Protein Albumin Urine Negative NEG^Negative mg/dL    Urobilinogen Urine 0.2 0.2 - 1.0 EU/dL    Nitrite Urine Negative NEG^Negative    Leukocyte Esterase Urine Negative NEG^Negative    Source Midstream Urine    CT Head w/o Contrast    Addendum: 8/30/2019      Addendum created by Foreign Gutierrez MD on 8/30/2019 10:34:15 PM CDT     Findings were discussed with JOSE GUTIERREZ at 8/30/2019 10:33 PM CDT.        Narrative    Initial report created on 8/30/2019 10:29:49 PM CDT     EXAM:   CT Head Without Contrast     EXAM DATE/TIME:   8/30/2019 9:37 PM     CLINICAL HISTORY:   48 years old, male; Other: Headache; Prior surgery; Surgery type: Brain tumor   removal , nerve stimulator; Patient HX: PT arrives to ED with family about C/O   falling. PT reports falling 50 times. PT has brain cancer and is on hospice but   hospice nurse and wife reports he is unsafe due to falling so much. ;   Additional info: Headache, chronic, neuro deficit     TECHNIQUE:   Imaging protocol: Computed tomography of the head without contrast.   Radiation optimization: All CT scans at this facility use at least one of these   dose optimization techniques: automated exposure control; mA and/or kV   adjustment per patient size (includes targeted exams where dose is matched to   clinical indication); or iterative reconstruction.     COMPARISON:   CT Head^Head Routine (Adult) 7/27/2018 1:17 PM     FINDINGS:   Brain: Since the prior MRI study of 12/18/2018, the patient has developed 2   areas of parenchymal hemorrhage in the right basal ganglia. The largest   parenchymal hemorrhage measures 2.2 cm in AP length, 2.6 cm in width and 3.1 cm   in height. A fluid blood level is seen. This is associated with vasogenic   edema. The second hemorrhagic lesion is located just anterior to  the larger   hemorrhagic lesion. This lesion measures 1.4 cm in AP length, 1.7 cm in width   and 1.6 cm in width. This is also was associated with some vasogenic edema. The   patient has had a prior resection of portions of the right frontal lobe for   primary brain tumor. A cystic cavity in the anterior aspect of the right   hemisphere which communicates with the right frontal horn. The left hemisphere   shows patchy areas of diminished density in the subcortical and periventricular   white matter which may represent microvascular leukoencephalopathy/radiation   effects. The brainstem and cerebellum are intact.   Midline shift: Subfalcine shift from right-to-left about 6 mm. On the prior MRI   study there was no significant subfalcine shift.   Ventricles: No obstructive hydrocephalus.   Bones/joints: Right frontal craniotomy without apparent complication.   Sinuses: No significant pathology.  Mastoid air cells: Visualized mastoid air cells are well aerated. No mastoid   effusion.   Soft tissues: Unremarkable.       Impression    IMPRESSION:   Since the prior MRI study of 12/18/2018, the patient has developed 2 new   hemorrhagic lesions located in the right basal ganglia. The largest lesion   measures approximately 3.1 cm in greatest dimension. The patient has had a   right frontal craniotomy for resection of a primary brain tumor. A portion of   the right frontal lobe was removed. New subfalcine shift from right-to-left by   about 6 mm. No obstructive hydrocephalus.    THIS DOCUMENT HAS BEEN ELECTRONICALLY SIGNED BY LISA DONOHUE MD       Medications   ondansetron (ZOFRAN-ODT) ODT tab 4 mg (has no administration in time range)     Or   ondansetron (ZOFRAN) injection 4 mg (has no administration in time range)       Assessments & Plan (with Medical Decision Making)     I have reviewed the nursing notes.    I have reviewed the findings, diagnosis, plan and need for follow up with the patient.    Patient's care was  discussed in detail with Dr. Vincent plus patient's neurosurgeon Dr. Oscar.  She will be admitted observation with eventual plan transferred to hospice.  Admitting orders were placed by myself.  Laboratory results and CT results was discussed with patient's wife.  With patient being placed in hospice patient will not have any aggressive therapy nor is a candidate for any aggressive therapy for his intracranial hemorrhages.  New Prescriptions    No medications on file       Final diagnoses:   Malignant neoplasm of brain, unspecified location (H)   Acute intra-cranial hemorrhage (H)       8/30/2019   [facility] MD  08/31/19 0009

## 2019-08-31 NOTE — PHARMACY-ADMISSION MEDICATION HISTORY
Pharmacy -- Admission Medication Reconciliation    Prior to admission (PTA) medications were reviewed and the patient's PTA medication list was updated.    Sources Consulted: [employer], [employer], , [facility] hospice nurse    The reliability of this Medication Reconciliation is: Reliability: Borderline reliable    The following significant changes were made:  Reentered acetaminophen  Changed last doses  Removed riboflavin    In addition, the patient's allergies were reviewed with the patient's records and left as follows:   Allergies: Dilantin [phenytoin]    The pharmacist has reviewed with the patient that all personal medications should be removed from the building or locked in the belongings safe.  Patient shall only take medications ordered by the physician and administered by the nursing staff.       Medication barriers identified: unsure of adherence   Medication adherence concerns: unsure of adherence   Understanding of emergency medications: n/a    Antionette Beaulieu [facility], 8/31/2019,  4:39 PM

## 2019-08-31 NOTE — H&P
[facility]    History and Physical  Hospitalist       Date of Admission:  8/30/2019    Assessment & Plan   Joni Borja is a 48 year old male who presents with multiple falls.       His biggest issue comes to care and placement.  He is unsafe to go home and there are not resources there to care for him properly.  Resources for placement are not available this long holiday weekend. If he is an outpatient on observation status, he can remain admitted to the hospice however because they have no contract with our hospital it makes for some difficulties with management.  Will await  input from here as well as hospice and the [facility].     Patient does not want us to communicate with his wife with whom he is having arguments.  It is okay to communicate with his hospice nurse Sury GONZALEZ ([phone]) is okay to communicate with his mother Brittany Soto ([phone])    Principal Problem:    Intracranial hemorrhage (H)    Assessment: While this is new and inoperable, this is likely affecting his ambulation and safety    Plan: IV Decadron per neurosurgery recommendation to try and reduce swelling and improve symptoms  Active Problems:    Partial epilepsy with impairment of consciousness, intractable (H)    Assessment: This is been long-standing and difficult to control    Plan: Continue home medications    Unstable gait    Assessment: Due to intracranial process    Plan: PT and OT to evaluate and treat    Fluency disorder associated with underlying disease    Assessment: Chronic and long-standing    Plan: We will need to be patient with communication    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    Assessment: Long-standing and end-stage    Plan: Comfort cares on hospice    Headache    Assessment: Due to neoplasm    Plan: Symptomatic treatment    Medical marijuana use    Assessment: Long-standing    Plan: Continue after discharge    Poor balance    Assessment: See  above    Plan: See above    Falls frequently    Assessment: See above     Plan: See above    Memory loss    Assessment: Chronic but mild    Plan: See above    Cancer associated pain    Assessment: Approved with hydrocodone    Plan: Continue hydrocodone      DVT Prophylaxis: comfort care  Code Status: DNR/DNI    Jonathan HOOKS Carlton    Primary Care Physician   Brian Coon    Chief Complaint   Frequent falls. Unable to be at home.     History is obtained from the patient, ER MD and chart review.    History of Present Illness   Joni Borja is a 48 year old male who presents with frequent falls and inability to be cared for at home.  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  He has had multiple brain surgeries plus chemotherapy and radiation.  He has exhausted all of his options and is no longer a candidate for further treatment.  Over the last 2 months, he has had increasing number of multiple falls.  He has a seizure disorder that he has somewhat intractable and he does continue to have seizures.  He was just admitted to [facility] on Thursday, August 29.  The paperwork went through on Friday.  He was visited by LISA Ga from that hospice.  Patient was having multiple falls.  He has not been getting along with his wife and in fact states that he is going to divorce her.  He started the legal process last year but then they reconciled somewhat but he is unhappy and does not feel that the marriage should continue.  He does not want us to communicate with his wife.  She however has been his primary caregiver.  She has a job and was going to leave to go to work last night and leave him alone at home.  The nurse from hospice did not feel that this was safe for him so he was brought to the emergency room for evaluation.  At the time of this evaluation he was sent for CT which showed 2 new intracranial hemorrhages.  The ER physician spoke with Dr. Oscar his neurosurgeon who recommended IV  Decadron but no further interventions.  Patient was referred for observation for further cares.    Apparently [facility] hospice since they are new to the area do not have a contract with [facility] and [facility] and they are unable to manage the patient here.  There is  has been calling the county and calling around to see about getting the patient some type of placement.  With his frequent falls, it is unlikely that he would be excepted into an assisted living and may need skilled nursing care.  With patient states that his plan was to go and live with his mother [place] [facility] however she is 75 years old and the hospice nurse spoke with her and she does not feel that she is able to care for him and would not be able to get him up off the floor if indeed he continued to fall.  The hospice nurse also relates that the patient has been smoking, using marijuana which he is certified for, but also has been drinking and perhaps not being compliant with his medications.  He is very impulsive and because of this is not really safe to be at home alone.    Patient has frequent headaches and has frequent seizures.  He has some memory loss and impulsiveness and was recently started on hydrocodone by Dr. Espinoza for these headaches and his leg pain.  He uses them minimally by his report but finds they are very helpful.    Past Medical History    I have reviewed this patient's medical history and updated it with pertinent information if needed.   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  Past Surgical History:   Procedure Laterality Date     APPENDECTOMY  1972     BIOPSY  2002     CRANIOTOMY, EXCISE TUMOR COMPLEX, COMBINED  9/18/2013    Procedure: COMBINED CRANIOTOMY, EXCISE TUMOR COMPLEX;  Re-do Right Frontal Craniotomy, Grid Removal,  Resection of Right Frontal  Tumor and Epileptogenic Cortex Resection;  Surgeon: Maverick Linder MD;  Location: [facility] OR     HEAD & NECK SURGERY  2002    brain tumor removal     IMPLANT STIMULATOR VAGUS NERVE Left 7/19/2016    Procedure: IMPLANT STIMULATOR VAGUS NERVE;  Surgeon: Maverick Linder MD;  Location: [facility] OR     OPTICAL TRACKING SYSTEM CRANIOTOMY, EXCISE TUMOR WITH MAPPING, COMBINED  9/10/2013    Procedure: COMBINED OPTICAL TRACKING SYSTEM CRANIOTOMY, EXCISE TUMOR WITH MAPPING;  Stealth Guided Right Redo Frontal Craniotomy for Grid Placement ;  Surgeon: Maverick Linder MD;  Location:  OR     OPTICAL TRACKING SYSTEM CRANIOTOMY, EXCISE TUMOR, COMBINED Right 3/24/2017    Procedure: COMBINED OPTICAL TRACKING SYSTEM CRANIOTOMY, EXCISE TUMOR;  Surgeon: Stuart Oscar MD;  Location:  OR     OPTICAL TRACKING SYSTEM CRANIOTOMY, EXCISE TUMOR, COMBINED Right 7/13/2018    Procedure: COMBINED OPTICAL TRACKING SYSTEM CRANIOTOMY, EXCISE TUMOR;  RIGHT CRANIOTOMY FOR EVACUATION OF BRAIN TUMOR ;  Surgeon: Stuart Oscar MD;  Location:  OR     ORTHOPEDIC SURGERY  1982    Right Hip - Perthe's     REPAIR CONGENITAL HIP Right     Age 8       Prior to Admission Medications   Prior to Admission Medications   Prescriptions Last Dose Informant Patient Reported? Taking?   Acetaminophen (TYLENOL PO) Unknown at Unknown time Self Yes No   Sig: Take 500 mg by mouth 2 times daily as needed for mild pain or fever   HYDROcodone-acetaminophen (NORCO) 5-325 MG tablet 8/30/2019 at 1600  No Yes   Sig: Take 0.5-1 tablets by mouth every  6 hours as needed for breakthrough pain   HYDROcodone-acetaminophen (NORCO) 5-325 MG tablet Unknown at Unknown time  No No   Sig: Take 0.5-1 tablets by mouth every 6 hours as needed for breakthrough pain   RIBOFLAVIN PO Unknown at Unknown time  Yes No   Sig: Take 200 mg by mouth daily   carBAMazepine (CARBATROL) 300 MG 12 hr capsule 8/30/2019 at Unknown time  No Yes   Sig: TAKE 1 CAPSULE BY MOUTH TWICE DAILY (AT  10  AM  AND  10PM)   cetirizine (ZYRTEC ALLERGY) 10 MG tablet 8/30/2019 at Unknown time  No Yes   Sig: Take 1 tablet (10 mg) by mouth daily as needed (for mosquitos.)   clonazePAM (KLONOPIN) 0.5 MG tablet 8/30/2019 at Unknown time  No Yes   Sig: TAKE 1 TABLET BY MOUTH ONCE DAILY IN THE MORNING AND 2 IN THE EVENING   escitalopram (LEXAPRO) 20 MG tablet 8/30/2019 at Unknown time  No Yes   Sig: TAKE 1 TABLET BY MOUTH ONCE DAILY AT  NIGHT   felbamate (FELBATOL) 600 MG tablet 8/30/2019 at Unknown time  No Yes   Sig: Take 900 mg (1.5 tablet) am and 900 mg (1.5 tablet) 2 pm   pantoprazole (PROTONIX) 40 MG EC tablet 8/30/2019 at Unknown time  No Yes   Sig: Take 1 tablet (40 mg) by mouth daily   topiramate (TOPAMAX) 100 MG tablet 8/30/2019 at Unknown time  No Yes   Sig: TAKE TWO TABLETS BY MOUTH ONCE DAILY IN THE MORNING AND THREE ONCE DAILY IN THE EVENING      Facility-Administered Medications: None     Allergies   Allergies   Allergen Reactions     Dilantin [Phenytoin] Hives       Social History   I have reviewed this patient's social history and updated it with pertinent information if needed. Joni Borja  reports that he has been smoking cigarettes.  He started smoking about 33 years ago. He has a 48.00 pack-year smoking history. He has never used smokeless tobacco. He reports that he drinks alcohol. He reports that he has current or past drug history. Drug: Marijuana.    Family History   I have reviewed this patient's family history and updated it with pertinent information if needed.   Family History    Problem Relation Age of Onset     Hyperlipidemia Father         medicine therapy     Coronary Artery Disease Maternal Grandmother         Medicine Therapy/Decesed     Diabetes Paternal Grandfather         Slight/diet control     Coronary Artery Disease Brother 42        mechanical valve     Coronary Artery Disease Maternal Grandfather         undiagnosed/     Cerebrovascular Disease Other      Breast Cancer Other      Colon Cancer No family hx of      Prostate Cancer No family hx of        Review of Systems     REVIEW OF SYSTEMS:    Constitutional: normal energy and appetite, no recent sick contacts  Eyes: Ongoing visual problems due to his brain tumor  Ears, nose, mouth, throat, and face: no mouth sores, dysphagia, or odynophagia  Respiratory: no shortness of breath, cough, or wheezing. No aspiration symptoms.   Cardiovascular: no chest pain, palpitations, orthopnea, increased lower extremity edema, or syncope.   Gastrointestinal: no constipation, diarrhea, nausea, vomiting or abdominal pain.  Genitourinary: no dysuria, hematuria, urgency or frequency.   Hematologic/lymphatic: no unintentional weight loss or night sweats.  Musculoskeletal: no pain to extremities   Neurological: See HPI  Psychiatric: no hallucinations ordelusions.  Endocrine: He is not a known diabetic.       Physical Exam   Temp: 96.9  F (36.1  C) Temp src: Tympanic BP: 124/72 Pulse: 60 Heart Rate: 57 Resp: 16 SpO2: 98 % O2 Device: None (Room air)    Vital Signs with Ranges  Temp:  [96.9  F (36.1  C)-99.6  F (37.6  C)] 96.9  F (36.1  C)  Pulse:  [60-76] 60  Heart Rate:  [57-75] 57  Resp:  [16-29] 16  BP: (124-172)/() 124/72  SpO2:  [98 %-100 %] 98 %  159 lbs 8 oz    Constitutional: Alert and cooperative.  He is in no distress.  Speech is somewhat deliberate and at times dysarthric.  He is able to track and carry on a conversation albeit slowly  Eyes: Extraocular movements are intact but he has a slightly disconjugate gaze.  HEENT:  Previous craniotomy scar is noted.  Mucous membranes are moist  Respiratory: Lungs are clear, no rales rhonchi or wheezes are heard  Cardiovascular: RRR without murmur  GI: Abdomen soft nontender  Lymph/Hematologic: No palpable lymphadenopathy  Skin: No rashes or open areas  Musculoskeletal: No swelling or synovitis of any joints  Neurologic: Gait was not tested.  Speech is somewhat halting and dysarthric.  Formal mental status testing was not performed  Psychiatric: Affect is somewhat restricted but appropriate.  No homicidal or suicidal ideations    Data   Data reviewed today:  I personally reviewed the head CT image(s) showing New intracranial hemorrhage.  Recent Labs   Lab 08/30/19  2117   WBC 7.6   HGB 12.5*   MCV 95         POTASSIUM 4.1   CHLORIDE 110*   CO2 24   BUN 14   CR 0.96   ANIONGAP 4   SHABBIR 8.1*   GLC 83   ALBUMIN 3.6   PROTTOTAL 6.7   BILITOTAL 0.2*   ALKPHOS 108*   ALT 10   AST 15       Recent Results (from the past 24 hour(s))   CT Head w/o Contrast    Addendum: 8/30/2019      Addendum created by Foreign Gutierrez MD on 8/30/2019 10:34:15 PM CDT     Findings were discussed with JOSE GUTIERREZ at 8/30/2019 10:33 PM CDT.        Narrative    Initial report created on 8/30/2019 10:29:49 PM CDT     EXAM:   CT Head Without Contrast     EXAM DATE/TIME:   8/30/2019 9:37 PM     CLINICAL HISTORY:   48 years old, male; Other: Headache; Prior surgery; Surgery type: Brain tumor   removal , nerve stimulator; Patient HX: PT arrives to ED with family about C/O   falling. PT reports falling 50 times. PT has brain cancer and is on hospice but   hospice nurse and wife reports he is unsafe due to falling so much. ;   Additional info: Headache, chronic, neuro deficit     TECHNIQUE:   Imaging protocol: Computed tomography of the head without contrast.   Radiation optimization: All CT scans at this facility use at least one of these   dose optimization techniques: automated exposure control; mA and/or kV    adjustment per patient size (includes targeted exams where dose is matched to   clinical indication); or iterative reconstruction.     COMPARISON:   CT Head^Head Routine (Adult) 7/27/2018 1:17 PM     FINDINGS:   Brain: Since the prior MRI study of 12/18/2018, the patient has developed 2   areas of parenchymal hemorrhage in the right basal ganglia. The largest   parenchymal hemorrhage measures 2.2 cm in AP length, 2.6 cm in width and 3.1 cm   in height. A fluid blood level is seen. This is associated with vasogenic   edema. The second hemorrhagic lesion is located just anterior to the larger   hemorrhagic lesion. This lesion measures 1.4 cm in AP length, 1.7 cm in width   and 1.6 cm in width. This is also was associated with some vasogenic edema. The   patient has had a prior resection of portions of the right frontal lobe for   primary brain tumor. A cystic cavity in the anterior aspect of the right   hemisphere which communicates with the right frontal horn. The left hemisphere   shows patchy areas of diminished density in the subcortical and periventricular   white matter which may represent microvascular leukoencephalopathy/radiation   effects. The brainstem and cerebellum are intact.   Midline shift: Subfalcine shift from right-to-left about 6 mm. On the prior MRI   study there was no significant subfalcine shift.   Ventricles: No obstructive hydrocephalus.   Bones/joints: Right frontal craniotomy without apparent complication.   Sinuses: No significant pathology.  Mastoid air cells: Visualized mastoid air cells are well aerated. No mastoid   effusion.   Soft tissues: Unremarkable.       Impression    IMPRESSION:   Since the prior MRI study of 12/18/2018, the patient has developed 2 new   hemorrhagic lesions located in the right basal ganglia. The largest lesion   measures approximately 3.1 cm in greatest dimension. The patient has had a   right frontal craniotomy for resection of a primary brain tumor. A  portion of   the right frontal lobe was removed. New subfalcine shift from right-to-left by   about 6 mm. No obstructive hydrocephalus.    THIS DOCUMENT HAS BEEN ELECTRONICALLY SIGNED BY LISA DONOHUE MD

## 2019-09-01 NOTE — PROGRESS NOTES
[facility]    Hospitalist Progress Note      Assessment & Plan   Joni Borja is a 48 year old male who was admitted on 8/30/2019 with multiple falls.       His biggest issue comes to care and placement.  He is unsafe to go home and there are not resources there to care for him properly.  Resources for placement are not available this long holiday weekend. If he is an outpatient on observation status, he can remain admitted to the hospice however because they have no contract with our hospital it makes for some difficulties with management.  Will await  input from here as well as hospice and the [facility].     Patient does not want us to communicate with his wife with whom he is having arguments.  It is okay to communicate with his hospice nurse Sury GONZALEZ ([phone]) is okay to communicate with his mother Brittany Soto ([phone])    Principal Problem:    Intracranial hemorrhage (H)    Assessment: While this is new and inoperable, this is likely affecting his ambulation and safety    Plan: IV Decadron per neurosurgery recommendation to try and reduce swelling and improve symptoms initially, now changed to oral.    Active Problems:    Partial epilepsy with impairment of consciousness, intractable (H)    Assessment: This is been long-standing and difficult to control. He does have a vagus nerve stimulator implanted in his chest and has a magnet to turn it on and off.     Plan: Continue home medications.       Unstable gait    Assessment: Due to intracranial process. We asked PT and OT to evaluate and treat, but they didn't know that they would have anything to offer him and were uncertain if they were able to because he is on hospice    Plan: will have someone assist him with any ambulation.       Fluency disorder associated with underlying disease    Assessment: Chronic and long-standing    Plan: We will need to be patient with communication      Oligodendroglioma (H) - right  temporal -- incurable brain cancer    Assessment: Long-standing and end-stage    Plan: Comfort cares on hospice      Headache    Assessment: Due to neoplasm    Plan: Symptomatic treatment      Medical marijuana use    Assessment: Long-standing    Plan: Continue after discharge      Poor balance    Assessment: See above    Plan: See above      Falls frequently    Assessment: See above     Plan: See above      Memory loss    Assessment: Chronic but mild    Plan: See above      Cancer associated pain    Assessment: Approved with hydrocodone    Plan: Continue hydrocodone      DVT Prophylaxis: comfort care  Code Status: DNR/DNI    Jonathan Vincent    Interval History   No complaints.  Got some sleep.  Denies pain he is looking forward to supper    -Data reviewed today: I reviewed all new labs and imaging results over the last 24 hours. I personally reviewed no images or EKG's today.    Physical Exam   Temp: 98.3  F (36.8  C) Temp src: Tympanic BP: 138/89 Pulse: 90 Heart Rate: 77 Resp: 16 SpO2: 96 % O2 Device: None (Room air)    Vitals:    08/30/19 2050 08/31/19 0100   Weight: 72.6 kg (160 lb) 72.3 kg (159 lb 8 oz)     Vital Signs with Ranges  Temp:  [97.7  F (36.5  C)-98.3  F (36.8  C)] 98.3  F (36.8  C)  Pulse:  [84-90] 90  Heart Rate:  [77-90] 77  Resp:  [16] 16  BP: (118-138)/(70-89) 138/89  SpO2:  [96 %-99 %] 96 %  I/O last 3 completed shifts:  In: 500 [P.O.:480; I.V.:20]  Out: 470 [Urine:470]    Constitutional: Alert and cooperative, no distress.  Respiratory: Lungs are clear  Cardiovascular: RRR without murmur  GI: Soft nontender abdomen  Skin/Integumen: No open areas.  Other: No change in neurologic testing    Medications       carBAMazepine  300 mg Oral BID     clonazePAM  0.5 mg Oral Daily     clonazePAM  1 mg Oral At Bedtime     dexamethasone  4 mg Oral BID    Followed by     [START ON 9/3/2019] dexamethasone  2 mg Oral BID    Followed by     [START ON 9/5/2019] dexamethasone  1 mg Oral BID    Followed by      [START ON 9/7/2019] dexamethasone  1 mg Oral Daily     escitalopram  20 mg Oral Daily     felbamate  1,200 mg Oral Q12H ELENA     pantoprazole  40 mg Oral Daily     sodium chloride (PF)  10 mL Intravenous Q12H     topiramate  200 mg Oral Daily     topiramate  300 mg Oral QPM       Data   Recent Labs   Lab 08/30/19  2117   WBC 7.6   HGB 12.5*   MCV 95         POTASSIUM 4.1   CHLORIDE 110*   CO2 24   BUN 14   CR 0.96   ANIONGAP 4   SHABBIR 8.1*   GLC 83   ALBUMIN 3.6   PROTTOTAL 6.7   BILITOTAL 0.2*   ALKPHOS 108*   ALT 10   AST 15       No results found for this or any previous visit (from the past 24 hour(s)).
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
## 2019-09-02 NOTE — PLAN OF CARE
Pt is on observation for placement r/t being unsafe at home with frequent falls. Implanted device to L chest with magnet necklace for seizure activity. Side rails padded, no noted seizures. Fall precautions in place. Incontinent of urine. Pt is A&Ox4 with intermittent forgetfulness and inappropriate humor at times, redirectable. Uses call light appropriately. Vitals stable.

## 2019-09-02 NOTE — PROGRESS NOTES
"Afternoon assessment complete.  No change from morning assessment. Had a BM this afternoon.  Has be continent of urine for entire shift.  He has been using his urinal appropriately.  Patient has expressed desire to leave tonight.  States that he can go live in his camper.  Spoke with patient about concerns this writer has with him being by himself.  Patient stated that \"you may be right\".  At this time, patient is willing to stay.   Taryn White RN on 9/2/2019   "

## 2019-09-02 NOTE — PROGRESS NOTES
Patient remains vitally stable.   He is A&O x4.  Transferred from his bed to the chair by assist of 2.  No concerns at this time.  Will continue to monitor.   Taryn White RN on 9/2/2019

## 2019-09-02 NOTE — PROGRESS NOTES
:    Ongoing coordination with patient, his parents Niko and Brittany, and hospice nurse Sury. While meeting with patient this morning to discuss discharge planning he stated that he wanted to live with his parents and that he did not want to return home.  He was not interested in applying for MA to see if [facility] would accept him with a pending MA application.      Spoke with patient's mom and she stated they are unable to care for patient.  They are supportive but unable to provide day to day cares/needs.  Patient's parents will assist with helping work on MA application and will fax to the hospital.    Spoke with patient's hospice nurse Sury from [facility].  Provided update.  They just admitted last Thursday.  They do not feel patient is safe at home and would discharge from hospice if patient returns home without a safe plan.  C:[phone]  Office:  [phone]  F:  [phone]    Met with patient again to discuss planning. He is aware his parents cannot care for him.  He is now stating that he wants to get his 30ft camper and live in his camper.  When asked questions about how he will take care of himself, grocery shop, etc.  He states that friends will help him.  He doesn't think his wife will allow him to take the camper.  He called his wife on speaker phone and she was provided with a discharge update in his room.  Patient is now agreeable to  calling his wife to discuss further discharge planning.  He stated that she can visit and call.    Spoke with patient's wife via phone to further discuss discharge planning.  She stated that patient is declining.  She does not feel that patient is able to make his own decisions.  Discussed her speaking with patient's primary doctor regarding guardianship statement.  She stated that she works outside the home but they have a friend residing in the home also.  Someone is home 24 hr a day four of seven days.  They have not  been on any public health programs or MA due to assets and were denied in the past.  Discussed applying again to see if patient can be eligible if he is in a facility.  She does not think patient will stay in a facility.  She thinks that he will become angry and leave.  Wife works nights at [employer] and usually wakes in the afternoon.    ROSI Grant on 9/2/2019 at 3:44 PM

## 2019-09-02 NOTE — PLAN OF CARE
Pt requested to walk at HS. Attempted to ambulate with walker, wheelchair and three staff but leaning and too weak after appx. 50 feet. Wheeled back to room. High fall risk. Pt continues to make sexually inappropriate comments toward staff, needs redirection. Speech mumbling and nonsensical at times. No seizure activity. Vitals stable.

## 2019-09-02 NOTE — PROGRESS NOTES
[facility]    Medicine Progress Note - Hospitalist Service       Date of Admission:  8/30/2019  Assessment & Plan      Joni Borja is a 48 year old male who was admitted on 8/30/2019 with multiple falls.       His biggest issue comes to care and placement.  He is unsafe to go home and there are not resources there to care for him properly. If he is an outpatient on observation status, he can remain admitted to the hospice however because they have no contract with our hospital it makes for some difficulties with management.  SW to see patient for placement options.     Patient does not want us to communicate with his wife with whom he is having arguments.  It is okay to communicate with his hospice nurse Sury GONZALEZ ([phone]) is okay to communicate with his mother Brittany Soto ([phone])    Principal Problem:    Intracranial hemorrhage (H)    Assessment: While this is new and inoperable, this is likely affecting his ambulation and safety    Plan: On decadron per neurosurgery.    Active Problems:    Partial epilepsy with impairment of consciousness, intractable (H)    Assessment: This is been long-standing and difficult to control. He does have a vagus nerve stimulator implanted in his chest and has a magnet to turn it on and off.     Plan: Continue home medications.       Unstable gait    Assessment: Due to intracranial process. We asked PT and OT to evaluate and treat, but they didn't know that they would have anything to offer him and were uncertain if they were able to because he is on hospice    Plan:  will have someone assist him with any ambulation.       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    Assessment: Long-standing and end-stage    Plan: Comfort cares on hospice      Headache    Assessment: Due  to neoplasm    Plan: Symptomatic treatment      Medical marijuana use    Assessment: Long-standing    Plan: Continue after discharge      Poor balance    Assessment: See above    Plan: See above      Falls frequently    Assessment: See above     Plan: See above      Memory loss    Assessment: Chronic but mild    Plan: See above      Cancer associated pain    Assessment: Approved with hydrocodone    Plan: Continue hydrocodone      DVT Prophylaxis: comfort care  Code Status: DNR/DNI        Disposition Plan   Expected discharge: Tomorrow, recommended to safe living  once safe disposition plan/ TCU bed available.  Entered: Leandro De La Vega MD 09/02/2019, 1:02 PM       The patient's care was discussed with the Patient.    Leandro De La Vega MD  Hospitalist Service  [facility]    ______________________________________________________________________    Interval History   Complains of double vision. No headache. No nausea, vomiting. No dyspnea.    Data reviewed today: I reviewed all medications, new labs and imaging results over the last 24 hours. I personally reviewed no images or EKG's today.    Physical Exam   Vital Signs: Temp: 97.8  F (36.6  C) Temp src: Tympanic BP: (!) 160/91 Pulse: 65 Heart Rate: 62 Resp: 18 SpO2: 99 % O2 Device: None (Room air)    Weight: 153 lbs 12.8 oz  GENERAL: Comfortable, no apparent distress.  CARDIOVASCULAR: regular rate and rhythm, no murmur. No lower extremity edema   RESPIRATORY: Clear to auscultation bilaterally, no wheezes or crackles.  GI: non-tender, non-distended, normal bowel sounds.   SKIN: warm periphery, no rashes      Data   Recent Labs   Lab 08/30/19  2117   WBC 7.6   HGB 12.5*   MCV 95         POTASSIUM 4.1   CHLORIDE 110*   CO2 24   BUN 14   CR 0.96   ANIONGAP 4   SHABBIR 8.1*   GLC 83   ALBUMIN 3.6   PROTTOTAL 6.7   BILITOTAL 0.2*   ALKPHOS 108*   ALT 10   AST 15

## 2019-09-02 NOTE — PROGRESS NOTES
Mom, Brittany called.  She states that they found an assisted living [place] that [place] like Joni to go.  It is called the [facility] in [facility].  Taryn White RN on 9/2/2019 at 5:57 PM

## 2019-09-03 NOTE — PLAN OF CARE
Vitals stable. No seizure activity. Requested norco for eye pain. Wife called requesting patients phone be taken away, states he is calling and making threats toward her. Pt states he only asked why she didn't visit. Phone placed away from patient.

## 2019-09-03 NOTE — PROGRESS NOTES
Patient uses VNS (vagal nerve stimulator) whenever he is about to drink fluids.  Fluids tickle his throat and makes him gag/cough.  He will run the magnet that is hung from his neck to turn on the stimulator and drink his fluids, then turn is back off with the magnet. His voice is also effected whether he has it turned on or off.He has been appropriate with staff today.  I gave nurse to nurse with [facility]. Update given to his mother.  Seizure and fall precautions.

## 2019-09-03 NOTE — PROGRESS NOTES
[facility]    Hospitalist Progress Note      Assessment & Plan   Joni Borja is a 48 year old male who was admitted on 8/30/2019.       Intracranial hemorrhage (H)    Assessment: While this is new and inoperable, this is likely affecting his ambulation and safety    Plan: On decadron per neurosurgery.     Active Problems:    Partial epilepsy with impairment of consciousness, intractable (H)    Assessment: This is been long-standing and difficult to control. He does have a vagus nerve stimulator implanted in his chest and has a magnet to turn it on and off.     Plan: Continue home medications.        Unstable gait    Assessment: SW working on home safety plan. patient states he is willing to go to nursing home if needed.    Plan:  will have someone assist him with any ambulation.        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    Assessment: Long-standing and end-stage    Plan: Comfort cares on hospice       Headache    Assessment: Due to neoplasm    Plan: Symptomatic treatment       Medical marijuana use    Assessment: Long-standing    Plan: Continue after discharge       Poor balance    Assessment: See above    Plan: See above       Falls frequently    Assessment: See above     Plan: See above       Memory loss    Assessment: Chronic but mild    Plan: See above       Cancer associated pain    Assessment: Approved with hydrocodone    Plan: Continue hydrocodone    DVT Prophylaxis: Comfort care  Code Status: DNR/DNI    Leandro De La Vega    Interval History   No double vision today. No pain. Wants to leave.     -Data reviewed today: I reviewed all new labs and imaging results over the last 24 hours. I personally reviewed no images or EKG's today.    Physical Exam   Temp: 97.8  F (36.6  C) Temp src: Tympanic BP: 114/76  Pulse: 60 Heart Rate: 65 Resp: 12 SpO2: 98 % O2 Device: None (Room air)    Vitals:    08/31/19 0100 09/02/19 0619 09/03/19 0555   Weight: 72.3 kg (159 lb 8 oz) 69.8 kg (153 lb 12.8 oz) 73.7 kg (162 lb 6.4 oz)     Vital Signs with Ranges  Temp:  [97.8  F (36.6  C)-99.1  F (37.3  C)] 97.8  F (36.6  C)  Pulse:  [60-66] 60  Heart Rate:  [65] 65  Resp:  [12-16] 12  BP: (114-150)/(73-76) 114/76  SpO2:  [98 %-99 %] 98 %  I/O last 3 completed shifts:  In: 865 [P.O.:860; I.V.:5]  Out: 525 [Urine:525]    Constitutional: In no apparent distress       Medications       carBAMazepine  300 mg Oral BID     clonazePAM  0.5 mg Oral Daily     clonazePAM  1 mg Oral At Bedtime     dexamethasone  2 mg Oral BID    Followed by     [START ON 9/5/2019] dexamethasone  1 mg Oral BID    Followed by     [START ON 9/7/2019] dexamethasone  1 mg Oral Daily     escitalopram  20 mg Oral Daily     felbamate  1,200 mg Oral Q12H ELENA     pantoprazole  40 mg Oral Daily     sodium chloride (PF)  10 mL Intravenous Q12H     topiramate  200 mg Oral Daily     topiramate  300 mg Oral QPM       Data   Recent Labs   Lab 08/30/19  2117   WBC 7.6   HGB 12.5*   MCV 95         POTASSIUM 4.1   CHLORIDE 110*   CO2 24   BUN 14   CR 0.96   ANIONGAP 4   SHABBIR 8.1*   GLC 83   ALBUMIN 3.6   PROTTOTAL 6.7   BILITOTAL 0.2*   ALKPHOS 108*   ALT 10   AST 15

## 2019-09-03 NOTE — PLAN OF CARE
Pt up and moving in hallway and room independently, discharge disposition unknown at this point, no further OT/PT needs at this time, will remain involved to assist with discharge planning as needed.     Kesha Scott, OT on 9/3/2019 at 2:15 PM

## 2019-09-03 NOTE — PROGRESS NOTES
If a seizure is witnessed, run the magnet over his vagal nerve stimulator to help stop the seizure.as per the patient.

## 2019-09-03 NOTE — PROGRESS NOTES
:    I received a call from patients mother Brittany stating she had completed MA application on patient and knew of some SNF facilities closer to her.  I mentioned to her that they would be considered private pay and would be medical assistance pending.  She stated patient did not have the money to pay for them up front. She was unsure at this time if she could care for her son.  I also received contact information from [facility]  located out of [place]. I met with patient in room to discuss discharge planning needs. Patient stated he feels he cannot go home at this time.  I mentioned to him about maybe the possibility of his parents caring for him and he stated they were elderly and he wasn't sure if they could.  I offered [facility] as sometimes they will accept medical assistance pending and he was in agreement.  I faxed over his MA application that was already completed by his parents to [facility] and to the [facility] along with his referral.  I spoke with Emily at [facility] and she was receptive and will start screening patient.  I gave him support and encouragement.    ROSI Torres on 9/3/2019 at 10:32 AM    :    I received a call from Amy at [facility] and she stated she needs more information regarding his MA application.  I met with patient in room and completed what he knew regarding application.  He also stated he would his parents to make all medical decisions when he is unable to.  Will meet with him tomorrow to complete a health care directive.  I called Emily at the [facility] and they are still screening patient.  I re faxed over medical assistance paperwork to [facility].    ROSI Torres on 9/3/2019 at 3:13 PM

## 2019-09-04 NOTE — PROGRESS NOTES
:    Emily from [facility] called and accepted patient.  They will be here at 1400 to transport.  Patient wanted to complete his health care directive.  I called Lisa to have it notarized.  I called patients wife and mother per his request.  I also called [facility] hospice and gave them discharge update.  Patient did not have any other questions or concerns at this time.    ROSI Torres on 9/4/2019 at 10:45 AM

## 2019-09-04 NOTE — PHARMACY - DISCHARGE MEDICATION RECONCILIATION AND EDUCATION
Pharmacy:  Discharge Medication Reconciliation    Joni Borja  [address] [address] [address]  [phone] (home) [phone] (work)  48 year old male  PCP: Brian Coon    Allergies: Dilantin [phenytoin]    Patient is discharging back to home hospice care. Medication orders have been faxed to hospice.    Discharge Medication Reconciliation:    Simran Collazo RPH has reviewed the patient's discharge medication orders and has compared them to the inpatient medication administration record and to what the patient was taking prior to admission - any discrepancies have been resolved.    It has been determined that the patient has an adequate supply of medications available or which can be obtained from the patient's preferred pharmacy.    Thank you for the consult.    Simran Collazo RPH........September 4, 2019 11:13 AM

## 2019-09-04 NOTE — DISCHARGE SUMMARY
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 Per the H&P by Dr. Jonathan Vincent:  \"Joni Borja is a 48 year old male who presents with frequent falls and inability to be cared for at home.  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  He has had multiple brain surgeries plus chemotherapy and radiation.  He has exhausted all of his options and is no longer a candidate for further treatment.  Over the last 2 months, he has had increasing number of multiple falls.  He has a seizure disorder that he has somewhat intractable and he does continue to have seizures.  He was just admitted to [facility] on Thursday, August 29.  The paperwork went through on Friday.  He was visited by LISA Ga from that hospice.  Patient was having multiple falls.  He has not been getting along with his wife and in fact states that he is going to divorce her.  He started the legal process last year but then they reconciled somewhat but he is unhappy and does not feel that the marriage should continue.  He does not want us to communicate " "with his wife.  She however has been his primary caregiver.  She has a job and was going to leave to go to work last night and leave him alone at home.  The nurse from hospice did not feel that this was safe for him so he was brought to the emergency room for evaluation.  At the time of this evaluation he was sent for CT which showed 2 new intracranial hemorrhages.  The ER physician spoke with Dr. Oscar his neurosurgeon who recommended IV Decadron but no further interventions.  Patient was referred for observation for further cares.     Apparently [facility] hospice since they are new to the area do not have a contract with [facility] and they are unable to manage the patient here.  There is  has been calling the county and calling around to see about getting the patient some type of placement.  With his frequent falls, it is unlikely that he would be excepted into an assisted living and may need skilled nursing care.  With patient states that his plan was to go and live with his mother in the [place] of the [facility] however she is 75 years old and the hospice nurse spoke with her and she does not feel that she is able to care for him and would not be able to get him up off the floor if indeed he continued to fall.  The hospice nurse also relates that the patient has been smoking, using marijuana which he is certified for, but also has been drinking and perhaps not being compliant with his medications.  He is very impulsive and because of this is not really safe to be at home alone.     Patient has frequent headaches and has frequent seizures.  He has some memory loss and impulsiveness and was recently started on hydrocodone by Dr. Espinoza for these headaches and his leg pain.  He uses them minimally by his report but finds they are very helpful.\"    Hospital Course   Joni Borja was admitted on 8/30/2019.  The following problems were addressed during his " hospitalization:    Intracranial hemorrhage  The patient is on hospice and was monitored.  His condition remained stable through his hospitalization.  Was placed on Decadron and will taper off this over the next few days.  He will continue with hospice care at [facility].    Laendro De La Vega MD    Code Status   Comfort Care       Primary Care Physician   Brian Coon    Physical Exam   Temp: 98  F (36.7  C) Temp src: Tympanic BP: (!) 146/83   Heart Rate: 78 Resp: 16 SpO2: 96 % O2 Device: None (Room air)    Vitals:    09/02/19 0619 09/03/19 0555 09/04/19 0417   Weight: 69.8 kg (153 lb 12.8 oz) 73.7 kg (162 lb 6.4 oz) 72.5 kg (159 lb 12.8 oz)     Vital Signs with Ranges  Temp:  [97.7  F (36.5  C)-98.6  F (37  C)] 98  F (36.7  C)  Heart Rate:  [66-78] 78  Resp:  [16-18] 16  BP: (122-147)/(83-95) 146/83  SpO2:  [96 %-100 %] 96 %  I/O last 3 completed shifts:  In: 450 [P.O.:450]  Out: 525 [Urine:525]    GENERAL: Comfortable, no apparent distress.      Discharge Disposition   Admited to hospice care.   Agency: [facility].  Discharged to nursing home  Condition at discharge: Terminal    Consultations This Hospital Stay   SOCIAL WORK IP CONSULT  PHYSICAL THERAPY ADULT IP CONSULT  OCCUPATIONAL THERAPY ADULT IP CONSULT  PHYSICAL THERAPY ADULT IP CONSULT  OCCUPATIONAL THERAPY ADULT IP CONSULT    Time Spent on this Encounter   I, Leandro De La Vega MD, personally saw the patient today and spent less than or equal to 30 minutes discharging this patient.    Discharge Orders      Reason for your hospital stay    Intracerebral bleeding, brain tumor     Activity - Up ad richard     Follow Up and recommended labs and tests    Follow up with hospice     DNR/DNI     Advance Diet as Tolerated    Follow this diet upon discharge: Orders Placed This Encounter      Regular Diet Adult       Discharge Medications   Current Discharge Medication List      START taking these medications    Details   !! dexamethasone (DECADRON) 1 MG tablet Take 1  tablet (1 mg) by mouth 2 times daily  Qty: 4 tablet    Associated Diagnoses: Intracranial hemorrhage (H)      !! dexamethasone (DECADRON) 1 MG tablet Take 1 tablet (1 mg) by mouth daily  Qty: 3 tablet    Associated Diagnoses: Intracranial hemorrhage (H)       !! - Potential duplicate medications found. Please discuss with provider.      CONTINUE these medications which have CHANGED    Details   clonazePAM (KLONOPIN) 0.5 MG tablet TAKE 1 TABLET BY MOUTH ONCE DAILY IN THE MORNING AND 2 IN THE EVENING  Qty: 90 tablet, Refills: 5    Associated Diagnoses: Partial epilepsy with impairment of consciousness, intractable (H)      HYDROcodone-acetaminophen (NORCO) 5-325 MG tablet Take 0.5-1 tablets by mouth every 6 hours as needed for breakthrough pain  Qty: 28 tablet, Refills: 0    Associated Diagnoses: Oligodendroglioma (H); Cancer associated pain         CONTINUE these medications which have NOT CHANGED    Details   carBAMazepine (CARBATROL) 300 MG 12 hr capsule TAKE 1 CAPSULE BY MOUTH TWICE DAILY (AT  10  AM  AND  10PM)  Qty: 180 capsule, Refills: 3    Associated Diagnoses: Partial epilepsy with impairment of consciousness, intractable (H)      escitalopram (LEXAPRO) 20 MG tablet TAKE 1 TABLET BY MOUTH ONCE DAILY AT  NIGHT  Qty: 90 tablet, Refills: 3    Associated Diagnoses: History of depression; Anxiety state      felbamate (FELBATOL) 600 MG tablet Take 900 mg (1.5 tablet) am and 900 mg (1.5 tablet) 2 pm  Qty: 270 tablet, Refills: 3    Associated Diagnoses: Partial epilepsy with impairment of consciousness, intractable (H)      pantoprazole (PROTONIX) 40 MG EC tablet Take 1 tablet (40 mg) by mouth daily  Qty: 90 tablet, Refills: 1    Associated Diagnoses: Gastroesophageal reflux disease without esophagitis      topiramate (TOPAMAX) 100 MG tablet TAKE TWO TABLETS BY MOUTH ONCE DAILY IN THE MORNING AND THREE ONCE DAILY IN THE EVENING  Qty: 450 tablet, Refills: 3    Associated Diagnoses: Partial epilepsy with impairment of  consciousness, intractable (H)      acetaminophen (TYLENOL) 500 MG tablet Take 500-1,000 mg by mouth 2 times daily as needed for mild pain or fever      cetirizine (ZYRTEC ALLERGY) 10 MG tablet Take 1 tablet (10 mg) by mouth daily as needed (for mosquitos.)  Qty: 90 tablet, Refills: 3    Associated Diagnoses: Reaction to insect bite           Allergies   Allergies   Allergen Reactions     Dilantin [Phenytoin] Hives     Data   Most Recent 3 CBC's:  Recent Labs   Lab Test 08/30/19  2117 10/17/18  1510 07/27/18  1310   WBC 7.6 5.8 7.7   HGB 12.5* 13.3 12.0*   MCV 95 95 96    295 304      Most Recent 3 BMP's:  Recent Labs   Lab Test 08/30/19  2117 10/17/18  1510 07/27/18  1310    139 142   POTASSIUM 4.1 3.5 3.7   CHLORIDE 110* 110* 109   CO2 24 24 27   BUN 14 10 22   CR 0.96 0.92 0.92   ANIONGAP 4 5 6   SHABBIR 8.1* 8.8 7.9*   GLC 83 104 79     Most Recent 2 LFT's:  Recent Labs   Lab Test 08/30/19  2117 10/17/18  1510   AST 15 10*   ALT 10 8   ALKPHOS 108* 131*   BILITOTAL 0.2* 0.4     Most Recent INR's and Anticoagulation Dosing History:  Anticoagulation Dose History     Recent Dosing and Labs Latest Ref Rng & Units 9/17/2013 9/18/2013 9/19/2013 9/21/2013 9/22/2013 9/23/2013 9/24/2013    INR 0.86 - 1.14 1.09 1.12 1.19(H) 1.57(H) 1.17(H) 1.23(H) 1.09        Most Recent 3 Troponin's:No lab results found.  Most Recent Cholesterol Panel:  Recent Labs   Lab Test 07/14/16  1045   LDL 93   HDL 23   TRIG 289*     Most Recent 6 Bacteria Isolates From Any Culture (See EPIC Reports for Culture Details):  Recent Labs   Lab Test 09/19/13 2000 09/18/13  1625   CULT No growth  Cultured on the 2nd day of incubation: Coagulase negative Staphylococcus Critical Value, preliminary result only, called to and read back by Beatriz Desai RN 4D @ 4839 on 9.21.13 kjr No MRSA isolated     Most Recent TSH, T4 and A1c Labs:No lab results found.  Results for orders placed or performed during the hospital encounter of 08/30/19   CT  Head w/o Contrast    Addendum: 8/30/2019      Addendum created by Foreign Gutierrez MD on 8/30/2019 10:34:15 PM CDT     Findings were discussed with JOSE GUTIERREZ at 8/30/2019 10:33 PM CDT.        Narrative    Initial report created on 8/30/2019 10:29:49 PM CDT     EXAM:   CT Head Without Contrast     EXAM DATE/TIME:   8/30/2019 9:37 PM     CLINICAL HISTORY:   48 years old, male; Other: Headache; Prior surgery; Surgery type: Brain tumor   removal , nerve stimulator; Patient HX: PT arrives to ED with family about C/O   falling. PT reports falling 50 times. PT has brain cancer and is on hospice but   hospice nurse and wife reports he is unsafe due to falling so much. ;   Additional info: Headache, chronic, neuro deficit     TECHNIQUE:   Imaging protocol: Computed tomography of the head without contrast.   Radiation optimization: All CT scans at this facility use at least one of these   dose optimization techniques: automated exposure control; mA and/or kV   adjustment per patient size (includes targeted exams where dose is matched to   clinical indication); or iterative reconstruction.     COMPARISON:   CT Head^Head Routine (Adult) 7/27/2018 1:17 PM     FINDINGS:   Brain: Since the prior MRI study of 12/18/2018, the patient has developed 2   areas of parenchymal hemorrhage in the right basal ganglia. The largest   parenchymal hemorrhage measures 2.2 cm in AP length, 2.6 cm in width and 3.1 cm   in height. A fluid blood level is seen. This is associated with vasogenic   edema. The second hemorrhagic lesion is located just anterior to the larger   hemorrhagic lesion. This lesion measures 1.4 cm in AP length, 1.7 cm in width   and 1.6 cm in width. This is also was associated with some vasogenic edema. The   patient has had a prior resection of portions of the right frontal lobe for   primary brain tumor. A cystic cavity in the anterior aspect of the right   hemisphere which communicates with the right frontal horn. The  left hemisphere   shows patchy areas of diminished density in the subcortical and periventricular   white matter which may represent microvascular leukoencephalopathy/radiation   effects. The brainstem and cerebellum are intact.   Midline shift: Subfalcine shift from right-to-left about 6 mm. On the prior MRI   study there was no significant subfalcine shift.   Ventricles: No obstructive hydrocephalus.   Bones/joints: Right frontal craniotomy without apparent complication.   Sinuses: No significant pathology.  Mastoid air cells: Visualized mastoid air cells are well aerated. No mastoid   effusion.   Soft tissues: Unremarkable.       Impression    IMPRESSION:   Since the prior MRI study of 12/18/2018, the patient has developed 2 new   hemorrhagic lesions located in the right basal ganglia. The largest lesion   measures approximately 3.1 cm in greatest dimension. The patient has had a   right frontal craniotomy for resection of a primary brain tumor. A portion of   the right frontal lobe was removed. New subfalcine shift from right-to-left by   about 6 mm. No obstructive hydrocephalus.    THIS DOCUMENT HAS BEEN ELECTRONICALLY SIGNED BY LISA DONOHUE MD

## 2019-09-04 NOTE — PROGRESS NOTES
VSS. Denies pain. LSC. Bowel tones active. Independently repositions self in bed. Able to use urinal at bedside. Showered earlier this evening. Playing on cell phone majority of time. Safety checks hourly per staff. Will continue to monitor and intervene as needed. Lisa Alfred RN on 9/4/2019 at 12:01 AM

## 2019-09-04 NOTE — PHARMACY
[facility]  Part of [facility]  [address] [place] [address]    September 4, 2019    Dear Hospice Nurse,    Your customer, Joni Borja, born on 1970, was recently discharged from [facility].  We have updated his medication list and want to alert you to the following:       Review of your medicines      START taking      Dose / Directions   * dexamethasone 1 MG tablet  Commonly known as:  DECADRON      Dose:  1 mg  Start taking on:  9/5/2019  Take 1 tablet (1 mg) by mouth 2 times daily  Quantity:  4 tablet  Refills:  0     * dexamethasone 1 MG tablet  Commonly known as:  DECADRON      Dose:  1 mg  Start taking on:  9/7/2019  Take 1 tablet (1 mg) by mouth daily  Quantity:  3 tablet  Refills:  0         * This list has 2 medication(s) that are the same as other medications prescribed for you. Read the directions carefully, and ask your doctor or other care provider to review them with you.            CONTINUE these medicines which may have CHANGED, or have new prescriptions. If we are uncertain of the size of tablets/capsules you have at home, strength may be listed as something that might have changed.      Dose / Directions   HYDROcodone-acetaminophen 5-325 MG tablet  Commonly known as:  NORCO  This may have changed:  Another medication with the same name was removed. Continue taking this medication, and follow the directions you see here.  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, Cancer associated pain      Dose:  0.5-1 tablet  Take 0.5-1 tablets by mouth every 6 hours as needed for breakthrough pain  Quantity:  28 tablet  Refills:  0        CONTINUE these medicines which have NOT CHANGED      Dose / Directions   acetaminophen 500 MG tablet  Commonly known as:  TYLENOL      Dose:  500-1000 mg  Take 500-1,000 mg by mouth 2 times daily as needed for mild pain or fever  Refills:  0     carBAMazepine 300 MG 12  hr capsule  Commonly known as:  CARBATROL  Used for:  Partial epilepsy with impairment of consciousness, intractable (H)      TAKE 1 CAPSULE BY MOUTH TWICE DAILY (AT  10  AM  AND  10PM)  Quantity:  180 capsule  Refills:  3     cetirizine 10 MG tablet  Commonly known as:  ZYRTEC ALLERGY  Used for:  Reaction to insect bite      Dose:  10 mg  Take 1 tablet (10 mg) by mouth daily as needed (for mosquitos.)  Quantity:  90 tablet  Refills:  3     clonazePAM 0.5 MG tablet  Commonly known as:  klonoPIN  Used for:  Partial epilepsy with impairment of consciousness, intractable (H)      TAKE 1 TABLET BY MOUTH ONCE DAILY IN THE MORNING AND 2 IN THE EVENING  Quantity:  90 tablet  Refills:  5     escitalopram 20 MG tablet  Commonly known as:  LEXAPRO  Used for:  History of depression, Anxiety state      TAKE 1 TABLET BY MOUTH ONCE DAILY AT  NIGHT  Quantity:  90 tablet  Refills:  3     felbamate 600 MG tablet  Commonly known as:  FELBATOL  Used for:  Partial epilepsy with impairment of consciousness, intractable (H)      Take 900 mg (1.5 tablet) am and 900 mg (1.5 tablet) 2 pm  Quantity:  270 tablet  Refills:  3     pantoprazole 40 MG EC tablet  Commonly known as:  PROTONIX  Used for:  Gastroesophageal reflux disease without esophagitis      Dose:  40 mg  Take 1 tablet (40 mg) by mouth daily  Quantity:  90 tablet  Refills:  1     topiramate 100 MG tablet  Commonly known as:  TOPAMAX  Used for:  Partial epilepsy with impairment of consciousness, intractable (H)      TAKE TWO TABLETS BY MOUTH ONCE DAILY IN THE MORNING AND THREE ONCE DAILY IN THE EVENING  Quantity:  450 tablet  Refills:  3           Where to get your medicines      Information about where to get these medications is not yet available    Ask your nurse or doctor about these medications    clonazePAM 0.5 MG tablet    HYDROcodone-acetaminophen 5-325 MG tablet         We also reviewed Joni Borja's allergy list and updated it as needed:  Allergies: Dilantin  [phenytoin]    Thank you for continuing to care for Joni Borja.  We look forward to working together with you in the future.    Sincerely,  Simran Collazo, [facility]

## 2019-09-04 NOTE — PROGRESS NOTES
NSG DISCHARGE NOTE    Patient discharged to home at 1:59 PM via wheel chair. Accompanied by other:care cab staff and staff. Discharge instructions reviewed with patient, opportunity offered to ask questions. Prescriptions sent to patients preferred pharmacy. All belongings sent with patient.    Hard scripts placed in discharge packet.       Louise Menendez RN

## 2019-09-04 NOTE — PROGRESS NOTES
Nurse to nurse given to [facility] staff. All questions answered at this time.

## 2019-09-04 NOTE — PLAN OF CARE
No change in patient's condition. Vital signs:  Temp: 98.1  F (36.7  C) Temp src: Tympanic BP: 122/83 Pulse: 74 Heart Rate: 66 Resp: 16 SpO2: 98 % O2 Device: None (Room air)     Simin Saucedo RN on 9/4/2019 at 6:36 AM

## 2019-09-05 NOTE — PROGRESS NOTES
:    I received a call from [facility] and they provided a local fax number.  I faxed over discharge summary and current HCD.    ROSI Torres on 9/5/2019 at 10:39 AM

## 2019-11-20 ENCOUNTER — TELEPHONE (OUTPATIENT)
Dept: ONCOLOGY | Facility: CLINIC | Age: 49
End: 2019-11-20

## 2019-11-20 NOTE — TELEPHONE ENCOUNTER
Pt mother called and reported pt has . She would like to thank Dr Daniels for all her help and care.   She wants to donate funds for memorial purposes for her son. Indira can you give her a call and give her a few ideas where she can donate. She shares her son was able to benefit from a lot of free drug assistance.   Thank you   Sd Zambrano RN

## 2019-12-04 ENCOUNTER — TELEPHONE (OUTPATIENT)
Dept: ONCOLOGY | Facility: CLINIC | Age: 49
End: 2019-12-04

## 2019-12-04 NOTE — TELEPHONE ENCOUNTER
Called and left a message with patient's mother offering my deepest condolence.   Berkley Daniels MD  Neuro-oncology  12/4/2019

## 2021-01-22 NOTE — PROGRESS NOTES
"Oncology Rooming Note    June 6, 2017 2:56 PM   Joni Borja is a 46 year old male who presents for:    Chief Complaint   Patient presents with     Oncology Clinic Visit     Oligodendroglioma, anaplastic      Initial Vitals: /76 (BP Location: Right arm, Patient Position: Chair, Cuff Size: Adult Regular)  Pulse 80  Temp 98.8  F (37.1  C) (Oral)  Resp 16  Wt 81 kg (178 lb 9.6 oz)  SpO2 98%  BMI 26.36 kg/m2 Estimated body mass index is 26.36 kg/(m^2) as calculated from the following:    Height as of 5/17/17: 1.753 m (5' 9.02\").    Weight as of this encounter: 81 kg (178 lb 9.6 oz). Body surface area is 1.99 meters squared.  No Pain (0) Comment: Data Unavailable   No LMP for male patient.  Allergies reviewed: Yes  Medications reviewed: Yes    Medications: Medication refills not needed today.  Pharmacy name entered into [employer] [facility]:    [id number] - [place], [place] - [address]  [facility] - [place], [place] - [address]    Clinical concerns: None            5 minutes for nursing intake (face to face time)     Joyce Pelayo MA    Reviewed with Pt & his parents:   Increase temozolomide to 400mg, start date on 6/9.    Return to clinic on 6/27.  Repeat MRI scan 8/1 to be done 3 hours prior to PM appt with me.   Contact radiology department about turning off patient's VNS; may need to come from the [facility]. --needs special wand to shut of     Berkley Daniels MD  Neuro-oncology  6/6/2017    DISCHARGE PLAN:  Next appointments: See patient instruction section  Departure Mode: Ambulatory  Accompanied by: parents   15 minutes for nursing discharge (face to face time)   Gina Ramos RN      " Unknown if ever smoked

## 2021-02-12 NOTE — TELEPHONE ENCOUNTER
Social Work Progress Note      Data/Intervention:  Patient Name:  Joni Borja  /Age:  1970 (47 year old)    Reason for Follow-Up:  Joni is a 47-year-old gentleman who has a diagnosis of right frontal low grade oligodendroglioma. This clinician made second attempt to reach pt today, and follow-up with pt's mother.     Intervention:   This clinician called and LVM again on pt's two cell phone numbers provided by mother. This clinician called pt's mother and reviewed resources in [place] area for transportation and lodging. Mother reported that she will call and discuss with son and will call clinic at beginning of next week to express desired location for radiation treatment. Mother with general questions about radiation treatment today, which this clinician answered to the best of my ability.    Resources Provided:  [facility] Transportation- [phone]  [employer]- [employer] [facility] Program    Plan:  1) Previously provided patient/family with writer's contact information and availability. Family is aware that this clinician will be out of town -, and knows to follow-up with [facility] for plan as to location of radiation treatment  2) This clinician will continue to be available for ongoing psychosocial support as needed.  3) Will continue to collaborate with pt's care team regarding ongoing care needs.     Please call or page if needs or concerns arise.     DELANEY Cooper, [facility]  Direct Phone: [phone]  Pager: [phone]   02-12 Na136 mmol/L Glu 106 mg/dL<H> K+ 5.0 mmol/L Cr  2.57 mg/dL<H> BUN 38 mg/dL<H> 02-11 Phos 4.3 mg/dL 02-12 Alb 2.8 g/dL<L>  HbA1c is  7.0%        CAPILLARY BLOOD GLUCOSE      POCT Blood Glucose.: 117 mg/dL (12 Feb 2021 07:37)  POCT Blood Glucose.: 147 mg/dL (11 Feb 2021 23:05)

## (undated) DEVICE — SYR 10ML FINGER CONTROL W/O NDL 309695

## (undated) DEVICE — BLADE CLIPPER 4412A

## (undated) DEVICE — SU ETHILON 3-0 FS-1 18" 663G

## (undated) DEVICE — SPONGE COTTONOID 1/2X3" 80-1407

## (undated) DEVICE — MIDAS REX DISSECTING TOOL  14MH30

## (undated) DEVICE — STPL SKIN 35W ROTATING HEAD PRW35

## (undated) DEVICE — PIN SKULL MAYFIELD ADULT TITANIUM 3/PK A1120

## (undated) DEVICE — ESU CORD BIPOLAR 12' E0509

## (undated) DEVICE — ESU GROUND PAD UNIVERSAL W/O CORD

## (undated) DEVICE — TUBING SUCTION SOFT 20'X3/16" 0036570

## (undated) DEVICE — SU VICRYL 2-0 CT-2 CR 8X18" J726D

## (undated) DEVICE — MANIFOLD NEPTUNE 4 PORT 700-20

## (undated) DEVICE — GLOVE PROTEXIS BLUE W/NEU-THERA 6.5  2D73EB65

## (undated) DEVICE — DRAPE MICROSCOPE EQUIP 48X120" 6130VL2

## (undated) DEVICE — BLADE KNIFE SURG 15 371115

## (undated) DEVICE — SOL NACL 0.9% IRRIG 1000ML BOTTLE 07138-09

## (undated) DEVICE — SOLUTION WOUND CLEANSING 3/4OZ 10% PVP EA-L3011FB-50

## (undated) DEVICE — SOL WATER IRRIG 1000ML BOTTLE 2F7114

## (undated) DEVICE — DRSG KERLIX FLUFFS X5

## (undated) DEVICE — SURGICEL FIBRILLAR HEMOSTAT 1"X2" 1961

## (undated) DEVICE — DRAPE ISOLATION BAG 1003

## (undated) DEVICE — GUN RANEY W/30 CLIPS CG8900

## (undated) DEVICE — SU VICRYL 0 CT-1 CR 8X18" J740D

## (undated) DEVICE — DRSG TELFA 3X8" 1238

## (undated) DEVICE — NDL ANGIOCATH 14GA 1.25" 4048

## (undated) DEVICE — DRAPE POUCH INSTRUMENT 1018

## (undated) DEVICE — SPONGE COTTONOID 1X3" 80-1408

## (undated) DEVICE — PREP DURAPREP 06ML APL 8635

## (undated) DEVICE — PERFORATOR 14MM CODMAN

## (undated) DEVICE — MARKER SPHERES PASSIVE MEDT PACK 5 8801075

## (undated) DEVICE — RX SURGIFLO HEMOSTATIC MATRIX W/THROMBIN 8ML NEXTGEN 2993

## (undated) DEVICE — LINEN TOWEL PACK X5 5464

## (undated) DEVICE — ESU ELEC BLADE 2.75" COATED/INSULATED E1455

## (undated) DEVICE — SU NUROLON 4-0 TF CR 8X18" C584D

## (undated) DEVICE — TIP BIPOLAR FCP BAYONET 1MM DISP 8135100S

## (undated) DEVICE — PACK NEURO SNE15SNFSA

## (undated) DEVICE — SPONGE SURGIFOAM 100 1974

## (undated) DEVICE — Device

## (undated) DEVICE — NDL 25GA 1.5" 305127

## (undated) DEVICE — MIDAS REX DISSECTING TOOL  F2/8TA23

## (undated) DEVICE — IMP PLATE SYN MATRIXNEURO STR 2 HOLE 12MM  04.503.062: Type: IMPLANTABLE DEVICE | Site: SKULL | Status: NON-FUNCTIONAL

## (undated) DEVICE — MIDAS REX DISSECTING TOOL 6MM 9BA60

## (undated) DEVICE — SOL NACL 0.9% INJ 250ML BAG 2B1322Q

## (undated) DEVICE — SPONGE COTTON BALL 1/4" W/STRING 30-00

## (undated) DEVICE — GLOVE PROTEXIS W/NEU-THERA 7.5  2D73TE75

## (undated) DEVICE — RX SURGIFLO HEMOSTATIC MATRIX W/THROMBIN 8ML 2994

## (undated) DEVICE — GLOVE PROTEXIS W/NEU-THERA 6.5  2D73TE65

## (undated) DEVICE — DRSG KERLIX 4 1/2"X4YDS ROLL 6715

## (undated) DEVICE — IMP SCR SYN MATRIX LOW PRO 1.5X04MM SELF DRILL 04.503.104.01: Type: IMPLANTABLE DEVICE | Site: SKULL | Status: NON-FUNCTIONAL

## (undated) DEVICE — BATTERY PACK FOR POWERED DRIVER 05.000.007.01S

## (undated) RX ORDER — PROPOFOL 10 MG/ML
INJECTION, EMULSION INTRAVENOUS
Status: DISPENSED
Start: 2017-03-24

## (undated) RX ORDER — GLYCOPYRROLATE 0.2 MG/ML
INJECTION, SOLUTION INTRAMUSCULAR; INTRAVENOUS
Status: DISPENSED
Start: 2017-03-24

## (undated) RX ORDER — ONDANSETRON 2 MG/ML
INJECTION INTRAMUSCULAR; INTRAVENOUS
Status: DISPENSED
Start: 2018-07-13

## (undated) RX ORDER — PROPOFOL 10 MG/ML
INJECTION, EMULSION INTRAVENOUS
Status: DISPENSED
Start: 2018-07-13

## (undated) RX ORDER — HYDROMORPHONE HYDROCHLORIDE 1 MG/ML
INJECTION, SOLUTION INTRAMUSCULAR; INTRAVENOUS; SUBCUTANEOUS
Status: DISPENSED
Start: 2018-07-13

## (undated) RX ORDER — DEXAMETHASONE SODIUM PHOSPHATE 4 MG/ML
INJECTION, SOLUTION INTRA-ARTICULAR; INTRALESIONAL; INTRAMUSCULAR; INTRAVENOUS; SOFT TISSUE
Status: DISPENSED
Start: 2018-07-13

## (undated) RX ORDER — LIDOCAINE HYDROCHLORIDE 20 MG/ML
INJECTION, SOLUTION EPIDURAL; INFILTRATION; INTRACAUDAL; PERINEURAL
Status: DISPENSED
Start: 2017-03-24

## (undated) RX ORDER — LIDOCAINE HYDROCHLORIDE 20 MG/ML
INJECTION, SOLUTION EPIDURAL; INFILTRATION; INTRACAUDAL; PERINEURAL
Status: DISPENSED
Start: 2018-07-13

## (undated) RX ORDER — CEFAZOLIN SODIUM 2 G/100ML
INJECTION, SOLUTION INTRAVENOUS
Status: DISPENSED
Start: 2017-03-24

## (undated) RX ORDER — LIDOCAINE HYDROCHLORIDE AND EPINEPHRINE 10; 10 MG/ML; UG/ML
INJECTION, SOLUTION INFILTRATION; PERINEURAL
Status: DISPENSED
Start: 2017-03-24

## (undated) RX ORDER — CARBAMAZEPINE 100 MG/1
TABLET, EXTENDED RELEASE ORAL
Status: DISPENSED
Start: 2019-01-01

## (undated) RX ORDER — FENTANYL CITRATE 50 UG/ML
INJECTION, SOLUTION INTRAMUSCULAR; INTRAVENOUS
Status: DISPENSED
Start: 2017-03-24

## (undated) RX ORDER — HYDROMORPHONE HYDROCHLORIDE 1 MG/ML
INJECTION, SOLUTION INTRAMUSCULAR; INTRAVENOUS; SUBCUTANEOUS
Status: DISPENSED
Start: 2017-03-24

## (undated) RX ORDER — CEFAZOLIN SODIUM 1 G/3ML
INJECTION, POWDER, FOR SOLUTION INTRAMUSCULAR; INTRAVENOUS
Status: DISPENSED
Start: 2017-03-24

## (undated) RX ORDER — FENTANYL CITRATE 50 UG/ML
INJECTION, SOLUTION INTRAMUSCULAR; INTRAVENOUS
Status: DISPENSED
Start: 2018-07-13

## (undated) RX ORDER — DEXAMETHASONE SODIUM PHOSPHATE 4 MG/ML
INJECTION, SOLUTION INTRA-ARTICULAR; INTRALESIONAL; INTRAMUSCULAR; INTRAVENOUS; SOFT TISSUE
Status: DISPENSED
Start: 2019-01-01

## (undated) RX ORDER — GINSENG 100 MG
CAPSULE ORAL
Status: DISPENSED
Start: 2018-07-13

## (undated) RX ORDER — VECURONIUM BROMIDE 1 MG/ML
INJECTION, POWDER, LYOPHILIZED, FOR SOLUTION INTRAVENOUS
Status: DISPENSED
Start: 2017-03-24

## (undated) RX ORDER — ONDANSETRON 2 MG/ML
INJECTION INTRAMUSCULAR; INTRAVENOUS
Status: DISPENSED
Start: 2017-03-24

## (undated) RX ORDER — ESCITALOPRAM OXALATE 10 MG/1
TABLET ORAL
Status: DISPENSED
Start: 2019-01-01

## (undated) RX ORDER — TOPIRAMATE 100 MG/1
TABLET, FILM COATED ORAL
Status: DISPENSED
Start: 2019-01-01

## (undated) RX ORDER — GINSENG 100 MG
CAPSULE ORAL
Status: DISPENSED
Start: 2017-03-24

## (undated) RX ORDER — CEFAZOLIN SODIUM 2 G/100ML
INJECTION, SOLUTION INTRAVENOUS
Status: DISPENSED
Start: 2018-07-13

## (undated) RX ORDER — CEFAZOLIN SODIUM 1 G/3ML
INJECTION, POWDER, FOR SOLUTION INTRAMUSCULAR; INTRAVENOUS
Status: DISPENSED
Start: 2018-07-13

## (undated) RX ORDER — DEXAMETHASONE SODIUM PHOSPHATE 4 MG/ML
INJECTION, SOLUTION INTRA-ARTICULAR; INTRALESIONAL; INTRAMUSCULAR; INTRAVENOUS; SOFT TISSUE
Status: DISPENSED
Start: 2017-03-24

## (undated) RX ORDER — LIDOCAINE HYDROCHLORIDE AND EPINEPHRINE 10; 10 MG/ML; UG/ML
INJECTION, SOLUTION INFILTRATION; PERINEURAL
Status: DISPENSED
Start: 2018-07-13

## (undated) RX ORDER — CLONAZEPAM 0.5 MG/1
TABLET ORAL
Status: DISPENSED
Start: 2019-01-01